# Patient Record
Sex: FEMALE | Race: WHITE | NOT HISPANIC OR LATINO | Employment: OTHER | URBAN - METROPOLITAN AREA
[De-identification: names, ages, dates, MRNs, and addresses within clinical notes are randomized per-mention and may not be internally consistent; named-entity substitution may affect disease eponyms.]

---

## 2017-01-09 ENCOUNTER — HOSPITAL ENCOUNTER (OUTPATIENT)
Dept: RADIOLOGY | Facility: MEDICAL CENTER | Age: 82
End: 2017-01-09
Attending: FAMILY MEDICINE
Payer: MEDICARE

## 2017-01-09 DIAGNOSIS — Z13.9 SCREENING: ICD-10-CM

## 2017-01-09 PROCEDURE — 77063 BREAST TOMOSYNTHESIS BI: CPT

## 2017-01-18 ENCOUNTER — APPOINTMENT (OUTPATIENT)
Dept: RADIOLOGY | Facility: MEDICAL CENTER | Age: 82
DRG: 189 | End: 2017-01-18
Attending: EMERGENCY MEDICINE
Payer: MEDICARE

## 2017-01-18 ENCOUNTER — HOSPITAL ENCOUNTER (INPATIENT)
Facility: MEDICAL CENTER | Age: 82
LOS: 12 days | DRG: 189 | End: 2017-01-30
Attending: EMERGENCY MEDICINE | Admitting: INTERNAL MEDICINE
Payer: MEDICARE

## 2017-01-18 ENCOUNTER — RESOLUTE PROFESSIONAL BILLING HOSPITAL PROF FEE (OUTPATIENT)
Dept: HOSPITALIST | Facility: MEDICAL CENTER | Age: 82
End: 2017-01-18
Payer: MEDICARE

## 2017-01-18 DIAGNOSIS — J84.9 INTERSTITIAL LUNG DISEASE (HCC): ICD-10-CM

## 2017-01-18 DIAGNOSIS — R09.02 HYPOXEMIA: ICD-10-CM

## 2017-01-18 DIAGNOSIS — R55 NEAR SYNCOPE: ICD-10-CM

## 2017-01-18 DIAGNOSIS — R91.1 LUNG NODULE: ICD-10-CM

## 2017-01-18 DIAGNOSIS — Z90.11 S/P RIGHT MASTECTOMY: ICD-10-CM

## 2017-01-18 DIAGNOSIS — I47.20 V TACH (HCC): ICD-10-CM

## 2017-01-18 DIAGNOSIS — J18.9 COMMUNITY ACQUIRED PNEUMONIA: ICD-10-CM

## 2017-01-18 DIAGNOSIS — J96.01 ACUTE RESPIRATORY FAILURE WITH HYPOXIA (HCC): ICD-10-CM

## 2017-01-18 PROBLEM — J96.90 RESPIRATORY FAILURE (HCC): Status: ACTIVE | Noted: 2017-01-18

## 2017-01-18 LAB
ALBUMIN SERPL BCP-MCNC: 3 G/DL (ref 3.2–4.9)
ALBUMIN/GLOB SERPL: 0.7 G/DL
ALP SERPL-CCNC: 95 U/L (ref 30–99)
ALT SERPL-CCNC: 19 U/L (ref 2–50)
ANION GAP SERPL CALC-SCNC: 9 MMOL/L (ref 0–11.9)
AST SERPL-CCNC: 27 U/L (ref 12–45)
BASOPHILS # BLD AUTO: 0.7 % (ref 0–1.8)
BASOPHILS # BLD: 0.05 K/UL (ref 0–0.12)
BILIRUB SERPL-MCNC: 0.2 MG/DL (ref 0.1–1.5)
BNP SERPL-MCNC: 75 PG/ML (ref 0–100)
BUN SERPL-MCNC: 26 MG/DL (ref 8–22)
CALCIUM SERPL-MCNC: 9.5 MG/DL (ref 8.4–10.2)
CHLORIDE SERPL-SCNC: 104 MMOL/L (ref 96–112)
CO2 SERPL-SCNC: 24 MMOL/L (ref 20–33)
CREAT SERPL-MCNC: 1.22 MG/DL (ref 0.5–1.4)
DEPRECATED D DIMER PPP IA-ACNC: 783 NG/ML(D-DU)
EKG IMPRESSION: NORMAL
EOSINOPHIL # BLD AUTO: 0.25 K/UL (ref 0–0.51)
EOSINOPHIL NFR BLD: 3.4 % (ref 0–6.9)
ERYTHROCYTE [DISTWIDTH] IN BLOOD BY AUTOMATED COUNT: 45.5 FL (ref 35.9–50)
GFR SERPL CREATININE-BSD FRML MDRD: 42 ML/MIN/1.73 M 2
GLOBULIN SER CALC-MCNC: 4.3 G/DL (ref 1.9–3.5)
GLUCOSE SERPL-MCNC: 142 MG/DL (ref 65–99)
HCT VFR BLD AUTO: 37.7 % (ref 37–47)
HGB BLD-MCNC: 12.6 G/DL (ref 12–16)
IMM GRANULOCYTES # BLD AUTO: 0.05 K/UL (ref 0–0.11)
IMM GRANULOCYTES NFR BLD AUTO: 0.7 % (ref 0–0.9)
LACTATE BLD-SCNC: 1.3 MMOL/L (ref 0.5–2)
LYMPHOCYTES # BLD AUTO: 0.78 K/UL (ref 1–4.8)
LYMPHOCYTES NFR BLD: 10.5 % (ref 22–41)
MCH RBC QN AUTO: 29.7 PG (ref 27–33)
MCHC RBC AUTO-ENTMCNC: 33.4 G/DL (ref 33.6–35)
MCV RBC AUTO: 88.9 FL (ref 81.4–97.8)
MONOCYTES # BLD AUTO: 0.9 K/UL (ref 0–0.85)
MONOCYTES NFR BLD AUTO: 12.1 % (ref 0–13.4)
NEUTROPHILS # BLD AUTO: 5.42 K/UL (ref 2–7.15)
NEUTROPHILS NFR BLD: 72.6 % (ref 44–72)
NRBC # BLD AUTO: 0 K/UL
NRBC BLD AUTO-RTO: 0 /100 WBC
PLATELET # BLD AUTO: 250 K/UL (ref 164–446)
PMV BLD AUTO: 8.3 FL (ref 9–12.9)
POTASSIUM SERPL-SCNC: 4.4 MMOL/L (ref 3.6–5.5)
PROT SERPL-MCNC: 7.3 G/DL (ref 6–8.2)
RBC # BLD AUTO: 4.24 M/UL (ref 4.2–5.4)
SODIUM SERPL-SCNC: 137 MMOL/L (ref 135–145)
SPECIMEN DRAWN FROM PATIENT: NORMAL
TROPONIN I SERPL-MCNC: <0.02 NG/ML (ref 0–0.04)
WBC # BLD AUTO: 7.5 K/UL (ref 4.8–10.8)

## 2017-01-18 PROCEDURE — 94760 N-INVAS EAR/PLS OXIMETRY 1: CPT

## 2017-01-18 PROCEDURE — 84484 ASSAY OF TROPONIN QUANT: CPT

## 2017-01-18 PROCEDURE — 700111 HCHG RX REV CODE 636 W/ 250 OVERRIDE (IP): Performed by: INTERNAL MEDICINE

## 2017-01-18 PROCEDURE — 83605 ASSAY OF LACTIC ACID: CPT

## 2017-01-18 PROCEDURE — 700102 HCHG RX REV CODE 250 W/ 637 OVERRIDE(OP): Performed by: EMERGENCY MEDICINE

## 2017-01-18 PROCEDURE — 99223 1ST HOSP IP/OBS HIGH 75: CPT | Mod: AI | Performed by: INTERNAL MEDICINE

## 2017-01-18 PROCEDURE — 700111 HCHG RX REV CODE 636 W/ 250 OVERRIDE (IP)

## 2017-01-18 PROCEDURE — A9270 NON-COVERED ITEM OR SERVICE: HCPCS | Performed by: EMERGENCY MEDICINE

## 2017-01-18 PROCEDURE — 700102 HCHG RX REV CODE 250 W/ 637 OVERRIDE(OP): Performed by: INTERNAL MEDICINE

## 2017-01-18 PROCEDURE — 99285 EMERGENCY DEPT VISIT HI MDM: CPT

## 2017-01-18 PROCEDURE — 93010 ELECTROCARDIOGRAM REPORT: CPT | Performed by: INTERNAL MEDICINE

## 2017-01-18 PROCEDURE — 700117 HCHG RX CONTRAST REV CODE 255: Performed by: EMERGENCY MEDICINE

## 2017-01-18 PROCEDURE — 96374 THER/PROPH/DIAG INJ IV PUSH: CPT

## 2017-01-18 PROCEDURE — 85379 FIBRIN DEGRADATION QUANT: CPT

## 2017-01-18 PROCEDURE — 93005 ELECTROCARDIOGRAM TRACING: CPT | Performed by: INTERNAL MEDICINE

## 2017-01-18 PROCEDURE — 36415 COLL VENOUS BLD VENIPUNCTURE: CPT

## 2017-01-18 PROCEDURE — 700105 HCHG RX REV CODE 258

## 2017-01-18 PROCEDURE — 94640 AIRWAY INHALATION TREATMENT: CPT

## 2017-01-18 PROCEDURE — 83880 ASSAY OF NATRIURETIC PEPTIDE: CPT

## 2017-01-18 PROCEDURE — 700105 HCHG RX REV CODE 258: Performed by: INTERNAL MEDICINE

## 2017-01-18 PROCEDURE — 700111 HCHG RX REV CODE 636 W/ 250 OVERRIDE (IP): Performed by: EMERGENCY MEDICINE

## 2017-01-18 PROCEDURE — 80053 COMPREHEN METABOLIC PANEL: CPT

## 2017-01-18 PROCEDURE — 71010 DX-CHEST-PORTABLE (1 VIEW): CPT

## 2017-01-18 PROCEDURE — 770020 HCHG ROOM/CARE - TELE (206)

## 2017-01-18 PROCEDURE — 72131 CT LUMBAR SPINE W/O DYE: CPT

## 2017-01-18 PROCEDURE — 71275 CT ANGIOGRAPHY CHEST: CPT

## 2017-01-18 PROCEDURE — A9270 NON-COVERED ITEM OR SERVICE: HCPCS | Performed by: INTERNAL MEDICINE

## 2017-01-18 PROCEDURE — 85025 COMPLETE CBC W/AUTO DIFF WBC: CPT

## 2017-01-18 PROCEDURE — 93005 ELECTROCARDIOGRAM TRACING: CPT

## 2017-01-18 RX ORDER — DOCUSATE SODIUM 100 MG/1
100 CAPSULE, LIQUID FILLED ORAL 2 TIMES DAILY
Status: DISCONTINUED | OUTPATIENT
Start: 2017-01-18 | End: 2017-01-18

## 2017-01-18 RX ORDER — DOCUSATE SODIUM 100 MG/1
100 CAPSULE, LIQUID FILLED ORAL EVERY MORNING
Status: DISCONTINUED | OUTPATIENT
Start: 2017-01-19 | End: 2017-01-30 | Stop reason: HOSPADM

## 2017-01-18 RX ORDER — ONDANSETRON 4 MG/1
4 TABLET, ORALLY DISINTEGRATING ORAL EVERY 4 HOURS PRN
Status: DISCONTINUED | OUTPATIENT
Start: 2017-01-18 | End: 2017-01-30 | Stop reason: HOSPADM

## 2017-01-18 RX ORDER — GABAPENTIN 300 MG/1
CAPSULE ORAL
Status: COMPLETED
Start: 2017-01-18 | End: 2017-01-18

## 2017-01-18 RX ORDER — AMOXICILLIN 250 MG
1 CAPSULE ORAL
Status: DISCONTINUED | OUTPATIENT
Start: 2017-01-18 | End: 2017-01-30 | Stop reason: HOSPADM

## 2017-01-18 RX ORDER — IPRATROPIUM BROMIDE AND ALBUTEROL SULFATE 2.5; .5 MG/3ML; MG/3ML
3 SOLUTION RESPIRATORY (INHALATION)
Status: DISCONTINUED | OUTPATIENT
Start: 2017-01-18 | End: 2017-01-20 | Stop reason: ALTCHOICE

## 2017-01-18 RX ORDER — AMOXICILLIN 250 MG
1 CAPSULE ORAL NIGHTLY
Status: DISCONTINUED | OUTPATIENT
Start: 2017-01-19 | End: 2017-01-30 | Stop reason: HOSPADM

## 2017-01-18 RX ORDER — ALPRAZOLAM 0.25 MG/1
0.25 TABLET ORAL NIGHTLY PRN
Status: DISCONTINUED | OUTPATIENT
Start: 2017-01-18 | End: 2017-01-30 | Stop reason: HOSPADM

## 2017-01-18 RX ORDER — FLUTICASONE PROPIONATE 50 MCG
1 SPRAY, SUSPENSION (ML) NASAL
Status: DISCONTINUED | OUTPATIENT
Start: 2017-01-18 | End: 2017-01-30 | Stop reason: HOSPADM

## 2017-01-18 RX ORDER — MORPHINE SULFATE 4 MG/ML
4 INJECTION, SOLUTION INTRAMUSCULAR; INTRAVENOUS ONCE
Status: COMPLETED | OUTPATIENT
Start: 2017-01-18 | End: 2017-01-18

## 2017-01-18 RX ORDER — HYDROCODONE BITARTRATE AND ACETAMINOPHEN 5; 325 MG/1; MG/1
1-2 TABLET ORAL EVERY 4 HOURS PRN
Status: DISCONTINUED | OUTPATIENT
Start: 2017-01-18 | End: 2017-01-21

## 2017-01-18 RX ORDER — GABAPENTIN 300 MG/1
300 CAPSULE ORAL 3 TIMES DAILY
Status: DISCONTINUED | OUTPATIENT
Start: 2017-01-18 | End: 2017-01-30 | Stop reason: HOSPADM

## 2017-01-18 RX ORDER — HEPARIN SODIUM 5000 [USP'U]/ML
5000 INJECTION, SOLUTION INTRAVENOUS; SUBCUTANEOUS EVERY 8 HOURS
Status: DISCONTINUED | OUTPATIENT
Start: 2017-01-19 | End: 2017-01-30 | Stop reason: HOSPADM

## 2017-01-18 RX ORDER — SODIUM CHLORIDE 9 MG/ML
INJECTION, SOLUTION INTRAVENOUS CONTINUOUS
Status: ACTIVE | OUTPATIENT
Start: 2017-01-18 | End: 2017-01-19

## 2017-01-18 RX ORDER — ONDANSETRON 2 MG/ML
4 INJECTION INTRAMUSCULAR; INTRAVENOUS EVERY 4 HOURS PRN
Status: DISCONTINUED | OUTPATIENT
Start: 2017-01-18 | End: 2017-01-30 | Stop reason: HOSPADM

## 2017-01-18 RX ORDER — PREDNISONE 20 MG/1
40 TABLET ORAL
Status: COMPLETED | OUTPATIENT
Start: 2017-01-18 | End: 2017-01-22

## 2017-01-18 RX ORDER — LACTULOSE 20 G/30ML
30 SOLUTION ORAL
Status: DISCONTINUED | OUTPATIENT
Start: 2017-01-18 | End: 2017-01-30 | Stop reason: HOSPADM

## 2017-01-18 RX ORDER — ENEMA 19; 7 G/133ML; G/133ML
1 ENEMA RECTAL
Status: DISCONTINUED | OUTPATIENT
Start: 2017-01-18 | End: 2017-01-18

## 2017-01-18 RX ORDER — BISACODYL 10 MG
10 SUPPOSITORY, RECTAL RECTAL
Status: DISCONTINUED | OUTPATIENT
Start: 2017-01-18 | End: 2017-01-30 | Stop reason: HOSPADM

## 2017-01-18 RX ADMIN — CEFTRIAXONE 1 G: 1 INJECTION, POWDER, FOR SOLUTION INTRAMUSCULAR; INTRAVENOUS at 23:15

## 2017-01-18 RX ADMIN — AZITHROMYCIN MONOHYDRATE 250 MG: 500 INJECTION, POWDER, LYOPHILIZED, FOR SOLUTION INTRAVENOUS at 23:51

## 2017-01-18 RX ADMIN — MORPHINE SULFATE 4 MG: 4 INJECTION INTRAVENOUS at 19:57

## 2017-01-18 RX ADMIN — ALPRAZOLAM 0.25 MG: 0.25 TABLET ORAL at 23:50

## 2017-01-18 RX ADMIN — GABAPENTIN 300 MG: 300 CAPSULE ORAL at 21:23

## 2017-01-18 RX ADMIN — IOHEXOL 100 ML: 350 INJECTION, SOLUTION INTRAVENOUS at 20:50

## 2017-01-18 RX ADMIN — SODIUM CHLORIDE: 9 INJECTION, SOLUTION INTRAVENOUS at 23:44

## 2017-01-18 RX ADMIN — PREDNISONE 40 MG: 20 TABLET ORAL at 23:44

## 2017-01-18 ASSESSMENT — COPD QUESTIONNAIRES
HAVE YOU SMOKED AT LEAST 100 CIGARETTES IN YOUR ENTIRE LIFE: YES
DO YOU EVER COUGH UP ANY MUCUS OR PHLEGM?: NO/ONLY WITH OCCASIONAL COLDS OR INFECTIONS
DURING THE PAST 4 WEEKS HOW MUCH DID YOU FEEL SHORT OF BREATH: SOME OF THE TIME
COPD SCREENING SCORE: 6

## 2017-01-18 ASSESSMENT — LIFESTYLE VARIABLES
CONSUMPTION TOTAL: POSITIVE
EVER_SMOKED: YES
TOTAL SCORE: 0
HAVE PEOPLE ANNOYED YOU BY CRITICIZING YOUR DRINKING: NO
TOTAL SCORE: 0
HOW MANY TIMES IN THE PAST YEAR HAVE YOU HAD 5 OR MORE DRINKS IN A DAY: 0
TOTAL SCORE: 0
DO YOU DRINK ALCOHOL: YES
ON A TYPICAL DAY WHEN YOU DRINK ALCOHOL HOW MANY DRINKS DO YOU HAVE: 2
HAVE YOU EVER FELT YOU SHOULD CUT DOWN ON YOUR DRINKING: NO
EVER HAD A DRINK FIRST THING IN THE MORNING TO STEADY YOUR NERVES TO GET RID OF A HANGOVER: NO
AVERAGE NUMBER OF DAYS PER WEEK YOU HAVE A DRINK CONTAINING ALCOHOL: 7
EVER FELT BAD OR GUILTY ABOUT YOUR DRINKING: NO

## 2017-01-18 ASSESSMENT — PAIN SCALES - GENERAL
PAINLEVEL_OUTOF10: 0
PAINLEVEL_OUTOF10: 10

## 2017-01-19 PROBLEM — D35.00 ADRENAL ADENOMA: Status: ACTIVE | Noted: 2017-01-19

## 2017-01-19 PROBLEM — R91.1 LUNG NODULE: Status: ACTIVE | Noted: 2017-01-19

## 2017-01-19 PROBLEM — I47.20 V TACH (HCC): Status: ACTIVE | Noted: 2017-01-19

## 2017-01-19 PROBLEM — I10 HYPERTENSION: Status: ACTIVE | Noted: 2017-01-19

## 2017-01-19 LAB
ANION GAP SERPL CALC-SCNC: 8 MMOL/L (ref 0–11.9)
BUN SERPL-MCNC: 18 MG/DL (ref 8–22)
CALCIUM SERPL-MCNC: 9 MG/DL (ref 8.4–10.2)
CHLORIDE SERPL-SCNC: 107 MMOL/L (ref 96–112)
CO2 SERPL-SCNC: 22 MMOL/L (ref 20–33)
CREAT SERPL-MCNC: 0.94 MG/DL (ref 0.5–1.4)
EKG IMPRESSION: NORMAL
ERYTHROCYTE [DISTWIDTH] IN BLOOD BY AUTOMATED COUNT: 46.5 FL (ref 35.9–50)
GFR SERPL CREATININE-BSD FRML MDRD: 56 ML/MIN/1.73 M 2
GLUCOSE SERPL-MCNC: 174 MG/DL (ref 65–99)
HCT VFR BLD AUTO: 35.2 % (ref 37–47)
HGB BLD-MCNC: 11.6 G/DL (ref 12–16)
MCH RBC QN AUTO: 29.7 PG (ref 27–33)
MCHC RBC AUTO-ENTMCNC: 33 G/DL (ref 33.6–35)
MCV RBC AUTO: 90.3 FL (ref 81.4–97.8)
PLATELET # BLD AUTO: 238 K/UL (ref 164–446)
PMV BLD AUTO: 10.1 FL (ref 9–12.9)
POTASSIUM SERPL-SCNC: 3.6 MMOL/L (ref 3.6–5.5)
RBC # BLD AUTO: 3.9 M/UL (ref 4.2–5.4)
SODIUM SERPL-SCNC: 137 MMOL/L (ref 135–145)
WBC # BLD AUTO: 7 K/UL (ref 4.8–10.8)

## 2017-01-19 PROCEDURE — A9270 NON-COVERED ITEM OR SERVICE: HCPCS | Performed by: INTERNAL MEDICINE

## 2017-01-19 PROCEDURE — 770020 HCHG ROOM/CARE - TELE (206)

## 2017-01-19 PROCEDURE — 700105 HCHG RX REV CODE 258: Performed by: INTERNAL MEDICINE

## 2017-01-19 PROCEDURE — 700102 HCHG RX REV CODE 250 W/ 637 OVERRIDE(OP): Performed by: INTERNAL MEDICINE

## 2017-01-19 PROCEDURE — 94640 AIRWAY INHALATION TREATMENT: CPT

## 2017-01-19 PROCEDURE — 93306 TTE W/DOPPLER COMPLETE: CPT

## 2017-01-19 PROCEDURE — 700112 HCHG RX REV CODE 229: Performed by: INTERNAL MEDICINE

## 2017-01-19 PROCEDURE — 80048 BASIC METABOLIC PNL TOTAL CA: CPT

## 2017-01-19 PROCEDURE — 700111 HCHG RX REV CODE 636 W/ 250 OVERRIDE (IP): Performed by: INTERNAL MEDICINE

## 2017-01-19 PROCEDURE — 99232 SBSQ HOSP IP/OBS MODERATE 35: CPT | Performed by: INTERNAL MEDICINE

## 2017-01-19 PROCEDURE — 93306 TTE W/DOPPLER COMPLETE: CPT | Mod: 26 | Performed by: INTERNAL MEDICINE

## 2017-01-19 PROCEDURE — 700102 HCHG RX REV CODE 250 W/ 637 OVERRIDE(OP): Performed by: EMERGENCY MEDICINE

## 2017-01-19 PROCEDURE — A9270 NON-COVERED ITEM OR SERVICE: HCPCS | Performed by: EMERGENCY MEDICINE

## 2017-01-19 PROCEDURE — 700101 HCHG RX REV CODE 250: Performed by: INTERNAL MEDICINE

## 2017-01-19 PROCEDURE — 85027 COMPLETE CBC AUTOMATED: CPT

## 2017-01-19 PROCEDURE — 94760 N-INVAS EAR/PLS OXIMETRY 1: CPT

## 2017-01-19 RX ADMIN — ALPRAZOLAM 0.25 MG: 0.25 TABLET ORAL at 21:13

## 2017-01-19 RX ADMIN — PREDNISONE 40 MG: 20 TABLET ORAL at 21:23

## 2017-01-19 RX ADMIN — DOCUSATE SODIUM 100 MG: 100 CAPSULE, LIQUID FILLED ORAL at 10:22

## 2017-01-19 RX ADMIN — HEPARIN SODIUM 5000 UNITS: 5000 INJECTION, SOLUTION INTRAVENOUS; SUBCUTANEOUS at 06:19

## 2017-01-19 RX ADMIN — GABAPENTIN 300 MG: 300 CAPSULE ORAL at 10:22

## 2017-01-19 RX ADMIN — IPRATROPIUM BROMIDE AND ALBUTEROL SULFATE 3 ML: .5; 3 SOLUTION RESPIRATORY (INHALATION) at 07:28

## 2017-01-19 RX ADMIN — GABAPENTIN 300 MG: 300 CAPSULE ORAL at 21:13

## 2017-01-19 RX ADMIN — IPRATROPIUM BROMIDE AND ALBUTEROL SULFATE 3 ML: .5; 3 SOLUTION RESPIRATORY (INHALATION) at 00:22

## 2017-01-19 RX ADMIN — HEPARIN SODIUM 5000 UNITS: 5000 INJECTION, SOLUTION INTRAVENOUS; SUBCUTANEOUS at 21:13

## 2017-01-19 RX ADMIN — CEFTRIAXONE SODIUM 1 G: 1 INJECTION, POWDER, FOR SOLUTION INTRAMUSCULAR; INTRAVENOUS at 21:13

## 2017-01-19 RX ADMIN — DEXTROSE MONOHYDRATE 500 MG: 50 INJECTION, SOLUTION INTRAVENOUS at 22:11

## 2017-01-19 RX ADMIN — IPRATROPIUM BROMIDE AND ALBUTEROL SULFATE 3 ML: .5; 3 SOLUTION RESPIRATORY (INHALATION) at 19:05

## 2017-01-19 RX ADMIN — GABAPENTIN 300 MG: 300 CAPSULE ORAL at 14:13

## 2017-01-19 RX ADMIN — METOPROLOL TARTRATE 25 MG: 25 TABLET ORAL at 21:13

## 2017-01-19 RX ADMIN — CHOLECALCIFEROL TAB 25 MCG (1000 UNIT) 1000 UNITS: 25 TAB at 10:30

## 2017-01-19 RX ADMIN — METOPROLOL TARTRATE 25 MG: 25 TABLET ORAL at 12:36

## 2017-01-19 RX ADMIN — SENNOSIDES AND DOCUSATE SODIUM 1 TABLET: 8.6; 5 TABLET ORAL at 21:12

## 2017-01-19 RX ADMIN — HEPARIN SODIUM 5000 UNITS: 5000 INJECTION, SOLUTION INTRAVENOUS; SUBCUTANEOUS at 14:13

## 2017-01-19 RX ADMIN — IPRATROPIUM BROMIDE AND ALBUTEROL SULFATE 3 ML: .5; 3 SOLUTION RESPIRATORY (INHALATION) at 14:58

## 2017-01-19 ASSESSMENT — ENCOUNTER SYMPTOMS
ABDOMINAL PAIN: 0
FEVER: 0
SHORTNESS OF BREATH: 1
SPUTUM PRODUCTION: 0
DIARRHEA: 0
WHEEZING: 0
NAUSEA: 0
CONSTIPATION: 0
COUGH: 1
HEMOPTYSIS: 0
CHILLS: 0
VOMITING: 0

## 2017-01-19 ASSESSMENT — PAIN SCALES - GENERAL: PAINLEVEL_OUTOF10: 0

## 2017-01-19 NOTE — FLOWSHEET NOTE
01/19/17 1455   RT Assessment of Delivered Medications   Evaluation of Medication Delivery Daily Yes-- Pt /Family has been Instructed in use of Respiratory Medications and Adverse Reactions   SVN Group   #SVN Performed Yes   Given By: Mouthpiece   Incentive Spirometry Group   Breathing Exercises Yes   Incentive Spirometer Volume 1250 mL   Chest Exam   Respiration 18   Pulse 71   Breath Sounds   RUL Breath Sounds Clear   RML Breath Sounds Clear   RLL Breath Sounds Fine Crackles;Diminished   KRISTINA Breath Sounds Clear   LLL Breath Sounds Fine Crackles;Diminished   Secretions   Cough Dry;Non Productive   Oximetry   #Pulse Oximetry (Single Determination) Yes   Oxygen   Home O2 Use Prior To Admission? No   Pulse Oximetry 92 %   O2 (LPM) 2   O2 Daily Delivery Respiratory  Nasal Cannula

## 2017-01-19 NOTE — PROGRESS NOTES
Hospital Medicine Progress Note, Adult, Complex               Author: Shefali Cooney Date & Time created: 1/19/2017  11:20 AM     Interval History:  CC - SOB    Pt reports subjective improvement but still SOB  Still requiring 3L O2 (not on any at home)  No f/c/n/v/d  BP better controlled this morning  7 beats VT overnight on tele    Review of Systems:  Review of Systems   Constitutional: Negative for fever and chills.   Respiratory: Positive for cough (dry) and shortness of breath. Negative for hemoptysis, sputum production and wheezing.    Cardiovascular: Negative for chest pain and leg swelling.   Gastrointestinal: Negative for nausea, vomiting, abdominal pain, diarrhea and constipation.   Genitourinary: Negative for dysuria, urgency and frequency.   Skin: Negative for rash.   All other systems reviewed and are negative.      Physical Exam:  Physical Exam   Constitutional: She is oriented to person, place, and time. She appears well-developed and well-nourished. No distress.   HENT:   Head: Normocephalic and atraumatic.   Mouth/Throat: Oropharynx is clear and moist.   Eyes: Conjunctivae and EOM are normal. Pupils are equal, round, and reactive to light. Right eye exhibits no discharge. Left eye exhibits no discharge. No scleral icterus.   Neck: Neck supple. No tracheal deviation present.   Cardiovascular: Normal rate, regular rhythm and normal heart sounds.    Pulmonary/Chest: Effort normal. No respiratory distress. She has no wheezes. She has rales (bibasilar).   Abdominal: Soft. Bowel sounds are normal. She exhibits no distension. There is no tenderness.   Musculoskeletal: She exhibits no edema.   Lymphadenopathy:     She has no cervical adenopathy.        Right: No supraclavicular adenopathy present.        Left: No supraclavicular adenopathy present.   Neurological: She is alert and oriented to person, place, and time. No cranial nerve deficit.   Skin: Skin is warm and dry. No rash noted. She is not  diaphoretic.   Psychiatric: She has a normal mood and affect. Her behavior is normal.   Nursing note and vitals reviewed.      Labs:  Recent Labs      17   ISTATSPEC  Venous     Recent Labs      17   TROPONINI  <0.02   BNPBTYPENAT  75     Recent Labs      17   SODIUM  137  137   POTASSIUM  4.4  3.6   CHLORIDE  104  107   CO2  24  22   BUN  26*  18   CREATININE  1.22  0.94   CALCIUM  9.5  9.0     Recent Labs      17   ALTSGPT  19   --    ASTSGOT  27   --    ALKPHOSPHAT  95   --    TBILIRUBIN  0.2   --    GLUCOSE  142*  174*     Recent Labs      17   RBC  4.24  3.90*   HEMOGLOBIN  12.6  11.6*   HEMATOCRIT  37.7  35.2*   PLATELETCT  250  238     Recent Labs      17   WBC  7.5  7.0   NEUTSPOLYS  72.60*   --    LYMPHOCYTES  10.50*   --    MONOCYTES  12.10   --    EOSINOPHILS  3.40   --    BASOPHILS  0.70   --    ASTSGOT  27   --    ALTSGPT  19   --    ALKPHOSPHAT  95   --    TBILIRUBIN  0.2   --            Hemodynamics:  Temp (24hrs), Av.9 °C (98.4 °F), Min:36.6 °C (97.8 °F), Max:37.4 °C (99.3 °F)  Temperature: 36.6 °C (97.8 °F)  Pulse  Av.7  Min: 75  Max: 92Heart Rate (Monitored): 79  Blood Pressure : 135/56 mmHg, NIBP: (!) 162/79 mmHg     Respiratory:    Respiration: 18, Pulse Oximetry: 93 %, O2 Daily Delivery Respiratory : Nasal Cannula     Given By:: Mouthpiece, Work Of Breathing / Effort: Mild  RUL Breath Sounds: Clear, RML Breath Sounds: Clear, RLL Breath Sounds: Fine Crackles, KRISTINA Breath Sounds: Clear, LLL Breath Sounds: Fine Crackles  Fluids:    Intake/Output Summary (Last 24 hours) at 17 1120  Last data filed at 17 0800   Gross per 24 hour   Intake    970 ml   Output      0 ml   Net    970 ml     Weight: 71.2 kg (156 lb 15.5 oz)  GI/Nutrition:  Orders Placed This Encounter   Procedures   • Diet Order     Standing Status: Standing      Number of  Occurrences: 1      Standing Expiration Date:      Order Specific Question:  Diet:     Answer:  Regular [1]     Medical Decision Making, by Problem:  Active Hospital Problems    Diagnosis   • *Respiratory failure (CMS-HCC) [J96.90] - subjective improvement, cont o2 to maintain sats >90%   • V tach (CMS-HCC) [I47.2] - get echo, start metoprolol, cont tele   • Lung nodule [R91.1] - outpt follow up   • Adrenal adenoma [D35.00]   • Hypertension [I10] - better controlled this AM, started metoprolol   • Interstitial lung disease (CMS-HCC) [J84.9] - rec outpatient pulm referral   • Community acquired pneumonia [J18.9] c3 & azithro as noted above, sputum cx pending   • COPD (CMS-HCC) [J44.9] no wheezing currently, bimal prn       Core Measures

## 2017-01-19 NOTE — PROGRESS NOTES
Report received from Yennifer at bedside, pt on the phone with her .  Morning assessment and medications given about 1020 after pt had napped.  For plan for the day, pt agreed that she would ambulate to the bathroom and walk the halls.  As decreased her oxygen to 2 lt/min and when checking back she has been >90%.  About 1315, pt assisted to the bathroom with a fww as she is unsteady without assistive devices; ra sat dropped to 83% and she felt dizzy.  Pt ambulated down the halls about 1400 with a fww and she is little unsteady on her right ankle and she is aware of this; she states she has the medical equipment at home as she needed it when.  Started on metoprolol at about lunchtime and had a discussion about her AAA increasing.  VSS.

## 2017-01-19 NOTE — ED NOTES
EKG performed - No stemi. Pt's vital signs checked in triage room. Pt sating at 77 on RA. Pt placed on 4L O2 via NC. Pt roomed to 7B

## 2017-01-19 NOTE — ED PROVIDER NOTES
ED Provider Note    CHIEF COMPLAINT  Chief Complaint   Patient presents with   • Chest Pain   • Shortness of Breath   • Back Pain       HPI  Yesi Garduno is a 88 y.o. female who presents for evaluation of hypoxemia. The patient has a home oximeter which she has been using intermittently and she is noted to have oxygenation in the 80s. Today her  checked her oxygen level and it was noted to be in the 70s. She is brought to the emergency department for evaluation. She is having shortness of breath. The patient denies any chest pain, denies cough, denies any pleuritic pain. The patient has no history of pneumonia. She has had mastectomy remotely in the 70s for breast cancer with no recurrence. She does have a diagnosis of COPD noted on the chart.  She has been recovering for ankle surgery over the past few months. The patient is also complaining of unrelenting back pain for the past week. She is having some intermittent numbness down the right leg. She denies any fever or chills. The patient has had no bowel or bladder control issues she denies any dysuria. The patient was a former smoker for 34 years not currently    REVIEW OF SYSTEMS  See HPI for further details. All other systems are reviewed and negative except as noted above    PAST MEDICAL HISTORY  Past Medical History   Diagnosis Date   • Cancer (CMS-HCC)    • Breast cancer (CMS-HCC)    • Near syncope 8/11/2015   • AAA (abdominal aortic aneurysm) (CMS-HCC) 8/11/2015   • COPD (chronic obstructive pulmonary disease) (CMS-HCC) 8/11/2015   • S/P right mastectomy 8/11/2015       FAMILY HISTORY  Family History   Problem Relation Age of Onset   • Cancer Sister    • Cancer Mother        SOCIAL HISTORY  Social History     Social History   • Marital Status:      Spouse Name: N/A   • Number of Children: N/A   • Years of Education: N/A     Social History Main Topics   • Smoking status: Former Smoker -- 34 years     Types: Cigarettes   • Smokeless tobacco:  "Never Used   • Alcohol Use: Yes   • Drug Use: No   • Sexual Activity: Not Asked     Other Topics Concern   • None     Social History Narrative       SURGICAL HISTORY  Past Surgical History   Procedure Laterality Date   • Other       mastectomy   • Mastectomy  1979     right side   • Breast biopsy  1990       CURRENT MEDICATIONS  Home Medications     Reviewed by Pablito Boyd R.N. (Registered Nurse) on 01/18/17 at 1913  Med List Status: Complete    Medication Last Dose Status    alprazolam (XANAX) 0.25 MG Tab 8/11/2016 Active    Calcium Carbonate-Vit D-Min (CALCIUM 1200 PO) 8/14/2016 Active    Coenzyme Q10 (CO Q 10 PO) 8/14/2016 Active    Cyanocobalamin (B-12 PO) 8/14/2016 Active    docusate sodium (COLACE) 100 MG Cap  Active    fluticasone (FLONASE) 50 MCG/ACT nasal spray 8/13/2016 Active    gelatin 650 MG capsule 8/14/2016 Active    hydrocodone-acetaminophen (NORCO) 5-325 MG Tab per tablet  Active    vitamin D (CHOLECALCIFEROL) 1000 UNIT Tab 8/14/2016 Active                 ALLERGIES  Allergies   Allergen Reactions   • Penicillins Vomiting   • Tetracycline Rash and Itching     Pt states \"I get a bad body rash and itch all over\".       PHYSICAL EXAM  VITAL SIGNS: /77 mmHg  Pulse 83  Temp(Src) 37 °C (98.6 °F)  Resp 22  Ht 1.753 m (5' 9.02\")  Wt 69.3 kg (152 lb 12.5 oz)  BMI 22.55 kg/m2  SpO2 93%  Constitutional :  Well developed, Well nourished, No acute distress, Non-toxic appearance.   HENT: Head is atraumatic normocephalic moist mucous membranes  Eyes: Normal-appearing nonicteric  Neck: Normal range of motion, No tenderness, Supple, No stridor.   Lymphatic: No cervical adenopathy.   Cardiovascular: Normal heart rate, Normal rhythm, No murmurs, No rubs, No gallops.   Thorax & Lungs: Equal breath sounds bilaterally no rales rhonchi  Skin: Warm, Dry, No erythema, No rash.   Neurologically she is awake alert without focal findings  Extremities no cyanosis or edema      CT-CTA CHEST PULMONARY ARTERY W/ " RECONS   Final Result      1.  No central or segmental pulmonary embolus   2.  BILATERAL interstitial lung disease, suspect atypical infection or other inflammatory process with a small component of consolidation in the RIGHT lower lobe.   3.  10 x 4 mm RIGHT middle lobe pulmonary nodule   4.  Enlarged LEFT hilar lymph node and prominent mediastinal and RIGHT hilar lymph nodes   5.  BILATERAL adrenal adenomas         RECOMMENDATION FOR PULMONARY NODULE EVALUATION:   High Risk Patient:  Initial follow up CT at 3-6 months, then at 9-12 and 24 months if no change.         Low Risk - Minimal or absent history of smoking and of other known risk factors.   High Risk - History of smoking or of other known risk factors.   Fleischner Society Guidelines for Pulmonary Nodules:  Radiology, 237:2005         CT-LSPINE W/O PLUS RECONS   Final Result      1.  No acute or suspicious abnormality identified.   2.  Osteopenia   3.  Lumbar spondylosis   4.  Slight interval enlargement of infrarenal abdominal aortic aneurysm which now measures up to 4.2 cm.      DX-CHEST-PORTABLE (1 VIEW)   Final Result      1.  Interstitial and alveolar edema, less likely atypical infection   2.  Atherosclerosis   3.  Emphysema          Results for orders placed or performed during the hospital encounter of 01/18/17   CBC WITH DIFFERENTIAL   Result Value Ref Range    WBC 7.5 4.8 - 10.8 K/uL    RBC 4.24 4.20 - 5.40 M/uL    Hemoglobin 12.6 12.0 - 16.0 g/dL    Hematocrit 37.7 37.0 - 47.0 %    MCV 88.9 81.4 - 97.8 fL    MCH 29.7 27.0 - 33.0 pg    MCHC 33.4 (L) 33.6 - 35.0 g/dL    RDW 45.5 35.9 - 50.0 fL    Platelet Count 250 164 - 446 K/uL    MPV 8.3 (L) 9.0 - 12.9 fL    Neutrophils-Polys 72.60 (H) 44.00 - 72.00 %    Lymphocytes 10.50 (L) 22.00 - 41.00 %    Monocytes 12.10 0.00 - 13.40 %    Eosinophils 3.40 0.00 - 6.90 %    Basophils 0.70 0.00 - 1.80 %    Immature Granulocytes 0.70 0.00 - 0.90 %    Nucleated RBC 0.00 /100 WBC    Neutrophils (Absolute) 5.42  2.00 - 7.15 K/uL    Lymphs (Absolute) 0.78 (L) 1.00 - 4.80 K/uL    Monos (Absolute) 0.90 (H) 0.00 - 0.85 K/uL    Eos (Absolute) 0.25 0.00 - 0.51 K/uL    Baso (Absolute) 0.05 0.00 - 0.12 K/uL    Immature Granulocytes (abs) 0.05 0.00 - 0.11 K/uL    NRBC (Absolute) 0.00 K/uL   COMP METABOLIC PANEL   Result Value Ref Range    Sodium 137 135 - 145 mmol/L    Potassium 4.4 3.6 - 5.5 mmol/L    Chloride 104 96 - 112 mmol/L    Co2 24 20 - 33 mmol/L    Anion Gap 9.0 0.0 - 11.9    Glucose 142 (H) 65 - 99 mg/dL    Bun 26 (H) 8 - 22 mg/dL    Creatinine 1.22 0.50 - 1.40 mg/dL    Calcium 9.5 8.4 - 10.2 mg/dL    AST(SGOT) 27 12 - 45 U/L    ALT(SGPT) 19 2 - 50 U/L    Alkaline Phosphatase 95 30 - 99 U/L    Total Bilirubin 0.2 0.1 - 1.5 mg/dL    Albumin 3.0 (L) 3.2 - 4.9 g/dL    Total Protein 7.3 6.0 - 8.2 g/dL    Globulin 4.3 (H) 1.9 - 3.5 g/dL    A-G Ratio 0.7 g/dL   TROPONIN   Result Value Ref Range    Troponin I <0.02 0.00 - 0.04 ng/mL   BTYPE NATRIURETIC PEPTIDE   Result Value Ref Range    B Natriuretic Peptide 75 0 - 100 pg/mL   LACTIC ACID   Result Value Ref Range    Lactic Acid 1.30 0.50 - 2.00 mmol/L    Specimen Venous    D-DIMER (only helpful in low pre-test probability wells critieria. Do not order if patient ruled out by PERC criteria. See Weblinks at top of Labs section)   Result Value Ref Range    D-Dimer Screen 783 (H) <250 ng/mL(D-DU)   ESTIMATED GFR   Result Value Ref Range    GFR If African American 50 (A) >60 mL/min/1.73 m 2    GFR If Non  42 (A) >60 mL/min/1.73 m 2   EKG (NOW)   Result Value Ref Range    Report       [facility] Emergency Dept.    Test Date:  2017  Pt Name:    ADAM JOSIAH                 Department: [facility]  MRN:        [id number]                      Room:       -ROOM 7  Gender:     F                            Technician: MILTON  :        [id number]                   Requested By:ER TRIAGE PROTOCOL  Order #:    [id number]                    Reading  MD:    Measurements  Intervals                                Axis  Rate:       93                           P:          73  NV:         167                          QRS:        79  QRSD:       101                          T:          38  QT:         358  QTc:        446    Interpretive Statements  Sinus rhythm  Probable left atrial enlargement  Artifact in lead(s) I,II,III,aVR,aVL,aVF  Compared to ECG 08/11/2015 10:33:22  Poor R-wave progression no longer present         12-lead tracing sinus rhythm rate 93 no acute ST elevation compared to previous EKG 8/11/2015 poor R-wave progression longer present she intervals normal  impression is abnormal EKG without evidence of hypoxemia        COURSE & MEDICAL DECISION MAKING  Pertinent Labs & Imaging studies reviewed. (See chart for details)  The patient is presenting with evidence of acute hypoxemia. Plain films showed no evidence of pneumonia. The patient had CT imaging done to rule out possible pulmonary embolus as a cause of her hypoxemia. CT imaging shows findings of probable pneumonia in the right lower lobe. There is no evidence of pulmonary embolus. The patient has negative troponins no evidence of acute ischemia. Her hypoxemia may be a combination of COPD and pneumonia. In addition imaging of her back was done because of chronic back pain this shows no evidence of any significant acute abnormalities such as metastatic disease or significant compression fracture. She was treated with IV morphine with relief of her symptoms of her back. The patient is not stable for discharge given her hypoxemia. I discussed case with the hospitalist will be admitting. She has no evidence of sepsis or Sirs. Antibiotics will be instituted by the hospitalist    FINAL IMPRESSION  1. Hypoxemia  2. Right lower lobe pneumonia  3.      Electronically signed by: Pelon Maxwell, 1/18/2017    SHEBA

## 2017-01-19 NOTE — PROGRESS NOTES
Pt transferred from ER. Verbalized she felt very weak and required a wheelchair to transfer from [facility] to bed with Z1 staff assistance. Pt is a&oX4, pleasant and cooperative.  Admit profile and med rec completed. POC discussed with pt, questions/concerns encouraged and addressed. Pt verbalized understanding; now in bed, CLIP, fall precautions in place, will CTM. RT is at the BS.

## 2017-01-19 NOTE — FLOWSHEET NOTE
01/19/17 0728   Interdisciplinary Plan of Care-Goals (Indications)   Obstructive Ventilatory Defect or Pulmonary Disease without Obvious Obstruction History / Diagnosis   RT Assessment of Delivered Medications   Evaluation of Medication Delivery Daily Yes-- Pt /Family has been Instructed in use of Respiratory Medications and Adverse Reactions   SVN Group   #SVN Performed Yes   Given By: Mouthpiece   Incentive Spirometry Group   Incentive Spirometry Instruction Yes   Incentive Spirometer Volume 800 mL   Chest Exam   Respiration 18   Pulse 78   Breath Sounds   RUL Breath Sounds Clear   RML Breath Sounds Clear   RLL Breath Sounds Fine Crackles   KRISTINA Breath Sounds Clear   LLL Breath Sounds Fine Crackles   Secretions   Cough Strong;Dry;Non Productive   How Sputum Obtained Spontaneous   Oximetry   #Pulse Oximetry (Single Determination) Yes   Oxygen   Home O2 Use Prior To Admission? No   Pulse Oximetry 94 %   O2 (LPM) 3   O2 Daily Delivery Respiratory  Nasal Cannula

## 2017-01-19 NOTE — ED NOTES
Noted that pt had a run of vtach-7 beats, hospitalist notified, pt to be changed to a tele admission.

## 2017-01-19 NOTE — FLOWSHEET NOTE
01/18/17 4447   Events/Summary/Plan   Events/Summary/Plan Protocol SVN   Interdisciplinary Plan of Care-Goals (Indications)   Obstructive Ventilatory Defect or Pulmonary Disease without Obvious Obstruction History / Diagnosis   Interdisciplinary Plan of Care-Outcomes    Bronchodilator Outcome Patient at Stable Baseline   Education   Education Yes - Pt. / Family has been Instructed in use of Respiratory Equipment;Yes - Pt. / Family has been Instructed in use of Respiratory Medications and Adverse Reactions   RT Assessment of Delivered Medications   Evaluation of Medication Delivery Daily Yes-- Pt /Family has been Instructed in use of Respiratory Medications and Adverse Reactions   SVN Group   #SVN Performed Yes   Given By: Mouthpiece   Date SVN Last Changed 01/19/17   Date SVN Next Change Due (Q 7 Days) 01/26/17   Chest Exam   Respiration 20   Pulse 78   Breath Sounds   Pre/Post Intervention Post Intervention Assessment   RUL Breath Sounds Clear   RML Breath Sounds Clear   RLL Breath Sounds Diminished   KRISTINA Breath Sounds Clear   LLL Breath Sounds Diminished   Oximetry   #Pulse Oximetry (Single Determination) Yes   Oxygen   Home O2 Use Prior To Admission? No   Pulse Oximetry 94 %   O2 (LPM) 2   O2 Daily Delivery Respiratory  Nasal Cannula   Room Air Challenge Fail

## 2017-01-19 NOTE — RESPIRATORY CARE
COPD Coordinator to see patient, but she was asleep. Will check back. Left COPD packet at the bedside.

## 2017-01-19 NOTE — PROGRESS NOTES
NO acute changes in pt status. Pt is awake, resting in bed with RR even and unlabored. Call light in reach and bed alarm set-- monitoring.     Telemetry Report: pt has been SR/ST.  HR: 80s- low 100s  Measurements: (16/10/36)  Ectopy: f PVC, couplet    Measurements and updates per Chance MATHEWS.

## 2017-01-19 NOTE — ED NOTES
Pt bib family with c/o of SOB and hypoxia, was found at home by family with oxygen saturation in the 70s. States she is currently have chest pain with right hip/leg numbness.   Pt saturating in the 70s during triage and placed on 4lpm nc and now saturating in the 90s.

## 2017-01-19 NOTE — CARE PLAN
Problem: Safety  Goal: Will remain free from falls  Outcome: PROGRESSING AS EXPECTED  Pt educated regarding the use of call light when needing assistance. Pt demonstrated and verbalized the proper use of a call light and to call for assistance. Fall precautions in place, treaded slippers on, personal belongings/phone in reach. Pt has a steady gate ambulating X1 assist to BSC and is encouraged to call if assistance is needed. Bed alarm is set.    Problem: Respiratory:  Goal: Respiratory status will improve  Outcome: PROGRESSING SLOWER THAN EXPECTED  Continues to need oxygen at 3 lpm via NC with O2sat 93-96%.

## 2017-01-19 NOTE — CARE PLAN
Problem: Bowel/Gastric:  Goal: Normal bowel function is maintained or improved  Intervention: Educate patient and significant other/support system about diet, fluid intake, medications and activity to promote bowel function  Pt has a good appetite and has been eating well.       Problem: Mobility  Goal: Risk for activity intolerance will decrease  Intervention: Encourage patient to increase activity level in collaboration with Interdisciplinary Team  PT has been ordered; after discussion, pt up to the bathroom about 1330 and desated to 83% on RA.  Ambulated down the marte about 1400 with a fww, little unsteady on with her right leg where she has a previous ankle fracture.

## 2017-01-20 LAB
EST. AVERAGE GLUCOSE BLD GHB EST-MCNC: 114 MG/DL
HBA1C MFR BLD: 5.6 % (ref 0–5.6)
LV EJECT FRACT  99904: 66
LV EJECT FRACT MOD 2C 99903: 66.48
LV EJECT FRACT MOD 4C 99902: 51
LV EJECT FRACT MOD BP 99901: 58.32

## 2017-01-20 PROCEDURE — 770020 HCHG ROOM/CARE - TELE (206)

## 2017-01-20 PROCEDURE — 700112 HCHG RX REV CODE 229: Performed by: INTERNAL MEDICINE

## 2017-01-20 PROCEDURE — 700101 HCHG RX REV CODE 250: Performed by: INTERNAL MEDICINE

## 2017-01-20 PROCEDURE — 700105 HCHG RX REV CODE 258: Performed by: INTERNAL MEDICINE

## 2017-01-20 PROCEDURE — 97162 PT EVAL MOD COMPLEX 30 MIN: CPT

## 2017-01-20 PROCEDURE — 700102 HCHG RX REV CODE 250 W/ 637 OVERRIDE(OP): Performed by: EMERGENCY MEDICINE

## 2017-01-20 PROCEDURE — 94760 N-INVAS EAR/PLS OXIMETRY 1: CPT

## 2017-01-20 PROCEDURE — 700111 HCHG RX REV CODE 636 W/ 250 OVERRIDE (IP): Performed by: INTERNAL MEDICINE

## 2017-01-20 PROCEDURE — G8979 MOBILITY GOAL STATUS: HCPCS | Mod: CJ

## 2017-01-20 PROCEDURE — 83036 HEMOGLOBIN GLYCOSYLATED A1C: CPT

## 2017-01-20 PROCEDURE — A9270 NON-COVERED ITEM OR SERVICE: HCPCS | Performed by: INTERNAL MEDICINE

## 2017-01-20 PROCEDURE — A9270 NON-COVERED ITEM OR SERVICE: HCPCS | Performed by: EMERGENCY MEDICINE

## 2017-01-20 PROCEDURE — 700102 HCHG RX REV CODE 250 W/ 637 OVERRIDE(OP): Performed by: INTERNAL MEDICINE

## 2017-01-20 PROCEDURE — G8978 MOBILITY CURRENT STATUS: HCPCS | Mod: CL

## 2017-01-20 PROCEDURE — 94640 AIRWAY INHALATION TREATMENT: CPT

## 2017-01-20 PROCEDURE — 99233 SBSQ HOSP IP/OBS HIGH 50: CPT | Performed by: INTERNAL MEDICINE

## 2017-01-20 RX ORDER — IPRATROPIUM BROMIDE AND ALBUTEROL SULFATE 2.5; .5 MG/3ML; MG/3ML
3 SOLUTION RESPIRATORY (INHALATION)
Status: DISCONTINUED | OUTPATIENT
Start: 2017-01-20 | End: 2017-01-26 | Stop reason: ALTCHOICE

## 2017-01-20 RX ORDER — IPRATROPIUM BROMIDE AND ALBUTEROL SULFATE 2.5; .5 MG/3ML; MG/3ML
3 SOLUTION RESPIRATORY (INHALATION)
Status: DISCONTINUED | OUTPATIENT
Start: 2017-01-20 | End: 2017-01-30 | Stop reason: HOSPADM

## 2017-01-20 RX ADMIN — HEPARIN SODIUM 5000 UNITS: 5000 INJECTION, SOLUTION INTRAVENOUS; SUBCUTANEOUS at 15:39

## 2017-01-20 RX ADMIN — CHOLECALCIFEROL TAB 25 MCG (1000 UNIT) 1000 UNITS: 25 TAB at 08:48

## 2017-01-20 RX ADMIN — IPRATROPIUM BROMIDE AND ALBUTEROL SULFATE 3 ML: .5; 3 SOLUTION RESPIRATORY (INHALATION) at 11:43

## 2017-01-20 RX ADMIN — HEPARIN SODIUM 5000 UNITS: 5000 INJECTION, SOLUTION INTRAVENOUS; SUBCUTANEOUS at 06:20

## 2017-01-20 RX ADMIN — CEFTRIAXONE SODIUM 1 G: 1 INJECTION, POWDER, FOR SOLUTION INTRAMUSCULAR; INTRAVENOUS at 22:09

## 2017-01-20 RX ADMIN — HEPARIN SODIUM 5000 UNITS: 5000 INJECTION, SOLUTION INTRAVENOUS; SUBCUTANEOUS at 22:15

## 2017-01-20 RX ADMIN — IPRATROPIUM BROMIDE AND ALBUTEROL SULFATE 3 ML: .5; 3 SOLUTION RESPIRATORY (INHALATION) at 18:29

## 2017-01-20 RX ADMIN — GABAPENTIN 300 MG: 300 CAPSULE ORAL at 08:49

## 2017-01-20 RX ADMIN — GABAPENTIN 300 MG: 300 CAPSULE ORAL at 15:39

## 2017-01-20 RX ADMIN — GABAPENTIN 300 MG: 300 CAPSULE ORAL at 22:15

## 2017-01-20 RX ADMIN — IPRATROPIUM BROMIDE AND ALBUTEROL SULFATE 3 ML: .5; 3 SOLUTION RESPIRATORY (INHALATION) at 07:44

## 2017-01-20 RX ADMIN — ALPRAZOLAM 0.25 MG: 0.25 TABLET ORAL at 22:15

## 2017-01-20 RX ADMIN — IPRATROPIUM BROMIDE AND ALBUTEROL SULFATE 3 ML: .5; 3 SOLUTION RESPIRATORY (INHALATION) at 15:55

## 2017-01-20 RX ADMIN — METOPROLOL TARTRATE 25 MG: 25 TABLET ORAL at 22:15

## 2017-01-20 RX ADMIN — DEXTROSE MONOHYDRATE 500 MG: 50 INJECTION, SOLUTION INTRAVENOUS at 23:06

## 2017-01-20 RX ADMIN — DOCUSATE SODIUM 100 MG: 100 CAPSULE, LIQUID FILLED ORAL at 08:47

## 2017-01-20 RX ADMIN — METOPROLOL TARTRATE 25 MG: 25 TABLET ORAL at 08:49

## 2017-01-20 RX ADMIN — SENNOSIDES AND DOCUSATE SODIUM 1 TABLET: 8.6; 5 TABLET ORAL at 22:15

## 2017-01-20 RX ADMIN — PREDNISONE 40 MG: 20 TABLET ORAL at 08:48

## 2017-01-20 ASSESSMENT — ENCOUNTER SYMPTOMS
SHORTNESS OF BREATH: 1
CONSTIPATION: 0
SPUTUM PRODUCTION: 0
FEVER: 0
CHILLS: 0
VOMITING: 0
WHEEZING: 0
COUGH: 1
HEMOPTYSIS: 0
NAUSEA: 0
DIARRHEA: 0
ABDOMINAL PAIN: 0

## 2017-01-20 ASSESSMENT — GAIT ASSESSMENTS
DISTANCE (FEET): 2
GAIT LEVEL OF ASSIST: MINIMAL ASSIST
DEVIATION: STEP TO
ASSISTIVE DEVICE: HAND HELD ASSIST

## 2017-01-20 ASSESSMENT — PAIN SCALES - GENERAL: PAINLEVEL_OUTOF10: 0

## 2017-01-20 NOTE — PROGRESS NOTES
Echo at bedside about 1600 and completed about 1630.  Attempted to turn her oxygen down to 1 lt/min but she desated quickly to 87-88%; put back to her 2 lt/min.    Tele Summary:    Strip at 0704 showed SR at 70.  Throughout the shift, pt had rare PVCs.    Measurements from am strip were as follows:  NE=0.20  QRS=0.08  QT=0.40

## 2017-01-20 NOTE — PROGRESS NOTES
Report received from eron De Oliveira sleeping during report.  Morning medications and assessment completed about 0850 after pt finished with breakfast.  When asking how she is feeling, she states she feels more fatigued today and that her back/hips are hurting which is why she went to the doctor on Tuesday.  Nightshift had to increase her oxygen to 3 lt/min as she kept desating at rest and with activity she would desat to 60-70s%.   When discussing if she ready to go home, she is not sure.  Swelling in right ankle has improved from yesterday.  Discussed during a shower as well as walking the halls to do room air saturations.

## 2017-01-20 NOTE — FLOWSHEET NOTE
01/19/17 1906   Events/Summary/Plan   Non-Invasive Resp Device Site Inspection Completed Intact   Interdisciplinary Plan of Care-Goals (Indications)   Obstructive Ventilatory Defect or Pulmonary Disease without Obvious Obstruction History / Diagnosis;Strong Subjective / Objective Improvement   Hyperinflation Protocol Indications Restrictive Lung Disorder / Consolidation   Interdisciplinary Plan of Care-Outcomes    Bronchodilator Outcome Patient at Stable Baseline   Hyperinflation Protocol Goals/Outcome Stable Vital Capacity x24 hrs and Patient Understands / uses I.S.   Education   Education Yes - Pt. / Family has been Instructed in use of Respiratory Equipment;Yes - Pt. / Family has been Instructed in use of Respiratory Medications and Adverse Reactions   RT Assessment of Delivered Medications   Evaluation of Medication Delivery Daily Yes-- Pt /Family has been Instructed in use of Respiratory Medications and Adverse Reactions   SVN Group   #SVN Performed Yes   Given By: Mouthpiece   Incentive Spirometry Group   Breathing Exercises Yes   Incentive Spirometer Volume 1350 mL   Respiratory WDL   Respiratory (WDL) X   Chest Exam   Work Of Breathing / Effort Mild   Respiration 16   Pulse 83   Breath Sounds   Pre/Post Intervention Pre Intervention Assessment   RUL Breath Sounds Clear   RML Breath Sounds Clear;Diminished   RLL Breath Sounds Diminished   KRISTINA Breath Sounds Clear   LLL Breath Sounds Diminished   Secretions   Cough Non Productive   Oximetry   #Pulse Oximetry (Single Determination) Yes   Oxygen   Home O2 Use Prior To Admission? No   Pulse Oximetry 90 %   O2 (LPM) 2   O2 Daily Delivery Respiratory  Nasal Cannula

## 2017-01-20 NOTE — FLOWSHEET NOTE
01/20/17 0200   Events/Summary/Plan   Events/Summary/Plan Pr refused SVN    Therapy Not Performed   Type of Therapy Not Performed SVN   Reason Therapy Not Performed Refused

## 2017-01-20 NOTE — FLOWSHEET NOTE
01/20/17 1146   Events/Summary/Plan   Events/Summary/Plan svn given   Interdisciplinary Plan of Care-Goals (Indications)   Obstructive Ventilatory Defect or Pulmonary Disease without Obvious Obstruction History / Diagnosis   Interdisciplinary Plan of Care-Outcomes    Bronchodilator Outcome Patient at Stable Baseline   SVN Group   #SVN Performed Yes   Given By: Mask   Incentive Spirometry Group   Incentive Spirometer Volume 1250 mL   Chest Exam   Work Of Breathing / Effort Mild   Respiration 18  (post 18)   Pulse 65  (post 78)   Heart Rate (Monitored) 65   Breath Sounds   Pre/Post Intervention Pre Intervention Assessment  (increased aeration after svn)   RUL Breath Sounds Clear;Diminished   RML Breath Sounds Clear;Diminished   RLL Breath Sounds Clear;Diminished   KRISTINA Breath Sounds Clear;Diminished   LLL Breath Sounds Clear;Diminished   Oximetry   #Pulse Oximetry (Single Determination) Yes   Oxygen   Pulse Oximetry 95 %   O2 (LPM) 4   O2 Daily Delivery Respiratory  Nasal Cannula

## 2017-01-20 NOTE — FLOWSHEET NOTE
01/20/17 0744   Events/Summary/Plan   Events/Summary/Plan svn given   Interdisciplinary Plan of Care-Goals (Indications)   Obstructive Ventilatory Defect or Pulmonary Disease without Obvious Obstruction History / Diagnosis   Interdisciplinary Plan of Care-Outcomes    Bronchodilator Outcome Patient at Stable Baseline   SVN Group   #SVN Performed Yes   Given By: Mask  (pt wanted to sleeep during svn)   Chest Exam   Respiration 16  (post 16)   Pulse 74  (post 83)   Heart Rate (Monitored) 74   Breath Sounds   Pre/Post Intervention Pre Intervention Assessment  (increased aeration after svn)   RUL Breath Sounds Clear;Diminished   RML Breath Sounds Clear;Diminished   RLL Breath Sounds Clear;Diminished   KRISTINA Breath Sounds Clear;Diminished   LLL Breath Sounds Clear;Diminished   Oximetry   #Pulse Oximetry (Single Determination) Yes   Oxygen   Home O2 Use Prior To Admission? No   Pulse Oximetry 98 %   O2 (LPM) 4  (decreased to 3 liters)   O2 Daily Delivery Respiratory  OxyMask

## 2017-01-20 NOTE — THERAPY
"Physical Therapy Evaluation completed.   Bed Mobility: Sit to Supine with SBA  Transfers: Sit to Stand: Minimal Assist  Gait: Level Of Assist: Minimal Assist with Hand Held Assist to take steps to HOB to reposition. Unsteady.  Hypoxic sitting up at EOB at 73% on 2 L upon PT arrival. Improved to 91% on 4 L supine at rest.   Plan of Care: Will benefit from Physical Therapy 5 times per week  Discharge Recommendations: Equipment: Will Continue to Assess for Equipment Needs. Post-acute therapy recommended before discharged home.    87 yo female admitted w/ SOB. PMH includes COPD but no home O2 use. Pt also has recent hx of R ankle fracture but reports being ambulatory without an AD prior to her admisssion. Pt continues to have difficulty maintaining O2 sats at rest and with activity, requiring 4 LO2 sitting EOB and supine during PT evaluation. Further mobility limited due to pt's respiratory status. RN present and aware. Pt will benefit from further acute and post acute therapy to promote improvement in strength, balance and mobility to promote safest return to home w/ spouse.     See \"Rehab Therapy-Acute\" Patient Summary Report for complete documentation.     "

## 2017-01-20 NOTE — CARE PLAN
Problem: Safety  Goal: Will remain free from falls  Outcome: PROGRESSING AS EXPECTED  Pt educated regarding the use of call light when needing assistance. Pt demonstrated and verbalized the proper use of a call light and to call for assistance. Fall precautions in place, treaded slippers on, personal belongings/phone in reach. Pt has a steady gate ambulating with FWW and x1 contact guard assist and is encouraged to call if assistance is needed. Bed alarm is set.    Problem: Respiratory:  Goal: Respiratory status will improve  Outcome: PROGRESSING SLOWER THAN EXPECTED  Back up to 3 lpm via NC after being on 2 lpm. Pt is not tolerating activity and becomes very SOB with O2 sat 84% on 2 lpm via NC. Commode is at BS to help decrease amount of exertion.

## 2017-01-20 NOTE — PROGRESS NOTES
Pt continues to desaturate to 77% when up to BSC-- placed on oxymask and is tolerating with oxygen at 92%. RT in to assess pt and no noted distress. Call light in reach and BA is set.     Telemetry Report: pt has been SR.  HR: 70s-90s  Measurements: (14/10/38)  Ectopy: f pvc, coup    Measurements and updates per Chance MATHEWS.

## 2017-01-20 NOTE — PROGRESS NOTES
Received report from day shift RN; care assumed. Pt is in bed, visiting with family-- denies any needs. CLIP, pt calling for assistance, fall precautions in place (bed alarm set), will CTM.

## 2017-01-20 NOTE — PROGRESS NOTES
Hospital Medicine Progress Note, Adult, Complex               Author: Shefali Cooney Date & Time created: 1/20/2017  9:22 AM     Interval History:  CC - SOB    Last night pt desatted, required inc supplemental O2  Today she feels better, stable on 3L, but desats when they turn to 2 now  No f/c/n/v/d  BP controlled now    Review of Systems:  Review of Systems   Constitutional: Negative for fever and chills.   Respiratory: Positive for cough (dry) and shortness of breath. Negative for hemoptysis, sputum production and wheezing.    Cardiovascular: Negative for chest pain and leg swelling.   Gastrointestinal: Negative for nausea, vomiting, abdominal pain, diarrhea and constipation.   Genitourinary: Negative for dysuria, urgency and frequency.   Skin: Negative for rash.   All other systems reviewed and are negative.      Physical Exam:  Physical Exam   Constitutional: She is oriented to person, place, and time. She appears well-developed and well-nourished. No distress.   HENT:   Head: Normocephalic and atraumatic.   Mouth/Throat: Oropharynx is clear and moist.   Eyes: Conjunctivae and EOM are normal. Pupils are equal, round, and reactive to light. Right eye exhibits no discharge. Left eye exhibits no discharge. No scleral icterus.   Neck: Neck supple. No JVD present. No tracheal deviation present.   Cardiovascular: Normal rate, regular rhythm and normal heart sounds.    Pulmonary/Chest: Effort normal. No respiratory distress. She has no wheezes. She has rales (bibasilar).   Abdominal: Soft. Bowel sounds are normal. She exhibits no distension. There is no tenderness.   Musculoskeletal: She exhibits no edema.   Neurological: She is alert and oriented to person, place, and time. No cranial nerve deficit.   Skin: Skin is warm and dry. No rash noted. She is not diaphoretic.   Psychiatric: She has a normal mood and affect. Her behavior is normal.   Nursing note and vitals reviewed.      Labs:  Recent Labs      01/18/17       ISTATSPEC  Venous     Recent Labs      17   TROPONINI  <0.02   BNPBTYPENAT  75     Recent Labs      17   SODIUM  137  137   POTASSIUM  4.4  3.6   CHLORIDE  104  107   CO2  24  22   BUN  26*  18   CREATININE  1.22  0.94   CALCIUM  9.5  9.0     Recent Labs      17   ALTSGPT  19   --    ASTSGOT  27   --    ALKPHOSPHAT  95   --    TBILIRUBIN  0.2   --    GLUCOSE  142*  174*     Recent Labs      17   RBC  4.24  3.90*   HEMOGLOBIN  12.6  11.6*   HEMATOCRIT  37.7  35.2*   PLATELETCT  250  238     Recent Labs      17   WBC  7.5  7.0   NEUTSPOLYS  72.60*   --    LYMPHOCYTES  10.50*   --    MONOCYTES  12.10   --    EOSINOPHILS  3.40   --    BASOPHILS  0.70   --    ASTSGOT  27   --    ALTSGPT  19   --    ALKPHOSPHAT  95   --    TBILIRUBIN  0.2   --            Hemodynamics:  Temp (24hrs), Av.7 °C (98.1 °F), Min:36.3 °C (97.4 °F), Max:37.3 °C (99.2 °F)  Temperature: 36.6 °C (97.8 °F)  Pulse  Av  Min: 71  Max: 98Heart Rate (Monitored): 74  Blood Pressure : 130/71 mmHg     Respiratory:    Respiration: 16 (post 16), Pulse Oximetry: 98 %, O2 Daily Delivery Respiratory : OxyMask     Given By:: Mask (pt wanted to sleeep during svn), Work Of Breathing / Effort: Mild  RUL Breath Sounds: Clear;Diminished, RML Breath Sounds: Clear;Diminished, RLL Breath Sounds: Diminished, KRISTINA Breath Sounds: Clear;Diminished, LLL Breath Sounds: Diminished  Fluids:    Intake/Output Summary (Last 24 hours) at 17 0922  Last data filed at 17 0800   Gross per 24 hour   Intake    660 ml   Output      0 ml   Net    660 ml        GI/Nutrition:  Orders Placed This Encounter   Procedures   • Diet Order     Standing Status: Standing      Number of Occurrences: 1      Standing Expiration Date:      Order Specific Question:  Diet:     Answer:  Regular [1]     Medical Decision Making, by Problem:  Active  Hospital Problems    Diagnosis   • *Respiratory failure (CMS-HCC) [J96.90] - subjective improvement, cont o2 to maintain sats >90%   • V tach (CMS-HCC) [I47.2] - echo done and ok, cont metoprolol, cont tele   • Lung nodule [R91.1] - outpt follow up   • Adrenal adenoma [D35.00]   • Hypertension [I10] - now controlled, cont metoprolol   • Interstitial lung disease (CMS-HCC) [J84.9] - rec outpatient pulm referral   • Community acquired pneumonia [J18.9] c3 & azithro as noted above, sputum cx pending   • COPD (CMS-HCC) [J44.9] no wheezing currently, bimal prn       Core Measures

## 2017-01-20 NOTE — CARE PLAN
Problem: Oxygenation:  Goal: Maintain adequate oxygenation dependent on patient condition  Outcome: PROGRESSING AS EXPECTED  Titrate and maintain saturation by pulse oximetry >90%. Current fiO2 2 lpm nasal cannula.          Problem: Bronchoconstriction:  Goal: Improve in air movement and diminished wheezing  Outcome: PROGRESSING AS EXPECTED  Improvement in airflow    Improved vital signs and measures of gas exchange    Improved patient appearance with subjective improvement of symptom alleviation after treatment.        Intervention: Implement inhaled treatments  Duoneb QID

## 2017-01-20 NOTE — PROGRESS NOTES
"Pt is a&oX4, concerned that she believed to have seen \"a spider on the floor\"-- will monitor. Pt very weak this evening and used the BSC and required X1 contact assist and FWW. Despite oxygen at 2 lpm via NC, pt did not tolerate getting up to BSC and was 83% once back in bed. Increased to 3.5 lpm via NC and after a couple of minutes, O2 sat is 97%. Snack provided with prednisone tonight. Pt denies other needs. Call light in reach and BA is set.   "

## 2017-01-21 ENCOUNTER — APPOINTMENT (OUTPATIENT)
Dept: RADIOLOGY | Facility: MEDICAL CENTER | Age: 82
DRG: 189 | End: 2017-01-21
Attending: INTERNAL MEDICINE
Payer: MEDICARE

## 2017-01-21 LAB
MRSA DNA SPEC QL NAA+PROBE: NORMAL
SIGNIFICANT IND 70042: NORMAL
SITE SITE: NORMAL
SOURCE SOURCE: NORMAL

## 2017-01-21 PROCEDURE — 700102 HCHG RX REV CODE 250 W/ 637 OVERRIDE(OP): Performed by: INTERNAL MEDICINE

## 2017-01-21 PROCEDURE — 770020 HCHG ROOM/CARE - TELE (206)

## 2017-01-21 PROCEDURE — 87641 MR-STAPH DNA AMP PROBE: CPT

## 2017-01-21 PROCEDURE — A9270 NON-COVERED ITEM OR SERVICE: HCPCS | Performed by: INTERNAL MEDICINE

## 2017-01-21 PROCEDURE — 700112 HCHG RX REV CODE 229: Performed by: INTERNAL MEDICINE

## 2017-01-21 PROCEDURE — 94640 AIRWAY INHALATION TREATMENT: CPT

## 2017-01-21 PROCEDURE — 700111 HCHG RX REV CODE 636 W/ 250 OVERRIDE (IP): Performed by: INTERNAL MEDICINE

## 2017-01-21 PROCEDURE — 700105 HCHG RX REV CODE 258: Performed by: INTERNAL MEDICINE

## 2017-01-21 PROCEDURE — 99233 SBSQ HOSP IP/OBS HIGH 50: CPT | Performed by: INTERNAL MEDICINE

## 2017-01-21 PROCEDURE — 700101 HCHG RX REV CODE 250: Performed by: INTERNAL MEDICINE

## 2017-01-21 PROCEDURE — A9270 NON-COVERED ITEM OR SERVICE: HCPCS | Performed by: EMERGENCY MEDICINE

## 2017-01-21 PROCEDURE — 71020 DX-CHEST-2 VIEWS: CPT

## 2017-01-21 PROCEDURE — 700102 HCHG RX REV CODE 250 W/ 637 OVERRIDE(OP)

## 2017-01-21 PROCEDURE — 700102 HCHG RX REV CODE 250 W/ 637 OVERRIDE(OP): Performed by: EMERGENCY MEDICINE

## 2017-01-21 PROCEDURE — A9270 NON-COVERED ITEM OR SERVICE: HCPCS

## 2017-01-21 PROCEDURE — 94760 N-INVAS EAR/PLS OXIMETRY 1: CPT

## 2017-01-21 RX ORDER — ACETAMINOPHEN 325 MG/1
TABLET ORAL
Status: COMPLETED
Start: 2017-01-21 | End: 2017-01-21

## 2017-01-21 RX ORDER — ACETAMINOPHEN 325 MG/1
650 TABLET ORAL EVERY 6 HOURS PRN
Status: DISCONTINUED | OUTPATIENT
Start: 2017-01-21 | End: 2017-01-30 | Stop reason: HOSPADM

## 2017-01-21 RX ORDER — HYDROCODONE BITARTRATE AND ACETAMINOPHEN 5; 325 MG/1; MG/1
1-2 TABLET ORAL EVERY 6 HOURS PRN
Status: DISCONTINUED | OUTPATIENT
Start: 2017-01-21 | End: 2017-01-30 | Stop reason: HOSPADM

## 2017-01-21 RX ADMIN — VANCOMYCIN HYDROCHLORIDE 1800 MG: 10 INJECTION, POWDER, LYOPHILIZED, FOR SOLUTION INTRAVENOUS at 17:33

## 2017-01-21 RX ADMIN — METOPROLOL TARTRATE 25 MG: 25 TABLET ORAL at 20:57

## 2017-01-21 RX ADMIN — ACETAMINOPHEN 650 MG: 325 TABLET ORAL at 02:34

## 2017-01-21 RX ADMIN — GABAPENTIN 300 MG: 300 CAPSULE ORAL at 08:49

## 2017-01-21 RX ADMIN — HEPARIN SODIUM 5000 UNITS: 5000 INJECTION, SOLUTION INTRAVENOUS; SUBCUTANEOUS at 20:58

## 2017-01-21 RX ADMIN — IPRATROPIUM BROMIDE AND ALBUTEROL SULFATE 3 ML: .5; 3 SOLUTION RESPIRATORY (INHALATION) at 18:46

## 2017-01-21 RX ADMIN — IPRATROPIUM BROMIDE AND ALBUTEROL SULFATE 3 ML: .5; 3 SOLUTION RESPIRATORY (INHALATION) at 15:55

## 2017-01-21 RX ADMIN — ALPRAZOLAM 0.25 MG: 0.25 TABLET ORAL at 21:06

## 2017-01-21 RX ADMIN — DOCUSATE SODIUM 100 MG: 100 CAPSULE, LIQUID FILLED ORAL at 08:48

## 2017-01-21 RX ADMIN — GABAPENTIN 300 MG: 300 CAPSULE ORAL at 14:58

## 2017-01-21 RX ADMIN — HYDROCODONE BITARTRATE AND ACETAMINOPHEN 1 TABLET: 5; 325 TABLET ORAL at 08:47

## 2017-01-21 RX ADMIN — HEPARIN SODIUM 5000 UNITS: 5000 INJECTION, SOLUTION INTRAVENOUS; SUBCUTANEOUS at 06:24

## 2017-01-21 RX ADMIN — METOPROLOL TARTRATE 25 MG: 25 TABLET ORAL at 08:49

## 2017-01-21 RX ADMIN — IPRATROPIUM BROMIDE AND ALBUTEROL SULFATE 3 ML: .5; 3 SOLUTION RESPIRATORY (INHALATION) at 07:52

## 2017-01-21 RX ADMIN — HEPARIN SODIUM 5000 UNITS: 5000 INJECTION, SOLUTION INTRAVENOUS; SUBCUTANEOUS at 15:00

## 2017-01-21 RX ADMIN — GABAPENTIN 300 MG: 300 CAPSULE ORAL at 20:57

## 2017-01-21 RX ADMIN — IPRATROPIUM BROMIDE AND ALBUTEROL SULFATE 3 ML: .5; 3 SOLUTION RESPIRATORY (INHALATION) at 11:38

## 2017-01-21 RX ADMIN — CHOLECALCIFEROL TAB 25 MCG (1000 UNIT) 1000 UNITS: 25 TAB at 08:49

## 2017-01-21 RX ADMIN — CEFEPIME HYDROCHLORIDE 1 G: 1 INJECTION, POWDER, FOR SOLUTION INTRAMUSCULAR; INTRAVENOUS at 20:59

## 2017-01-21 RX ADMIN — ACETAMINOPHEN 650 MG: 325 TABLET, FILM COATED ORAL at 02:34

## 2017-01-21 RX ADMIN — SENNOSIDES AND DOCUSATE SODIUM 1 TABLET: 8.6; 5 TABLET ORAL at 20:57

## 2017-01-21 RX ADMIN — PREDNISONE 40 MG: 20 TABLET ORAL at 08:48

## 2017-01-21 ASSESSMENT — ENCOUNTER SYMPTOMS
COUGH: 1
CONSTIPATION: 0
CHILLS: 0
SHORTNESS OF BREATH: 1
DIARRHEA: 0
SPUTUM PRODUCTION: 0
ABDOMINAL PAIN: 0
HEMOPTYSIS: 0
VOMITING: 0
NAUSEA: 0
FEVER: 0
WHEEZING: 0

## 2017-01-21 ASSESSMENT — PAIN SCALES - GENERAL
PAINLEVEL_OUTOF10: 6
PAINLEVEL_OUTOF10: 4

## 2017-01-21 NOTE — PROGRESS NOTES
Pt is a&o, but very fatigued tonight and is sleeping on and off frequently. Denies any pain or discomfort tonight. At rest pt's breathing is calm, without exertion and O2 sat 93-95% on 5 lpm via NC. New PIV started and abx infusing without difficulty. Pt denies other needs. Call light in reach and BA is set.

## 2017-01-21 NOTE — RESPIRATORY CARE
COPD EDUCATION by COPD CLINICAL EDUCATOR  1/20/2017  at  1:50 PM by Maru Farrar     Patient interviewed by COPD education team.  Patient unable to participate in full program.  Short intervention has been conducted.  Short intervention was completed with this patient covering: What is COPD/Intertitial Lung Disease (how the lungs work), daily medications rescue and maintenance, breathing techniques, infection prevention and oxygen safety were covered in detail.  A comprehensive packet including information about COPD/Interstitial Lung Disease, treatments, and oxygen safety was given.         Provided spacer with instruction for use, care, and cleaning.      Patient has a PCP appointment on 2/22/17.    A new patient appointment was scheduled with [facility] on 4/12/17.

## 2017-01-21 NOTE — PROGRESS NOTES
Received report from day shift RN; care assumed. Pt is sleeping in bed with RR even and unlabored. CLIP, pt calling for assistance, fall precautions in place (bed alarm is set), will CTM.

## 2017-01-21 NOTE — CARE PLAN
Problem: Oxygenation:  Goal: Maintain adequate oxygenation dependent on patient condition  Outcome: PROGRESSING AS EXPECTED  Titrate and maintain saturation by pulse oximetry >90%. Current fiO2 5 lpm nasal cannula. Encourage I.S>       Problem: Bronchoconstriction:  Goal: Improve in air movement and diminished wheezing  Outcome: PROGRESSING AS EXPECTED  Improvement in airflow    Improved vital signs and measures of gas exchange    Improved patient appearance with subjective improvement of symptom alleviation after treatment.        Intervention: Implement inhaled treatments  QID DUoneb

## 2017-01-21 NOTE — PROGRESS NOTES
As the day progressed, attempted to wean her down from 4 lt/min but she would desat to 70-80s.  With activity she would take over 5 minutes to compensate and her oxygen would have to be increased to 5 lt/min.  Pt still fatigued and wanting to sleep.  Her IS was up to 1500 and pt said she was breathing about the same.  No new cough.  Family at bedside when Dr. Cooney rounded and questions answered.    Tele Summary:    Strip at 0705 showed SR at 70.  Throughout the shift, pt had frequent PVCs.    Measurements from am strip were as follows:  MN=0.20  QRS=0.08  QT=0.40

## 2017-01-21 NOTE — FLOWSHEET NOTE
01/20/17 1600   Events/Summary/Plan   Events/Summary/Plan svn given   Interdisciplinary Plan of Care-Goals (Indications)   Obstructive Ventilatory Defect or Pulmonary Disease without Obvious Obstruction History / Diagnosis   Interdisciplinary Plan of Care-Outcomes    Bronchodilator Outcome Patient at Stable Baseline   SVN Group   #SVN Performed Yes   Given By: Mask   Incentive Spirometry Group   Incentive Spirometer Volume 1400 mL   Chest Exam   Respiration 20  (post 20 Simultaneous filing. User may not have seen previous data.)   Pulse 76  (post 79 Simultaneous filing. User may not have seen previous data.)   Heart Rate (Monitored) 76   Breath Sounds   Pre/Post Intervention Pre Intervention Assessment  (increased aeration after svn)   RUL Breath Sounds Clear;Diminished   RML Breath Sounds Clear;Diminished   RLL Breath Sounds Clear;Diminished   KRISTINA Breath Sounds Clear;Diminished   LLL Breath Sounds Clear;Diminished   Secretions   Cough Non Productive   Oximetry   #Pulse Oximetry (Single Determination) Yes   Oxygen   Pulse Oximetry 93 %  (Simultaneous filing. User may not have seen previous data.)   O2 (LPM) 4  (increased 02 5 liters. RN notified Simultaneous filing. User may not have seen previous data.)   O2 Daily Delivery Respiratory  Nasal Cannula

## 2017-01-21 NOTE — PROGRESS NOTES
NO acute changes in pt status. Pt is sleeping with RR even and unlabored. Call light in reach and bed alarm is set-- monitoring.     Telemetry Report: pt has been SR.  HR: 70s-80s  Measurements: (16/10/34)  Ectopy: o PVC    Measurements and updates per Funmilayo MATHEWS.

## 2017-01-21 NOTE — CARE PLAN
"Problem: Safety  Goal: Will remain free from falls  Outcome: PROGRESSING AS EXPECTED  Pt educated regarding the use of call light when needing assistance. Pt demonstrated and verbalized the proper use of a call light and to call for assistance. Fall precautions in place, treaded slippers on, personal belongings/phone in reach. Pt has a steady gate ambulating x1 assist and us of FWW and is encouraged to call if assistance is needed.     Problem: Bowel/Gastric:  Goal: Normal bowel function is maintained or improved  Outcome: PROGRESSING SLOWER THAN EXPECTED    01/21/17 0200   OTHER   Last BM 01/12/17     Pt stated, \"I haven't gone in 6 days\". RN attempted to escalate bowel protocol and provide pt with lactulose, but pt refused stating she is willing to take this am after breakfast-- did take senokot tonight.     Problem: Respiratory:  Goal: Respiratory status will improve  Intervention: Collaborate with respiratory therapist and Interdisciplinary Team on treatment measures to improve respiratory function  Pt progressing slower than expected and continues to require 5 lpm via NC. MD has been alerted.           "

## 2017-01-21 NOTE — PROGRESS NOTES
"Report received from Yennifer, pt not getting any better or worse throughout the night.  Pt up to the edge of bed about 0830 to eat breakfast.  Pt complaining of pain in her lower back into her hips that it hurts when she moves and pt asking for narcotics even though \"they make me loopy and the one that starts with an o makes me see bugs.\"  Spoke with Adrienne about her request and received orders for Norco.  Medicated about 0850 with her other medications.  Pt also complaining she hasn't pooped in a while but wanted to start with prune juice and coffee first before medications.  Lung sounds are the same, but pt having more frequency of dry coughing.    "

## 2017-01-21 NOTE — FLOWSHEET NOTE
01/21/17 0745   Events/Summary/Plan   Non-Invasive Resp Device Site Inspection Completed Intact   Interdisciplinary Plan of Care-Goals (Indications)   Obstructive Ventilatory Defect or Pulmonary Disease without Obvious Obstruction History / Diagnosis   Interdisciplinary Plan of Care-Outcomes    Bronchodilator Outcome Improved Patient Appearance with Decreased use of Accessory Muscles   Education   Education Yes - Pt. / Family has been Instructed in use of Respiratory Medications and Adverse Reactions   RT Assessment of Delivered Medications   Evaluation of Medication Delivery Daily Yes-- Pt /Family has been Instructed in use of Respiratory Medications and Adverse Reactions   SVN Group   #SVN Performed Yes   Given By: Mouthpiece   Date SVN Next Change Due (Q 7 Days) 01/26/17   Incentive Spirometry Group   Breathing Exercises Yes   Incentive Spirometer Volume 1250 mL   Incentive Spirometer Next Change Date (Q 30 Days) 02/19/17   Chest Exam   Work Of Breathing / Effort Mild   Respiration 16   Pulse 76   Breath Sounds   RUL Breath Sounds Clear   RML Breath Sounds Diminished   RLL Breath Sounds Diminished   KRISTINA Breath Sounds Clear   LLL Breath Sounds Diminished   Secretions   Cough Dry   Oxygen   Pulse Oximetry 92 %   O2 (LPM) 5   O2 Daily Delivery Respiratory  Nasal Cannula

## 2017-01-21 NOTE — FLOWSHEET NOTE
01/21/17 1138   Events/Summary/Plan   Non-Invasive Resp Device Site Inspection Completed Intact   Interdisciplinary Plan of Care-Goals (Indications)   Obstructive Ventilatory Defect or Pulmonary Disease without Obvious Obstruction History / Diagnosis   Interdisciplinary Plan of Care-Outcomes    Bronchodilator Outcome Improved Patient Appearance with Decreased use of Accessory Muscles   Education   Education Yes - Pt. / Family has been Instructed in use of Respiratory Medications and Adverse Reactions   RT Assessment of Delivered Medications   Evaluation of Medication Delivery Daily Yes-- Pt /Family has been Instructed in use of Respiratory Medications and Adverse Reactions   SVN Group   #SVN Performed Yes   Given By: Mouthpiece   Date SVN Next Change Due (Q 7 Days) 01/26/17   Incentive Spirometry Group   Breathing Exercises Yes   Incentive Spirometer Volume 1250 mL   Incentive Spirometer Next Change Date (Q 30 Days) 02/19/17   Chest Exam   Work Of Breathing / Effort Mild   Respiration 16   Pulse 76   Breath Sounds   RUL Breath Sounds Clear   RML Breath Sounds Diminished   RLL Breath Sounds Diminished   KRISTINA Breath Sounds Clear   LLL Breath Sounds Diminished   Secretions   Cough Dry;Non Productive   Oximetry   #Pulse Oximetry (Single Determination) Yes   Oxygen   Pulse Oximetry 92 %   O2 (LPM) 5   O2 Daily Delivery Respiratory  Nasal Cannula

## 2017-01-21 NOTE — PROGRESS NOTES
Dr. Marin updated regarding pt's c/o pain and no pain medications ordered. Tylenol 650 mg q 6 hrs PRN ordered. T/O RBV.

## 2017-01-22 ENCOUNTER — RESOLUTE PROFESSIONAL BILLING HOSPITAL PROF FEE (OUTPATIENT)
Dept: PULMONOLOGY | Facility: HOSPICE | Age: 82
End: 2017-01-22
Payer: MEDICARE

## 2017-01-22 LAB
ANION GAP SERPL CALC-SCNC: 6 MMOL/L (ref 0–11.9)
BASOPHILS # BLD AUTO: 0.3 % (ref 0–1.8)
BASOPHILS # BLD: 0.04 K/UL (ref 0–0.12)
BNP SERPL-MCNC: 96 PG/ML (ref 0–100)
BUN SERPL-MCNC: 29 MG/DL (ref 8–22)
CALCIUM SERPL-MCNC: 9.1 MG/DL (ref 8.4–10.2)
CHLORIDE SERPL-SCNC: 105 MMOL/L (ref 96–112)
CO2 SERPL-SCNC: 24 MMOL/L (ref 20–33)
CREAT SERPL-MCNC: 0.75 MG/DL (ref 0.5–1.4)
EOSINOPHIL # BLD AUTO: 0.13 K/UL (ref 0–0.51)
EOSINOPHIL NFR BLD: 1.1 % (ref 0–6.9)
ERYTHROCYTE [DISTWIDTH] IN BLOOD BY AUTOMATED COUNT: 48.8 FL (ref 35.9–50)
GFR SERPL CREATININE-BSD FRML MDRD: >60 ML/MIN/1.73 M 2
GLUCOSE SERPL-MCNC: 116 MG/DL (ref 65–99)
HCT VFR BLD AUTO: 36.5 % (ref 37–47)
HGB BLD-MCNC: 11.7 G/DL (ref 12–16)
IMM GRANULOCYTES # BLD AUTO: 0.1 K/UL (ref 0–0.11)
IMM GRANULOCYTES NFR BLD AUTO: 0.9 % (ref 0–0.9)
LYMPHOCYTES # BLD AUTO: 0.53 K/UL (ref 1–4.8)
LYMPHOCYTES NFR BLD: 4.6 % (ref 22–41)
MCH RBC QN AUTO: 29.9 PG (ref 27–33)
MCHC RBC AUTO-ENTMCNC: 32.1 G/DL (ref 33.6–35)
MCV RBC AUTO: 93.4 FL (ref 81.4–97.8)
MONOCYTES # BLD AUTO: 0.94 K/UL (ref 0–0.85)
MONOCYTES NFR BLD AUTO: 8.1 % (ref 0–13.4)
NEUTROPHILS # BLD AUTO: 9.88 K/UL (ref 2–7.15)
NEUTROPHILS NFR BLD: 85 % (ref 44–72)
NRBC # BLD AUTO: 0 K/UL
NRBC BLD AUTO-RTO: 0 /100 WBC
PLATELET # BLD AUTO: 286 K/UL (ref 164–446)
PMV BLD AUTO: 9.1 FL (ref 9–12.9)
POTASSIUM SERPL-SCNC: 3.9 MMOL/L (ref 3.6–5.5)
RBC # BLD AUTO: 3.91 M/UL (ref 4.2–5.4)
SODIUM SERPL-SCNC: 135 MMOL/L (ref 135–145)
WBC # BLD AUTO: 11.6 K/UL (ref 4.8–10.8)

## 2017-01-22 PROCEDURE — A9270 NON-COVERED ITEM OR SERVICE: HCPCS | Performed by: INTERNAL MEDICINE

## 2017-01-22 PROCEDURE — 700102 HCHG RX REV CODE 250 W/ 637 OVERRIDE(OP): Performed by: INTERNAL MEDICINE

## 2017-01-22 PROCEDURE — 700102 HCHG RX REV CODE 250 W/ 637 OVERRIDE(OP): Performed by: EMERGENCY MEDICINE

## 2017-01-22 PROCEDURE — 94640 AIRWAY INHALATION TREATMENT: CPT

## 2017-01-22 PROCEDURE — 700111 HCHG RX REV CODE 636 W/ 250 OVERRIDE (IP): Performed by: INTERNAL MEDICINE

## 2017-01-22 PROCEDURE — A9270 NON-COVERED ITEM OR SERVICE: HCPCS | Performed by: EMERGENCY MEDICINE

## 2017-01-22 PROCEDURE — 99233 SBSQ HOSP IP/OBS HIGH 50: CPT | Performed by: INTERNAL MEDICINE

## 2017-01-22 PROCEDURE — 83880 ASSAY OF NATRIURETIC PEPTIDE: CPT

## 2017-01-22 PROCEDURE — 80048 BASIC METABOLIC PNL TOTAL CA: CPT

## 2017-01-22 PROCEDURE — 770020 HCHG ROOM/CARE - TELE (206)

## 2017-01-22 PROCEDURE — 700101 HCHG RX REV CODE 250: Performed by: INTERNAL MEDICINE

## 2017-01-22 PROCEDURE — 94760 N-INVAS EAR/PLS OXIMETRY 1: CPT

## 2017-01-22 PROCEDURE — 700105 HCHG RX REV CODE 258: Performed by: INTERNAL MEDICINE

## 2017-01-22 PROCEDURE — 99222 1ST HOSP IP/OBS MODERATE 55: CPT | Mod: AI | Performed by: INTERNAL MEDICINE

## 2017-01-22 PROCEDURE — 85025 COMPLETE CBC W/AUTO DIFF WBC: CPT

## 2017-01-22 RX ORDER — METHYLPREDNISOLONE SODIUM SUCCINATE 125 MG/2ML
125 INJECTION, POWDER, LYOPHILIZED, FOR SOLUTION INTRAMUSCULAR; INTRAVENOUS EVERY 6 HOURS
Status: DISCONTINUED | OUTPATIENT
Start: 2017-01-22 | End: 2017-01-30 | Stop reason: HOSPADM

## 2017-01-22 RX ADMIN — IPRATROPIUM BROMIDE AND ALBUTEROL SULFATE 3 ML: .5; 3 SOLUTION RESPIRATORY (INHALATION) at 07:47

## 2017-01-22 RX ADMIN — HYDROCODONE BITARTRATE AND ACETAMINOPHEN 1 TABLET: 5; 325 TABLET ORAL at 01:15

## 2017-01-22 RX ADMIN — GABAPENTIN 300 MG: 300 CAPSULE ORAL at 09:37

## 2017-01-22 RX ADMIN — CEFEPIME HYDROCHLORIDE 1 G: 1 INJECTION, POWDER, FOR SOLUTION INTRAMUSCULAR; INTRAVENOUS at 20:42

## 2017-01-22 RX ADMIN — ALPRAZOLAM 0.25 MG: 0.25 TABLET ORAL at 21:59

## 2017-01-22 RX ADMIN — IPRATROPIUM BROMIDE AND ALBUTEROL SULFATE 3 ML: .5; 3 SOLUTION RESPIRATORY (INHALATION) at 19:19

## 2017-01-22 RX ADMIN — HYDROCODONE BITARTRATE AND ACETAMINOPHEN 1 TABLET: 5; 325 TABLET ORAL at 02:13

## 2017-01-22 RX ADMIN — CHOLECALCIFEROL TAB 25 MCG (1000 UNIT) 1000 UNITS: 25 TAB at 09:37

## 2017-01-22 RX ADMIN — GABAPENTIN 300 MG: 300 CAPSULE ORAL at 20:35

## 2017-01-22 RX ADMIN — METHYLPREDNISOLONE SODIUM SUCCINATE 125 MG: 125 INJECTION, POWDER, FOR SOLUTION INTRAMUSCULAR; INTRAVENOUS at 11:36

## 2017-01-22 RX ADMIN — IPRATROPIUM BROMIDE AND ALBUTEROL SULFATE 3 ML: .5; 3 SOLUTION RESPIRATORY (INHALATION) at 15:57

## 2017-01-22 RX ADMIN — IPRATROPIUM BROMIDE AND ALBUTEROL SULFATE 3 ML: .5; 3 SOLUTION RESPIRATORY (INHALATION) at 12:39

## 2017-01-22 RX ADMIN — PREDNISONE 40 MG: 20 TABLET ORAL at 09:37

## 2017-01-22 RX ADMIN — GABAPENTIN 300 MG: 300 CAPSULE ORAL at 15:32

## 2017-01-22 RX ADMIN — CEFEPIME HYDROCHLORIDE 1 G: 1 INJECTION, POWDER, FOR SOLUTION INTRAMUSCULAR; INTRAVENOUS at 09:42

## 2017-01-22 RX ADMIN — METOPROLOL TARTRATE 25 MG: 25 TABLET ORAL at 20:35

## 2017-01-22 RX ADMIN — HEPARIN SODIUM 5000 UNITS: 5000 INJECTION, SOLUTION INTRAVENOUS; SUBCUTANEOUS at 15:32

## 2017-01-22 RX ADMIN — METHYLPREDNISOLONE SODIUM SUCCINATE 125 MG: 125 INJECTION, POWDER, FOR SOLUTION INTRAMUSCULAR; INTRAVENOUS at 18:11

## 2017-01-22 RX ADMIN — HEPARIN SODIUM 5000 UNITS: 5000 INJECTION, SOLUTION INTRAVENOUS; SUBCUTANEOUS at 06:05

## 2017-01-22 ASSESSMENT — ENCOUNTER SYMPTOMS
WHEEZING: 0
NAUSEA: 0
CHILLS: 0
FEVER: 0
ABDOMINAL PAIN: 0
HEMOPTYSIS: 1
SHORTNESS OF BREATH: 1
COUGH: 1
VOMITING: 0
DIARRHEA: 0
SPUTUM PRODUCTION: 1
CONSTIPATION: 0

## 2017-01-22 ASSESSMENT — PAIN SCALES - GENERAL
PAINLEVEL_OUTOF10: 8
PAINLEVEL_OUTOF10: 10
PAINLEVEL_OUTOF10: 0

## 2017-01-22 NOTE — DISCHARGE PLANNING
Patient discussed in morning rounds.  Patient reportedly lives at home with her spouse and her dishcarge plan is to return home when medically able. No current SS needs noted.

## 2017-01-22 NOTE — CARE PLAN
Problem: Oxygenation:  Goal: Maintain adequate oxygenation dependent on patient condition  Outcome: PROGRESSING SLOWER THAN EXPECTED  Monitor oxygenation for SpO2 >90%  Intervention: Manage oxygen therapy by monitoring pulse oximetry and/or ABG values  Oxygen is currently required at 4 lpm nasal cannula for SpO2 >90%  Intervention: Levels of oxygenation will improve to baseline  Patient did not require oxygen at home prior to this admission.       Problem: Bronchoconstriction:  Goal: Improve in air movement and diminished wheezing  Outcome: PROGRESSING AS EXPECTED  Patient does claim a benefit from bronchodilator therapy. There was a increase in aeration and a decrease in wheezes heard  Intervention: Implement inhaled treatments  Patient is receiving Duoneb Qid  Intervention: Evaluate and manage medication effects  Patient will be evaluated before and after medication delivery to assess effectiveness of therapy. Currently there seems to be both subjective and objective improvement.

## 2017-01-22 NOTE — PROGRESS NOTES
Hospital Medicine Progress Note, Adult, Complex               Author: Shefali Cooney Date & Time created: 1/21/2017  6:55 PM     Interval History:  CC - SOB    Despite abx, steroids, and nebs, still requiring 5L O2, gets SOB with any movement, very weak today, trouble walking d/t weakness. Nebs not helping. CXR worse PNA. Appears euvolemic. No n/v/d. BP controlled.    Review of Systems:  Review of Systems   Constitutional: Negative for fever and chills.   Respiratory: Positive for cough (dry) and shortness of breath. Negative for hemoptysis, sputum production and wheezing.    Cardiovascular: Negative for chest pain and leg swelling.   Gastrointestinal: Negative for nausea, vomiting, abdominal pain, diarrhea and constipation.   Genitourinary: Negative for dysuria, urgency and frequency.   Skin: Negative for rash.   All other systems reviewed and are negative.      Physical Exam:  Physical Exam   Constitutional: She is oriented to person, place, and time. She appears well-developed and well-nourished. No distress.   HENT:   Head: Normocephalic and atraumatic.   Mouth/Throat: Oropharynx is clear and moist.   Eyes: Conjunctivae and EOM are normal. Pupils are equal, round, and reactive to light. Right eye exhibits no discharge. Left eye exhibits no discharge. No scleral icterus.   Neck: Neck supple. No JVD present. No tracheal deviation present.   Cardiovascular: Normal rate, regular rhythm and normal heart sounds.    Pulmonary/Chest: Effort normal. No respiratory distress. She has no wheezes. She has rales (bibasilar).   Abdominal: Soft. Bowel sounds are normal. She exhibits no distension. There is no tenderness.   Musculoskeletal: She exhibits no edema.   Neurological: She is alert and oriented to person, place, and time. No cranial nerve deficit.   Skin: Skin is warm and dry. No rash noted. She is not diaphoretic.   Psychiatric: She has a normal mood and affect. Her behavior is normal.   Nursing note and vitals  reviewed.      Labs:  Recent Labs      17   ISTATSPEC  Venous     Recent Labs      17   TROPONINI  <0.02   BNPBTYPENAT  75     Recent Labs      17   SODIUM  137  137   POTASSIUM  4.4  3.6   CHLORIDE  104  107   CO2  24  22   BUN  26*  18   CREATININE  1.22  0.94   CALCIUM  9.5  9.0     Recent Labs      17   ALTSGPT  19   --    ASTSGOT  27   --    ALKPHOSPHAT  95   --    TBILIRUBIN  0.2   --    GLUCOSE  142*  174*     Recent Labs      17   RBC  4.24  3.90*   HEMOGLOBIN  12.6  11.6*   HEMATOCRIT  37.7  35.2*   PLATELETCT  250  238     Recent Labs      17   WBC  7.5  7.0   NEUTSPOLYS  72.60*   --    LYMPHOCYTES  10.50*   --    MONOCYTES  12.10   --    EOSINOPHILS  3.40   --    BASOPHILS  0.70   --    ASTSGOT  27   --    ALTSGPT  19   --    ALKPHOSPHAT  95   --    TBILIRUBIN  0.2   --            Hemodynamics:  Temp (24hrs), Av.4 °C (97.6 °F), Min:36.2 °C (97.2 °F), Max:36.9 °C (98.4 °F)  Temperature: 36.3 °C (97.4 °F)  Pulse  Av.8  Min: 65  Max: 98   Blood Pressure : 103/47 mmHg     Respiratory:    Respiration: 16, Pulse Oximetry: 92 %, O2 Daily Delivery Respiratory : Nasal Cannula     Given By:: Mouthpiece, Work Of Breathing / Effort: Mild  RUL Breath Sounds: Clear, RML Breath Sounds: Diminished, RLL Breath Sounds: Diminished, KRISTINA Breath Sounds: Clear, LLL Breath Sounds: Diminished  Fluids:    Intake/Output Summary (Last 24 hours) at 17 8635  Last data filed at 17 1200   Gross per 24 hour   Intake    240 ml   Output      0 ml   Net    240 ml        GI/Nutrition:  Orders Placed This Encounter   Procedures   • Diet Order     Standing Status: Standing      Number of Occurrences: 1      Standing Expiration Date:      Order Specific Question:  Diet:     Answer:  Regular [1]     Medical Decision Making, by Problem:  Active Hospital Problems    Diagnosis   •  *Respiratory failure (CMS-HCC) [J96.90] - not improving, still on 5L, cont o2 to maintain sats >90%   • V tach (CMS-HCC) [I47.2] - echo done and ok, cont metoprolol, cont tele   • Lung nodule [R91.1] - outpt follow up   • Adrenal adenoma [D35.00]   • Hypertension [I10] - now controlled, cont metoprolol   • Interstitial lung disease (CMS-HCC) [J84.9] - will get inpatient pulm consult as she is no improving   • Community acquired pneumonia [J18.9] CXR shows worsening PNA. change c3 & azithro to vanc and zosyn (mrsa pcr pending), pulm consult   • COPD (CMS-HCC) [J44.9] no wheezing currently, duonebs prn       Core Measures

## 2017-01-22 NOTE — PROGRESS NOTES
"Pharmacy Kinetics Vancomycin Day # 1  2017        Estimated CrCl =  Estimated Creatinine Clearance: 43.2 mL/min (by C-G formula based on Cr of 0.94).     Recent Labs      17   1950  17   0222   WBC  7.5  7.0   NEUTSPOLYS  72.60*   --    BUN  26*  18   CREATININE  1.22  0.94        Blood pressure 114/62, pulse 82, temperature 36.2 °C (97.2 °F), resp. rate 16, height 1.753 m (5' 9.02\"), weight 71.2 kg (156 lb 15.5 oz), SpO2 94 %.    Temp (24hrs), Av.4 °C (97.6 °F), Min:36.2 °C (97.2 °F), Max:36.9 °C (98.4 °F)        A/P 1.  Vancomycin 1800 mg loading dose followed by vancomycin 1000 mg every 24 hours with a target trough of 16-20 mcg/ml.   2.  MRSA nasal swab ordered.   3.  Evaluate labs and schedule trough in AM.        Isaiah VargheseD BCPS              "

## 2017-01-22 NOTE — FLOWSHEET NOTE
01/22/17 0753   Events/Summary/Plan   Events/Summary/Plan svn given   Interdisciplinary Plan of Care-Goals (Indications)   Obstructive Ventilatory Defect or Pulmonary Disease without Obvious Obstruction History / Diagnosis   SVN Group   #SVN Performed Yes   Given By: Mouthpiece   Chest Exam   Work Of Breathing / Effort Mild   Respiration 20  (post 20)   Pulse 73  (post 84)   Heart Rate (Monitored) 73   Breath Sounds   Pre/Post Intervention Pre Intervention Assessment  (increased aeration after svn)   RUL Breath Sounds Clear;Diminished   RML Breath Sounds Clear;Diminished   RLL Breath Sounds Clear;Diminished   KRISTINA Breath Sounds Clear;Diminished   LLL Breath Sounds Clear;Diminished   Oximetry   #Pulse Oximetry (Single Determination) Yes   Oxygen   Pulse Oximetry 100 %   O2 (LPM) 15  (02 decreased to 10 liters. Switched over to oxymask)   O2 Daily Delivery Respiratory  Non Rebreather Mask  (switched over to oxymask after svn)

## 2017-01-22 NOTE — FLOWSHEET NOTE
01/22/17 1242   Events/Summary/Plan   Events/Summary/Plan svn given   Interdisciplinary Plan of Care-Goals (Indications)   Obstructive Ventilatory Defect or Pulmonary Disease without Obvious Obstruction History / Diagnosis   Interdisciplinary Plan of Care-Outcomes    Bronchodilator Outcome Improved Vital Signs and Measures of Gas Exchange;Patient at Stable Baseline   SVN Group   #SVN Performed Yes   Given By: Mouthpiece   Chest Exam   Work Of Breathing / Effort Mild   Respiration 20  (post 20)   Pulse 82  (post 92)   Heart Rate (Monitored) 82   Breath Sounds   Pre/Post Intervention Pre Intervention Assessment  (increased aeration after svn)   RUL Breath Sounds Clear;Diminished   RML Breath Sounds Clear;Diminished   RLL Breath Sounds Clear;Diminished   KRISTINA Breath Sounds Clear;Diminished   LLL Breath Sounds Clear;Diminished   Oximetry   #Pulse Oximetry (Single Determination) Yes   Oxygen   Pulse Oximetry 92 %   O2 (LPM) 6   O2 Daily Delivery Respiratory  Nasal Cannula

## 2017-01-22 NOTE — PROGRESS NOTES
1900 Report received. Pt resting in bed, 5 liters oxy mask, O2 sat 94%.    2030 Pt had large hard BM.     2100 Assessment completed, medications given. Pt reports back pain but declines medication at this time, requests Xanax.    0100 Pt requests pain medication.    0200 Pt still reporting severe pain, assisted to requested seated position at edge of bed. Second Norco tablet administered. Pt assisted back to bed.    0430 Pt observed resting in bed with eyes closed, respirations even and unlabored.    0600 O2 sat mid 80s on 5 liters oxy mask. Pt placed on non-rebreather at 15 liters. RT Lo with pt.

## 2017-01-22 NOTE — PROGRESS NOTES
Pt left for MRI about 1000.  Pt attempted to ambulate from the bed to the wheelchair which was about 10 ft but only made it 3 feet and had to sit down.  Legs are weak and unsteady even with the walker.  Pt sat up for lunch and dinner being a one person max assist; she was doing exercises in her legs while she was up.  Pt remains on the 5 lt/min and IS up to 1500; lung sounds are diminished.   MRSA swab collected and resulted negative.  Vancomycin started this evening at 1730.      Tele Summary:    Strip at 0717 showed SR 71.  Throughout the shift, pt had rare PVCs and rate was in the 70s.    Measurements from am strip were as follows:  NC=0.18  QRS=0.10  QT=0.38

## 2017-01-22 NOTE — CONSULTS
DATE OF SERVICE:  01/22/2017    REASON FOR CONSULTATION:  Hypoxemia, respiratory insufficiency, abnormal chest   x-ray.    REQUESTING PHYSICIAN:  Shefali Cooney MD    HISTORY OF PRESENT ILLNESS:  The patient is an 88-year-old woman who was   admitted to the hospital on the 18th of this month with increasing shortness   of breath.  The patient said a week ago that she was fine and was shopping in   _____.  Her daughter has had a recent bronchitis.  The patient is a remote   smoker having quit smoking about 40 years ago.  She says that Dr. Doyle her   once that she had COPD, but she has never been on oxygen or any inhalers.  The   patient has developed shortness of breath, cough.  She did not have much in   the way of fever or chills.  She has gotten her influenza vaccination this   year.  She was admitted, started on prednisone and antibiotics.  However, her   oxygen needs have continued to increase and she is now on a mask at 15 liters.    At rest she is not particularly dyspnea on the supplemental oxygen with her   sats are in the mid 90s.    REVIEW OF SYSTEMS:  Otherwise negative.  No history of seizures or neurologic   problems.  No chest pain or angina.  No history of cardiac issues.  No   abdominal issues.  Rest of the review of systems is negative.    PAST MEDICAL HISTORY:  Besides her chronic obstructive pulmonary disease which   seems to have been mild, she does have a prior history of breast cancer,   history of an abdominal aortic aneurysm and some chronic pain.    PAST SURGICAL HISTORY:  She had a prior right mastectomy.    SOCIAL HISTORY:  She does use alcohol on a regular basis, but has not smoked   in over 40 years.    MEDICATIONS:  At home reviewed.    PHYSICAL EXAMINATION:  GENERAL:  Currently, she is awake and alert on supplemental oxygen via mask   anywhere between 8 and 15 liters a minute.  Her sats are in the mid 90s.  She   is not in acute distress, on oxygen at rest.  Pulse is in the  90s, blood   pressure 103/45.  HEENT:  No nasal or oral lesions.  NECK:  No JVD.  LUNGS:  She has bibasilar crackles.  There are no wheezes are heard   anteriorly, lungs are clear.  HEART:  Regular.  I do not hear a gallop or a murmur.  ABDOMEN:  Soft, nontender.  Bowel sounds are present.  EXTREMITIES:  No cyanosis or edema.  NEUROLOGIC:  Intact.    LABORATORY DATA:  White blood cell count is 7.0 with hematocrit of 35%.  No   band forms are noted.  Electrolytes are normal.  Kidney and renal function are   normal.  Glucose is mildly elevated.  Her MRSA smear and is negative.    IMAGING:  Chest x-ray shows bilateral interstitial edema or pneumonitis with   no evidence of cardiomegaly.  A chest CTA showed no evidence of pulmonary   embolism, but she does have bilateral interstitial changes with significant   ground glass changes.  Her echocardiogram shows an EF of 66% with no diastolic   dysfunction, but she does have very mild pulmonary hypertension with an   estimated RVSP of 35 mmHg.  Her BNP is not elevated.    ASSESSMENT:  1.  Respiratory insufficiency, now on increasing oxygen supplementation.  2.  Abnormal chest x-ray and CT scan with diffuse bilateral infiltrates.  Her   scan suggests the possibility of underlying basilar fibrosis and possible IPF   or UIP now with a more diffuse changes consistent with inflammation or   infection.  She does not seem to have pulmonary edema.  3.  Possible mild chronic obstructive pulmonary disease.  4.  She does have a lung nodule on CT scan.  5.  History of hypertension.  6.  History of ventricular tachycardia in the past.    DISCUSSION:  At this point, it appears that she may have had some underlying   pulmonary fibrosis.  She is now a bit worse and most likely with an acute   viral or atypical infection.  She has been treated with antibiotics without   significant improvement, so far.  I would most likely increase her steroids   and give her Solu-Medrol intravenously rather  than prednisone.  Continue   antibiotics and I would keep her relatively dry, although certainly she is   euvolemic at the present time.  Try to avoid volume overload.  Lasix if   necessary can be given.  It is quite possible this is a viral process, so far   she seems to be handling the hypoxemia.  She is not in respiratory distress,   but needs to be watched closely in the hospital.  We will follow along with   you.       ____________________________________     RAFAL TOLBERT MD    SAB / NTS    DD:  01/22/2017 10:33:50  DT:  01/22/2017 11:03:29    D#:  [id number]  Job#:  [id number]

## 2017-01-23 ENCOUNTER — HOSPITAL ENCOUNTER (INPATIENT)
Facility: REHABILITATION | Age: 82
End: 2017-01-23
Admitting: PHYSICAL MEDICINE & REHABILITATION
Payer: MEDICARE

## 2017-01-23 PROCEDURE — 94760 N-INVAS EAR/PLS OXIMETRY 1: CPT

## 2017-01-23 PROCEDURE — 700105 HCHG RX REV CODE 258: Performed by: INTERNAL MEDICINE

## 2017-01-23 PROCEDURE — 770020 HCHG ROOM/CARE - TELE (206)

## 2017-01-23 PROCEDURE — 700111 HCHG RX REV CODE 636 W/ 250 OVERRIDE (IP): Performed by: INTERNAL MEDICINE

## 2017-01-23 PROCEDURE — A9270 NON-COVERED ITEM OR SERVICE: HCPCS | Performed by: INTERNAL MEDICINE

## 2017-01-23 PROCEDURE — 99232 SBSQ HOSP IP/OBS MODERATE 35: CPT | Performed by: INTERNAL MEDICINE

## 2017-01-23 PROCEDURE — 700102 HCHG RX REV CODE 250 W/ 637 OVERRIDE(OP): Performed by: INTERNAL MEDICINE

## 2017-01-23 PROCEDURE — A9270 NON-COVERED ITEM OR SERVICE: HCPCS | Performed by: EMERGENCY MEDICINE

## 2017-01-23 PROCEDURE — 700101 HCHG RX REV CODE 250: Performed by: INTERNAL MEDICINE

## 2017-01-23 PROCEDURE — 700102 HCHG RX REV CODE 250 W/ 637 OVERRIDE(OP): Performed by: EMERGENCY MEDICINE

## 2017-01-23 PROCEDURE — 94640 AIRWAY INHALATION TREATMENT: CPT

## 2017-01-23 RX ADMIN — METHYLPREDNISOLONE SODIUM SUCCINATE 125 MG: 125 INJECTION, POWDER, FOR SOLUTION INTRAMUSCULAR; INTRAVENOUS at 13:11

## 2017-01-23 RX ADMIN — GABAPENTIN 300 MG: 300 CAPSULE ORAL at 15:16

## 2017-01-23 RX ADMIN — IPRATROPIUM BROMIDE AND ALBUTEROL SULFATE 3 ML: .5; 3 SOLUTION RESPIRATORY (INHALATION) at 15:47

## 2017-01-23 RX ADMIN — GABAPENTIN 300 MG: 300 CAPSULE ORAL at 09:00

## 2017-01-23 RX ADMIN — METOPROLOL TARTRATE 25 MG: 25 TABLET ORAL at 20:01

## 2017-01-23 RX ADMIN — HEPARIN SODIUM 5000 UNITS: 5000 INJECTION, SOLUTION INTRAVENOUS; SUBCUTANEOUS at 15:16

## 2017-01-23 RX ADMIN — METHYLPREDNISOLONE SODIUM SUCCINATE 125 MG: 125 INJECTION, POWDER, FOR SOLUTION INTRAMUSCULAR; INTRAVENOUS at 18:36

## 2017-01-23 RX ADMIN — CEFEPIME HYDROCHLORIDE 1 G: 1 INJECTION, POWDER, FOR SOLUTION INTRAMUSCULAR; INTRAVENOUS at 08:59

## 2017-01-23 RX ADMIN — IPRATROPIUM BROMIDE AND ALBUTEROL SULFATE 3 ML: .5; 3 SOLUTION RESPIRATORY (INHALATION) at 06:54

## 2017-01-23 RX ADMIN — METHYLPREDNISOLONE SODIUM SUCCINATE 125 MG: 125 INJECTION, POWDER, FOR SOLUTION INTRAMUSCULAR; INTRAVENOUS at 00:03

## 2017-01-23 RX ADMIN — GABAPENTIN 300 MG: 300 CAPSULE ORAL at 20:01

## 2017-01-23 RX ADMIN — CEFEPIME HYDROCHLORIDE 1 G: 1 INJECTION, POWDER, FOR SOLUTION INTRAMUSCULAR; INTRAVENOUS at 20:06

## 2017-01-23 RX ADMIN — METHYLPREDNISOLONE SODIUM SUCCINATE 125 MG: 125 INJECTION, POWDER, FOR SOLUTION INTRAMUSCULAR; INTRAVENOUS at 05:47

## 2017-01-23 RX ADMIN — IPRATROPIUM BROMIDE AND ALBUTEROL SULFATE 3 ML: .5; 3 SOLUTION RESPIRATORY (INHALATION) at 11:25

## 2017-01-23 RX ADMIN — IPRATROPIUM BROMIDE AND ALBUTEROL SULFATE 3 ML: .5; 3 SOLUTION RESPIRATORY (INHALATION) at 19:15

## 2017-01-23 RX ADMIN — METHYLPREDNISOLONE SODIUM SUCCINATE 125 MG: 125 INJECTION, POWDER, FOR SOLUTION INTRAMUSCULAR; INTRAVENOUS at 23:30

## 2017-01-23 RX ADMIN — METOPROLOL TARTRATE 25 MG: 25 TABLET ORAL at 09:00

## 2017-01-23 RX ADMIN — CHOLECALCIFEROL TAB 25 MCG (1000 UNIT) 1000 UNITS: 25 TAB at 08:59

## 2017-01-23 ASSESSMENT — PAIN SCALES - GENERAL
PAINLEVEL_OUTOF10: 0
PAINLEVEL_OUTOF10: 0

## 2017-01-23 ASSESSMENT — ENCOUNTER SYMPTOMS
DIARRHEA: 0
SHORTNESS OF BREATH: 1
CHILLS: 0
NAUSEA: 0
CONSTIPATION: 0
HEMOPTYSIS: 1
SPUTUM PRODUCTION: 1
FEVER: 0
ABDOMINAL PAIN: 0
VOMITING: 0
WHEEZING: 0
COUGH: 1

## 2017-01-23 NOTE — DISCHARGE PLANNING
Aware of Rehab referral/consult from Dr. Hutchinson Transitional Care Coordinator to follow. Dr. Hale to consult at this time patient is showing to have Medicare as their coverage.

## 2017-01-23 NOTE — PROGRESS NOTES
Hospital Medicine Progress Note, Adult, Complex               Author: Shefali Cooney Date & Time created: 1/22/2017  5:03 PM     Interval History:  CC - SOB    Despite abx, steroids, and nebs, on 15L oxymask this morning, now on 8L. She is comfortable. Some scant blood in sputum. Pulmonology to see her today. No f/c/n/v/d. Glucose elevated, likely from prednisone, a1c wnl.    still requiring 5L O2, gets SOB with any movement, very weak today, trouble walking d/t weakness. Nebs not helping. CXR worse PNA. Appears euvolemic. No n/v/d. BP controlled.    Review of Systems:  Review of Systems   Constitutional: Negative for fever and chills.   Respiratory: Positive for cough (dry), hemoptysis, sputum production and shortness of breath. Negative for wheezing.    Cardiovascular: Negative for chest pain and leg swelling.   Gastrointestinal: Negative for nausea, vomiting, abdominal pain, diarrhea and constipation.   Genitourinary: Negative for dysuria, urgency and frequency.   Skin: Negative for rash.   All other systems reviewed and are negative.      Physical Exam:  Physical Exam   Constitutional: She is oriented to person, place, and time. She appears well-developed and well-nourished. No distress.   HENT:   Head: Normocephalic and atraumatic.   Mouth/Throat: Oropharynx is clear and moist.   Eyes: Conjunctivae and EOM are normal. Pupils are equal, round, and reactive to light. Right eye exhibits no discharge. Left eye exhibits no discharge. No scleral icterus.   Neck: Neck supple. No JVD present. No tracheal deviation present.   Cardiovascular: Normal rate, regular rhythm and normal heart sounds.    Pulmonary/Chest: Effort normal. No respiratory distress. She has no wheezes. She has rales (bibasilar).   Abdominal: Soft. Bowel sounds are normal. She exhibits no distension. There is no tenderness.   Musculoskeletal: She exhibits no edema.   Neurological: She is alert and oriented to person, place, and time. No cranial  nerve deficit.   Skin: Skin is warm and dry. No rash noted. She is not diaphoretic.   Psychiatric: She has a normal mood and affect. Her behavior is normal.   Nursing note and vitals reviewed.      Labs:        Invalid input(s): [id number]  Recent Labs      17   1116   BNPBTYPENAT  96     Recent Labs      17   1116   SODIUM  135   POTASSIUM  3.9   CHLORIDE  105   CO2  24   BUN  29*   CREATININE  0.75   CALCIUM  9.1     Recent Labs      17   1116   GLUCOSE  116*     Recent Labs      17   1116   RBC  3.91*   HEMOGLOBIN  11.7*   HEMATOCRIT  36.5*   PLATELETCT  286     Recent Labs      17   1116   WBC  11.6*   NEUTSPOLYS  85.00*   LYMPHOCYTES  4.60*   MONOCYTES  8.10   EOSINOPHILS  1.10   BASOPHILS  0.30           Hemodynamics:  Temp (24hrs), Av.6 °C (97.8 °F), Min:36.5 °C (97.7 °F), Max:36.7 °C (98 °F)  Temperature: 36.7 °C (98 °F)  Pulse  Av.3  Min: 51  Max: 98Heart Rate (Monitored): 77  Blood Pressure : (!) 90/74 mmHg (nurse aware.)     Respiratory:    Respiration: 18 (post 18), Pulse Oximetry: 95 %, O2 Daily Delivery Respiratory : Nasal Cannula     Given By:: Mouthpiece, Work Of Breathing / Effort: Mild  RUL Breath Sounds: Clear, RML Breath Sounds: Clear;Diminished, RLL Breath Sounds: Clear;Diminished, KRISTINA Breath Sounds: Clear, LLL Breath Sounds: Diminished  Fluids:    Intake/Output Summary (Last 24 hours) at 17 1703  Last data filed at 17 0800   Gross per 24 hour   Intake    780 ml   Output      0 ml   Net    780 ml        GI/Nutrition:  Orders Placed This Encounter   Procedures   • Diet Order     Standing Status: Standing      Number of Occurrences: 1      Standing Expiration Date:      Order Specific Question:  Diet:     Answer:  Regular [1]     Medical Decision Making, by Problem:  Active Hospital Problems    Diagnosis   • *Respiratory failure (CMS-[facility]) [J96.90] - not improving, now on 8L, cont o2 to maintain sats >90%   • V tach (CMS-[facility]) [I47.2] - echo  done and ok, cont metoprolol, cont tele   • Lung nodule [R91.1] - outpt follow up   • Adrenal adenoma [D35.00]   • Hypertension [I10] - now controlled, cont metoprolol   • Interstitial lung disease (CMS-[facility]) [J84.9] - will get inpatient pulm consult as she is no improving   • Community acquired pneumonia [J18.9] CXR shows worsening PNA. Dc vanc as mrsa pcr neg and cont zosyn, pulm consult   • COPD (CMS-[facility]) [J44.9] no wheezing currently, duonebs prn, prednisone       Core Measures

## 2017-01-23 NOTE — FLOWSHEET NOTE
01/22/17 1603   Events/Summary/Plan   Events/Summary/Plan Svn given, pt sitting up in cardiac chair   Interdisciplinary Plan of Care-Goals (Indications)   Obstructive Ventilatory Defect or Pulmonary Disease without Obvious Obstruction History / Diagnosis   Interdisciplinary Plan of Care-Outcomes    Bronchodilator Outcome Improved Vital Signs and Measures of Gas Exchange;Patient at Stable Baseline   SVN Group   #SVN Performed Yes   Given By: Mouthpiece   Chest Exam   Work Of Breathing / Effort Mild   Respiration 18  (post 18)   Pulse 77  (post 93)   Heart Rate (Monitored) 77   Breath Sounds   Pre/Post Intervention Pre Intervention Assessment  (increased aeration after svn)   RUL Breath Sounds Clear   RML Breath Sounds Clear;Diminished   RLL Breath Sounds Clear;Diminished   KRISTINA Breath Sounds Clear   LLL Breath Sounds Diminished   Oximetry   #Pulse Oximetry (Single Determination) Yes   Oxygen   Pulse Oximetry 95 %   O2 (LPM) 6   O2 Daily Delivery Respiratory  Nasal Cannula

## 2017-01-23 NOTE — PROGRESS NOTES
Pulmonary Critical Care Progress Note        Chief Complaint: Dyspnea, hypoxemia.    History of Present Illness: 88 y.o. female admitted on January 18 with dyspnea and hypoxemia. She continued to require high flow oxygen therapy, prompting pulmonary consultation on January 23.     ROS:  Respiratory: positive shortness of breath, Cardiac: negative chest pain, GI: negative abdominal pain.  All other systems negative.    Interval Events:  24 hour interval history reviewed.  Comfortable on oxygen nasal cannula, out of bed to chair. Eating.    PFSH:  No change.    Respiratory:  Pulse Oximetry: 93 % on an oxygen nasal cannula at 5-6 L/m.  Exam: diminished but symmetrical bilateral breath sounds with bilateral rails but no wheezing or egophony.  ImagingAvailable data reviewed. The chest film on January 21 demonstrates diffuse interstitial prominence without focal consolidation. The CT angiogram on January 18 demonstrated no vascular filling defects to suggest pulmonary emboli but did demonstrate bilateral interstitial lung disease and a 10 mm right middle lobe noncalcified nodule. There was a 15 mm left hilar lymph node and bilateral adrenal adenomas.    HemoDynamics:  Pulse: 73, Heart Rate (Monitored): 76  Blood Pressure : 127/48 mmHg     Exam: regular rhythm (Sinus)  Imaging: Available data reviewed. The echocardiogram on January 19 demonstrates normal left ventricular systolic function with an estimated ejection fraction of 66% but grade 2 diastolic dysfunction. Right ventricular systolic pressure is 35 mmHg.  Recent Labs      01/22/17   1116   BNPBTYPENAT  96     Neuro:  Exam: no focal deficits noted, alert.  Imaging: None - Reviewed    Fluids:  Intake/Output       01/21/17 0700 - 01/22/17 0659 01/22/17 0700 - 01/23/17 0659 01/23/17 0700 - 01/24/17 0659      2079-7049 9654-2040 Total 6914-4220 2706-7138 Total 6212-3900 5326-9684 Total       Intake    P.O.  240  300 540  880  720 1600  --  -- --    P.O. 240 300 540 880  720 1600 -- -- --    I.V.  --  -- --  --  100 100  --  -- --    IV Piggyback Volume (Rocephin) -- -- -- -- 100 100 -- -- --    Total Intake 240 300 540  -- -- --       Output    Urine  --  -- --  400  350 750  --  -- --    Number of Times Voided -- 1 x 1 x 1 x -- 1 x -- -- --    Indwelling Cathether -- -- -- -- 350 350 -- -- --    Void (ml) -- -- -- 400 -- 400 -- -- --    Total Output -- -- -- 400 350 750 -- -- --       Net I/O     240 300 540 480 470 950 -- -- --           Recent Labs      17   1116   SODIUM  135   POTASSIUM  3.9   CHLORIDE  105   CO2  24   BUN  29*   CREATININE  0.75   CALCIUM  9.1       GI/Nutrition:  Exam: abdomen is soft and non-tender  Imaging: None - Reviewed  taking PO  Liver Function  Recent Labs      17   1116   GLUCOSE  116*       Heme:  Recent Labs      17   1116   RBC  3.91*   HEMOGLOBIN  11.7*   HEMATOCRIT  36.5*   PLATELETCT  286       Infectious Disease:  Temp  Av.5 °C (97.7 °F)  Min: 36.3 °C (97.4 °F)  Max: 36.9 °C (98.5 °F)  Micro: antibiotics reviewed and no new positive cultures, on cefepime.  Recent Labs      17   1116   WBC  11.6*   NEUTSPOLYS  85.00*   LYMPHOCYTES  4.60*   MONOCYTES  8.10   EOSINOPHILS  1.10   BASOPHILS  0.30     Current Facility-Administered Medications   Medication Dose Frequency Provider Last Rate Last Dose   • methylPREDNISolone sod succ (SOLU-MEDROL) 125 MG injection 125 mg  125 mg Q6HRS Shefali Cooney M.D.   125 mg at 17 1311   • acetaminophen (TYLENOL) tablet 650 mg  650 mg Q6HRS PRN Jose Julien M.D.   650 mg at 17 0234   • hydrocodone-acetaminophen (NORCO) 5-325 MG per tablet 1-2 Tab  1-2 Tab Q6HRS PRN Shefali Cooney M.D.   1 Tab at 17 0213   • ceFEPIme (MAXIPIME) 1 g in  mL IVPB  1 g Q12HRS Shefali Cooney M.D.   1 g at 17 0859   • ipratropium-albuterol (DUONEB) nebulizer solution 3 mL  3 mL 4X/DAY (RT) Shefali Cooney M.D.   3 mL at 17 1125   •  ipratropium-albuterol (DUONEB) nebulizer solution 3 mL  3 mL Q4H PRN (RT) Newton Hutchinson M.D.       • metoprolol (LOPRESSOR) tablet 25 mg  25 mg TWICE DAILY Shefali Cooney M.D.   25 mg at 01/23/17 0900   • gabapentin (NEURONTIN) capsule 300 mg  300 mg TID Pelon Maxwell M.D.   300 mg at 01/23/17 1516   • alprazolam (XANAX) tablet 0.25 mg  0.25 mg HS PRN Newton Hutchinson M.D.   0.25 mg at 01/22/17 2159   • fluticasone (FLONASE) nasal spray 50 mcg  1 [place] QHS Newton Hutchinson M.D.   50 mcg at 01/18/17 2343   • vitamin D (cholecalciferol) tablet 1,000 Units  1,000 Units DAILY Newton Hutchinson M.D.   1,000 Units at 01/23/17 0859   • Respiratory Care per Protocol   Continuous RT Newton Hutchinson M.D.       • docusate sodium (COLACE) capsule 100 mg  100 mg QAM Newton Hutchinson M.D.   100 mg at 01/21/17 0848    And   • senna-docusate (PERICOLACE or SENOKOT S) 8.6-50 MG per tablet 1 Tab  1 Tab Nightly Newton Hutchinson M.D.   1 Tab at 01/21/17 2057    And   • senna-docusate (PERICOLACE or SENOKOT S) 8.6-50 MG per tablet 1 Tab  1 Tab Q24HRS PRN Newton Hutchinson M.D.        And   • lactulose 20 GM/30ML solution 30 mL  30 mL Q24HRS PRN Newton Hutchinson M.D.        And   • bisacodyl (DULCOLAX) suppository 10 mg  10 mg Q24HRS PRN Newton Hutchinson M.D.       • heparin injection 5,000 Units  5,000 Units Q8HRS Newton Hutchinson M.D.   5,000 Units at 01/23/17 1516   • ondansetron (ZOFRAN) syringe/vial injection 4 mg  4 mg Q4HRS PRN Newton Hutchinson M.D.       • ondansetron (ZOFRAN ODT) dispertab 4 mg  4 mg Q4HRS PRN Newton Hutchinson M.D.         Last reviewed on 1/18/2017 11:24 PM by Yennifer Hardy R.N.    Quality  Measures:  Core Measures & Quality Metrics    Problems:  1.  Respiratory insufficiency, requiring significant oxygen supplementation, slowly better.  2.  Abnormal chest x-ray and CT scan with diffuse bilateral infiltrates.  Her    scan suggests the possibility of underlying basilar fibrosis  and possible interstitial pulmonary fibrosis or usual interstitial pneumonia, now with a more diffuse changes consistent with inflammation or infection.  She does not seem to have pulmonary edema.  3.  Possible mild chronic obstructive pulmonary disease.  4.  10 mm right middle lobe noncalcified lung nodule on CT scan.  5. Systemic arterial hypertension, controlled.  6.  History of ventricular tachycardia.    Plan:  Continue titrated oxygen, bronchodilators, respiratory protocols and systemic corticosteroids.  Continue antibiotics, prophylaxis and mobilization.  The lung nodule require serial follow-up after discharge.  Pulmonary function studies and a high-resolution CT scan in the near future might help to clarify the nature of the underlying interstitial lung disease.

## 2017-01-23 NOTE — PROGRESS NOTES
0700 Report received from [facility] Concepción harrington, at bedside. Pt is resting in recliner.    0805 Monitored heart rhythm is SR w rare PVCs 90.14/ 0.10/ 0.38), rate 70-90's.    1015 Dr. Garrido visited pt & discuss POC at bedside.    1230 O2 sat maintains >92% on 6L/min O2 via NC, pt tolerating well.    1400 Taking a nap, no res. distress noticed, skin color pink.    1530 Pt's daughter is at bedside.    1900 Report given to NOC Concepción harrington, at bedside.

## 2017-01-23 NOTE — FLOWSHEET NOTE
01/22/17 1919   Events/Summary/Plan   Events/Summary/Plan SVN   Interdisciplinary Plan of Care-Goals (Indications)   Obstructive Ventilatory Defect or Pulmonary Disease without Obvious Obstruction History / Diagnosis   Interdisciplinary Plan of Care-Outcomes    Bronchodilator Outcome Patient at Stable Baseline   Education   Education Yes - Pt. / Family has been Instructed in use of Respiratory Equipment;Yes - Pt. / Family has been Instructed in use of Respiratory Medications and Adverse Reactions   RT Assessment of Delivered Medications   Evaluation of Medication Delivery Daily Yes-- Pt /Family has been Instructed in use of Respiratory Medications and Adverse Reactions   SVN Group   #SVN Performed Yes   Given By: Mouthpiece   Incentive Spirometry Group   Incentive Spirometry Instruction Yes   Breathing Exercises Yes   Incentive Spirometer Volume 1250 mL   Respiratory WDL   Respiratory (WDL) X   Chest Exam   Respiration 16   Pulse 78   Breath Sounds   Pre/Post Intervention Post Intervention Assessment   RUL Breath Sounds Clear   RML Breath Sounds Clear;Diminished   RLL Breath Sounds Clear;Diminished   KRISTINA Breath Sounds Clear   LLL Breath Sounds Diminished   Oximetry   #Pulse Oximetry (Single Determination) Yes   Oxygen   Pulse Oximetry 95 %   O2 (LPM) 5   O2 Daily Delivery Respiratory  Nasal Cannula

## 2017-01-23 NOTE — PROGRESS NOTES
Bedside report given to JESUS Clements. POC discussed w/ pt. Lines patent. Fall precautions in place.

## 2017-01-23 NOTE — CARE PLAN
Problem: Oxygenation:  Goal: Maintain adequate oxygenation dependent on patient condition  Outcome: PROGRESSING SLOWER THAN EXPECTED  Monitor oxygenation for SpO2 >90%  Intervention: Manage oxygen therapy by monitoring pulse oximetry and/or ABG values  Oxygen is currently required at 6 lpm nasal cannula for SpO2 >90%  Intervention: Levels of oxygenation will improve to baseline  Patient did not require oxygen at home prior to this admission.       Problem: Bronchoconstriction:  Goal: Improve in air movement and diminished wheezing  Outcome: PROGRESSING AS EXPECTED  Patient does claim a benefit from bronchodilator therapy. There was a increase in aeration and a decrease in wheezes heard  Intervention: Implement inhaled treatments  Patient is receiving Duoneb Q4   Intervention: Evaluate and manage medication effects  Patient will be evaluated before and after medication delivery to assess effectiveness of therapy. Currently there seems to be both subjective and objective improvement.

## 2017-01-23 NOTE — FLOWSHEET NOTE
01/23/17 0658   Interdisciplinary Plan of Care-Goals (Indications)   Obstructive Ventilatory Defect or Pulmonary Disease without Obvious Obstruction History / Diagnosis   Interdisciplinary Plan of Care-Outcomes    Bronchodilator Outcome Patient at Stable Baseline   Education   Education Yes - Pt. / Family has been Instructed in use of Respiratory Medications and Adverse Reactions;Yes - Pt. / Family has been Instructed in use of Respiratory Equipment   RT Assessment of Delivered Medications   Evaluation of Medication Delivery Daily Yes-- Pt /Family has been Instructed in use of Respiratory Medications and Adverse Reactions   SVN Group   #SVN Performed Yes   Given By: Mouthpiece   Date SVN Last Changed 01/19/17   Date SVN Next Change Due (Q 7 Days) 01/26/17   Incentive Spirometry Group   Breathing Exercises Yes   Incentive Spirometer Volume 1000 mL   Respiratory WDL   Respiratory (WDL) X   Chest Exam   Respiration 20   Heart Rate (Monitored) 74   Breath Sounds   Pre/Post Intervention Pre Intervention Assessment   RUL Breath Sounds Diminished;Fine Crackles   RML Breath Sounds Diminished;Fine Crackles   RLL Breath Sounds Diminished   KRISTINA Breath Sounds Diminished;Expiratory Wheezes   LLL Breath Sounds Diminished   Secretions   Cough Congested;Non Productive;Strong   How Sputum Obtained Spontaneous   Oximetry   #Pulse Oximetry (Single Determination) Yes   Oxygen   Home O2 Use Prior To Admission? No   Pulse Oximetry 98 %   O2 (LPM) 10  (titrated to 6 lpm,  RN aware.)   O2 Daily Delivery Respiratory  OxyMask  (changed to NC)   Increased aeration after TX.

## 2017-01-23 NOTE — PROGRESS NOTES
Pt desat to 76%. Titrated to 6L NC with little improvement. Now on 10L via oxymask and fluctuating between 90-93%. RT notified.

## 2017-01-23 NOTE — PROGRESS NOTES
Tele strip at 1919 shows sinus rhythm w/ HR of 79.     Measurements: 0.16 / 0.08 / 0.32    Tele Shift Summary:    Rhythm : SR  Rate : 70s    Ectopy : Per CCT Funmilayo, pt had occasional to rare PVCs and rare couplets.     Telemetry monitoring strips placed in pt chart.

## 2017-01-23 NOTE — PROGRESS NOTES
Assumed care of patient. Bedside report from JESUS Clements. POC discussed w/ pt. Lines patent. CLIP. Fall precautions in place.

## 2017-01-23 NOTE — PROGRESS NOTES
AOx4. Afebrile. Denied pain. Assessment per doc flowsheet. PIV intact & patent. IVabt infusing. Sitting up in cardiac chair reading. States understanding of POC. No additional concerns at this time. CLIP. Personal belongings within reach. Non-skid socks.

## 2017-01-23 NOTE — CARE PLAN
Problem: Infection  Goal: Will remain free from infection  Outcome: PROGRESSING AS EXPECTED  IV Cefepime for CAP. No s/s adverse reactions noted.        Problem: Knowledge Deficit  Goal: Knowledge of disease process/condition, treatment plan, diagnostic tests, and medications will improve  Outcome: PROGRESSING AS EXPECTED  POC discussed w/ pt. Understands disease process and treatment plan. Educated on new meds & procedures/tests. Questions answered.        Problem: Respiratory:  Goal: Respiratory status will improve  Outcome: PROGRESSING SLOWER THAN EXPECTED  5L o2 via NC. Maintaining o2sat >93%. No SOB. No dyspnea while at rest. RT following. IV steroids and IV abt per MD order.

## 2017-01-23 NOTE — DISCHARGE PLANNING
PMR referral     Medical Decision Making, by Problem:  Active Hospital Problems      Diagnosis    •  *Respiratory failure (CMS-HCC) [J96.90] - not improving, now on 8L, cont o2 to maintain sats >90%    •  V tach (CMS-HCC) [I47.2] - echo done and ok, cont metoprolol, cont tele    •  Lung nodule [R91.1] - outpt follow up    •  Adrenal adenoma [D35.00]    •  Hypertension [I10] - now controlled, cont metoprolol    •  Interstitial lung disease (CMS-HCC) [J84.9] - will get inpatient pulm consult as she is no improving    •  Community acquired pneumonia [J18.9] CXR shows worsening PNA. Dc vanc as mrsa pcr neg and cont zosyn, pulm consult    •  COPD (CMS-HCC) [J44.9] no wheezing currently, duonebs prn, prednisone        Core Measures                            Dulce Almanza [id number]. Physical Therapist Signed  Therapy 1/20/2017 10:22 AM      Expand All Collapse All    Physical Therapy Evaluation completed.   Bed Mobility: Sit to Supine with SBA  Transfers: Sit to Stand: Minimal Assist  Gait: Level Of Assist: Minimal Assist with Hand Held Assist to take steps to HOB to reposition. Unsteady.  Hypoxic sitting up at EOB at 73% on 2 L upon PT arrival. Improved to 91% on 4 L supine at rest.   Plan of Care: Will benefit from Physical Therapy 5 times per week  Discharge Recommendations: Equipment: Will Continue to Assess for Equipment Needs. Post-acute therapy recommended before discharged home.    89 yo female admitted w/ SOB. PMH includes COPD but no home O2 use. Pt also has recent hx of R ankle fracture but reports being ambulatory without an AD prior to her admisssion. Pt continues to have difficulty maintaining O2 sats at rest and with activity, requiring 4 LO2 sitting EOB and supine during PT evaluation. Further mobility limited due to pt's respiratory status. RN present and aware. Pt will benefit from further acute and post acute therapy to promote improvement in strength, balance and mobility to promote safest return to home  "w/ spouse.     See \"Rehab Therapy-Acute\" Patient Summary Report for complete documentation.                     Will need OT note for current level of function I will follow up with Physiatry consult.   "

## 2017-01-23 NOTE — FLOWSHEET NOTE
01/23/17 1120   Events/Summary/Plan   Non-Invasive Resp Device Site Inspection Completed Intact   Interdisciplinary Plan of Care-Goals (Indications)   Obstructive Ventilatory Defect or Pulmonary Disease without Obvious Obstruction History / Diagnosis   Interdisciplinary Plan of Care-Outcomes    Bronchodilator Outcome Patient at Stable Baseline   Education   Education Yes - Pt. / Family has been Instructed in use of Respiratory Medications and Adverse Reactions   RT Assessment of Delivered Medications   Evaluation of Medication Delivery Daily Yes-- Pt /Family has been Instructed in use of Respiratory Medications and Adverse Reactions   SVN Group   #SVN Performed Yes   Given By: Mouthpiece   Date SVN Next Change Due (Q 7 Days) 01/26/17   Incentive Spirometry Group   Breathing Exercises Yes   Incentive Spirometer Volume 1000 mL   Incentive Spirometer Next Change Date (Q 30 Days) 02/19/17   Chest Exam   Work Of Breathing / Effort Mild   Respiration 20   Pulse 76   Heart Rate (Monitored) 76   Breath Sounds   RUL Breath Sounds Diminished;Fine Crackles   RML Breath Sounds Diminished;Fine Crackles   RLL Breath Sounds Diminished;Fine Crackles   KRISTINA Breath Sounds Diminished;Fine Crackles   LLL Breath Sounds Diminished;Fine Crackles   Secretions   Cough Dry   How Sputum Obtained Cough on Request   Oximetry   #Pulse Oximetry (Single Determination) Yes   Oxygen   Pulse Oximetry 95 %   O2 (LPM) 6   O2 Daily Delivery Respiratory  Silicone Nasal Cannula

## 2017-01-24 PROBLEM — J96.01 ACUTE RESPIRATORY FAILURE WITH HYPOXIA (HCC): Status: ACTIVE | Noted: 2017-01-18

## 2017-01-24 PROCEDURE — 700105 HCHG RX REV CODE 258: Performed by: INTERNAL MEDICINE

## 2017-01-24 PROCEDURE — 700102 HCHG RX REV CODE 250 W/ 637 OVERRIDE(OP): Performed by: EMERGENCY MEDICINE

## 2017-01-24 PROCEDURE — A9270 NON-COVERED ITEM OR SERVICE: HCPCS | Performed by: INTERNAL MEDICINE

## 2017-01-24 PROCEDURE — 97165 OT EVAL LOW COMPLEX 30 MIN: CPT

## 2017-01-24 PROCEDURE — 94760 N-INVAS EAR/PLS OXIMETRY 1: CPT

## 2017-01-24 PROCEDURE — 700111 HCHG RX REV CODE 636 W/ 250 OVERRIDE (IP): Performed by: INTERNAL MEDICINE

## 2017-01-24 PROCEDURE — 97535 SELF CARE MNGMENT TRAINING: CPT

## 2017-01-24 PROCEDURE — A9270 NON-COVERED ITEM OR SERVICE: HCPCS | Performed by: EMERGENCY MEDICINE

## 2017-01-24 PROCEDURE — 700102 HCHG RX REV CODE 250 W/ 637 OVERRIDE(OP): Performed by: INTERNAL MEDICINE

## 2017-01-24 PROCEDURE — 97530 THERAPEUTIC ACTIVITIES: CPT

## 2017-01-24 PROCEDURE — 99233 SBSQ HOSP IP/OBS HIGH 50: CPT | Performed by: HOSPITALIST

## 2017-01-24 PROCEDURE — 94640 AIRWAY INHALATION TREATMENT: CPT

## 2017-01-24 PROCEDURE — G8988 SELF CARE GOAL STATUS: HCPCS | Mod: CI

## 2017-01-24 PROCEDURE — 770020 HCHG ROOM/CARE - TELE (206)

## 2017-01-24 PROCEDURE — 700101 HCHG RX REV CODE 250: Performed by: INTERNAL MEDICINE

## 2017-01-24 PROCEDURE — G8987 SELF CARE CURRENT STATUS: HCPCS | Mod: CK

## 2017-01-24 PROCEDURE — 97116 GAIT TRAINING THERAPY: CPT

## 2017-01-24 RX ADMIN — CEFEPIME HYDROCHLORIDE 1 G: 1 INJECTION, POWDER, FOR SOLUTION INTRAMUSCULAR; INTRAVENOUS at 08:35

## 2017-01-24 RX ADMIN — METHYLPREDNISOLONE SODIUM SUCCINATE 125 MG: 125 INJECTION, POWDER, FOR SOLUTION INTRAMUSCULAR; INTRAVENOUS at 17:59

## 2017-01-24 RX ADMIN — CEFEPIME HYDROCHLORIDE 1 G: 1 INJECTION, POWDER, FOR SOLUTION INTRAMUSCULAR; INTRAVENOUS at 21:25

## 2017-01-24 RX ADMIN — GABAPENTIN 300 MG: 300 CAPSULE ORAL at 08:37

## 2017-01-24 RX ADMIN — METOPROLOL TARTRATE 25 MG: 25 TABLET ORAL at 21:25

## 2017-01-24 RX ADMIN — IPRATROPIUM BROMIDE AND ALBUTEROL SULFATE 3 ML: .5; 3 SOLUTION RESPIRATORY (INHALATION) at 15:25

## 2017-01-24 RX ADMIN — IPRATROPIUM BROMIDE AND ALBUTEROL SULFATE 3 ML: .5; 3 SOLUTION RESPIRATORY (INHALATION) at 06:42

## 2017-01-24 RX ADMIN — IPRATROPIUM BROMIDE AND ALBUTEROL SULFATE 3 ML: .5; 3 SOLUTION RESPIRATORY (INHALATION) at 19:12

## 2017-01-24 RX ADMIN — HEPARIN SODIUM 5000 UNITS: 5000 INJECTION, SOLUTION INTRAVENOUS; SUBCUTANEOUS at 13:44

## 2017-01-24 RX ADMIN — METOPROLOL TARTRATE 25 MG: 25 TABLET ORAL at 08:36

## 2017-01-24 RX ADMIN — CHOLECALCIFEROL TAB 25 MCG (1000 UNIT) 1000 UNITS: 25 TAB at 08:36

## 2017-01-24 RX ADMIN — FLUTICASONE PROPIONATE 50 MCG: 50 SPRAY, METERED NASAL at 21:25

## 2017-01-24 RX ADMIN — GABAPENTIN 300 MG: 300 CAPSULE ORAL at 13:44

## 2017-01-24 RX ADMIN — IPRATROPIUM BROMIDE AND ALBUTEROL SULFATE 3 ML: .5; 3 SOLUTION RESPIRATORY (INHALATION) at 11:24

## 2017-01-24 RX ADMIN — GABAPENTIN 300 MG: 300 CAPSULE ORAL at 21:25

## 2017-01-24 RX ADMIN — METHYLPREDNISOLONE SODIUM SUCCINATE 125 MG: 125 INJECTION, POWDER, FOR SOLUTION INTRAMUSCULAR; INTRAVENOUS at 11:33

## 2017-01-24 RX ADMIN — METHYLPREDNISOLONE SODIUM SUCCINATE 125 MG: 125 INJECTION, POWDER, FOR SOLUTION INTRAMUSCULAR; INTRAVENOUS at 06:02

## 2017-01-24 ASSESSMENT — ENCOUNTER SYMPTOMS
CHILLS: 0
FOCAL WEAKNESS: 1
VOMITING: 0
NAUSEA: 0
ABDOMINAL PAIN: 0
COUGH: 1
MYALGIAS: 0
PALPITATIONS: 0
DIZZINESS: 0
FEVER: 0
SHORTNESS OF BREATH: 0

## 2017-01-24 ASSESSMENT — ACTIVITIES OF DAILY LIVING (ADL): TOILETING: INDEPENDENT

## 2017-01-24 ASSESSMENT — GAIT ASSESSMENTS
DEVIATION: STEP TO
ASSISTIVE DEVICE: FRONT WHEEL WALKER
DISTANCE (FEET): 15
GAIT LEVEL OF ASSIST: CONTACT GUARD ASSIST

## 2017-01-24 ASSESSMENT — PAIN SCALES - GENERAL: PAINLEVEL_OUTOF10: 0

## 2017-01-24 NOTE — PROGRESS NOTES
0700 [facility]pción, at bedside. Pt is resting in bed & eyes closed.    0730 Monitored heart rhythm is SR, no ectopy (0.14/ 0.10/ 0.38), rate 80's.    1530 Pulmonary specialist Dr. Putnam visited pt.    1630 Pt ambulated in the hallway w 1-2 person assistance, unsteady gait. O2 sat dropped to 70's on 5L/min during walking.    1900 Report given to NOC nurseConcepción, at bedside.

## 2017-01-24 NOTE — THERAPY
"Physical Therapy Evaluation completed.   Bed Mobility:     Transfers: Sit to Stand: Minimal Assist  Gait: Level Of Assist: Contact Guard Assist with Front-Wheel Walker       Plan of Care: Plan to complete next treatment by 1/25/17  Discharge Recommendations: Equipment: Will Continue to Assess for Equipment Needs. Post-acute therapy recommended before discharged home.    See \"Rehab Therapy-Acute\" Patient Summary Report for complete documentation.       Pt has improved activity tolerance during today's session and is able to amb outside of room.  Patient requires frequent rest breaks due to desaturating rather than fatigue.  Patient does fatigue and desat with sit to stand exercises.  Patient requires min to mod A for sit to stand, mod A for stand to sit to control descent and SBA to CGA for amb.  Will continue to follow patient while she remains in hospital, and will benefit from post acute therapy prior to d/c home  "

## 2017-01-24 NOTE — DISCHARGE PLANNING
Medical Social Work    Referral: Pt discussed at IDT rounds this AM.    Intervention: Per flowsheet, pt lives with spouse.  SW stated Rehab is requesting OT notes and SW requested updated PT eval as well.      Plan: SW available for any assistance with d.c planning.

## 2017-01-24 NOTE — PROGRESS NOTES
Bedside report given to JESUS Sanders. POC discussed w/ pt. Lines patent. Fall precautions in place.

## 2017-01-24 NOTE — PROGRESS NOTES
Hospital Medicine Progress Note, Adult, Complex               Author: Newton Hutchinson Date & Time created: 1/23/2017  9:09 PM     Interval History:  CC - SOB    Respiratory failure-no changes today. Patient feels slightly better today. Less cough. Steroids are making her hungry.     Review of Systems:  Review of Systems   Constitutional: Negative for fever and chills.   Respiratory: Positive for cough (dry), hemoptysis, sputum production and shortness of breath. Negative for wheezing.         No apparent change today     Cardiovascular: Negative for chest pain and leg swelling.   Gastrointestinal: Negative for nausea, vomiting, abdominal pain, diarrhea and constipation.   Genitourinary: Negative for dysuria, urgency and frequency.   Skin: Negative for rash.   All other systems reviewed and are negative.      Physical Exam:  Physical Exam   Constitutional: She is oriented to person, place, and time. She appears well-developed and well-nourished. No distress.   HENT:   Head: Normocephalic and atraumatic.   Mouth/Throat: Oropharynx is clear and moist.   Eyes: Conjunctivae and EOM are normal. Pupils are equal, round, and reactive to light. Right eye exhibits no discharge. Left eye exhibits no discharge. No scleral icterus.   Neck: Neck supple. No JVD present. No tracheal deviation present.   Cardiovascular: Normal rate, regular rhythm and normal heart sounds.    Pulmonary/Chest: Effort normal. No respiratory distress. She has no wheezes. She has rales (bibasilar).   Abdominal: Soft. Bowel sounds are normal. She exhibits no distension. There is no tenderness.   Musculoskeletal: She exhibits no edema.   Neurological: She is alert and oriented to person, place, and time. No cranial nerve deficit.   Skin: Skin is warm and dry. No rash noted. She is not diaphoretic.   Psychiatric: She has a normal mood and affect. Her behavior is normal.   Nursing note and vitals reviewed.      Labs:        Invalid input(s):  [id number]  Recent Labs      17   1116   BNPBTYPENAT  96     Recent Labs      17   1116   SODIUM  135   POTASSIUM  3.9   CHLORIDE  105   CO2  24   BUN  29*   CREATININE  0.75   CALCIUM  9.1     Recent Labs      17   1116   GLUCOSE  116*     Recent Labs      17   1116   RBC  3.91*   HEMOGLOBIN  11.7*   HEMATOCRIT  36.5*   PLATELETCT  286     Recent Labs      17   1116   WBC  11.6*   NEUTSPOLYS  85.00*   LYMPHOCYTES  4.60*   MONOCYTES  8.10   EOSINOPHILS  1.10   BASOPHILS  0.30           Hemodynamics:  Temp (24hrs), Av.6 °C (97.8 °F), Min:36.3 °C (97.4 °F), Max:36.9 °C (98.5 °F)  Temperature: 36.5 °C (97.7 °F)  Pulse  Av.6  Min: 51  Max: 98Heart Rate (Monitored): 76  Blood Pressure : 118/46 mmHg     Respiratory:    Respiration: 20, Pulse Oximetry: 95 %, O2 Daily Delivery Respiratory : Silicone Nasal Cannula     Given By:: Mouthpiece, Work Of Breathing / Effort: Mild  RUL Breath Sounds: Clear, RML Breath Sounds: Fine Crackles;Diminished, RLL Breath Sounds: Diminished, KRISTINA Breath Sounds: Clear, LLL Breath Sounds: Diminished;Fine Crackles  Fluids:    Intake/Output Summary (Last 24 hours) at 17 2109  Last data filed at 17 0600   Gross per 24 hour   Intake    340 ml   Output    350 ml   Net    -10 ml        GI/Nutrition:  Orders Placed This Encounter   Procedures   • Diet Order     Standing Status: Standing      Number of Occurrences: 1      Standing Expiration Date:      Order Specific Question:  Diet:     Answer:  Regular [1]     Medical Decision Making, by Problem:  Active Hospital Problems    Diagnosis   • *Respiratory failure (CMS-[facility]) [J96.90] - slowly improving on high dose steroids  -now down to 5 L NC, continue IV solumedrol, PMA is following  -consider stopping IV cefepime soon given all cultures NGTD.   • V tach (CMS-[facility]) [I47.2] - echo done and ok, cont metoprolol, cont tele   • Lung nodule [R91.1] - outpt follow up   • Adrenal adenoma [D35.00]-will need  F/U outpatient   • Hypertension [I10] - now controlled, cont metoprolol   • Interstitial lung disease (CMS-HCC) [J84.9] - will get inpatient pulm consult as she is no improving   • Community acquired pneumonia [J18.9]-as above   • COPD (CMS-HCC) [J44.9] no wheezing currently, duonebs prn, prednisone       Labs reviewed and Medications reviewed  Bergeron catheter: No Bergeron      DVT Prophylaxis: Heparin        Assessed for rehab: Patient was assess for and/or received rehabilitation services during this hospitalization

## 2017-01-24 NOTE — PROGRESS NOTES
Tele strip at 1853 shows sinus rhythm w/ HR of 81.     Measurements: 0.14 / 0.10 / 0.32    Tele Shift Summary:    Rhythm : SR  Rate : 60-80    Ectopy : Per CCT Funmilayo, pt had rare PVC.     Telemetry monitoring strips placed in pt chart.

## 2017-01-24 NOTE — CONSULTS
PM&R/Physiatry Note:    The patient admitted with shortness of breath, under treatment for pneumonia, pulmonary disease. Medical team working through medical stability. Physical therapy consulted, noted limited participation due to pulmonary issue.    Recommend occupational therapy evaluation as well as updated physical therapy evaluation to evaluate patient's candidacy for acute inpatient rehabilitation. Will continue to follow.

## 2017-01-24 NOTE — THERAPY
"Occupational Therapy Evaluation completed.   Functional Status: A&Ox3. O2@6L NC. Motivated for OOB activity. Tolerates UIC >3 hrs, PT,OT,RT sessions. STS with Gabriel, ADL transfers with CGA. Ambulates to/from BR with CGA,FWW,O2. Toileting with SBA. LE dressing with Gabriel. Stands at sink to groom with CGA.    Plan of Care: Will benefit from Occupational Therapy 3 times per week  Discharge Recommendations:  Equipment: Will Continue to Assess for Equipment Needs. Post-acute inpt therapy recommended before discharged home.  Lives with spouse. Indep with all I/ADL's PTA.   See \"Rehab Therapy-Acute\" Patient Summary Report for complete documentation.    "

## 2017-01-24 NOTE — FLOWSHEET NOTE
01/23/17 1915   Events/Summary/Plan   Non-Invasive Resp Device Site Inspection Completed Intact   Interdisciplinary Plan of Care-Goals (Indications)   Obstructive Ventilatory Defect or Pulmonary Disease without Obvious Obstruction History / Diagnosis   Interdisciplinary Plan of Care-Outcomes    Bronchodilator Outcome Improved Patient Appearance with Decreased use of Accessory Muscles   Education   Education Yes - Pt. / Family has been Instructed in use of Respiratory Medications and Adverse Reactions   RT Assessment of Delivered Medications   Evaluation of Medication Delivery Daily Yes-- Pt /Family has been Instructed in use of Respiratory Medications and Adverse Reactions   SVN Group   #SVN Performed Yes   Given By: Mouthpiece   Date SVN Next Change Due (Q 7 Days) 01/26/17   Incentive Spirometry Group   Breathing Exercises Yes   Incentive Spirometer Volume 1250 mL   Incentive Spirometer Next Change Date (Q 30 Days) 02/19/17   Chest Exam   Work Of Breathing / Effort Mild   Respiration 20   Pulse 88   Breath Sounds   RUL Breath Sounds Clear   RML Breath Sounds Fine Crackles;Diminished   RLL Breath Sounds Diminished   KRISTINA Breath Sounds Clear   LLL Breath Sounds Diminished;Fine Crackles   Secretions   Cough Dry   How Sputum Obtained Cough on Request   Oxygen   Pulse Oximetry 95 %   O2 (LPM) 6   O2 Daily Delivery Respiratory  Silicone Nasal Cannula

## 2017-01-24 NOTE — FLOWSHEET NOTE
01/24/17 0645   Interdisciplinary Plan of Care-Goals (Indications)   Obstructive Ventilatory Defect or Pulmonary Disease without Obvious Obstruction History / Diagnosis   Hyperinflation Protocol Indications Restrictive Lung Disorder / Consolidation   Interdisciplinary Plan of Care-Outcomes    Bronchodilator Outcome Patient at Stable Baseline   Hyperinflation Protocol Goals/Outcome Stable Vital Capacity x24 hrs and Patient Understands / uses I.S.   Education   Education Yes - Pt. / Family has been Instructed in use of Respiratory Medications and Adverse Reactions;Yes - Pt. / Family has been Instructed in use of Respiratory Equipment   RT Assessment of Delivered Medications   Evaluation of Medication Delivery Daily Yes-- Pt /Family has been Instructed in use of Respiratory Medications and Adverse Reactions   SVN Group   #SVN Performed Yes   Given By: Mouthseal   Incentive Spirometry Group   Breathing Exercises Yes   Incentive Spirometer Volume 1250 mL   Respiratory WDL   Respiratory (WDL) X   Chest Exam   Respiration 18   Heart Rate (Monitored) 70   Breath Sounds   Pre/Post Intervention Pre Intervention Assessment   RUL Breath Sounds Clear   RML Breath Sounds Fine Crackles   RLL Breath Sounds Diminished   KRISTINA Breath Sounds Fine Crackles   LLL Breath Sounds Fine Crackles;Diminished   Secretions   Cough Dry;Non Productive;Strong   How Sputum Obtained Spontaneous   Oximetry   #Pulse Oximetry (Single Determination) Yes   Oxygen   Home O2 Use Prior To Admission? No   Pulse Oximetry 97 %   O2 (LPM) 6  (titrated to 4 lpm,  day RN aware.)   O2 Daily Delivery Respiratory  Silicone Nasal Cannula

## 2017-01-24 NOTE — PROGRESS NOTES
Hospital Medicine ICU Interval Note    Date of Service: 2017    Chief Complaint  88 y.o.female admitted 2017 with pneumonia and acute respiratory failure with hypoxia    Interval Problem Update  Feeling ok, on 6L NC but desats to 70s with exertion, had to go up to 10L earlier.  Has been on treatment now for a week with no real improvement.  Pulm following.  No MRSA in sputum, vanco dc'd, still on cefepime.  Rehab consulting, pt/ot seeing.        Consultants/Specialty  Pulm/CC Dr. Putnam    Disposition  ?snf vs rehab; but not medically stable yet to go        Review of Systems   Constitutional: Negative for fever and chills.   Respiratory: Positive for cough. Negative for shortness of breath (she doesn't perceive any).    Cardiovascular: Negative for chest pain and palpitations.   Gastrointestinal: Negative for nausea, vomiting and abdominal pain.   Musculoskeletal: Negative for myalgias.   Neurological: Positive for focal weakness (legs). Negative for dizziness.      Physical Exam  Laboratory/Imaging   Hemodynamics  Temp (24hrs), Av.6 °C (97.9 °F), Min:36.4 °C (97.6 °F), Max:36.9 °C (98.5 °F)   Temperature: 36.4 °C (97.6 °F)  Pulse  Av.5  Min: 51  Max: 98 Heart Rate (Monitored): 70  Blood Pressure : 109/51 mmHg      Respiratory      Respiration: 18, Pulse Oximetry: 88 %, O2 Daily Delivery Respiratory : Silicone Nasal Cannula     Given By:: Mouthseal, Work Of Breathing / Effort: Mild  RUL Breath Sounds: Clear, RML Breath Sounds: Fine Crackles;Diminished, RLL Breath Sounds: Diminished, KRISTINA Breath Sounds: Fine Crackles;Diminished, LLL Breath Sounds: Fine Crackles;Diminished    Fluids       Nutrition  Orders Placed This Encounter   Procedures   • Diet Order     Standing Status: Standing      Number of Occurrences: 1      Standing Expiration Date:      Order Specific Question:  Diet:     Answer:  Regular [1]     Physical Exam   Constitutional: She is oriented to person, place, and time. She appears  well-developed and well-nourished.   Cardiovascular: Normal rate, regular rhythm and normal heart sounds.    No murmur heard.  Pulmonary/Chest: Effort normal and breath sounds normal. No respiratory distress. She has no wheezes. She has no rales (dry crackles throughout).   On 6L; pulls 2000 on IS   Abdominal: Soft. Bowel sounds are normal. She exhibits no distension. There is no tenderness.   Musculoskeletal: Normal range of motion. She exhibits no edema.   Neurological: She is alert and oriented to person, place, and time.   Skin: Skin is warm and dry. No erythema.   Nursing note and vitals reviewed.      Recent Labs      01/22/17   1116   WBC  11.6*   RBC  3.91*   HEMOGLOBIN  11.7*   HEMATOCRIT  36.5*   MCV  93.4   MCH  29.9   MCHC  32.1*   RDW  48.8   PLATELETCT  286   MPV  9.1     Recent Labs      01/22/17   1116   SODIUM  135   POTASSIUM  3.9   CHLORIDE  105   CO2  24   GLUCOSE  116*   BUN  29*   CREATININE  0.75   CALCIUM  9.1         Recent Labs      01/22/17   1116   BNPBTYPENAT  96              Assessment/Plan     * Community acquired pneumonia  Assessment & Plan  -continue cefepoime    Acute respiratory failure with hypoxia (CMS-HCC)  Assessment & Plan  -pulm/cc following  -on steroids, abx, RT protocol  -still requiring 6L O2, 10 with exertion  -?2/2 UIP, ILD with pna    Interstitial lung disease (CMS-HCC)  Assessment & Plan  -she says she's never had sx in the past but I suspect she's had this for some time  -continue meds    V tach (CMS-HCC)  Assessment & Plan  -hx; nothing here  -on metoprolol    Lung nodule  Assessment & Plan  -will need ouptt f/u    Adrenal adenoma  Assessment & Plan  -incidental finding; no sx    Hypertension  Assessment & Plan  -continue metoprolol    COPD (chronic obstructive pulmonary disease) (CMS-HCC)  Assessment & Plan  -hx, on steroids, rt protocol  -quit smoking 40 years ago      Labs reviewed, Medications reviewed and Radiology images reviewed  Bergeron catheter: No  Bergeron      DVT Prophylaxis: Heparin    Ulcer prophylaxis: Not indicated  Antibiotics: Treating active infection/contamination beyond 24 hours perioperative coverage

## 2017-01-24 NOTE — CARE PLAN
Problem: Oxygenation:  Goal: Maintain adequate oxygenation dependent on patient condition  Outcome: PROGRESSING SLOWER THAN EXPECTED  Patient still requires 6 lpm via nasal cannula or oxymask to maintain saturations >92%    Problem: Bronchoconstriction:  Goal: Improve in air movement and diminished wheezing  Outcome: PROGRESSING AS EXPECTED  Patient tolerating TX's wit6h a little increase in aeration after TX.  Intervention: Implement inhaled treatments  Followed QID per RT protocol.

## 2017-01-24 NOTE — FLOWSHEET NOTE
01/24/17 1525   Interdisciplinary Plan of Care-Goals (Indications)   Obstructive Ventilatory Defect or Pulmonary Disease without Obvious Obstruction History / Diagnosis   Hyperinflation Protocol Indications Restrictive Lung Disorder / Consolidation   Interdisciplinary Plan of Care-Outcomes    Bronchodilator Outcome Patient at Stable Baseline   Hyperinflation Protocol Goals/Outcome Improvement in Previously Absent or Diminished Breath Sounds   RT Assessment of Delivered Medications   Evaluation of Medication Delivery Daily Yes-- Pt /Family has been Instructed in use of Respiratory Medications and Adverse Reactions   SVN Group   #SVN Performed Yes   Given By: Mouthpiece   Respiratory WDL   Respiratory (WDL) X   Chest Exam   Work Of Breathing / Effort Mild   Respiration 20   Heart Rate (Monitored) 80   Breath Sounds   Pre/Post Intervention Pre Intervention Assessment   RUL Breath Sounds Diminished;Fine Crackles   RML Breath Sounds Diminished;Fine Crackles   RLL Breath Sounds Diminished   KRISTINA Breath Sounds Fine Crackles   LLL Breath Sounds Fine Crackles;Diminished   Oxygen   Pulse Oximetry 97 %   O2 (LPM) 6   O2 Daily Delivery Respiratory  Silicone Nasal Cannula

## 2017-01-24 NOTE — PROGRESS NOTES
AOx4. Afebrile. Denied pain. Assessment per doc flowsheet. PIV intact & patent. IVabt infusing. Due NOC meds given (see MAR). Sitting up in cardiac chair. States understanding of POC. No additional concerns at this time. CLIP. Personal belongings within reach. Non-skid socks. Bed locked & in low position. Bed alarm on.

## 2017-01-24 NOTE — CARE PLAN
Problem: Infection  Goal: Will remain free from infection  Outcome: PROGRESSING AS EXPECTED  IV Cefepime for CAP. No s/s adverse reactions noted.     Problem: Knowledge Deficit  Goal: Knowledge of disease process/condition, treatment plan, diagnostic tests, and medications will improve  Outcome: PROGRESSING AS EXPECTED  POC discussed w/ pt. Understands disease process and treatment plan. Educated on new meds & procedures/tests. Questions answered.       Problem: Respiratory:  Goal: Respiratory status will improve  Outcome: PROGRESSING SLOWER THAN EXPECTED  5-6L o2 via NC. Maintaining o2sat >93%. No SOB. No dyspnea while at rest. RT following. Continues on IV steroids and IV abt per MD order.

## 2017-01-24 NOTE — FLOWSHEET NOTE
01/24/17 1124   Interdisciplinary Plan of Care-Goals (Indications)   Obstructive Ventilatory Defect or Pulmonary Disease without Obvious Obstruction History / Diagnosis   Hyperinflation Protocol Indications Restrictive Lung Disorder / Consolidation   Interdisciplinary Plan of Care-Outcomes    Bronchodilator Outcome Patient at Stable Baseline   Hyperinflation Protocol Goals/Outcome Improvement in Repeat CXR   RT Assessment of Delivered Medications   Evaluation of Medication Delivery Daily Yes-- Pt /Family has been Instructed in use of Respiratory Medications and Adverse Reactions   SVN Group   #SVN Performed Yes   Given By: Mouthpiece   Incentive Spirometry Group   Breathing Exercises Yes   Incentive Spirometer Volume 1000 mL   Respiratory WDL   Respiratory (WDL) X   Chest Exam   Work Of Breathing / Effort Mild   Respiration 20   Heart Rate (Monitored) 70   Breath Sounds   Pre/Post Intervention Pre Intervention Assessment   RUL Breath Sounds Diminished;Fine Crackles   RML Breath Sounds Diminished;Fine Crackles   RLL Breath Sounds Diminished   KRISTINA Breath Sounds Fine Crackles   LLL Breath Sounds Fine Crackles   Oximetry   #Pulse Oximetry (Single Determination) Yes   Oxygen   Pulse Oximetry 97 %   O2 (LPM) 6   O2 Daily Delivery Respiratory  Silicone Nasal Cannula

## 2017-01-25 LAB
ANION GAP SERPL CALC-SCNC: 8 MMOL/L (ref 0–11.9)
BUN SERPL-MCNC: 39 MG/DL (ref 8–22)
CALCIUM SERPL-MCNC: 8.8 MG/DL (ref 8.4–10.2)
CHLORIDE SERPL-SCNC: 108 MMOL/L (ref 96–112)
CO2 SERPL-SCNC: 23 MMOL/L (ref 20–33)
CREAT SERPL-MCNC: 1.03 MG/DL (ref 0.5–1.4)
ERYTHROCYTE [DISTWIDTH] IN BLOOD BY AUTOMATED COUNT: 47.7 FL (ref 35.9–50)
GFR SERPL CREATININE-BSD FRML MDRD: 51 ML/MIN/1.73 M 2
GLUCOSE SERPL-MCNC: 150 MG/DL (ref 65–99)
HCT VFR BLD AUTO: 34.4 % (ref 37–47)
HGB BLD-MCNC: 10.9 G/DL (ref 12–16)
MCH RBC QN AUTO: 29.1 PG (ref 27–33)
MCHC RBC AUTO-ENTMCNC: 31.7 G/DL (ref 33.6–35)
MCV RBC AUTO: 91.7 FL (ref 81.4–97.8)
PLATELET # BLD AUTO: 224 K/UL (ref 164–446)
PMV BLD AUTO: 9.9 FL (ref 9–12.9)
POTASSIUM SERPL-SCNC: 4.1 MMOL/L (ref 3.6–5.5)
RBC # BLD AUTO: 3.75 M/UL (ref 4.2–5.4)
SODIUM SERPL-SCNC: 139 MMOL/L (ref 135–145)
WBC # BLD AUTO: 9.4 K/UL (ref 4.8–10.8)

## 2017-01-25 PROCEDURE — 97116 GAIT TRAINING THERAPY: CPT

## 2017-01-25 PROCEDURE — 94640 AIRWAY INHALATION TREATMENT: CPT

## 2017-01-25 PROCEDURE — 99232 SBSQ HOSP IP/OBS MODERATE 35: CPT | Performed by: HOSPITALIST

## 2017-01-25 PROCEDURE — 700102 HCHG RX REV CODE 250 W/ 637 OVERRIDE(OP): Performed by: INTERNAL MEDICINE

## 2017-01-25 PROCEDURE — A9270 NON-COVERED ITEM OR SERVICE: HCPCS | Performed by: INTERNAL MEDICINE

## 2017-01-25 PROCEDURE — A9270 NON-COVERED ITEM OR SERVICE: HCPCS | Performed by: EMERGENCY MEDICINE

## 2017-01-25 PROCEDURE — 700105 HCHG RX REV CODE 258: Performed by: INTERNAL MEDICINE

## 2017-01-25 PROCEDURE — 700111 HCHG RX REV CODE 636 W/ 250 OVERRIDE (IP): Performed by: INTERNAL MEDICINE

## 2017-01-25 PROCEDURE — 700102 HCHG RX REV CODE 250 W/ 637 OVERRIDE(OP): Performed by: EMERGENCY MEDICINE

## 2017-01-25 PROCEDURE — 80048 BASIC METABOLIC PNL TOTAL CA: CPT

## 2017-01-25 PROCEDURE — 85027 COMPLETE CBC AUTOMATED: CPT

## 2017-01-25 PROCEDURE — 94760 N-INVAS EAR/PLS OXIMETRY 1: CPT

## 2017-01-25 PROCEDURE — 770020 HCHG ROOM/CARE - TELE (206)

## 2017-01-25 PROCEDURE — 97530 THERAPEUTIC ACTIVITIES: CPT

## 2017-01-25 PROCEDURE — 700101 HCHG RX REV CODE 250: Performed by: INTERNAL MEDICINE

## 2017-01-25 RX ORDER — BUDESONIDE 0.5 MG/2ML
0.5 INHALANT ORAL
Status: DISCONTINUED | OUTPATIENT
Start: 2017-01-25 | End: 2017-01-30 | Stop reason: HOSPADM

## 2017-01-25 RX ADMIN — CHOLECALCIFEROL TAB 25 MCG (1000 UNIT) 1000 UNITS: 25 TAB at 09:14

## 2017-01-25 RX ADMIN — BUDESONIDE 0.5 MG: 0.5 INHALANT RESPIRATORY (INHALATION) at 19:05

## 2017-01-25 RX ADMIN — METHYLPREDNISOLONE SODIUM SUCCINATE 125 MG: 125 INJECTION, POWDER, FOR SOLUTION INTRAMUSCULAR; INTRAVENOUS at 06:07

## 2017-01-25 RX ADMIN — BUDESONIDE 0.5 MG: 0.5 INHALANT RESPIRATORY (INHALATION) at 11:42

## 2017-01-25 RX ADMIN — METHYLPREDNISOLONE SODIUM SUCCINATE 125 MG: 125 INJECTION, POWDER, FOR SOLUTION INTRAMUSCULAR; INTRAVENOUS at 00:43

## 2017-01-25 RX ADMIN — ALPRAZOLAM 0.25 MG: 0.25 TABLET ORAL at 00:43

## 2017-01-25 RX ADMIN — IPRATROPIUM BROMIDE AND ALBUTEROL SULFATE 3 ML: .5; 3 SOLUTION RESPIRATORY (INHALATION) at 11:42

## 2017-01-25 RX ADMIN — CEFEPIME HYDROCHLORIDE 1 G: 1 INJECTION, POWDER, FOR SOLUTION INTRAMUSCULAR; INTRAVENOUS at 09:14

## 2017-01-25 RX ADMIN — IPRATROPIUM BROMIDE AND ALBUTEROL SULFATE 3 ML: .5; 3 SOLUTION RESPIRATORY (INHALATION) at 15:24

## 2017-01-25 RX ADMIN — CEFEPIME HYDROCHLORIDE 1 G: 1 INJECTION, POWDER, FOR SOLUTION INTRAMUSCULAR; INTRAVENOUS at 22:27

## 2017-01-25 RX ADMIN — IPRATROPIUM BROMIDE AND ALBUTEROL SULFATE 3 ML: .5; 3 SOLUTION RESPIRATORY (INHALATION) at 07:24

## 2017-01-25 RX ADMIN — GABAPENTIN 300 MG: 300 CAPSULE ORAL at 09:13

## 2017-01-25 RX ADMIN — METHYLPREDNISOLONE SODIUM SUCCINATE 125 MG: 125 INJECTION, POWDER, FOR SOLUTION INTRAMUSCULAR; INTRAVENOUS at 14:56

## 2017-01-25 RX ADMIN — GABAPENTIN 300 MG: 300 CAPSULE ORAL at 22:28

## 2017-01-25 RX ADMIN — GABAPENTIN 300 MG: 300 CAPSULE ORAL at 14:56

## 2017-01-25 RX ADMIN — METHYLPREDNISOLONE SODIUM SUCCINATE 125 MG: 125 INJECTION, POWDER, FOR SOLUTION INTRAMUSCULAR; INTRAVENOUS at 23:55

## 2017-01-25 RX ADMIN — METOPROLOL TARTRATE 25 MG: 25 TABLET ORAL at 09:14

## 2017-01-25 RX ADMIN — METHYLPREDNISOLONE SODIUM SUCCINATE 125 MG: 125 INJECTION, POWDER, FOR SOLUTION INTRAMUSCULAR; INTRAVENOUS at 18:46

## 2017-01-25 RX ADMIN — IPRATROPIUM BROMIDE AND ALBUTEROL SULFATE 3 ML: .5; 3 SOLUTION RESPIRATORY (INHALATION) at 19:05

## 2017-01-25 RX ADMIN — ALPRAZOLAM 0.25 MG: 0.25 TABLET ORAL at 22:28

## 2017-01-25 RX ADMIN — FLUTICASONE PROPIONATE 50 MCG: 50 SPRAY, METERED NASAL at 22:28

## 2017-01-25 ASSESSMENT — GAIT ASSESSMENTS
GAIT LEVEL OF ASSIST: CONTACT GUARD ASSIST
DISTANCE (FEET): 10
ASSISTIVE DEVICE: FRONT WHEEL WALKER
DEVIATION: INCREASED BASE OF SUPPORT

## 2017-01-25 ASSESSMENT — ENCOUNTER SYMPTOMS
SHORTNESS OF BREATH: 0
NAUSEA: 0
DIZZINESS: 0
VOMITING: 0
PALPITATIONS: 0
MYALGIAS: 0
FEVER: 0
CHILLS: 0
ABDOMINAL PAIN: 0

## 2017-01-25 ASSESSMENT — PAIN SCALES - GENERAL
PAINLEVEL_OUTOF10: 0
PAINLEVEL_OUTOF10: 0

## 2017-01-25 NOTE — FLOWSHEET NOTE
01/25/17 1524   Interdisciplinary Plan of Care-Goals (Indications)   Obstructive Ventilatory Defect or Pulmonary Disease without Obvious Obstruction History / Diagnosis   Interdisciplinary Plan of Care-Outcomes    Bronchodilator Outcome Patient at Stable Baseline   RT Assessment of Delivered Medications   Evaluation of Medication Delivery Daily Yes-- Pt /Family has been Instructed in use of Respiratory Medications and Adverse Reactions   SVN Group   #SVN Performed Yes   Given By: Mouthpiece   Respiratory WDL   Respiratory (WDL) X   Chest Exam   Respiration 18   Heart Rate (Monitored) 76   Breath Sounds   Pre/Post Intervention Pre Intervention Assessment   RUL Breath Sounds Clear   RML Breath Sounds Fine Crackles   RLL Breath Sounds Fine Crackles;Diminished   KRISTINA Breath Sounds Clear   LLL Breath Sounds Fine Crackles   Oxygen   Pulse Oximetry 94 %   O2 (LPM) 3.5   O2 Daily Delivery Respiratory  Silicone Nasal Cannula

## 2017-01-25 NOTE — PROGRESS NOTES
"Pt assisted to BSC, had large BM, assisted back to bed. Reports that \"xanax worked\" and that she had been sleeping.  "

## 2017-01-25 NOTE — DISCHARGE PLANNING
PAM Martinez left message for Julio Cesar [phone] and Sailaja [phone] at [facility] informing them that new PT and OT notes were entered yesterday afternoon and requested a call back update on whether pt is good candidate for rehab.

## 2017-01-25 NOTE — THERAPY
"Physical Therapy Treatment completed.   Transfers: Sit to Stand: Contact Guard Assist  Gait: Level Of Assist: Contact Guard Assist with Front-Wheel Walker 10 ft.      Plan of Care: Will benefit from Physical Therapy 5 times per week  Discharge Recommendations: Equipment: Will Continue to Assess for Equipment Needs. Post-acute therapy recommended before discharged home.    Pt required 6 L O2 to maintain sats in standing but then did desat with ambulation to 82%. RN notified. Pt stable on 3.5 L at 91% at rest in chair at end of PT.      See \"Rehab Therapy-Acute\" Patient Summary Report for complete documentation.       "

## 2017-01-25 NOTE — DISCHARGE PLANNING
Physiatry Dr. Hale recommending pt appropriate for acute rehab. Most recent vitals documented (1/25/17 @ 1216) indicate pt on 6L O2,  which is barrier to IRF. TCC will continue to follow for medical readiness/clearance.

## 2017-01-25 NOTE — FLOWSHEET NOTE
01/25/17 1142   Interdisciplinary Plan of Care-Goals (Indications)   Obstructive Ventilatory Defect or Pulmonary Disease without Obvious Obstruction History / Diagnosis   Hyperinflation Protocol Indications Restrictive Lung Disorder / Consolidation   Interdisciplinary Plan of Care-Outcomes    Bronchodilator Outcome Patient at Stable Baseline   Hyperinflation Protocol Goals/Outcome Improvement in Repeat CXR   Education   Education Yes - Pt. / Family has been Instructed in use of Respiratory Medications and Adverse Reactions   RT Assessment of Delivered Medications   Evaluation of Medication Delivery Daily Yes-- Pt /Family has been Instructed in use of Respiratory Medications and Adverse Reactions   SVN Group   #SVN Performed Yes   Given By: Mouthpiece   Incentive Spirometry Group   Breathing Exercises Yes   Incentive Spirometer Volume 1500 mL   Respiratory WDL   Respiratory (WDL) X   Chest Exam   Work Of Breathing / Effort Mild   Respiration 18   Heart Rate (Monitored) 90   Breath Sounds   Pre/Post Intervention Post Intervention Assessment   RUL Breath Sounds Clear   RML Breath Sounds Clear   RLL Breath Sounds Fine Crackles   KRISTINA Breath Sounds Clear   LLL Breath Sounds Fine Crackles   Oximetry   #Pulse Oximetry (Single Determination) Yes   Continuous Oximetry Yes   Oxygen   Pulse Oximetry 96 %   O2 (LPM) 3.5   O2 Daily Delivery Respiratory  Silicone Nasal Cannula

## 2017-01-25 NOTE — PROGRESS NOTES
Pulmonary Critical Care Progress Note        Chief Complaint: Dyspnea, hypoxemia.    History of Present Illness: 88 y.o. female admitted on January 18 with dyspnea and hypoxemia. She continued to require high flow oxygen therapy, prompting pulmonary consultation on January 23.     ROS:  Respiratory: positive shortness of breath, but improving, Cardiac: negative chest pain, GI: negative abdominal pain.  All other systems negative.    Interval Events:  24 hour interval history reviewed.  Comfortable on oxygen nasal cannula, out of bed to chair. Eating. Her back pain, which was her original reason in complaint, has resolved. She is having some confusional awakenings at night.    PFSH:  No change.    Respiratory:  Pulse Oximetry: 97 % on an oxygen nasal cannula at 5-6 L/m.  Exam: diminished but symmetrical bilateral breath sounds with bilateral rails but no wheezing or egophony.  ImagingAvailable data reviewed. The chest film on January 21 demonstrates diffuse interstitial prominence without focal consolidation. The CT angiogram on January 18 demonstrated no vascular filling defects to suggest pulmonary emboli but did demonstrate bilateral interstitial lung disease and a 10 mm right middle lobe noncalcified nodule. There was a 15 mm left hilar lymph node and bilateral adrenal adenomas.    HemoDynamics:  Pulse: 77, Heart Rate (Monitored): 80  Blood Pressure : 101/41 mmHg     Exam: regular rhythm (Sinus)  Imaging: Available data reviewed. The echocardiogram on January 19 demonstrates normal left ventricular systolic function with an estimated ejection fraction of 66% but grade 2 diastolic dysfunction. Right ventricular systolic pressure is 35 mmHg.  Recent Labs      01/22/17   1116   BNPBTYPENAT  96     Neuro:  Exam: no focal deficits noted, alert.  Imaging: None - Reviewed    Fluids:  Intake/Output       01/22/17 0700 - 01/23/17 0659 01/23/17 0700 - 01/24/17 0659 01/24/17 0700 - 01/25/17 0659      [id number] 6874-7004  Total 1889-6173 0084-0230 Total 1900-0659 Total       Intake    P.O.  880  720 1600  --  120 120  --  -- --    P.O.  -- 120 120 -- -- --    I.V.  --  100 100  --  340 340  110  -- 110    IV Volume (NS) -- -- -- -- 240 240 -- -- --    CEFEPIME -- 100 100 -- 100 100 110 -- 110    Total Intake  -- 460 460 110 -- 110       Output    Urine  400  350 750  --  -- --  350  -- 350    Number of Times Voided 1 x -- 1 x -- -- -- 1 x -- 1 x    Indwelling Cathether -- 350 350 -- -- -- -- -- --    Void (ml) 400 -- 400 -- -- -- 350 -- 350    Stool  --  -- --  --  -- --  --  -- --    Number of Times Stooled -- -- -- -- -- -- 0 x -- 0 x    Total Output 400 350 750 -- -- -- 350 -- 350       Net I/O     480 470 950 -- 460 460 -240 -- -240           Recent Labs      17   1116   SODIUM  135   POTASSIUM  3.9   CHLORIDE  105   CO2  24   BUN  29*   CREATININE  0.75   CALCIUM  9.1       GI/Nutrition:  Exam: abdomen is soft and non-tender  Imaging: None - Reviewed  taking PO  Liver Function  Recent Labs      17   1116   GLUCOSE  116*       Heme:  Recent Labs      17   1116   RBC  3.91*   HEMOGLOBIN  11.7*   HEMATOCRIT  36.5*   PLATELETCT  286       Infectious Disease:  Temp  Av.6 °C (97.8 °F)  Min: 36.4 °C (97.6 °F)  Max: 36.6 °C (97.9 °F)  Micro: antibiotics reviewed and no new positive cultures, on cefepime.  Recent Labs      17   1116   WBC  11.6*   NEUTSPOLYS  85.00*   LYMPHOCYTES  4.60*   MONOCYTES  8.10   EOSINOPHILS  1.10   BASOPHILS  0.30     Current Facility-Administered Medications   Medication Dose Frequency Provider Last Rate Last Dose   • methylPREDNISolone sod succ (SOLU-MEDROL) 125 MG injection 125 mg  125 mg Q6HRS Shefali Cooney M.D.   125 mg at 17 1133   • acetaminophen (TYLENOL) tablet 650 mg  650 mg Q6HRS PRN Jose Julien M.D.   650 mg at 17 0234   • hydrocodone-acetaminophen (NORCO) 5-325 MG per tablet 1-2 Tab  1-2 Tab Q6HRS PRN Shefali CUMMINS  EFRA Cooney   1 Tab at 01/22/17 0213   • ceFEPIme (MAXIPIME) 1 g in  mL IVPB  1 g Q12HRS Shefali Cooney M.D.   1 g at 01/24/17 0835   • ipratropium-albuterol (DUONEB) nebulizer solution 3 mL  3 mL 4X/DAY (RT) Shefali Cooney M.D.   3 mL at 01/24/17 1525   • ipratropium-albuterol (DUONEB) nebulizer solution 3 mL  3 mL Q4H PRN (RT) Newton Hutchinson M.D.       • metoprolol (LOPRESSOR) tablet 25 mg  25 mg TWICE DAILY Shefali Cooney M.D.   25 mg at 01/24/17 0836   • gabapentin (NEURONTIN) capsule 300 mg  300 mg TID Pelon Maxwell M.D.   300 mg at 01/24/17 1344   • alprazolam (XANAX) tablet 0.25 mg  0.25 mg HS PRN Newton Hutchinson M.D.   0.25 mg at 01/22/17 2159   • fluticasone (FLONASE) nasal spray 50 mcg  1 [place] QHS Newton Hutchinson M.D.   50 mcg at 01/18/17 2343   • vitamin D (cholecalciferol) tablet 1,000 Units  1,000 Units DAILY Newton Hutchinson M.D.   1,000 Units at 01/24/17 0836   • Respiratory Care per Protocol   Continuous RT Newton Hutchinson M.D.       • docusate sodium (COLACE) capsule 100 mg  100 mg QAM Newton Hutchinson M.D.   100 mg at 01/21/17 0848    And   • senna-docusate (PERICOLACE or SENOKOT S) 8.6-50 MG per tablet 1 Tab  1 Tab Nightly Newton Hutchinson M.D.   1 Tab at 01/21/17 2057    And   • senna-docusate (PERICOLACE or SENOKOT S) 8.6-50 MG per tablet 1 Tab  1 Tab Q24HRS PRN KHARI Dumas.D.        And   • lactulose 20 GM/30ML solution 30 mL  30 mL Q24HRS PRJOHN Hutchinson M.D.        And   • bisacodyl (DULCOLAX) suppository 10 mg  10 mg Q24HRS PRJOHN Hutchinson M.D.       • heparin injection 5,000 Units  5,000 Units Q8HRS Newton Hutchinson M.D.   5,000 Units at 01/24/17 1344   • ondansetron (ZOFRAN) syringe/vial injection 4 mg  4 mg Q4HRS PRJOHN Hutchinson M.D.       • ondansetron (ZOFRAN ODT) dispertab 4 mg  4 mg Q4HRS PRJOHN Hutchinson M.D.         Last reviewed on 1/18/2017 11:24 PM by Yennifer Hardy R.N.    Quality  Measures:  Core  Measures & Quality Metrics    Problems:  1.  Respiratory insufficiency, requiring significant oxygen supplementation, slowly better.  2.  Abnormal chest x-ray and CT scan with diffuse bilateral infiltrates.  Her    scan suggests the possibility of underlying basilar fibrosis and possible interstitial pulmonary fibrosis or usual interstitial pneumonia, now with a more diffuse changes consistent with inflammation or infection.  She does not seem to have pulmonary edema.  3.  Possible mild chronic obstructive pulmonary disease.  4.  10 mm right middle lobe noncalcified lung nodule on CT scan.  5. Systemic arterial hypertension, controlled.  6.  History of ventricular tachycardia.    Plan:  Continue titrated oxygen, bronchodilators, respiratory protocols and systemic corticosteroids.  Continue antibiotics and prophylaxis.  Continue physical therapy and mobilization. The possibility of inpatient rehabilitation is being discussed.  The lung nodule require serial follow-up after discharge.  Pulmonary function studies and a high-resolution CT scan in the near future might help to clarify the nature of the underlying interstitial lung disease.

## 2017-01-25 NOTE — PROGRESS NOTES
0815Report received, plan of care reviewed and discussed, assessment complete, oriented to room, bed alarm on, nonskid socks applied, advised to call for assistance.   1015 Discussed plan of care, encouraged and answered all questions, will continue to monitor.  1215 Up to bedside commode, all needs met at this time.  1415 Up in chair, call light within reach.  1615 Ambulating with PT.  1815 Up in chair for dinner.  Cardiac summary.  Sinus rhythm with rare PVCs (0.18/0.08/0.40)  Report given to next shift.

## 2017-01-25 NOTE — PROGRESS NOTES
Report received. Pt resting in chair. Medications per MAR. Pt to call later when she wants to transfer to the bed and take Xanax.

## 2017-01-25 NOTE — FLOWSHEET NOTE
01/25/17 0724   Interdisciplinary Plan of Care-Goals (Indications)   Obstructive Ventilatory Defect or Pulmonary Disease without Obvious Obstruction History / Diagnosis   Hyperinflation Protocol Indications Restrictive Lung Disorder / Consolidation   Interdisciplinary Plan of Care-Outcomes    Bronchodilator Outcome Patient at Stable Baseline   Hyperinflation Protocol Goals/Outcome Improvement in Repeat CXR   Education   Education Yes - Pt. / Family has been Instructed in use of Respiratory Medications and Adverse Reactions;Yes - Pt. / Family has been Instructed in use of Respiratory Equipment   RT Assessment of Delivered Medications   Evaluation of Medication Delivery Daily Yes-- Pt /Family has been Instructed in use of Respiratory Medications and Adverse Reactions   SVN Group   #SVN Performed Yes   Given By: Mouthpiece   Date SVN Last Changed 01/19/17   Date SVN Next Change Due (Q 7 Days) 01/26/17   Incentive Spirometry Group   Breathing Exercises Yes   Incentive Spirometer Volume 1250 mL   Respiratory WDL   Respiratory (WDL) X   Chest Exam   Respiration 16   Heart Rate (Monitored) 88   Breath Sounds   Pre/Post Intervention Pre Intervention Assessment   RUL Breath Sounds Crackles   RML Breath Sounds Crackles   RLL Breath Sounds Crackles;Diminished   KRISTINA Breath Sounds Crackles   LLL Breath Sounds Crackles;Diminished   Oximetry   #Pulse Oximetry (Single Determination) Yes   Oxygen   Home O2 Use Prior To Admission? No   Pulse Oximetry 91 %   O2 (LPM) 6   O2 Daily Delivery Respiratory  Silicone Nasal Cannula

## 2017-01-25 NOTE — DISCHARGE PLANNING
Medical Social Work    Referral: Pt discussed in IDT rounds.     Intervention: Discussion on d.c placement.  Pt is not a good candidate for Rehab as pt is destating off O2.  PAM Martinez requested SNF order.    Plan: PAM Martinez will obtain SNF choice from pt.

## 2017-01-25 NOTE — CARE PLAN
Problem: Oxygenation:  Goal: Maintain adequate oxygenation dependent on patient condition  Outcome: PROGRESSING SLOWER THAN EXPECTED  Titrate and maintain saturation by pulse oximetry >90%. Current fiO2 6 lpm nasal cannula.          Problem: Bronchoconstriction:  Goal: Improve in air movement and diminished wheezing  Outcome: PROGRESSING SLOWER THAN EXPECTED  Improvement in airflow    Improved vital signs and measures of gas exchange    Improved patient appearance with subjective improvement of symptom alleviation after treatment.        Intervention: Implement inhaled treatments  DUoneb QID
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
## 2017-01-25 NOTE — ASSESSMENT & PLAN NOTE
-?2/2 UIP, ILD with pna; improving except for exertion  -pulm/cc following  -continuing on IV steroids, abx, RT protocol  -requiring less O2 with rest, but desats to low 80s on 10L O2 with ambulation; not improving  -will d/w pulm tomorrow  -there is an O2 company who could supply equipment going up to 10L, but she still desats on that much

## 2017-01-25 NOTE — DISCHARGE PLANNING
OT eval and updated PT note appreciated for Physiatry consult. Update to Dr. Hale. TCC to follow for Physiatry recommendation.

## 2017-01-25 NOTE — CONSULTS
Medical chart review completed. Patient is a 88 y.o. year-old female admitted with shortness of breath, treated for pneumonia, pulmonary disease, pulmonary consulted, with multiple co-morbidities including renal insufficiency, COPD, arrhythmia; with functional deficits in mobility/self-cares and de-conditioning. Pre-morbidly, lived with her spouse. The patient was evaluated by acute care by physical therapy and occupational therapy currently requiring assistance with transitions, mobility, and ADLs. The patient is a good candidate for an acute inpatient rehabilitation program with a coordinated program of care at an intensity and frequency not available at a lower level of care. This recommendation is substantiated by the patient's current medical condition with intervention and assessment of medical issues requiring an acute level of care for patient's safety and maximum outcome. A coordinated program of care will be provided by an interdisciplinary team including physical therapy, occupational therapy, and rehabilitation nursing. Rehab goals include improved mobility, self-care management, strength and conditioning/endurance, pain management, bowel and bladder management, mood and affect, and safety with independent home management including caregiver training. Estimated length of stay is approximately 10-14 days days. Rehab potential: Good. Disposition: to pre-morbid independent living setting with supportive care of patient's family and community resources. We will continue to follow with you in anticipation of discharge to acute inpatient rehabilitation when medically stable to do so at the discretion of her attending physician. Thank you for allowing us to participate in her care. Please call with any questions regarding this recommendation.

## 2017-01-25 NOTE — CARE PLAN
Problem: Oxygenation:  Goal: Maintain adequate oxygenation dependent on patient condition  Outcome: PROGRESSING AS EXPECTED  Oxygen turned down to 3.5 lpm from 6 lpm.    Intervention: Manage oxygen therapy by monitoring pulse oximetry and/or ABG values  Pt. On continuous oximetry.      Problem: Bronchoconstriction:  Goal: Improve in air movement and diminished wheezing  Outcome: PROGRESSING AS EXPECTED  Increased aeration after TX.  Intervention: Implement inhaled treatments  Following QID per RT protocol.  Intervention: Evaluate and manage medication effects  Tolerating TX's, with increased aeration after.

## 2017-01-25 NOTE — DISCHARGE PLANNING
PAM Martinez met with pt and pt's daughter in law, Lay, to discuss SNF.  Pt really wants to go to Rehab and O2 is now down to 4L.  PAM obtained SNF choices just in case Rehab does not accept.  ([facility], [facility], [facility]).  PAM called Sailaja at Rehab conveying pt's wishes and requesting to be kept informed of approval or denial so SW can send referrals to SNF's if denied.

## 2017-01-25 NOTE — DISCHARGE PLANNING
PAM Martinez received a call from Sailaja at [facility] stating Dr. Hale feels pt is a good candidate for Rehab.  She is working on an accepting physiatrist.  PAM notified [facility] and pt.  Pt will notify her family.

## 2017-01-26 PROCEDURE — 700111 HCHG RX REV CODE 636 W/ 250 OVERRIDE (IP): Performed by: INTERNAL MEDICINE

## 2017-01-26 PROCEDURE — 97530 THERAPEUTIC ACTIVITIES: CPT

## 2017-01-26 PROCEDURE — A9270 NON-COVERED ITEM OR SERVICE: HCPCS | Performed by: INTERNAL MEDICINE

## 2017-01-26 PROCEDURE — 97535 SELF CARE MNGMENT TRAINING: CPT

## 2017-01-26 PROCEDURE — 99233 SBSQ HOSP IP/OBS HIGH 50: CPT | Performed by: HOSPITALIST

## 2017-01-26 PROCEDURE — 700102 HCHG RX REV CODE 250 W/ 637 OVERRIDE(OP): Performed by: EMERGENCY MEDICINE

## 2017-01-26 PROCEDURE — 770020 HCHG ROOM/CARE - TELE (206)

## 2017-01-26 PROCEDURE — A9270 NON-COVERED ITEM OR SERVICE: HCPCS | Performed by: EMERGENCY MEDICINE

## 2017-01-26 PROCEDURE — 700102 HCHG RX REV CODE 250 W/ 637 OVERRIDE(OP): Performed by: INTERNAL MEDICINE

## 2017-01-26 PROCEDURE — 700101 HCHG RX REV CODE 250: Performed by: INTERNAL MEDICINE

## 2017-01-26 PROCEDURE — 94760 N-INVAS EAR/PLS OXIMETRY 1: CPT

## 2017-01-26 PROCEDURE — 94640 AIRWAY INHALATION TREATMENT: CPT

## 2017-01-26 PROCEDURE — 700105 HCHG RX REV CODE 258: Performed by: INTERNAL MEDICINE

## 2017-01-26 RX ADMIN — CEFEPIME HYDROCHLORIDE 1 G: 1 INJECTION, POWDER, FOR SOLUTION INTRAMUSCULAR; INTRAVENOUS at 08:44

## 2017-01-26 RX ADMIN — METOPROLOL TARTRATE 25 MG: 25 TABLET ORAL at 22:08

## 2017-01-26 RX ADMIN — ALPRAZOLAM 0.25 MG: 0.25 TABLET ORAL at 22:07

## 2017-01-26 RX ADMIN — METHYLPREDNISOLONE SODIUM SUCCINATE 125 MG: 125 INJECTION, POWDER, FOR SOLUTION INTRAMUSCULAR; INTRAVENOUS at 06:00

## 2017-01-26 RX ADMIN — METHYLPREDNISOLONE SODIUM SUCCINATE 125 MG: 125 INJECTION, POWDER, FOR SOLUTION INTRAMUSCULAR; INTRAVENOUS at 11:27

## 2017-01-26 RX ADMIN — METHYLPREDNISOLONE SODIUM SUCCINATE 125 MG: 125 INJECTION, POWDER, FOR SOLUTION INTRAMUSCULAR; INTRAVENOUS at 23:38

## 2017-01-26 RX ADMIN — CHOLECALCIFEROL TAB 25 MCG (1000 UNIT) 1000 UNITS: 25 TAB at 08:44

## 2017-01-26 RX ADMIN — IPRATROPIUM BROMIDE AND ALBUTEROL SULFATE 3 ML: .5; 3 SOLUTION RESPIRATORY (INHALATION) at 06:40

## 2017-01-26 RX ADMIN — METOPROLOL TARTRATE 25 MG: 25 TABLET ORAL at 08:44

## 2017-01-26 RX ADMIN — GABAPENTIN 300 MG: 300 CAPSULE ORAL at 15:09

## 2017-01-26 RX ADMIN — BUDESONIDE 0.5 MG: 0.5 INHALANT RESPIRATORY (INHALATION) at 06:40

## 2017-01-26 RX ADMIN — BUDESONIDE 0.5 MG: 0.5 INHALANT RESPIRATORY (INHALATION) at 19:34

## 2017-01-26 RX ADMIN — FLUTICASONE PROPIONATE 50 MCG: 50 SPRAY, METERED NASAL at 22:09

## 2017-01-26 RX ADMIN — CEFEPIME HYDROCHLORIDE 1 G: 1 INJECTION, POWDER, FOR SOLUTION INTRAMUSCULAR; INTRAVENOUS at 22:05

## 2017-01-26 RX ADMIN — GABAPENTIN 300 MG: 300 CAPSULE ORAL at 08:44

## 2017-01-26 RX ADMIN — METHYLPREDNISOLONE SODIUM SUCCINATE 125 MG: 125 INJECTION, POWDER, FOR SOLUTION INTRAMUSCULAR; INTRAVENOUS at 18:20

## 2017-01-26 RX ADMIN — IPRATROPIUM BROMIDE AND ALBUTEROL SULFATE 3 ML: .5; 3 SOLUTION RESPIRATORY (INHALATION) at 14:56

## 2017-01-26 RX ADMIN — IPRATROPIUM BROMIDE AND ALBUTEROL SULFATE 3 ML: .5; 3 SOLUTION RESPIRATORY (INHALATION) at 11:08

## 2017-01-26 ASSESSMENT — ENCOUNTER SYMPTOMS
DIZZINESS: 0
VOMITING: 0
CHILLS: 0
NAUSEA: 0
COUGH: 0
PALPITATIONS: 0
SHORTNESS OF BREATH: 0
ABDOMINAL PAIN: 0
FEVER: 0
MYALGIAS: 0

## 2017-01-26 ASSESSMENT — PAIN SCALES - GENERAL
PAINLEVEL_OUTOF10: 0
PAINLEVEL_OUTOF10: 0

## 2017-01-26 NOTE — CARE PLAN
Problem: Oxygenation:  Goal: Maintain adequate oxygenation dependent on patient condition  Outcome: PROGRESSING AS EXPECTED  Patient is currently on 3.5 Liters with saturations of 100%. Patient was turned down to 3, RN was aware  Intervention: Manage oxygen therapy by monitoring pulse oximetry and/or ABG values  Titrate to maintain o2 saturations greater than 90%  Intervention: Levels of oxygenation will improve to baseline  Patient doesn't use home o2      Problem: Bronchoconstriction:  Goal: Improve in air movement and diminished wheezing  Outcome: PROGRESSING AS EXPECTED  Patient has minimal increased aeration post treatment  Intervention: Evaluate and manage medication effects  Patient is mostly clear with fine crackles, will continue to monitor pre and post breath sounds

## 2017-01-26 NOTE — PROGRESS NOTES
Bedside report given to  Rebecca LEE.Pt  Resting in the bed.Safety precautions in place and all the lines remain patent.

## 2017-01-26 NOTE — CONSULTS
PMR consult/follow-up.    Chart reviewed including therapy notes.    Patient desaturates to 82% on 6 L when ambulating 10 feet.    She cannot tolerate 3 hours of therapy daily with this amount of respiratory insufficiency.    I recommend either home health with therapy versus SNF.    Of course if she is not yet ready for discharge, we can follow her and re-assess.    To be appropriate for acute inpatient rehabilitation, she would need to be able to maintain her saturations above 90% with ambulation on less than 6 L.    Thank you for this consult.    Emi Deras M.D.  Physical Medicine and Rehabilitation

## 2017-01-26 NOTE — PROGRESS NOTES
Assessment completed, alert and oriented  X2.On 3 1/2 lpm of oxygen, sat 94%. Denied pain at this time.Due medication given per MAR. States understanding of POC. No additional concern this time.

## 2017-01-26 NOTE — CARE PLAN
Problem: Safety  Goal: Will remain free from injury  Outcome: PROGRESSING AS EXPECTED  Hourly rounding.  Non-skid socks. Bed locked & in low position. Personal belongings within reach reminded to use the call light .        Problem: Infection  Goal: Will remain free from infection  Outcome: PROGRESSING AS EXPECTED  Standard precaution in place. Educated  And encouraged regarding proper hand washing techniques.

## 2017-01-26 NOTE — PROGRESS NOTES
Hospital Medicine ICU Interval Note    Date of Service: 2017    Chief Complaint  88 y.o.female admitted 2017 with pneumonia and acute respiratory failure with hypoxia    Interval Problem Update  Down to 3.5L NC and doing much better, however still desats to 80s with ambulation on 6L      Consultants/Specialty  Pulm/CC Dr. Putnam    Disposition  ?snf vs rehab; but not medically stable yet to go; getting closer        Review of Systems   Constitutional: Negative for fever and chills.   Respiratory: Negative for shortness of breath.    Cardiovascular: Negative for chest pain and palpitations.   Gastrointestinal: Negative for nausea, vomiting and abdominal pain.   Musculoskeletal: Negative for myalgias.   Neurological: Negative for dizziness.      Physical Exam  Laboratory/Imaging   Hemodynamics  Temp (24hrs), Av.5 °C (97.7 °F), Min:36.3 °C (97.4 °F), Max:36.7 °C (98 °F)   Temperature: 36.5 °C (97.7 °F)  Pulse  Av.9  Min: 51  Max: 98 Heart Rate (Monitored): 76  Blood Pressure : 118/49 mmHg      Respiratory      Respiration: 18, Pulse Oximetry: 100 %, O2 Daily Delivery Respiratory : Silicone Nasal Cannula     Given By:: Mouthpiece, Work Of Breathing / Effort: Mild  RUL Breath Sounds: Clear, RML Breath Sounds: Fine Crackles, RLL Breath Sounds: Fine Crackles;Diminished, KRISTINA Breath Sounds: Clear, LLL Breath Sounds: Fine Crackles    Fluids       Nutrition  Orders Placed This Encounter   Procedures   • Diet Order     Standing Status: Standing      Number of Occurrences: 1      Standing Expiration Date:      Order Specific Question:  Diet:     Answer:  Regular [1]     Physical Exam   Constitutional: She is oriented to person, place, and time.   Sitting up in chair   Cardiovascular: Normal rate, regular rhythm and normal heart sounds.    No murmur heard.  Pulmonary/Chest: Effort normal and breath sounds normal. No respiratory distress. She has no wheezes. She has no rales (dry crackles throughout but  actually much improved since yesterday).   Down to 3.5L O2   Abdominal: Soft. Bowel sounds are normal. She exhibits no distension. There is no tenderness.   Musculoskeletal: Normal range of motion. She exhibits no edema.   Neurological: She is alert and oriented to person, place, and time.   Skin: Skin is warm and dry. No erythema.   Nursing note and vitals reviewed.      Recent Labs      01/25/17   0450   WBC  9.4   RBC  3.75*   HEMOGLOBIN  10.9*   HEMATOCRIT  34.4*   MCV  91.7   MCH  29.1   MCHC  31.7*   RDW  47.7   PLATELETCT  224   MPV  9.9     Recent Labs      01/25/17   0450   SODIUM  139   POTASSIUM  4.1   CHLORIDE  108   CO2  23   GLUCOSE  150*   BUN  39*   CREATININE  1.03   CALCIUM  8.8                      Assessment/Plan     * Community acquired pneumonia  Assessment & Plan  -continue cefepoime    Acute respiratory failure with hypoxia (CMS-HCC)  Assessment & Plan  -pulm/cc following  -improving on IV steroids, abx, RT protocol  -requiring less O2  -?2/2 UIP, ILD with pna    Interstitial lung disease (CMS-HCC)  Assessment & Plan  -she says she's never had sx in the past but I suspect she's had this for some time  -continue meds    V tach (CMS-HCC)  Assessment & Plan  -hx; nothing here  -on metoprolol    Lung nodule  Assessment & Plan  -will need ouptt f/u    Adrenal adenoma  Assessment & Plan  -incidental finding; no sx    Hypertension  Assessment & Plan  -continue metoprolol    COPD (chronic obstructive pulmonary disease) (CMS-HCC)  Assessment & Plan  -hx, on steroids, rt protocol  -quit smoking 40 years ago      Labs reviewed, Medications reviewed and Radiology images reviewed  Bergeron catheter: No Bergeron      DVT Prophylaxis: Heparin    Ulcer prophylaxis: Not indicated  Antibiotics: Treating active infection/contamination beyond 24 hours perioperative coverage

## 2017-01-26 NOTE — PROGRESS NOTES
Tele Note:    Rhythm: Sinus rhythm  Rate: high 60s-high 90s  ND: 0.14  QRS: 0.10  QT: 0.36  Ectopy: Frequent PVCs, couplets    Primary pt care not given. Interpretation of strip only.

## 2017-01-26 NOTE — PREADMISSION SCREENING NOTE
"  Pre-Admission Screening Form    Patient Information:   Name: Yesi Garduno     MRN: [id number]       : 1928      Age: 88 y.o.   Gender: female      Race: White [7]       Marital Status:  [2]  Family Contact: Woody GardunoLindseye        Relationship: Spouse [17]  Daughter [2]  Home Phone: [phone]           Cell Phone:   [phone]  Advanced Directives: Yes  Code Status:  FULL  Current Attending Provider: Cat Martinez M.D.  Referring Physician: Dr. Hutchinson      Physiatrist Consult: Dr. Jan Hale       Referral Date: 17  Primary Payor Source:  MEDICARE  Secondary Payor Source:  [place] is seeking inpatient rehabilitation for ongoing medical management and therapeutic interventions to prepare for transition home, to supportive care of family and outpatient resources.     Medical Information:   Date of Admission to Acute Care [place]  Room Number: 3303/02  Rehabilitation Diagnosis: 16 Debility (Non Cardiac, Non Pulmonary)  @IMM@  Allergies   Allergen Reactions   • Hydrocodone Rash     \"all over\" and it makes me act loopy   • Penicillins Vomiting   • Tetracycline Rash and Itching     Pt states \"I get a bad body rash and itch all over\".     Past Medical History   Diagnosis Date   • Cancer (CMS-HCC)    • Breast cancer (CMS-HCC)    • Near syncope 2015   • AAA (abdominal aortic aneurysm) (CMS-HCC) 2015   • COPD (chronic obstructive pulmonary disease) (CMS-HCC) 2015   • S/P right mastectomy      per pt done in      Past Surgical History   Procedure Laterality Date   • Other       mastectomy   • Mastectomy       right side   • Breast biopsy       History Leading to Admission, Conditions that Caused the Need for Rehab (CMS):      Draft: Not Electronically Signed.   Newton Hutchinson M.D. Physician Unsigned Transcription  H&P 2017 10:15 PM      Expand All Collapse All    CHIEF COMPLAINT:  Increasing shortness of breath and " hypoxia at home.     HISTORY OF PRESENT ILLNESS:  The patient is an 88-year-old female with the    history of COPD, does not use oxygen at home.  She says over the last week,    she has had increasing shortness of breath especially with exertion.  It got    to the point today where she walked just to open a door at her home when a    neighbor came and saw her and she was out of breath.  This has never happened    to her before.  She denies any cough, except until have a cough in the ER.     She says that she has a pulse oximeter at home and checked and it was 66%.     She thought it was broken and therefore she called her daughter and daughter    came over and use her pulse ox, which showed to be 66%.  She has got her flu    shot this year.  No obvious sick contacts except for her daughter who was sick   a week and half earlier.  She also endorses some mild muscle aches, but no    fevers, no nausea or vomiting.  She denies any decrease in appetite.  She    denies any new skin changes.  No diarrhea, no dysuria, frequency or hematuria.     REVIEW OF SYSTEMS:  All other systems reviewed and are negative.     In the ED, no intervention was done.     PAST MEDICAL HISTORY:  1.  COPD.  2.  History of breast cancer.  3.  History of abdominal aortic aneurysm.  4.  Chronic pain.           ASSESSMENT:  1.  Community-acquired pneumonia.  2.  Acute respiratory failure with hypoxia.  3.  Newly diagnosed interstitial lung disease.  4.  Underlying chronic obstructive pulmonary disease.  5.  Incidentally discovered bilateral adrenal adenomas.  6.  A 10 x 4 mm right middle lobe pulmonary nodule incidentally discovered.  7.  Mild acute kidney injury.  8.  Dehydration.  9.  Elevated glucose.  10.  History of breast cancer.  11.  History of abdominal aortic aneurysm, stable.     PLAN:  Patient will be admitted to the medical unit.  She appears to have an    early community-acquired pneumonia in the setting of undiagnosed interstitial     lung disease.  Interstitial lung disease could certainly be related to the    smoking or to environmental exposure.  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  She might benefit from an outpatient    pulmonary consultation.  At this time, we will do Prednisone 40 mg p.o. daily    for 5 days as well as IV ceftriaxone and azithromycin for both atypical as    well as the community-acquired pneumonia pathogens.  Also, do DuoNeb inhalers,   RT q. 6 hours.  Do gentle fluids NS at 75 mL an hour.     CODE STATUS:  Full code.     DIET:  Regular.     DEEP VENOUS THROMBOSIS PROPHYLAXIS:  Heparin 5000 units every 8 hours.        ____________________________________     REJI CUNNINGHAM MD     [facility] / [facility]     DD:  01/18/2017 22:15:00  DT:  01/18/2017 23:17:38     D#:  [id number]  Job#:  [id number]                        Shalom Garrido M.D. Physician Unsigned Transcription  Consults 1/22/2017 10:33 AM      Expand All Collapse All    DATE OF SERVICE:  01/22/2017     REASON FOR CONSULTATION:  Hypoxemia, respiratory insufficiency, abnormal chest   x-ray.     REQUESTING PHYSICIAN:  Shefali Cooney MD     HISTORY OF PRESENT ILLNESS:  The patient is an 88-year-old woman who was    admitted to the hospital on the 18th of this month with increasing shortness    of breath.  The patient said a week ago that she was fine and was shopping in    _____.  Her daughter has had a recent bronchitis.  The patient is a remote    smoker having quit smoking about 40 years ago.  She says that Dr. Doyle her    once that she had COPD, but she has never been on oxygen or any inhalers.  The   patient has developed shortness of breath, cough.  She did not have much in    the way of fever or chills.  She has gotten her influenza vaccination this    year.  She was admitted, started on prednisone and antibiotics.  However, her    oxygen needs have continued to increase and she is now on a mask at 15 liters.    At rest she  is not particularly dyspnea on the supplemental oxygen with her    sats are in the mid 90s.           IMAGING:  Chest x-ray shows bilateral interstitial edema or pneumonitis with    no evidence of cardiomegaly.  A chest CTA showed no evidence of pulmonary    embolism, but she does have bilateral interstitial changes with significant    ground glass changes.  Her echocardiogram shows an EF of 66% with no diastolic   dysfunction, but she does have very mild pulmonary hypertension with an    estimated RVSP of 35 mmHg.  Her BNP is not elevated.     ASSESSMENT:  1.  Respiratory insufficiency, now on increasing oxygen supplementation.  2.  Abnormal chest x-ray and CT scan with diffuse bilateral infiltrates.  Her    scan suggests the possibility of underlying basilar fibrosis and possible IPF    or UIP now with a more diffuse changes consistent with inflammation or    infection.  She does not seem to have pulmonary edema.  3.  Possible mild chronic obstructive pulmonary disease.  4.  She does have a lung nodule on CT scan.  5.  History of hypertension.  6.  History of ventricular tachycardia in the past.     DISCUSSION:  At this point, it appears that she may have had some underlying    pulmonary fibrosis.  She is now a bit worse and most likely with an acute    viral or atypical infection.  She has been treated with antibiotics without    significant improvement, so far.  I would most likely increase her steroids    and give her Solu-Medrol intravenously rather than prednisone.  Continue    antibiotics and I would keep her relatively dry, although certainly she is    euvolemic at the present time.  Try to avoid volume overload.  Lasix if    necessary can be given.  It is quite possible this is a viral process, so far    she seems to be handling the hypoxemia.  She is not in respiratory distress,    but needs to be watched closely in the hospital.  We will follow along with    you.         ____________________________________     MD TAMMY WOLF / [facility]     DD:  01/22/2017 10:33:50  DT:  01/22/2017 11:03:29     D#:  [id number]  Job#:  [id number]                        Jan Hale M.D. Physician Signed  Consults 1/25/2017 12:18 PM      Expand All Collapse All    Medical chart review completed. Patient is a 88 y.o. year-old female admitted with shortness of breath, treated for pneumonia, pulmonary disease, pulmonary consulted, with multiple co-morbidities including renal insufficiency, COPD, arrhythmia; with functional deficits in mobility/self-cares and de-conditioning. Pre-morbidly, lived with her spouse. The patient was evaluated by acute care by physical therapy and occupational therapy currently requiring assistance with transitions, mobility, and ADLs. The patient is a good candidate for an acute inpatient rehabilitation program with a coordinated program of care at an intensity and frequency not available at a lower level of care. This recommendation is substantiated by the patient's current medical condition with intervention and assessment of medical issues requiring an acute level of care for patient's safety and maximum outcome. A coordinated program of care will be provided by an interdisciplinary team including physical therapy, occupational therapy, and rehabilitation nursing. Rehab goals include improved mobility, self-care management, strength and conditioning/endurance, pain management, bowel and bladder management, mood and affect, and safety with independent home management including caregiver training. Estimated length of stay is approximately 10-14 days days. Rehab potential: Good. Disposition: to pre-morbid independent living setting with supportive care of patient's family and community resources. We will continue to follow with you in anticipation of discharge to acute inpatient rehabilitation when medically stable to do so at the discretion of her attending physician.  "Thank you for allowing us to participate in her care. Please call with any questions regarding this recommendation.                     Co-morbidities: See above  Potential Risk - Complications: Deep Vein Thrombosis, Malnutrition, Pain, Perceptual Impairment, Pneumonia and Pressure Ulcer  Level of Risk: High    Ongoing Medical Management Needed (Medical/Nursing Needs):   Patient Active Problem List    Diagnosis Date Noted   • Acute respiratory failure with hypoxia (CMS-HCC) 01/18/2017     Priority: High   • Interstitial lung disease (CMS-HCC) 01/18/2017     Priority: High   • Community acquired pneumonia 01/18/2017     Priority: High   • V tach (CMS-HCC) 01/19/2017     Priority: Medium   • COPD (chronic obstructive pulmonary disease) (CMS-HCC) 08/11/2015     Priority: Low   • Lung nodule 01/19/2017   • Adrenal adenoma 01/19/2017   • Hypertension 01/19/2017   • Near syncope 08/11/2015   • AAA (abdominal aortic aneurysm) (CMS-HCC) 08/11/2015   • Breast cancer (CMS-HCC) 08/11/2015   • S/P right mastectomy 08/11/2015     Bernabe Hou R.N. Registered Nurse Signed  Progress Notes 1/25/2017 10:27 PM      Expand All Collapse All      Assessment completed, alert and oriented  X2.On 3 1/2 lpm of oxygen, sat 94%. Denied pain at this time.Due medication given per MAR. States understanding of POC. No additional concern this time.                      My Rodriguez R.N. Registered Nurse Signed  Progress Notes 1/26/2017  4:43 AM      Expand All Collapse All    Tele Note:    Rhythm: Sinus rhythm  Rate: high 60s-high 90s  CO: 0.14  QRS: 0.10  QT: 0.36  Ectopy: Frequent PVCs, couplets    Primary pt care not given. Interpretation of strip only.                      Current Vital Signs:   Temperature: 36.3 °C (97.4 °F) Pulse: 76 Respiration: 17 Blood Pressure : 124/68 mmHg  Weight: 71.2 kg (156 lb 15.5 oz) Height: 175.3 cm (5' 9.02\")  Pulse Oximetry: 92 % O2 (LPM): 3.5      Completed Laboratory Reports:  Recent Labs      " 01/25/17   0450   WBC  9.4   HEMOGLOBIN  10.9*   HEMATOCRIT  34.4*   PLATELETCT  224   SODIUM  139   POTASSIUM  4.1   BUN  39*   CREATININE  1.03   GLUCOSE  150*     Additional Labs: None    Prior Living Situation:   Housing / Facility: 1 Story House  Steps Into Home: 0  Steps In Home: 0  Lives with - Patient's Self Care Capacity: Spouse  Equipment Owned: Front-Wheel Walker, Single Point Cane, Wheelchair, Tub / Shower Seat, Grab Bar(s) In Tub / Shower, Grab Bar(s) By Toilet, Ramp    Prior Level of Function / Living Situation:   Physical Therapy: Prior Services: Home-Independent  Housing / Facility: 1 Story House  Steps Into Home: 0  Steps In Home: 0  Bathroom Set up: Walk In Shower  Equipment Owned: Front-Wheel Walker, Single Point Cane, Wheelchair, Tub / Shower Seat, Grab Bar(s) In Tub / Shower, Grab Bar(s) By Toilet, Ramp  Lives with - Patient's Self Care Capacity: Spouse  Bed Mobility: Independent  Transfer Status: Independent  Ambulation: Independent  Distance Ambulation (Feet):  (community - Monday)  Assistive Devices Used: None  Current Level of Function:   Level Of Assist: Contact Guard Assist  Assistive Device: Front Wheel Walker  Distance (Feet): 10  Deviation: Increased Base Of Support  # of Stairs Climbed: 0  Level of Assist with Stairs: Unable to Participate  Weight Bearing Status: FWB  Skilled Intervention: Verbal Cuing, Compensatory Strategies  Comments: PT cued PLB during ambulation. Pt desats in standing on 4 L, then 5 L. Able to ambulate a short distance w/ FWW on 6 L before desatting to 82%. RN and CNA notified.   Sit to Supine: Supervised  Scooting: Stand by Assist  Skilled Intervention: Verbal Cuing  Sit to Stand: Contact Guard Assist  Bed, Chair, Wheelchair Transfer: Contact Guard Assist  Toilet Transfers: Minimal Assist  Transfer Method: Stand Pivot  Skilled Intervention: Verbal Cuing  Comments: hand placement cued  Sitting in Chair: UIC pre and post PT  Sitting Edge of Bed: 26'  Standing: ~10  mins total  Occupational Therapy:   Self Feeding: Independent  Grooming / Hygiene: Independent  Bathing: Independent  Dressing: Independent  Toileting: Independent  Medication Management: Independent  Laundry: Independent  Kitchen Mobility: Independent  Finances: Independent  Home Management: Independent  Shopping: Independent  Prior Level Of Mobility: Independent Without Device in Home  Driving / Transportation: Driving Independent  Prior Services: Home-Independent  Housing / Facility: [address]  Occupation (Pre-Hospital Vocational): Retired Due To Age  Leisure Interests: Exercise, Hobbies, Card, Reading  Current Level of Function:   Eating: Independent  Bathing: Supervision (clau-area)  Upper Body Dressing: Contact Guard Assist  Lower Body Dressing: Minimal Assist  Toileting: Contact Guard Assist  Speech Language Pathology:      Rehabilitation Prognosis/Potential: Good  Estimated Length of Stay: 10-14 days    Nursing:      [facility]    Scope/Intensity of Services Recommended:  Physical Therapy: 1.5 hr / day  5 days / week. Therapeutic Interventions Required: Maximize Endurance, Mobility, Strength and Safety  Occupational Therapy: 1.5 hr / day 5 days / week. Therapeutic Interventions Required: Maximize Self Care, ADLs, IADLs and Energy Conservation  Rehabilitation Nursin/7. Therapeutic Interventions Required: Monitor Pain, Skin, Vital Signs, Intake and Output, Labs, Safety, Family Training and DVT Prophylaxis, ADL's, Medication administration.  Rehabilitation Physician: 3 - 5 days / week. Therapeutic Interventions Required: Medical Management  Respiratory Care: Evaluation. Therapeutic Interventions Required: Pulmonary Toileting, O2 Weaning, Aspiration Risk and Respiratroy care per protocol  Dietician: Consult. Therapeutic Interventions Required: Nutritional evaluation with recommendations to promote optimal health/healing.    Rehabilitation Goals and Plan (Expected frequency & duration of treatment in the  IRF):   Return to the Community, Modified Independent Level of Care and Outpatient Support  Anticipated Date of Rehabilitation Admission: 1/26/17  Patient/Family oriented IRF level of care/facility/plan: Yes  Patient/Family willing to participate in IRF care/facility/plan: Yes  Patient able to tolerate IRF level of care proposed: Yes  Patient has potential to benefit IRF level of care proposed: Yes  Comments: None    Special Needs or Precautions - Medical Necessity:  Safety Concerns/Precautions:  Fall Risk / High Risk for Falls  IV Site: Peripheral  Requires Oxygen  Diet:   DIET ORDERS (Through next 24h)    Start     Ordered    01/18/17 2207  Diet Order   ALL MEALS     Question:  Diet:  Answer:  Regular    01/18/17 2208          Anticipated Discharge Destination / Patient/Family Goal:  Destination: Home with Assistance Support System: Spouse and Family   Anticipated home health services: OT, PT and Nursing  Previously used HH service/ provider: Not Applicable  Anticipated DME Needs: To be determined  Outpatient Services: To be determined  Alternative resources to address additional identified needs:   N/A  Pre-Screen Completed: 1/26/2017 8:59 AM Sailaja Sanchez R.N.

## 2017-01-26 NOTE — DISCHARGE PLANNING
[facility] received a SNF choice form the. Per the choice form the referral has been sent to the patient's 1st choice [facility]

## 2017-01-26 NOTE — PROGRESS NOTES
0815Report received, plan of care reviewed and discussed, assessment complete, oriented to room, bed alarm on, nonskid socks applied, advised to call for assistance.   1015 Discussed plan of care, encouraged and answered all questions, will continue to monitor.  1215 Up to bathroom, all needs met at this time.  1415 Up in chair, call light within reach.  1615 Resting, will continue to monitor.  1815 Up in chair eating dinner.  Report given to next shift.  Cardiac Summary.  Sinus rhythm with occasional PVCs and PACs (0.14/0.08/0.36)

## 2017-01-26 NOTE — PROGRESS NOTES
Pulmonary Critical Care Progress Note        Chief Complaint: Dyspnea, hypoxemia.    History of Present Illness: 88 y.o. female admitted on January 18 with dyspnea and hypoxemia. She continued to require high flow oxygen therapy, prompting pulmonary consultation on January 23.     ROS:  Respiratory: positive shortness of breath, but improving, Cardiac: negative chest pain, GI: negative abdominal pain.  All other systems negative.    Interval Events:  24 hour interval history reviewed.  Comfortable on oxygen nasal cannula, flow rate down to 3.5 liters/minute. Out of bed to chair. Eating. Her back pain, which was her original reason in complaint, has resolved.     PFSH:  No change.    Respiratory:  Pulse Oximetry: 100 % on an oxygen nasal cannula at 3.5 L/m.  Exam: diminished but symmetrical bilateral breath sounds with bilateral rails but no wheezing or egophony.  ImagingAvailable data reviewed. The chest film on January 21 demonstrates diffuse interstitial prominence without focal consolidation. The CT angiogram on January 18 demonstrated no vascular filling defects to suggest pulmonary emboli but did demonstrate bilateral interstitial lung disease and a 10 mm right middle lobe noncalcified nodule. There was a 15 mm left hilar lymph node and bilateral adrenal adenomas.    HemoDynamics:  Pulse: 77, Heart Rate (Monitored): 76  Blood Pressure : 118/49 mmHg     Exam: regular rhythm (Sinus)  Imaging: Available data reviewed. The echocardiogram on January 19 demonstrates normal left ventricular systolic function with an estimated ejection fraction of 66% but grade 2 diastolic dysfunction. Right ventricular systolic pressure is 35 mmHg.      Neuro:  Exam: no focal deficits noted, alert.  Imaging: None - Reviewed    Fluids:  Intake/Output       01/23/17 0700 - 01/24/17 0659 01/24/17 0700 - 01/25/17 0659 01/25/17 0700 - 01/26/17 0659      [id number] Total 6283-1950 0066-7080 Total 9339-1472 9217-0465 Total        Intake    P.O.  --  120 120  --  -- --  --  -- --    P.O. -- 120 120 -- -- -- -- -- --    I.V.  --  340 340  110  -- 110  --  -- --    IV Volume (NS) -- 240 240 -- -- -- -- -- --    CEFEPIME -- 100 100 110 -- 110 -- -- --    Total Intake -- 460 460 110 -- 110 -- -- --       Output    Urine  --  -- --  350  -- 350  --  -- --    Number of Times Voided -- -- -- 1 x -- 1 x 1 x -- 1 x    Void (ml) -- -- -- 350 -- 350 -- -- --    Stool  --  -- --  --  -- --  --  -- --    Number of Times Stooled -- -- -- 0 x 1 x 1 x 1 x -- 1 x    Total Output -- -- -- 350 -- 350 -- -- --       Net I/O     -- 460 460 -240 -- -240 -- -- --           Recent Labs      17   0450   SODIUM  139   POTASSIUM  4.1   CHLORIDE  108   CO2  23   BUN  39*   CREATININE  1.03   CALCIUM  8.8       GI/Nutrition:  Exam: abdomen is soft and non-tender  Imaging: None - Reviewed  taking PO  Liver Function  Recent Labs      17   0450   GLUCOSE  150*       Heme:  Recent Labs      17   0450   RBC  3.75*   HEMOGLOBIN  10.9*   HEMATOCRIT  34.4*   PLATELETCT  224       Infectious Disease:  Temp  Av.5 °C (97.7 °F)  Min: 36.3 °C (97.4 °F)  Max: 36.7 °C (98 °F)  Micro: antibiotics reviewed and no new positive cultures, on cefepime.  Recent Labs      17   0450   WBC  9.4     Current Facility-Administered Medications   Medication Dose Frequency Provider Last Rate Last Dose   • budesonide (PULMICORT) neb susp 0.5 mg  0.5 mg BID (RT) Jan DAMIAN M.D.   0.5 mg at 17 1142   • methylPREDNISolone sod succ (SOLU-MEDROL) 125 MG injection 125 mg  125 mg Q6HRS Shefali Cooney M.D.   125 mg at 17 1456   • acetaminophen (TYLENOL) tablet 650 mg  650 mg Q6HRS PRN Jose Julien M.D.   650 mg at 17 0234   • hydrocodone-acetaminophen (NORCO) 5-325 MG per tablet 1-2 Tab  1-2 Tab Q6HRS PRN Shefali Cooney M.D.   1 Tab at 17 0213   • ceFEPIme (MAXIPIME) 1 g in  mL IVPB  1 g Q12HRS Shefali Cooney M.D.   1 g at  01/25/17 0914   • ipratropium-albuterol (DUONEB) nebulizer solution 3 mL  3 mL 4X/DAY (RT) Shefali Cooney M.D.   3 mL at 01/25/17 1524   • ipratropium-albuterol (DUONEB) nebulizer solution 3 mL  3 mL Q4H PRN (RT) Newton Hutchinson M.D.       • metoprolol (LOPRESSOR) tablet 25 mg  25 mg TWICE DAILY Shefali Cooney M.D.   25 mg at 01/25/17 0914   • gabapentin (NEURONTIN) capsule 300 mg  300 mg TID Pelon Maxwell M.D.   300 mg at 01/25/17 1456   • alprazolam (XANAX) tablet 0.25 mg  0.25 mg HS PRN Newton Hutchinson M.D.   0.25 mg at 01/25/17 0043   • fluticasone (FLONASE) nasal spray 50 mcg  1 [place] QHS Newton Hutchinson M.D.   50 mcg at 01/24/17 2125   • vitamin D (cholecalciferol) tablet 1,000 Units  1,000 Units DAILY Newton Hutchinson M.D.   1,000 Units at 01/25/17 0914   • Respiratory Care per Protocol   Continuous RT Newton Hutchinson M.D.       • docusate sodium (COLACE) capsule 100 mg  100 mg QAM Newton Hutchinson M.D.   100 mg at 01/21/17 0848    And   • senna-docusate (PERICOLACE or SENOKOT S) 8.6-50 MG per tablet 1 Tab  1 Tab Nightly Newton Hutchinson M.D.   1 Tab at 01/21/17 2057    And   • senna-docusate (PERICOLACE or SENOKOT S) 8.6-50 MG per tablet 1 Tab  1 Tab Q24HRS PRN Newton Hutchinson M.D.        And   • lactulose 20 GM/30ML solution 30 mL  30 mL Q24HRS PRN Newton Hutchinson M.D.        And   • bisacodyl (DULCOLAX) suppository 10 mg  10 mg Q24HRS PRJOHN Hutchinson M.D.       • heparin injection 5,000 Units  5,000 Units Q8HRS Newton Hutchinson M.D.   5,000 Units at 01/24/17 1344   • ondansetron (ZOFRAN) syringe/vial injection 4 mg  4 mg Q4HRS PRJOHN Hutchinson M.D.       • ondansetron (ZOFRAN ODT) dispertab 4 mg  4 mg Q4HRS PRJOHN Hutchinson M.D.         Last reviewed on 1/18/2017 11:24 PM by Yennifer Hardy R.N.    Quality  Measures:  Core Measures & Quality Metrics    Problems:  1.  Respiratory insufficiency, requiring significant oxygen supplementation, slowly better,  decreasing oxygen requirement.  2.  Abnormal chest x-ray and CT scan with diffuse bilateral infiltrates.  Her    scan suggests the possibility of underlying basilar fibrosis and possible interstitial pulmonary fibrosis or usual interstitial pneumonia, now with a more diffuse changes consistent with inflammation or infection.  She does not seem to have pulmonary edema.  3.  Possible mild chronic obstructive pulmonary disease.  4.  10 mm right middle lobe noncalcified lung nodule on CT scan.  5. Systemic arterial hypertension, controlled.  6.  History of ventricular tachycardia.    Plan:  Continue titrated oxygen, bronchodilators, respiratory protocols and systemic corticosteroids.  Continue antibiotics and prophylaxis.  Continue physical therapy and mobilization. Agree with acute inpatient rehabilitation.   The lung nodule will require serial follow-up after discharge.  Pulmonary function studies and a high-resolution CT scan after discharge and clinical resolution of her presumed acute pneumonia might help to clarify the nature of the underlying interstitial lung disease.

## 2017-01-26 NOTE — DISCHARGE PLANNING
PAM Martinez received voicemail from [facility] at [facility] who stated due to pt destating to 80's on 10L with physical activity the pt isn't a good candidate right now.  PAM faxed choice form for SNF (1st [facility]; [facility]; [facility]) to [facility].  PAM will let pt know of the denial.

## 2017-01-26 NOTE — FLOWSHEET NOTE
01/25/17 1905   Interdisciplinary Plan of Care-Goals (Indications)   Obstructive Ventilatory Defect or Pulmonary Disease without Obvious Obstruction History / Diagnosis   Interdisciplinary Plan of Care-Outcomes    Bronchodilator Outcome Patient at Stable Baseline   Education   Education Yes - Pt. / Family has been Instructed in use of Respiratory Equipment   RT Assessment of Delivered Medications   Evaluation of Medication Delivery Daily Yes-- Pt /Family has been Instructed in use of Respiratory Medications and Adverse Reactions   SVN Group   #SVN Performed Yes   Given By: Mouthpiece   Incentive Spirometry Group   Incentive Spirometry Instruction Yes   Breathing Exercises Yes   Respiratory WDL   Respiratory (WDL) X   Chest Exam   Respiration 18   Heart Rate (Monitored) 77   Breath Sounds   Pre/Post Intervention Post Intervention Assessment   RUL Breath Sounds Clear   RML Breath Sounds Clear   RLL Breath Sounds Fine Crackles   KRISTINA Breath Sounds Clear   LLL Breath Sounds Fine Crackles   Oximetry   Continuous Oximetry Yes   Oxygen   Home O2 Use Prior To Admission? No   Pulse Oximetry 100 %   O2 (LPM) 3.5   O2 Daily Delivery Respiratory  Silicone Nasal Cannula

## 2017-01-26 NOTE — DISCHARGE PLANNING
PAM Martinez left voicemail for Sailaja at Rehab ([id number]) to let her know pt is on 3.5L O2 since 1524 through current vitals at 643.

## 2017-01-27 LAB
ANION GAP SERPL CALC-SCNC: 8 MMOL/L (ref 0–11.9)
BUN SERPL-MCNC: 32 MG/DL (ref 8–22)
CALCIUM SERPL-MCNC: 8.4 MG/DL (ref 8.4–10.2)
CHLORIDE SERPL-SCNC: 110 MMOL/L (ref 96–112)
CO2 SERPL-SCNC: 21 MMOL/L (ref 20–33)
CREAT SERPL-MCNC: 0.89 MG/DL (ref 0.5–1.4)
ERYTHROCYTE [DISTWIDTH] IN BLOOD BY AUTOMATED COUNT: 47.2 FL (ref 35.9–50)
GFR SERPL CREATININE-BSD FRML MDRD: 60 ML/MIN/1.73 M 2
GLUCOSE SERPL-MCNC: 156 MG/DL (ref 65–99)
HCT VFR BLD AUTO: 34.1 % (ref 37–47)
HGB BLD-MCNC: 11.1 G/DL (ref 12–16)
MCH RBC QN AUTO: 29.7 PG (ref 27–33)
MCHC RBC AUTO-ENTMCNC: 32.6 G/DL (ref 33.6–35)
MCV RBC AUTO: 91.2 FL (ref 81.4–97.8)
PLATELET # BLD AUTO: 120 K/UL (ref 164–446)
PMV BLD AUTO: 10.8 FL (ref 9–12.9)
POTASSIUM SERPL-SCNC: 4.3 MMOL/L (ref 3.6–5.5)
RBC # BLD AUTO: 3.74 M/UL (ref 4.2–5.4)
SODIUM SERPL-SCNC: 139 MMOL/L (ref 135–145)
WBC # BLD AUTO: 8.6 K/UL (ref 4.8–10.8)

## 2017-01-27 PROCEDURE — 700105 HCHG RX REV CODE 258: Performed by: INTERNAL MEDICINE

## 2017-01-27 PROCEDURE — 700111 HCHG RX REV CODE 636 W/ 250 OVERRIDE (IP): Performed by: INTERNAL MEDICINE

## 2017-01-27 PROCEDURE — A9270 NON-COVERED ITEM OR SERVICE: HCPCS | Performed by: INTERNAL MEDICINE

## 2017-01-27 PROCEDURE — 94640 AIRWAY INHALATION TREATMENT: CPT

## 2017-01-27 PROCEDURE — 700102 HCHG RX REV CODE 250 W/ 637 OVERRIDE(OP): Performed by: INTERNAL MEDICINE

## 2017-01-27 PROCEDURE — 85027 COMPLETE CBC AUTOMATED: CPT

## 2017-01-27 PROCEDURE — 770020 HCHG ROOM/CARE - TELE (206)

## 2017-01-27 PROCEDURE — 80048 BASIC METABOLIC PNL TOTAL CA: CPT

## 2017-01-27 PROCEDURE — 99233 SBSQ HOSP IP/OBS HIGH 50: CPT | Performed by: HOSPITALIST

## 2017-01-27 PROCEDURE — 94760 N-INVAS EAR/PLS OXIMETRY 1: CPT

## 2017-01-27 RX ADMIN — BUDESONIDE 0.5 MG: 0.5 INHALANT RESPIRATORY (INHALATION) at 11:23

## 2017-01-27 RX ADMIN — BUDESONIDE 0.5 MG: 0.5 INHALANT RESPIRATORY (INHALATION) at 22:42

## 2017-01-27 RX ADMIN — FLUTICASONE PROPIONATE 50 MCG: 50 SPRAY, METERED NASAL at 20:36

## 2017-01-27 RX ADMIN — METOPROLOL TARTRATE 25 MG: 25 TABLET ORAL at 20:41

## 2017-01-27 RX ADMIN — METHYLPREDNISOLONE SODIUM SUCCINATE 125 MG: 125 INJECTION, POWDER, FOR SOLUTION INTRAMUSCULAR; INTRAVENOUS at 11:54

## 2017-01-27 RX ADMIN — METHYLPREDNISOLONE SODIUM SUCCINATE 125 MG: 125 INJECTION, POWDER, FOR SOLUTION INTRAMUSCULAR; INTRAVENOUS at 17:10

## 2017-01-27 RX ADMIN — METHYLPREDNISOLONE SODIUM SUCCINATE 125 MG: 125 INJECTION, POWDER, FOR SOLUTION INTRAMUSCULAR; INTRAVENOUS at 23:29

## 2017-01-27 RX ADMIN — CHOLECALCIFEROL TAB 25 MCG (1000 UNIT) 1000 UNITS: 25 TAB at 09:28

## 2017-01-27 RX ADMIN — METHYLPREDNISOLONE SODIUM SUCCINATE 125 MG: 125 INJECTION, POWDER, FOR SOLUTION INTRAMUSCULAR; INTRAVENOUS at 05:52

## 2017-01-27 RX ADMIN — ALPRAZOLAM 0.25 MG: 0.25 TABLET ORAL at 20:46

## 2017-01-27 RX ADMIN — CEFEPIME HYDROCHLORIDE 1 G: 1 INJECTION, POWDER, FOR SOLUTION INTRAMUSCULAR; INTRAVENOUS at 20:35

## 2017-01-27 RX ADMIN — CEFEPIME HYDROCHLORIDE 1 G: 1 INJECTION, POWDER, FOR SOLUTION INTRAMUSCULAR; INTRAVENOUS at 09:28

## 2017-01-27 RX ADMIN — METOPROLOL TARTRATE 25 MG: 25 TABLET ORAL at 09:28

## 2017-01-27 ASSESSMENT — ENCOUNTER SYMPTOMS
NAUSEA: 0
MYALGIAS: 0
COUGH: 0
SHORTNESS OF BREATH: 0
ABDOMINAL PAIN: 0
DIZZINESS: 0
VOMITING: 0
PALPITATIONS: 0

## 2017-01-27 ASSESSMENT — PAIN SCALES - GENERAL
PAINLEVEL_OUTOF10: 0
PAINLEVEL_OUTOF10: 0

## 2017-01-27 NOTE — PROGRESS NOTES
Tele Note    Rhythm Sinus Rhythm  Rate 60-80  NH 0.18  QRS 0.08  QT 0.40  Ectopy frequent PVC, occasional coup, rare PAC

## 2017-01-27 NOTE — CARE PLAN
Problem: Safety  Goal: Will remain free from injury  Outcome: PROGRESSING AS EXPECTED  Hourly rounding.  Non-skid socks. Bed locked & in low position. Personal belongings within reach. .        Problem: Pain Management  Goal: Pain level will decrease to patient’s comfort goal  Outcome: PROGRESSING AS EXPECTED  Taught pt 0-10 pain scale and  non pharmacological method of pain mgt, encouraged to verbalize when in pain. Administered PRN pain med as needed.

## 2017-01-27 NOTE — PROGRESS NOTES
Hospital Medicine ICU Interval Note    Date of Service: 2017    Chief Complaint  88 y.o.female admitted 2017 with pneumonia and acute respiratory failure with hypoxia    Interval Problem Update  Still doing fine on 3.5L but still desats with ambulation on 10L; feels the best she's felt however.    Consultants/Specialty  Pulm/CC Dr. Putnam    Disposition  ?snf vs rehab; needs to be able to maintain sats on at most 6L with ambulation    FC  D/W tx team on rounds        Review of Systems   Constitutional: Negative for fever and chills.        Feels quite good   Respiratory: Negative for cough and shortness of breath.    Cardiovascular: Negative for chest pain and palpitations.   Gastrointestinal: Negative for nausea, vomiting and abdominal pain.   Musculoskeletal: Negative for myalgias.   Neurological: Negative for dizziness.      Physical Exam  Laboratory/Imaging   Hemodynamics  Temp (24hrs), Av.6 °C (97.9 °F), Min:36.3 °C (97.4 °F), Max:36.9 °C (98.5 °F)   Temperature: 36.7 °C (98 °F)  Pulse  Av.1  Min: 51  Max: 98 Heart Rate (Monitored): 76  Blood Pressure : 130/47 mmHg      Respiratory      Respiration: 18, Pulse Oximetry: 94 %, O2 Daily Delivery Respiratory : Silicone Nasal Cannula     Given By:: Mouthpiece, Work Of Breathing / Effort: Mild  RUL Breath Sounds: Clear, RML Breath Sounds: Clear, RLL Breath Sounds: Fine Crackles, KRISTINA Breath Sounds: Clear, LLL Breath Sounds: Fine Crackles    Fluids       Nutrition  Orders Placed This Encounter   Procedures   • Diet Order     Standing Status: Standing      Number of Occurrences: 1      Standing Expiration Date:      Order Specific Question:  Diet:     Answer:  Regular [1]     Physical Exam   Constitutional: She is oriented to person, place, and time.   Sitting up in chair again, very good spirits   Cardiovascular: Normal rate, regular rhythm and normal heart sounds.    No murmur heard.  Pulmonary/Chest: Effort normal. No respiratory distress. She has  no wheezes. She has rales (fine dry crackles).    3.5L O2 but did still desaturate to 80s on 10L while ambulating   Abdominal: Soft. Bowel sounds are normal. She exhibits no distension. There is no tenderness.   Musculoskeletal: Normal range of motion. She exhibits no edema.   Neurological: She is alert and oriented to person, place, and time.   Skin: Skin is warm and dry. No erythema.   Nursing note and vitals reviewed.      Recent Labs      01/25/17   0450   WBC  9.4   RBC  3.75*   HEMOGLOBIN  10.9*   HEMATOCRIT  34.4*   MCV  91.7   MCH  29.1   MCHC  31.7*   RDW  47.7   PLATELETCT  224   MPV  9.9     Recent Labs      01/25/17   0450   SODIUM  139   POTASSIUM  4.1   CHLORIDE  108   CO2  23   GLUCOSE  150*   BUN  39*   CREATININE  1.03   CALCIUM  8.8                      Assessment/Plan     * Community acquired pneumonia  Assessment & Plan  -continue cefepime    Acute respiratory failure with hypoxia (CMS-HCC)  Assessment & Plan  -pulm/cc following  -improving on IV steroids, abx, RT protocol  -requiring less O2, except with ambulation, though she is very motivated to get up and around  -?2/2 UIP, ILD with pna    Interstitial lung disease (CMS-HCC)  Assessment & Plan  -suspect chronic  -continue steorids, abx    V tach (CMS-HCC)  Assessment & Plan  -hx; nothing here  -on metoprolol    Lung nodule  Assessment & Plan  -will need ouptt f/u    Adrenal adenoma  Assessment & Plan  -incidental finding; no sx    Hypertension  Assessment & Plan  -continue metoprolol    COPD (chronic obstructive pulmonary disease) (CMS-HCC)  Assessment & Plan  -hx, on steroids, rt protocol  -quit smoking 40 years ago      Labs reviewed, Medications reviewed and Radiology images reviewed  Bergeron catheter: No Bergeron      DVT Prophylaxis: Heparin    Ulcer prophylaxis: Not indicated  Antibiotics: Treating active infection/contamination beyond 24 hours perioperative coverage

## 2017-01-27 NOTE — CARE PLAN
Problem: Safety  Goal: Will remain free from falls  Intervention: Implement fall precautions    01/26/17 0815 01/27/17 0928   OTHER   Environmental Precautions --  Treaded Slipper Socks on Patient;Personal Belongings, Wastebasket, [place] etc. in Easy Reach;Transferred to Stronger Side;Report Given to Other Health Care Providers Regarding Fall Risk;Communication Sign for Patients & Families;Bed in Low Position;Mobility Assessed & Appropriate Sign Placed   IV Pole on Same Side of Bed as Bathroom --  Yes   Bedrails --  Bedrails Closest to Bathroom Down   Chair/Bed Strip Alarm --  Yes - Alarm On   Bed Alarm (Built in - for ICU ONLY) Yes - Alarm On --            Problem: Bowel/Gastric:  Goal: Normal bowel function is maintained or improved    01/25/17 1015 01/27/17 1000   OTHER   Last BM --  01/27/17   Number of Times Stooled 1 --

## 2017-01-27 NOTE — PROGRESS NOTES
Pulmonary Critical Care Progress Note        Chief Complaint: Dyspnea, hypoxemia.    History of Present Illness: 88 y.o. female admitted on January 18 with dyspnea and hypoxemia. She continued to require high flow oxygen therapy, prompting pulmonary consultation on January 23.     ROS:  Respiratory: positive shortness of breath, but improving, Cardiac: negative chest pain, GI: negative abdominal pain.  All other systems negative.    Interval Events:  24 hour interval history reviewed.  Comfortable on oxygen nasal cannula, flow rate down to 3.5 liters/minute. She does become hypoxemic with any exertion, requiring an increase in the oxygen flow rate 6 L/m. Out of bed to chair. Eating. Her back pain, which was her original reason in complaint, has resolved.     PFSH:  No change.    Respiratory:  Pulse Oximetry: (!) 82 % (ambulating ) on an oxygen nasal cannula at 3.5 L/m.  Exam: diminished but symmetrical bilateral breath sounds with some clearing of bilateral rales but no wheezing or egophony.  ImagingAvailable data reviewed. The chest film on January 21 demonstrates diffuse interstitial prominence without focal consolidation. The CT angiogram on January 18 demonstrated no vascular filling defects to suggest pulmonary emboli but did demonstrate bilateral interstitial lung disease and a 10 mm right middle lobe noncalcified nodule. There was a 15 mm left hilar lymph node and bilateral adrenal adenomas.    HemoDynamics:  Pulse: 73, Heart Rate (Monitored): 76  Blood Pressure : 150/62 mmHg     Exam: regular rhythm (Sinus)  Imaging: Available data reviewed. The echocardiogram on January 19 demonstrates normal left ventricular systolic function with an estimated ejection fraction of 66% but grade 2 diastolic dysfunction. Right ventricular systolic pressure is 35 mmHg.      Neuro:  Exam: no focal deficits noted, alert.  Imaging: None - Reviewed    Fluids:  Intake/Output       01/25/17 0700 - 01/26/17 0659 01/26/17 0700 -  17 - 17 0659      1900-0659 Total  Total  Total       Intake    Total Intake -- -- -- -- -- -- -- -- --       Output    Urine  --  -- --  --  -- --  --  -- --    Number of Times Voided 1 x -- 1 x -- -- -- -- -- --    Stool  --  -- --  --  -- --  --  -- --    Number of Times Stooled 1 x -- 1 x -- -- -- -- -- --    Total Output -- -- -- -- -- -- -- -- --       Net I/O     -- -- -- -- -- -- -- -- --           Recent Labs      17   SODIUM  139  139   POTASSIUM  4.1  4.3   CHLORIDE  108  110   CO2  23  21   BUN  39*  32*   CREATININE  1.03  0.89   CALCIUM  8.8  8.4       GI/Nutrition:  Exam: abdomen is soft and non-tender  Imaging: None - Reviewed  taking PO  Liver Function  Recent Labs      17   GLUCOSE  150*  156*       Heme:  Recent Labs      17   RBC  3.75*  3.74*   HEMOGLOBIN  10.9*  11.1*   HEMATOCRIT  34.4*  34.1*   PLATELETCT  224  120*       Infectious Disease:  Temp  Av.6 °C (97.9 °F)  Min: 36.4 °C (97.6 °F)  Max: 36.7 °C (98.1 °F)  Micro: antibiotics reviewed and no new positive cultures, on cefepime.  Recent Labs      17   WBC  9.4  8.6     Current Facility-Administered Medications   Medication Dose Frequency Provider Last Rate Last Dose   • budesonide (PULMICORT) neb susp 0.5 mg  0.5 mg BID (RT) Jan DAMIAN M.D.   0.5 mg at 17   • methylPREDNISolone sod succ (SOLU-MEDROL) 125 MG injection 125 mg  125 mg Q6HRS Shefali Cooney M.D.   125 mg at 17 0552   • acetaminophen (TYLENOL) tablet 650 mg  650 mg Q6HRS PRN Jose Julien M.D.   650 mg at 17 0234   • hydrocodone-acetaminophen (NORCO) 5-325 MG per tablet 1-2 Tab  1-2 Tab Q6HRS PRN Shefali Cooney M.D.   1 Tab at 17 0213   • ceFEPIme (MAXIPIME) 1 g in  mL IVPB  1 g Q12HRS Shefali Cooney M.D.   1 g at  01/27/17 0928   • ipratropium-albuterol (DUONEB) nebulizer solution 3 mL  3 mL Q4H PRN (RT) Newton Hutchinson M.D.       • metoprolol (LOPRESSOR) tablet 25 mg  25 mg TWICE DAILY Shefali Cooney M.D.   25 mg at 01/27/17 0928   • gabapentin (NEURONTIN) capsule 300 mg  300 mg TID Pelon Maxwell M.D.   300 mg at 01/26/17 1509   • alprazolam (XANAX) tablet 0.25 mg  0.25 mg HS PRN Newton Hutchinson M.D.   0.25 mg at 01/26/17 2207   • fluticasone (FLONASE) nasal spray 50 mcg  1 [place] QHS Newton Hutchinson M.D.   50 mcg at 01/26/17 2209   • vitamin D (cholecalciferol) tablet 1,000 Units  1,000 Units DAILY Newton Hutchinson M.D.   1,000 Units at 01/27/17 0928   • Respiratory Care per Protocol   Continuous RT Newton Hutchinson M.D.       • docusate sodium (COLACE) capsule 100 mg  100 mg QAM Newton Hutchinson M.D.   100 mg at 01/21/17 0848    And   • senna-docusate (PERICOLACE or SENOKOT S) 8.6-50 MG per tablet 1 Tab  1 Tab Nightly Newton Hutchinson M.D.   1 Tab at 01/21/17 2057    And   • senna-docusate (PERICOLACE or SENOKOT S) 8.6-50 MG per tablet 1 Tab  1 Tab Q24HRS PRN Newton Hutchinson M.D.        And   • lactulose 20 GM/30ML solution 30 mL  30 mL Q24HRS PRN Newton Hutchinson M.D.        And   • bisacodyl (DULCOLAX) suppository 10 mg  10 mg Q24HRS PRN Newton Hutchinson M.D.       • heparin injection 5,000 Units  5,000 Units Q8HRS Newton Hutchinson M.D.   5,000 Units at 01/24/17 1344   • ondansetron (ZOFRAN) syringe/vial injection 4 mg  4 mg Q4HRS PRN Newton Hutchinson M.D.       • ondansetron (ZOFRAN ODT) dispertab 4 mg  4 mg Q4HRS PRN Newton Hutchinson M.D.         Last reviewed on 1/18/2017 11:24 PM by Yennifer Hardy R.N.    Quality  Measures:  Core Measures & Quality Metrics    Problems:  1.  Respiratory insufficiency, requiring significant oxygen supplementation, slowly better, decreasing oxygen requirement.  2.  Abnormal chest x-ray and CT scan with diffuse bilateral infiltrates.  Her    scan suggests  the possibility of underlying basilar fibrosis and possible interstitial pulmonary fibrosis or usual interstitial pneumonia, now with a more diffuse changes consistent with inflammation or infection.  She does not seem to have pulmonary edema.  3.  Possible mild chronic obstructive pulmonary disease.  4.  10 mm right middle lobe noncalcified lung nodule on CT scan.  5. Systemic arterial hypertension, controlled.  6.  History of ventricular tachycardia.    Plan:  Continue titrated oxygen, bronchodilators, respiratory protocols and systemic corticosteroids.  Continue antibiotics and prophylaxis.  Continue physical therapy and mobilization. Agree with acute inpatient rehabilitation.   The lung nodule will require serial follow-up after discharge.  Pulmonary function studies and a high-resolution CT scan after discharge and clinical resolution of her presumed acute pneumonia might help to clarify the nature of the underlying interstitial lung disease.

## 2017-01-27 NOTE — PROGRESS NOTES
Assessment completed, medicated per MAR. Plan of care discussed. IV patent. Fall protocol in place.

## 2017-01-27 NOTE — PROGRESS NOTES
Hospital Medicine ICU Interval Note    Date of Service: 2017    Chief Complaint  88 y.o.female admitted 2017 with pneumonia and acute respiratory failure with hypoxia    Interval Problem Update  Still doing fine on 3.5L but still desats with ambulation on 10L; feels the best she's felt however.    Consultants/Specialty  Pulm/CC Dr. Putnam    Disposition  ?snf vs rehab; needs to be able to maintain sats on at most 6L with ambulation    FC  D/W tx team on rounds        Review of Systems   Constitutional:        Feels good   Respiratory: Negative for cough and shortness of breath.    Cardiovascular: Negative for chest pain and palpitations.   Gastrointestinal: Negative for nausea, vomiting and abdominal pain.   Musculoskeletal: Negative for myalgias.   Neurological: Negative for dizziness.      Physical Exam  Laboratory/Imaging   Hemodynamics  Temp (24hrs), Av.6 °C (97.9 °F), Min:36.4 °C (97.6 °F), Max:36.7 °C (98.1 °F)   Temperature: 36.6 °C (97.8 °F)  Pulse  Av.5  Min: 51  Max: 98 Heart Rate (Monitored): 76  Blood Pressure : 121/50 mmHg      Respiratory      Respiration: 17, Pulse Oximetry: 96 %, O2 Daily Delivery Respiratory : Silicone Nasal Cannula     Given By:: Mouthpiece, Work Of Breathing / Effort: Mild  RUL Breath Sounds: Clear, RML Breath Sounds: Clear, RLL Breath Sounds: Clear, KRISTINA Breath Sounds: Clear, LLL Breath Sounds: Clear    Fluids       Nutrition  Orders Placed This Encounter   Procedures   • Diet Order     Standing Status: Standing      Number of Occurrences: 1      Standing Expiration Date:      Order Specific Question:  Diet:     Answer:  Regular [1]     Physical Exam   Constitutional: She is oriented to person, place, and time.   Sitting up in chair again, very good spirits   Cardiovascular: Normal rate, regular rhythm and normal heart sounds.    No murmur heard.  Pulmonary/Chest: Effort normal. No respiratory distress. She has no wheezes. She has rales (fine dry crackles  remain).    3.5L O2 but still desaturates to 80s on 8L while ambulating   Abdominal: Soft. Bowel sounds are normal. She exhibits no distension. There is no tenderness.   Musculoskeletal: Normal range of motion. She exhibits no edema.   Neurological: She is alert and oriented to person, place, and time.   Skin: Skin is warm and dry. No erythema.   Nursing note and vitals reviewed.      Recent Labs      01/25/17 0450 01/27/17 0225   WBC  9.4  8.6   RBC  3.75*  3.74*   HEMOGLOBIN  10.9*  11.1*   HEMATOCRIT  34.4*  34.1*   MCV  91.7  91.2   MCH  29.1  29.7   MCHC  31.7*  32.6*   RDW  47.7  47.2   PLATELETCT  224  120*   MPV  9.9  10.8     Recent Labs      01/25/17 0450 01/27/17 0225   SODIUM  139  139   POTASSIUM  4.1  4.3   CHLORIDE  108  110   CO2  23  21   GLUCOSE  150*  156*   BUN  39*  32*   CREATININE  1.03  0.89   CALCIUM  8.8  8.4                      Assessment/Plan     * Community acquired pneumonia  Assessment & Plan  -continue cefepime    Acute respiratory failure with hypoxia (CMS-HCC)  Assessment & Plan  -pulm/cc following  -improving on IV steroids, abx, RT protocol  -requiring less O2, except with ambulation, will keep working on this; still desatting to 80s on 8L when exerting herself  -?2/2 UIP, ILD with pna; improving    Interstitial lung disease (CMS-HCC)  Assessment & Plan  -suspect chronic  -continue steorids, abx    V tach (CMS-HCC)  Assessment & Plan  -hx; nothing here  -on metoprolol    Lung nodule  Assessment & Plan  -will need ouptt f/u    Adrenal adenoma  Assessment & Plan  -incidental finding; no sx    Hypertension  Assessment & Plan  -continue metoprolol    COPD (chronic obstructive pulmonary disease) (CMS-HCC)  Assessment & Plan  -hx, on steroids, rt protocol  -quit smoking 40 years ago      Labs reviewed, Medications reviewed and Radiology images reviewed  Bergeron catheter: No Bergeron      DVT Prophylaxis: Heparin    Ulcer prophylaxis: Not indicated  Antibiotics: Treating active  infection/contamination beyond 24 hours perioperative coverage

## 2017-01-27 NOTE — PROGRESS NOTES
Received bedside report from Vijaya LEE. Assumed care of pt who is sitting up in bed, involved in report. RR even/unlabored. Fall protocol in place. CLIP. Will continue to monitor.

## 2017-01-27 NOTE — PROGRESS NOTES
Assessment completed, alert and oriented  to person ,place and time.On 3lpm of Oxygen sat 95% . Denied pain at this time.Due medication given per MAR. e.

## 2017-01-27 NOTE — PROGRESS NOTES
Bedside report given to  Mera LEE.Pt  Resting in the bed.Safety precautions in place and all the lines remain patent.

## 2017-01-27 NOTE — PROGRESS NOTES
Pulmonary Critical Care Progress Note        Chief Complaint: Dyspnea, hypoxemia.    History of Present Illness: 88 y.o. female admitted on January 18 with dyspnea and hypoxemia. She continued to require high flow oxygen therapy, prompting pulmonary consultation on January 23.     ROS:  Respiratory: positive shortness of breath, but improving, Cardiac: negative chest pain, GI: negative abdominal pain.  All other systems negative.    Interval Events:  24 hour interval history reviewed.  Comfortable on oxygen nasal cannula, flow rate down to 3 liters/minute. She does become hypoxemic with any exertion, requiring an increase in the oxygen flow rate to 6-8 L/m. Out of bed to chair. Eating. Her back pain, which was her original reason in complaint, has resolved.     PFSH:  No change.    Respiratory:  Pulse Oximetry: (!) 82 % (ambulating ) on an oxygen nasal cannula at 3 L/m.  Exam: diminished but symmetrical bilateral breath sounds with some clearing of bilateral rales but no wheezing or egophony.  ImagingAvailable data reviewed. The chest film on January 21 demonstrates diffuse interstitial prominence without focal consolidation. The CT angiogram on January 18 demonstrated no vascular filling defects to suggest pulmonary emboli but did demonstrate bilateral interstitial lung disease and a 10 mm right middle lobe noncalcified nodule. There was a 15 mm left hilar lymph node and bilateral adrenal adenomas.    HemoDynamics:  Pulse: 73, Heart Rate (Monitored): 76  Blood Pressure : 150/62 mmHg     Exam: regular rhythm (Sinus)  Imaging: Available data reviewed. The echocardiogram on January 19 demonstrates normal left ventricular systolic function with an estimated ejection fraction of 66% but grade 2 diastolic dysfunction. Right ventricular systolic pressure is 35 mmHg.      Neuro:  Exam: no focal deficits noted, alert and responsive.  Imaging: None - Reviewed    Fluids:  Intake/Output       01/25/17 0700 - 01/26/17 8773  17 07 - 1759 17 - 17 0659      1900-0659 Total  Total  Total       Intake    Total Intake -- -- -- -- -- -- -- -- --       Output    Urine  --  -- --  --  -- --  --  -- --    Number of Times Voided 1 x -- 1 x -- -- -- -- -- --    Stool  --  -- --  --  -- --  --  -- --    Number of Times Stooled 1 x -- 1 x -- -- -- -- -- --    Total Output -- -- -- -- -- -- -- -- --       Net I/O     -- -- -- -- -- -- -- -- --           Recent Labs      17   SODIUM  139  139   POTASSIUM  4.1  4.3   CHLORIDE  108  110   CO2  23  21   BUN  39*  32*   CREATININE  1.03  0.89   CALCIUM  8.8  8.4       GI/Nutrition:  Exam: abdomen is soft and non-tender  Imaging: None - Reviewed  taking PO  Liver Function  Recent Labs      17   GLUCOSE  150*  156*       Heme:  Recent Labs      17   RBC  3.75*  3.74*   HEMOGLOBIN  10.9*  11.1*   HEMATOCRIT  34.4*  34.1*   PLATELETCT  224  120*       Infectious Disease:  Temp  Av.6 °C (97.9 °F)  Min: 36.4 °C (97.6 °F)  Max: 36.7 °C (98.1 °F)  Micro: antibiotics reviewed and no new positive cultures, on cefepime.  Recent Labs      17   WBC  9.4  8.6     Current Facility-Administered Medications   Medication Dose Frequency Provider Last Rate Last Dose   • budesonide (PULMICORT) neb susp 0.5 mg  0.5 mg BID (RT) Jan DAMIAN M.D.   0.5 mg at 17   • methylPREDNISolone sod succ (SOLU-MEDROL) 125 MG injection 125 mg  125 mg Q6HRS Shefali Cooney M.D.   125 mg at 17 0552   • acetaminophen (TYLENOL) tablet 650 mg  650 mg Q6HRS PRN Jose Julien M.D.   650 mg at 17 0234   • hydrocodone-acetaminophen (NORCO) 5-325 MG per tablet 1-2 Tab  1-2 Tab Q6HRS PRN Shefali Cooney M.D.   1 Tab at 17 0213   • ceFEPIme (MAXIPIME) 1 g in  mL IVPB  1 g Q12HRS Shefali Cooney  M.D.   1 g at 01/27/17 0928   • ipratropium-albuterol (DUONEB) nebulizer solution 3 mL  3 mL Q4H PRN (RT) Newton Hutchinson M.D.       • metoprolol (LOPRESSOR) tablet 25 mg  25 mg TWICE DAILY Shefali Cooney M.D.   25 mg at 01/27/17 0928   • gabapentin (NEURONTIN) capsule 300 mg  300 mg TID Pelon Maxwell M.D.   300 mg at 01/26/17 1509   • alprazolam (XANAX) tablet 0.25 mg  0.25 mg HS PRN Newton Hutchinson M.D.   0.25 mg at 01/26/17 2207   • fluticasone (FLONASE) nasal spray 50 mcg  1 [place] QHS Newton Hutchinson M.D.   50 mcg at 01/26/17 2209   • vitamin D (cholecalciferol) tablet 1,000 Units  1,000 Units DAILY Newton Hutchinson M.D.   1,000 Units at 01/27/17 0928   • Respiratory Care per Protocol   Continuous RT Newton Hutchinson M.D.       • docusate sodium (COLACE) capsule 100 mg  100 mg QAM Newton Hutchinson M.D.   100 mg at 01/21/17 0848    And   • senna-docusate (PERICOLACE or SENOKOT S) 8.6-50 MG per tablet 1 Tab  1 Tab Nightly Newton Hutchinson M.D.   1 Tab at 01/21/17 2057    And   • senna-docusate (PERICOLACE or SENOKOT S) 8.6-50 MG per tablet 1 Tab  1 Tab Q24HRS PRN Newton Hutchinson M.D.        And   • lactulose 20 GM/30ML solution 30 mL  30 mL Q24HRS PRN Newton Hutchinson M.D.        And   • bisacodyl (DULCOLAX) suppository 10 mg  10 mg Q24HRS PRN Newton Hutchinson M.D.       • heparin injection 5,000 Units  5,000 Units Q8HRS Newton Hutchinson M.D.   5,000 Units at 01/24/17 1344   • ondansetron (ZOFRAN) syringe/vial injection 4 mg  4 mg Q4HRS PRN Newton Hutchinson M.D.       • ondansetron (ZOFRAN ODT) dispertab 4 mg  4 mg Q4HRS PRN Newton Hutchinson M.D.         Last reviewed on 1/18/2017 11:24 PM by Yennifer Hardy R.N.    Quality  Measures:  Core Measures & Quality Metrics    Problems:  1.  Respiratory insufficiency, requiring significant oxygen supplementation, slowly better, decreasing oxygen requirement.  2.  Abnormal chest x-ray and CT scan with diffuse bilateral infiltrates.  Her     scan suggests the possibility of underlying basilar fibrosis and possible interstitial pulmonary fibrosis or usual interstitial pneumonia, now with a more diffuse changes consistent with inflammation or infection.  She does not seem to have pulmonary edema.  3.  Possible mild chronic obstructive pulmonary disease.  4.  10 mm right middle lobe noncalcified lung nodule on CT scan.  5. Systemic arterial hypertension, controlled.  6.  History of ventricular tachycardia.    Plan:  Continue titrated oxygen, bronchodilators, respiratory protocols and systemic corticosteroids.  Continue antibiotics and prophylaxis.  Continue physical therapy and mobilization. Apparently does not qualify for acute inpatient rehabilitation. Options include a skilled nursing facility with rehabilitation versus home care with high flow oxygen system.  The lung nodule will require serial follow-up after discharge.  Pulmonary function studies and a high-resolution CT scan after discharge and clinical resolution of her presumed acute pneumonia might help to clarify the nature of the underlying interstitial lung disease.  Discussed with respiratory therapy and with Dr. Martinez as well as the patient.

## 2017-01-27 NOTE — PROGRESS NOTES
Tele strip at 1905 shows sinus rhythm w/ HR of 74.  Measurements: .14/.10/.36    Tele Shift Summary:  Rhythm: Sinus rhythm  Rate: 60's-70's  Ectopy: Per CCT Funmilayo, pt had frequent PVC, occasional couplets, rare PAC    Telemetry monitoring strips placed in pt chart. Primary RN responsible for further assessment and monitoring.

## 2017-01-28 ENCOUNTER — APPOINTMENT (OUTPATIENT)
Dept: RADIOLOGY | Facility: MEDICAL CENTER | Age: 82
DRG: 189 | End: 2017-01-28
Attending: HOSPITALIST
Payer: MEDICARE

## 2017-01-28 PROCEDURE — A9270 NON-COVERED ITEM OR SERVICE: HCPCS | Performed by: INTERNAL MEDICINE

## 2017-01-28 PROCEDURE — 94640 AIRWAY INHALATION TREATMENT: CPT

## 2017-01-28 PROCEDURE — 700102 HCHG RX REV CODE 250 W/ 637 OVERRIDE(OP): Performed by: INTERNAL MEDICINE

## 2017-01-28 PROCEDURE — 700101 HCHG RX REV CODE 250: Performed by: INTERNAL MEDICINE

## 2017-01-28 PROCEDURE — 770020 HCHG ROOM/CARE - TELE (206)

## 2017-01-28 PROCEDURE — 94760 N-INVAS EAR/PLS OXIMETRY 1: CPT

## 2017-01-28 PROCEDURE — 99232 SBSQ HOSP IP/OBS MODERATE 35: CPT | Performed by: HOSPITALIST

## 2017-01-28 PROCEDURE — 93970 EXTREMITY STUDY: CPT

## 2017-01-28 PROCEDURE — 700111 HCHG RX REV CODE 636 W/ 250 OVERRIDE (IP): Performed by: INTERNAL MEDICINE

## 2017-01-28 PROCEDURE — 97530 THERAPEUTIC ACTIVITIES: CPT

## 2017-01-28 RX ADMIN — METOPROLOL TARTRATE 25 MG: 25 TABLET ORAL at 09:24

## 2017-01-28 RX ADMIN — BUDESONIDE 0.5 MG: 0.5 INHALANT RESPIRATORY (INHALATION) at 07:03

## 2017-01-28 RX ADMIN — METHYLPREDNISOLONE SODIUM SUCCINATE 125 MG: 125 INJECTION, POWDER, FOR SOLUTION INTRAMUSCULAR; INTRAVENOUS at 17:22

## 2017-01-28 RX ADMIN — METHYLPREDNISOLONE SODIUM SUCCINATE 125 MG: 125 INJECTION, POWDER, FOR SOLUTION INTRAMUSCULAR; INTRAVENOUS at 05:53

## 2017-01-28 RX ADMIN — IPRATROPIUM BROMIDE AND ALBUTEROL SULFATE 3 ML: .5; 3 SOLUTION RESPIRATORY (INHALATION) at 14:07

## 2017-01-28 RX ADMIN — METHYLPREDNISOLONE SODIUM SUCCINATE 125 MG: 125 INJECTION, POWDER, FOR SOLUTION INTRAMUSCULAR; INTRAVENOUS at 12:54

## 2017-01-28 RX ADMIN — CHOLECALCIFEROL TAB 25 MCG (1000 UNIT) 1000 UNITS: 25 TAB at 09:24

## 2017-01-28 RX ADMIN — IPRATROPIUM BROMIDE AND ALBUTEROL SULFATE 3 ML: .5; 3 SOLUTION RESPIRATORY (INHALATION) at 07:04

## 2017-01-28 RX ADMIN — ALPRAZOLAM 0.25 MG: 0.25 TABLET ORAL at 23:31

## 2017-01-28 ASSESSMENT — ENCOUNTER SYMPTOMS
NERVOUS/ANXIOUS: 1
ABDOMINAL PAIN: 0
WHEEZING: 1
SHORTNESS OF BREATH: 0
COUGH: 0
VOMITING: 0
MYALGIAS: 0
NAUSEA: 0
PALPITATIONS: 0

## 2017-01-28 ASSESSMENT — GAIT ASSESSMENTS
GAIT LEVEL OF ASSIST: STAND BY ASSIST
DEVIATION: INCREASED BASE OF SUPPORT
ASSISTIVE DEVICE: FRONT WHEEL WALKER

## 2017-01-28 ASSESSMENT — PAIN SCALES - GENERAL
PAINLEVEL_OUTOF10: 0
PAINLEVEL_OUTOF10: 0

## 2017-01-28 NOTE — CARE PLAN
Problem: Safety  Goal: Will remain free from injury  Outcome: PROGRESSING AS EXPECTED  Hourly rounding.  Non-skid socks. Bed locked & in low position. Personal belongings within reach. .        Problem: Infection  Goal: Will remain free from infection  Outcome: PROGRESSING AS EXPECTED  Standard precaution in place. Educated  And encouraged regarding proper hand washing techniques.

## 2017-01-28 NOTE — PROGRESS NOTES
Assessment completed, medicated per MAR. Denies pain and nausea at this time. Plan of care discussed. Aware of upcoming walk. Denies needs at this time.

## 2017-01-28 NOTE — FLOWSHEET NOTE
01/27/17 6302   Interdisciplinary Plan of Care-Goals (Indications)   Obstructive Ventilatory Defect or Pulmonary Disease without Obvious Obstruction History / Diagnosis   Interdisciplinary Plan of Care-Outcomes    Bronchodilator Outcome Patient at Stable Baseline   Education   Education Yes - Pt. / Family has been Instructed in use of Respiratory Medications and Adverse Reactions   RT Assessment of Delivered Medications   Evaluation of Medication Delivery Daily Yes-- Pt /Family has been Instructed in use of Respiratory Medications and Adverse Reactions   SVN Group   #SVN Performed Yes   Given By: Mouthpiece   Date SVN Last Changed 01/27/17   Date SVN Next Change Due (Q 7 Days) 02/03/17   Incentive Spirometry Group   Incentive Spirometry Instruction Yes   Breathing Exercises Yes   Incentive Spirometer Volume 1500 mL   Incentive Spirometer Date Last Changed 01/19/17   Incentive Spirometer Next Change Date (Q 30 Days) 02/19/17   Chest Exam   Work Of Breathing / Effort Mild   Respiration 18   Pulse 68   Breath Sounds   RUL Breath Sounds Clear   RML Breath Sounds Clear   RLL Breath Sounds Clear   KRISTINA Breath Sounds Clear   LLL Breath Sounds Clear   Secretions   Cough Congested;Non Productive   How Sputum Obtained Spontaneous   Oximetry   #Pulse Oximetry (Single Determination) Yes   Oxygen   Home O2 Use Prior To Admission? No   Pulse Oximetry 95 %   O2 (LPM) 3   O2 Daily Delivery Respiratory  Silicone Nasal Cannula

## 2017-01-28 NOTE — PROGRESS NOTES
Assessment completed, alert and oriented  to person ,place and time. Edema RLE 1+. Denied pain at this time.Due medication given per MAR. States understanding of POC. No additional concern this time.

## 2017-01-28 NOTE — CARE PLAN
Problem: Safety  Goal: Will remain free from falls  Intervention: Implement fall precautions    01/26/17 0815 01/27/17 0928 01/28/17 0924   OTHER   Environmental Precautions --  --  Treaded Slipper Socks on Patient;Personal Belongings, Wastebasket, Call Bell etc. in Easy Reach;Transferred to Stronger Side;Report Given to Other Health Care Providers Regarding Fall Risk;Bed in Low Position;Communication Sign for Patients & Families;Mobility Assessed & Appropriate Sign Placed   IV Pole on Same Side of Bed as Bathroom --  Yes --    Bedrails --  --  Bedrails Closest to Bathroom Down   Chair/Bed Strip Alarm --  --  Yes - Alarm On   Bed Alarm (Built in - for ICU ONLY) Yes - Alarm On --  --            Problem: Venous Thromboembolism (VTW)/Deep Vein Thrombosis (DVT) Prevention:  Goal: Patient will participate in Venous Thrombosis (VTE)/Deep Vein Thrombosis (DVT)Prevention Measures  Outcome: PROGRESSING SLOWER THAN EXPECTED    01/28/17 0924   OTHER   Risk Assessment Score 4   VTE RISK High   Pharmacologic Prophylaxis Used Unfractionated Heparin  (refused)     Pt refusing heparin, educated pt on the need. Continued to refuse

## 2017-01-28 NOTE — PROGRESS NOTES
Hospital Medicine ICU Interval Note    Date of Service: 2017    Chief Complaint  88 y.o.female admitted 2017 with pneumonia and acute respiratory failure with hypoxia    Interval Problem Update  Said she developed wheezing for the first time during her breathing treatment today; o/w feeling ok; plan to walk more today; moving good air    Consultants/Specialty  Pulm/CC Dr. Putnam    Disposition  ?snf vs rehab; needs to be able to maintain sats on at most 6L with ambulation    FC  D/W tx team on rounds        Review of Systems   Constitutional:        Feels good   Respiratory: Positive for wheezing (earlier am). Negative for cough and shortness of breath.    Cardiovascular: Negative for chest pain and palpitations.   Gastrointestinal: Negative for nausea, vomiting and abdominal pain.   Musculoskeletal: Negative for myalgias.   Psychiatric/Behavioral: The patient is nervous/anxious (starting to get anxious to leave).       Physical Exam  Laboratory/Imaging   Hemodynamics  Temp (24hrs), Av.5 °C (97.7 °F), Min:36.3 °C (97.3 °F), Max:36.7 °C (98 °F)   Temperature: 36.4 °C (97.6 °F)  Pulse  Av.8  Min: 51  Max: 98    Blood Pressure : 134/53 mmHg      Respiratory      Respiration: 20, Pulse Oximetry: 92 %, O2 Daily Delivery Respiratory : Silicone Nasal Cannula     Given By:: Mouthpiece (duoneb and pulmicort), Work Of Breathing / Effort: Mild  RUL Breath Sounds: Clear, RML Breath Sounds: Clear, RLL Breath Sounds: Fine Crackles, KRISTINA Breath Sounds: Clear, LLL Breath Sounds: Fine Crackles    Fluids       Nutrition  Orders Placed This Encounter   Procedures   • Diet Order     Standing Status: Standing      Number of Occurrences: 1      Standing Expiration Date:      Order Specific Question:  Diet:     Answer:  Regular [1]     Physical Exam   Constitutional: She is oriented to person, place, and time.   Sitting up in chair again, very good spirits   Cardiovascular: Normal rate, regular rhythm and normal heart  sounds.    No murmur heard.  Pulmonary/Chest: Effort normal. No respiratory distress. She has no wheezes. She has rales (her crackles actually are not very prominent today; moving good air).   On 4L NC currently   Abdominal: Soft. Bowel sounds are normal. She exhibits no distension. There is no tenderness.   Musculoskeletal: Normal range of motion. She exhibits no edema.   Neurological: She is alert and oriented to person, place, and time.   Skin: Skin is warm and dry. No erythema.   Nursing note and vitals reviewed.      Recent Labs      01/27/17 0225   WBC  8.6   RBC  3.74*   HEMOGLOBIN  11.1*   HEMATOCRIT  34.1*   MCV  91.2   MCH  29.7   MCHC  32.6*   RDW  47.2   PLATELETCT  120*   MPV  10.8     Recent Labs      01/27/17 0225   SODIUM  139   POTASSIUM  4.3   CHLORIDE  110   CO2  21   GLUCOSE  156*   BUN  32*   CREATININE  0.89   CALCIUM  8.4                      Assessment/Plan     * Community acquired pneumonia  Assessment & Plan  -dc cefepime; s/p 10 days abx    Acute respiratory failure with hypoxia (CMS-HCC)  Assessment & Plan  -?2/2 UIP, ILD with pna; improving  -pulm/cc following  -improving on IV steroids, abx, RT protocol  -requiring less O2 but ambulation still an issue, trying to get to where she can require less than 6L with ambulation; she has gotten as low as 77% while on 8L NC ambulating; takes about 20 minutes to recover Os sats.      Interstitial lung disease (CMS-HCC)  Assessment & Plan  -suspect chronic  -continue steorids    V tach (CMS-HCC)  Assessment & Plan  -hx; nothing here  -on metoprolol    Lung nodule  Assessment & Plan  -will need ouptt f/u    Adrenal adenoma  Assessment & Plan  -incidental finding; no sx    Hypertension  Assessment & Plan  -continue metoprolol    COPD (chronic obstructive pulmonary disease) (CMS-HCC)  Assessment & Plan  -hx, on steroids, rt protocol  -quit smoking 40 years ago      Labs reviewed, Medications reviewed and Radiology images reviewed  Bergeron catheter:  No Bergeron      DVT Prophylaxis: Heparin    Ulcer prophylaxis: Not indicated  Antibiotics: Treating active infection/contamination beyond 24 hours perioperative coverage  Assessed for rehab: Patient was assess for and/or received rehabilitation services during this hospitalization

## 2017-01-28 NOTE — FLOWSHEET NOTE
01/28/17 1408   SVN Group   #SVN Performed Yes   Given By: Mouthpiece   Incentive Spirometry Group   Incentive Spirometer Volume 1300 mL   Chest Exam   Respiration 18   Pulse 74   Breath Sounds   RUL Breath Sounds Clear   RML Breath Sounds Clear   RLL Breath Sounds Fine Crackles   KRISTINA Breath Sounds Clear   LLL Breath Sounds Fine Crackles   Secretions   Cough Strong;Non Productive   Oxygen   Pulse Oximetry 91 %  (at rest)   O2 (LPM) 3   O2 Daily Delivery Respiratory  Silicone Nasal Cannula

## 2017-01-28 NOTE — FLOWSHEET NOTE
Oxygen requirements went up this AM.  Found pt 82% on 2.5 L this AM.  At 0900 94% on 5 L resting.  At 0915 84% with walking on 6 L nasal cannula.     01/28/17 0703   Interdisciplinary Plan of Care-Goals (Indications)   Obstructive Ventilatory Defect or Pulmonary Disease without Obvious Obstruction History / Diagnosis   RT Assessment of Delivered Medications   Evaluation of Medication Delivery Daily Yes-- Pt /Family has been Instructed in use of Respiratory Medications and Adverse Reactions   SVN Group   #SVN Performed Yes   Given By: Mouthpiece  (duoneb and pulmicort)   Incentive Spirometry Group   Breathing Exercises Yes   Incentive Spirometer Volume 1250 mL  (850-1250)   Chest Exam   Respiration 20   Pulse 72   Breath Sounds   Pre/Post Intervention (minimal crackles at bases+)   RUL Breath Sounds Clear   RML Breath Sounds Clear   RLL Breath Sounds Fine Crackles   KRISTINA Breath Sounds Clear   LLL Breath Sounds Fine Crackles   Secretions   Cough Moist;Non Productive  (slight wheeze, only with cough)   Oximetry   #Pulse Oximetry (Single Determination) Yes   Oxygen   Pulse Oximetry (!) 82 %  (this reading duplicated on 2 different oximeters)   O2 (LPM) 2.5   O2 Daily Delivery Respiratory  Silicone Nasal Cannula

## 2017-01-28 NOTE — THERAPY
"Physical Therapy Treatment completed.   Transfers: Sit to Stand: Stand by Assist  Gait: Level Of Assist: Stand by Assist with Front-Wheel Walker       Plan of Care: Will benefit from Physical Therapy 5 times per week  Discharge Recommendations: Equipment: Will Continue to Assess for Equipment Needs. Post-acute therapy recommended before discharged home.     Stability with ambulation has improved to SBA. PT educated pt on PLB during ambulation to optimize oxygenation. Pt able to maintain sats >91% on 6 L with 15 ft ambulation. Seated rest break was taken.  After 25 ft pt did desat to 84% requiring ~1.5 minutes on 6 L to recover. RN updated. PT educated pt and nursing on short distance ambulation with seated rest breaks monitoring O2 and allowing full recovery prior to increasing distances.     See \"Rehab Therapy-Acute\" Patient Summary Report for complete documentation.       "

## 2017-01-28 NOTE — DISCHARGE PLANNING
Medical Social Work    Referral: Pt discussed at IDT rounds this AM.    Intervention: [facility] has accepted pt but O2 stats are dipping too low during ambulation for transfer to [facility] yet.    Plan: SW available for any assistance with d.c planning.

## 2017-01-28 NOTE — PROGRESS NOTES
Received bedside report from Vijaya LEE. Assumed care of pt who is sitting up in bed, RR even/unlabored. Fall protocol in place. CLIP. Will continue to monitor.

## 2017-01-29 PROCEDURE — 94760 N-INVAS EAR/PLS OXIMETRY 1: CPT

## 2017-01-29 PROCEDURE — 700111 HCHG RX REV CODE 636 W/ 250 OVERRIDE (IP): Performed by: INTERNAL MEDICINE

## 2017-01-29 PROCEDURE — 99232 SBSQ HOSP IP/OBS MODERATE 35: CPT | Performed by: HOSPITALIST

## 2017-01-29 PROCEDURE — A9270 NON-COVERED ITEM OR SERVICE: HCPCS | Performed by: INTERNAL MEDICINE

## 2017-01-29 PROCEDURE — 700102 HCHG RX REV CODE 250 W/ 637 OVERRIDE(OP): Performed by: INTERNAL MEDICINE

## 2017-01-29 PROCEDURE — 770020 HCHG ROOM/CARE - TELE (206)

## 2017-01-29 PROCEDURE — 700101 HCHG RX REV CODE 250: Performed by: INTERNAL MEDICINE

## 2017-01-29 PROCEDURE — 94640 AIRWAY INHALATION TREATMENT: CPT

## 2017-01-29 RX ADMIN — BUDESONIDE 0.5 MG: 0.5 INHALANT RESPIRATORY (INHALATION) at 07:40

## 2017-01-29 RX ADMIN — METHYLPREDNISOLONE SODIUM SUCCINATE 125 MG: 125 INJECTION, POWDER, FOR SOLUTION INTRAMUSCULAR; INTRAVENOUS at 18:37

## 2017-01-29 RX ADMIN — METHYLPREDNISOLONE SODIUM SUCCINATE 125 MG: 125 INJECTION, POWDER, FOR SOLUTION INTRAMUSCULAR; INTRAVENOUS at 13:27

## 2017-01-29 RX ADMIN — CHOLECALCIFEROL TAB 25 MCG (1000 UNIT) 1000 UNITS: 25 TAB at 09:16

## 2017-01-29 RX ADMIN — METHYLPREDNISOLONE SODIUM SUCCINATE 125 MG: 125 INJECTION, POWDER, FOR SOLUTION INTRAMUSCULAR; INTRAVENOUS at 05:19

## 2017-01-29 RX ADMIN — ALPRAZOLAM 0.25 MG: 0.25 TABLET ORAL at 23:42

## 2017-01-29 RX ADMIN — FLUTICASONE PROPIONATE 50 MCG: 50 SPRAY, METERED NASAL at 20:32

## 2017-01-29 RX ADMIN — IPRATROPIUM BROMIDE AND ALBUTEROL SULFATE 3 ML: .5; 3 SOLUTION RESPIRATORY (INHALATION) at 07:39

## 2017-01-29 RX ADMIN — METOPROLOL TARTRATE 25 MG: 25 TABLET ORAL at 09:16

## 2017-01-29 RX ADMIN — METHYLPREDNISOLONE SODIUM SUCCINATE 125 MG: 125 INJECTION, POWDER, FOR SOLUTION INTRAMUSCULAR; INTRAVENOUS at 23:00

## 2017-01-29 ASSESSMENT — ENCOUNTER SYMPTOMS
COUGH: 0
VOMITING: 0
NERVOUS/ANXIOUS: 1
SHORTNESS OF BREATH: 0
NAUSEA: 0
ABDOMINAL PAIN: 0
WHEEZING: 1
PALPITATIONS: 0
MYALGIAS: 0

## 2017-01-29 ASSESSMENT — PAIN SCALES - GENERAL: PAINLEVEL_OUTOF10: 3

## 2017-01-29 NOTE — PROGRESS NOTES
Medicated per MAR. Pt resting in bed watching tv. Denies needs at this time. Right leg resting on pillow. Bed alarm on.

## 2017-01-29 NOTE — DISCHARGE PLANNING
Received call from PAM Singleton to see if who has a home concentrator at 10L.  Contacted [facility]  to call back.  Contacted [facility] spoke to Delmy they do have 10L concentrators.  Notified PAM Singleton via phone.

## 2017-01-29 NOTE — DISCHARGE PLANNING
Received choice form from PAM Singleton at 1310. Notified PAM Singleton via phone that we will need order and F2F before the referral can be sent.

## 2017-01-29 NOTE — DISCHARGE PLANNING
Medical SW    Referral: Pt discussed in AM IDT Rounds. MD request Sw call [employer] to locate company who carries portable and concentrators who offer lpm over 6. Currently when pt walking needs O2 6+ lpm.     Intervention: Gerald called Rajan LYONS. She contacted both [facility] and [facility] to determine if pt can received required high flow equipment. The order/face to face will need to be rewritten w/ accurate sats including walking w/o O2, sats at 4lpm walking etc. Gerald advised hospitalist Varsha LEE, as stated above.     Gerald met w/ pt at bedside. She completed O2 choice for [employer]. Gerald faxed to Rajan LYONS. Gerald received fax confirmation. Gerald called Rajan and left VM. Gerald advised JESUS Maddox order and face to face will need to be completed again w/ new sats entered by bedside RN.    Varsha indicates pt will not d/c today and order will not be filled today because sats will change tomorrow. Gerald advised Rajan to hold off on referral as pt is not d/cing today.         Plan: Gerald to assist w/ d/c planning as needed.

## 2017-01-29 NOTE — PROGRESS NOTES
Helped to bathroom. Back to bed without incident. Meds given per MAR for sleep. No other needs. Bed alarm on.

## 2017-01-29 NOTE — FLOWSHEET NOTE
01/29/17 0742   Events/Summary/Plan   Events/Summary/Plan svn given, pt went to bathroom before getting into cardiac chair, appears to be sob   Interdisciplinary Plan of Care-Goals (Indications)   Obstructive Ventilatory Defect or Pulmonary Disease without Obvious Obstruction History / Diagnosis   Interdisciplinary Plan of Care-Outcomes    Bronchodilator Outcome Patient at Stable Baseline   SVN Group   #SVN Performed Yes   Given By: Mask   Incentive Spirometry Group   Incentive Spirometer Volume 1500 mL   Chest Exam   Work Of Breathing / Effort Moderate   Respiration (!) 22  (post 20)   Pulse 74  (post 76)   Breath Sounds   Pre/Post Intervention Pre Intervention Assessment  (increased aeration after svn)   RUL Breath Sounds Clear   RML Breath Sounds Clear   RLL Breath Sounds Diminished   KRISTINA Breath Sounds Clear   LLL Breath Sounds Diminished   Secretions   Cough Dry   Oximetry   #Pulse Oximetry (Single Determination) Yes   Oxygen   Pulse Oximetry (!) 87 %   O2 (LPM) 3  (increased to 4 liters for breakfast)   O2 Daily Delivery Respiratory  Nasal Cannula

## 2017-01-29 NOTE — PROGRESS NOTES
Pulmonary Critical Care Progress Note        Chief Complaint: Dyspnea, hypoxemia.    History of Present Illness: 88 y.o. female admitted on January 18 with dyspnea and hypoxemia. She continued to require high flow oxygen therapy, prompting pulmonary consultation on January 23.     ROS:  Respiratory: positive shortness of breath, but improving, Cardiac: negative chest pain, GI: negative abdominal pain.  All other systems negative.    Interval Events:  24 hour interval history reviewed.  Comfortable on oxygen nasal cannula, flow rate down to 3 liters/minute. She does become hypoxemic with any exertion, requiring an increase in the oxygen flow PFSH:  No change.    Respiratory:  Pulse Oximetry: 90 % on an oxygen nasal cannula at 3 L/m.  Exam: diminished but symmetrical bilateral breath sounds with some clearing of bilateral rales but no wheezing or egophony.  ImagingAvailable data reviewed. The chest film on January 21 demonstrates diffuse interstitial prominence without focal consolidation. The CT angiogram on January 18 demonstrated no vascular filling defects to suggest pulmonary emboli but did demonstrate bilateral interstitial lung disease and a 10 mm right middle lobe noncalcified nodule. There was a 15 mm left hilar lymph node and bilateral adrenal adenomas.    HemoDynamics:  Pulse: 64, Heart Rate (Monitored): 74  Blood Pressure : 134/48 mmHg     Exam: regular rhythm (Sinus)  Imaging: Available data reviewed. The echocardiogram on January 19 demonstrates normal left ventricular systolic function with an estimated ejection fraction of 66% but grade 2 diastolic dysfunction. Right ventricular systolic pressure is 35 mmHg.      Neuro:  Exam: no focal deficits noted, alert and responsive.  Imaging: None - Reviewed    Fluids:  Intake/Output       01/27/17 0700 - 01/28/17 0659 01/28/17 0700 - 01/29/17 0659 01/29/17 0700 - 01/30/17 0659      [id number] 7516-6201 Total 2552-7700 1819-3510 Total 7665-4758 3253-3270  Total       Intake    P.O.  360  -- 360  360  -- 360  --  -- --    P.O. 360 -- 360 360 -- 360 -- -- --    Total Intake 360 -- 360 360 -- 360 -- -- --       Output    Urine  --  -- --  --  -- --  --  -- --    Number of Times Voided -- -- -- -- 1 x 1 x -- -- --    Total Output -- -- -- -- -- -- -- -- --       Net I/O     360 -- 360 360 -- 360 -- -- --           Recent Labs      17   SODIUM  139   POTASSIUM  4.3   CHLORIDE  110   CO2  21   BUN  32*   CREATININE  0.89   CALCIUM  8.4       GI/Nutrition:  Exam: abdomen is soft and non-tender  Imaging: None - Reviewed  taking PO  Liver Function  Recent Labs      17   GLUCOSE  156*       Heme:  Recent Labs      17   RBC  3.74*   HEMOGLOBIN  11.1*   HEMATOCRIT  34.1*   PLATELETCT  120*       Infectious Disease:  Temp  Av.4 °C (97.6 °F)  Min: 36.2 °C (97.2 °F)  Max: 36.7 °C (98.1 °F)  Micro: antibiotics reviewed and no new positive cultures, on cefepime.  Recent Labs      17   WBC  8.6     Current Facility-Administered Medications   Medication Dose Frequency Provider Last Rate Last Dose   • budesonide (PULMICORT) neb susp 0.5 mg  0.5 mg BID (RT) Jan DAMIAN M.D.   0.5 mg at 17 0740   • methylPREDNISolone sod succ (SOLU-MEDROL) 125 MG injection 125 mg  125 mg Q6HRS Shefali Cooney M.D.   125 mg at 17 0519   • acetaminophen (TYLENOL) tablet 650 mg  650 mg Q6HRS PRN Jose Julien M.D.   650 mg at 17 0234   • hydrocodone-acetaminophen (NORCO) 5-325 MG per tablet 1-2 Tab  1-2 Tab Q6HRS PRN Shefali Cooney M.D.   1 Tab at 17 0213   • ipratropium-albuterol (DUONEB) nebulizer solution 3 mL  3 mL Q4H PRN (RT) Newton Hutchinson M.D.   3 mL at 17 0739   • metoprolol (LOPRESSOR) tablet 25 mg  25 mg TWICE DAILY Shefali Cooney M.D.   25 mg at 17 0924   • gabapentin (NEURONTIN) capsule 300 mg  300 mg TID Pelon Maxwell M.D.   300 mg at 17 1509   • alprazolam (XANAX)  tablet 0.25 mg  0.25 mg HS PRN Newton Hutchinson M.D.   0.25 mg at 01/28/17 2331   • fluticasone (FLONASE) nasal spray 50 mcg  1 [place] QHS Newton Hutchinson M.D.   50 mcg at 01/27/17 2036   • vitamin D (cholecalciferol) tablet 1,000 Units  1,000 Units DAILY Newton Hutchinson M.D.   1,000 Units at 01/28/17 0924   • Respiratory Care per Protocol   Continuous RT Newton Hutchinson M.D.       • docusate sodium (COLACE) capsule 100 mg  100 mg QAM Newton Hutchinson M.D.   100 mg at 01/21/17 0848    And   • senna-docusate (PERICOLACE or SENOKOT S) 8.6-50 MG per tablet 1 Tab  1 Tab Nightly Newton Hutchinson M.D.   1 Tab at 01/21/17 2057    And   • senna-docusate (PERICOLACE or SENOKOT S) 8.6-50 MG per tablet 1 Tab  1 Tab Q24HRS PRJOHN Hutchinson M.D.        And   • lactulose 20 GM/30ML solution 30 mL  30 mL Q24HRS PRJOHN Hutchinson M.D.        And   • bisacodyl (DULCOLAX) suppository 10 mg  10 mg Q24HRS PRJOHN Hutchinson M.D.       • heparin injection 5,000 Units  5,000 Units Q8HRS Newton Hutchinson M.D.   5,000 Units at 01/24/17 1344   • ondansetron (ZOFRAN) syringe/vial injection 4 mg  4 mg Q4HRS PRJOHN Hutchinson M.D.       • ondansetron (ZOFRAN ODT) dispertab 4 mg  4 mg Q4HRS PRJOHN Hutchinson M.D.         Last reviewed on 1/18/2017 11:24 PM by Yennifer Hardy R.N.    Quality  Measures:  Core Measures & Quality Metrics    Problems:  1.  Respiratory insufficiency, requiring significant oxygen supplementation, slowly better, decreasing oxygen requirement.  2.  Abnormal chest x-ray and CT scan with diffuse bilateral infiltrates.  Her    scan suggests the possibility of underlying basilar fibrosis and possible interstitial pulmonary fibrosis or usual interstitial pneumonia, now with a more diffuse changes consistent with inflammation or infection.  She does not seem to have pulmonary edema.  3.  Possible mild chronic obstructive pulmonary disease.  4.  10 mm right middle lobe noncalcified lung  nodule on CT scan.  5. Systemic arterial hypertension, controlled.  6.  History of ventricular tachycardia.    Plan:  Continue titrated oxygen, bronchodilators, respiratory protocols and systemic corticosteroids.  Continue antibiotics and prophylaxis.  Continue physical therapy and mobilization. Apparently does not qualify for acute inpatient rehabilitation. Options include a skilled nursing facility with rehabilitation versus home care with high flow oxygen system.  The lung nodule will require serial follow-up after discharge.  Pulmonary function studies and a high-resolution CT scan after discharge and clinical resolution of her presumed acute pneumonia might help to clarify the nature of the underlying interstitial lung disease.  Discussed with respiratory therapy and  the patient.    Consider home soon on O2, ABs ,steroids, d/w pt 1/29, alert , has  help at home.

## 2017-01-29 NOTE — PROGRESS NOTES
Telemetry Summary    Rhythm SR/SB  HR 40-80s  Ectopy f PVC, o PAC, Couplets  CT 0.16  QRS 0.10  QT 0.36

## 2017-01-29 NOTE — PROGRESS NOTES
Tele Note    Rhythm Sinus Rhythm, Sinus licha  Rate 59-85  DC 0.14  QRS 0.10  QT 0.34  Ectopy frequent pvc, pac

## 2017-01-29 NOTE — PROGRESS NOTES
Hospital Medicine ICU Interval Note    Date of Service: 2017    Chief Complaint  88 y.o.female admitted 2017 with pneumonia and acute respiratory failure with hypoxia    Interval Problem Update  Still desatting to low 80s on 10L O2 with ambulation.  Otherwise doing ok, had to bump her up to 5L sitting this am    Consultants/Specialty  Pulm/CC    Disposition  Unsure, much stronger at this point and could go home, but still severely desaturates with ambulation; will d/w pulm tomorrow    FC  D/W tx team on rounds        Review of Systems   Constitutional:        Feels good   Respiratory: Positive for wheezing (earlier am). Negative for cough and shortness of breath.    Cardiovascular: Negative for chest pain and palpitations.   Gastrointestinal: Negative for nausea, vomiting and abdominal pain.   Musculoskeletal: Negative for myalgias.   Psychiatric/Behavioral: The patient is nervous/anxious (starting to get anxious to leave).       Physical Exam  Laboratory/Imaging   Hemodynamics  Temp (24hrs), Av.5 °C (97.7 °F), Min:36.2 °C (97.2 °F), Max:36.7 °C (98.1 °F)   Temperature: 36.6 °C (97.9 °F)  Pulse  Av.2  Min: 51  Max: 98 Heart Rate (Monitored): 74  Blood Pressure : (!) 164/62 mmHg (RN aware)      Respiratory      Respiration: 18, Pulse Oximetry: 91 %, O2 Daily Delivery Respiratory : Nasal Cannula     Given By:: Mask, Work Of Breathing / Effort: Moderate  RUL Breath Sounds: Clear, RML Breath Sounds: Clear, RLL Breath Sounds: Diminished, KRISTINA Breath Sounds: Clear, LLL Breath Sounds: Diminished    Fluids       Nutrition  Orders Placed This Encounter   Procedures   • Diet Order     Standing Status: Standing      Number of Occurrences: 1      Standing Expiration Date:      Order Specific Question:  Diet:     Answer:  Regular [1]     Physical Exam   Constitutional: She is oriented to person, place, and time.   Cardiovascular: Normal rate, regular rhythm and normal heart sounds.    No murmur  heard.  Pulmonary/Chest: Effort normal. No respiratory distress. She has no wheezes. She has rales (dry crackles).   On 5L NC currently; went down to low 80s on 10L with ambulation   Abdominal: Soft. Bowel sounds are normal. She exhibits no distension. There is no tenderness.   Musculoskeletal: Normal range of motion. She exhibits no edema.   Neurological: She is alert and oriented to person, place, and time.   Skin: Skin is warm and dry. No erythema.   Psychiatric:   Getting very anxious about her lung status and wanting to go home   Nursing note and vitals reviewed.      Recent Labs      01/27/17 0225   WBC  8.6   RBC  3.74*   HEMOGLOBIN  11.1*   HEMATOCRIT  34.1*   MCV  91.2   MCH  29.7   MCHC  32.6*   RDW  47.2   PLATELETCT  120*   MPV  10.8     Recent Labs      01/27/17 0225   SODIUM  139   POTASSIUM  4.3   CHLORIDE  110   CO2  21   GLUCOSE  156*   BUN  32*   CREATININE  0.89   CALCIUM  8.4                      Assessment/Plan     * Community acquired pneumonia  Assessment & Plan  -s/p cefepime    Acute respiratory failure with hypoxia (CMS-HCC)  Assessment & Plan  -?2/2 UIP, ILD with pna; improving except for exertion  -pulm/cc following  -continuing on IV steroids, abx, RT protocol  -requiring less O2 with rest, but desats to low 80s on 10L O2 with ambulation; not improving  -will d/w pulm tomorrow  -there is an O2 company who could supply equipment going up to 10L, but she still desats on that much       Interstitial lung disease (CMS-HCC)  Assessment & Plan  -suspect chronic  -continue steroids  -pulmicofrt, flonase    V tach (CMS-HCC)  Assessment & Plan  -hx; nothing here  -on metoprolol    Lung nodule  Assessment & Plan  -will need ouptt f/u    Adrenal adenoma  Assessment & Plan  -incidental finding; no sx    Hypertension  Assessment & Plan  -continue metoprolol    COPD (chronic obstructive pulmonary disease) (CMS-HCC)  Assessment & Plan  -hx, on steroids, rt protocol  -quit smoking 40 years ago       Labs reviewed, Medications reviewed and Radiology images reviewed  Bergeron catheter: No Bergeron      DVT Prophylaxis: Heparin    Ulcer prophylaxis: Not indicated  Antibiotics: Treating active infection/contamination beyond 24 hours perioperative coverage  Assessed for rehab: Patient was assess for and/or received rehabilitation services during this hospitalization

## 2017-01-29 NOTE — PROGRESS NOTES
Report received from JESUS Tesfaye. Pt denies any needs at this time. Lines patent. Safety precautions in place, call light within reach. Will continue to monitor.

## 2017-01-29 NOTE — PROGRESS NOTES
"AOx4. POC discussed. Pt refused all medications tonight stating she \"doesn't take them at home and just wants to sleep.\" Discussed implications for not taking beta-blocker. Pt continues to decline. Denies pain. No N/V/D. No numbness/tingling. No other needs at this time. Bed alarm on.  "

## 2017-01-29 NOTE — CARE PLAN
"Problem: Bowel/Gastric:  Goal: Will not experience complications related to bowel motility  Outcome: PROGRESSING SLOWER THAN EXPECTED    01/25/17 1015 01/28/17 2151   OTHER   Last BM --  01/27/17   Number of Times Stooled 1 --      Bowel protocol explained and refused.    Problem: Knowledge Deficit  Goal: Knowledge of the prescribed therapeutic regimen will improve  Outcome: PROGRESSING SLOWER THAN EXPECTED  Educated on medications. Pt is refusing most meds at this time stating she \"does not want to take anything she doesn't take at home.\" Reinforcement needed on education about taking medications while in the hospital.        "

## 2017-01-30 VITALS
TEMPERATURE: 99 F | DIASTOLIC BLOOD PRESSURE: 47 MMHG | HEIGHT: 69 IN | BODY MASS INDEX: 23.25 KG/M2 | SYSTOLIC BLOOD PRESSURE: 123 MMHG | OXYGEN SATURATION: 97 % | HEART RATE: 59 BPM | RESPIRATION RATE: 16 BRPM | WEIGHT: 156.97 LBS

## 2017-01-30 PROBLEM — J18.9 COMMUNITY ACQUIRED PNEUMONIA: Status: RESOLVED | Noted: 2017-01-18 | Resolved: 2017-01-30

## 2017-01-30 PROBLEM — I47.20 V TACH (HCC): Status: RESOLVED | Noted: 2017-01-19 | Resolved: 2017-01-30

## 2017-01-30 LAB
ANION GAP SERPL CALC-SCNC: 8 MMOL/L (ref 0–11.9)
BASE EXCESS BLDA CALC-SCNC: 1 MMOL/L (ref -4–3)
BODY TEMPERATURE: ABNORMAL CENTIGRADE
BUN SERPL-MCNC: 31 MG/DL (ref 8–22)
CALCIUM SERPL-MCNC: 8.1 MG/DL (ref 8.4–10.2)
CHLORIDE SERPL-SCNC: 103 MMOL/L (ref 96–112)
CO2 SERPL-SCNC: 25 MMOL/L (ref 20–33)
CREAT SERPL-MCNC: 0.73 MG/DL (ref 0.5–1.4)
ERYTHROCYTE [DISTWIDTH] IN BLOOD BY AUTOMATED COUNT: 45 FL (ref 35.9–50)
GFR SERPL CREATININE-BSD FRML MDRD: >60 ML/MIN/1.73 M 2
GLUCOSE SERPL-MCNC: 162 MG/DL (ref 65–99)
HCO3 BLDA-SCNC: 23 MMOL/L (ref 17–25)
HCT VFR BLD AUTO: 34.6 % (ref 37–47)
HGB BLD-MCNC: 11.1 G/DL (ref 12–16)
INHALED O2 FLOW RATE: 4 L/MIN (ref 2–10)
MCH RBC QN AUTO: 29.1 PG (ref 27–33)
MCHC RBC AUTO-ENTMCNC: 32.1 G/DL (ref 33.6–35)
MCV RBC AUTO: 90.8 FL (ref 81.4–97.8)
PCO2 BLDA: 29.5 MMHG (ref 26–37)
PH BLDA: 7.5 [PH] (ref 7.4–7.5)
PLATELET # BLD AUTO: 113 K/UL (ref 164–446)
PMV BLD AUTO: 10.1 FL (ref 9–12.9)
PO2 BLDA: 58.5 MMHG (ref 64–87)
POTASSIUM SERPL-SCNC: 4.3 MMOL/L (ref 3.6–5.5)
RBC # BLD AUTO: 3.81 M/UL (ref 4.2–5.4)
SAO2 % BLDA: 90.7 % (ref 93–99)
SODIUM SERPL-SCNC: 136 MMOL/L (ref 135–145)
WBC # BLD AUTO: 8.5 K/UL (ref 4.8–10.8)

## 2017-01-30 PROCEDURE — 700102 HCHG RX REV CODE 250 W/ 637 OVERRIDE(OP): Performed by: INTERNAL MEDICINE

## 2017-01-30 PROCEDURE — 99239 HOSP IP/OBS DSCHRG MGMT >30: CPT | Performed by: HOSPITALIST

## 2017-01-30 PROCEDURE — 97112 NEUROMUSCULAR REEDUCATION: CPT

## 2017-01-30 PROCEDURE — 80048 BASIC METABOLIC PNL TOTAL CA: CPT

## 2017-01-30 PROCEDURE — A9270 NON-COVERED ITEM OR SERVICE: HCPCS | Performed by: INTERNAL MEDICINE

## 2017-01-30 PROCEDURE — 97530 THERAPEUTIC ACTIVITIES: CPT

## 2017-01-30 PROCEDURE — G8989 SELF CARE D/C STATUS: HCPCS | Mod: CJ

## 2017-01-30 PROCEDURE — 94760 N-INVAS EAR/PLS OXIMETRY 1: CPT

## 2017-01-30 PROCEDURE — 97110 THERAPEUTIC EXERCISES: CPT

## 2017-01-30 PROCEDURE — 97535 SELF CARE MNGMENT TRAINING: CPT

## 2017-01-30 PROCEDURE — G8988 SELF CARE GOAL STATUS: HCPCS | Mod: CJ

## 2017-01-30 PROCEDURE — 700111 HCHG RX REV CODE 636 W/ 250 OVERRIDE (IP): Performed by: INTERNAL MEDICINE

## 2017-01-30 PROCEDURE — 82803 BLOOD GASES ANY COMBINATION: CPT

## 2017-01-30 PROCEDURE — 85027 COMPLETE CBC AUTOMATED: CPT

## 2017-01-30 PROCEDURE — 36600 WITHDRAWAL OF ARTERIAL BLOOD: CPT

## 2017-01-30 PROCEDURE — 94640 AIRWAY INHALATION TREATMENT: CPT

## 2017-01-30 RX ORDER — PSEUDOEPHEDRINE HCL 30 MG
100 TABLET ORAL EVERY MORNING
Qty: 60 CAP | Status: ON HOLD | DISCHARGE
Start: 2017-01-30 | End: 2021-04-01

## 2017-01-30 RX ORDER — HEPARIN SODIUM 5000 [USP'U]/ML
5000 INJECTION, SOLUTION INTRAVENOUS; SUBCUTANEOUS EVERY 8 HOURS
Refills: 0 | Status: SHIPPED | DISCHARGE
Start: 2017-01-30 | End: 2020-12-18

## 2017-01-30 RX ORDER — ONDANSETRON 4 MG/1
4 TABLET, ORALLY DISINTEGRATING ORAL EVERY 4 HOURS PRN
Qty: 10 TAB | Refills: 0 | Status: ON HOLD | DISCHARGE
Start: 2017-01-30 | End: 2021-04-01

## 2017-01-30 RX ORDER — ACETAMINOPHEN 325 MG/1
650 TABLET ORAL EVERY 6 HOURS PRN
Qty: 30 TAB | Refills: 0 | Status: ON HOLD | DISCHARGE
Start: 2017-01-30 | End: 2021-04-01

## 2017-01-30 RX ORDER — LACTULOSE 20 G/30ML
30 SOLUTION ORAL
Qty: 30 EACH | Status: ON HOLD | DISCHARGE
Start: 2017-01-30 | End: 2021-04-01

## 2017-01-30 RX ORDER — GABAPENTIN 300 MG/1
300 CAPSULE ORAL 3 TIMES DAILY
Qty: 90 CAP | Status: ON HOLD | DISCHARGE
Start: 2017-01-30 | End: 2021-04-01

## 2017-01-30 RX ORDER — AMOXICILLIN 250 MG
1 CAPSULE ORAL
Qty: 30 TAB | Refills: 0 | Status: ON HOLD | DISCHARGE
Start: 2017-01-30 | End: 2021-04-01

## 2017-01-30 RX ORDER — IPRATROPIUM BROMIDE AND ALBUTEROL SULFATE 2.5; .5 MG/3ML; MG/3ML
3 SOLUTION RESPIRATORY (INHALATION) EVERY 6 HOURS PRN
Status: ON HOLD | DISCHARGE
Start: 2017-01-30 | End: 2021-04-01

## 2017-01-30 RX ORDER — BISACODYL 10 MG
10 SUPPOSITORY, RECTAL RECTAL
Refills: 0 | Status: ON HOLD | DISCHARGE
Start: 2017-01-30 | End: 2021-04-01

## 2017-01-30 RX ORDER — BUDESONIDE 0.5 MG/2ML
500 INHALANT ORAL 2 TIMES DAILY
Status: SHIPPED | DISCHARGE
Start: 2017-01-30 | End: 2020-12-18

## 2017-01-30 RX ORDER — AMOXICILLIN 250 MG
1 CAPSULE ORAL DAILY
Qty: 30 TAB | Refills: 0 | Status: ON HOLD | DISCHARGE
Start: 2017-01-30 | End: 2021-04-01

## 2017-01-30 RX ORDER — PREDNISONE 20 MG/1
60 TABLET ORAL DAILY
Qty: 18 TAB | Refills: 0 | Status: SHIPPED | DISCHARGE
Start: 2017-01-30 | End: 2017-03-01

## 2017-01-30 RX ORDER — METHYLPREDNISOLONE SODIUM SUCCINATE 125 MG/2ML
125 INJECTION, POWDER, LYOPHILIZED, FOR SOLUTION INTRAMUSCULAR; INTRAVENOUS EVERY 6 HOURS
Refills: 0 | Status: SHIPPED | DISCHARGE
Start: 2017-01-30 | End: 2017-01-30

## 2017-01-30 RX ADMIN — METHYLPREDNISOLONE SODIUM SUCCINATE 125 MG: 125 INJECTION, POWDER, FOR SOLUTION INTRAMUSCULAR; INTRAVENOUS at 12:45

## 2017-01-30 RX ADMIN — CHOLECALCIFEROL TAB 25 MCG (1000 UNIT) 1000 UNITS: 25 TAB at 09:32

## 2017-01-30 RX ADMIN — METOPROLOL TARTRATE 25 MG: 25 TABLET ORAL at 09:31

## 2017-01-30 RX ADMIN — METHYLPREDNISOLONE SODIUM SUCCINATE 125 MG: 125 INJECTION, POWDER, FOR SOLUTION INTRAMUSCULAR; INTRAVENOUS at 05:51

## 2017-01-30 RX ADMIN — BUDESONIDE 0.5 MG: 0.5 INHALANT RESPIRATORY (INHALATION) at 06:45

## 2017-01-30 ASSESSMENT — PAIN SCALES - GENERAL: PAINLEVEL_OUTOF10: 0

## 2017-01-30 NOTE — FLOWSHEET NOTE
01/30/17 0645   Interdisciplinary Plan of Care-Goals (Indications)   Obstructive Ventilatory Defect or Pulmonary Disease without Obvious Obstruction History / Diagnosis   Interdisciplinary Plan of Care-Outcomes    Bronchodilator Outcome Improved Patient Appearance with Decreased use of Accessory Muscles   Education   Education Yes - Pt. / Family has been Instructed in use of Respiratory Medications and Adverse Reactions   RT Assessment of Delivered Medications   Evaluation of Medication Delivery Daily Yes-- Pt /Family has been Instructed in use of Respiratory Medications and Adverse Reactions   SVN Group   #SVN Performed Yes   Given By: Mask   Date SVN Next Change Due (Q 7 Days) 02/03/17   Incentive Spirometry Group   Breathing Exercises Yes   Incentive Spirometer Volume 1500 mL   Incentive Spirometer Next Change Date (Q 30 Days) 02/19/17   Chest Exam   Work Of Breathing / Effort Mild   Respiration 18   Pulse 68   Breath Sounds   RUL Breath Sounds Clear   RML Breath Sounds Fine Crackles   RLL Breath Sounds Diminished   KRISTINA Breath Sounds Clear   LLL Breath Sounds Fine Crackles;Diminished   Secretions   Cough Congested   Oximetry   #Pulse Oximetry (Single Determination) Yes   Oxygen   Pulse Oximetry 91 %   O2 (LPM) 5   O2 Daily Delivery Respiratory  Nasal Cannula

## 2017-01-30 NOTE — PROGRESS NOTES
Pulmonary Critical Care Progress Note        Chief Complaint: Dyspnea, hypoxemia.    History of Present Illness: 88 y.o. female admitted on January 18 with dyspnea and hypoxemia. She continued to require high flow oxygen therapy, prompting pulmonary consultation on January 23.     ROS:  Respiratory: positive shortness of breath, but improving, Cardiac: negative chest pain, GI: negative abdominal pain.  All other systems negative.    Interval Events:  24 hour interval history reviewed.     1/30 - no interval events     Respiratory:  Pulse Oximetry: 92 % on an oxygen nasal cannula at 3 L/m.  Exam: diminished but symmetrical bilateral breath sounds with some clearing of bilateral rales but no wheezing or egophony.  ImagingAvailable data reviewed. The chest film on January 21 demonstrates diffuse interstitial prominence without focal consolidation. The CT angiogram on January 18 demonstrated no vascular filling defects to suggest pulmonary emboli but did demonstrate bilateral interstitial lung disease and a 10 mm right middle lobe noncalcified nodule. There was a 15 mm left hilar lymph node and bilateral adrenal adenomas.    HemoDynamics:  Sinus Rhythm, Sinus Eddi    Frequent PVC, Occ PAC    Pulse: 63  Blood Pressure : 160/61 mmHg     Exam: regular rhythm (Sinus)  Imaging: Available data reviewed. The echocardiogram on January 19 demonstrates normal left ventricular systolic function with an estimated ejection fraction of 66% but grade 2 diastolic dysfunction. Right ventricular systolic pressure is 35 mmHg.      Neuro:  Exam: no focal deficits noted, alert and responsive.  Imaging: None - Reviewed    Fluids:  Intake/Output       01/28/17 0700 - 01/29/17 0659 01/29/17 0700 - 01/30/17 0659 01/30/17 0700 - 01/31/17 0659      0144-3715 3168-7281 Total 1659-1780 0748-0868 Total 9630-7562 3283-5598 Total       Intake    P.O.  360  -- 360  240  400 640  --  -- --    P.O. 360 -- 360 240 400 640 -- -- --    Total Intake  360 -- 360 240 400 640 -- -- --       Output    Urine  --  -- --  --  -- --  --  -- --    Number of Times Voided -- 1 x 1 x -- 2 x 2 x -- -- --    Stool  --  -- --  --  -- --  --  -- --    Number of Times Stooled -- -- -- -- 1 x 1 x -- -- --    Total Output -- -- -- -- -- -- -- -- --       Net I/O     360 -- 360 240 400 640 -- -- --           Recent Labs      17   032   SODIUM  136   POTASSIUM  4.3   CHLORIDE  103   CO2  25   BUN  31*   CREATININE  0.73   CALCIUM  8.1*       GI/Nutrition:  Exam: abdomen is soft and non-tender  Imaging: None - Reviewed  taking PO  Liver Function  Recent Labs      17   GLUCOSE  162*       Heme:  Recent Labs      17   RBC  3.81*   HEMOGLOBIN  11.1*   HEMATOCRIT  34.6*   PLATELETCT  113*       Infectious Disease:  Temp  Av.5 °C (97.7 °F)  Min: 36.2 °C (97.2 °F)  Max: 36.6 °C (97.9 °F)  Micro: antibiotics reviewed and no new positive cultures , off of abx   Recent Labs      17   WBC  8.5     Current Facility-Administered Medications   Medication Dose Frequency Provider Last Rate Last Dose   • budesonide (PULMICORT) neb susp 0.5 mg  0.5 mg BID (RT) Jan DAMIAN M.D.   0.5 mg at 17 0645   • methylPREDNISolone sod succ (SOLU-MEDROL) 125 MG injection 125 mg  125 mg Q6HRS Shefali Cooney M.D.   125 mg at 17 0551   • acetaminophen (TYLENOL) tablet 650 mg  650 mg Q6HRS PRN Jose Julien M.D.   650 mg at 17 0234   • hydrocodone-acetaminophen (NORCO) 5-325 MG per tablet 1-2 Tab  1-2 Tab Q6HRS PRN Shefali Cooney M.D.   1 Tab at 17 0213   • ipratropium-albuterol (DUONEB) nebulizer solution 3 mL  3 mL Q4H PRN (RT) Newton Hutchinson M.D.   3 mL at 17 0739   • metoprolol (LOPRESSOR) tablet 25 mg  25 mg TWICE DAILY Shefali Cooney M.D.   25 mg at 17 0916   • gabapentin (NEURONTIN) capsule 300 mg  300 mg TID Pelon Maxwell M.D.   300 mg at 17 1509   • alprazolam (XANAX) tablet 0.25 mg  0.25  mg HS PRN Newton Hutchinson M.D.   0.25 mg at 01/29/17 2342   • fluticasone (FLONASE) nasal spray 50 mcg  [address] [place] QHS Newton Hutchinson M.D.   50 mcg at 01/29/17 2032   • vitamin D (cholecalciferol) tablet 1,000 Units  1,000 Units DAILY Newton Hutchinson M.D.   1,000 Units at 01/29/17 0916   • Respiratory Care per Protocol   Continuous RT Newton Hutchinson M.D.       • docusate sodium (COLACE) capsule 100 mg  100 mg QAM Newton Hutchinson M.D.   100 mg at 01/21/17 0848    And   • senna-docusate (PERICOLACE or SENOKOT S) 8.6-50 MG per tablet 1 Tab  1 Tab Nightly Newton Hutchinson M.D.   1 Tab at 01/21/17 2057    And   • senna-docusate (PERICOLACE or SENOKOT S) 8.6-50 MG per tablet 1 Tab  1 Tab Q24HRS PRJOHN Hutchinson M.D.        And   • lactulose 20 GM/30ML solution 30 mL  30 mL Q24HRS PRJOHN Hutchinson M.D.        And   • bisacodyl (DULCOLAX) suppository 10 mg  10 mg Q24HRS PRJOHN Hutchinson M.D.       • heparin injection 5,000 Units  5,000 Units Q8HRS Newton Hutchinson M.D.   5,000 Units at 01/24/17 1344   • ondansetron (ZOFRAN) syringe/vial injection 4 mg  4 mg Q4HRS PRJOHN Hutchinson M.D.       • ondansetron (ZOFRAN ODT) dispertab 4 mg  4 mg Q4HRS PRJOHN Hutchinson M.D.         Last reviewed on 1/18/2017 11:24 PM by Yennifer aHrdy R.N.    Quality  Measures:  Core Measures & Quality Metrics    Problems:  1.  Respiratory insufficiency, requiring significant oxygen supplementation, slowly better, decreasing oxygen requirement.  2.  Abnormal chest x-ray and CT scan with diffuse bilateral infiltrates.  Her    scan suggests the possibility of underlying basilar fibrosis and possible interstitial pulmonary fibrosis or usual interstitial pneumonia, now with a more diffuse changes consistent with inflammation or infection.  She does not seem to have pulmonary edema.  3.  Possible mild chronic obstructive pulmonary disease.  4.  10 mm right middle lobe noncalcified lung nodule on CT  scan.  5. Systemic arterial hypertension, controlled.  6.  History of ventricular tachycardia.    Plan:  Continue titrated oxygen, bronchodilators, respiratory protocols and systemic corticosteroids.  Continue antibiotics and prophylaxis.  Continue physical therapy and mobilization. Apparently does not qualify for acute inpatient rehabilitation. Options include a skilled nursing facility with rehabilitation versus home care with high flow oxygen system.  The lung nodule will require serial follow-up after discharge.  Pulmonary function studies and a high-resolution CT scan after discharge and clinical resolution of her presumed acute pneumonia might help to clarify the nature of the underlying interstitial lung disease.  Discussed with respiratory therapy and  the patient.    Steroid taper.   +4.7 liters this admission, BP good, recommend diuresis to improve sats.    Currently desaturating with exercise even with high liter flow complicating dc plans.

## 2017-01-30 NOTE — PROGRESS NOTES
Resting with eyes closed. Respirations even and unlabored. Repositions self. No c/o. Call light in reach.     Tele Report:  Sinus Rhythm, Sinus Eddi   Frequent PVC, Occ PAC  HR 50-70  HI 0.14  QRS 0.10  QT 0.38

## 2017-01-30 NOTE — DISCHARGE PLANNING
Per the request of the SW on the floor. [facility] called and spoke to Aylin at [facility]. Per Aylin they are able to accept the patient at 8L of O2. SW on floor Cate michael been notified via voicemail

## 2017-01-30 NOTE — FLOWSHEET NOTE
01/29/17 2042   Events/Summary/Plan   Events/Summary/Plan SVN   Interdisciplinary Plan of Care-Goals (Indications)   Obstructive Ventilatory Defect or Pulmonary Disease without Obvious Obstruction History / Diagnosis   Interdisciplinary Plan of Care-Outcomes    Bronchodilator Outcome Diminished Wheezing and Volume of Air Movement Increased;Patient at Stable Baseline   Education   Education Yes - Pt. / Family has been Instructed in use of Respiratory Equipment;Yes - Pt. / Family has been Instructed in use of Respiratory Medications and Adverse Reactions   RT Assessment of Delivered Medications   Evaluation of Medication Delivery Daily Yes-- Pt /Family has been Instructed in use of Respiratory Medications and Adverse Reactions   SVN Group   #SVN Performed Yes   Given By: Mask   Respiratory WDL   Respiratory (WDL) X   Chest Exam   Respiration 18   Pulse 76   Breath Sounds   Pre/Post Intervention Post Intervention Assessment   RUL Breath Sounds Clear   RML Breath Sounds Clear   RLL Breath Sounds Diminished   KRISTINA Breath Sounds Clear   LLL Breath Sounds Diminished   Oximetry   #Pulse Oximetry (Single Determination) Yes   Oxygen   Pulse Oximetry 94 %   O2 (LPM) 4   O2 Daily Delivery Respiratory  Nasal Cannula

## 2017-01-30 NOTE — PROGRESS NOTES
Report received. Care assumed. Resting in bed. Alert and oriented. Respirations even and unlabored. NC in place. Ankle swollen, elevated on pillow, ice pack applied. No other c/o. Call light in reach.

## 2017-01-30 NOTE — DISCHARGE SUMMARY
CHIEF COMPLAINT ON ADMISSION  Chief Complaint   Patient presents with   • Chest Pain   • Shortness of Breath   • Back Pain       CODE STATUS  Full Code    HPI & HOSPITAL COURSE  For H and P on admission, please refer to full H and P dictated by Dr. Hutchinson on 1/18/17 for full details.  Briefly, this is an 88-year-old woman who was admitted to the hospital on the 18th of this month with increasing shortness of breath.  The patient said a week ago that she was fine and was shopping.  Her daughter had had a recent bronchitis.  The patient is a remote    smoker having quit smoking about 40 years ago.  She said that Dr. Doyle her once that she had COPD, but she had never been on oxygen or any inhalers.  She was admitted, started on prednisone and antibiotics.  However, her oxygen needs continued to increase and she was at one point on a mask at 15 liters.  We got a CT scan of her chest which showed interstitial lung disease.  We continued her steroids and finished out her antibiotics, and she has made a slow recovery to where she is in the mid 90s on 3L NC.  She still desaturates with walking, but it has greatly improved.  She was desatting to the low 80s on 10L O2, and now she goes to about 89 or 90% on 8L O2 with ambulation.  She has been accepted to [facility] for further therapy and will need to take many walks per day with oxygen, as this has been what has improved her sats each time.      Therefore, she is discharged in good and stable condition for further post-acute management.     SPECIFIC OUTPATIENT FOLLOW-UP  Will need dedicated PT with O2, doing well on 8L with ambulation, 3L without; has been improving daily with ambulation at least TID    DISCHARGE PROBLEM LIST    Community acquired pneumonia    Acute respiratory failure with hypoxia     Interstitial lung disease     Lung nodule     Adrenal adenoma    Hypertension  Resolved Problems:    V tach transient and likely 2/2 hypoxia    Chronic Medical  Problems:  1.  COPD.  2.  History of breast cancer.  3.  History of abdominal aortic aneurysm.  4.  Chronic pain.    FOLLOW UP  Future Appointments  Date Time Provider Department Center   2/21/2017 10:00 AM [id number] PULM None   2/21/2017 11:00 AM Willy Marcos M.D. PULM None     Savage Doyle M.D.  [address] [facility]  [address]  [facility] [address]  [phone]    Follow up on 2/22/2017      MEDICATIONS ON DISCHARGE  Medication Information                      acetaminophen (TYLENOL) 325 MG Tab  Take 2 Tabs by mouth every 6 hours as needed for Mild Pain or Moderate Pain.             alprazolam (XANAX) 0.25 MG Tab  Take 0.25 mg by mouth at bedtime as needed for Sleep.             bisacodyl (DULCOLAX) 10 MG Suppos  Insert 1 Suppository in rectum every 24 hours as needed (if lactulose ineffective).             budesonide (PULMICORT) 0.5 MG/2ML Suspension  2 mL by Nebulization route 2 Times a Day.             Calcium Carbonate-Vit D-Min (CALCIUM 1200 PO)  Take 1 Tab by mouth every day.             Coenzyme Q10 (CO Q 10 PO)  Take 1 Cap by mouth every day at 6 PM.             Cyanocobalamin (B-12 PO)  Take 1 Tab by mouth every day.             docusate sodium 100 MG Cap  Take 100 mg by mouth every morning.             fluticasone (FLONASE) 50 MCG/ACT nasal spray  Spray 1 Spray in nose every bedtime. Each Nostril             gabapentin (NEURONTIN) 300 MG Cap  Take 1 Cap by mouth 3 times a day.             gelatin 650 MG capsule  Take 1 Cap by mouth every day.             heparin 5000 UNIT/ML Solution  Inject 1 mL as instructed every 8 hours.             hydrocodone-acetaminophen (NORCO) 5-325 MG Tab per tablet  Take 1-2 Tabs by mouth every four hours as needed.             ipratropium-albuterol (DUONEB) 0.5-2.5 (3) MG/3ML nebulizer solution  3 mL by Nebulization route every 6 hours as needed for Shortness of Breath.             lactulose 20 GM/30ML Solution  Take 30 mL by mouth every 24 hours as needed (if  sennosides-docusate sodium (SENOKOT-S) ineffective).             metoprolol (LOPRESSOR) 25 MG Tab  Take 1 Tab by mouth 2 Times a Day.             ondansetron (ZOFRAN ODT) 4 MG TABLET DISPERSIBLE  Take 1 Tab by mouth every four hours as needed for Nausea/Vomiting (give PO if IV route is unavailable. May give per feeding tube.).             predniSONE (DELTASONE) 20 MG Tab  Take 3 Tabs by mouth every day for 30 days. Stay on this dose until seen by Pulmonary             senna-docusate (PERICOLACE OR SENOKOT S) 8.6-50 MG Tab  Take 1 Tab by mouth every day.             senna-docusate (PERICOLACE OR SENOKOT S) 8.6-50 MG Tab  Take 1 Tab by mouth every 24 hours as needed for Constipation.             vitamin D (CHOLECALCIFEROL) 1000 UNIT Tab  Take 1,000 Units by mouth every day.               DIET  Orders Placed This Encounter   Procedures   • Diet Order     Standing Status: Standing      Number of Occurrences: 1      Standing Expiration Date:      Order Specific Question:  Diet:     Answer:  Regular [1]       ACTIVITY  As tolerated and directed by skilled nursing.  Class 2 - comfortable at rest but have symptoms with ordinary physcial activity.  Needs about 8L O2 for ambulation; encouraging walking, has been improving the more she walks.    LINES, DRAINS, AND WOUNDS  This is an automated list. Peripheral IVs will be removed prior to discharge.  PIV Group Right Antecubital 22g Flexible Catheter (Active)   Line Secured Taped;Transparent 1/29/2017  7:00 PM   Site Condition / Description Assessed;Patent;Clean;Dry;Intact 1/29/2017  7:00 PM   Dressing Type / Description Transparent;Clean;Dry;Intact 1/29/2017  7:00 PM   Dressing Status Observed 1/29/2017  7:00 PM   Saline Locked Yes 1/29/2017  7:00 PM   Infiltration Grading Used by [facility] [place] 1/29/2017  7:00 PM   Phlebitis Scale (Used by [facility]) 0 1/29/2017  7:00 PM   Date Primary Tubing Changed 01/24/17 1/24/2017  9:24 AM   Date Secondary Tubing Changed 01/24/17  1/24/2017  9:24 AM   NEXT Primary Tubing Change  01/27/17 1/24/2017  9:24 AM   NEXT Secondary Tubing Change  01/25/17 1/24/2017  9:24 AM                     MENTAL STATUS ON TRANSFER  Level of Consciousness: Alert    CONSULTATIONS  [facility]    PROCEDURES  None    LABORATORY  Lab Results   Component Value Date/Time    SODIUM 136 01/30/2017 03:21 AM    POTASSIUM 4.3 01/30/2017 03:21 AM    CHLORIDE 103 01/30/2017 03:21 AM    CO2 25 01/30/2017 03:21 AM    GLUCOSE 162* 01/30/2017 03:21 AM    BUN 31* 01/30/2017 03:21 AM    CREATININE 0.73 01/30/2017 03:21 AM        Lab Results   Component Value Date/Time    WBC 8.5 01/30/2017 03:21 AM    HEMOGLOBIN 11.1* 01/30/2017 03:21 AM    HEMATOCRIT 34.6* 01/30/2017 03:21 AM    PLATELET COUNT 113* 01/30/2017 03:21 AM        Total time of the discharge process exceeds 45 minutes.

## 2017-01-30 NOTE — PROGRESS NOTES
Due medications given, refused all PO medications as per MAR. No c/o. Feet remain elevated on pillow. Edema now 2+ right, 1+ on left. Respiratory in for Tx. Call light in reach.

## 2017-01-30 NOTE — THERAPY
"Occupational Therapy Treatment completed with focus on ADLs, ADL transfers, patient education and upper extremity function.  Functional Status: A&Ox4. O2@3L NC with 97% sats when sitting in chair. O2@8L NC with 90-92% sats during walk in halls using FWW. Walks ~120'. Grooming at sink, toileting with SBA.      Plan of Care: Will benefit from Occupational Therapy 3 times per week.  Discharge Recommendations:  Equipment: O2.   Post-acute therapy recommended after/before discharged home - HH vs. SNF.   Pt states she prefers to return home with her spouse there to assist as needed. Daughter lives in town.   See \"Rehab Therapy-Acute\" Patient Summary Report for complete documentation.   "

## 2017-01-30 NOTE — PROGRESS NOTES
Due medication given without issue. IV patent, no redness or swelling noted. Pt anxious, Xanax provided per request. No c/o pain, SOB. Call light in reach.

## 2017-01-30 NOTE — PROGRESS NOTES
0700 Report received from [facility] nurseStephanie, at bedside. Pt is resting in recliner.    0730 Monitored heart rhythm is SR w occasional PVCs (0.14/ 0.10/ 0.38), rate 60-70's.    1155 O2 sat assessed- walking and resting, flowsheets.    1900 Report given to [facility] nurseKathy, at bedside.

## 2017-01-30 NOTE — PROGRESS NOTES
Due medication given without issue. IV flushes easily. No c/o. Calls for assist to bathroom, voided and had BM. Assist back to bed. Call light in reach.

## 2017-01-30 NOTE — PROGRESS NOTES
0700 Report received from [facility] nurse, Kathy, at bedside. Pt is resting in bed & eyes closed.    1030 Pt ambulated in the hallway, used FWW. O2 sat 80-91% on 10L/min during walking.    1115 Monitored heart rhythm is SR w rare PVCs (0.16/ 0.10/ 0.44), rate 60's.    1330 OT involved pt's care.    1500 Pt is informed about d/c to [facility] at 3 pm today.    1615 Report given to nurseChaparrita, at [facility].    1620 Pt transferred to [facility] by their transport, pt's spouse is with pt.

## 2017-01-30 NOTE — CARE PLAN
Problem: Oxygenation:  Goal: Maintain adequate oxygenation dependent on patient condition  Outcome: PROGRESSING SLOWER THAN EXPECTED  4LNC    Problem: Bronchoconstriction:  Goal: Improve in air movement and diminished wheezing  Outcome: PROGRESSING AS EXPECTED

## 2017-02-01 ENCOUNTER — TELEPHONE (OUTPATIENT)
Dept: PULMONOLOGY | Facility: HOSPICE | Age: 82
End: 2017-02-01

## 2017-02-01 DIAGNOSIS — R06.00 DYSPNEA, UNSPECIFIED TYPE: ICD-10-CM

## 2017-02-10 ENCOUNTER — HOSPITAL ENCOUNTER (OUTPATIENT)
Dept: RADIOLOGY | Facility: MEDICAL CENTER | Age: 82
End: 2017-02-10
Attending: FAMILY MEDICINE
Payer: MEDICARE

## 2017-02-10 DIAGNOSIS — B59 PNEUMONIA OF RIGHT LOWER LOBE DUE TO PNEUMOCYSTIS JIROVECII (HCC): ICD-10-CM

## 2017-02-10 PROCEDURE — 71020 DX-CHEST-2 VIEWS: CPT

## 2017-02-21 ENCOUNTER — OFFICE VISIT (OUTPATIENT)
Dept: PULMONOLOGY | Facility: HOSPICE | Age: 82
End: 2017-02-21
Payer: MEDICARE

## 2017-02-21 ENCOUNTER — NON-PROVIDER VISIT (OUTPATIENT)
Dept: PULMONOLOGY | Facility: HOSPICE | Age: 82
End: 2017-02-21
Payer: MEDICARE

## 2017-02-21 VITALS
TEMPERATURE: 98.8 F | DIASTOLIC BLOOD PRESSURE: 68 MMHG | WEIGHT: 150 LBS | BODY MASS INDEX: 22.22 KG/M2 | RESPIRATION RATE: 16 BRPM | HEART RATE: 83 BPM | HEIGHT: 69 IN | OXYGEN SATURATION: 95 % | SYSTOLIC BLOOD PRESSURE: 120 MMHG

## 2017-02-21 DIAGNOSIS — J18.9 COMMUNITY ACQUIRED PNEUMONIA: ICD-10-CM

## 2017-02-21 DIAGNOSIS — J44.9 COPD, MILD (HCC): ICD-10-CM

## 2017-02-21 DIAGNOSIS — R06.00 DYSPNEA, UNSPECIFIED TYPE: ICD-10-CM

## 2017-02-21 DIAGNOSIS — J84.9 ILD (INTERSTITIAL LUNG DISEASE) (HCC): ICD-10-CM

## 2017-02-21 PROCEDURE — 4040F PNEUMOC VAC/ADMIN/RCVD: CPT | Performed by: INTERNAL MEDICINE

## 2017-02-21 PROCEDURE — 1036F TOBACCO NON-USER: CPT | Performed by: INTERNAL MEDICINE

## 2017-02-21 PROCEDURE — 1111F DSCHRG MED/CURRENT MED MERGE: CPT | Performed by: INTERNAL MEDICINE

## 2017-02-21 PROCEDURE — 99214 OFFICE O/P EST MOD 30 MIN: CPT | Mod: 25 | Performed by: INTERNAL MEDICINE

## 2017-02-21 PROCEDURE — G8482 FLU IMMUNIZE ORDER/ADMIN: HCPCS | Performed by: INTERNAL MEDICINE

## 2017-02-21 PROCEDURE — G8419 CALC BMI OUT NRM PARAM NOF/U: HCPCS | Performed by: INTERNAL MEDICINE

## 2017-02-21 PROCEDURE — 1101F PT FALLS ASSESS-DOCD LE1/YR: CPT | Mod: 8P | Performed by: INTERNAL MEDICINE

## 2017-02-21 RX ORDER — GABAPENTIN 100 MG/1
CAPSULE ORAL
Refills: 1 | Status: ON HOLD | COMMUNITY
Start: 2017-02-13 | End: 2021-04-01

## 2017-02-21 RX ORDER — LEVOFLOXACIN 500 MG/1
TABLET, FILM COATED ORAL DAILY
Refills: 0 | Status: ON HOLD | COMMUNITY
Start: 2017-02-10 | End: 2021-04-01

## 2017-02-21 ASSESSMENT — PULMONARY FUNCTION TESTS
FVC_PERCENT_PREDICTED: 63
FEV1/FVC_PERCENT_PREDICTED: 74
FVC_PERCENT_PREDICTED: 64
FEV1_PERCENT_CHANGE: 1
FEV1: 1.55
FEV1/FVC_PERCENT_PREDICTED: 111
FEV1: 1.57
FEV1/FVC: 83
FEV1/FVC_PERCENT_PREDICTED: 114
FEV1_PERCENT_PREDICTED: 71
FVC: 1.85
FEV1_PERCENT_CHANGE: 0
FEV1/FVC: 84.86
FEV1/FVC_PERCENT_CHANGE: 0
FEV1_PERCENT_PREDICTED: 72
FEV1_PREDICTED: 2.16
FVC_PREDICTED: 2.9
FVC: 1.86

## 2017-02-21 NOTE — PATIENT INSTRUCTIONS
1. We have scheduled a repeat CT scan of the chest to be done in 6 weeks  2. We have scheduled a video swallow study  3. We have ordered blood work  4. We have prescribed Combivent inhaler  5. Continue to use oxygen at 2 or 3 L nasal cannula  6. Continue with physical therapy

## 2017-02-21 NOTE — PROGRESS NOTES
Yesi Garduno is a 88 y.o. female here for interstitial lung disease.  Patient was referred by Dr. Savage Doyle.    History of Present Illness:    The patient is an 88-year-old female who was actually in fairly good health up until she was admitted to the hospital in mid January for hypoxemia. Her chest x-ray showed bilateral infiltrates and evidence of some interstitial infiltrates. She required high doses of oxygen and at one point needed 15 L. She was discharged to rehabilitation facility on 8 L of oxygen. She spent one week at rehabilitation and then was discharged to home. Now she is on 2 L of oxygen at home. Prior to her illness as she is very functional and still doing some work with her auxillary. She was not requiring oxygen and was able to do all of her ADLs. Now she is still trying to get her strength back. She does some walking with a walker at home and with oxygen. She has no history of lung disease. When she was in the hospital she was treated with antibiotics and steroids. She is now completed both the antibiotics and steroids. She finished the steroids one week ago and is completing antibiotics today. She has no mucus production and denies any yellow phlegm. She denies trouble swallowing. She has no history of connective tissue disease. She denies any history of chemotherapy. She has had breast cancer and bladder cancer. She does not have any animals at home and does not have any birds at home. She denies that there is any significant mold at home. Pulmonary function tests were done today which show mild lung restriction. There was some air trapping on lung volumes and the diffusion capacity test is severely reduced. She had an echo in the hospital which showed a right ventricular systolic pressure 35 mmHg. The left ventricle was normal with left jugular ejection fraction of 66%. The patient quit smoking 40 years ago. She smoked minimally prior to that. 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    Constitutional:  Negative for fever, chills, sweats, and fatigue.  Eyes:  Negative for eye pain and visual changes.  HENT:  Negative for tinnitus and hoarse voice.  Cardiovascular:  Negative for chest pain, leg swelling, syncope and orthopnea.  Respiratory:  See HPI for pertinent negatives  Sleep:  Negative for somnolence, loud snoring, sleep disturbance due to breathing, insomnia.  Gastrointestinal:  Negative for dysphagia, nausea and abdominal pain.  Heme/lymph:  Denies easy bruising, blood clots.  Musculoskeletal:  Negative for arthralgias, sore muscles and back pain.  Skin:  Negative for rash and color change.  Neurological:  Negative for headaches, lightheadedness and weakness.  Psychiatric:  Denies depression.    Current Outpatient Prescriptions   Medication Sig Dispense Refill   • ipratropium-albuterol (COMBIVENT RESPIMAT)  MCG/ACT Aero Soln Inhale 1 Puff by mouth 4 times a day. 1 Inhaler 11   • gabapentin (NEURONTIN) 100 MG Cap TAKE 1 CAPSULE BY MOUTH AT 6 PM FOR 1 WEEK THEN 2 CAPS BY MOUTH AT 6 PM DAILY  1   • levofloxacin (LEVAQUIN) 500 MG tablet Take  by mouth every day. WITH FOOD  0   • gabapentin (NEURONTIN) 300 MG Cap Take 1 Cap by mouth 3 times a day. 90 Cap    • docusate sodium 100 MG Cap Take 100 mg by mouth every morning. 60 Cap    • senna-docusate (PERICOLACE OR SENOKOT S) 8.6-50 MG Tab Take 1 Tab by mouth every day. (Patient not taking: Reported on 2/21/2017) 30 Tab 0   • senna-docusate (PERICOLACE OR SENOKOT S) 8.6-50 MG Tab Take 1 Tab by mouth every 24 hours as needed for Constipation. (Patient not taking: Reported on 2/21/2017) 30 Tab 0   • lactulose 20 GM/30ML Solution Take 30 mL by mouth every 24 hours as needed (if sennosides-docusate sodium (SENOKOT-S) ineffective). 30 Each    • bisacodyl (DULCOLAX) 10 MG Suppos Insert 1 Suppository in rectum every 24 hours as needed (if lactulose ineffective).  0   • heparin 5000  UNIT/ML Solution Inject 1 mL as instructed every 8 hours.  0   • ondansetron (ZOFRAN ODT) 4 MG TABLET DISPERSIBLE Take 1 Tab by mouth every four hours as needed for Nausea/Vomiting (give PO if IV route is unavailable. May give per feeding tube.). (Patient not taking: Reported on 2/21/2017) 10 Tab 0   • metoprolol (LOPRESSOR) 25 MG Tab Take 1 Tab by mouth 2 Times a Day. (Patient not taking: Reported on 2/21/2017) 60 Tab    • ipratropium-albuterol (DUONEB) 0.5-2.5 (3) MG/3ML nebulizer solution 3 mL by Nebulization route every 6 hours as needed for Shortness of Breath.     • acetaminophen (TYLENOL) 325 MG Tab Take 2 Tabs by mouth every 6 hours as needed for Mild Pain or Moderate Pain. 30 Tab 0   • budesonide (PULMICORT) 0.5 MG/2ML Suspension 2 mL by Nebulization route 2 Times a Day.     • predniSONE (DELTASONE) 20 MG Tab Take 3 Tabs by mouth every day for 30 days. Stay on this dose until seen by Pulmonary (Patient not taking: Reported on 2/21/2017) 18 Tab 0   • hydrocodone-acetaminophen (NORCO) 5-325 MG Tab per tablet Take 1-2 Tabs by mouth every four hours as needed. 20 Tab 0   • alprazolam (XANAX) 0.25 MG Tab Take 0.25 mg by mouth at bedtime as needed for Sleep.     • Coenzyme Q10 (CO Q 10 PO) Take 1 Cap by mouth every day at 6 PM.     • Calcium Carbonate-Vit D-Min (CALCIUM 1200 PO) Take 1 Tab by mouth every day.     • gelatin 650 MG capsule Take 1 Cap by mouth every day.     • vitamin D (CHOLECALCIFEROL) 1000 UNIT Tab Take 1,000 Units by mouth every day.     • Cyanocobalamin (B-12 PO) Take 1 Tab by mouth every day.     • fluticasone (FLONASE) 50 MCG/ACT nasal spray Spray 1 Spray in nose every bedtime. Each Nostril       No current facility-administered medications for this visit.       Social History   Substance Use Topics   • Smoking status: Former Smoker -- 0.50 packs/day for 34 years     Types: Cigarettes     Quit date: 01/01/1985   • Smokeless tobacco: Never Used   • Alcohol Use: Yes      Comment: 2        Past  "Medical History   Diagnosis Date   • Cancer (CMS-HCC)    • Breast cancer (CMS-HCC)    • Near syncope 8/11/2015   • AAA (abdominal aortic aneurysm) (CMS-HCC) 8/11/2015   • COPD (chronic obstructive pulmonary disease) (CMS-HCC) 8/11/2015   • S/P right mastectomy      per pt done in 1979       Past Surgical History   Procedure Laterality Date   • Other       mastectomy   • Mastectomy  1979     right side   • Breast biopsy  1990       Allergies:  Hydrocodone; Penicillins; and Tetracycline    Family History   Problem Relation Age of Onset   • Cancer Sister    • Cancer Mother    • Lung Disease Paternal Uncle    • Arthritis Neg Hx    • Genetic Neg Hx    • Psychiatry Neg Hx    • Diabetes Neg Hx    • Heart Disease Neg Hx    • Hypertension Neg Hx    • Hyperlipidemia Neg Hx    • Stroke Neg Hx    • Alcohol/Drug Neg Hx        Physical Examination    Filed Vitals:    02/21/17 1040   Height: 1.753 m (5' 9.02\")   Weight: 68.04 kg (150 lb)   Weight % change since last entry.: 0 %   BP: 120/68   Pulse: 83   BMI (Calculated): 22.14   Resp: 16   Temp: 37.1 °C (98.8 °F)       Physical Exam:  Constitutional:  Well developed and well nourished.  Head:  Normocephalic and atraumatic.  Nose:  Nose normal.  Mouth/Throat:  Oropharynx is clear and moist, no lesions.    Eyes:  Conjunctivae and EOM are normal.  Pupils are equal, round, and reactive to light.  Neck:  Normal range of motion.  Supple.  No JVD. No tracheal deviation.  No thyromegally  Cardiovascular:  Normal rate, regular rhythm, normal heart sounds and intact distal pulses.  Pulmonary/Chest:  No accessory muscle use.  No wheezing, rales or rhonchi.  No dullness to percussion, tenderness or deformity.  Abdominal:  Soft.  No ascites.  No Hepatosplenomegally.  Non tender.  Musculoskeletal.  Normal range of motion.  No muscular atrophy.  Lymphadenopathy:  No cervical or supraclavicular adenopathy  Neurological:  Alert and oriented.  Cranial nerves intact.  No focal deficits  Skin:  No " rashes or ulcers.  Psyciatric:  Normal mood and affect.      PFTS: Spirometry shows moderate lung restriction and there was not a significant improvement after a bronchodilator. Lung volumes are consistent with mild lung restriction and there was mild air trapping. The diffusion capacity test was severely reduced.     Assessment and Plan:  1. Community acquired pneumonia  The patient was treated for community-acquired pneumonia. She has clinically improved significantly. She was on 15 L now she is down to 2 L of oxygen. She has completed her course of antibiotics. She is continuing to get physical therapy and she is trying to regain her strength. I have ordered a swallow study in order to make sure that she does not have silent aspiration.  - [place] EVAL,SWALLOW FUNCTION,CINE/VIDEO RECORD    2. ILD (interstitial lung disease) (CMS-[facility])  The CT scan of the chest demonstrates interstitial infiltrates. This may be related to atypical pathogens from community-acquired pneumonia. However must consider the possibility of interstitial lung disease with a differential diagnosis to include nonspecific interstitial pneumonitis, cryptogenic organizing pneumonia, eosinophilic pneumonia, hypersensitivity pneumonitis, interstitial lung disease related to connective tissue disease and vasculitis. The CT scan of the chest is not consistent with UIP. Chronic aspiration is a possibility. I have a low suspicion for sarcoid, neoplastic disease and occupational related interstitial lung disease. She has no history of medication that could induce interstitial lung disease. Clinically she has improved significantly and I would like to give her a little bit more time to improve. We will repeat the CT scan of the chest in 6 weeks. Also need to assess for mediastinal adenopathy as there was evidence of some enlarged lymph nodes which presumably are reactive on her original CT scan of the chest in the hospital. I have ordered blood work as listed  below. We will see her back in 6 weeks for close follow-up  - CT-CHEST, HIGH RESOLUTION LUNG; Future  - ALDOLASE; Future  - ANGIOTENSIN I CONVERTING ENZYME; Future  - ANTI-JO01 ABS; Future  - ANTI-NEUTROPHIL CYTOPLASMIC AB; Future  - ANTI-NUCLEAR ANTIBODY SERUM; Future  - BTYPE NATRIURETIC PEPTIDE; Future  - CCP-CYCLIC CITRULLINATED PEPTID; Future  - HYPERSENSITIVITY PNEUMONITIS ANTIBODIES; Future  - RHEUMATOID ARTHRITIS FACTOR; Future  - SSA 52 AND 60 (RO)(KARSON) AB, IGG; Future  - WESTERGREN SED RATE; Future  - SYSTEMIC SCLEROSIS PANEL; Future    3. COPD, mild (CMS-HCC)  The patient has a remote smoking history. She does have some air trapping on her pulmonary function tests. I suspect that she has mild COPD. We will start her on a Combivent inhaler  - ipratropium-albuterol (COMBIVENT RESPIMAT)  MCG/ACT Aero Soln; Inhale 1 Puff by mouth 4 times a day.  Dispense: 1 Inhaler; Refill: 11          Followup Return in about 6 weeks (around 4/4/2017) for follow up visit with Willy Marcos MD.

## 2017-02-21 NOTE — MR AVS SNAPSHOT
"        Yesi Shaan   2017 10:00 AM   Non-Provider Visit   MRN: [id number]    Department:  [facility]   Dept Phone:  [phone]    Description:  Female : 1928   Provider:  Willy Marcos M.D.           Reason for Visit     Shortness of Breath           Allergies as of 2017     Allergen Noted Reactions    Hydrocodone 2017   Rash    \"all over\" and it makes me act loopy    Penicillins 2015   Vomiting    Tetracycline 12/10/2011   Rash, Itching    Pt states \"I get a bad body rash and itch all over\".      You were diagnosed with     Dyspnea, unspecified type   [8932873]         Vital Signs     Smoking Status                   Former Smoker           Basic Information     Date Of Birth Sex Race Ethnicity Preferred Language    1928 Female White Non- English      Problem List              ICD-10-CM Priority Class Noted - Resolved    Near syncope R55   2015 - Present    AAA (abdominal aortic aneurysm) (CMS-HCC) I71.4   2015 - Present    Breast cancer (CMS-HCC) C50.919   2015 - Present    S/P right mastectomy Z90.11   2015 - Present    COPD (chronic obstructive pulmonary disease) (CMS-HCC) J44.9 Low  2015 - Present    Acute respiratory failure with hypoxia (CMS-HCC) J96.01 High  2017 - Present    Interstitial lung disease (CMS-HCC) J84.9 High  2017 - Present    Lung nodule R91.1   2017 - Present    Adrenal adenoma D35.00   2017 - Present    Hypertension I10   2017 - Present      Health Maintenance        Date Due Completion Dates    IMM DTaP/Tdap/Td Vaccine (1 - Tdap) 1947 ---    PAP SMEAR 1949 ---    COLONOSCOPY 1978 ---    IMM ZOSTER VACCINE 1988 ---    IMM PNEUMOCOCCAL 65+ (ADULT) LOW/MEDIUM RISK SERIES (1 of 2 - PCV13) 1993 ---    IMM INFLUENZA (1) 2016 ---    MAMMOGRAM 2018, 2016, 2015, 2013, 2012, 2011, 2011, 2010, 2010, 2009, 2009, " 1/23/2009, 1/21/2008, 1/21/2008    BONE DENSITY 7/7/2019 7/7/2014            Current Immunizations     Influenza TIV (IM) 11/1/2016    Pneumococcal polysaccharide vaccine (PPSV-23) 11/1/2016      Below and/or attached are the medications your provider expects you to take. Review all of your home medications and newly ordered medications with your provider and/or pharmacist. Follow medication instructions as directed by your provider and/or pharmacist. Please keep your medication list with you and share with your provider. Update the information when medications are discontinued, doses are changed, or new medications (including over-the-counter products) are added; and carry medication information at all times in the event of emergency situations     Allergies:  HYDROCODONE - Rash     PENICILLINS - Vomiting     TETRACYCLINE - Rash,Itching               Medications  Valid as of: February 21, 2017 - 11:03 AM    Generic Name Brand Name Tablet Size Instructions for use    Acetaminophen (Tab) TYLENOL 325 MG Take 2 Tabs by mouth every 6 hours as needed for Mild Pain or Moderate Pain.        ALPRAZolam (Tab) XANAX 0.25 MG Take 0.25 mg by mouth at bedtime as needed for Sleep.        Bisacodyl (Suppos) DULCOLAX 10 MG Insert 1 Suppository in rectum every 24 hours as needed (if lactulose ineffective).        Budesonide (Suspension) PULMICORT 0.5 MG/2ML 2 mL by Nebulization route 2 Times a Day.        Calcium Carbonate-Vit D-Min   Take 1 Tab by mouth every day.        Cholecalciferol (Tab) cholecalciferol 1000 UNIT Take 1,000 Units by mouth every day.        Coenzyme Q10   Take 1 Cap by mouth every day at 6 PM.        Cyanocobalamin   Take 1 Tab by mouth every day.        Docusate Sodium (Cap)  MG Take 100 mg by mouth every morning.        Fluticasone Propionate (Suspension) FLONASE 50 MCG/ACT Spray 1 Spray in nose every bedtime. Each Nostril        Gabapentin (Cap) NEURONTIN 300 MG Take 1 Cap by mouth 3 times a day.       Gabapentin (Cap) NEURONTIN 100 MG TAKE 1 CAPSULE BY MOUTH AT 6 PM FOR 1 WEEK THEN 2 CAPS BY MOUTH AT 6 PM DAILY        Gelatin (Cap) gelatin 650 MG Take 1 Cap by mouth every day.        Heparin Sodium (Porcine) (Solution) heparin 5000 UNIT/ML Inject 1 mL as instructed every 8 hours.        Hydrocodone-Acetaminophen (Tab) NORCO 5-325 MG Take 1-2 Tabs by mouth every four hours as needed.        Ipratropium-Albuterol (Solution) DUONEB 0.5-2.5 (3) MG/3ML 3 mL by Nebulization route every 6 hours as needed for Shortness of Breath.        Lactulose (Solution) lactulose 20 GM/30ML Take 30 mL by mouth every 24 hours as needed (if sennosides-docusate sodium (SENOKOT-S) ineffective).        LevoFLOXacin (Tab) LEVAQUIN 500 MG Take  by mouth every day. WITH FOOD        Metoprolol Tartrate (Tab) LOPRESSOR 25 MG Take 1 Tab by mouth 2 Times a Day.        Ondansetron (TABLET DISPERSIBLE) ZOFRAN ODT 4 MG Take 1 Tab by mouth every four hours as needed for Nausea/Vomiting (give PO if IV route is unavailable. May give per feeding tube.).        PredniSONE (Tab) DELTASONE 20 MG Take 3 Tabs by mouth every day for 30 days. Stay on this dose until seen by Pulmonary        Sennosides-Docusate Sodium (Tab) PERICOLACE or SENOKOT S 8.6-50 MG Take 1 Tab by mouth every day.        Sennosides-Docusate Sodium (Tab) PERICOLACE or SENOKOT S 8.6-50 MG Take 1 Tab by mouth every 24 hours as needed for Constipation.        .                 Medicines prescribed today were sent to:     [facility]/PHARMACY #[id number] - [place] - [address]    [address] [place] [address] [place] [address]    Phone: [phone] Fax: [phone]    Open 24 Hours?: No      Medication refill instructions:       If your prescription bottle indicates you have medication refills left, it is not necessary to call your provider’s office. Please contact your pharmacy and they will refill your medication.    If your prescription bottle indicates you do not have any  refills left, you may request refills at any time through one of the following ways: The online [employer] system (except Urgent Care), by calling your provider’s office, or by asking your pharmacy to contact your provider’s office with a refill request. Medication refills are processed only during regular business hours and may not be available until the next business day. Your provider may request additional information or to have a follow-up visit with you prior to refilling your medication.   *Please Note: Medication refills are assigned a new Rx number when refilled electronically. Your pharmacy may indicate that no refills were authorized even though a new prescription for the same medication is available at the pharmacy. Please request the medicine by name with the pharmacy before contacting your provider for a refill.           [employer]gg Access Code: Activation code not generated  Current [employer] Status: Active

## 2017-02-21 NOTE — PROCEDURES
Good patient effort & cooperation.  The results of this test meet the ATS/ERS standards for acceptability and repeatability.  Test was performed on the [employer] Body Plethysmograph- Elite DX system.  Predicted equations for Spirometry are NHanes, DLCO- [facility] volumes- ITS.  The DLCO was uncorrected for Hgb.  A bronchodilator of Ventolin HFA- 2puffs via spacer were administered. Post FVC had back extrapolation but were repeatable.    Spirometry shows moderate lung restriction and there was not a significant improvement after a bronchodilator. Lung volumes are consistent with mild lung restriction and there was mild air trapping. The diffusion capacity test was severely reduced.

## 2017-02-21 NOTE — MR AVS SNAPSHOT
"        Yesi Shaan   2017 11:00 AM   Office Visit   MRN: [id number]    Department:  [facility]   Dept Phone:  [phone]    Description:  Female : 1928   Provider:  Willy Marcos M.D.           Reason for Visit     Hospital Follow-up Pneumonia, ILD      Allergies as of 2017     Allergen Noted Reactions    Hydrocodone 2017   Rash    \"all over\" and it makes me act loopy    Penicillins 2015   Vomiting    Tetracycline 12/10/2011   Rash, Itching    Pt states \"I get a bad body rash and itch all over\".      You were diagnosed with     Community acquired pneumonia   [665704]       ILD (interstitial lung disease) (CMS-HCC)   [052872]       COPD, mild (CMS-HCC)   [193605]         Vital Signs     Blood Pressure Pulse Temperature Respirations Height Weight    120/68 mmHg 83 37.1 °C (98.8 °F) 16 1.753 m (5' 9.02\") 68.04 kg (150 lb)    Body Mass Index Oxygen Saturation Smoking Status             22.14 kg/m2 95% Former Smoker         Basic Information     Date Of Birth Sex Race Ethnicity Preferred Language    1928 Female White Non- English      Your appointments     2017 11:00 AM   Established Patient Pul with Willy Marcos M.D.   [facility] (--)    [facility] [address] [place] [id number] [phone]              Problem List              ICD-10-CM Priority Class Noted - Resolved    Near syncope R55   2015 - Present    AAA (abdominal aortic aneurysm) (CMS-HCC) I71.4   2015 - Present    Breast cancer (CMS-HCC) C50.919   2015 - Present    S/P right mastectomy Z90.11   2015 - Present    COPD (chronic obstructive pulmonary disease) (CMS-HCC) J44.9 Low  2015 - Present    Acute respiratory failure with hypoxia (CMS-HCC) J96.01 High  2017 - Present    Interstitial lung disease (CMS-HCC) J84.9 High  2017 - Present    Lung nodule R91.1   2017 - Present    Adrenal adenoma D35.00   2017 - Present   " Hypertension I10   1/19/2017 - Present      Health Maintenance        Date Due Completion Dates    IMM DTaP/Tdap/Td Vaccine (1 - Tdap) 8/5/1947 ---    PAP SMEAR 8/5/1949 ---    COLONOSCOPY 8/5/1978 ---    IMM ZOSTER VACCINE 8/5/1988 ---    IMM PNEUMOCOCCAL 65+ (ADULT) LOW/MEDIUM RISK SERIES (1 of 2 - PCV13) 8/5/1993 ---    IMM INFLUENZA (1) 9/1/2016 ---    MAMMOGRAM 1/9/2018 1/9/2017, 1/5/2016, 1/2/2015, 12/27/2013, 12/31/2012, 12/29/2011, 1/27/2011, 1/25/2010, 1/25/2010, 1/28/2009, 1/23/2009, 1/23/2009, 1/21/2008, 1/21/2008    BONE DENSITY 7/7/2019 7/7/2014            Current Immunizations     Influenza TIV (IM) 11/1/2016    Pneumococcal polysaccharide vaccine (PPSV-23) 11/1/2016      Below and/or attached are the medications your provider expects you to take. Review all of your home medications and newly ordered medications with your provider and/or pharmacist. Follow medication instructions as directed by your provider and/or pharmacist. Please keep your medication list with you and share with your provider. Update the information when medications are discontinued, doses are changed, or new medications (including over-the-counter products) are added; and carry medication information at all times in the event of emergency situations     Allergies:  HYDROCODONE - Rash     PENICILLINS - Vomiting     TETRACYCLINE - Rash,Itching               Medications  Valid as of: February 21, 2017 - 11:42 AM    Generic Name Brand Name Tablet Size Instructions for use    Acetaminophen (Tab) TYLENOL 325 MG Take 2 Tabs by mouth every 6 hours as needed for Mild Pain or Moderate Pain.        ALPRAZolam (Tab) XANAX 0.25 MG Take 0.25 mg by mouth at bedtime as needed for Sleep.        Bisacodyl (Suppos) DULCOLAX 10 MG Insert 1 Suppository in rectum every 24 hours as needed (if lactulose ineffective).        Budesonide (Suspension) PULMICORT 0.5 MG/2ML 2 mL by Nebulization route 2 Times a Day.        Calcium Carbonate-Vit D-Min   Take 1  Tab by mouth every day.        Cholecalciferol (Tab) cholecalciferol 1000 UNIT Take 1,000 Units by mouth every day.        Coenzyme Q10   Take 1 Cap by mouth every day at 6 PM.        Cyanocobalamin   Take 1 Tab by mouth every day.        Docusate Sodium (Cap)  MG Take 100 mg by mouth every morning.        Fluticasone Propionate (Suspension) FLONASE 50 MCG/ACT Spray 1 Spray in nose every bedtime. Each Nostril        Gabapentin (Cap) NEURONTIN 300 MG Take 1 Cap by mouth 3 times a day.        Gabapentin (Cap) NEURONTIN 100 MG TAKE 1 CAPSULE BY MOUTH AT 6 PM FOR 1 WEEK THEN 2 CAPS BY MOUTH AT 6 PM DAILY        Gelatin (Cap) gelatin 650 MG Take 1 Cap by mouth every day.        Heparin Sodium (Porcine) (Solution) heparin 5000 UNIT/ML Inject 1 mL as instructed every 8 hours.        Hydrocodone-Acetaminophen (Tab) NORCO 5-325 MG Take 1-2 Tabs by mouth every four hours as needed.        Ipratropium-Albuterol (Solution) DUONEB 0.5-2.5 (3) MG/3ML 3 mL by Nebulization route every 6 hours as needed for Shortness of Breath.        Ipratropium-Albuterol (Aero Soln) COMBIVENT RESPIMAT  MCG/ACT Inhale 1 Puff by mouth 4 times a day.        Lactulose (Solution) lactulose 20 GM/30ML Take 30 mL by mouth every 24 hours as needed (if sennosides-docusate sodium (SENOKOT-S) ineffective).        LevoFLOXacin (Tab) LEVAQUIN 500 MG Take  by mouth every day. WITH FOOD        Metoprolol Tartrate (Tab) LOPRESSOR 25 MG Take 1 Tab by mouth 2 Times a Day.        Ondansetron (TABLET DISPERSIBLE) ZOFRAN ODT 4 MG Take 1 Tab by mouth every four hours as needed for Nausea/Vomiting (give PO if IV route is unavailable. May give per feeding tube.).        PredniSONE (Tab) DELTASONE 20 MG Take 3 Tabs by mouth every day for 30 days. Stay on this dose until seen by Pulmonary        Sennosides-Docusate Sodium (Tab) PERICOLACE or SENOKOT S 8.6-50 MG Take 1 Tab by mouth every day.        Sennosides-Docusate Sodium (Tab) PERICOLACE or SENOKOT S  8.6-50 MG Take 1 Tab by mouth every 24 hours as needed for Constipation.        .                 Medicines prescribed today were sent to:     [facility]/PHARMACY #[id number] - [place], [place] - [address]    [address] [place] [address]    Phone: [phone] Fax: [phone]    Open 24 Hours?: No      Medication refill instructions:       If your prescription bottle indicates you have medication refills left, it is not necessary to call your provider’s office. Please contact your pharmacy and they will refill your medication.    If your prescription bottle indicates you do not have any refills left, you may request refills at any time through one of the following ways: The online [employer] system (except Urgent Care), by calling your provider’s office, or by asking your pharmacy to contact your provider’s office with a refill request. Medication refills are processed only during regular business hours and may not be available until the next business day. Your provider may request additional information or to have a follow-up visit with you prior to refilling your medication.   *Please Note: Medication refills are assigned a new Rx number when refilled electronically. Your pharmacy may indicate that no refills were authorized even though a new prescription for the same medication is available at the pharmacy. Please request the medicine by name with the pharmacy before contacting your provider for a refill.        Your To Do List     Future Labs/Procedures Complete By Expires    CT-CHEST, HIGH RESOLUTION LUNG  4/4/2017 (Approximate) 2/21/2018    ALDOLASE  As directed 2/21/2018    ANGIOTENSIN I CONVERTING ENZYME  As directed 2/21/2018    ANTI-JO01 ABS  As directed 2/21/2018    ANTI-NEUTROPHIL CYTOPLASMIC AB  As directed 2/21/2018    ANTI-NUCLEAR ANTIBODY SERUM  As directed 2/21/2018    BTYPE NATRIURETIC PEPTIDE  As directed 2/21/2018    CCP-CYCLIC CITRULLINATED PEPTID  As directed 2/21/2018     HYPERSENSITIVITY PNEUMONITIS ANTIBODIES  As directed 2/21/2018    RHEUMATOID ARTHRITIS FACTOR  As directed 2/21/2018    SSA 52 AND 60 (RO)(KARSON) AB, IGG  As directed 2/21/2018    SYSTEMIC SCLEROSIS PANEL  As directed 2/21/2018    WESTERGREN SED RATE  As directed 2/21/2018      Instructions    1. We have scheduled a repeat CT scan of the chest to be done in 6 weeks  2. We have scheduled a video swallow study  3. We have ordered blood work  4. We have prescribed Combivent inhaler  5. Continue to use oxygen at 2 or 3 L nasal cannula  6. Continue with physical therapy          MyChart Access Code: Activation code not generated  Current [employer] Status: Active

## 2017-03-03 ENCOUNTER — HOSPITAL ENCOUNTER (OUTPATIENT)
Dept: LAB | Facility: MEDICAL CENTER | Age: 82
End: 2017-03-03
Attending: FAMILY MEDICINE
Payer: MEDICARE

## 2017-03-03 ENCOUNTER — HOSPITAL ENCOUNTER (OUTPATIENT)
Dept: LAB | Facility: MEDICAL CENTER | Age: 82
End: 2017-03-03
Attending: INTERNAL MEDICINE
Payer: MEDICARE

## 2017-03-03 DIAGNOSIS — J84.9 ILD (INTERSTITIAL LUNG DISEASE) (HCC): ICD-10-CM

## 2017-03-03 LAB
BASOPHILS # BLD AUTO: 0.16 K/UL (ref 0–0.12)
BASOPHILS NFR BLD AUTO: 1.8 % (ref 0–1.8)
BNP SERPL-MCNC: 116 PG/ML (ref 0–100)
BNP SERPL-MCNC: 120 PG/ML (ref 0–100)
EOSINOPHIL # BLD: 0.08 K/UL (ref 0–0.51)
EOSINOPHIL NFR BLD AUTO: 0.9 % (ref 0–6.9)
ERYTHROCYTE [DISTWIDTH] IN BLOOD BY AUTOMATED COUNT: 51.8 FL (ref 35.9–50)
ERYTHROCYTE [SEDIMENTATION RATE] IN BLOOD BY WESTERGREN METHOD: 47 MM/HOUR (ref 0–30)
HCT VFR BLD AUTO: 31 % (ref 37–47)
HGB BLD-MCNC: 9.4 G/DL (ref 12–16)
LYMPHOCYTES # BLD: 1.02 K/UL (ref 1–4.8)
LYMPHOCYTES NFR BLD AUTO: 11.7 % (ref 22–41)
MANUAL DIFF BLD: NORMAL
MCH RBC QN AUTO: 29 PG (ref 27–33)
MCHC RBC AUTO-ENTMCNC: 30.3 G/DL (ref 33.6–35)
MCV RBC AUTO: 95.7 FL (ref 81.4–97.8)
MONOCYTES # BLD: 0.94 K/UL (ref 0–0.85)
MONOCYTES NFR BLD AUTO: 10.8 % (ref 0–13.4)
MORPHOLOGY BLD-IMP: NORMAL
NEUTROPHILS # BLD: 6.51 K/UL (ref 2–7.15)
NEUTROPHILS NFR BLD AUTO: 74.8 % (ref 44–72)
NRBC # BLD AUTO: 0 K/UL
NRBC BLD-RTO: 0 /100 WBC
OVALOCYTES BLD QL SMEAR: NORMAL
PLATELET # BLD AUTO: 339 K/UL (ref 164–446)
PLATELET BLD QL SMEAR: NORMAL
PMV BLD AUTO: 9.9 FL (ref 9–12.9)
POIKILOCYTOSIS BLD QL SMEAR: NORMAL
RBC # BLD AUTO: 3.24 M/UL (ref 4.2–5.4)
RBC BLD AUTO: PRESENT
RHEUMATOID FACT SERPL-ACNC: 19 IU/ML (ref 0–14)
SMUDGE CELLS BLD QL SMEAR: NORMAL
WBC # BLD AUTO: 8.7 K/UL (ref 4.8–10.8)

## 2017-03-03 PROCEDURE — 86038 ANTINUCLEAR ANTIBODIES: CPT

## 2017-03-03 PROCEDURE — 86200 CCP ANTIBODY: CPT

## 2017-03-03 PROCEDURE — 82164 ANGIOTENSIN I ENZYME TEST: CPT

## 2017-03-03 PROCEDURE — 85652 RBC SED RATE AUTOMATED: CPT

## 2017-03-03 PROCEDURE — 82085 ASSAY OF ALDOLASE: CPT

## 2017-03-03 PROCEDURE — 86003 ALLG SPEC IGE CRUDE XTRC EA: CPT | Mod: GA

## 2017-03-03 PROCEDURE — 86431 RHEUMATOID FACTOR QUANT: CPT

## 2017-03-03 PROCEDURE — 86606 ASPERGILLUS ANTIBODY: CPT | Mod: 91

## 2017-03-03 PROCEDURE — 86255 FLUORESCENT ANTIBODY SCREEN: CPT

## 2017-03-03 PROCEDURE — 86039 ANTINUCLEAR ANTIBODIES (ANA): CPT

## 2017-03-03 PROCEDURE — 83516 IMMUNOASSAY NONANTIBODY: CPT

## 2017-03-03 PROCEDURE — 86235 NUCLEAR ANTIGEN ANTIBODY: CPT

## 2017-03-03 PROCEDURE — 86005 ALLG SPEC IGE MULTIALLG SCR: CPT | Mod: 59

## 2017-03-03 PROCEDURE — 83880 ASSAY OF NATRIURETIC PEPTIDE: CPT | Mod: GA,91

## 2017-03-03 PROCEDURE — 36415 COLL VENOUS BLD VENIPUNCTURE: CPT

## 2017-03-03 PROCEDURE — 86331 IMMUNODIFFUSION OUCHTERLONY: CPT | Mod: 91

## 2017-03-05 LAB
ACE SERPL-CCNC: 36 U/L (ref 9–67)
ALDOLASE SERPL-CCNC: 5.7 U/L (ref 1.5–8.1)
CCP IGG SERPL-ACNC: 17 UNITS (ref 0–19)

## 2017-03-06 LAB
ANCA IGG TITR SER IF: NORMAL {TITER}
ENA JO1 AB TITR SER: 0 AU/ML (ref 0–40)
SSA52 R0ENA AB IGG Q0420: 1 AU/ML (ref 0–40)
SSA60 R0ENA AB IGG Q0419: 0 AU/ML (ref 0–40)

## 2017-03-07 LAB
ENA SCL70 IGG SER QL: 0 AU/ML (ref 0–40)
NUCLEAR IGG SER QL IA: ABNORMAL
NUCLEAR IGG TITR SER IF: ABNORMAL {TITER}
RNAP III AB SER IA-ACNC: 2 UNITS (ref 0–19)

## 2017-03-09 LAB
A FLAVUS AB SER QL ID: NORMAL
A FUMIGATUS1 AB SER QL ID: NORMAL
A FUMIGATUS2 AB SER QL: NORMAL
A FUMIGATUS3 AB SER QL ID: NORMAL
A FUMIGATUS6 AB SER QL ID: NORMAL
A PULLULANS AB SER QL ID: NORMAL
BEEF IGE QN: <0.1 KU/L
DEPRECATED MISC ALLERGEN IGE RAST QL: NORMAL
EPID ALLERG MIX73 IGE QN: NEGATIVE KU/L
LACEYELLA SACCHARI AB SER QL: NORMAL
P BETAE IGE QN: <0.1 KU/L
PIGEON SERUM AB QL ID: NORMAL
PORK IGE QN: <0.1 KU/L
S RECTIVIRGULA AB SER QL ID: NORMAL
S VIRIDIS AB SER QL: NORMAL
T CANDIDUS AB SER QL: NORMAL
T VULGARIS AB SER QL ID: NORMAL

## 2017-03-10 ENCOUNTER — TELEPHONE (OUTPATIENT)
Dept: PULMONOLOGY | Facility: HOSPICE | Age: 82
End: 2017-03-10

## 2017-03-10 NOTE — TELEPHONE ENCOUNTER
Pt had dmv paperwork filled out but it was sent back with information missing. Paperwork is up front in ma basket.

## 2017-03-11 NOTE — TELEPHONE ENCOUNTER
I called Yesi to let her know we filled out the paperwork and I refaxed it and mailed her a copy  [phone] (home)

## 2017-03-20 ENCOUNTER — HOSPITAL ENCOUNTER (OUTPATIENT)
Dept: LAB | Facility: MEDICAL CENTER | Age: 82
End: 2017-03-20
Attending: FAMILY MEDICINE
Payer: MEDICARE

## 2017-03-20 LAB
BASOPHILS # BLD AUTO: 0.07 K/UL (ref 0–0.12)
BASOPHILS NFR BLD AUTO: 0.9 % (ref 0–1.8)
BNP SERPL-MCNC: 88 PG/ML (ref 0–100)
EOSINOPHIL # BLD: 0.26 K/UL (ref 0–0.51)
EOSINOPHIL NFR BLD AUTO: 3.2 % (ref 0–6.9)
ERYTHROCYTE [DISTWIDTH] IN BLOOD BY AUTOMATED COUNT: 52.3 FL (ref 35.9–50)
HCT VFR BLD AUTO: 38.6 % (ref 37–47)
HGB BLD-MCNC: 11.7 G/DL (ref 12–16)
IMM GRANULOCYTES # BLD AUTO: 0.04 K/UL (ref 0–0.11)
IMM GRANULOCYTES NFR BLD AUTO: 0.5 % (ref 0–0.9)
IRON SERPL-MCNC: 70 UG/DL (ref 40–170)
LYMPHOCYTES # BLD: 1.23 K/UL (ref 1–4.8)
LYMPHOCYTES NFR BLD AUTO: 15.2 % (ref 22–41)
MCH RBC QN AUTO: 28.8 PG (ref 27–33)
MCHC RBC AUTO-ENTMCNC: 30.3 G/DL (ref 33.6–35)
MCV RBC AUTO: 95.1 FL (ref 81.4–97.8)
MONOCYTES # BLD: 0.58 K/UL (ref 0–0.85)
MONOCYTES NFR BLD AUTO: 7.2 % (ref 0–13.4)
NEUTROPHILS # BLD: 5.93 K/UL (ref 2–7.15)
NEUTROPHILS NFR BLD AUTO: 73 % (ref 44–72)
NRBC # BLD AUTO: 0 K/UL
NRBC BLD-RTO: 0 /100 WBC
PLATELET # BLD AUTO: 234 K/UL (ref 164–446)
PMV BLD AUTO: 9.6 FL (ref 9–12.9)
RBC # BLD AUTO: 4.06 M/UL (ref 4.2–5.4)
VIT B12 SERPL-MCNC: 294 PG/ML (ref 211–911)
WBC # BLD AUTO: 8.1 K/UL (ref 4.8–10.8)

## 2017-03-20 PROCEDURE — 83540 ASSAY OF IRON: CPT

## 2017-03-20 PROCEDURE — 36415 COLL VENOUS BLD VENIPUNCTURE: CPT

## 2017-03-20 PROCEDURE — 83880 ASSAY OF NATRIURETIC PEPTIDE: CPT

## 2017-03-20 PROCEDURE — 82607 VITAMIN B-12: CPT

## 2017-03-20 PROCEDURE — 85025 COMPLETE CBC W/AUTO DIFF WBC: CPT

## 2017-03-22 ENCOUNTER — HOSPITAL ENCOUNTER (OUTPATIENT)
Dept: RADIOLOGY | Facility: MEDICAL CENTER | Age: 82
End: 2017-03-22
Attending: INTERNAL MEDICINE
Payer: MEDICARE

## 2017-03-22 DIAGNOSIS — J18.9 COMMUNITY ACQUIRED PNEUMONIA: ICD-10-CM

## 2017-03-22 PROCEDURE — 92611 MOTION FLUOROSCOPY/SWALLOW: CPT

## 2017-03-22 PROCEDURE — G8997 SWALLOW GOAL STATUS: HCPCS | Mod: CH

## 2017-03-22 PROCEDURE — G8998 SWALLOW D/C STATUS: HCPCS | Mod: CH

## 2017-03-22 PROCEDURE — 74230 X-RAY XM SWLNG FUNCJ C+: CPT

## 2017-03-22 PROCEDURE — G8996 SWALLOW CURRENT STATUS: HCPCS | Mod: CH

## 2017-03-27 ENCOUNTER — HOSPITAL ENCOUNTER (OUTPATIENT)
Dept: RADIOLOGY | Facility: MEDICAL CENTER | Age: 82
End: 2017-03-27
Attending: INTERNAL MEDICINE
Payer: MEDICARE

## 2017-03-27 DIAGNOSIS — J84.9 ILD (INTERSTITIAL LUNG DISEASE) (HCC): ICD-10-CM

## 2017-03-27 PROCEDURE — 71250 CT THORAX DX C-: CPT

## 2017-04-13 ENCOUNTER — OFFICE VISIT (OUTPATIENT)
Dept: PULMONOLOGY | Facility: HOSPICE | Age: 82
End: 2017-04-13
Payer: MEDICARE

## 2017-04-13 VITALS
DIASTOLIC BLOOD PRESSURE: 60 MMHG | SYSTOLIC BLOOD PRESSURE: 118 MMHG | TEMPERATURE: 97.3 F | HEIGHT: 69 IN | BODY MASS INDEX: 22.22 KG/M2 | WEIGHT: 150 LBS | RESPIRATION RATE: 16 BRPM | OXYGEN SATURATION: 91 % | HEART RATE: 78 BPM

## 2017-04-13 DIAGNOSIS — J84.9 INTERSTITIAL LUNG DISEASE (HCC): ICD-10-CM

## 2017-04-13 PROCEDURE — 1036F TOBACCO NON-USER: CPT | Performed by: INTERNAL MEDICINE

## 2017-04-13 PROCEDURE — 4040F PNEUMOC VAC/ADMIN/RCVD: CPT | Performed by: INTERNAL MEDICINE

## 2017-04-13 PROCEDURE — G8420 CALC BMI NORM PARAMETERS: HCPCS | Performed by: INTERNAL MEDICINE

## 2017-04-13 PROCEDURE — 1101F PT FALLS ASSESS-DOCD LE1/YR: CPT | Mod: 8P | Performed by: INTERNAL MEDICINE

## 2017-04-13 PROCEDURE — 99214 OFFICE O/P EST MOD 30 MIN: CPT | Performed by: INTERNAL MEDICINE

## 2017-04-13 PROCEDURE — G8432 DEP SCR NOT DOC, RNG: HCPCS | Performed by: INTERNAL MEDICINE

## 2017-04-13 NOTE — PATIENT INSTRUCTIONS
1. We will send the CT scan films to a lung radiologists for a second opinion  2. I will call with the results of the radiologist's interpretation

## 2017-04-13 NOTE — PROGRESS NOTES
Yesi Garduno is a 88 y.o. female here for interstitial lung disease.    History of Present Illness:    The patient is an 88-year-old female who comes in for follow-up of interstitial lung disease. We repeated the CT scan of the chest which showed resolution of the previously identified posterior right lower lobe consolidation with a new 1.97 area of atelectasis or infiltrate in the lateral right middle lobe. There is persistent diffuse patchy interstitial infiltrates with anterior and intralobular septal thickening and a stable 10 mm noncalcified right middle lobe pulmonary nodule. The patient underwent a swallowing study and that was normal. We did AAA and extensive blood panel for interstitial lung disease and the patient is positive for ANTHONY at 1-160 and the sedimentation rates 47 and rheumatoid factor slightly elevated as well. The patient has no history of lupus or rheumatoid arthritis. Hypersensitivity pneumonitis panel was also negative.    Constitutional:  Negative for fever, chills, sweats, and fatigue.  Eyes:  Negative for eye pain and visual changes.  HENT:  Negative for tinnitus and hoarse voice.  Cardiovascular:  Negative for chest pain, leg swelling, syncope and orthopnea.  Respiratory:  See HPI for pertinent negatives  Sleep:  Negative for somnolence, loud snoring, sleep disturbance due to breathing, insomnia.  Gastrointestinal:  Negative for dysphagia, nausea and abdominal pain.  Heme/lymph:  Denies easy bruising, blood clots.  Musculoskeletal:  Negative for arthralgias, sore muscles and back pain.  Skin:  Negative for rash and color change.  Neurological:  Negative for headaches, lightheadedness and weakness.  Psychiatric:  Denies depression.    Current Outpatient Prescriptions   Medication Sig Dispense Refill   • gabapentin (NEURONTIN) 100 MG Cap TAKE 1 CAPSULE BY MOUTH AT 6 PM FOR 1 WEEK THEN 2 CAPS BY MOUTH AT 6 PM DAILY  1   • levofloxacin (LEVAQUIN) 500 MG tablet Take  by mouth every day. WITH  FOOD  0   • ipratropium-albuterol (COMBIVENT RESPIMAT)  MCG/ACT Aero Soln Inhale 1 Puff by mouth 4 times a day. 1 Inhaler 11   • gabapentin (NEURONTIN) 300 MG Cap Take 1 Cap by mouth 3 times a day. 90 Cap    • docusate sodium 100 MG Cap Take 100 mg by mouth every morning. 60 Cap    • senna-docusate (PERICOLACE OR SENOKOT S) 8.6-50 MG Tab Take 1 Tab by mouth every day. (Patient not taking: Reported on 2/21/2017) 30 Tab 0   • senna-docusate (PERICOLACE OR SENOKOT S) 8.6-50 MG Tab Take 1 Tab by mouth every 24 hours as needed for Constipation. (Patient not taking: Reported on 2/21/2017) 30 Tab 0   • lactulose 20 GM/30ML Solution Take 30 mL by mouth every 24 hours as needed (if sennosides-docusate sodium (SENOKOT-S) ineffective). 30 Each    • bisacodyl (DULCOLAX) 10 MG Suppos Insert 1 Suppository in rectum every 24 hours as needed (if lactulose ineffective).  0   • heparin 5000 UNIT/ML Solution Inject 1 mL as instructed every 8 hours.  0   • ondansetron (ZOFRAN ODT) 4 MG TABLET DISPERSIBLE Take 1 Tab by mouth every four hours as needed for Nausea/Vomiting (give PO if IV route is unavailable. May give per feeding tube.). (Patient not taking: Reported on 2/21/2017) 10 Tab 0   • metoprolol (LOPRESSOR) 25 MG Tab Take 1 Tab by mouth 2 Times a Day. (Patient not taking: Reported on 2/21/2017) 60 Tab    • ipratropium-albuterol (DUONEB) 0.5-2.5 (3) MG/3ML nebulizer solution 3 mL by Nebulization route every 6 hours as needed for Shortness of Breath.     • acetaminophen (TYLENOL) 325 MG Tab Take 2 Tabs by mouth every 6 hours as needed for Mild Pain or Moderate Pain. 30 Tab 0   • budesonide (PULMICORT) 0.5 MG/2ML Suspension 2 mL by Nebulization route 2 Times a Day.     • hydrocodone-acetaminophen (NORCO) 5-325 MG Tab per tablet Take 1-2 Tabs by mouth every four hours as needed. 20 Tab 0   • alprazolam (XANAX) 0.25 MG Tab Take 0.25 mg by mouth at bedtime as needed for Sleep.     • Coenzyme Q10 (CO Q 10 PO) Take 1 Cap by mouth  "every day at 6 PM.     • Calcium Carbonate-Vit D-Min (CALCIUM 1200 PO) Take 1 Tab by mouth every day.     • gelatin 650 MG capsule Take 1 Cap by mouth every day.     • vitamin D (CHOLECALCIFEROL) 1000 UNIT Tab Take 1,000 Units by mouth every day.     • Cyanocobalamin (B-12 PO) Take 1 Tab by mouth every day.     • fluticasone (FLONASE) 50 MCG/ACT nasal spray Spray 1 Spray in nose every bedtime. Each Nostril       No current facility-administered medications for this visit.       Social History   Substance Use Topics   • Smoking status: Former Smoker -- 0.50 packs/day for 34 years     Types: Cigarettes     Quit date: 01/01/1985   • Smokeless tobacco: Never Used   • Alcohol Use: Yes      Comment: 2        Past Medical History   Diagnosis Date   • Cancer (CMS-HCC)    • Breast cancer (CMS-HCC)    • Near syncope 8/11/2015   • AAA (abdominal aortic aneurysm) (CMS-HCC) 8/11/2015   • COPD (chronic obstructive pulmonary disease) (CMS-HCC) 8/11/2015   • S/P right mastectomy      per pt done in 1979       Past Surgical History   Procedure Laterality Date   • Other       mastectomy   • Mastectomy  1979     right side   • Breast biopsy  1990       Allergies:  Hydrocodone; Penicillins; and Tetracycline    Family History   Problem Relation Age of Onset   • Cancer Sister    • Cancer Mother    • Lung Disease Paternal Uncle    • Arthritis Neg Hx    • Genetic Neg Hx    • Psychiatry Neg Hx    • Diabetes Neg Hx    • Heart Disease Neg Hx    • Hypertension Neg Hx    • Hyperlipidemia Neg Hx    • Stroke Neg Hx    • Alcohol/Drug Neg Hx        Physical Examination    Filed Vitals:    04/13/17 1034   Height: 1.753 m (5' 9.02\")   Weight: 68.04 kg (150 lb)   Weight % change since last entry.: 0 %   BP: 118/60   Pulse: 78   BMI (Calculated): 22.14   Resp: 16   Temp: 36.3 °C (97.3 °F)   O2 sat % on O2: 91 %   O2 Flow Rate (L/min): 3       Physical Exam:  Constitutional:  Well developed and well nourished.  Head:  Normocephalic and " atraumatic.  Nose:  Nose normal.  Mouth/Throat:  Oropharynx is clear and moist, no lesions.    Neck:  Normal range of motion.  Supple.  No JVD.  Cardiovascular:  Normal rate, regular rhythm, normal heart sounds. No edema  Pulmonary/Chest: No wheezing, rales or rhonchi.  Respiratory effort non labored  Musculoskeletal.  No muscular atrophy.  Lymphadenopathy:  No cervical or supraclavicular adenopathy  Neurological:  Alert and oriented.  Cranial nerves intact.  No focal deficits  Skin:  No rashes or ulcers.  Psyciatric:  Normal mood and affect.    Assessment and Plan:  1. Interstitial lung disease (CMS-HCC)  The patient has interstitial lung disease of unclear etiology. The high-resolution CT scan of the chest is nonspecific and I believe that it is a low probability for UIP. Differential diagnosis may include an NS IP, cryptogenic organizing pneumonia, hypersensitivity pneumonitis, eosinophilic pneumonia, connective tissue disease related interstitial lung disease.    At this point it would be optimal to have tissue diagnosis. Unfortunately bronchoscopy is of limited value although there may be some information that can be garnered from a bronchoalveolar lavage. Best option would be open lung biopsy. However the patient is 88 years old. I am also reluctant to give her an empiric course of steroid therapy given her age and side effects. I would like to discuss the case with Dr. Alaniz at [facility] and with Dr. Elise bahena radiologist also at [facility].    Followup Return in about 4 weeks (around 5/11/2017) for follow up visit with Willy Marcos MD.

## 2017-04-13 NOTE — MR AVS SNAPSHOT
"        Yesi Shaan   2017 11:00 AM   Office Visit   MRN: [id number]    Department:  [facility]   Dept Phone:  [phone]    Description:  Female : 1928   Provider:  Willy Marcos M.D.           Reason for Visit     Results CT and Labs      Allergies as of 2017     Allergen Noted Reactions    Hydrocodone 2017   Rash    \"all over\" and it makes me act loopy    Penicillins 2015   Vomiting    Tetracycline 12/10/2011   Rash, Itching    Pt states \"I get a bad body rash and itch all over\".      You were diagnosed with     Interstitial lung disease (CMS-HCC)   [099470]         Vital Signs     Blood Pressure Pulse Temperature Respirations Height Weight    118/60 mmHg 78 36.3 °C (97.3 °F) 16 1.753 m (5' 9.02\") 68.04 kg (150 lb)    Body Mass Index Oxygen Saturation Smoking Status             22.14 kg/m2 91% Former Smoker         Basic Information     Date Of Birth Sex Race Ethnicity Preferred Language    1928 Female White Non- English      Problem List              ICD-10-CM Priority Class Noted - Resolved    Near syncope R55   2015 - Present    AAA (abdominal aortic aneurysm) (CMS-HCC) I71.4   2015 - Present    Breast cancer (CMS-HCC) C50.919   2015 - Present    S/P right mastectomy Z90.11   2015 - Present    COPD (chronic obstructive pulmonary disease) (CMS-HCC) J44.9 Low  2015 - Present    Acute respiratory failure with hypoxia (CMS-HCC) J96.01 High  2017 - Present    Interstitial lung disease (CMS-HCC) J84.9 High  2017 - Present    Lung nodule R91.1   2017 - Present    Adrenal adenoma D35.00   2017 - Present    Hypertension I10   2017 - Present      Health Maintenance        Date Due Completion Dates    IMM DTaP/Tdap/Td Vaccine (1 - Tdap) 1947 ---    PAP SMEAR 1949 ---    COLONOSCOPY 1978 ---    IMM ZOSTER VACCINE 1988 ---    IMM PNEUMOCOCCAL 65+ (ADULT) LOW/MEDIUM RISK SERIES (2 of 2 - PCV13) 2017 " 11/1/2016    MAMMOGRAM 1/9/2018 1/9/2017, 1/5/2016, 1/2/2015, 12/27/2013, 12/31/2012, 12/29/2011, 1/27/2011, 1/25/2010, 1/25/2010, 1/28/2009, 1/23/2009, 1/23/2009, 1/21/2008, 1/21/2008    BONE DENSITY 7/7/2019 7/7/2014            Current Immunizations     Influenza TIV (IM) 11/1/2016    Pneumococcal polysaccharide vaccine (PPSV-23) 11/1/2016      Below and/or attached are the medications your provider expects you to take. Review all of your home medications and newly ordered medications with your provider and/or pharmacist. Follow medication instructions as directed by your provider and/or pharmacist. Please keep your medication list with you and share with your provider. Update the information when medications are discontinued, doses are changed, or new medications (including over-the-counter products) are added; and carry medication information at all times in the event of emergency situations     Allergies:  HYDROCODONE - Rash     PENICILLINS - Vomiting     TETRACYCLINE - Rash,Itching               Medications  Valid as of: April 13, 2017 - 11:36 AM    Generic Name Brand Name Tablet Size Instructions for use    Acetaminophen (Tab) TYLENOL 325 MG Take 2 Tabs by mouth every 6 hours as needed for Mild Pain or Moderate Pain.        ALPRAZolam (Tab) XANAX 0.25 MG Take 0.25 mg by mouth at bedtime as needed for Sleep.        Bisacodyl (Suppos) DULCOLAX 10 MG Insert 1 Suppository in rectum every 24 hours as needed (if lactulose ineffective).        Budesonide (Suspension) PULMICORT 0.5 MG/2ML 2 mL by Nebulization route 2 Times a Day.        Calcium Carbonate-Vit D-Min   Take 1 Tab by mouth every day.        Cholecalciferol (Tab) cholecalciferol 1000 UNIT Take 1,000 Units by mouth every day.        Coenzyme Q10   Take 1 Cap by mouth every day at 6 PM.        Cyanocobalamin   Take 1 Tab by mouth every day.        Docusate Sodium (Cap)  MG Take 100 mg by mouth every morning.        Fluticasone Propionate (Suspension)  FLONASE 50 MCG/ACT Spray 1 Spray in nose every bedtime. Each Nostril        Gabapentin (Cap) NEURONTIN 300 MG Take 1 Cap by mouth 3 times a day.        Gabapentin (Cap) NEURONTIN 100 MG TAKE 1 CAPSULE BY MOUTH AT 6 PM FOR 1 WEEK THEN 2 CAPS BY MOUTH AT 6 PM DAILY        Gelatin (Cap) gelatin 650 MG Take 1 Cap by mouth every day.        Heparin Sodium (Porcine) (Solution) heparin 5000 UNIT/ML Inject 1 mL as instructed every 8 hours.        Hydrocodone-Acetaminophen (Tab) NORCO 5-325 MG Take 1-2 Tabs by mouth every four hours as needed.        Ipratropium-Albuterol (Solution) DUONEB 0.5-2.5 (3) MG/3ML 3 mL by Nebulization route every 6 hours as needed for Shortness of Breath.        Ipratropium-Albuterol (Aero Soln) COMBIVENT RESPIMAT  MCG/ACT Inhale 1 Puff by mouth 4 times a day.        Lactulose (Solution) lactulose 20 GM/30ML Take 30 mL by mouth every 24 hours as needed (if sennosides-docusate sodium (SENOKOT-S) ineffective).        LevoFLOXacin (Tab) LEVAQUIN 500 MG Take  by mouth every day. WITH FOOD        Metoprolol Tartrate (Tab) LOPRESSOR 25 MG Take 1 Tab by mouth 2 Times a Day.        Ondansetron (TABLET DISPERSIBLE) ZOFRAN ODT 4 MG Take 1 Tab by mouth every four hours as needed for Nausea/Vomiting (give PO if IV route is unavailable. May give per feeding tube.).        Sennosides-Docusate Sodium (Tab) PERICOLACE or SENOKOT S 8.6-50 MG Take 1 Tab by mouth every day.        Sennosides-Docusate Sodium (Tab) PERICOLACE or SENOKOT S 8.6-50 MG Take 1 Tab by mouth every 24 hours as needed for Constipation.        .                 Medicines prescribed today were sent to:     [id number] - [place] - [address] [address]    Phone: [phone] Fax: [phone]    Open 24 Hours?: No      Medication refill instructions:       If your prescription bottle indicates you have medication refills left, it is not necessary to call your provider’s office. Please contact your  pharmacy and they will refill your medication.    If your prescription bottle indicates you do not have any refills left, you may request refills at any time through one of the following ways: The online [employer] system (except Urgent Care), by calling your provider’s office, or by asking your pharmacy to contact your provider’s office with a refill request. Medication refills are processed only during regular business hours and may not be available until the next business day. Your provider may request additional information or to have a follow-up visit with you prior to refilling your medication.   *Please Note: Medication refills are assigned a new Rx number when refilled electronically. Your pharmacy may indicate that no refills were authorized even though a new prescription for the same medication is available at the pharmacy. Please request the medicine by name with the pharmacy before contacting your provider for a refill.        Instructions    1. We will send the CT scan films to a lung radiologists for a second opinion  2. I will call with the results of the radiologist's interpretation          [employer] Access Code: Activation code not generated  Current [employer] Status: Active

## 2017-04-18 ENCOUNTER — TELEPHONE (OUTPATIENT)
Dept: PULMONOLOGY | Facility: HOSPICE | Age: 82
End: 2017-04-18

## 2017-05-08 ENCOUNTER — TELEPHONE (OUTPATIENT)
Dept: PULMONOLOGY | Facility: HOSPICE | Age: 82
End: 2017-05-08

## 2017-05-08 NOTE — TELEPHONE ENCOUNTER
Fax was received 5/8/17 stating the got the forms and the case is currently in process and they would contact shortly with case status.

## 2017-05-11 ENCOUNTER — TELEPHONE (OUTPATIENT)
Dept: SLEEP MEDICINE | Facility: MEDICAL CENTER | Age: 82
End: 2017-05-11

## 2017-05-17 ENCOUNTER — OFFICE VISIT (OUTPATIENT)
Dept: PULMONOLOGY | Facility: HOSPICE | Age: 82
End: 2017-05-17
Payer: MEDICARE

## 2017-05-17 VITALS
OXYGEN SATURATION: 91 % | WEIGHT: 150 LBS | DIASTOLIC BLOOD PRESSURE: 60 MMHG | BODY MASS INDEX: 22.22 KG/M2 | HEART RATE: 77 BPM | SYSTOLIC BLOOD PRESSURE: 116 MMHG | HEIGHT: 69 IN | RESPIRATION RATE: 16 BRPM | TEMPERATURE: 97.5 F

## 2017-05-17 DIAGNOSIS — J84.10 PULMONARY FIBROSIS (HCC): ICD-10-CM

## 2017-05-17 PROCEDURE — 4040F PNEUMOC VAC/ADMIN/RCVD: CPT | Performed by: INTERNAL MEDICINE

## 2017-05-17 PROCEDURE — G8420 CALC BMI NORM PARAMETERS: HCPCS | Performed by: INTERNAL MEDICINE

## 2017-05-17 PROCEDURE — G8432 DEP SCR NOT DOC, RNG: HCPCS | Performed by: INTERNAL MEDICINE

## 2017-05-17 PROCEDURE — 99213 OFFICE O/P EST LOW 20 MIN: CPT | Performed by: INTERNAL MEDICINE

## 2017-05-17 PROCEDURE — 1036F TOBACCO NON-USER: CPT | Performed by: INTERNAL MEDICINE

## 2017-05-17 PROCEDURE — 1101F PT FALLS ASSESS-DOCD LE1/YR: CPT | Mod: 8P | Performed by: INTERNAL MEDICINE

## 2017-05-17 NOTE — PATIENT INSTRUCTIONS
1.  We have scheduled pulmonary rehabilitation  2. Recommend starting Pirfenidone medication when it becomes available  3. Recommend follow-up 4 weeks after starting the Pirfenidone

## 2017-05-17 NOTE — MR AVS SNAPSHOT
"        Yesi Shaan   2017 7:40 AM   Office Visit   MRN: [id number]    Department:  [facility]   Dept Phone:  [phone]    Description:  Female : 1928   Provider:  Willy Marcos M.D.           Reason for Visit     Follow-Up           Allergies as of 2017     Allergen Noted Reactions    Hydrocodone 2017   Rash    \"all over\" and it makes me act loopy    Penicillins 2015   Vomiting    Tetracycline 12/10/2011   Rash, Itching    Pt states \"I get a bad body rash and itch all over\".      You were diagnosed with     Pulmonary fibrosis (CMS-HCC)   [784955]         Vital Signs     Blood Pressure Pulse Temperature Respirations Height Weight    116/60 mmHg 77 36.4 °C (97.5 °F) 16 1.753 m (5' 9\") 68.04 kg (150 lb)    Body Mass Index Oxygen Saturation Smoking Status             22.14 kg/m2 91% Former Smoker         Basic Information     Date Of Birth Sex Race Ethnicity Preferred Language    1928 Female White Non- English      Your appointments     May 20, 2017  9:15 AM   ECHO with ECHO [facility], [facility] EXAM 9   ECHOCARDIOLOGY [facility] ([facility])    [address] [place] [id number]   [phone]           No prep              Problem List              ICD-10-CM Priority Class Noted - Resolved    Near syncope R55   2015 - Present    AAA (abdominal aortic aneurysm) (CMS-HCC) I71.4   2015 - Present    Breast cancer (CMS-HCC) C50.919   2015 - Present    S/P right mastectomy Z90.11   2015 - Present    COPD (chronic obstructive pulmonary disease) (CMS-HCC) J44.9 Low  2015 - Present    Acute respiratory failure with hypoxia (CMS-HCC) J96.01 High  2017 - Present    Interstitial lung disease (CMS-HCC) J84.9 High  2017 - Present    Lung nodule R91.1   2017 - Present    Adrenal adenoma D35.00   2017 - Present    Hypertension I10   2017 - Present      Health Maintenance        Date Due Completion Dates    IMM DTaP/Tdap/Td Vaccine (1 - Tdap) 1947 " ---    PAP SMEAR 8/5/1949 ---    COLONOSCOPY 8/5/1978 ---    IMM ZOSTER VACCINE 8/5/1988 ---    IMM PNEUMOCOCCAL 65+ (ADULT) LOW/MEDIUM RISK SERIES (2 of 2 - PCV13) 11/1/2017 11/1/2016    MAMMOGRAM 1/9/2018 1/9/2017, 1/5/2016, 1/2/2015, 12/27/2013, 12/31/2012, 12/29/2011, 1/27/2011, 1/25/2010, 1/25/2010, 1/28/2009, 1/23/2009, 1/23/2009, 1/21/2008, 1/21/2008    BONE DENSITY 7/7/2019 7/7/2014            Current Immunizations     Influenza TIV (IM) 11/1/2016    Pneumococcal polysaccharide vaccine (PPSV-23) 11/1/2016      Below and/or attached are the medications your provider expects you to take. Review all of your home medications and newly ordered medications with your provider and/or pharmacist. Follow medication instructions as directed by your provider and/or pharmacist. Please keep your medication list with you and share with your provider. Update the information when medications are discontinued, doses are changed, or new medications (including over-the-counter products) are added; and carry medication information at all times in the event of emergency situations     Allergies:  HYDROCODONE - Rash     PENICILLINS - Vomiting     TETRACYCLINE - Rash,Itching               Medications  Valid as of: May 17, 2017 -  8:14 AM    Generic Name Brand Name Tablet Size Instructions for use    Acetaminophen (Tab) TYLENOL 325 MG Take 2 Tabs by mouth every 6 hours as needed for Mild Pain or Moderate Pain.        ALPRAZolam (Tab) XANAX 0.25 MG Take 0.25 mg by mouth at bedtime as needed for Sleep.        Bisacodyl (Suppos) DULCOLAX 10 MG Insert 1 Suppository in rectum every 24 hours as needed (if lactulose ineffective).        Budesonide (Suspension) PULMICORT 0.5 MG/2ML 2 mL by Nebulization route 2 Times a Day.        Calcium Carbonate-Vit D-Min   Take 1 Tab by mouth every day.        Cholecalciferol (Tab) cholecalciferol 1000 UNIT Take 1,000 Units by mouth every day.        Coenzyme Q10   Take 1 Cap by mouth every day at 6 PM.          Cyanocobalamin   Take 1 Tab by mouth every day.        Docusate Sodium (Cap)  MG Take 100 mg by mouth every morning.        Fluticasone Propionate (Suspension) FLONASE 50 MCG/ACT Spray 1 Spray in nose every bedtime. Each Nostril        Gabapentin (Cap) NEURONTIN 300 MG Take 1 Cap by mouth 3 times a day.        Gabapentin (Cap) NEURONTIN 100 MG TAKE 1 CAPSULE BY MOUTH AT 6 PM FOR 1 WEEK THEN 2 CAPS BY MOUTH AT 6 PM DAILY        Gelatin (Cap) gelatin 650 MG Take 1 Cap by mouth every day.        Heparin Sodium (Porcine) (Solution) heparin 5000 UNIT/ML Inject 1 mL as instructed every 8 hours.        Hydrocodone-Acetaminophen (Tab) NORCO 5-325 MG Take 1-2 Tabs by mouth every four hours as needed.        Ipratropium-Albuterol (Solution) DUONEB 0.5-2.5 (3) MG/3ML 3 mL by Nebulization route every 6 hours as needed for Shortness of Breath.        Ipratropium-Albuterol (Aero Soln) COMBIVENT RESPIMAT  MCG/ACT Inhale 1 Puff by mouth 4 times a day.        Lactulose (Solution) lactulose 20 GM/30ML Take 30 mL by mouth every 24 hours as needed (if sennosides-docusate sodium (SENOKOT-S) ineffective).        LevoFLOXacin (Tab) LEVAQUIN 500 MG Take  by mouth every day. WITH FOOD        Metoprolol Tartrate (Tab) LOPRESSOR 25 MG Take 1 Tab by mouth 2 Times a Day.        Ondansetron (TABLET DISPERSIBLE) ZOFRAN ODT 4 MG Take 1 Tab by mouth every four hours as needed for Nausea/Vomiting (give PO if IV route is unavailable. May give per feeding tube.).        Sennosides-Docusate Sodium (Tab) PERICOLACE or SENOKOT S 8.6-50 MG Take 1 Tab by mouth every day.        Sennosides-Docusate Sodium (Tab) PERICOLACE or SENOKOT S 8.6-50 MG Take 1 Tab by mouth every 24 hours as needed for Constipation.        .                 Medicines prescribed today were sent to:     [id number] - [place] - [address] [place] [address]    Phone: [phone] Fax: [phone]    Open 24 Hours?: No       Medication refill instructions:       If your prescription bottle indicates you have medication refills left, it is not necessary to call your provider’s office. Please contact your pharmacy and they will refill your medication.    If your prescription bottle indicates you do not have any refills left, you may request refills at any time through one of the following ways: The online [employer] system (except Urgent Care), by calling your provider’s office, or by asking your pharmacy to contact your provider’s office with a refill request. Medication refills are processed only during regular business hours and may not be available until the next business day. Your provider may request additional information or to have a follow-up visit with you prior to refilling your medication.   *Please Note: Medication refills are assigned a new Rx number when refilled electronically. Your pharmacy may indicate that no refills were authorized even though a new prescription for the same medication is available at the pharmacy. Please request the medicine by name with the pharmacy before contacting your provider for a refill.        Referral     A referral request has been sent to our patient care coordination department. Please allow 3-5 business days for us to process this request and contact you either by phone or mail. If you do not hear from us by the 5th business day, please call us at [phone].           [employer] Access Code: Activation code not generated  Current [employer] Status: Active

## 2017-05-17 NOTE — PROGRESS NOTES
"Yesi Garduno is a 88 y.o. female here for follow-up of interstitial lung disease.    History of Present Illness:    The patient is an 80-year-old female who comes in for follow-up of pulmonary fibrosis. We received a feedback from Dr. Olivares who is a pulmonary radiologists at [facility] in regards to the results of her high resolution CT scan of the chest. 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 This is a possible UIP pattern with moderate to severe basilar predominant reticulations. There is suggestion of some early honeycombing, but not quite there yet to call it a definite UIP pattern. However, given her age and the imaging appearance, UIP is felt to be most likely. There is some groundglass, but not enough to make the imaging inconsistent with UIP. The distribution argues against chronic HP. CTD-ILD is a differential consideration, but felt to be less likely. She does have some underlying emphysema, but ILD is the predominant process. \"    Constitutional:  Negative for fever, chills, sweats, and fatigue.  Eyes:  Negative for eye pain and visual changes.  HENT:  Negative for tinnitus and hoarse voice.  Cardiovascular:  Negative for chest pain, leg swelling, syncope and orthopnea.  Respiratory:  See HPI for pertinent negatives  Sleep:  Negative for somnolence, loud snoring, sleep disturbance due to breathing, insomnia.  Gastrointestinal:  Negative for dysphagia, nausea and abdominal pain.  Heme/lymph:  Denies easy bruising, blood clots.  Musculoskeletal:  Negative for arthralgias, sore muscles and back pain.  Skin:  Negative for rash and color change.  Neurological:  Negative for headaches, lightheadedness and weakness.  Psychiatric:  Denies depression.    Current Outpatient Prescriptions   Medication Sig Dispense Refill   • gabapentin (NEURONTIN) 100 MG Cap TAKE 1 CAPSULE BY MOUTH AT 6 PM FOR 1 WEEK THEN 2 CAPS BY MOUTH AT 6 PM DAILY  1   • levofloxacin (LEVAQUIN) 500 MG tablet Take  by mouth " every day. WITH FOOD  0   • ipratropium-albuterol (COMBIVENT RESPIMAT)  MCG/ACT Aero Soln Inhale 1 Puff by mouth 4 times a day. 1 Inhaler 11   • gabapentin (NEURONTIN) 300 MG Cap Take 1 Cap by mouth 3 times a day. 90 Cap    • docusate sodium 100 MG Cap Take 100 mg by mouth every morning. 60 Cap    • senna-docusate (PERICOLACE OR SENOKOT S) 8.6-50 MG Tab Take 1 Tab by mouth every day. (Patient not taking: Reported on 2/21/2017) 30 Tab 0   • senna-docusate (PERICOLACE OR SENOKOT S) 8.6-50 MG Tab Take 1 Tab by mouth every 24 hours as needed for Constipation. (Patient not taking: Reported on 2/21/2017) 30 Tab 0   • lactulose 20 GM/30ML Solution Take 30 mL by mouth every 24 hours as needed (if sennosides-docusate sodium (SENOKOT-S) ineffective). 30 Each    • bisacodyl (DULCOLAX) 10 MG Suppos Insert 1 Suppository in rectum every 24 hours as needed (if lactulose ineffective).  0   • heparin 5000 UNIT/ML Solution Inject 1 mL as instructed every 8 hours.  0   • ondansetron (ZOFRAN ODT) 4 MG TABLET DISPERSIBLE Take 1 Tab by mouth every four hours as needed for Nausea/Vomiting (give PO if IV route is unavailable. May give per feeding tube.). (Patient not taking: Reported on 2/21/2017) 10 Tab 0   • metoprolol (LOPRESSOR) 25 MG Tab Take 1 Tab by mouth 2 Times a Day. (Patient not taking: Reported on 2/21/2017) 60 Tab    • ipratropium-albuterol (DUONEB) 0.5-2.5 (3) MG/3ML nebulizer solution 3 mL by Nebulization route every 6 hours as needed for Shortness of Breath.     • acetaminophen (TYLENOL) 325 MG Tab Take 2 Tabs by mouth every 6 hours as needed for Mild Pain or Moderate Pain. 30 Tab 0   • budesonide (PULMICORT) 0.5 MG/2ML Suspension 2 mL by Nebulization route 2 Times a Day.     • hydrocodone-acetaminophen (NORCO) 5-325 MG Tab per tablet Take 1-2 Tabs by mouth every four hours as needed. 20 Tab 0   • alprazolam (XANAX) 0.25 MG Tab Take 0.25 mg by mouth at bedtime as needed for Sleep.     • Coenzyme Q10 (CO Q 10 PO) Take  "1 Cap by mouth every day at 6 PM.     • Calcium Carbonate-Vit D-Min (CALCIUM 1200 PO) Take 1 Tab by mouth every day.     • gelatin 650 MG capsule Take 1 Cap by mouth every day.     • vitamin D (CHOLECALCIFEROL) 1000 UNIT Tab Take 1,000 Units by mouth every day.     • Cyanocobalamin (B-12 PO) Take 1 Tab by mouth every day.     • fluticasone (FLONASE) 50 MCG/ACT nasal spray Spray 1 Spray in nose every bedtime. Each Nostril       No current facility-administered medications for this visit.       Social History   Substance Use Topics   • Smoking status: Former Smoker -- 0.50 packs/day for 34 years     Types: Cigarettes     Quit date: 01/01/1985   • Smokeless tobacco: Never Used   • Alcohol Use: Yes      Comment: 2        Past Medical History   Diagnosis Date   • Cancer (CMS-HCC)    • Breast cancer (CMS-HCC)    • Near syncope 8/11/2015   • AAA (abdominal aortic aneurysm) (CMS-HCC) 8/11/2015   • COPD (chronic obstructive pulmonary disease) (CMS-HCC) 8/11/2015   • S/P right mastectomy      per pt done in 1979       Past Surgical History   Procedure Laterality Date   • Other       mastectomy   • Mastectomy  1979     right side   • Breast biopsy  1990       Allergies:  Hydrocodone; Penicillins; and Tetracycline    Family History   Problem Relation Age of Onset   • Cancer Sister    • Cancer Mother    • Lung Disease Paternal Uncle    • Arthritis Neg Hx    • Genetic Neg Hx    • Psychiatry Neg Hx    • Diabetes Neg Hx    • Heart Disease Neg Hx    • Hypertension Neg Hx    • Hyperlipidemia Neg Hx    • Stroke Neg Hx    • Alcohol/Drug Neg Hx        Physical Examination    Filed Vitals:    05/17/17 0752   Height: 1.753 m (5' 9\")   Weight: 68.04 kg (150 lb)   Weight % change since last entry.: 0 %   BP: 116/60   Pulse: 77   BMI (Calculated): 22.15   Resp: 16   Temp: 36.4 °C (97.5 °F)   O2 sat % on O2: 91 %   O2 Flow Rate (L/min): 3       Physical Exam:  Constitutional:  Well developed and well nourished.  Head:  Normocephalic and " atraumatic.  Nose:  Nose normal.  Mouth/Throat:  Oropharynx is clear and moist, no lesions.    Neck:  Normal range of motion.  Supple.  No JVD.  Cardiovascular:  Normal rate, regular rhythm, normal heart sounds. No edema  Pulmonary/Chest: No wheezing, rales or rhonchi.  Respiratory effort non labored  Musculoskeletal.  No muscular atrophy.  Lymphadenopathy:  No cervical or supraclavicular adenopathy  Neurological:  Alert and oriented.  Cranial nerves intact.  No focal deficits  Skin:  No rashes or ulcers.  Psyciatric:  Normal mood and affect.    Assessment and Plan:  1. Pulmonary fibrosis (CMS-[facility])  I have informed the patient that it is most likely that she has UIP. Treatment options include Esbriet or Ofev.  We have already requested Esbriet medication and the patient has an appointment with the clinical nurse to go over the medication. She will receive the medication from the specialty pharmacy. She will continue with her oxygen therapy. I'll also send her for pulmonary rehabilitation program. We will see her back in 4 weeks after she starts the medication to see how she is doing and also we will need to check liver function testing periodically.  - REFERRAL TO [facility] IMPROVEMENT PROGRAMS (HIP) Services Requested:: Pulmonary Rehab; Reason for Visit:: COPD/Emphysema      Followup Return in about 8 weeks (around 7/12/2017) for follow up visit with Willy Marcos MD.

## 2017-05-20 ENCOUNTER — HOSPITAL ENCOUNTER (OUTPATIENT)
Dept: CARDIOLOGY | Facility: MEDICAL CENTER | Age: 82
End: 2017-05-20
Attending: FAMILY MEDICINE
Payer: MEDICARE

## 2017-05-20 DIAGNOSIS — I50.9 CONGESTIVE HEART FAILURE, UNSPECIFIED CONGESTIVE HEART FAILURE CHRONICITY, UNSPECIFIED CONGESTIVE HEART FAILURE TYPE: ICD-10-CM

## 2017-05-20 PROCEDURE — 93306 TTE W/DOPPLER COMPLETE: CPT | Mod: 26 | Performed by: INTERNAL MEDICINE

## 2017-05-20 PROCEDURE — 93306 TTE W/DOPPLER COMPLETE: CPT

## 2017-05-21 LAB — LV EJECT FRACT  99904: 55

## 2017-05-25 ENCOUNTER — TELEPHONE (OUTPATIENT)
Dept: SLEEP MEDICINE | Facility: MEDICAL CENTER | Age: 82
End: 2017-05-25

## 2017-05-25 ENCOUNTER — PATIENT MESSAGE (OUTPATIENT)
Dept: PULMONOLOGY | Facility: HOSPICE | Age: 82
End: 2017-05-25

## 2017-05-25 NOTE — TELEPHONE ENCOUNTER
"Pt wondering if \"additional paperwork was sent to [employer] [address]\" yet.  Do you know anything about this?    "

## 2017-05-25 NOTE — TELEPHONE ENCOUNTER
Do you know when? Called patient and when she called [employer] they did not receive it as of yesterday.

## 2017-05-26 NOTE — TELEPHONE ENCOUNTER
I called Yesi and left a voice mail stating to call me so I can let her know I refaxed the paperwork

## 2017-06-06 ENCOUNTER — TELEPHONE (OUTPATIENT)
Dept: PULMONOLOGY | Facility: HOSPICE | Age: 82
End: 2017-06-06

## 2017-06-06 ENCOUNTER — PATIENT MESSAGE (OUTPATIENT)
Dept: PULMONOLOGY | Facility: HOSPICE | Age: 82
End: 2017-06-06

## 2017-06-06 DIAGNOSIS — J84.9 ILD (INTERSTITIAL LUNG DISEASE) (HCC): ICD-10-CM

## 2017-06-06 NOTE — PROGRESS NOTES
Subjective:      Yesi Garduno is a 88 y.o. female who presents with No chief complaint on file.            HPI    Yesi  has a past medical history of Cancer (CMS-HCC); Breast cancer (CMS-HCC); Near syncope (8/11/2015); AAA (abdominal aortic aneurysm) (CMS-HCC) (8/11/2015); COPD (chronic obstructive pulmonary disease) (CMS-HCC) (8/11/2015); and S/P right mastectomy.    Residential Hx-                                Pet Exposures Hx  Pet Exposures   • Denies Pet/Animal Exposures Yes        Occupational Hx-                                      Review of Systems       Objective:     There were no vitals filed for this visit.    Physical Exam           Assessment/Plan:     There are no diagnoses linked to this encounter.

## 2017-06-06 NOTE — TELEPHONE ENCOUNTER
From: Yesi Garduno  To: Willy Marcos M.D.  Sent: 6/6/2017 11:43 AM PDT  Subject: Non-Urgent Medical Question    [facility] needs a new prescription, cud u pls ask Dr. Marcos for it

## 2017-06-16 ENCOUNTER — TELEPHONE (OUTPATIENT)
Dept: PULMONOLOGY | Facility: HOSPICE | Age: 82
End: 2017-06-16

## 2017-06-16 NOTE — TELEPHONE ENCOUNTER
I spoke with Yesi on 6/15/17 to get her in for a sooner appt, at that time she informed me she was trying to get a hold of me to let me know the pharmacy was faxing over a form for  Medical Necessity needing the Dx code on it.      I faxed over the form to [phone] with the Dx code on it and then called the Pt to let her know it was done.

## 2017-06-20 ENCOUNTER — HOSPITAL ENCOUNTER (OUTPATIENT)
Dept: RADIOLOGY | Facility: MEDICAL CENTER | Age: 82
End: 2017-06-20
Attending: FAMILY MEDICINE
Payer: MEDICARE

## 2017-06-20 DIAGNOSIS — I71.40 ABDOMINAL AORTIC ANEURYSM WITHOUT RUPTURE (HCC): ICD-10-CM

## 2017-06-20 PROCEDURE — 76775 US EXAM ABDO BACK WALL LIM: CPT

## 2017-06-22 ENCOUNTER — TELEPHONE (OUTPATIENT)
Dept: PULMONOLOGY | Facility: HOSPICE | Age: 82
End: 2017-06-22

## 2017-06-22 NOTE — TELEPHONE ENCOUNTER
David from [facility] called and stated that he is waiting for a fax back for this patients maintenance dose of Esbriet.  His fax is [phone].  His phone is [phone] opt 2, opt 1.

## 2017-06-23 NOTE — TELEPHONE ENCOUNTER
Spoke to pharmacist at [facility], verbal order given for maintnance dose of Esbriet. 267mg 3 capsules p.o, t.i.d, with food, Quantity 270 with 11 refills.    Paper work put into Esbriet binder.

## 2017-07-10 DIAGNOSIS — J84.112 IPF (IDIOPATHIC PULMONARY FIBROSIS) (HCC): ICD-10-CM

## 2017-07-10 RX ORDER — PIRFENIDONE 267 MG/1
801 CAPSULE ORAL 3 TIMES DAILY
Qty: 90 CAP | Refills: 11 | Status: SHIPPED
Start: 2017-07-10 | End: 2018-06-28 | Stop reason: SDUPTHER

## 2017-07-14 ENCOUNTER — TELEPHONE (OUTPATIENT)
Dept: PULMONOLOGY | Facility: HOSPICE | Age: 82
End: 2017-07-14

## 2017-07-24 NOTE — TELEPHONE ENCOUNTER
PA is approved; patient does not qualify for the patient assistance program.    Esbriet shipped on 7/18/2017

## 2017-08-10 ENCOUNTER — OFFICE VISIT (OUTPATIENT)
Dept: PULMONOLOGY | Facility: HOSPICE | Age: 82
End: 2017-08-10
Payer: MEDICARE

## 2017-08-10 VITALS
SYSTOLIC BLOOD PRESSURE: 120 MMHG | BODY MASS INDEX: 22.66 KG/M2 | RESPIRATION RATE: 16 BRPM | WEIGHT: 153 LBS | DIASTOLIC BLOOD PRESSURE: 72 MMHG | OXYGEN SATURATION: 90 % | HEIGHT: 69 IN | HEART RATE: 69 BPM | TEMPERATURE: 97.7 F

## 2017-08-10 DIAGNOSIS — J84.112 IPF (IDIOPATHIC PULMONARY FIBROSIS) (HCC): ICD-10-CM

## 2017-08-10 DIAGNOSIS — M19.90 ARTHRITIS: ICD-10-CM

## 2017-08-10 PROCEDURE — 99214 OFFICE O/P EST MOD 30 MIN: CPT | Performed by: INTERNAL MEDICINE

## 2017-08-10 RX ORDER — TRIAMTERENE AND HYDROCHLOROTHIAZIDE 37.5; 25 MG/1; MG/1
1 TABLET ORAL DAILY
Qty: 30 TAB | Refills: 3 | Status: ON HOLD | OUTPATIENT
Start: 2017-08-10 | End: 2021-04-01

## 2017-08-10 NOTE — MR AVS SNAPSHOT
"        Yesi Garduno   8/10/2017 11:00 AM   Office Visit   MRN: [id number]    Department:  [facility]   Dept Phone:  [phone]    Description:  Female : 1928   Provider:  Willy Marcos M.D.           Reason for Visit     Follow-Up Esbriet check    COPD           Allergies as of 8/10/2017     Allergen Noted Reactions    Hydrocodone 2017   Rash    \"all over\" and it makes me act loopy    Penicillins 2015   Vomiting    Tetracycline 12/10/2011   Rash, Itching    Pt states \"I get a bad body rash and itch all over\".      You were diagnosed with     Arthritis   [732498]       IPF (idiopathic pulmonary fibrosis) (CMS-HCC)   [805560]         Vital Signs     Blood Pressure Pulse Temperature Respirations Height Weight    120/72 mmHg 69 36.5 °C (97.7 °F) 16 1.753 m (5' 9.02\") 69.4 kg (153 lb)    Body Mass Index Oxygen Saturation Smoking Status             22.58 kg/m2 90% Former Smoker         Basic Information     Date Of Birth Sex Race Ethnicity Preferred Language    1928 Female White Non- English      Problem List              ICD-10-CM Priority Class Noted - Resolved    Near syncope R55   2015 - Present    AAA (abdominal aortic aneurysm) (CMS-HCC) I71.4   2015 - Present    Breast cancer (CMS-HCC) C50.919   2015 - Present    S/P right mastectomy Z90.11   2015 - Present    COPD (chronic obstructive pulmonary disease) (CMS-HCC) J44.9 Low  2015 - Present    Acute respiratory failure with hypoxia (CMS-HCC) J96.01 High  2017 - Present    Interstitial lung disease (CMS-HCC) J84.9 High  2017 - Present    Lung nodule R91.1   2017 - Present    Adrenal adenoma D35.00   2017 - Present    Hypertension I10   2017 - Present      Health Maintenance        Date Due Completion Dates    IMM DTaP/Tdap/Td Vaccine (1 - Tdap) 1947 ---    PAP SMEAR 1949 ---    COLONOSCOPY 1978 ---    IMM ZOSTER VACCINE 1988 ---    IMM INFLUENZA (1) " 9/1/2017 11/1/2016    IMM PNEUMOCOCCAL 65+ (ADULT) LOW/MEDIUM RISK SERIES (2 of 2 - PCV13) 11/1/2017 11/1/2016    MAMMOGRAM 1/9/2018 1/9/2017, 1/5/2016, 1/2/2015, 12/27/2013, 12/31/2012, 12/29/2011, 1/27/2011, 1/25/2010, 1/25/2010, 1/28/2009, 1/23/2009, 1/23/2009, 1/21/2008, 1/21/2008    BONE DENSITY 7/7/2019 7/7/2014            Current Immunizations     Influenza TIV (IM) 11/17/2016, 10/19/2015    Pneumococcal polysaccharide vaccine (PPSV-23) 10/19/2015    SHINGLES VACCINE 8/2/2011      Below and/or attached are the medications your provider expects you to take. Review all of your home medications and newly ordered medications with your provider and/or pharmacist. Follow medication instructions as directed by your provider and/or pharmacist. Please keep your medication list with you and share with your provider. Update the information when medications are discontinued, doses are changed, or new medications (including over-the-counter products) are added; and carry medication information at all times in the event of emergency situations     Allergies:  HYDROCODONE - Rash     PENICILLINS - Vomiting     TETRACYCLINE - Rash,Itching               Medications  Valid as of: August 10, 2017 - 11:40 AM    Generic Name Brand Name Tablet Size Instructions for use    Acetaminophen (Tab) TYLENOL 325 MG Take 2 Tabs by mouth every 6 hours as needed for Mild Pain or Moderate Pain.        ALPRAZolam (Tab) XANAX 0.25 MG Take 0.25 mg by mouth at bedtime as needed for Sleep.        Bisacodyl (Suppos) DULCOLAX 10 MG Insert 1 Suppository in rectum every 24 hours as needed (if lactulose ineffective).        Budesonide (Suspension) PULMICORT 0.5 MG/2ML 2 mL by Nebulization route 2 Times a Day.        Calcium Carbonate-Vit D-Min   Take 1 Tab by mouth every day.        Cholecalciferol (Tab) cholecalciferol 1000 UNIT Take 1,000 Units by mouth every day.        Coenzyme Q10   Take 1 Cap by mouth every day at 6 PM.        Cyanocobalamin   Take  1 Tab by mouth every day.        Docusate Sodium (Cap)  MG Take 100 mg by mouth every morning.        Fluticasone Propionate (Suspension) FLONASE 50 MCG/ACT Spray 1 Spray in nose every bedtime. Each Nostril        Gabapentin (Cap) NEURONTIN 300 MG Take 1 Cap by mouth 3 times a day.        Gabapentin (Cap) NEURONTIN 100 MG TAKE 1 CAPSULE BY MOUTH AT 6 PM FOR 1 WEEK THEN 2 CAPS BY MOUTH AT 6 PM DAILY        Gelatin (Cap) gelatin 650 MG Take 1 Cap by mouth every day.        Heparin Sodium (Porcine) (Solution) heparin 5000 UNIT/ML Inject 1 mL as instructed every 8 hours.        Hydrocodone-Acetaminophen (Tab) NORCO 5-325 MG Take 1-2 Tabs by mouth every four hours as needed.        Ipratropium-Albuterol (Solution) DUONEB 0.5-2.5 (3) MG/3ML 3 mL by Nebulization route every 6 hours as needed for Shortness of Breath.        Ipratropium-Albuterol (Aero Soln) COMBIVENT RESPIMAT  MCG/ACT Inhale 1 Puff by mouth 4 times a day.        Lactulose (Solution) lactulose 20 GM/30ML Take 30 mL by mouth every 24 hours as needed (if sennosides-docusate sodium (SENOKOT-S) ineffective).        LevoFLOXacin (Tab) LEVAQUIN 500 MG Take  by mouth every day. WITH FOOD        Metoprolol Tartrate (Tab) LOPRESSOR 25 MG Take 1 Tab by mouth 2 Times a Day.        Ondansetron (TABLET DISPERSIBLE) ZOFRAN ODT 4 MG Take 1 Tab by mouth every four hours as needed for Nausea/Vomiting (give PO if IV route is unavailable. May give per feeding tube.).        Pirfenidone (Cap) Pirfenidone 267 MG Take 801 mg by mouth 3 times a day. Take with meals        Sennosides-Docusate Sodium (Tab) PERICOLACE or SENOKOT S 8.6-50 MG Take 1 Tab by mouth every day.        Sennosides-Docusate Sodium (Tab) PERICOLACE or SENOKOT S 8.6-50 MG Take 1 Tab by mouth every 24 hours as needed for Constipation.        .                 Medicines prescribed today were sent to:     [id number] - [place], [place] [address] [place] [address]     Phone: [phone] Fax: [phone]    Open 24 Hours?: No      Medication refill instructions:       If your prescription bottle indicates you have medication refills left, it is not necessary to call your provider’s office. Please contact your pharmacy and they will refill your medication.    If your prescription bottle indicates you do not have any refills left, you may request refills at any time through one of the following ways: The online [employer] system (except Urgent Care), by calling your provider’s office, or by asking your pharmacy to contact your provider’s office with a refill request. Medication refills are processed only during regular business hours and may not be available until the next business day. Your provider may request additional information or to have a follow-up visit with you prior to refilling your medication.   *Please Note: Medication refills are assigned a new Rx number when refilled electronically. Your pharmacy may indicate that no refills were authorized even though a new prescription for the same medication is available at the pharmacy. Please request the medicine by name with the pharmacy before contacting your provider for a refill.           [employer] Access Code: Activation code not generated  Current [employer] Status: Active

## 2017-08-10 NOTE — PATIENT INSTRUCTIONS
1. We have started Maxide diuretic to be taken once daily  2. Recommend blood work to be done now and every month  3. We have made a referral to rheumatology  4. We have ordered hand x-rays  5. Recommend continuing with Esbriet

## 2017-08-10 NOTE — PROGRESS NOTES
Yesi Garduno is a 89 y.o. female here for pulmonary fibrosis.    History of Present Illness:    The patient is an 89-year-old female with UIP. She is onEsbriet therapy. She is on 3 tablets 3 times a day. She started to have some joint pains in the wrists and some of the finger joints as well as the shoulder. She is wondering if she might have rheumatoid arthritis. Rheumatoid factor was slightly elevated however her CCP antibodies were negative. She is having some leg edema. She otherwise is been tolerating the medication without difficulty. She is on 2-1/2 L of oxygen and her saturations are 90%. She's been doing more exercises and things and has been a little bit more active and her daughter believes that she may be a little bit better than she was last time. She is not having any new complaints today.    Constitutional:  Negative for fever, chills, sweats, and fatigue.  Eyes:  Negative for eye pain and visual changes.  HENT:  Negative for tinnitus and hoarse voice.  Cardiovascular:  Negative for chest pain, leg swelling, syncope and orthopnea.  Respiratory:  See HPI for pertinent negatives  Sleep:  Negative for somnolence, loud snoring, sleep disturbance due to breathing, insomnia.  Gastrointestinal:  Negative for dysphagia, nausea and abdominal pain.  Heme/lymph:  Denies easy bruising, blood clots.  Musculoskeletal:  Negative for arthralgias, sore muscles and back pain.  Skin:  Negative for rash and color change.  Neurological:  Negative for headaches, lightheadedness and weakness.  Psychiatric:  Denies depression.    Current Outpatient Prescriptions   Medication Sig Dispense Refill   • triamterene-hctz (MAXZIDE-25/DYAZIDE) 37.5-25 MG Tab Take 1 Tab by mouth every day. 30 Tab 3   • Pirfenidone 267 MG Cap Take 801 mg by mouth 3 times a day. Take with meals 90 Cap 11   • levofloxacin (LEVAQUIN) 500 MG tablet Take  by mouth every day. WITH FOOD  0   • ipratropium-albuterol (COMBIVENT RESPIMAT)  MCG/ACT  Aero Soln Inhale 1 Puff by mouth 4 times a day. 1 Inhaler 11   • gabapentin (NEURONTIN) 300 MG Cap Take 1 Cap by mouth 3 times a day. 90 Cap    • ipratropium-albuterol (DUONEB) 0.5-2.5 (3) MG/3ML nebulizer solution 3 mL by Nebulization route every 6 hours as needed for Shortness of Breath.     • acetaminophen (TYLENOL) 325 MG Tab Take 2 Tabs by mouth every 6 hours as needed for Mild Pain or Moderate Pain. 30 Tab 0   • budesonide (PULMICORT) 0.5 MG/2ML Suspension 2 mL by Nebulization route 2 Times a Day.     • alprazolam (XANAX) 0.25 MG Tab Take 0.25 mg by mouth at bedtime as needed for Sleep.     • fluticasone (FLONASE) 50 MCG/ACT nasal spray Spray 1 Spray in nose every bedtime. Each Nostril     • gabapentin (NEURONTIN) 100 MG Cap TAKE 1 CAPSULE BY MOUTH AT 6 PM FOR 1 WEEK THEN 2 CAPS BY MOUTH AT 6 PM DAILY  1   • docusate sodium 100 MG Cap Take 100 mg by mouth every morning. 60 Cap    • senna-docusate (PERICOLACE OR SENOKOT S) 8.6-50 MG Tab Take 1 Tab by mouth every day. (Patient not taking: Reported on 2/21/2017) 30 Tab 0   • senna-docusate (PERICOLACE OR SENOKOT S) 8.6-50 MG Tab Take 1 Tab by mouth every 24 hours as needed for Constipation. (Patient not taking: Reported on 2/21/2017) 30 Tab 0   • lactulose 20 GM/30ML Solution Take 30 mL by mouth every 24 hours as needed (if sennosides-docusate sodium (SENOKOT-S) ineffective). 30 Each    • bisacodyl (DULCOLAX) 10 MG Suppos Insert 1 Suppository in rectum every 24 hours as needed (if lactulose ineffective).  0   • heparin 5000 UNIT/ML Solution Inject 1 mL as instructed every 8 hours.  0   • ondansetron (ZOFRAN ODT) 4 MG TABLET DISPERSIBLE Take 1 Tab by mouth every four hours as needed for Nausea/Vomiting (give PO if IV route is unavailable. May give per feeding tube.). (Patient not taking: Reported on 2/21/2017) 10 Tab 0   • metoprolol (LOPRESSOR) 25 MG Tab Take 1 Tab by mouth 2 Times a Day. (Patient not taking: Reported on 2/21/2017) 60 Tab    •  "hydrocodone-acetaminophen (NORCO) 5-325 MG Tab per tablet Take 1-2 Tabs by mouth every four hours as needed. 20 Tab 0   • Coenzyme Q10 (CO Q 10 PO) Take 1 Cap by mouth every day at 6 PM.     • Calcium Carbonate-Vit D-Min (CALCIUM 1200 PO) Take 1 Tab by mouth every day.     • gelatin 650 MG capsule Take 1 Cap by mouth every day.     • vitamin D (CHOLECALCIFEROL) 1000 UNIT Tab Take 1,000 Units by mouth every day.     • Cyanocobalamin (B-12 PO) Take 1 Tab by mouth every day.       No current facility-administered medications for this visit.       Social History   Substance Use Topics   • Smoking status: Former Smoker -- 0.50 packs/day for 34 years     Types: Cigarettes     Quit date: 01/01/1985   • Smokeless tobacco: Never Used   • Alcohol Use: Yes      Comment: 2        Past Medical History   Diagnosis Date   • Cancer (CMS-HCC)    • Breast cancer (CMS-HCC)    • Near syncope 8/11/2015   • AAA (abdominal aortic aneurysm) (CMS-HCC) 8/11/2015   • COPD (chronic obstructive pulmonary disease) (CMS-HCC) 8/11/2015   • S/P right mastectomy      per pt done in 1979       Past Surgical History   Procedure Laterality Date   • Other       mastectomy   • Mastectomy  1979     right side   • Breast biopsy  1990       Allergies:  Hydrocodone; Penicillins; and Tetracycline    Family History   Problem Relation Age of Onset   • Cancer Sister    • Cancer Mother    • Lung Disease Paternal Uncle    • Arthritis Neg Hx    • Genetic Neg Hx    • Psychiatry Neg Hx    • Diabetes Neg Hx    • Heart Disease Neg Hx    • Hypertension Neg Hx    • Hyperlipidemia Neg Hx    • Stroke Neg Hx    • Alcohol/Drug Neg Hx        Physical Examination    Filed Vitals:    08/10/17 1046   Height: 1.753 m (5' 9.02\")   Weight: 69.4 kg (153 lb)   Weight % change since last entry.: 0 %   BP: 120/72   Pulse: 69   BMI (Calculated): 22.58   Resp: 16   Temp: 36.5 °C (97.7 °F)   O2 sat % on O2: 90 %   O2 Flow Rate (L/min): 2.5       Physical Exam:  Constitutional:  Well " developed and well nourished.  Head:  Normocephalic and atraumatic.  Nose:  Nose normal.  Mouth/Throat:  Oropharynx is clear and moist, no lesions.    Neck:  Normal range of motion.  Supple.  No JVD.  Cardiovascular:  Normal rate, regular rhythm, normal heart sounds. No edema  Pulmonary/Chest: No wheezing, rales or rhonchi.  Respiratory effort non labored  Musculoskeletal.  No muscular atrophy.  Lymphadenopathy:  No cervical or supraclavicular adenopathy  Neurological:  Alert and oriented.  Cranial nerves intact.  No focal deficits  Skin:  No rashes or ulcers.  Psyciatric:  Normal mood and affect.    Assessment and Plan:  1. Arthritis  I have a low suspicion that we are dealing with rheumatoid arthritis. However if we are then the treatment for her interstitial lung disease will be significantly different than the use of Esbriet. Subsequently I have made a referral to rheumatology to see if they believe she has rheumatoid arthritis. I also ordered hand films.  - DX-JOINT SURVEY-HANDS SINGLE VIEW; Future  - REFERRAL TO OTHER    2. IPF (idiopathic pulmonary fibrosis) (CMS-HCC)  She will continue with the Esbriet for now and I would like to get liver function tests every month. She's having some swelling of her lower extremity is about started her on Maxide and have encouraged a low-salt diet. We will check a a basic metabolic panel every month.  - triamterene-hctz (MAXZIDE-25/DYAZIDE) 37.5-25 MG Tab; Take 1 Tab by mouth every day.  Dispense: 30 Tab; Refill: 3  - HEPATIC FUNCTION PANEL; Standing  - BASIC METABOLIC PANEL; Standing      Followup Return in about 3 months (around 11/10/2017) for follow up visit with Willy Marcos MD.

## 2017-08-17 ENCOUNTER — HOSPITAL ENCOUNTER (OUTPATIENT)
Dept: RADIOLOGY | Facility: MEDICAL CENTER | Age: 82
End: 2017-08-17
Attending: INTERNAL MEDICINE
Payer: MEDICARE

## 2017-08-17 DIAGNOSIS — M19.90 ARTHRITIS: ICD-10-CM

## 2017-08-17 PROCEDURE — 77077 JOINT SURVEY SINGLE VIEW: CPT

## 2017-08-21 ENCOUNTER — HOSPITAL ENCOUNTER (OUTPATIENT)
Dept: LAB | Facility: MEDICAL CENTER | Age: 82
End: 2017-08-21
Attending: INTERNAL MEDICINE
Payer: MEDICARE

## 2017-08-21 DIAGNOSIS — J84.112 IPF (IDIOPATHIC PULMONARY FIBROSIS) (HCC): ICD-10-CM

## 2017-08-21 LAB
ALBUMIN SERPL BCP-MCNC: 4 G/DL (ref 3.2–4.9)
ALP SERPL-CCNC: 83 U/L (ref 30–99)
ALT SERPL-CCNC: 10 U/L (ref 2–50)
ANION GAP SERPL CALC-SCNC: 8 MMOL/L (ref 0–11.9)
AST SERPL-CCNC: 16 U/L (ref 12–45)
BILIRUB CONJ SERPL-MCNC: <0.1 MG/DL (ref 0.1–0.5)
BILIRUB INDIRECT SERPL-MCNC: NORMAL MG/DL (ref 0–1)
BILIRUB SERPL-MCNC: 0.3 MG/DL (ref 0.1–1.5)
BUN SERPL-MCNC: 24 MG/DL (ref 8–22)
CALCIUM SERPL-MCNC: 9.7 MG/DL (ref 8.5–10.5)
CHLORIDE SERPL-SCNC: 110 MMOL/L (ref 96–112)
CO2 SERPL-SCNC: 24 MMOL/L (ref 20–33)
CREAT SERPL-MCNC: 0.78 MG/DL (ref 0.5–1.4)
GFR SERPL CREATININE-BSD FRML MDRD: >60 ML/MIN/1.73 M 2
GLUCOSE SERPL-MCNC: 86 MG/DL (ref 65–99)
POTASSIUM SERPL-SCNC: 4.2 MMOL/L (ref 3.6–5.5)
PROT SERPL-MCNC: 6.5 G/DL (ref 6–8.2)
SODIUM SERPL-SCNC: 142 MMOL/L (ref 135–145)

## 2017-08-21 PROCEDURE — 80048 BASIC METABOLIC PNL TOTAL CA: CPT

## 2017-08-21 PROCEDURE — 36415 COLL VENOUS BLD VENIPUNCTURE: CPT

## 2017-08-21 PROCEDURE — 80076 HEPATIC FUNCTION PANEL: CPT

## 2017-09-25 ENCOUNTER — HOSPITAL ENCOUNTER (OUTPATIENT)
Dept: LAB | Facility: MEDICAL CENTER | Age: 82
End: 2017-09-25
Attending: INTERNAL MEDICINE
Payer: MEDICARE

## 2017-09-25 DIAGNOSIS — J84.112 IPF (IDIOPATHIC PULMONARY FIBROSIS) (HCC): ICD-10-CM

## 2017-09-25 LAB
ALBUMIN SERPL BCP-MCNC: 3.8 G/DL (ref 3.2–4.9)
ALP SERPL-CCNC: 93 U/L (ref 30–99)
ALT SERPL-CCNC: 26 U/L (ref 2–50)
ANION GAP SERPL CALC-SCNC: 8 MMOL/L (ref 0–11.9)
AST SERPL-CCNC: 22 U/L (ref 12–45)
BILIRUB CONJ SERPL-MCNC: <0.1 MG/DL (ref 0.1–0.5)
BILIRUB INDIRECT SERPL-MCNC: NORMAL MG/DL (ref 0–1)
BILIRUB SERPL-MCNC: 0.3 MG/DL (ref 0.1–1.5)
BUN SERPL-MCNC: 19 MG/DL (ref 8–22)
CALCIUM SERPL-MCNC: 9.5 MG/DL (ref 8.5–10.5)
CHLORIDE SERPL-SCNC: 108 MMOL/L (ref 96–112)
CO2 SERPL-SCNC: 25 MMOL/L (ref 20–33)
CREAT SERPL-MCNC: 0.82 MG/DL (ref 0.5–1.4)
GFR SERPL CREATININE-BSD FRML MDRD: >60 ML/MIN/1.73 M 2
GLUCOSE SERPL-MCNC: 80 MG/DL (ref 65–99)
POTASSIUM SERPL-SCNC: 4.2 MMOL/L (ref 3.6–5.5)
PROT SERPL-MCNC: 6.5 G/DL (ref 6–8.2)
SODIUM SERPL-SCNC: 141 MMOL/L (ref 135–145)

## 2017-09-25 PROCEDURE — 36415 COLL VENOUS BLD VENIPUNCTURE: CPT

## 2017-09-25 PROCEDURE — 80048 BASIC METABOLIC PNL TOTAL CA: CPT

## 2017-09-25 PROCEDURE — 80076 HEPATIC FUNCTION PANEL: CPT

## 2017-10-30 ENCOUNTER — HOSPITAL ENCOUNTER (OUTPATIENT)
Dept: LAB | Facility: MEDICAL CENTER | Age: 82
End: 2017-10-30
Attending: INTERNAL MEDICINE
Payer: MEDICARE

## 2017-10-30 DIAGNOSIS — J84.112 IPF (IDIOPATHIC PULMONARY FIBROSIS) (HCC): ICD-10-CM

## 2017-10-30 LAB
ALBUMIN SERPL BCP-MCNC: 4.2 G/DL (ref 3.2–4.9)
ALP SERPL-CCNC: 95 U/L (ref 30–99)
ALT SERPL-CCNC: 14 U/L (ref 2–50)
ANION GAP SERPL CALC-SCNC: 9 MMOL/L (ref 0–11.9)
AST SERPL-CCNC: 18 U/L (ref 12–45)
BILIRUB CONJ SERPL-MCNC: <0.1 MG/DL (ref 0.1–0.5)
BILIRUB INDIRECT SERPL-MCNC: NORMAL MG/DL (ref 0–1)
BILIRUB SERPL-MCNC: 0.3 MG/DL (ref 0.1–1.5)
BUN SERPL-MCNC: 24 MG/DL (ref 8–22)
CALCIUM SERPL-MCNC: 9.7 MG/DL (ref 8.5–10.5)
CHLORIDE SERPL-SCNC: 109 MMOL/L (ref 96–112)
CO2 SERPL-SCNC: 23 MMOL/L (ref 20–33)
CREAT SERPL-MCNC: 0.81 MG/DL (ref 0.5–1.4)
GFR SERPL CREATININE-BSD FRML MDRD: >60 ML/MIN/1.73 M 2
GLUCOSE SERPL-MCNC: 100 MG/DL (ref 65–99)
POTASSIUM SERPL-SCNC: 4.3 MMOL/L (ref 3.6–5.5)
PROT SERPL-MCNC: 6.7 G/DL (ref 6–8.2)
SODIUM SERPL-SCNC: 141 MMOL/L (ref 135–145)

## 2017-10-30 PROCEDURE — 80076 HEPATIC FUNCTION PANEL: CPT

## 2017-10-30 PROCEDURE — 80048 BASIC METABOLIC PNL TOTAL CA: CPT

## 2017-10-30 PROCEDURE — 36415 COLL VENOUS BLD VENIPUNCTURE: CPT

## 2017-11-16 ENCOUNTER — OFFICE VISIT (OUTPATIENT)
Dept: PULMONOLOGY | Facility: HOSPICE | Age: 82
End: 2017-11-16
Payer: MEDICARE

## 2017-11-16 VITALS
DIASTOLIC BLOOD PRESSURE: 74 MMHG | OXYGEN SATURATION: 91 % | SYSTOLIC BLOOD PRESSURE: 120 MMHG | TEMPERATURE: 97.9 F | WEIGHT: 153 LBS | HEART RATE: 76 BPM | BODY MASS INDEX: 22.66 KG/M2 | RESPIRATION RATE: 16 BRPM | HEIGHT: 69 IN

## 2017-11-16 DIAGNOSIS — J84.112 IPF (IDIOPATHIC PULMONARY FIBROSIS) (HCC): ICD-10-CM

## 2017-11-16 PROCEDURE — 99214 OFFICE O/P EST MOD 30 MIN: CPT | Performed by: INTERNAL MEDICINE

## 2017-11-16 NOTE — PROGRESS NOTES
Yesi Garduno is a 89 y.o. female here for idiopathic pulmonary fibrosis.    History of Present Illness:    The patient is an 89-year-old female who has idiopathic pulmonary fibrosis. She had also some positive serologies but she recently saw rheumatology and it was felt like she did not have any significant connective tissue disease. She is now on Esbriet therapy at the maximal dosing. She's having her liver function tests checked every month and they have been fine. The patient is tolerating the medicine without difficulty. She is on 3 L of oxygen and her oxygen saturation is 91%. She has not had any new concerns today and denies any worsening shortness of breath or worsening cough or any worsening congestion. She has no new concerns.    Constitutional:  Negative for fever, chills, sweats, and fatigue.  Eyes:  Negative for eye pain and visual changes.  HENT:  Negative for tinnitus and hoarse voice.  Cardiovascular:  Negative for chest pain, leg swelling, syncope and orthopnea.  Respiratory:  See HPI for pertinent negatives  Sleep:  Negative for somnolence, loud snoring, sleep disturbance due to breathing, insomnia.  Gastrointestinal:  Negative for dysphagia, nausea and abdominal pain.  Heme/lymph:  Denies easy bruising, blood clots.  Musculoskeletal:  Negative for arthralgias, sore muscles and back pain.  Skin:  Negative for rash and color change.  Neurological:  Negative for headaches, lightheadedness and weakness.  Psychiatric:  Denies depression.    Current Outpatient Prescriptions   Medication Sig Dispense Refill   • triamterene-hctz (MAXZIDE-25/DYAZIDE) 37.5-25 MG Tab Take 1 Tab by mouth every day. 30 Tab 3   • Pirfenidone 267 MG Cap Take 801 mg by mouth 3 times a day. Take with meals 90 Cap 11   • gabapentin (NEURONTIN) 100 MG Cap TAKE 1 CAPSULE BY MOUTH AT 6 PM FOR 1 WEEK THEN 2 CAPS BY MOUTH AT 6 PM DAILY  1   • levofloxacin (LEVAQUIN) 500 MG tablet Take  by mouth every day. WITH FOOD  0   •  ipratropium-albuterol (COMBIVENT RESPIMAT)  MCG/ACT Aero Soln Inhale 1 Puff by mouth 4 times a day. 1 Inhaler 11   • gabapentin (NEURONTIN) 300 MG Cap Take 1 Cap by mouth 3 times a day. 90 Cap    • docusate sodium 100 MG Cap Take 100 mg by mouth every morning. 60 Cap    • senna-docusate (PERICOLACE OR SENOKOT S) 8.6-50 MG Tab Take 1 Tab by mouth every day. (Patient not taking: Reported on 2/21/2017) 30 Tab 0   • senna-docusate (PERICOLACE OR SENOKOT S) 8.6-50 MG Tab Take 1 Tab by mouth every 24 hours as needed for Constipation. (Patient not taking: Reported on 2/21/2017) 30 Tab 0   • lactulose 20 GM/30ML Solution Take 30 mL by mouth every 24 hours as needed (if sennosides-docusate sodium (SENOKOT-S) ineffective). 30 Each    • bisacodyl (DULCOLAX) 10 MG Suppos Insert 1 Suppository in rectum every 24 hours as needed (if lactulose ineffective).  0   • heparin 5000 UNIT/ML Solution Inject 1 mL as instructed every 8 hours.  0   • ondansetron (ZOFRAN ODT) 4 MG TABLET DISPERSIBLE Take 1 Tab by mouth every four hours as needed for Nausea/Vomiting (give PO if IV route is unavailable. May give per feeding tube.). (Patient not taking: Reported on 2/21/2017) 10 Tab 0   • metoprolol (LOPRESSOR) 25 MG Tab Take 1 Tab by mouth 2 Times a Day. (Patient not taking: Reported on 2/21/2017) 60 Tab    • ipratropium-albuterol (DUONEB) 0.5-2.5 (3) MG/3ML nebulizer solution 3 mL by Nebulization route every 6 hours as needed for Shortness of Breath.     • acetaminophen (TYLENOL) 325 MG Tab Take 2 Tabs by mouth every 6 hours as needed for Mild Pain or Moderate Pain. 30 Tab 0   • budesonide (PULMICORT) 0.5 MG/2ML Suspension 2 mL by Nebulization route 2 Times a Day.     • hydrocodone-acetaminophen (NORCO) 5-325 MG Tab per tablet Take 1-2 Tabs by mouth every four hours as needed. 20 Tab 0   • alprazolam (XANAX) 0.25 MG Tab Take 0.25 mg by mouth at bedtime as needed for Sleep.     • Coenzyme Q10 (CO Q 10 PO) Take 1 Cap by mouth every day at  "6 PM.     • Calcium Carbonate-Vit D-Min (CALCIUM 1200 PO) Take 1 Tab by mouth every day.     • gelatin 650 MG capsule Take 1 Cap by mouth every day.     • vitamin D (CHOLECALCIFEROL) 1000 UNIT Tab Take 1,000 Units by mouth every day.     • Cyanocobalamin (B-12 PO) Take 1 Tab by mouth every day.     • fluticasone (FLONASE) 50 MCG/ACT nasal spray Spray 1 Spray in nose every bedtime. Each Nostril       No current facility-administered medications for this visit.        Social History   Substance Use Topics   • Smoking status: Former Smoker     Packs/day: 0.50     Years: 34.00     Types: Cigarettes     Quit date: 1/1/1985   • Smokeless tobacco: Never Used   • Alcohol use Yes      Comment: 2        Past Medical History:   Diagnosis Date   • AAA (abdominal aortic aneurysm) (CMS-HCC) 8/11/2015   • Breast cancer (CMS-HCC)    • Cancer (CMS-HCC)    • COPD (chronic obstructive pulmonary disease) (CMS-HCC) 8/11/2015   • Near syncope 8/11/2015   • S/P right mastectomy     per pt done in 1979       Past Surgical History:   Procedure Laterality Date   • BREAST BIOPSY  1990   • MASTECTOMY  1979    right side   • OTHER      mastectomy       Allergies:  Hydrocodone; Penicillins; and Tetracycline    Family History   Problem Relation Age of Onset   • Cancer Sister    • Cancer Mother    • Lung Disease Paternal Uncle    • Arthritis Neg Hx    • Genetic Neg Hx    • Psychiatry Neg Hx    • Diabetes Neg Hx    • Heart Disease Neg Hx    • Hypertension Neg Hx    • Hyperlipidemia Neg Hx    • Stroke Neg Hx    • Alcohol/Drug Neg Hx        Physical Examination    Vitals:    11/16/17 1000   Height: 1.753 m (5' 9\")   Weight: 69.4 kg (153 lb)   Weight % change since last entry.: 0 %   BP: 120/74   Pulse: 76   BMI (Calculated): 22.59   Resp: 16   Temp: 36.6 °C (97.9 °F)   O2 sat % on O2: 91 %   O2 Flow Rate (L/min): 3       Physical Exam:  Constitutional:  Well developed and well nourished.  Head:  Normocephalic and atraumatic.  Nose:  Nose " normal.  Mouth/Throat:  Oropharynx is clear and moist, no lesions.    Neck:  Normal range of motion.  Supple.  No JVD.  Cardiovascular:  Normal rate, regular rhythm, normal heart sounds. No edema  Pulmonary/Chest: No wheezing, rales or rhonchi.  Respiratory effort non labored  Musculoskeletal.  No muscular atrophy.  Lymphadenopathy:  No cervical or supraclavicular adenopathy  Neurological:  Alert and oriented.  Cranial nerves intact.  No focal deficits  Skin:  No rashes or ulcers.  Psyciatric:  Normal mood and affect.    Assessment and Plan:  1. IPF (idiopathic pulmonary fibrosis) (CMS-HCC)  The patient has interstitial lung disease most consistent with a UIP. She is now on Esbriet therapy 3 tablets 3 times a day. Clinically she is stable. She is tolerating the medication. Her liver function tests are within normal limits. She is due for repeat CT scan of the chest and pulmonary function test in March. We'll plan to see her back at that time.  - CT-CHEST, HIGH RESOLUTION LUNG; Future  - AMB PULMONARY FUNCTION TEST/LAB; Future        Followup Return in about 3 months (around 2/16/2018) for follow up with the pulmonary physician.

## 2017-11-22 ENCOUNTER — TELEPHONE (OUTPATIENT)
Dept: PULMONOLOGY | Facility: HOSPICE | Age: 82
End: 2017-11-22

## 2017-11-22 DIAGNOSIS — J84.10 PULMONARY FIBROSIS (HCC): ICD-10-CM

## 2017-12-04 ENCOUNTER — HOSPITAL ENCOUNTER (OUTPATIENT)
Dept: LAB | Facility: MEDICAL CENTER | Age: 82
End: 2017-12-04
Attending: INTERNAL MEDICINE
Payer: MEDICARE

## 2017-12-04 LAB
ALBUMIN SERPL BCP-MCNC: 4.4 G/DL (ref 3.2–4.9)
ALP SERPL-CCNC: 91 U/L (ref 30–99)
ALT SERPL-CCNC: 12 U/L (ref 2–50)
ANION GAP SERPL CALC-SCNC: 9 MMOL/L (ref 0–11.9)
AST SERPL-CCNC: 20 U/L (ref 12–45)
BILIRUB CONJ SERPL-MCNC: <0.1 MG/DL (ref 0.1–0.5)
BILIRUB INDIRECT SERPL-MCNC: NORMAL MG/DL (ref 0–1)
BILIRUB SERPL-MCNC: 0.3 MG/DL (ref 0.1–1.5)
BUN SERPL-MCNC: 24 MG/DL (ref 8–22)
CALCIUM SERPL-MCNC: 9.6 MG/DL (ref 8.5–10.5)
CHLORIDE SERPL-SCNC: 106 MMOL/L (ref 96–112)
CO2 SERPL-SCNC: 24 MMOL/L (ref 20–33)
CREAT SERPL-MCNC: 0.95 MG/DL (ref 0.5–1.4)
GFR SERPL CREATININE-BSD FRML MDRD: 55 ML/MIN/1.73 M 2
GLUCOSE SERPL-MCNC: 76 MG/DL (ref 65–99)
POTASSIUM SERPL-SCNC: 4.1 MMOL/L (ref 3.6–5.5)
PROT SERPL-MCNC: 7 G/DL (ref 6–8.2)
SODIUM SERPL-SCNC: 139 MMOL/L (ref 135–145)

## 2017-12-04 PROCEDURE — 80048 BASIC METABOLIC PNL TOTAL CA: CPT

## 2017-12-04 PROCEDURE — 80076 HEPATIC FUNCTION PANEL: CPT

## 2017-12-04 PROCEDURE — 36415 COLL VENOUS BLD VENIPUNCTURE: CPT

## 2017-12-05 DIAGNOSIS — Z79.899 HIGH RISK MEDICATION USE: ICD-10-CM

## 2017-12-06 ENCOUNTER — TELEPHONE (OUTPATIENT)
Dept: PULMONOLOGY | Facility: HOSPICE | Age: 82
End: 2017-12-06

## 2017-12-06 NOTE — PROGRESS NOTES
Please inform pt they need to have their blood drawn in 1 month as kidney function is slightly abnormal. Should f/u with pcp.

## 2018-01-03 ENCOUNTER — TELEPHONE (OUTPATIENT)
Dept: PULMONOLOGY | Facility: HOSPICE | Age: 83
End: 2018-01-03

## 2018-01-03 NOTE — TELEPHONE ENCOUNTER
Called over to Dr. Garcia's office, left vm for them to cb [phone] and let us know what info they need on the pt.

## 2018-01-05 ENCOUNTER — HOSPITAL ENCOUNTER (OUTPATIENT)
Dept: LAB | Facility: MEDICAL CENTER | Age: 83
End: 2018-01-05
Attending: INTERNAL MEDICINE
Payer: MEDICARE

## 2018-01-05 LAB
ALBUMIN SERPL BCP-MCNC: 4.2 G/DL (ref 3.2–4.9)
ALP SERPL-CCNC: 84 U/L (ref 30–99)
ALT SERPL-CCNC: 13 U/L (ref 2–50)
ANION GAP SERPL CALC-SCNC: 7 MMOL/L (ref 0–11.9)
AST SERPL-CCNC: 16 U/L (ref 12–45)
BILIRUB CONJ SERPL-MCNC: <0.1 MG/DL (ref 0.1–0.5)
BILIRUB INDIRECT SERPL-MCNC: NORMAL MG/DL (ref 0–1)
BILIRUB SERPL-MCNC: 0.3 MG/DL (ref 0.1–1.5)
BUN SERPL-MCNC: 26 MG/DL (ref 8–22)
CALCIUM SERPL-MCNC: 9.2 MG/DL (ref 8.5–10.5)
CHLORIDE SERPL-SCNC: 106 MMOL/L (ref 96–112)
CO2 SERPL-SCNC: 27 MMOL/L (ref 20–33)
CREAT SERPL-MCNC: 0.97 MG/DL (ref 0.5–1.4)
GFR SERPL CREATININE-BSD FRML MDRD: 54 ML/MIN/1.73 M 2
GLUCOSE SERPL-MCNC: 89 MG/DL (ref 65–99)
POTASSIUM SERPL-SCNC: 4.2 MMOL/L (ref 3.6–5.5)
PROT SERPL-MCNC: 6.7 G/DL (ref 6–8.2)
SODIUM SERPL-SCNC: 140 MMOL/L (ref 135–145)

## 2018-01-05 PROCEDURE — 80076 HEPATIC FUNCTION PANEL: CPT

## 2018-01-05 PROCEDURE — 80048 BASIC METABOLIC PNL TOTAL CA: CPT

## 2018-01-05 PROCEDURE — 36415 COLL VENOUS BLD VENIPUNCTURE: CPT

## 2018-01-11 ENCOUNTER — APPOINTMENT (OUTPATIENT)
Dept: RADIOLOGY | Facility: MEDICAL CENTER | Age: 83
End: 2018-01-11
Attending: FAMILY MEDICINE
Payer: MEDICARE

## 2018-02-05 ENCOUNTER — HOSPITAL ENCOUNTER (OUTPATIENT)
Dept: LAB | Facility: MEDICAL CENTER | Age: 83
End: 2018-02-05
Attending: INTERNAL MEDICINE
Payer: MEDICARE

## 2018-02-05 DIAGNOSIS — J84.112 IPF (IDIOPATHIC PULMONARY FIBROSIS) (HCC): ICD-10-CM

## 2018-02-05 LAB
ALBUMIN SERPL BCP-MCNC: 4.3 G/DL (ref 3.2–4.9)
ALP SERPL-CCNC: 84 U/L (ref 30–99)
ALT SERPL-CCNC: 12 U/L (ref 2–50)
ANION GAP SERPL CALC-SCNC: 8 MMOL/L (ref 0–11.9)
AST SERPL-CCNC: 16 U/L (ref 12–45)
BILIRUB CONJ SERPL-MCNC: <0.1 MG/DL (ref 0.1–0.5)
BILIRUB INDIRECT SERPL-MCNC: NORMAL MG/DL (ref 0–1)
BILIRUB SERPL-MCNC: 0.3 MG/DL (ref 0.1–1.5)
BUN SERPL-MCNC: 23 MG/DL (ref 8–22)
CALCIUM SERPL-MCNC: 9.5 MG/DL (ref 8.5–10.5)
CHLORIDE SERPL-SCNC: 107 MMOL/L (ref 96–112)
CO2 SERPL-SCNC: 26 MMOL/L (ref 20–33)
CREAT SERPL-MCNC: 1.03 MG/DL (ref 0.5–1.4)
GLUCOSE SERPL-MCNC: 84 MG/DL (ref 65–99)
POTASSIUM SERPL-SCNC: 4.1 MMOL/L (ref 3.6–5.5)
PROT SERPL-MCNC: 6.4 G/DL (ref 6–8.2)
SODIUM SERPL-SCNC: 141 MMOL/L (ref 135–145)

## 2018-02-05 PROCEDURE — 80048 BASIC METABOLIC PNL TOTAL CA: CPT

## 2018-02-05 PROCEDURE — 80076 HEPATIC FUNCTION PANEL: CPT

## 2018-02-05 PROCEDURE — 36415 COLL VENOUS BLD VENIPUNCTURE: CPT

## 2018-02-08 ENCOUNTER — TELEPHONE (OUTPATIENT)
Dept: PULMONOLOGY | Facility: HOSPICE | Age: 83
End: 2018-02-08

## 2018-02-09 NOTE — TELEPHONE ENCOUNTER
The patient called and stated that her Esbriet needs a prior auth.  The number for that is [phone].  Thank you.

## 2018-02-09 NOTE — TELEPHONE ENCOUNTER
Called [facility] to start PA.     Case # - [id number]    Approval letter scanned into media.

## 2018-03-12 ENCOUNTER — NON-PROVIDER VISIT (OUTPATIENT)
Dept: PULMONOLOGY | Facility: HOSPICE | Age: 83
End: 2018-03-12
Payer: MEDICARE

## 2018-03-12 VITALS — HEIGHT: 68 IN | BODY MASS INDEX: 23.95 KG/M2 | WEIGHT: 158 LBS

## 2018-03-12 DIAGNOSIS — J84.112 IPF (IDIOPATHIC PULMONARY FIBROSIS) (HCC): ICD-10-CM

## 2018-03-12 PROCEDURE — 94726 PLETHYSMOGRAPHY LUNG VOLUMES: CPT | Performed by: INTERNAL MEDICINE

## 2018-03-12 PROCEDURE — 94729 DIFFUSING CAPACITY: CPT | Performed by: INTERNAL MEDICINE

## 2018-03-12 PROCEDURE — 94060 EVALUATION OF WHEEZING: CPT | Performed by: INTERNAL MEDICINE

## 2018-03-12 ASSESSMENT — PULMONARY FUNCTION TESTS
FVC: 2.51
FEV1_LLN: 1.65
FEV1/FVC: 67
FVC_PERCENT_PREDICTED: 93
FEV1_PERCENT_CHANGE: 0
FEV1/FVC_PERCENT_PREDICTED: 96
FEV1/FVC_PERCENT_PREDICTED: 90
FEV1_PERCENT_PREDICTED: 90
FEV1: 1.67
FEV1: 1.78
FVC_PREDICTED: 2.67
FEV1/FVC: 70.63
FVC: 2.52
FEV1_PERCENT_PREDICTED: 84
FEV1/FVC_PERCENT_PREDICTED: 98
FEV1/FVC_PERCENT_PREDICTED: 74
FEV1_PERCENT_CHANGE: 6
FVC_PERCENT_PREDICTED: 94
FEV1/FVC: 67
FEV1/FVC_PREDICTED: 72
FEV1/FVC_PERCENT_LLN: 60
FEV1/FVC_PERCENT_PREDICTED: 92
FEV1_PREDICTED: 1.98
FEV1/FVC_PERCENT_CHANGE: 6
FEV1/FVC: 71
FVC_LLN: 2.23

## 2018-03-12 NOTE — PROCEDURES
Technician Isabel Edwards, [facility] Comments:Good patient effort & cooperation.  The results of this test meet the ATS/ERS standards for acceptability & reproducibility.  Test was performed on the [employer] Body Plethysmograph-Elite DX system.  Predicted values were [facility]-3 for spirometry, [facility] for DLCO, ITS for Lung Volumes.  The DLCO was uncorrected for Hgb.  A bronchodilator of Ventolin HFA -2puffs via spacer administered.  DLCO performed during dilation period.      The FVC is 2.52 L or 94%, FEV1 is 1.78 L or 90%, FEV1/FVC 71%. TLC 89%. DLCO 57%. No bronchodilator response.    Interpretation:  Isolated moderate diffusion impairment which is consistent with interstitial lung disease. Normal overall total lung capacity.

## 2018-03-19 ENCOUNTER — HOSPITAL ENCOUNTER (OUTPATIENT)
Dept: RADIOLOGY | Facility: MEDICAL CENTER | Age: 83
End: 2018-03-19
Attending: INTERNAL MEDICINE
Payer: MEDICARE

## 2018-03-19 DIAGNOSIS — J84.112 IPF (IDIOPATHIC PULMONARY FIBROSIS) (HCC): ICD-10-CM

## 2018-03-19 PROCEDURE — 71250 CT THORAX DX C-: CPT

## 2018-03-23 ENCOUNTER — HOSPITAL ENCOUNTER (OUTPATIENT)
Dept: LAB | Facility: MEDICAL CENTER | Age: 83
End: 2018-03-23
Attending: INTERNAL MEDICINE
Payer: MEDICARE

## 2018-03-23 LAB
ALBUMIN SERPL BCP-MCNC: 4.1 G/DL (ref 3.2–4.9)
ALP SERPL-CCNC: 96 U/L (ref 30–99)
ALT SERPL-CCNC: 13 U/L (ref 2–50)
ANION GAP SERPL CALC-SCNC: 5 MMOL/L (ref 0–11.9)
AST SERPL-CCNC: 15 U/L (ref 12–45)
BILIRUB CONJ SERPL-MCNC: <0.1 MG/DL (ref 0.1–0.5)
BILIRUB INDIRECT SERPL-MCNC: NORMAL MG/DL (ref 0–1)
BILIRUB SERPL-MCNC: 0.4 MG/DL (ref 0.1–1.5)
BUN SERPL-MCNC: 25 MG/DL (ref 8–22)
CALCIUM SERPL-MCNC: 9.4 MG/DL (ref 8.5–10.5)
CHLORIDE SERPL-SCNC: 109 MMOL/L (ref 96–112)
CO2 SERPL-SCNC: 27 MMOL/L (ref 20–33)
CREAT SERPL-MCNC: 0.95 MG/DL (ref 0.5–1.4)
GLUCOSE SERPL-MCNC: 94 MG/DL (ref 65–99)
POTASSIUM SERPL-SCNC: 4.5 MMOL/L (ref 3.6–5.5)
PROT SERPL-MCNC: 6.5 G/DL (ref 6–8.2)
SODIUM SERPL-SCNC: 141 MMOL/L (ref 135–145)

## 2018-03-23 PROCEDURE — 80048 BASIC METABOLIC PNL TOTAL CA: CPT

## 2018-03-23 PROCEDURE — 80076 HEPATIC FUNCTION PANEL: CPT

## 2018-03-23 PROCEDURE — 36415 COLL VENOUS BLD VENIPUNCTURE: CPT

## 2018-03-29 ENCOUNTER — OFFICE VISIT (OUTPATIENT)
Dept: PULMONOLOGY | Facility: HOSPICE | Age: 83
End: 2018-03-29
Payer: MEDICARE

## 2018-03-29 VITALS
DIASTOLIC BLOOD PRESSURE: 78 MMHG | TEMPERATURE: 97.7 F | BODY MASS INDEX: 23.49 KG/M2 | OXYGEN SATURATION: 97 % | HEART RATE: 74 BPM | SYSTOLIC BLOOD PRESSURE: 120 MMHG | RESPIRATION RATE: 16 BRPM | WEIGHT: 155 LBS | HEIGHT: 68 IN

## 2018-03-29 DIAGNOSIS — J43.2 CENTRILOBULAR EMPHYSEMA (HCC): ICD-10-CM

## 2018-03-29 DIAGNOSIS — J84.9 INTERSTITIAL LUNG DISEASE (HCC): ICD-10-CM

## 2018-03-29 DIAGNOSIS — R91.1 LUNG NODULE: ICD-10-CM

## 2018-03-29 DIAGNOSIS — R09.02 HYPOXIA: ICD-10-CM

## 2018-03-29 PROCEDURE — 99214 OFFICE O/P EST MOD 30 MIN: CPT | Performed by: INTERNAL MEDICINE

## 2018-03-29 NOTE — PROGRESS NOTES
Yesi Garduno is a 89 y.o. female here for interstitial lung disease on perfinidone and chronic oxygen. Patient was referred by her primary care doctor.    History of Present Illness:    This lady is responding nicely to perfinidone, takes it 3 times a day without liver difficulties. Her chronic oxygen is utilized faithfully, I did order a portable oxygen concentrator. Her daughter is present, indicates that the patient is doing well with regard to activity as tolerated, supplemental oxygen use, faithful with medications.    This lady is quite independent at age 89, lives with her . Her daughter is present, Lindsey. I reviewed her prior imaging, lung function testing done last week shows an improvement in the oxygen transfer, from 33% previously up to 57%, very encouraging. She has no new hemoptysis or purulence. Vaccinations are current. Body mass index is appropriate. She is not smoking.    She will continue the current medicines, follow up in 6 months with lung function testing, sooner for problems      Constitutional ROS: No unexpected change in weight, No unexplained fevers  Eyes: No change in vision or blurring or double vision  Mouth/Throat ROS: No sore throat, No recent change in voice or hoarseness  Pulmonary ROS: See present history for pertinent positives  Cardiovascular ROS: No chest pain to suggest acute coronary syndrome  Gastrointestinal ROS: No abdominal pain to suggest peptic disease  Musculoskeletal/Extremities ROS: no acute artritis or unusual swelling  Hematologic/Lymphatic ROS: No easy bleeding or unusual lymph node swelling  Neurologic ROS: No new or unusual weakness  Psychiatric ROS: No hallucinations  Allergic/Immunologic: No  urticaria or allergic rash      Current Outpatient Prescriptions   Medication Sig Dispense Refill   • Pirfenidone 267 MG Cap Take 801 mg by mouth 3 times a day. Take with meals 90 Cap 11   • acetaminophen (TYLENOL) 325 MG Tab Take 2 Tabs by mouth every 6  hours as needed for Mild Pain or Moderate Pain. 30 Tab 0   • alprazolam (XANAX) 0.25 MG Tab Take 0.25 mg by mouth at bedtime as needed for Sleep.     • Coenzyme Q10 (CO Q 10 PO) Take 1 Cap by mouth every day at 6 PM.     • Calcium Carbonate-Vit D-Min (CALCIUM 1200 PO) Take 1 Tab by mouth every day.     • vitamin D (CHOLECALCIFEROL) 1000 UNIT Tab Take 1,000 Units by mouth every day.     • Cyanocobalamin (B-12 PO) Take 1 Tab by mouth every day.     • fluticasone (FLONASE) 50 MCG/ACT nasal spray Spray 1 Spray in nose every bedtime. Each Nostril     • triamterene-hctz (MAXZIDE-25/DYAZIDE) 37.5-25 MG Tab Take 1 Tab by mouth every day. 30 Tab 3   • gabapentin (NEURONTIN) 100 MG Cap TAKE 1 CAPSULE BY MOUTH AT 6 PM FOR 1 WEEK THEN 2 CAPS BY MOUTH AT 6 PM DAILY  1   • levofloxacin (LEVAQUIN) 500 MG tablet Take  by mouth every day. WITH FOOD  0   • ipratropium-albuterol (COMBIVENT RESPIMAT)  MCG/ACT Aero Soln Inhale 1 Puff by mouth 4 times a day. 1 Inhaler 11   • gabapentin (NEURONTIN) 300 MG Cap Take 1 Cap by mouth 3 times a day. 90 Cap    • docusate sodium 100 MG Cap Take 100 mg by mouth every morning. 60 Cap    • senna-docusate (PERICOLACE OR SENOKOT S) 8.6-50 MG Tab Take 1 Tab by mouth every day. (Patient not taking: Reported on 2/21/2017) 30 Tab 0   • senna-docusate (PERICOLACE OR SENOKOT S) 8.6-50 MG Tab Take 1 Tab by mouth every 24 hours as needed for Constipation. (Patient not taking: Reported on 2/21/2017) 30 Tab 0   • lactulose 20 GM/30ML Solution Take 30 mL by mouth every 24 hours as needed (if sennosides-docusate sodium (SENOKOT-S) ineffective). 30 Each    • bisacodyl (DULCOLAX) 10 MG Suppos Insert 1 Suppository in rectum every 24 hours as needed (if lactulose ineffective).  0   • heparin 5000 UNIT/ML Solution Inject 1 mL as instructed every 8 hours.  0   • ondansetron (ZOFRAN ODT) 4 MG TABLET DISPERSIBLE Take 1 Tab by mouth every four hours as needed for Nausea/Vomiting (give PO if IV route is unavailable.  "May give per feeding tube.). (Patient not taking: Reported on 2/21/2017) 10 Tab 0   • metoprolol (LOPRESSOR) 25 MG Tab Take 1 Tab by mouth 2 Times a Day. (Patient not taking: Reported on 2/21/2017) 60 Tab    • ipratropium-albuterol (DUONEB) 0.5-2.5 (3) MG/3ML nebulizer solution 3 mL by Nebulization route every 6 hours as needed for Shortness of Breath.     • budesonide (PULMICORT) 0.5 MG/2ML Suspension 2 mL by Nebulization route 2 Times a Day.     • hydrocodone-acetaminophen (NORCO) 5-325 MG Tab per tablet Take 1-2 Tabs by mouth every four hours as needed. 20 Tab 0   • gelatin 650 MG capsule Take 1 Cap by mouth every day.       No current facility-administered medications for this visit.        Social History   Substance Use Topics   • Smoking status: Former Smoker     Packs/day: 0.25     Years: 34.00     Types: Cigarettes     Quit date: 1/1/1985   • Smokeless tobacco: Never Used   • Alcohol use Yes      Comment: 2         Past Medical History:   Diagnosis Date   • AAA (abdominal aortic aneurysm) (CMS-HCC) 8/11/2015   • Breast cancer (CMS-HCC)    • Cancer (CMS-HCC)    • COPD (chronic obstructive pulmonary disease) (CMS-HCC) 8/11/2015   • Near syncope 8/11/2015   • S/P right mastectomy     per pt done in 1979       Past Surgical History:   Procedure Laterality Date   • BREAST BIOPSY  1990   • MASTECTOMY  1979    right side   • OTHER      mastectomy       Allergies: Tetracycline; Hydrocodone; and Penicillins    Family History   Problem Relation Age of Onset   • Cancer Sister    • Cancer Mother    • Lung Disease Paternal Uncle    • Arthritis Neg Hx    • Genetic Neg Hx    • Psychiatry Neg Hx    • Diabetes Neg Hx    • Heart Disease Neg Hx    • Hypertension Neg Hx    • Hyperlipidemia Neg Hx    • Stroke Neg Hx    • Alcohol/Drug Neg Hx        Physical Examination    Vitals:    03/29/18 1052   Height: 1.727 m (5' 8\")   Weight: 70.3 kg (155 lb)   Weight % change since last entry.: 0 %   BP: 120/78   Pulse: 74   BMI " (Calculated): 23.57   Resp: 16   Temp: 36.5 °C (97.7 °F)       General Appearance: alert, no distress  Skin: Skin color, texture, turgor normal. No rashes or lesions.  Eyes: negative  Oropharynx: Lips, mucosa, and tongue normal. Teeth and gums normal. Oropharynx moist and without lesion  Lungs: positive findings: Very faint rales in the bases  Heart: negative. RRR without murmur, gallop, or rubs.  No ectopy.  Abdomen: Abdomen soft, non-tender. . No masses,  No organomegaly  Extremities:  No deformities, edema, or skin discoloration  Joints: No acute arthritis  Peripheral Pulses:perfused  Neurologic: intact grossly  No clubbing or cyanosis    I (soft palate, uvula, fauces, tonsillar pillars visible)    Imaging: Described above    PFTS: Described above      Assessment and Plan  1. Interstitial lung disease (CMS-HCC)  - AMB PULMONARY FUNCTION TEST/LAB; Future  - DME OTHER    2. Lung nodule  - AMB PULMONARY FUNCTION TEST/LAB; Future    3. Centrilobular emphysema (CMS-HCC)  - AMB PULMONARY FUNCTION TEST/LAB; Future    4. Hypoxia  - AMB PULMONARY FUNCTION TEST/LAB; Future  - DME OTHER    This lady is responding nicely to perfinidone, takes it 3 times a day without liver difficulties. Her chronic oxygen is utilized faithfully, I did order a portable oxygen concentrator. Her daughter is present, indicates that the patient is doing well with regard to activity as tolerated, supplemental oxygen use, faithful with medications.    This lady is quite independent at age 89, lives with her . Her daughter is present, Lindsey. I reviewed her prior imaging, lung function testing done last week shows an improvement in the oxygen transfer, from 33% previously up to 57%, very encouraging. She has no new hemoptysis or purulence. Vaccinations are current. Body mass index is appropriate. She is not smoking.    She will continue the current medicines, follow up in 6 months with lung function testing, sooner for problems  Followup  Return in about 6 months (around 9/29/2018) for follow up visit with Dr. Cate Richardson, with PFT.

## 2018-03-29 NOTE — PATIENT INSTRUCTIONS
This lady is responding nicely to perfinidone, takes it 3 times a day without liver difficulties. Her chronic oxygen is utilized faithfully, I did order a portable oxygen concentrator. Her daughter is present, indicates that the patient is doing well with regard to activity as tolerated, supplemental oxygen use, faithful with medications.    This lady is quite independent at age 89, lives with her . Her daughter is present, Lindsey. I reviewed her prior imaging, lung function testing done last week shows an improvement in the oxygen transfer, from 33% previously up to 57%, very encouraging. She has no new hemoptysis or purulence. Vaccinations are current. Body mass index is appropriate. She is not smoking.    She will continue the current medicines, follow up in 6 months with lung function testing, sooner for problems

## 2018-04-27 ENCOUNTER — HOSPITAL ENCOUNTER (OUTPATIENT)
Dept: LAB | Facility: MEDICAL CENTER | Age: 83
End: 2018-04-27
Attending: INTERNAL MEDICINE
Payer: MEDICARE

## 2018-04-27 LAB
ALBUMIN SERPL BCP-MCNC: 4.1 G/DL (ref 3.2–4.9)
ALP SERPL-CCNC: 104 U/L (ref 30–99)
ALT SERPL-CCNC: 16 U/L (ref 2–50)
ANION GAP SERPL CALC-SCNC: 10 MMOL/L (ref 0–11.9)
AST SERPL-CCNC: 18 U/L (ref 12–45)
BILIRUB CONJ SERPL-MCNC: <0.1 MG/DL (ref 0.1–0.5)
BILIRUB INDIRECT SERPL-MCNC: ABNORMAL MG/DL (ref 0–1)
BILIRUB SERPL-MCNC: 0.4 MG/DL (ref 0.1–1.5)
BUN SERPL-MCNC: 25 MG/DL (ref 8–22)
CALCIUM SERPL-MCNC: 9.3 MG/DL (ref 8.5–10.5)
CHLORIDE SERPL-SCNC: 106 MMOL/L (ref 96–112)
CO2 SERPL-SCNC: 25 MMOL/L (ref 20–33)
CREAT SERPL-MCNC: 0.98 MG/DL (ref 0.5–1.4)
GLUCOSE SERPL-MCNC: 86 MG/DL (ref 65–99)
POTASSIUM SERPL-SCNC: 4.1 MMOL/L (ref 3.6–5.5)
PROT SERPL-MCNC: 6.8 G/DL (ref 6–8.2)
SODIUM SERPL-SCNC: 141 MMOL/L (ref 135–145)

## 2018-04-27 PROCEDURE — 36415 COLL VENOUS BLD VENIPUNCTURE: CPT

## 2018-04-27 PROCEDURE — 80076 HEPATIC FUNCTION PANEL: CPT

## 2018-04-27 PROCEDURE — 80048 BASIC METABOLIC PNL TOTAL CA: CPT

## 2018-06-01 ENCOUNTER — HOSPITAL ENCOUNTER (OUTPATIENT)
Dept: LAB | Facility: MEDICAL CENTER | Age: 83
End: 2018-06-01
Attending: INTERNAL MEDICINE
Payer: MEDICARE

## 2018-06-01 LAB
ALBUMIN SERPL BCP-MCNC: 4.2 G/DL (ref 3.2–4.9)
ALP SERPL-CCNC: 88 U/L (ref 30–99)
ALT SERPL-CCNC: 15 U/L (ref 2–50)
ANION GAP SERPL CALC-SCNC: 8 MMOL/L (ref 0–11.9)
AST SERPL-CCNC: 18 U/L (ref 12–45)
BILIRUB CONJ SERPL-MCNC: <0.1 MG/DL (ref 0.1–0.5)
BILIRUB INDIRECT SERPL-MCNC: NORMAL MG/DL (ref 0–1)
BILIRUB SERPL-MCNC: 0.3 MG/DL (ref 0.1–1.5)
BUN SERPL-MCNC: 27 MG/DL (ref 8–22)
CALCIUM SERPL-MCNC: 9.9 MG/DL (ref 8.5–10.5)
CHLORIDE SERPL-SCNC: 107 MMOL/L (ref 96–112)
CO2 SERPL-SCNC: 25 MMOL/L (ref 20–33)
CREAT SERPL-MCNC: 1.02 MG/DL (ref 0.5–1.4)
GLUCOSE SERPL-MCNC: 80 MG/DL (ref 65–99)
POTASSIUM SERPL-SCNC: 4.7 MMOL/L (ref 3.6–5.5)
PROT SERPL-MCNC: 6.7 G/DL (ref 6–8.2)
SODIUM SERPL-SCNC: 140 MMOL/L (ref 135–145)

## 2018-06-01 PROCEDURE — 80048 BASIC METABOLIC PNL TOTAL CA: CPT

## 2018-06-01 PROCEDURE — 36415 COLL VENOUS BLD VENIPUNCTURE: CPT

## 2018-06-01 PROCEDURE — 80076 HEPATIC FUNCTION PANEL: CPT

## 2018-06-28 DIAGNOSIS — J84.112 IPF (IDIOPATHIC PULMONARY FIBROSIS) (HCC): ICD-10-CM

## 2018-06-28 RX ORDER — PIRFENIDONE 267 MG/1
801 CAPSULE ORAL 3 TIMES DAILY
Qty: 90 CAP | Refills: 11 | Status: SHIPPED
Start: 2018-06-28 | End: 2018-06-29 | Stop reason: SDUPTHER

## 2018-06-28 NOTE — TELEPHONE ENCOUNTER
Have we ever prescribed this med? Yes.  If yes, what date? 07/10/2017    Last OV: 03/29/2018 - Dr. Richardson    Next OV: 10/04/2018 - A rotation     DX: IPF    Medications: Pirfenidone      Please fax too [phone]

## 2018-06-29 RX ORDER — PIRFENIDONE 267 MG/1
801 CAPSULE ORAL 3 TIMES DAILY
Qty: 90 CAP | Refills: 11 | Status: SHIPPED | OUTPATIENT
Start: 2018-06-29 | End: 2018-06-29 | Stop reason: SDUPTHER

## 2018-06-29 RX ORDER — PIRFENIDONE 267 MG/1
801 CAPSULE ORAL 3 TIMES DAILY
Qty: 90 CAP | Refills: 11 | Status: SHIPPED
Start: 2018-06-29 | End: 2018-07-29

## 2018-06-29 NOTE — TELEPHONE ENCOUNTER
Hetal from pt pharmacy [phone] called and wanted us to redo the Rx and add 30 day duration for the 90 cap.    I added the 30 days, please sign Rx and so we can Refax the RX

## 2018-06-29 NOTE — TELEPHONE ENCOUNTER
rx was sent to [facility]. I spoke to the pharmacist to cancel.    Please  Sign RX print on plain paper will need to be faxed

## 2018-07-02 ENCOUNTER — PATIENT MESSAGE (OUTPATIENT)
Dept: PULMONOLOGY | Facility: HOSPICE | Age: 83
End: 2018-07-02

## 2018-07-02 DIAGNOSIS — J84.112 IPF (IDIOPATHIC PULMONARY FIBROSIS) (HCC): ICD-10-CM

## 2018-07-03 ENCOUNTER — HOSPITAL ENCOUNTER (OUTPATIENT)
Dept: LAB | Facility: MEDICAL CENTER | Age: 83
End: 2018-07-03
Attending: INTERNAL MEDICINE
Payer: MEDICARE

## 2018-07-03 ENCOUNTER — HOSPITAL ENCOUNTER (OUTPATIENT)
Dept: LAB | Facility: MEDICAL CENTER | Age: 83
End: 2018-07-03
Attending: FAMILY MEDICINE
Payer: MEDICARE

## 2018-07-03 DIAGNOSIS — J84.112 IPF (IDIOPATHIC PULMONARY FIBROSIS) (HCC): ICD-10-CM

## 2018-07-03 LAB
ALBUMIN SERPL BCP-MCNC: 4.1 G/DL (ref 3.2–4.9)
ALP SERPL-CCNC: 85 U/L (ref 30–99)
ALT SERPL-CCNC: 13 U/L (ref 2–50)
ANION GAP SERPL CALC-SCNC: 9 MMOL/L (ref 0–11.9)
AST SERPL-CCNC: 16 U/L (ref 12–45)
BILIRUB CONJ SERPL-MCNC: <0.1 MG/DL (ref 0.1–0.5)
BILIRUB INDIRECT SERPL-MCNC: NORMAL MG/DL (ref 0–1)
BILIRUB SERPL-MCNC: 0.3 MG/DL (ref 0.1–1.5)
BUN SERPL-MCNC: 28 MG/DL (ref 8–22)
CALCIUM SERPL-MCNC: 9.6 MG/DL (ref 8.5–10.5)
CHLORIDE SERPL-SCNC: 109 MMOL/L (ref 96–112)
CO2 SERPL-SCNC: 24 MMOL/L (ref 20–33)
CREAT SERPL-MCNC: 1.06 MG/DL (ref 0.5–1.4)
GLUCOSE SERPL-MCNC: 77 MG/DL (ref 65–99)
POTASSIUM SERPL-SCNC: 4.5 MMOL/L (ref 3.6–5.5)
PROT SERPL-MCNC: 6.2 G/DL (ref 6–8.2)
SODIUM SERPL-SCNC: 142 MMOL/L (ref 135–145)
VIT B12 SERPL-MCNC: 435 PG/ML (ref 211–911)

## 2018-07-03 PROCEDURE — 80076 HEPATIC FUNCTION PANEL: CPT

## 2018-07-03 PROCEDURE — 80048 BASIC METABOLIC PNL TOTAL CA: CPT

## 2018-07-03 PROCEDURE — 36415 COLL VENOUS BLD VENIPUNCTURE: CPT

## 2018-07-03 PROCEDURE — 82607 VITAMIN B-12: CPT

## 2018-07-17 ENCOUNTER — TELEPHONE (OUTPATIENT)
Dept: PULMONOLOGY | Facility: HOSPICE | Age: 83
End: 2018-07-17

## 2018-07-17 DIAGNOSIS — J84.112 IPF (IDIOPATHIC PULMONARY FIBROSIS) (HCC): ICD-10-CM

## 2018-08-03 ENCOUNTER — HOSPITAL ENCOUNTER (OUTPATIENT)
Dept: LAB | Facility: MEDICAL CENTER | Age: 83
End: 2018-08-03
Attending: INTERNAL MEDICINE
Payer: MEDICARE

## 2018-08-03 LAB
ALBUMIN SERPL BCP-MCNC: 4.4 G/DL (ref 3.2–4.9)
ALP SERPL-CCNC: 86 U/L (ref 30–99)
ALT SERPL-CCNC: 13 U/L (ref 2–50)
ANION GAP SERPL CALC-SCNC: 8 MMOL/L (ref 0–11.9)
AST SERPL-CCNC: 18 U/L (ref 12–45)
BILIRUB CONJ SERPL-MCNC: <0.1 MG/DL (ref 0.1–0.5)
BILIRUB INDIRECT SERPL-MCNC: NORMAL MG/DL (ref 0–1)
BILIRUB SERPL-MCNC: 0.3 MG/DL (ref 0.1–1.5)
BUN SERPL-MCNC: 27 MG/DL (ref 8–22)
CALCIUM SERPL-MCNC: 9.5 MG/DL (ref 8.5–10.5)
CHLORIDE SERPL-SCNC: 108 MMOL/L (ref 96–112)
CO2 SERPL-SCNC: 27 MMOL/L (ref 20–33)
CREAT SERPL-MCNC: 0.92 MG/DL (ref 0.5–1.4)
GLUCOSE SERPL-MCNC: 85 MG/DL (ref 65–99)
POTASSIUM SERPL-SCNC: 4.7 MMOL/L (ref 3.6–5.5)
PROT SERPL-MCNC: 6.5 G/DL (ref 6–8.2)
SODIUM SERPL-SCNC: 143 MMOL/L (ref 135–145)

## 2018-08-03 PROCEDURE — 80076 HEPATIC FUNCTION PANEL: CPT

## 2018-08-03 PROCEDURE — 80048 BASIC METABOLIC PNL TOTAL CA: CPT

## 2018-08-03 PROCEDURE — 36415 COLL VENOUS BLD VENIPUNCTURE: CPT

## 2018-09-14 ENCOUNTER — HOSPITAL ENCOUNTER (OUTPATIENT)
Dept: LAB | Facility: MEDICAL CENTER | Age: 83
End: 2018-09-14
Attending: FAMILY MEDICINE
Payer: MEDICARE

## 2018-09-14 ENCOUNTER — HOSPITAL ENCOUNTER (OUTPATIENT)
Dept: LAB | Facility: MEDICAL CENTER | Age: 83
End: 2018-09-14
Attending: INTERNAL MEDICINE
Payer: MEDICARE

## 2018-09-14 DIAGNOSIS — J84.112 IPF (IDIOPATHIC PULMONARY FIBROSIS) (HCC): ICD-10-CM

## 2018-09-14 LAB
ALBUMIN SERPL BCP-MCNC: 4.1 G/DL (ref 3.2–4.9)
ALP SERPL-CCNC: 89 U/L (ref 30–99)
ALT SERPL-CCNC: 16 U/L (ref 2–50)
ANION GAP SERPL CALC-SCNC: 9 MMOL/L (ref 0–11.9)
AST SERPL-CCNC: 20 U/L (ref 12–45)
BILIRUB CONJ SERPL-MCNC: <0.1 MG/DL (ref 0.1–0.5)
BILIRUB INDIRECT SERPL-MCNC: NORMAL MG/DL (ref 0–1)
BILIRUB SERPL-MCNC: 0.4 MG/DL (ref 0.1–1.5)
BUN SERPL-MCNC: 25 MG/DL (ref 8–22)
CALCIUM SERPL-MCNC: 9.4 MG/DL (ref 8.5–10.5)
CHLORIDE SERPL-SCNC: 107 MMOL/L (ref 96–112)
CO2 SERPL-SCNC: 26 MMOL/L (ref 20–33)
CREAT SERPL-MCNC: 0.9 MG/DL (ref 0.5–1.4)
FASTING STATUS PATIENT QL REPORTED: NORMAL
GLUCOSE SERPL-MCNC: 82 MG/DL (ref 65–99)
POTASSIUM SERPL-SCNC: 4.7 MMOL/L (ref 3.6–5.5)
PROT SERPL-MCNC: 6.5 G/DL (ref 6–8.2)
SODIUM SERPL-SCNC: 142 MMOL/L (ref 135–145)

## 2018-09-14 PROCEDURE — 80076 HEPATIC FUNCTION PANEL: CPT

## 2018-09-14 PROCEDURE — 36415 COLL VENOUS BLD VENIPUNCTURE: CPT

## 2018-09-14 PROCEDURE — 82607 VITAMIN B-12: CPT

## 2018-09-14 PROCEDURE — 80048 BASIC METABOLIC PNL TOTAL CA: CPT

## 2018-09-15 LAB — VIT B12 SERPL-MCNC: 444 PG/ML (ref 211–911)

## 2018-10-04 ENCOUNTER — OFFICE VISIT (OUTPATIENT)
Dept: PULMONOLOGY | Facility: HOSPICE | Age: 83
End: 2018-10-04
Payer: MEDICARE

## 2018-10-04 ENCOUNTER — NON-PROVIDER VISIT (OUTPATIENT)
Dept: PULMONOLOGY | Facility: HOSPICE | Age: 83
End: 2018-10-04
Payer: MEDICARE

## 2018-10-04 VITALS
HEART RATE: 60 BPM | HEIGHT: 68 IN | BODY MASS INDEX: 23.19 KG/M2 | RESPIRATION RATE: 16 BRPM | WEIGHT: 153 LBS | OXYGEN SATURATION: 94 % | SYSTOLIC BLOOD PRESSURE: 116 MMHG | TEMPERATURE: 97.7 F | DIASTOLIC BLOOD PRESSURE: 76 MMHG

## 2018-10-04 VITALS — BODY MASS INDEX: 23.26 KG/M2 | WEIGHT: 153 LBS

## 2018-10-04 DIAGNOSIS — J43.2 CENTRILOBULAR EMPHYSEMA (HCC): ICD-10-CM

## 2018-10-04 DIAGNOSIS — R91.1 LUNG NODULE: ICD-10-CM

## 2018-10-04 DIAGNOSIS — J84.9 INTERSTITIAL LUNG DISEASE (HCC): ICD-10-CM

## 2018-10-04 DIAGNOSIS — R09.02 HYPOXIA: ICD-10-CM

## 2018-10-04 PROCEDURE — 94729 DIFFUSING CAPACITY: CPT | Performed by: INTERNAL MEDICINE

## 2018-10-04 PROCEDURE — 90670 PCV13 VACCINE IM: CPT | Performed by: INTERNAL MEDICINE

## 2018-10-04 PROCEDURE — G0009 ADMIN PNEUMOCOCCAL VACCINE: HCPCS | Performed by: INTERNAL MEDICINE

## 2018-10-04 PROCEDURE — 94726 PLETHYSMOGRAPHY LUNG VOLUMES: CPT | Performed by: INTERNAL MEDICINE

## 2018-10-04 PROCEDURE — 99214 OFFICE O/P EST MOD 30 MIN: CPT | Mod: 25 | Performed by: INTERNAL MEDICINE

## 2018-10-04 PROCEDURE — 94060 EVALUATION OF WHEEZING: CPT | Performed by: INTERNAL MEDICINE

## 2018-10-04 RX ORDER — PIRFENIDONE 801 MG/1
TABLET, FILM COATED ORAL
COMMUNITY
End: 2019-07-31 | Stop reason: SDUPTHER

## 2018-10-04 ASSESSMENT — PULMONARY FUNCTION TESTS
FEV1/FVC_PERCENT_PREDICTED: 93
FVC_PERCENT_PREDICTED: 95
FEV1/FVC_PERCENT_PREDICTED: 74
FEV1_PERCENT_PREDICTED: 87
FEV1/FVC_PERCENT_PREDICTED: 96
FEV1/FVC_PERCENT_PREDICTED: 99
FVC: 2.52
FVC_PERCENT_PREDICTED: 94
FEV1/FVC_PERCENT_PREDICTED: 95
FEV1/FVC_PERCENT_CHANGE: 4
FEV1_PREDICTED: 1.96
FVC_LLN: 2.20
FVC_PREDICTED: 2.64
FEV1_PERCENT_PREDICTED: 91
FEV1/FVC_PERCENT_LLN: 60
FEV1/FVC: 72
FEV1/FVC: 71.43
FEV1/FVC_PREDICTED: 72
FEV1/FVC: 69
FEV1_LLN: 1.64
FEV1: 1.72
FEV1: 1.8
FEV1_PERCENT_CHANGE: 4
FEV1_PERCENT_CHANGE: 0
FEV1/FVC: 69
FVC: 2.5

## 2018-10-04 ASSESSMENT — PAIN SCALES - GENERAL: PAINLEVEL: NO PAIN

## 2018-10-04 NOTE — PATIENT INSTRUCTIONS
This lady comes in with her daughter, the patient is 89 years of age but in very good shape.  She does use oxygen continuously.  Interstitial lung disease has remained stable, she continues to be active out and about within limits.    Lung function testing today shows stability, mild reduction of total lung capacity and a similar pattern of oxygen diffusion defect, 56% on today's testing.    She has done well on perfinidone, 3 times daily, without side effects.  Flu vaccine is going to be administered by her primary care doctor in November at her request, order placed but deferred.  Prevnar will be administered today.    In 6 months we will see her back with a high-resolution CAT scan, to review both the pulmonary fibrosis and IPF pattern, as well as tiny nodules seen on the last image, recheck at follow-up.  Lung function testing as well.  This is reviewed with her and her daughter who has good medical insight and is very supportive.

## 2018-10-04 NOTE — PROCEDURES
Technician Isabel Edwards, [facility] Comments:Good patient effort & cooperation.  The results of this test meet the ATS/ERS standards for acceptability & reproducibility.  Test was performed on the [employer] Body Plethysmograph-Elite DX system.  Predicted values were [facility] [place]-3 for spirometry, [facility] for DLCO, ITS for Lung Volumes.  The DLCO was uncorrected for Hgb.  A bronchodilator of Ventolin HFA -2puffs via spacer administered.  DLCO performed during dilation period.    Interpretation:    Lung function testing was completed on October 4, 2018.  Spirometry was normal, no change after bronchodilators.  Mild reduction of total lung capacity at 76% predicted.  Oxygen transfer was reduced to 56%, similar to March study when it was 57%.  This is consistent with known interstitial lung disease.  Good patient effort noted in flow volume loop confirms the mild restrictive process

## 2018-10-04 NOTE — PROGRESS NOTES
Yesi Garduno is a 89 y.o. female here for interstitial lung disease on Esbriet. Patient was referred by her primary care doctor.    History of Present Illness:      This lady comes in with her daughter, the patient is 89 years of age but in very good shape.  She does use oxygen continuously.  Interstitial lung disease has remained stable, she continues to be active out and about within limits.    Lung function testing today shows stability, mild reduction of total lung capacity and a similar pattern of oxygen diffusion defect, 56% on today's testing.    She has done well on perfinidone, 3 times daily, without side effects.  Flu vaccine is going to be administered by her primary care doctor in November at her request, order placed but deferred.  Prevnar will be administered today.    In 6 months we will see her back with a high-resolution CAT scan, to review both the pulmonary fibrosis and IPF pattern, as well as tiny nodules seen on the last image, recheck at follow-up.  Lung function testing as well.  This is reviewed with her and her daughter who has good medical insight and is very supportive.    Constitutional ROS: No unexpected change in weight, No unexplained fevers  Eyes: No change in vision or blurring or double vision  Mouth/Throat ROS: No sore throat, No recent change in voice or hoarseness  Pulmonary ROS: See present history for pertinent positives  Cardiovascular ROS: No chest pain to suggest acute coronary syndrome  Gastrointestinal ROS: No abdominal pain to suggest peptic disease  Musculoskeletal/Extremities ROS: no acute artritis or unusual swelling  Hematologic/Lymphatic ROS: No easy bleeding or unusual lymph node swelling  Neurologic ROS: No new or unusual weakness  Psychiatric ROS: No hallucinations  Allergic/Immunologic: No  urticaria or allergic rash      Current Outpatient Prescriptions   Medication Sig Dispense Refill   • Pirfenidone (ESBRIET) 801 MG Tab Take  by mouth.     •  acetaminophen (TYLENOL) 325 MG Tab Take 2 Tabs by mouth every 6 hours as needed for Mild Pain or Moderate Pain. 30 Tab 0   • alprazolam (XANAX) 0.25 MG Tab Take 0.25 mg by mouth at bedtime as needed for Sleep.     • Coenzyme Q10 (CO Q 10 PO) Take 1 Cap by mouth every day at 6 PM.     • Calcium Carbonate-Vit D-Min (CALCIUM 1200 PO) Take 1 Tab by mouth every day.     • Cyanocobalamin (B-12 PO) Take 1 Tab by mouth every day.     • fluticasone (FLONASE) 50 MCG/ACT nasal spray Spray 1 Spray in nose every bedtime. Each Nostril     • triamterene-hctz (MAXZIDE-25/DYAZIDE) 37.5-25 MG Tab Take 1 Tab by mouth every day. 30 Tab 3   • gabapentin (NEURONTIN) 100 MG Cap TAKE 1 CAPSULE BY MOUTH AT 6 PM FOR 1 WEEK THEN 2 CAPS BY MOUTH AT 6 PM DAILY  1   • levofloxacin (LEVAQUIN) 500 MG tablet Take  by mouth every day. WITH FOOD  0   • ipratropium-albuterol (COMBIVENT RESPIMAT)  MCG/ACT Aero Soln Inhale 1 Puff by mouth 4 times a day. 1 Inhaler 11   • gabapentin (NEURONTIN) 300 MG Cap Take 1 Cap by mouth 3 times a day. 90 Cap    • docusate sodium 100 MG Cap Take 100 mg by mouth every morning. 60 Cap    • senna-docusate (PERICOLACE OR SENOKOT S) 8.6-50 MG Tab Take 1 Tab by mouth every day. (Patient not taking: Reported on 2/21/2017) 30 Tab 0   • senna-docusate (PERICOLACE OR SENOKOT S) 8.6-50 MG Tab Take 1 Tab by mouth every 24 hours as needed for Constipation. (Patient not taking: Reported on 2/21/2017) 30 Tab 0   • lactulose 20 GM/30ML Solution Take 30 mL by mouth every 24 hours as needed (if sennosides-docusate sodium (SENOKOT-S) ineffective). 30 Each    • bisacodyl (DULCOLAX) 10 MG Suppos Insert 1 Suppository in rectum every 24 hours as needed (if lactulose ineffective).  0   • heparin 5000 UNIT/ML Solution Inject 1 mL as instructed every 8 hours.  0   • ondansetron (ZOFRAN ODT) 4 MG TABLET DISPERSIBLE Take 1 Tab by mouth every four hours as needed for Nausea/Vomiting (give PO if IV route is unavailable. May give per feeding  "tube.). (Patient not taking: Reported on 2/21/2017) 10 Tab 0   • metoprolol (LOPRESSOR) 25 MG Tab Take 1 Tab by mouth 2 Times a Day. (Patient not taking: Reported on 2/21/2017) 60 Tab    • ipratropium-albuterol (DUONEB) 0.5-2.5 (3) MG/3ML nebulizer solution 3 mL by Nebulization route every 6 hours as needed for Shortness of Breath.     • budesonide (PULMICORT) 0.5 MG/2ML Suspension 2 mL by Nebulization route 2 Times a Day.     • hydrocodone-acetaminophen (NORCO) 5-325 MG Tab per tablet Take 1-2 Tabs by mouth every four hours as needed. 20 Tab 0   • gelatin 650 MG capsule Take 1 Cap by mouth every day.     • vitamin D (CHOLECALCIFEROL) 1000 UNIT Tab Take 1,000 Units by mouth every day.       No current facility-administered medications for this visit.        Social History   Substance Use Topics   • Smoking status: Former Smoker     Packs/day: 0.25     Years: 34.00     Types: Cigarettes     Quit date: 1/1/1985   • Smokeless tobacco: Never Used   • Alcohol use Yes      Comment: 2         Past Medical History:   Diagnosis Date   • AAA (abdominal aortic aneurysm) ([facility]) 8/11/2015   • Breast cancer ([facility])    • Cancer (HCC)    • COPD (chronic obstructive pulmonary disease) ([facility]) 8/11/2015   • Near syncope 8/11/2015   • S/P right mastectomy     per pt done in 1979       Past Surgical History:   Procedure Laterality Date   • BREAST BIOPSY  1990   • MASTECTOMY  1979    right side   • OTHER      mastectomy       Allergies: Tetracycline; Hydrocodone; and Penicillins    Family History   Problem Relation Age of Onset   • Cancer Sister    • Cancer Mother    • Lung Disease Paternal Uncle    • Arthritis Neg Hx    • Genetic Neg Hx    • Psychiatry Neg Hx    • Diabetes Neg Hx    • Heart Disease Neg Hx    • Hypertension Neg Hx    • Hyperlipidemia Neg Hx    • Stroke Neg Hx    • Alcohol/Drug Neg Hx        Physical Examination    Vitals:    10/04/18 1125   Height: 1.727 m (5' 8\")   Weight: 69.4 kg (153 lb)   Weight % change since last " entry.: 0 %   BP: 116/76   Pulse: 60   BMI (Calculated): 23.26   Resp: 16   Temp: 36.5 °C (97.7 °F)   TempSrc: Oral       General Appearance: alert, no distress  Skin: Skin color, texture, turgor normal. No rashes or lesions.  Eyes: negative  Oropharynx: Lips, mucosa, and tongue normal. Teeth and gums normal. Oropharynx moist and without lesion  Lungs: positive findings: Minimal crackles without prolonged phases or increased effort  Heart: negative. RRR without murmur, gallop, or rubs.  No ectopy.  Abdomen: Abdomen soft, non-tender. . No masses,  No organomegaly  Extremities:  No deformities, edema, or skin discoloration  Joints: No acute arthritis  Peripheral Pulses:perfused  Neurologic: intact grossly  No clubbing    I (soft palate, uvula, fauces, tonsillar pillars visible)    Imaging: High-resolution CAT scan next visit    PFTS: Reviewed above      Assessment and Plan  1. Centrilobular emphysema (HCC)  - Influenza Vaccine, High Dose (65+ Only)  - Pneumococcal Conjugate Vaccine 13-Valent    2. Lung nodule  - CT-CHEST, HIGH RESOLUTION LUNG; Future    3. Interstitial lung disease (HCC)  - CT-CHEST, HIGH RESOLUTION LUNG; Future  - Spirometry; Future  - Diffusion Capacity (DLCO); Future  - Plethysmography; Future    This lady comes in with her daughter, the patient is 89 years of age but in very good shape.  She does use oxygen continuously.  Interstitial lung disease has remained stable, she continues to be active out and about within limits.    Lung function testing today shows stability, mild reduction of total lung capacity and a similar pattern of oxygen diffusion defect, 56% on today's testing.    She has done well on perfinidone, 3 times daily, without side effects.  Flu vaccine is going to be administered by her primary care doctor in November at her request, order placed but deferred.  Prevnar will be administered today.    In 6 months we will see her back with a high-resolution CAT scan, to review both the  pulmonary fibrosis and IPF pattern, as well as tiny nodules seen on the last image, recheck at follow-up.  Lung function testing as well.  This is reviewed with her and her daughter who has good medical insight and is very supportive.  Followup Return in about 6 months (around 4/4/2019) for follow up visit with Dr. Cate Richardson, after imaging test, with PFT.

## 2018-10-08 DIAGNOSIS — J84.9 ILD (INTERSTITIAL LUNG DISEASE) (HCC): ICD-10-CM

## 2018-10-19 ENCOUNTER — HOSPITAL ENCOUNTER (OUTPATIENT)
Dept: LAB | Facility: MEDICAL CENTER | Age: 83
End: 2018-10-19
Attending: INTERNAL MEDICINE
Payer: MEDICARE

## 2018-10-19 DIAGNOSIS — J84.112 IPF (IDIOPATHIC PULMONARY FIBROSIS) (HCC): ICD-10-CM

## 2018-10-19 LAB
ALBUMIN SERPL BCP-MCNC: 4.2 G/DL (ref 3.2–4.9)
ALP SERPL-CCNC: 85 U/L (ref 30–99)
ALT SERPL-CCNC: 14 U/L (ref 2–50)
ANION GAP SERPL CALC-SCNC: 7 MMOL/L (ref 0–11.9)
AST SERPL-CCNC: 15 U/L (ref 12–45)
BILIRUB CONJ SERPL-MCNC: <0.1 MG/DL (ref 0.1–0.5)
BILIRUB INDIRECT SERPL-MCNC: NORMAL MG/DL (ref 0–1)
BILIRUB SERPL-MCNC: 0.3 MG/DL (ref 0.1–1.5)
BUN SERPL-MCNC: 34 MG/DL (ref 8–22)
CALCIUM SERPL-MCNC: 10.1 MG/DL (ref 8.5–10.5)
CHLORIDE SERPL-SCNC: 108 MMOL/L (ref 96–112)
CO2 SERPL-SCNC: 27 MMOL/L (ref 20–33)
CREAT SERPL-MCNC: 1.09 MG/DL (ref 0.5–1.4)
GLUCOSE SERPL-MCNC: 88 MG/DL (ref 65–99)
POTASSIUM SERPL-SCNC: 4.7 MMOL/L (ref 3.6–5.5)
PROT SERPL-MCNC: 6.8 G/DL (ref 6–8.2)
SODIUM SERPL-SCNC: 142 MMOL/L (ref 135–145)

## 2018-10-19 PROCEDURE — 80076 HEPATIC FUNCTION PANEL: CPT

## 2018-10-19 PROCEDURE — 36415 COLL VENOUS BLD VENIPUNCTURE: CPT

## 2018-10-19 PROCEDURE — 80048 BASIC METABOLIC PNL TOTAL CA: CPT

## 2018-10-30 ENCOUNTER — PATIENT MESSAGE (OUTPATIENT)
Dept: PULMONOLOGY | Facility: HOSPICE | Age: 83
End: 2018-10-30

## 2018-11-16 ENCOUNTER — HOSPITAL ENCOUNTER (OUTPATIENT)
Dept: LAB | Facility: MEDICAL CENTER | Age: 83
End: 2018-11-16
Attending: INTERNAL MEDICINE
Payer: MEDICARE

## 2018-11-16 DIAGNOSIS — J84.112 IPF (IDIOPATHIC PULMONARY FIBROSIS) (HCC): ICD-10-CM

## 2018-11-16 LAB
ALBUMIN SERPL BCP-MCNC: 4 G/DL (ref 3.2–4.9)
ALP SERPL-CCNC: 85 U/L (ref 30–99)
ALT SERPL-CCNC: 10 U/L (ref 2–50)
ANION GAP SERPL CALC-SCNC: 7 MMOL/L (ref 0–11.9)
AST SERPL-CCNC: 13 U/L (ref 12–45)
BILIRUB CONJ SERPL-MCNC: <0.1 MG/DL (ref 0.1–0.5)
BILIRUB INDIRECT SERPL-MCNC: NORMAL MG/DL (ref 0–1)
BILIRUB SERPL-MCNC: 0.3 MG/DL (ref 0.1–1.5)
BUN SERPL-MCNC: 26 MG/DL (ref 8–22)
CALCIUM SERPL-MCNC: 9.7 MG/DL (ref 8.5–10.5)
CHLORIDE SERPL-SCNC: 108 MMOL/L (ref 96–112)
CO2 SERPL-SCNC: 27 MMOL/L (ref 20–33)
CREAT SERPL-MCNC: 0.98 MG/DL (ref 0.5–1.4)
FASTING STATUS PATIENT QL REPORTED: NORMAL
GLUCOSE SERPL-MCNC: 77 MG/DL (ref 65–99)
POTASSIUM SERPL-SCNC: 4.4 MMOL/L (ref 3.6–5.5)
PROT SERPL-MCNC: 6.5 G/DL (ref 6–8.2)
SODIUM SERPL-SCNC: 142 MMOL/L (ref 135–145)

## 2018-11-16 PROCEDURE — 80048 BASIC METABOLIC PNL TOTAL CA: CPT

## 2018-11-16 PROCEDURE — 80076 HEPATIC FUNCTION PANEL: CPT

## 2018-11-16 PROCEDURE — 36415 COLL VENOUS BLD VENIPUNCTURE: CPT

## 2018-12-14 ENCOUNTER — HOSPITAL ENCOUNTER (OUTPATIENT)
Dept: LAB | Facility: MEDICAL CENTER | Age: 83
End: 2018-12-14
Attending: NURSE PRACTITIONER
Payer: MEDICARE

## 2018-12-14 DIAGNOSIS — J84.112 IPF (IDIOPATHIC PULMONARY FIBROSIS) (HCC): ICD-10-CM

## 2018-12-14 LAB
ALBUMIN SERPL BCP-MCNC: 4.2 G/DL (ref 3.2–4.9)
ALP SERPL-CCNC: 87 U/L (ref 30–99)
ALT SERPL-CCNC: 14 U/L (ref 2–50)
ANION GAP SERPL CALC-SCNC: 9 MMOL/L (ref 0–11.9)
AST SERPL-CCNC: 19 U/L (ref 12–45)
BILIRUB CONJ SERPL-MCNC: <0.1 MG/DL (ref 0.1–0.5)
BILIRUB INDIRECT SERPL-MCNC: NORMAL MG/DL (ref 0–1)
BILIRUB SERPL-MCNC: 0.3 MG/DL (ref 0.1–1.5)
BUN SERPL-MCNC: 25 MG/DL (ref 8–22)
CALCIUM SERPL-MCNC: 9.5 MG/DL (ref 8.5–10.5)
CHLORIDE SERPL-SCNC: 108 MMOL/L (ref 96–112)
CO2 SERPL-SCNC: 25 MMOL/L (ref 20–33)
CREAT SERPL-MCNC: 0.97 MG/DL (ref 0.5–1.4)
FASTING STATUS PATIENT QL REPORTED: NORMAL
GLUCOSE SERPL-MCNC: 74 MG/DL (ref 65–99)
POTASSIUM SERPL-SCNC: 4.7 MMOL/L (ref 3.6–5.5)
PROT SERPL-MCNC: 6.6 G/DL (ref 6–8.2)
SODIUM SERPL-SCNC: 142 MMOL/L (ref 135–145)

## 2018-12-14 PROCEDURE — 80076 HEPATIC FUNCTION PANEL: CPT

## 2018-12-14 PROCEDURE — 36415 COLL VENOUS BLD VENIPUNCTURE: CPT

## 2018-12-14 PROCEDURE — 80048 BASIC METABOLIC PNL TOTAL CA: CPT

## 2019-01-23 ENCOUNTER — APPOINTMENT (OUTPATIENT)
Dept: RADIOLOGY | Facility: MEDICAL CENTER | Age: 84
End: 2019-01-23
Attending: FAMILY MEDICINE
Payer: MEDICARE

## 2019-01-29 ENCOUNTER — HOSPITAL ENCOUNTER (OUTPATIENT)
Dept: RADIOLOGY | Facility: MEDICAL CENTER | Age: 84
End: 2019-01-29
Attending: FAMILY MEDICINE
Payer: MEDICARE

## 2019-01-29 DIAGNOSIS — Z12.31 BREAST CANCER SCREENING BY MAMMOGRAM: ICD-10-CM

## 2019-01-29 PROCEDURE — 77063 BREAST TOMOSYNTHESIS BI: CPT

## 2019-02-20 ENCOUNTER — HOSPITAL ENCOUNTER (OUTPATIENT)
Dept: LAB | Facility: MEDICAL CENTER | Age: 84
End: 2019-02-20
Attending: NURSE PRACTITIONER
Payer: MEDICARE

## 2019-02-20 ENCOUNTER — HOSPITAL ENCOUNTER (OUTPATIENT)
Dept: LAB | Facility: MEDICAL CENTER | Age: 84
End: 2019-02-20
Attending: FAMILY MEDICINE
Payer: MEDICARE

## 2019-02-20 DIAGNOSIS — J84.112 IPF (IDIOPATHIC PULMONARY FIBROSIS) (HCC): ICD-10-CM

## 2019-02-20 LAB
ALBUMIN SERPL BCP-MCNC: 4.1 G/DL (ref 3.2–4.9)
ALP SERPL-CCNC: 83 U/L (ref 30–99)
ALT SERPL-CCNC: 12 U/L (ref 2–50)
ANION GAP SERPL CALC-SCNC: 3 MMOL/L (ref 0–11.9)
AST SERPL-CCNC: 16 U/L (ref 12–45)
BASOPHILS # BLD AUTO: 1 % (ref 0–1.8)
BASOPHILS # BLD: 0.06 K/UL (ref 0–0.12)
BILIRUB CONJ SERPL-MCNC: <0.1 MG/DL (ref 0.1–0.5)
BILIRUB INDIRECT SERPL-MCNC: NORMAL MG/DL (ref 0–1)
BILIRUB SERPL-MCNC: 0.3 MG/DL (ref 0.1–1.5)
BUN SERPL-MCNC: 28 MG/DL (ref 8–22)
CALCIUM SERPL-MCNC: 9.8 MG/DL (ref 8.5–10.5)
CHLORIDE SERPL-SCNC: 108 MMOL/L (ref 96–112)
CHOLEST SERPL-MCNC: 204 MG/DL (ref 100–199)
CO2 SERPL-SCNC: 27 MMOL/L (ref 20–33)
CREAT SERPL-MCNC: 0.99 MG/DL (ref 0.5–1.4)
EOSINOPHIL # BLD AUTO: 0.18 K/UL (ref 0–0.51)
EOSINOPHIL NFR BLD: 3.1 % (ref 0–6.9)
ERYTHROCYTE [DISTWIDTH] IN BLOOD BY AUTOMATED COUNT: 46.4 FL (ref 35.9–50)
FASTING STATUS PATIENT QL REPORTED: NORMAL
GLUCOSE SERPL-MCNC: 96 MG/DL (ref 65–99)
HCT VFR BLD AUTO: 38.1 % (ref 37–47)
HDLC SERPL-MCNC: 76 MG/DL
HGB BLD-MCNC: 11.9 G/DL (ref 12–16)
IMM GRANULOCYTES # BLD AUTO: 0.02 K/UL (ref 0–0.11)
IMM GRANULOCYTES NFR BLD AUTO: 0.3 % (ref 0–0.9)
LDLC SERPL CALC-MCNC: 114 MG/DL
LYMPHOCYTES # BLD AUTO: 1.31 K/UL (ref 1–4.8)
LYMPHOCYTES NFR BLD: 22.3 % (ref 22–41)
MCH RBC QN AUTO: 32.1 PG (ref 27–33)
MCHC RBC AUTO-ENTMCNC: 31.2 G/DL (ref 33.6–35)
MCV RBC AUTO: 102.7 FL (ref 81.4–97.8)
MONOCYTES # BLD AUTO: 0.57 K/UL (ref 0–0.85)
MONOCYTES NFR BLD AUTO: 9.7 % (ref 0–13.4)
NEUTROPHILS # BLD AUTO: 3.73 K/UL (ref 2–7.15)
NEUTROPHILS NFR BLD: 63.6 % (ref 44–72)
NRBC # BLD AUTO: 0 K/UL
NRBC BLD-RTO: 0 /100 WBC
PLATELET # BLD AUTO: 203 K/UL (ref 164–446)
PMV BLD AUTO: 10.1 FL (ref 9–12.9)
POTASSIUM SERPL-SCNC: 4.3 MMOL/L (ref 3.6–5.5)
PROT SERPL-MCNC: 6.3 G/DL (ref 6–8.2)
RBC # BLD AUTO: 3.71 M/UL (ref 4.2–5.4)
SODIUM SERPL-SCNC: 138 MMOL/L (ref 135–145)
TRIGL SERPL-MCNC: 69 MG/DL (ref 0–149)
TSH SERPL DL<=0.005 MIU/L-ACNC: 1.47 UIU/ML (ref 0.38–5.33)
VIT B12 SERPL-MCNC: 628 PG/ML (ref 211–911)
WBC # BLD AUTO: 5.9 K/UL (ref 4.8–10.8)

## 2019-02-20 PROCEDURE — 36415 COLL VENOUS BLD VENIPUNCTURE: CPT

## 2019-02-20 PROCEDURE — 85025 COMPLETE CBC W/AUTO DIFF WBC: CPT

## 2019-02-20 PROCEDURE — 84443 ASSAY THYROID STIM HORMONE: CPT

## 2019-02-20 PROCEDURE — 80061 LIPID PANEL: CPT

## 2019-02-20 PROCEDURE — 80048 BASIC METABOLIC PNL TOTAL CA: CPT

## 2019-02-20 PROCEDURE — 82607 VITAMIN B-12: CPT

## 2019-02-20 PROCEDURE — 80076 HEPATIC FUNCTION PANEL: CPT

## 2019-03-05 ENCOUNTER — TELEPHONE (OUTPATIENT)
Dept: PULMONOLOGY | Facility: HOSPICE | Age: 84
End: 2019-03-05

## 2019-03-21 ENCOUNTER — HOSPITAL ENCOUNTER (OUTPATIENT)
Dept: LAB | Facility: MEDICAL CENTER | Age: 84
End: 2019-03-21
Attending: NURSE PRACTITIONER
Payer: MEDICARE

## 2019-03-21 DIAGNOSIS — J84.112 IPF (IDIOPATHIC PULMONARY FIBROSIS) (HCC): ICD-10-CM

## 2019-03-21 LAB
ALBUMIN SERPL BCP-MCNC: 4 G/DL (ref 3.2–4.9)
ALP SERPL-CCNC: 79 U/L (ref 30–99)
ALT SERPL-CCNC: 11 U/L (ref 2–50)
ANION GAP SERPL CALC-SCNC: 7 MMOL/L (ref 0–11.9)
AST SERPL-CCNC: 15 U/L (ref 12–45)
BILIRUB CONJ SERPL-MCNC: <0.1 MG/DL (ref 0.1–0.5)
BILIRUB INDIRECT SERPL-MCNC: NORMAL MG/DL (ref 0–1)
BILIRUB SERPL-MCNC: 0.2 MG/DL (ref 0.1–1.5)
BUN SERPL-MCNC: 28 MG/DL (ref 8–22)
CALCIUM SERPL-MCNC: 9.3 MG/DL (ref 8.5–10.5)
CHLORIDE SERPL-SCNC: 110 MMOL/L (ref 96–112)
CO2 SERPL-SCNC: 26 MMOL/L (ref 20–33)
CREAT SERPL-MCNC: 0.95 MG/DL (ref 0.5–1.4)
GLUCOSE SERPL-MCNC: 102 MG/DL (ref 65–99)
POTASSIUM SERPL-SCNC: 4.1 MMOL/L (ref 3.6–5.5)
PROT SERPL-MCNC: 6 G/DL (ref 6–8.2)
SODIUM SERPL-SCNC: 143 MMOL/L (ref 135–145)

## 2019-03-21 PROCEDURE — 36415 COLL VENOUS BLD VENIPUNCTURE: CPT

## 2019-03-21 PROCEDURE — 80048 BASIC METABOLIC PNL TOTAL CA: CPT

## 2019-03-21 PROCEDURE — 80076 HEPATIC FUNCTION PANEL: CPT

## 2019-04-03 ENCOUNTER — HOSPITAL ENCOUNTER (OUTPATIENT)
Dept: RADIOLOGY | Facility: MEDICAL CENTER | Age: 84
End: 2019-04-03
Attending: INTERNAL MEDICINE
Payer: MEDICARE

## 2019-04-03 ENCOUNTER — HOSPITAL ENCOUNTER (OUTPATIENT)
Dept: RADIOLOGY | Facility: MEDICAL CENTER | Age: 84
End: 2019-04-03
Attending: FAMILY MEDICINE
Payer: MEDICARE

## 2019-04-03 DIAGNOSIS — J84.9 INTERSTITIAL LUNG DISEASE (HCC): ICD-10-CM

## 2019-04-03 DIAGNOSIS — I71.9 HYALINE NECROSIS OF AORTA (HCC): ICD-10-CM

## 2019-04-03 DIAGNOSIS — R91.1 LUNG NODULE: ICD-10-CM

## 2019-04-03 PROCEDURE — 76775 US EXAM ABDO BACK WALL LIM: CPT

## 2019-04-03 PROCEDURE — 71250 CT THORAX DX C-: CPT

## 2019-04-19 ENCOUNTER — OFFICE VISIT (OUTPATIENT)
Dept: PULMONOLOGY | Facility: HOSPICE | Age: 84
End: 2019-04-19
Payer: MEDICARE

## 2019-04-19 ENCOUNTER — NON-PROVIDER VISIT (OUTPATIENT)
Dept: PULMONOLOGY | Facility: HOSPICE | Age: 84
End: 2019-04-19
Payer: MEDICARE

## 2019-04-19 VITALS
TEMPERATURE: 98.4 F | BODY MASS INDEX: 23.64 KG/M2 | HEART RATE: 72 BPM | RESPIRATION RATE: 16 BRPM | DIASTOLIC BLOOD PRESSURE: 76 MMHG | SYSTOLIC BLOOD PRESSURE: 122 MMHG | OXYGEN SATURATION: 91 % | HEIGHT: 68 IN | WEIGHT: 156 LBS

## 2019-04-19 VITALS — BODY MASS INDEX: 23.72 KG/M2 | WEIGHT: 156 LBS

## 2019-04-19 DIAGNOSIS — R91.1 LUNG NODULE: ICD-10-CM

## 2019-04-19 DIAGNOSIS — J84.9 INTERSTITIAL LUNG DISEASE (HCC): ICD-10-CM

## 2019-04-19 DIAGNOSIS — R09.02 HYPOXIA: ICD-10-CM

## 2019-04-19 DIAGNOSIS — J43.2 CENTRILOBULAR EMPHYSEMA (HCC): ICD-10-CM

## 2019-04-19 PROCEDURE — 94726 PLETHYSMOGRAPHY LUNG VOLUMES: CPT | Performed by: INTERNAL MEDICINE

## 2019-04-19 PROCEDURE — 94060 EVALUATION OF WHEEZING: CPT | Performed by: INTERNAL MEDICINE

## 2019-04-19 PROCEDURE — 94729 DIFFUSING CAPACITY: CPT | Performed by: INTERNAL MEDICINE

## 2019-04-19 PROCEDURE — 99214 OFFICE O/P EST MOD 30 MIN: CPT | Mod: 25 | Performed by: INTERNAL MEDICINE

## 2019-04-19 ASSESSMENT — PULMONARY FUNCTION TESTS
FVC: 2.41
FEV1_PREDICTED: 1.94
FVC_PREDICTED: 2.62
FEV1/FVC: 66
FEV1/FVC_PERCENT_CHANGE: 600
FVC_PERCENT_PREDICTED: 92
FEV1_PERCENT_PREDICTED: 78
FEV1/FVC: 63
FVC_LLN: 2.19
FEV1_LLN: 1.62
FEV1/FVC_PERCENT_CHANGE: 4
FEV1/FVC_PERCENT_LLN: 60
FVC: 2.45
FEV1/FVC_PERCENT_PREDICTED: 91
FVC_PERCENT_PREDICTED: 93
FEV1/FVC: 63
FEV1/FVC_PERCENT_PREDICTED: 85
FEV1/FVC_PREDICTED: 72
FEV1/FVC: 66.53
FEV1: 1.63
FEV1: 1.53
FEV1_PERCENT_CHANGE: 6
FEV1/FVC_PERCENT_PREDICTED: 87
FEV1_PERCENT_CHANGE: 1
FEV1/FVC_PERCENT_PREDICTED: 89
FEV1/FVC_PERCENT_PREDICTED: 74
FEV1_PERCENT_PREDICTED: 83

## 2019-04-19 NOTE — LETTER
Cate Richardson M.D.  [facility]monary [facility]   [address] [place] [address] [place]  [place] [address]  Phone: [phone] - Fax: [phone]           Encounter Date: 4/19/2019  Provider: Cate Richardson M.D.  Location of Care: [facility] PULMONARY MEDICINE      Patient:   Yesi Garduno   MR Number: [id number]   YOB: 1928     PROGRESS NOTE:  Yesi Garduno is a 89 y.o. female here for interstitial lung disease on perfinidone. Patient was referred by primary care.    History of Present Illness:      This lady comes in with interstitial lung disease, current lung function testing continues to show stability, total lung capacity is 85% and oxygen transfer is actually better than it was last measurement.  This is reassuring.  She continues on perfinidone, and her daughter is present as well.  They asked about stem cell, we discussed the options, at present with her stability to improvement I do not think this is warranted.    No interval new problems, activity level remains good, oxygen is 24 7.  We will reassess in 6 months sooner if there are any new problems.    Of note the previously described lingular nodule has resolved.  The right middle lobe nodule at 7 mm from the interstitial changes are unchanged and no new process is seen    Constitutional ROS: No unexpected change in weight, No unexplained fevers  Eyes: No change in vision or blurring or double vision  Mouth/Throat ROS: No sore throat, No recent change in voice or hoarseness  Pulmonary ROS: See present history for pertinent positives  Cardiovascular ROS: No chest pain to suggest acute coronary syndrome  Gastrointestinal ROS: No abdominal pain to suggest peptic disease  Musculoskeletal/Extremities ROS: no acute artritis or unusual swelling  Hematologic/Lymphatic ROS: No easy bleeding or unusual lymph node swelling  Neurologic ROS: No new or unusual weakness  Psychiatric ROS: No  hallucinations  Allergic/Immunologic: No  urticaria or allergic rash      Current Outpatient Prescriptions   Medication Sig Dispense Refill   • Pirfenidone (ESBRIET) 801 MG Tab Take  by mouth.     • docusate sodium 100 MG Cap Take 100 mg by mouth every morning. 60 Cap    • fluticasone (FLONASE) 50 MCG/ACT nasal spray Spray 1 Spray in nose every bedtime. Each Nostril     • triamterene-hctz (MAXZIDE-25/DYAZIDE) 37.5-25 MG Tab Take 1 Tab by mouth every day. (Patient not taking: Reported on 4/19/2019) 30 Tab 3   • gabapentin (NEURONTIN) 100 MG Cap TAKE 1 CAPSULE BY MOUTH AT 6 PM FOR 1 WEEK THEN 2 CAPS BY MOUTH AT 6 PM DAILY  1   • levofloxacin (LEVAQUIN) 500 MG tablet Take  by mouth every day. WITH FOOD  0   • ipratropium-albuterol (COMBIVENT RESPIMAT)  MCG/ACT Aero Soln Inhale 1 Puff by mouth 4 times a day. (Patient not taking: Reported on 4/19/2019) 1 Inhaler 11   • gabapentin (NEURONTIN) 300 MG Cap Take 1 Cap by mouth 3 times a day. (Patient not taking: Reported on 4/19/2019) 90 Cap    • senna-docusate (PERICOLACE OR SENOKOT S) 8.6-50 MG Tab Take 1 Tab by mouth every day. (Patient not taking: Reported on 2/21/2017) 30 Tab 0   • senna-docusate (PERICOLACE OR SENOKOT S) 8.6-50 MG Tab Take 1 Tab by mouth every 24 hours as needed for Constipation. (Patient not taking: Reported on 2/21/2017) 30 Tab 0   • lactulose 20 GM/30ML Solution Take 30 mL by mouth every 24 hours as needed (if sennosides-docusate sodium (SENOKOT-S) ineffective). (Patient not taking: Reported on 4/19/2019) 30 Each    • bisacodyl (DULCOLAX) 10 MG Suppos Insert 1 Suppository in rectum every 24 hours as needed (if lactulose ineffective). (Patient not taking: Reported on 4/19/2019)  0   • heparin 5000 UNIT/ML Solution Inject 1 mL as instructed every 8 hours. (Patient not taking: Reported on 4/19/2019)  0   • ondansetron (ZOFRAN ODT) 4 MG TABLET DISPERSIBLE Take 1 Tab by mouth every four hours as needed for Nausea/Vomiting (give PO if IV route is  unavailable. May give per feeding tube.). (Patient not taking: Reported on 2/21/2017) 10 Tab 0   • metoprolol (LOPRESSOR) 25 MG Tab Take 1 Tab by mouth 2 Times a Day. (Patient not taking: Reported on 2/21/2017) 60 Tab    • ipratropium-albuterol (DUONEB) 0.5-2.5 (3) MG/3ML nebulizer solution 3 mL by Nebulization route every 6 hours as needed for Shortness of Breath. (Patient not taking: Reported on 4/19/2019)     • acetaminophen (TYLENOL) 325 MG Tab Take 2 Tabs by mouth every 6 hours as needed for Mild Pain or Moderate Pain. (Patient not taking: Reported on 4/19/2019) 30 Tab 0   • budesonide (PULMICORT) 0.5 MG/2ML Suspension 2 mL by Nebulization route 2 Times a Day. (Patient not taking: Reported on 4/19/2019)     • hydrocodone-acetaminophen (NORCO) 5-325 MG Tab per tablet Take 1-2 Tabs by mouth every four hours as needed. (Patient not taking: Reported on 4/19/2019) 20 Tab 0   • alprazolam (XANAX) 0.25 MG Tab Take 0.25 mg by mouth at bedtime as needed for Sleep.     • Coenzyme Q10 (CO Q 10 PO) Take 1 Cap by mouth every day at 6 PM.     • Calcium Carbonate-Vit D-Min (CALCIUM 1200 PO) Take 1 Tab by mouth every day.     • gelatin 650 MG capsule Take 1 Cap by mouth every day.     • vitamin D (CHOLECALCIFEROL) 1000 UNIT Tab Take 1,000 Units by mouth every day.     • Cyanocobalamin (B-12 PO) Take 1 Tab by mouth every day.       No current facility-administered medications for this visit.        Social History   Substance Use Topics   • Smoking status: Former Smoker     Packs/day: 0.25     Years: 34.00     Types: Cigarettes     Quit date: 1/1/1985   • Smokeless tobacco: Never Used   • Alcohol use 4.2 oz/week     7 Shots of liquor per week        Past Medical History:   Diagnosis Date   • AAA (abdominal aortic aneurysm) (HCC) 8/11/2015   • Breast cancer (HCC)    • Cancer (HCC)    • COPD (chronic obstructive pulmonary disease) (HCC) 8/11/2015   • Near syncope 8/11/2015   • S/P right mastectomy     per pt done in 1979  "      Past Surgical History:   Procedure Laterality Date   • BREAST BIOPSY  1990   • MASTECTOMY  1979    right side   • OTHER      mastectomy       Allergies: Tetracycline; Hydrocodone; and Penicillins    Family History   Problem Relation Age of Onset   • Cancer Sister    • Cancer Mother    • Lung Disease Paternal Uncle    • Arthritis Neg Hx    • Genetic Neg Hx    • Psychiatry Neg Hx    • Diabetes Neg Hx    • Heart Disease Neg Hx    • Hypertension Neg Hx    • Hyperlipidemia Neg Hx    • Stroke Neg Hx    • Alcohol/Drug Neg Hx        Physical Examination    Vitals:    04/19/19 1111 04/19/19 1114   Height: 1.727 m (5' 8\")    Weight: 70.8 kg (156 lb)    Weight % change since last entry.: 0 %    BP: 122/76    Pulse: 72    BMI (Calculated): 23.72    Resp: 16    Temp: 36.9 °C (98.4 °F)    TempSrc: Temporal    O2 sat % on O2:  (!) 91 %   O2 Flow Rate (L/min):  3       General Appearance: alert, no distress  Skin: Skin color, texture, turgor normal. No rashes or lesions.  Eyes: negative  Oropharynx: Lips, mucosa, and tongue normal. Teeth and gums normal. Oropharynx moist and without lesion  Lungs: positive findings: No rales or rhonchi  Heart: negative. RRR without murmur, gallop, or rubs.  No ectopy.  Abdomen: Abdomen soft, non-tender. . No masses,  No organomegaly  Extremities:  No deformities, edema, or skin discoloration  Joints: No acute arthritis  Peripheral Pulses:perfused  Neurologic: intact grossly  No    I (soft palate, uvula, fauces, tonsillar pillars visible)    Imaging: CAT scan to current described above    PFTS: Stable lung function test      Assessment and Plan  1. Interstitial lung disease (HCC)    2. Lung nodule  Resolved on current film see description    3. Centrilobular emphysema (HCC)    4. Hypoxia    This lady comes in with interstitial lung disease, current lung function testing continues to show stability, total lung capacity is 85% and oxygen transfer is actually better than it was last measurement.  " This is reassuring.  She continues on perfinidone, and her daughter is present as well.  They asked about stem cell, we discussed the options, at present with her stability to improvement I do not think this is warranted.    No interval new problems, activity level remains good, oxygen is 24 7.  We will reassess in 6 months sooner if there are any new problems.    Of note the previously described lingular nodule has resolved.  The right middle lobe nodule at 7 mm from the interstitial changes are unchanged and no new process is seen  Followup Return in about 6 months (around 10/19/2019) for follow up visit with Dr. Cate Richardson.        Electronically signed by Cate Richardson M.D.  on 04/19/19    Savage Doyle M.D.  [address] [address]  VIA Facsimile: [phone]

## 2019-04-19 NOTE — PROCEDURES
Technician: KIN Padilla    Technician Comment:  Good patient effort & cooperation.  The results of this test meet the ATS/ERS standards for acceptability & reproducibility.  Test was performed on the [employer] Body Plethysmograph-Elite DX system.  Predicted values were [facility]-3 for spirometry, [facility] for DLCO, ITS for Lung Volumes.  The DLCO was uncorrected for Hgb.  A bronchodilator of Ventolin HFA -2puffs via spacer administered.  DLCO performed during dilation period.    Interpretation:    Lung function testing completed on April 19, 2019 shows low normal total lung capacity at 85%.  Lung volumes are otherwise normal.  Spirometry shows very mild reduction of mid flows at 62% predicted.  No significant bronchodilator response.  FEV1 is low at 1.5 L, 78% predicted.  FEV1 FVC ratio is 63%.  Oxygen transfer is 60%, mildly reduced, slightly better than the previous test.  Flow volume loop confirms a combined obstructive and borderline restrictive pattern with good effort noted

## 2019-04-19 NOTE — PROGRESS NOTES
Yesi Garduno is a 89 y.o. female here for interstitial lung disease on perfinidone. Patient was referred by primary care.    History of Present Illness:      This lady comes in with interstitial lung disease, current lung function testing continues to show stability, total lung capacity is 85% and oxygen transfer is actually better than it was last measurement.  This is reassuring.  She continues on perfinidone, and her daughter is present as well.  They asked about stem cell, we discussed the options, at present with her stability to improvement I do not think this is warranted.    No interval new problems, activity level remains good, oxygen is 24 7.  We will reassess in 6 months sooner if there are any new problems.    Of note the previously described lingular nodule has resolved.  The right middle lobe nodule at 7 mm from the interstitial changes are unchanged and no new process is seen    Constitutional ROS: No unexpected change in weight, No unexplained fevers  Eyes: No change in vision or blurring or double vision  Mouth/Throat ROS: No sore throat, No recent change in voice or hoarseness  Pulmonary ROS: See present history for pertinent positives  Cardiovascular ROS: No chest pain to suggest acute coronary syndrome  Gastrointestinal ROS: No abdominal pain to suggest peptic disease  Musculoskeletal/Extremities ROS: no acute artritis or unusual swelling  Hematologic/Lymphatic ROS: No easy bleeding or unusual lymph node swelling  Neurologic ROS: No new or unusual weakness  Psychiatric ROS: No hallucinations  Allergic/Immunologic: No  urticaria or allergic rash      Current Outpatient Prescriptions   Medication Sig Dispense Refill   • Pirfenidone (ESBRIET) 801 MG Tab Take  by mouth.     • docusate sodium 100 MG Cap Take 100 mg by mouth every morning. 60 Cap    • fluticasone (FLONASE) 50 MCG/ACT nasal spray Spray 1 Spray in nose every bedtime. Each Nostril     • triamterene-hctz (MAXZIDE-25/DYAZIDE) 37.5-25  MG Tab Take 1 Tab by mouth every day. (Patient not taking: Reported on 4/19/2019) 30 Tab 3   • gabapentin (NEURONTIN) 100 MG Cap TAKE 1 CAPSULE BY MOUTH AT 6 PM FOR 1 WEEK THEN 2 CAPS BY MOUTH AT 6 PM DAILY  1   • levofloxacin (LEVAQUIN) 500 MG tablet Take  by mouth every day. WITH FOOD  0   • ipratropium-albuterol (COMBIVENT RESPIMAT)  MCG/ACT Aero Soln Inhale 1 Puff by mouth 4 times a day. (Patient not taking: Reported on 4/19/2019) 1 Inhaler 11   • gabapentin (NEURONTIN) 300 MG Cap Take 1 Cap by mouth 3 times a day. (Patient not taking: Reported on 4/19/2019) 90 Cap    • senna-docusate (PERICOLACE OR SENOKOT S) 8.6-50 MG Tab Take 1 Tab by mouth every day. (Patient not taking: Reported on 2/21/2017) 30 Tab 0   • senna-docusate (PERICOLACE OR SENOKOT S) 8.6-50 MG Tab Take 1 Tab by mouth every 24 hours as needed for Constipation. (Patient not taking: Reported on 2/21/2017) 30 Tab 0   • lactulose 20 GM/30ML Solution Take 30 mL by mouth every 24 hours as needed (if sennosides-docusate sodium (SENOKOT-S) ineffective). (Patient not taking: Reported on 4/19/2019) 30 Each    • bisacodyl (DULCOLAX) 10 MG Suppos Insert 1 Suppository in rectum every 24 hours as needed (if lactulose ineffective). (Patient not taking: Reported on 4/19/2019)  0   • heparin 5000 UNIT/ML Solution Inject 1 mL as instructed every 8 hours. (Patient not taking: Reported on 4/19/2019)  0   • ondansetron (ZOFRAN ODT) 4 MG TABLET DISPERSIBLE Take 1 Tab by mouth every four hours as needed for Nausea/Vomiting (give PO if IV route is unavailable. May give per feeding tube.). (Patient not taking: Reported on 2/21/2017) 10 Tab 0   • metoprolol (LOPRESSOR) 25 MG Tab Take 1 Tab by mouth 2 Times a Day. (Patient not taking: Reported on 2/21/2017) 60 Tab    • ipratropium-albuterol (DUONEB) 0.5-2.5 (3) MG/3ML nebulizer solution 3 mL by Nebulization route every 6 hours as needed for Shortness of Breath. (Patient not taking: Reported on 4/19/2019)     •  acetaminophen (TYLENOL) 325 MG Tab Take 2 Tabs by mouth every 6 hours as needed for Mild Pain or Moderate Pain. (Patient not taking: Reported on 4/19/2019) 30 Tab 0   • budesonide (PULMICORT) 0.5 MG/2ML Suspension 2 mL by Nebulization route 2 Times a Day. (Patient not taking: Reported on 4/19/2019)     • hydrocodone-acetaminophen (NORCO) 5-325 MG Tab per tablet Take 1-2 Tabs by mouth every four hours as needed. (Patient not taking: Reported on 4/19/2019) 20 Tab 0   • alprazolam (XANAX) 0.25 MG Tab Take 0.25 mg by mouth at bedtime as needed for Sleep.     • Coenzyme Q10 (CO Q 10 PO) Take 1 Cap by mouth every day at 6 PM.     • Calcium Carbonate-Vit D-Min (CALCIUM 1200 PO) Take 1 Tab by mouth every day.     • gelatin 650 MG capsule Take 1 Cap by mouth every day.     • vitamin D (CHOLECALCIFEROL) 1000 UNIT Tab Take 1,000 Units by mouth every day.     • Cyanocobalamin (B-12 PO) Take 1 Tab by mouth every day.       No current facility-administered medications for this visit.        Social History   Substance Use Topics   • Smoking status: Former Smoker     Packs/day: 0.25     Years: 34.00     Types: Cigarettes     Quit date: 1/1/1985   • Smokeless tobacco: Never Used   • Alcohol use 4.2 oz/week     7 Shots of liquor per week        Past Medical History:   Diagnosis Date   • AAA (abdominal aortic aneurysm) ([facility]) 8/11/2015   • Breast cancer ([facility])    • Cancer ([facility])    • COPD (chronic obstructive pulmonary disease) ([facility]) 8/11/2015   • Near syncope 8/11/2015   • S/P right mastectomy     per pt done in 1979       Past Surgical History:   Procedure Laterality Date   • BREAST BIOPSY  1990   • MASTECTOMY  1979    right side   • OTHER      mastectomy       Allergies: Tetracycline; Hydrocodone; and Penicillins    Family History   Problem Relation Age of Onset   • Cancer Sister    • Cancer Mother    • Lung Disease Paternal Uncle    • Arthritis Neg Hx    • Genetic Neg Hx    • Psychiatry Neg Hx    • Diabetes Neg Hx    • Heart Disease  "Neg Hx    • Hypertension Neg Hx    • Hyperlipidemia Neg Hx    • Stroke Neg Hx    • Alcohol/Drug Neg Hx        Physical Examination    Vitals:    04/19/19 1111 04/19/19 1114   Height: 1.727 m (5' 8\")    Weight: 70.8 kg (156 lb)    Weight % change since last entry.: 0 %    BP: 122/76    Pulse: 72    BMI (Calculated): 23.72    Resp: 16    Temp: 36.9 °C (98.4 °F)    TempSrc: Temporal    O2 sat % on O2:  (!) 91 %   O2 Flow Rate (L/min):  3       General Appearance: alert, no distress  Skin: Skin color, texture, turgor normal. No rashes or lesions.  Eyes: negative  Oropharynx: Lips, mucosa, and tongue normal. Teeth and gums normal. Oropharynx moist and without lesion  Lungs: positive findings: No rales or rhonchi  Heart: negative. RRR without murmur, gallop, or rubs.  No ectopy.  Abdomen: Abdomen soft, non-tender. . No masses,  No organomegaly  Extremities:  No deformities, edema, or skin discoloration  Joints: No acute arthritis  Peripheral Pulses:perfused  Neurologic: intact grossly  No    I (soft palate, uvula, fauces, tonsillar pillars visible)    Imaging: CAT scan to current described above    PFTS: Stable lung function test      Assessment and Plan  1. Interstitial lung disease (HCC)    2. Lung nodule  Resolved on current film see description    3. Centrilobular emphysema (HCC)    4. Hypoxia    This lady comes in with interstitial lung disease, current lung function testing continues to show stability, total lung capacity is 85% and oxygen transfer is actually better than it was last measurement.  This is reassuring.  She continues on perfinidone, and her daughter is present as well.  They asked about stem cell, we discussed the options, at present with her stability to improvement I do not think this is warranted.    No interval new problems, activity level remains good, oxygen is 24 7.  We will reassess in 6 months sooner if there are any new problems.    Of note the previously described lingular nodule has " resolved.  The right middle lobe nodule at 7 mm from the interstitial changes are unchanged and no new process is seen  Followup Return in about 6 months (around 10/19/2019) for follow up visit with Dr. Cate Richardson.

## 2019-04-19 NOTE — PATIENT INSTRUCTIONS
This lady comes in with interstitial lung disease, current lung function testing continues to show stability, total lung capacity is 85% and oxygen transfer is actually better than it was last measurement.  This is reassuring.  She continues on perfinidone, and her daughter is present as well.  They asked about stem cell, we discussed the options, at present with her stability to improvement I do not think this is warranted.    No interval new problems, activity level remains good, oxygen is 24 7.  We will reassess in 6 months sooner if there are any new problems.    Of note the previously described lingular nodule has resolved.  The right middle lobe nodule at 7 mm from the interstitial changes are unchanged and no new process is seen

## 2019-05-10 ENCOUNTER — HOSPITAL ENCOUNTER (OUTPATIENT)
Dept: LAB | Facility: MEDICAL CENTER | Age: 84
End: 2019-05-10
Attending: NURSE PRACTITIONER
Payer: MEDICARE

## 2019-05-10 DIAGNOSIS — J84.112 IPF (IDIOPATHIC PULMONARY FIBROSIS) (HCC): ICD-10-CM

## 2019-05-10 LAB
ALBUMIN SERPL BCP-MCNC: 4.2 G/DL (ref 3.2–4.9)
ALP SERPL-CCNC: 80 U/L (ref 30–99)
ALT SERPL-CCNC: 13 U/L (ref 2–50)
ANION GAP SERPL CALC-SCNC: 10 MMOL/L (ref 0–11.9)
AST SERPL-CCNC: 17 U/L (ref 12–45)
BILIRUB CONJ SERPL-MCNC: <0.1 MG/DL (ref 0.1–0.5)
BILIRUB INDIRECT SERPL-MCNC: NORMAL MG/DL (ref 0–1)
BILIRUB SERPL-MCNC: 0.3 MG/DL (ref 0.1–1.5)
BUN SERPL-MCNC: 26 MG/DL (ref 8–22)
CALCIUM SERPL-MCNC: 9.3 MG/DL (ref 8.5–10.5)
CHLORIDE SERPL-SCNC: 108 MMOL/L (ref 96–112)
CO2 SERPL-SCNC: 25 MMOL/L (ref 20–33)
CREAT SERPL-MCNC: 0.98 MG/DL (ref 0.5–1.4)
GLUCOSE SERPL-MCNC: 81 MG/DL (ref 65–99)
POTASSIUM SERPL-SCNC: 4.5 MMOL/L (ref 3.6–5.5)
PROT SERPL-MCNC: 6.5 G/DL (ref 6–8.2)
SODIUM SERPL-SCNC: 143 MMOL/L (ref 135–145)

## 2019-05-10 PROCEDURE — 80076 HEPATIC FUNCTION PANEL: CPT

## 2019-05-10 PROCEDURE — 36415 COLL VENOUS BLD VENIPUNCTURE: CPT

## 2019-05-10 PROCEDURE — 80048 BASIC METABOLIC PNL TOTAL CA: CPT

## 2019-06-14 ENCOUNTER — HOSPITAL ENCOUNTER (OUTPATIENT)
Dept: LAB | Facility: MEDICAL CENTER | Age: 84
End: 2019-06-14
Attending: NURSE PRACTITIONER
Payer: MEDICARE

## 2019-06-14 DIAGNOSIS — J84.112 IPF (IDIOPATHIC PULMONARY FIBROSIS) (HCC): ICD-10-CM

## 2019-06-14 LAB
ALBUMIN SERPL BCP-MCNC: 4.3 G/DL (ref 3.2–4.9)
ALP SERPL-CCNC: 83 U/L (ref 30–99)
ALT SERPL-CCNC: 15 U/L (ref 2–50)
ANION GAP SERPL CALC-SCNC: 7 MMOL/L (ref 0–11.9)
AST SERPL-CCNC: 17 U/L (ref 12–45)
BILIRUB CONJ SERPL-MCNC: <0.1 MG/DL (ref 0.1–0.5)
BILIRUB INDIRECT SERPL-MCNC: NORMAL MG/DL (ref 0–1)
BILIRUB SERPL-MCNC: 0.3 MG/DL (ref 0.1–1.5)
BUN SERPL-MCNC: 28 MG/DL (ref 8–22)
CALCIUM SERPL-MCNC: 9.6 MG/DL (ref 8.5–10.5)
CHLORIDE SERPL-SCNC: 107 MMOL/L (ref 96–112)
CO2 SERPL-SCNC: 25 MMOL/L (ref 20–33)
CREAT SERPL-MCNC: 1.07 MG/DL (ref 0.5–1.4)
GLUCOSE SERPL-MCNC: 75 MG/DL (ref 65–99)
POTASSIUM SERPL-SCNC: 4.8 MMOL/L (ref 3.6–5.5)
PROT SERPL-MCNC: 6.8 G/DL (ref 6–8.2)
SODIUM SERPL-SCNC: 139 MMOL/L (ref 135–145)

## 2019-06-14 PROCEDURE — 80048 BASIC METABOLIC PNL TOTAL CA: CPT

## 2019-06-14 PROCEDURE — 80076 HEPATIC FUNCTION PANEL: CPT

## 2019-06-14 PROCEDURE — 36415 COLL VENOUS BLD VENIPUNCTURE: CPT

## 2019-06-16 ENCOUNTER — PATIENT MESSAGE (OUTPATIENT)
Dept: PULMONOLOGY | Facility: HOSPICE | Age: 84
End: 2019-06-16

## 2019-06-17 NOTE — PROGRESS NOTES
Tele strip at 1910 shows Sinus rhythm with a HR of 69.      Measurements: .12/.08/.36    Tele Shift Summary:    Rhythm : Sinus bradycardia/rhythm  Rate : 50s to 60s (lowest was 49)    Ectopy : Per CCT [facility], pt had occasional PVCs and PACs    Telemetry monitoring strips placed in pt chart.   Improved

## 2019-06-17 NOTE — TELEPHONE ENCOUNTER
From: Yesi Garduno  To: Cate Richardson M.D.  Sent: 2019 10:13 AM PDT  Subject: Non-Urgent Medical Question    My monthly blood tests  in August, if you want me to continue with them I need a new order.  Thank you  Yesi Garduno

## 2019-07-31 DIAGNOSIS — J84.9 INTERSTITIAL LUNG DISEASE (HCC): ICD-10-CM

## 2019-07-31 RX ORDER — PIRFENIDONE 801 MG/1
1 TABLET, FILM COATED ORAL 3 TIMES DAILY
Qty: 90 TAB | Refills: 11 | Status: SHIPPED
Start: 2019-07-31 | End: 2019-07-31 | Stop reason: SDUPTHER

## 2019-07-31 RX ORDER — PIRFENIDONE 801 MG/1
1 TABLET, FILM COATED ORAL 3 TIMES DAILY
Qty: 90 TAB | Refills: 11 | Status: SHIPPED | OUTPATIENT
Start: 2019-07-31 | End: 2019-07-31 | Stop reason: SDUPTHER

## 2019-07-31 RX ORDER — PIRFENIDONE 801 MG/1
1 TABLET, FILM COATED ORAL 3 TIMES DAILY
Qty: 90 TAB | Refills: 11 | Status: SHIPPED | OUTPATIENT
Start: 2019-07-31 | End: 2019-08-30

## 2019-08-04 ENCOUNTER — PATIENT MESSAGE (OUTPATIENT)
Dept: PULMONOLOGY | Facility: HOSPICE | Age: 84
End: 2019-08-04

## 2019-08-04 DIAGNOSIS — J84.112 IPF (IDIOPATHIC PULMONARY FIBROSIS) (HCC): ICD-10-CM

## 2019-08-05 NOTE — PATIENT COMMUNICATION
Please see lab orders - this was ordered 6/2019 for standing orders.  Please have patient verify with the lab that it is also seen on their side for hepatic function.

## 2019-08-05 NOTE — PATIENT COMMUNICATION
Called and spoke with [facility].  Unfortunately that order has an expiration date on it.  They would need a new standing order.    I pended a standing lab order. Please review.   Thanks

## 2019-08-05 NOTE — TELEPHONE ENCOUNTER
From: Yesi Garduno  To: Cate Richardson M.D.  Sent: 8/4/2019 1:34 PM PDT  Subject: Prescription Question    Has my standing order for blood work been renewed

## 2019-08-17 ENCOUNTER — HOSPITAL ENCOUNTER (OUTPATIENT)
Dept: LAB | Facility: MEDICAL CENTER | Age: 84
End: 2019-08-17
Attending: NURSE PRACTITIONER
Payer: MEDICARE

## 2019-08-17 DIAGNOSIS — J84.112 IPF (IDIOPATHIC PULMONARY FIBROSIS) (HCC): ICD-10-CM

## 2019-08-17 LAB
ALBUMIN SERPL BCP-MCNC: 4.2 G/DL (ref 3.2–4.9)
ALP SERPL-CCNC: 85 U/L (ref 30–99)
ALT SERPL-CCNC: 10 U/L (ref 2–50)
AST SERPL-CCNC: 19 U/L (ref 12–45)
BILIRUB CONJ SERPL-MCNC: <0.1 MG/DL (ref 0.1–0.5)
BILIRUB INDIRECT SERPL-MCNC: NORMAL MG/DL (ref 0–1)
BILIRUB SERPL-MCNC: 0.3 MG/DL (ref 0.1–1.5)
PROT SERPL-MCNC: 7.1 G/DL (ref 6–8.2)

## 2019-08-17 PROCEDURE — 36415 COLL VENOUS BLD VENIPUNCTURE: CPT

## 2019-08-17 PROCEDURE — 80076 HEPATIC FUNCTION PANEL: CPT

## 2019-09-30 ENCOUNTER — HOSPITAL ENCOUNTER (OUTPATIENT)
Dept: LAB | Facility: MEDICAL CENTER | Age: 84
End: 2019-09-30
Attending: NURSE PRACTITIONER
Payer: MEDICARE

## 2019-09-30 DIAGNOSIS — J84.112 IPF (IDIOPATHIC PULMONARY FIBROSIS) (HCC): ICD-10-CM

## 2019-09-30 LAB
ALBUMIN SERPL BCP-MCNC: 4.1 G/DL (ref 3.2–4.9)
ALP SERPL-CCNC: 93 U/L (ref 30–99)
ALT SERPL-CCNC: 10 U/L (ref 2–50)
AST SERPL-CCNC: 22 U/L (ref 12–45)
BILIRUB CONJ SERPL-MCNC: 0.1 MG/DL (ref 0.1–0.5)
BILIRUB INDIRECT SERPL-MCNC: 0.2 MG/DL (ref 0–1)
BILIRUB SERPL-MCNC: 0.3 MG/DL (ref 0.1–1.5)
PROT SERPL-MCNC: 6.6 G/DL (ref 6–8.2)

## 2019-09-30 PROCEDURE — 80076 HEPATIC FUNCTION PANEL: CPT

## 2019-09-30 PROCEDURE — 36415 COLL VENOUS BLD VENIPUNCTURE: CPT

## 2019-10-03 ENCOUNTER — OFFICE VISIT (OUTPATIENT)
Dept: PULMONOLOGY | Facility: HOSPICE | Age: 84
End: 2019-10-03
Payer: MEDICARE

## 2019-10-03 VITALS
WEIGHT: 153 LBS | RESPIRATION RATE: 14 BRPM | SYSTOLIC BLOOD PRESSURE: 126 MMHG | DIASTOLIC BLOOD PRESSURE: 64 MMHG | OXYGEN SATURATION: 93 % | TEMPERATURE: 98 F | HEIGHT: 69 IN | BODY MASS INDEX: 22.66 KG/M2 | HEART RATE: 62 BPM

## 2019-10-03 DIAGNOSIS — R91.1 LUNG NODULE: ICD-10-CM

## 2019-10-03 DIAGNOSIS — R09.02 HYPOXIA: ICD-10-CM

## 2019-10-03 DIAGNOSIS — J43.2 CENTRILOBULAR EMPHYSEMA (HCC): ICD-10-CM

## 2019-10-03 DIAGNOSIS — J84.9 INTERSTITIAL PULMONARY DISEASE (HCC): ICD-10-CM

## 2019-10-03 DIAGNOSIS — J84.9 INTERSTITIAL LUNG DISEASE (HCC): ICD-10-CM

## 2019-10-03 PROCEDURE — 90662 IIV NO PRSV INCREASED AG IM: CPT | Performed by: INTERNAL MEDICINE

## 2019-10-03 PROCEDURE — G0008 ADMIN INFLUENZA VIRUS VAC: HCPCS | Performed by: INTERNAL MEDICINE

## 2019-10-03 PROCEDURE — 99214 OFFICE O/P EST MOD 30 MIN: CPT | Mod: 25 | Performed by: INTERNAL MEDICINE

## 2019-10-03 RX ORDER — PIRFENIDONE 801 MG/1
TABLET, FILM COATED ORAL 3 TIMES DAILY
COMMUNITY
End: 2020-12-18

## 2019-10-03 NOTE — PATIENT INSTRUCTIONS
Yesi comes in today with her daughter to follow-up on her interstitial lung disease, she is been on perfinidone for almost 3 years, we had some what of a response prior, total lung capacity is low normal, oxygen transfer has been reduced, has had some variability but last measured was 60%.    She does have some minor side effect with the perfinidone, in the morning has some dizziness lies down and then is able to mobilize.  Shortness of breath and dyspnea on exertion are multifactorial, may be subtly worse but she does continue on 3 L oxygen with good saturations and this is a 24/7 process.    Interval lung exam does not show significant changes, we will recheck her interstitial lung disease, look again at the stable nodule with a high-resolution CAT scan in about 3 months, and in addition look at lung function testing.  She is now 89 years of age, remarkably alert and is active is can be, and the question of how long she should be on the perfinidone and whether not we should do a trial off to see if there is an impact on her lung function will be an ongoing discussion.

## 2019-10-03 NOTE — PROGRESS NOTES
Yesi Garduno is a 89 y.o. female here for interstitial lung disease on anti-fibrotic therapy and chronic oxygen. Patient was referred by primary care.    History of Present Illness:      Yesi comes in today with her daughter to follow-up on her interstitial lung disease, she is been on perfinidone for almost 3 years, we had some what of a response prior, total lung capacity is low normal, oxygen transfer has been reduced, has had some variability but last measured was 60%.    She does have some minor side effect with the perfinidone, in the morning has some dizziness lies down and then is able to mobilize.  Shortness of breath and dyspnea on exertion are multifactorial, may be subtly worse but she does continue on 3 L oxygen with good saturations and this is a 24/7 process.    Interval lung exam does not show significant changes, we will recheck her interstitial lung disease, look again at the stable nodule with a high-resolution CAT scan in about 3 months, and in addition look at lung function testing.  She is now 89 years of age, remarkably alert and is active is can be, and the question of how long she should be on the perfinidone and whether not we should do a trial off to see if there is an impact on her lung function will be an ongoing discussion.    Constitutional ROS: No unexpected change in weight, No unexplained fevers  Eyes: No change in vision or blurring or double vision  Mouth/Throat ROS: No sore throat, No recent change in voice or hoarseness  Pulmonary ROS: See present history for pertinent positives  Cardiovascular ROS: No chest pain to suggest acute coronary syndrome  Gastrointestinal ROS: No abdominal pain to suggest peptic disease  Musculoskeletal/Extremities ROS: no acute artritis or unusual swelling  Hematologic/Lymphatic ROS: No easy bleeding or unusual lymph node swelling  Neurologic ROS: No new or unusual weakness  Psychiatric ROS: No hallucinations  Allergic/Immunologic: No  urticaria  or allergic rash      Current Outpatient Medications   Medication Sig Dispense Refill   • Pirfenidone 801 MG Tab Take  by mouth 3 times a day.     • alprazolam (XANAX) 0.25 MG Tab Take 0.25 mg by mouth at bedtime as needed for Sleep.     • Calcium Carbonate-Vit D-Min (CALCIUM 1200 PO) Take 1 Tab by mouth every day.     • gelatin 650 MG capsule Take 1 Cap by mouth every day.     • vitamin D (CHOLECALCIFEROL) 1000 UNIT Tab Take 1,000 Units by mouth every day.     • fluticasone (FLONASE) 50 MCG/ACT nasal spray Spray 1 Spray in nose every bedtime. Each Nostril     • triamterene-hctz (MAXZIDE-25/DYAZIDE) 37.5-25 MG Tab Take 1 Tab by mouth every day. (Patient not taking: Reported on 4/19/2019) 30 Tab 3   • gabapentin (NEURONTIN) 100 MG Cap TAKE 1 CAPSULE BY MOUTH AT 6 PM FOR 1 WEEK THEN 2 CAPS BY MOUTH AT 6 PM DAILY  1   • levofloxacin (LEVAQUIN) 500 MG tablet Take  by mouth every day. WITH FOOD  0   • ipratropium-albuterol (COMBIVENT RESPIMAT)  MCG/ACT Aero Soln Inhale 1 Puff by mouth 4 times a day. (Patient not taking: Reported on 4/19/2019) 1 Inhaler 11   • gabapentin (NEURONTIN) 300 MG Cap Take 1 Cap by mouth 3 times a day. (Patient not taking: Reported on 4/19/2019) 90 Cap    • docusate sodium 100 MG Cap Take 100 mg by mouth every morning. 60 Cap    • senna-docusate (PERICOLACE OR SENOKOT S) 8.6-50 MG Tab Take 1 Tab by mouth every day. (Patient not taking: Reported on 2/21/2017) 30 Tab 0   • senna-docusate (PERICOLACE OR SENOKOT S) 8.6-50 MG Tab Take 1 Tab by mouth every 24 hours as needed for Constipation. (Patient not taking: Reported on 2/21/2017) 30 Tab 0   • lactulose 20 GM/30ML Solution Take 30 mL by mouth every 24 hours as needed (if sennosides-docusate sodium (SENOKOT-S) ineffective). (Patient not taking: Reported on 4/19/2019) 30 Each    • bisacodyl (DULCOLAX) 10 MG Suppos Insert 1 Suppository in rectum every 24 hours as needed (if lactulose ineffective). (Patient not taking: Reported on 4/19/2019)  0    • heparin 5000 UNIT/ML Solution Inject 1 mL as instructed every 8 hours. (Patient not taking: Reported on 2019)  0   • ondansetron (ZOFRAN ODT) 4 MG TABLET DISPERSIBLE Take 1 Tab by mouth every four hours as needed for Nausea/Vomiting (give PO if IV route is unavailable. May give per feeding tube.). (Patient not taking: Reported on 2017) 10 Tab 0   • metoprolol (LOPRESSOR) 25 MG Tab Take 1 Tab by mouth 2 Times a Day. (Patient not taking: Reported on 2017) 60 Tab    • ipratropium-albuterol (DUONEB) 0.5-2.5 (3) MG/3ML nebulizer solution 3 mL by Nebulization route every 6 hours as needed for Shortness of Breath. (Patient not taking: Reported on 2019)     • acetaminophen (TYLENOL) 325 MG Tab Take 2 Tabs by mouth every 6 hours as needed for Mild Pain or Moderate Pain. (Patient not taking: Reported on 2019) 30 Tab 0   • budesonide (PULMICORT) 0.5 MG/2ML Suspension 2 mL by Nebulization route 2 Times a Day. (Patient not taking: Reported on 2019)     • hydrocodone-acetaminophen (NORCO) 5-325 MG Tab per tablet Take 1-2 Tabs by mouth every four hours as needed. (Patient not taking: Reported on 2019) 20 Tab 0   • Coenzyme Q10 (CO Q 10 PO) Take 1 Cap by mouth every day at 6 PM.     • Cyanocobalamin (B-12 PO) Take 1 Tab by mouth every day.       No current facility-administered medications for this visit.        Social History     Tobacco Use   • Smoking status: Former Smoker     Packs/day: 0.25     Years: 34.00     Pack years: 8.50     Types: Cigarettes     Last attempt to quit: 1985     Years since quittin.7   • Smokeless tobacco: Never Used   Substance Use Topics   • Alcohol use: Yes     Alcohol/week: 4.2 oz     Types: 7 Shots of liquor per week   • Drug use: No        Past Medical History:   Diagnosis Date   • AAA (abdominal aortic aneurysm) ([facility]) 2015   • Breast cancer (HCC)    • Cancer (HCC)    • COPD (chronic obstructive pulmonary disease) ([facility]) 2015   • Near syncope  "8/11/2015   • S/P right mastectomy     per pt done in 1979       Past Surgical History:   Procedure Laterality Date   • BREAST BIOPSY  1990   • MASTECTOMY  1979    right side   • OTHER      mastectomy       Allergies: Tetracycline; Hydrocodone; and Penicillins    Family History   Problem Relation Age of Onset   • Cancer Sister    • Cancer Mother    • Lung Disease Paternal Uncle    • Arthritis Neg Hx    • Genetic Disorder Neg Hx    • Psychiatric Illness Neg Hx    • Diabetes Neg Hx    • Heart Disease Neg Hx    • Hypertension Neg Hx    • Hyperlipidemia Neg Hx    • Stroke Neg Hx    • Alcohol/Drug Neg Hx        Physical Examination    Vitals:    10/03/19 0856   Height: 1.753 m (5' 9\")   Weight: 69.4 kg (153 lb)   Weight % change since last entry.: 0 %   BP: 126/64   Pulse: 62   BMI (Calculated): 22.59   Resp: 14   Temp: 36.7 °C (98 °F)   TempSrc: Oral       General Appearance: alert, no distress  Skin: Skin color, texture, turgor normal. No rashes or lesions.  Eyes: negative  Oropharynx: Lips, mucosa, and tongue normal. Teeth and gums normal. Oropharynx moist and without lesion  Lungs: positive findings: Very minimal crackles in the bases  Heart: negative. RRR without murmur, gallop, or rubs.  No ectopy.  Abdomen: Abdomen soft, non-tender. . No masses,  No organomegaly  Extremities:  No deformities, edema, or skin discoloration  Joints: No acute arthritis  Peripheral Pulses:perfused  Neurologic: intact grossly  No clubbing or cyanosis on oxygen    II (soft palate, uvula, fauces visible)    Imaging: Ordered for next visit    PFTS: Ordered for next visit      Assessment and Plan  1. Interstitial lung disease (HCC)  Seems to be controlled on perfinidone, not advancing, value of anti-fibrotic therapy discussed  - Influenza Vaccine, High Dose (65+ Only)  - PULMONARY FUNCTION TESTS -Test requested: Complete Pulmonary Function Test; Future    2. Lung nodule  Previously stable, one irregular nodule has resolved    3. " Centrilobular emphysema (HCC)    4. Hypoxia  Oxygen 24/7    5. Interstitial pulmonary disease (HCC)  See above  - CT-CHEST, HIGH RESOLUTION LUNG; Future  - PULMONARY FUNCTION TESTS -Test requested: Complete Pulmonary Function Test; Future      Followup Return in about 4 months (around 2/3/2020) for follow up visit with Dr. Cate Richardson, with PFT.

## 2019-12-19 ENCOUNTER — HOSPITAL ENCOUNTER (OUTPATIENT)
Dept: LAB | Facility: MEDICAL CENTER | Age: 84
End: 2019-12-19
Attending: NURSE PRACTITIONER
Payer: MEDICARE

## 2019-12-19 ENCOUNTER — HOSPITAL ENCOUNTER (OUTPATIENT)
Dept: LAB | Facility: MEDICAL CENTER | Age: 84
End: 2019-12-19
Attending: FAMILY MEDICINE
Payer: MEDICARE

## 2019-12-19 DIAGNOSIS — J84.112 IPF (IDIOPATHIC PULMONARY FIBROSIS) (HCC): ICD-10-CM

## 2019-12-19 LAB
ALBUMIN SERPL BCP-MCNC: 4.3 G/DL (ref 3.2–4.9)
ALBUMIN SERPL BCP-MCNC: 4.3 G/DL (ref 3.2–4.9)
ALBUMIN/GLOB SERPL: 1.8 G/DL
ALP SERPL-CCNC: 82 U/L (ref 30–99)
ALP SERPL-CCNC: 83 U/L (ref 30–99)
ALT SERPL-CCNC: 11 U/L (ref 2–50)
ALT SERPL-CCNC: 13 U/L (ref 2–50)
ANION GAP SERPL CALC-SCNC: 9 MMOL/L (ref 0–11.9)
AST SERPL-CCNC: 17 U/L (ref 12–45)
AST SERPL-CCNC: 17 U/L (ref 12–45)
BASOPHILS # BLD AUTO: 1.2 % (ref 0–1.8)
BASOPHILS # BLD: 0.07 K/UL (ref 0–0.12)
BILIRUB CONJ SERPL-MCNC: <0.1 MG/DL (ref 0.1–0.5)
BILIRUB INDIRECT SERPL-MCNC: NORMAL MG/DL (ref 0–1)
BILIRUB SERPL-MCNC: 0.4 MG/DL (ref 0.1–1.5)
BILIRUB SERPL-MCNC: 0.4 MG/DL (ref 0.1–1.5)
BUN SERPL-MCNC: 25 MG/DL (ref 8–22)
CALCIUM SERPL-MCNC: 9.4 MG/DL (ref 8.5–10.5)
CHLORIDE SERPL-SCNC: 105 MMOL/L (ref 96–112)
CHOLEST SERPL-MCNC: 213 MG/DL (ref 100–199)
CO2 SERPL-SCNC: 27 MMOL/L (ref 20–33)
CREAT SERPL-MCNC: 1.14 MG/DL (ref 0.5–1.4)
EOSINOPHIL # BLD AUTO: 0.11 K/UL (ref 0–0.51)
EOSINOPHIL NFR BLD: 1.9 % (ref 0–6.9)
ERYTHROCYTE [DISTWIDTH] IN BLOOD BY AUTOMATED COUNT: 46.8 FL (ref 35.9–50)
GLOBULIN SER CALC-MCNC: 2.4 G/DL (ref 1.9–3.5)
GLUCOSE SERPL-MCNC: 99 MG/DL (ref 65–99)
HCT VFR BLD AUTO: 39.1 % (ref 37–47)
HDLC SERPL-MCNC: 76 MG/DL
HGB BLD-MCNC: 12.1 G/DL (ref 12–16)
IMM GRANULOCYTES # BLD AUTO: 0.02 K/UL (ref 0–0.11)
IMM GRANULOCYTES NFR BLD AUTO: 0.3 % (ref 0–0.9)
LDLC SERPL CALC-MCNC: 120 MG/DL
LYMPHOCYTES # BLD AUTO: 1.37 K/UL (ref 1–4.8)
LYMPHOCYTES NFR BLD: 23.1 % (ref 22–41)
MCH RBC QN AUTO: 30.9 PG (ref 27–33)
MCHC RBC AUTO-ENTMCNC: 30.9 G/DL (ref 33.6–35)
MCV RBC AUTO: 99.7 FL (ref 81.4–97.8)
MONOCYTES # BLD AUTO: 0.59 K/UL (ref 0–0.85)
MONOCYTES NFR BLD AUTO: 9.9 % (ref 0–13.4)
NEUTROPHILS # BLD AUTO: 3.78 K/UL (ref 2–7.15)
NEUTROPHILS NFR BLD: 63.6 % (ref 44–72)
NRBC # BLD AUTO: 0 K/UL
NRBC BLD-RTO: 0 /100 WBC
PLATELET # BLD AUTO: 237 K/UL (ref 164–446)
PMV BLD AUTO: 9.4 FL (ref 9–12.9)
POTASSIUM SERPL-SCNC: 4.5 MMOL/L (ref 3.6–5.5)
PROT SERPL-MCNC: 6.7 G/DL (ref 6–8.2)
PROT SERPL-MCNC: 6.8 G/DL (ref 6–8.2)
RBC # BLD AUTO: 3.92 M/UL (ref 4.2–5.4)
SODIUM SERPL-SCNC: 141 MMOL/L (ref 135–145)
TRIGL SERPL-MCNC: 83 MG/DL (ref 0–149)
TSH SERPL DL<=0.005 MIU/L-ACNC: 2.01 UIU/ML (ref 0.38–5.33)
WBC # BLD AUTO: 5.9 K/UL (ref 4.8–10.8)

## 2019-12-19 PROCEDURE — 84443 ASSAY THYROID STIM HORMONE: CPT

## 2019-12-19 PROCEDURE — 80076 HEPATIC FUNCTION PANEL: CPT

## 2019-12-19 PROCEDURE — 36415 COLL VENOUS BLD VENIPUNCTURE: CPT

## 2019-12-19 PROCEDURE — 80061 LIPID PANEL: CPT

## 2019-12-19 PROCEDURE — 85025 COMPLETE CBC W/AUTO DIFF WBC: CPT

## 2019-12-19 PROCEDURE — 80053 COMPREHEN METABOLIC PANEL: CPT

## 2020-01-22 ENCOUNTER — HOSPITAL ENCOUNTER (OUTPATIENT)
Dept: LAB | Facility: MEDICAL CENTER | Age: 85
End: 2020-01-22
Attending: FAMILY MEDICINE
Payer: MEDICARE

## 2020-01-22 ENCOUNTER — HOSPITAL ENCOUNTER (OUTPATIENT)
Dept: LAB | Facility: MEDICAL CENTER | Age: 85
End: 2020-01-22
Attending: NURSE PRACTITIONER
Payer: MEDICARE

## 2020-01-22 DIAGNOSIS — J84.112 IPF (IDIOPATHIC PULMONARY FIBROSIS) (HCC): ICD-10-CM

## 2020-01-22 LAB
ALBUMIN SERPL BCP-MCNC: 4.2 G/DL (ref 3.2–4.9)
ALP SERPL-CCNC: 81 U/L (ref 30–99)
ALT SERPL-CCNC: 13 U/L (ref 2–50)
AST SERPL-CCNC: 18 U/L (ref 12–45)
BILIRUB CONJ SERPL-MCNC: <0.1 MG/DL (ref 0.1–0.5)
BILIRUB INDIRECT SERPL-MCNC: NORMAL MG/DL (ref 0–1)
BILIRUB SERPL-MCNC: 0.3 MG/DL (ref 0.1–1.5)
IRON SERPL-MCNC: 97 UG/DL (ref 40–170)
PROT SERPL-MCNC: 6.9 G/DL (ref 6–8.2)
VIT B12 SERPL-MCNC: 1085 PG/ML (ref 211–911)

## 2020-01-22 PROCEDURE — 36415 COLL VENOUS BLD VENIPUNCTURE: CPT

## 2020-01-22 PROCEDURE — 80076 HEPATIC FUNCTION PANEL: CPT

## 2020-01-22 PROCEDURE — 83540 ASSAY OF IRON: CPT

## 2020-01-22 PROCEDURE — 82607 VITAMIN B-12: CPT

## 2020-01-29 ENCOUNTER — PATIENT MESSAGE (OUTPATIENT)
Dept: PULMONOLOGY | Facility: HOSPICE | Age: 85
End: 2020-01-29

## 2020-01-29 NOTE — TELEPHONE ENCOUNTER
From: Yesi Garduno  To: Cate Richardson M.D.  Sent: 1/29/2020 1:20 PM PST  Subject: Non-Urgent Medical Question    I need a referral for oxygen. Medicare says they need information from Dr. ARYAN Richardson regarding my 24/7 use.Thank you  Yesi Garduno

## 2020-02-14 ENCOUNTER — HOSPITAL ENCOUNTER (OUTPATIENT)
Dept: RADIOLOGY | Facility: MEDICAL CENTER | Age: 85
End: 2020-02-14
Attending: INTERNAL MEDICINE
Payer: MEDICARE

## 2020-02-14 DIAGNOSIS — J84.9 INTERSTITIAL PULMONARY DISEASE (HCC): ICD-10-CM

## 2020-02-14 PROCEDURE — 71250 CT THORAX DX C-: CPT

## 2020-02-18 ENCOUNTER — NON-PROVIDER VISIT (OUTPATIENT)
Dept: PULMONOLOGY | Facility: HOSPICE | Age: 85
End: 2020-02-18
Attending: INTERNAL MEDICINE
Payer: MEDICARE

## 2020-02-18 ENCOUNTER — OFFICE VISIT (OUTPATIENT)
Dept: PULMONOLOGY | Facility: HOSPICE | Age: 85
End: 2020-02-18
Payer: MEDICARE

## 2020-02-18 VITALS
HEIGHT: 68 IN | BODY MASS INDEX: 22.73 KG/M2 | SYSTOLIC BLOOD PRESSURE: 124 MMHG | HEART RATE: 66 BPM | OXYGEN SATURATION: 99 % | TEMPERATURE: 97.9 F | WEIGHT: 150 LBS | RESPIRATION RATE: 14 BRPM | DIASTOLIC BLOOD PRESSURE: 78 MMHG

## 2020-02-18 VITALS — WEIGHT: 150 LBS | BODY MASS INDEX: 22.15 KG/M2

## 2020-02-18 DIAGNOSIS — R91.1 LUNG NODULE: ICD-10-CM

## 2020-02-18 DIAGNOSIS — R09.02 HYPOXIA: ICD-10-CM

## 2020-02-18 DIAGNOSIS — J84.9 INTERSTITIAL LUNG DISEASE (HCC): ICD-10-CM

## 2020-02-18 DIAGNOSIS — J43.2 CENTRILOBULAR EMPHYSEMA (HCC): ICD-10-CM

## 2020-02-18 DIAGNOSIS — J84.9 INTERSTITIAL PULMONARY DISEASE (HCC): ICD-10-CM

## 2020-02-18 PROCEDURE — 99214 OFFICE O/P EST MOD 30 MIN: CPT | Performed by: INTERNAL MEDICINE

## 2020-02-18 PROCEDURE — 94726 PLETHYSMOGRAPHY LUNG VOLUMES: CPT | Performed by: INTERNAL MEDICINE

## 2020-02-18 PROCEDURE — 94060 EVALUATION OF WHEEZING: CPT | Performed by: INTERNAL MEDICINE

## 2020-02-18 PROCEDURE — 94729 DIFFUSING CAPACITY: CPT | Performed by: INTERNAL MEDICINE

## 2020-02-18 RX ORDER — AZITHROMYCIN 250 MG/1
TABLET, FILM COATED ORAL
Status: ON HOLD | COMMUNITY
Start: 2020-01-30 | End: 2021-04-01

## 2020-02-18 ASSESSMENT — PULMONARY FUNCTION TESTS
FEV1/FVC_PERCENT_LLN: 63
FVC_PREDICTED: 2.6
FVC: 2.4
FEV1/FVC_PERCENT_PREDICTED: 74
FEV1/FVC: 68.42
FEV1_PERCENT_CHANGE: 2
FEV1/FVC: 65
FEV1/FVC_PREDICTED: 75
FEV1/FVC_PERCENT_PREDICTED: 85
FEV1/FVC_PERCENT_CHANGE: 6
FEV1/FVC: 69
FEV1: 1.69
FEV1_PERCENT_PREDICTED: 80
FVC: 2.47
FVC_LLN: 2.17
FEV1/FVC_PERCENT_CHANGE: 450
FEV1_PERCENT_PREDICTED: 88
FEV1_PERCENT_CHANGE: 9
FVC_PERCENT_PREDICTED: 95
FEV1_LLN: 1.60
FEV1: 1.55
FEV1/FVC_PERCENT_PREDICTED: 93
FEV1/FVC_PERCENT_PREDICTED: 90
FEV1/FVC: 65
FEV1/FVC_PERCENT_PREDICTED: 87
FVC_PERCENT_PREDICTED: 92
FEV1_PREDICTED: 1.92

## 2020-02-18 ASSESSMENT — PAIN SCALES - GENERAL: PAINLEVEL: NO PAIN

## 2020-02-18 NOTE — PROCEDURES
Technician: KIN Padilla    Technician Comment:  Good patient effort & cooperation.  The results of this test meet the ATS/ERS standards for acceptability & reproducibility.  Test was performed on the [employer] Body Plethysmograph-Elite DX system.  Predicted values were [facility] for spirometry, [facility] for DLCO, ITS for Lung Volumes.  The DLCO was uncorrected for Hgb.  A bronchodilator of Ventolin HFA -2puffs via spacer administered.  DLCO performed during dilation period.    Interpretation:    Lung function testing completed on February 18, 2020 shows a mild restrictive process with reduced oxygen transfer, DLCO is 47% predicted, partially corrects for alveolar ventilation to 71%.  total lung capacity is 77%.  FEV1/FVC ratio is 65%, with low FEV1, does suggest a combined obstructive and restrictive process contributing.  Flow volume loop confirms the obstructive process with coving of the expiratory limb, no significant bronchodilator response.  Good patient effort noted

## 2020-02-18 NOTE — PATIENT INSTRUCTIONS
Yesi comes in today with her daughter to follow-up on her interstitial lung disease.  Her CAT scan shows no change, appears to be predominantly fibrotic, and we once considered stopping the anti-fibrotic therapy as she may have predominantly burnt out interstitial lung disease.    I strongly suspect at this time that that is her pattern, she has not had progression, still requires 3 L oxygen, but in spite of a low oxygen transfer has actually fairly good response to oxygen.  Her multi ox today showed room air 97%, 80% with walking, 3 L restored 99% and then walking 3 L kept her at 92%.  She uses a tank, has considered the portable concentrator but cost is prohibitive.   We repeated the multi ox to requalify her, she has distinct benefit from it, uses it 24/7 and I reordered the 3 L continuously.    With regard to the anti-fibrotic therapy, Esbriet, we will discontinue that as she has daily nausea and side effects are worse than the treatment prospects, I also suspect that if we stop it we may not see any progression of her interstitial lung disease.  That remains to be determined and we will check her at a short interval, 4 months with lung function testing at that time and then decide if we can stretch that back out to 6 months as long as there is no loss of volume or oxygen transfer off the Esbriet.    Finally, she does have a small nodule which is unchanged on the current CAT scan, we will continue periodic surveillance.

## 2020-02-18 NOTE — PROCEDURES
Multi-Ox Readings  Multi Ox #1 Room air   O2 sat % at rest 97   O2 sat % on exertion 80   O2 sat average on exertion 84   Multi Ox #2 3 LPM   O2 sat % at rest 99   O2 sat % on exertion 92   O2 sat average on exertion 95     Oxygen Use 3   Oxygen Frequency 24/7   Duration of need     Is the patient mobile within the home?     CPAP Use?     BIPAP Use?     Servo Titration

## 2020-02-18 NOTE — PROGRESS NOTES
Yesi Garduno is a 89 y.o. female here for interstitial lung disease on Esbriet with results of lung function testing also a lung nodule. Patient was referred by her primary care doctor.    History of Present Illness:    comes in today with her daughter to follow-up on her interstitial lung disease.  Her CAT scan shows no change, appears to be predominantly fibrotic, and we once considered stopping the anti-fibrotic therapy as she may have predominantly burnt out interstitial lung disease.    I strongly suspect at this time that that is her pattern, she has not had progression, still requires 3 L oxygen, but in spite of a low oxygen transfer has actually fairly good response to oxygen.  Her multi ox today showed room air 97%, 80% with walking, 3 L restored 99% and then walking 3 L kept her at 92%.  She uses a tank, has considered the portable concentrator but cost is prohibitive.    We repeated the multi ox to requalify her, she has distinct benefit from it, uses it 24/7 and I reordered the 3 L continuously.    With regard to the anti-fibrotic therapy, Esbriet, we will discontinue that as she has daily nausea and side effects are worse than the treatment prospects, I also suspect that if we stop it we may not see any progression of her interstitial lung disease.  That remains to be determined and we will check her at a short interval, 4 months with lung function testing at that time and then decide if we can stretch that back out to 6 months as long as there is no loss of volume or oxygen transfer off the Esbriet.    Finally, she does have a small nodule which is unchanged on the current CAT scan, we will continue periodic surveillance.    Constitutional ROS: No unexpected change in weight, No unexplained fevers  Eyes: No change in vision or blurring or double vision  Mouth/Throat ROS: No sore throat, No recent change in voice or hoarseness  Pulmonary ROS: See present history for pertinent  positives  Cardiovascular ROS: No chest pain to suggest acute coronary syndrome  Gastrointestinal ROS: No abdominal pain to suggest peptic disease  Musculoskeletal/Extremities ROS: no acute artritis or unusual swelling  Hematologic/Lymphatic ROS: No easy bleeding or unusual lymph node swelling  Neurologic ROS: No new or unusual weakness  Psychiatric ROS: No hallucinations  Allergic/Immunologic: No  urticaria or allergic rash      Current Outpatient Medications   Medication Sig Dispense Refill   • Pirfenidone 801 MG Tab Take  by mouth 3 times a day.     • azithromycin (ZITHROMAX) 250 MG Tab      • triamterene-hctz (MAXZIDE-25/DYAZIDE) 37.5-25 MG Tab Take 1 Tab by mouth every day. (Patient not taking: Reported on 4/19/2019) 30 Tab 3   • gabapentin (NEURONTIN) 100 MG Cap TAKE 1 CAPSULE BY MOUTH AT 6 PM FOR 1 WEEK THEN 2 CAPS BY MOUTH AT 6 PM DAILY  1   • levofloxacin (LEVAQUIN) 500 MG tablet Take  by mouth every day. WITH FOOD  0   • ipratropium-albuterol (COMBIVENT RESPIMAT)  MCG/ACT Aero Soln Inhale 1 Puff by mouth 4 times a day. (Patient not taking: Reported on 4/19/2019) 1 Inhaler 11   • gabapentin (NEURONTIN) 300 MG Cap Take 1 Cap by mouth 3 times a day. (Patient not taking: Reported on 4/19/2019) 90 Cap    • docusate sodium 100 MG Cap Take 100 mg by mouth every morning. 60 Cap    • senna-docusate (PERICOLACE OR SENOKOT S) 8.6-50 MG Tab Take 1 Tab by mouth every day. (Patient not taking: Reported on 2/21/2017) 30 Tab 0   • senna-docusate (PERICOLACE OR SENOKOT S) 8.6-50 MG Tab Take 1 Tab by mouth every 24 hours as needed for Constipation. (Patient not taking: Reported on 2/21/2017) 30 Tab 0   • lactulose 20 GM/30ML Solution Take 30 mL by mouth every 24 hours as needed (if sennosides-docusate sodium (SENOKOT-S) ineffective). (Patient not taking: Reported on 4/19/2019) 30 Each    • bisacodyl (DULCOLAX) 10 MG Suppos Insert 1 Suppository in rectum every 24 hours as needed (if lactulose ineffective). (Patient not  taking: Reported on 4/19/2019)  0   • heparin 5000 UNIT/ML Solution Inject 1 mL as instructed every 8 hours. (Patient not taking: Reported on 4/19/2019)  0   • ondansetron (ZOFRAN ODT) 4 MG TABLET DISPERSIBLE Take 1 Tab by mouth every four hours as needed for Nausea/Vomiting (give PO if IV route is unavailable. May give per feeding tube.). (Patient not taking: Reported on 2/21/2017) 10 Tab 0   • metoprolol (LOPRESSOR) 25 MG Tab Take 1 Tab by mouth 2 Times a Day. (Patient not taking: Reported on 2/21/2017) 60 Tab    • ipratropium-albuterol (DUONEB) 0.5-2.5 (3) MG/3ML nebulizer solution 3 mL by Nebulization route every 6 hours as needed for Shortness of Breath. (Patient not taking: Reported on 4/19/2019)     • acetaminophen (TYLENOL) 325 MG Tab Take 2 Tabs by mouth every 6 hours as needed for Mild Pain or Moderate Pain. (Patient not taking: Reported on 4/19/2019) 30 Tab 0   • budesonide (PULMICORT) 0.5 MG/2ML Suspension 2 mL by Nebulization route 2 Times a Day. (Patient not taking: Reported on 4/19/2019)     • hydrocodone-acetaminophen (NORCO) 5-325 MG Tab per tablet Take 1-2 Tabs by mouth every four hours as needed. (Patient not taking: Reported on 4/19/2019) 20 Tab 0   • alprazolam (XANAX) 0.25 MG Tab Take 0.25 mg by mouth at bedtime as needed for Sleep.     • Coenzyme Q10 (CO Q 10 PO) Take 1 Cap by mouth every day at 6 PM.     • Calcium Carbonate-Vit D-Min (CALCIUM 1200 PO) Take 1 Tab by mouth every day.     • gelatin 650 MG capsule Take 1 Cap by mouth every day.     • vitamin D (CHOLECALCIFEROL) 1000 UNIT Tab Take 1,000 Units by mouth every day.     • Cyanocobalamin (B-12 PO) Take 1 Tab by mouth every day.     • fluticasone (FLONASE) 50 MCG/ACT nasal spray Spray 1 Spray in nose every bedtime. Each Nostril       No current facility-administered medications for this visit.        Social History     Tobacco Use   • Smoking status: Former Smoker     Packs/day: 0.25     Years: 34.00     Pack years: 8.50     Types:  "Cigarettes     Last attempt to quit: 1985     Years since quittin.1   • Smokeless tobacco: Never Used   Substance Use Topics   • Alcohol use: Yes     Alcohol/week: 4.2 oz     Types: 7 Shots of liquor per week   • Drug use: No        Past Medical History:   Diagnosis Date   • AAA (abdominal aortic aneurysm) (HCC) 2015   • Breast cancer (HCC)    • Cancer (HCC)    • COPD (chronic obstructive pulmonary disease) (HCC) 2015   • Near syncope 2015   • S/P right mastectomy     per pt done in        Past Surgical History:   Procedure Laterality Date   • BREAST BIOPSY     • MASTECTOMY      right side   • OTHER      mastectomy       Allergies: Tetracycline; Hydrocodone; and Penicillins    Family History   Problem Relation Age of Onset   • Cancer Sister    • Cancer Mother    • Lung Disease Paternal Uncle    • Arthritis Neg Hx    • Genetic Disorder Neg Hx    • Psychiatric Illness Neg Hx    • Diabetes Neg Hx    • Heart Disease Neg Hx    • Hypertension Neg Hx    • Hyperlipidemia Neg Hx    • Stroke Neg Hx    • Alcohol/Drug Neg Hx        Physical Examination    Vitals:    20 0858   Height: 1.727 m (5' 8\")   Weight: 68 kg (150 lb)   Weight % change since last entry.: 0 %   BP: 124/78   Pulse: 66   BMI (Calculated): 22.81   Resp: 14   Temp: 36.6 °C (97.9 °F)   TempSrc: Oral       General Appearance: alert, no distress  Skin: Skin color, texture, turgor normal. No rashes or lesions.  Eyes: negative  Oropharynx: Lips, mucosa, and tongue normal. Teeth and gums normal. Oropharynx moist and without lesion  Lungs: positive findings: Basilar crackles unchanged from prior exam, mid and upper lung zones clear  Heart: negative. RRR without murmur, gallop, or rubs.  No ectopy.  Abdomen: Abdomen soft, non-tender. . No masses,  No organomegaly  Extremities:  No deformities, edema, or skin discoloration  Joints: No acute arthritis  Peripheral Pulses:perfused  Neurologic: intact grossly  On oxygen no cyanosis " or clubbing    II (soft palate, uvula, fauces visible)    Imaging: Current chest CT scan shows stable basilar fibrotic pattern, and stable nodule    PFTS: Mild restriction with total lung capacity 77%.  Oxygen transfer at 47%.      Assessment and Plan  1. Interstitial lung disease (HCC)  Stop Esbriet, causing nausea, hopefully pattern is predominantly fibrotic, see discussion above  - Multiple Oximetry; Future  - DME O2 New Set Up  - PULMONARY FUNCTION TESTS -Test requested: Complete Pulmonary Function Test; Future    2. Centrilobular emphysema (HCC)  Remote smoker, not smoking currently and vaccinations are up-to-date  - PULMONARY FUNCTION TESTS -Test requested: Complete Pulmonary Function Test; Future    3. Lung nodule  Stable on serial imaging, recheck in 1 year    4. Hypoxia  Requalified and reordered oxygen as definitely needs it and very effective in keeping her active  - Multiple Oximetry; Future  - DME O2 New Set Up      Followup Return in about 4 months (around 6/18/2020) for follow up visit with Dr. Cate Richardson, with PFT.

## 2020-02-28 ENCOUNTER — TELEPHONE (OUTPATIENT)
Dept: PULMONOLOGY | Facility: HOSPICE | Age: 85
End: 2020-02-28

## 2020-02-28 DIAGNOSIS — J84.9 INTERSTITIAL LUNG DISEASE (HCC): ICD-10-CM

## 2020-06-14 NOTE — PATIENT COMMUNICATION
Pt is aware that due to new [employer] insurance - we need to get new testing on 2/18 to get her switched to another DME    none

## 2020-06-18 ENCOUNTER — OFFICE VISIT (OUTPATIENT)
Dept: PULMONOLOGY | Facility: HOSPICE | Age: 85
End: 2020-06-18
Payer: MEDICARE

## 2020-06-18 ENCOUNTER — NON-PROVIDER VISIT (OUTPATIENT)
Dept: PULMONOLOGY | Facility: HOSPICE | Age: 85
End: 2020-06-18
Attending: INTERNAL MEDICINE
Payer: MEDICARE

## 2020-06-18 VITALS
BODY MASS INDEX: 23.34 KG/M2 | HEIGHT: 68 IN | DIASTOLIC BLOOD PRESSURE: 76 MMHG | RESPIRATION RATE: 14 BRPM | HEART RATE: 60 BPM | OXYGEN SATURATION: 95 % | SYSTOLIC BLOOD PRESSURE: 116 MMHG | TEMPERATURE: 98.1 F | WEIGHT: 154 LBS

## 2020-06-18 VITALS — HEIGHT: 68 IN | BODY MASS INDEX: 23.34 KG/M2 | WEIGHT: 154 LBS

## 2020-06-18 DIAGNOSIS — J43.2 CENTRILOBULAR EMPHYSEMA (HCC): ICD-10-CM

## 2020-06-18 DIAGNOSIS — J84.9 INTERSTITIAL LUNG DISEASE (HCC): ICD-10-CM

## 2020-06-18 DIAGNOSIS — J96.01 ACUTE RESPIRATORY FAILURE WITH HYPOXIA (HCC): ICD-10-CM

## 2020-06-18 DIAGNOSIS — R91.1 LUNG NODULE: ICD-10-CM

## 2020-06-18 PROCEDURE — 94060 EVALUATION OF WHEEZING: CPT | Performed by: INTERNAL MEDICINE

## 2020-06-18 PROCEDURE — 94729 DIFFUSING CAPACITY: CPT | Performed by: INTERNAL MEDICINE

## 2020-06-18 PROCEDURE — 94726 PLETHYSMOGRAPHY LUNG VOLUMES: CPT | Performed by: INTERNAL MEDICINE

## 2020-06-18 PROCEDURE — 99214 OFFICE O/P EST MOD 30 MIN: CPT | Mod: 25 | Performed by: INTERNAL MEDICINE

## 2020-06-18 RX ORDER — IPRATROPIUM BROMIDE 42 UG/1
SPRAY, METERED NASAL
Status: ON HOLD | COMMUNITY
Start: 2020-05-30 | End: 2021-04-01

## 2020-06-18 RX ORDER — CIPROFLOXACIN HYDROCHLORIDE 3.5 MG/ML
SOLUTION/ DROPS TOPICAL
Status: ON HOLD | COMMUNITY
Start: 2020-05-29 | End: 2021-04-01

## 2020-06-18 ASSESSMENT — PULMONARY FUNCTION TESTS
FEV1/FVC: 65
FEV1/FVC_PERCENT_LLN: 60
FEV1_PREDICTED: 1.93
FEV1_PERCENT_CHANGE: 1
FEV1/FVC: 65
FEV1/FVC_PERCENT_PREDICTED: 95
FVC_PREDICTED: 2.6
FEV1/FVC_PERCENT_CHANGE: 600
FEV1_PERCENT_PREDICTED: 85
FEV1/FVC_PERCENT_PREDICTED: 91
FEV1/FVC_PERCENT_PREDICTED: 74
FEV1: 1.64
FEV1_PERCENT_PREDICTED: 80
FEV1: 1.55
FVC_PERCENT_PREDICTED: 92
FVC: 2.4
FEV1_LLN: 1.61
FEV1/FVC_PERCENT_CHANGE: 5
FEV1/FVC_PREDICTED: 71
FVC_PERCENT_PREDICTED: 91
FEV1_PERCENT_CHANGE: 6
FVC_LLN: 2.17
FEV1/FVC_PERCENT_PREDICTED: 88
FEV1/FVC: 68
FEV1/FVC_PERCENT_PREDICTED: 92
FEV1/FVC: 68.33
FVC: 2.37

## 2020-06-18 ASSESSMENT — FIBROSIS 4 INDEX
FIB4 SCORE: 1.92
FIB4 SCORE: 1.92

## 2020-06-18 ASSESSMENT — PAIN SCALES - GENERAL: PAINLEVEL: NO PAIN

## 2020-06-18 NOTE — PATIENT INSTRUCTIONS
Yesi comes in today with her daughter for follow-up of her chronic interstitial lung disease.  We stopped her anti-fibrotic therapy as it was giving her malaise fatigue a general sense of discomfort and fortunately we have not lost any capacity.  Her lung function testing today shows that her oxygen transfer is actually slightly better, total lung capacity is similar, and flows are similar.    She is 89 years of age, approaching 89, remains active and is aware of the need for supplemental oxygen with activity.  She will definitely benefit from the use of a portable oxygen concentrator which would give her mobility allow her to maintain activity and 3 L with exercise is recommended, I placed an order for the portable concentrator so that she can maintain an activity level with obvious benefit.    No interval change in her pulmonary status, she does have symmetric ankle edema, basilar crackles as previous but on room air today is comfortable, lung function testing looks slightly better and we can recheck in 6 months with lung function testing at that time

## 2020-06-18 NOTE — PROCEDURES
Tech: Concha Banuelos, RRT  Tech notes: Good patient effort & cooperation.  Pt off oxygen during maneuvers. No adverse reaction noted.  The results of this test meet the ATS/ERS standards for acceptability & reproducibility.  Test was performed on the [employer] Body Plethysmograph-Elite DX system.  Predicted values were GLI-2012 for spirometry, GLI- 2017 for DLCO, ITS for Lung Volumes.  The DLCO was uncorrected for Hgb.  A bronchodilator of Ventolin HFA -2puffs via spacer administered.  DLCO performed during dilation period.    Interpretation:    Lung function testing completed on June 18, 2020 shows mild reduction of mid flows at 68% predicted.  Borderline bronchodilator response was seen.  FEV1/FVC ratio is reduced at 65% consistent with obstructive defect.  In addition there is mild superimposed restriction with total lung capacity at 72%.  Oxygen transfer is 49% predicted, slightly better than measured previously.  Flow volume loop confirms the combined obstructive and restrictive pattern with good effort noted

## 2020-06-18 NOTE — PROGRESS NOTES
Yesi Garduno is a 89 y.o. female here for chronic interstitial lung disease and results of lung function testing. Patient was referred by her primary care doctor.    History of Present Illness:      Yesi comes in today with her daughter for follow-up of her chronic interstitial lung disease.  We stopped her anti-fibrotic therapy as it was giving her malaise fatigue a general sense of discomfort and fortunately we have not lost any capacity.  Her lung function testing today shows that her oxygen transfer is actually slightly better, total lung capacity is similar, and flows are similar.    She is 89 years of age, approaching 89, remains active and is aware of the need for supplemental oxygen with activity.  She will definitely benefit from the use of a portable oxygen concentrator which would give her mobility allow her to maintain activity and 3 L with exercise is recommended, I placed an order for the portable concentrator so that she can maintain an activity level with obvious benefit.    No interval change in her pulmonary status, she does have symmetric ankle edema, basilar crackles as previous but on room air today is comfortable, lung function testing looks slightly better and we can recheck in 6 months with lung function testing at that time    Constitutional ROS: No unexpected change in weight, No unexplained fevers  Eyes: No change in vision or blurring or double vision  Mouth/Throat ROS: No sore throat, No recent change in voice or hoarseness  Pulmonary ROS: See present history for pertinent positives  Cardiovascular ROS: No chest pain to suggest acute coronary syndrome  Gastrointestinal ROS: No abdominal pain to suggest peptic disease  Musculoskeletal/Extremities ROS: no acute artritis or unusual swelling; chronic lower extremity edema  Hematologic/Lymphatic ROS: No easy bleeding or unusual lymph node swelling  Neurologic ROS: No new or unusual weakness  Psychiatric ROS: No  hallucinations  Allergic/Immunologic: No  urticaria or allergic rash      Current Outpatient Medications   Medication Sig Dispense Refill   • acetaminophen (TYLENOL) 325 MG Tab Take 2 Tabs by mouth every 6 hours as needed for Mild Pain or Moderate Pain. 30 Tab 0   • alprazolam (XANAX) 0.25 MG Tab Take 0.25 mg by mouth at bedtime as needed for Sleep.     • Calcium Carbonate-Vit D-Min (CALCIUM 1200 PO) Take 1 Tab by mouth every day.     • gelatin 650 MG capsule Take 1 Cap by mouth every day.     • vitamin D (CHOLECALCIFEROL) 1000 UNIT Tab Take 1,000 Units by mouth every day.     • fluticasone (FLONASE) 50 MCG/ACT nasal spray Spray 1 Spray in nose every bedtime. Each Nostril     • ciprofloxacin (CILOXIN) 0.3 % Solution      • ipratropium (ATROVENT) 0.06 % Solution      • azithromycin (ZITHROMAX) 250 MG Tab      • Pirfenidone 801 MG Tab Take  by mouth 3 times a day.     • triamterene-hctz (MAXZIDE-25/DYAZIDE) 37.5-25 MG Tab Take 1 Tab by mouth every day. (Patient not taking: Reported on 4/19/2019) 30 Tab 3   • gabapentin (NEURONTIN) 100 MG Cap TAKE 1 CAPSULE BY MOUTH AT 6 PM FOR 1 WEEK THEN 2 CAPS BY MOUTH AT 6 PM DAILY  1   • levofloxacin (LEVAQUIN) 500 MG tablet Take  by mouth every day. WITH FOOD  0   • ipratropium-albuterol (COMBIVENT RESPIMAT)  MCG/ACT Aero Soln Inhale 1 Puff by mouth 4 times a day. (Patient not taking: Reported on 4/19/2019) 1 Inhaler 11   • gabapentin (NEURONTIN) 300 MG Cap Take 1 Cap by mouth 3 times a day. (Patient not taking: Reported on 4/19/2019) 90 Cap    • docusate sodium 100 MG Cap Take 100 mg by mouth every morning. 60 Cap    • senna-docusate (PERICOLACE OR SENOKOT S) 8.6-50 MG Tab Take 1 Tab by mouth every day. (Patient not taking: Reported on 2/21/2017) 30 Tab 0   • senna-docusate (PERICOLACE OR SENOKOT S) 8.6-50 MG Tab Take 1 Tab by mouth every 24 hours as needed for Constipation. (Patient not taking: Reported on 2/21/2017) 30 Tab 0   • lactulose 20 GM/30ML Solution Take 30 mL  by mouth every 24 hours as needed (if sennosides-docusate sodium (SENOKOT-S) ineffective). (Patient not taking: Reported on 2019) 30 Each    • bisacodyl (DULCOLAX) 10 MG Suppos Insert 1 Suppository in rectum every 24 hours as needed (if lactulose ineffective). (Patient not taking: Reported on 2019)  0   • heparin 5000 UNIT/ML Solution Inject 1 mL as instructed every 8 hours. (Patient not taking: Reported on 2019)  0   • ondansetron (ZOFRAN ODT) 4 MG TABLET DISPERSIBLE Take 1 Tab by mouth every four hours as needed for Nausea/Vomiting (give PO if IV route is unavailable. May give per feeding tube.). (Patient not taking: Reported on 2017) 10 Tab 0   • metoprolol (LOPRESSOR) 25 MG Tab Take 1 Tab by mouth 2 Times a Day. (Patient not taking: Reported on 2017) 60 Tab    • ipratropium-albuterol (DUONEB) 0.5-2.5 (3) MG/3ML nebulizer solution 3 mL by Nebulization route every 6 hours as needed for Shortness of Breath. (Patient not taking: Reported on 2019)     • budesonide (PULMICORT) 0.5 MG/2ML Suspension 2 mL by Nebulization route 2 Times a Day. (Patient not taking: Reported on 2019)     • hydrocodone-acetaminophen (NORCO) 5-325 MG Tab per tablet Take 1-2 Tabs by mouth every four hours as needed. (Patient not taking: Reported on 2019) 20 Tab 0   • Coenzyme Q10 (CO Q 10 PO) Take 1 Cap by mouth every day at 6 PM.     • Cyanocobalamin (B-12 PO) Take 1 Tab by mouth every day.       No current facility-administered medications for this visit.        Social History     Tobacco Use   • Smoking status: Former Smoker     Packs/day: 0.25     Years: 34.00     Pack years: 8.50     Types: Cigarettes     Last attempt to quit: 1985     Years since quittin.4   • Smokeless tobacco: Never Used   Substance Use Topics   • Alcohol use: Yes     Alcohol/week: 4.2 oz     Types: 7 Shots of liquor per week   • Drug use: No        Past Medical History:   Diagnosis Date   • AAA (abdominal aortic  "aneurysm) ([facility]) 8/11/2015   • Breast cancer ([facility])    • Cancer ([facility])    • COPD (chronic obstructive pulmonary disease) ([facility]) 8/11/2015   • Near syncope 8/11/2015   • S/P right mastectomy     per pt done in 1979       Past Surgical History:   Procedure Laterality Date   • BREAST BIOPSY  1990   • MASTECTOMY  1979    right side   • OTHER      mastectomy       Allergies: Tetracycline; Hydrocodone; and Penicillins    Family History   Problem Relation Age of Onset   • Cancer Sister    • Cancer Mother    • Lung Disease Paternal Uncle    • Arthritis Neg Hx    • Genetic Disorder Neg Hx    • Psychiatric Illness Neg Hx    • Diabetes Neg Hx    • Heart Disease Neg Hx    • Hypertension Neg Hx    • Hyperlipidemia Neg Hx    • Stroke Neg Hx    • Alcohol/Drug Neg Hx        Physical Examination    Vitals:    06/18/20 0931   Height: 1.727 m (5' 8\")   Weight: 69.9 kg (154 lb)   Weight % change since last entry.: 0 %   BP: 116/76   Pulse: 60   BMI (Calculated): 23.42   Resp: 14   Temp: 36.7 °C (98.1 °F)   TempSrc: Tympanic       General Appearance: alert, no distress  Skin: Skin color, texture, turgor normal. No rashes or lesions.  Eyes: negative  Oropharynx: Lips, mucosa, and tongue normal. Teeth and gums normal. Oropharynx moist and without lesion  Lungs: positive findings: Basilar crackles without wheezes or rhonchi  Heart: negative. RRR without murmur, gallop, or rubs.  No ectopy.  Abdomen: Abdomen soft, non-tender. . No masses,  No organomegaly  Extremities:  No deformities, edema, or skin discoloration  Joints: No acute arthritis  Peripheral Pulses:perfused  Neurologic: intact grossly  No neck vein distention and no clubbing    II (soft palate, uvula, fauces visible)    Imaging: None presently    PFTS: Oxygen transfer is slightly better than previous testing      Assessment and Plan  1. Interstitial lung disease (HCC)  No deterioration off anti-fibrotic therapy, better general status  - DME Other  - PULMONARY FUNCTION TESTS -Test " requested: Complete Pulmonary Function Test; Future    2. Lung nodule  Stable, surveillance discontinued    3. Centrilobular emphysema (HCC)  Noted, uses pursed lip breathing technique with exercise  - DME Other  - PULMONARY FUNCTION TESTS -Test requested: Complete Pulmonary Function Test; Future    4. Acute respiratory failure with hypoxia (HCC)  Acute and chronic, desaturation with exercise, portable concentrator ordered, see discussion above  - DME Other      Followup Return in about 6 months (around 12/18/2020) for follow up visit with Dr. Cate Richardson.

## 2020-06-24 ENCOUNTER — TELEPHONE (OUTPATIENT)
Dept: PULMONOLOGY | Facility: HOSPICE | Age: 85
End: 2020-06-24

## 2020-06-24 NOTE — TELEPHONE ENCOUNTER
"Received fax from [facility] they state that they have not been able to get ahold of patient for delivery of Esbriet.  I did review chart and it looks like on OV with Dr. Richardson and per his note \"We stopped her anti-fibrotic therapy as it was giving her malaise fatigue a general sense of discomfort and fortunately we have not lost any capacity.\"  Note on 2/18/2020 \" consider stopping the anti-fibrotic therapy as she may have predominantly burnt out ILD\"    I have called [facility] and let them know we have stopped therapy.   "

## 2020-12-17 ENCOUNTER — HOSPITAL ENCOUNTER (OUTPATIENT)
Dept: RADIOLOGY | Facility: MEDICAL CENTER | Age: 85
End: 2020-12-17
Attending: FAMILY MEDICINE
Payer: MEDICARE

## 2020-12-17 DIAGNOSIS — M15.0 PRIMARY GENERALIZED HYPERTROPHIC OSTEOARTHROSIS: ICD-10-CM

## 2020-12-17 PROCEDURE — 73521 X-RAY EXAM HIPS BI 2 VIEWS: CPT

## 2020-12-18 ENCOUNTER — NON-PROVIDER VISIT (OUTPATIENT)
Dept: SLEEP MEDICINE | Facility: MEDICAL CENTER | Age: 85
End: 2020-12-18
Attending: INTERNAL MEDICINE
Payer: MEDICARE

## 2020-12-18 ENCOUNTER — OFFICE VISIT (OUTPATIENT)
Dept: SLEEP MEDICINE | Facility: MEDICAL CENTER | Age: 85
End: 2020-12-18
Payer: MEDICARE

## 2020-12-18 VITALS — WEIGHT: 156 LBS | HEIGHT: 68 IN | BODY MASS INDEX: 23.64 KG/M2

## 2020-12-18 VITALS
SYSTOLIC BLOOD PRESSURE: 130 MMHG | RESPIRATION RATE: 16 BRPM | WEIGHT: 156 LBS | HEIGHT: 68 IN | HEART RATE: 80 BPM | DIASTOLIC BLOOD PRESSURE: 64 MMHG | OXYGEN SATURATION: 98 % | BODY MASS INDEX: 23.64 KG/M2

## 2020-12-18 DIAGNOSIS — J43.2 CENTRILOBULAR EMPHYSEMA (HCC): ICD-10-CM

## 2020-12-18 DIAGNOSIS — J96.01 ACUTE RESPIRATORY FAILURE WITH HYPOXIA (HCC): ICD-10-CM

## 2020-12-18 DIAGNOSIS — J84.9 INTERSTITIAL LUNG DISEASE (HCC): ICD-10-CM

## 2020-12-18 DIAGNOSIS — R09.02 HYPOXIA: ICD-10-CM

## 2020-12-18 DIAGNOSIS — Z90.11 S/P RIGHT MASTECTOMY: ICD-10-CM

## 2020-12-18 DIAGNOSIS — R91.1 LUNG NODULE: ICD-10-CM

## 2020-12-18 PROCEDURE — 94726 PLETHYSMOGRAPHY LUNG VOLUMES: CPT | Performed by: INTERNAL MEDICINE

## 2020-12-18 PROCEDURE — 94060 EVALUATION OF WHEEZING: CPT | Performed by: INTERNAL MEDICINE

## 2020-12-18 PROCEDURE — 99214 OFFICE O/P EST MOD 30 MIN: CPT | Mod: 25 | Performed by: INTERNAL MEDICINE

## 2020-12-18 PROCEDURE — 94729 DIFFUSING CAPACITY: CPT | Performed by: INTERNAL MEDICINE

## 2020-12-18 ASSESSMENT — PULMONARY FUNCTION TESTS
FVC: 2.4
FEV1/FVC_PERCENT_CHANGE: 267
FEV1/FVC_PERCENT_PREDICTED: 84
FEV1/FVC_PERCENT_PREDICTED: 91
FEV1/FVC_PERCENT_LLN: 63
FEV1_PERCENT_PREDICTED: 78
FVC_PERCENT_PREDICTED: 90
FEV1_PERCENT_CHANGE: 8
FEV1/FVC_PERCENT_PREDICTED: 89
FEV1/FVC_PERCENT_PREDICTED: 87
FEV1/FVC: 67
FVC: 2.33
FEV1/FVC_PERCENT_PREDICTED: 74
FVC_PREDICTED: 2.57
FEV1/FVC_PREDICTED: 75
FEV1_LLN: 1.58
FVC_PERCENT_PREDICTED: 93
FEV1/FVC: 64
FEV1: 1.49
FEV1/FVC: 64
FEV1/FVC: 67.5
FEV1_PERCENT_PREDICTED: 85
FEV1: 1.62
FEV1_PREDICTED: 1.89
FEV1/FVC_PERCENT_CHANGE: 5
FEV1_PERCENT_CHANGE: 3
FVC_LLN: 2.15

## 2020-12-18 ASSESSMENT — FIBROSIS 4 INDEX
FIB4 SCORE: 1.94
FIB4 SCORE: 1.94

## 2020-12-18 NOTE — PROGRESS NOTES
Yesi Garduno is a 89 y.o. female here for residual lung disease and lung function testing on chronic oxygen. Patient was referred by primary care.    History of Present Illness: As follows  Yesi comes in today with her daughter, has interstitial lung disease and previously was on pirfenidone.  She had side effects, malaise, and fortunately after discontinuing that medication she is actually rebounded, today her total lung capacity is up from 72% 6 months ago to 84%.  She has not lost any ground on oxygen transfer, and her spirometry looks good as well.  I suspect this is a stable chronic fibrotic process not advancing.  No interval new cough hemoptysis or purulence.    She is 89 years of age, very bright alert coherent insightful had her hair done yesterday and looks wonderful today.    Lung function testing today looks good as described above.  Flu vaccine is current.  She does require oxygen 24/7.  When she returns in 6 months we will look again at her oxygen needs, presently she is clearly benefiting from oxygen 24/7 and hopefully lung function testing will remain as good.  Constitutional ROS: No unexpected change in weight, No unexplained fevers  Eyes: No change in vision or blurring or double vision  Mouth/Throat ROS: No sore throat, No recent change in voice or hoarseness  Pulmonary ROS: See present history for pertinent positives  Cardiovascular ROS: No chest pain to suggest acute coronary syndrome  Gastrointestinal ROS: No abdominal pain to suggest peptic disease  Musculoskeletal/Extremities ROS: no acute artritis or unusual swelling  Hematologic/Lymphatic ROS: No easy bleeding or unusual lymph node swelling  Neurologic ROS: No new or unusual weakness  Psychiatric ROS: No hallucinations  Allergic/Immunologic: No  urticaria or allergic rash      Current Outpatient Medications   Medication Sig Dispense Refill   • acetaminophen (TYLENOL) 325 MG Tab Take 2 Tabs by mouth every 6 hours as needed for Mild  Pain or Moderate Pain. 30 Tab 0   • alprazolam (XANAX) 0.25 MG Tab Take 0.25 mg by mouth at bedtime as needed for Sleep.     • Calcium Carbonate-Vit D-Min (CALCIUM 1200 PO) Take 1 Tab by mouth every day.     • gelatin 650 MG capsule Take 1 Cap by mouth every day.     • vitamin D (CHOLECALCIFEROL) 1000 UNIT Tab Take 1,000 Units by mouth every day.     • fluticasone (FLONASE) 50 MCG/ACT nasal spray Spray 1 Spray in nose every bedtime. Each Nostril     • ciprofloxacin (CILOXIN) 0.3 % Solution      • ipratropium (ATROVENT) 0.06 % Solution      • azithromycin (ZITHROMAX) 250 MG Tab      • triamterene-hctz (MAXZIDE-25/DYAZIDE) 37.5-25 MG Tab Take 1 Tab by mouth every day. (Patient not taking: Reported on 4/19/2019) 30 Tab 3   • gabapentin (NEURONTIN) 100 MG Cap TAKE 1 CAPSULE BY MOUTH AT 6 PM FOR 1 WEEK THEN 2 CAPS BY MOUTH AT 6 PM DAILY  1   • levofloxacin (LEVAQUIN) 500 MG tablet Take  by mouth every day. WITH FOOD  0   • ipratropium-albuterol (COMBIVENT RESPIMAT)  MCG/ACT Aero Soln Inhale 1 Puff by mouth 4 times a day. (Patient not taking: Reported on 4/19/2019) 1 Inhaler 11   • gabapentin (NEURONTIN) 300 MG Cap Take 1 Cap by mouth 3 times a day. (Patient not taking: Reported on 4/19/2019) 90 Cap    • docusate sodium 100 MG Cap Take 100 mg by mouth every morning. 60 Cap    • senna-docusate (PERICOLACE OR SENOKOT S) 8.6-50 MG Tab Take 1 Tab by mouth every day. (Patient not taking: Reported on 2/21/2017) 30 Tab 0   • senna-docusate (PERICOLACE OR SENOKOT S) 8.6-50 MG Tab Take 1 Tab by mouth every 24 hours as needed for Constipation. (Patient not taking: Reported on 2/21/2017) 30 Tab 0   • lactulose 20 GM/30ML Solution Take 30 mL by mouth every 24 hours as needed (if sennosides-docusate sodium (SENOKOT-S) ineffective). (Patient not taking: Reported on 4/19/2019) 30 Each    • bisacodyl (DULCOLAX) 10 MG Suppos Insert 1 Suppository in rectum every 24 hours as needed (if lactulose ineffective). (Patient not taking:  Reported on 2019)  0   • ondansetron (ZOFRAN ODT) 4 MG TABLET DISPERSIBLE Take 1 Tab by mouth every four hours as needed for Nausea/Vomiting (give PO if IV route is unavailable. May give per feeding tube.). (Patient not taking: Reported on 2017) 10 Tab 0   • metoprolol (LOPRESSOR) 25 MG Tab Take 1 Tab by mouth 2 Times a Day. (Patient not taking: Reported on 2017) 60 Tab    • ipratropium-albuterol (DUONEB) 0.5-2.5 (3) MG/3ML nebulizer solution 3 mL by Nebulization route every 6 hours as needed for Shortness of Breath. (Patient not taking: Reported on 2019)     • Coenzyme Q10 (CO Q 10 PO) Take 1 Cap by mouth every day at 6 PM.     • Cyanocobalamin (B-12 PO) Take 1 Tab by mouth every day.       No current facility-administered medications for this visit.        Social History     Tobacco Use   • Smoking status: Former Smoker     Packs/day: 0.25     Years: 34.00     Pack years: 8.50     Types: Cigarettes     Quit date: 1985     Years since quittin.9   • Smokeless tobacco: Never Used   Substance Use Topics   • Alcohol use: Yes     Alcohol/week: 4.2 oz     Types: 7 Shots of liquor per week   • Drug use: No        Past Medical History:   Diagnosis Date   • AAA (abdominal aortic aneurysm) ([facility]) 2015   • Breast cancer ([facility])    • Cancer ([facility])    • COPD (chronic obstructive pulmonary disease) ([facility]) 2015   • Near syncope 2015   • S/P right mastectomy     per pt done in        Past Surgical History:   Procedure Laterality Date   • BREAST BIOPSY     • MASTECTOMY      right side   • OTHER      mastectomy       Allergies: Tetracycline, Hydrocodone, and Penicillins    Family History   Problem Relation Age of Onset   • Cancer Sister    • Cancer Mother    • Lung Disease Paternal Uncle    • Arthritis Neg Hx    • Genetic Disorder Neg Hx    • Psychiatric Illness Neg Hx    • Diabetes Neg Hx    • Heart Disease Neg Hx    • Hypertension Neg Hx    • Hyperlipidemia Neg Hx    • Stroke Neg  "Hx    • Alcohol/Drug Neg Hx        Physical Examination    Vitals:    12/18/20 1045 12/18/20 1049   Height: 1.727 m (5' 8\")    Weight: 70.8 kg (156 lb)    Weight % change since last entry.: 0 %    BP: 130/64    Pulse: 80    BMI (Calculated): 23.72    Resp: 16    O2 sat % on O2:  98 %   O2 Flow Rate (L/min):  3       General Appearance: alert, no distress  Skin: Skin color, texture, turgor normal. No rashes or lesions.  Eyes: negative  Oropharynx: Lips, mucosa, and tongue normal. Teeth and gums normal. Oropharynx moist and without lesion  Lungs: positive findings: Quiet and clear but diminished  Heart: negative. RRR without murmur, gallop, or rubs.  No ectopy.  Abdomen: Abdomen soft, non-tender. . No masses,  No organomegaly  Extremities:  No deformities, edema, or skin discoloration; recent hip x-ray showing degeneration  Joints: No acute arthritis  Peripheral Pulses:perfused  Neurologic: intact grossly  No clubbing      Imaging: None presently    PFTS: Reassess 6 months      Assessment and Plan  1. Centrilobular emphysema (HCC)  Previously noted  - Exercise Test for Bronchospasm / 6-Minute Walk; Future  - PULMONARY FUNCTION TESTS -Test requested: Complete Pulmonary Function Test; Future    2. Interstitial lung disease (HCC)  Appears to be stable not advancing  - Exercise Test for Bronchospasm / 6-Minute Walk; Future  - PULMONARY FUNCTION TESTS -Test requested: Complete Pulmonary Function Test; Future    3. Acute respiratory failure with hypoxia (HCC)  Longer acute, presently controlled on oxygen 24/7    4. S/P right mastectomy  Noted    5. Lung nodule  Stable previously    6. Hypoxia  Controlled      Followup Return in about 6 months (around 6/18/2021) for follow up visit with Dr. Cate Richardson, with 6 minute walk, with PFT.    "

## 2020-12-18 NOTE — PROCEDURES
Technician: Denise Amanda RRT   Good patient effort & cooperation.  The results of this test meet the ATS/ERS standards for acceptability & reproducibility.  Test was performed on the [employer] Body Plethysmograph-Elite DX system.  Predicted equations for Spirometry are GLI-2012, ITS for lung volumes, and GLI-2017 for DLCO.  The DLCO was uncorrected for Hgb.  A bronchodilator of Ventolin HFA -2puffs via spacer administered.  DLCO performed during dilation period.    Interpretation;     Lung function testing was completed on December 18, 2020.  Lung volumes demonstrate borderline restriction, total lung capacity is 84% predicted.  This is actually better than 6 months ago when it was 72% predicted.  Oxygen transfer remains in the same range, previously 10.2 raw data, now 9.5, very similar at about 50% predicted.  Spirometry reflects the restrictive process but also an element of obstruction, with mid flows reduced and a bronchodilator response at that level.  Flow volume loop confirms the combined process with good effort noted for this 89-year-old lady.

## 2021-01-12 DIAGNOSIS — Z23 NEED FOR VACCINATION: ICD-10-CM

## 2021-01-17 ENCOUNTER — IMMUNIZATION (OUTPATIENT)
Dept: FAMILY PLANNING/WOMEN'S HEALTH CLINIC | Facility: IMMUNIZATION CENTER | Age: 86
End: 2021-01-17
Attending: INTERNAL MEDICINE
Payer: MEDICARE

## 2021-01-17 DIAGNOSIS — Z23 ENCOUNTER FOR VACCINATION: Primary | ICD-10-CM

## 2021-01-17 DIAGNOSIS — Z23 NEED FOR VACCINATION: ICD-10-CM

## 2021-01-17 PROCEDURE — 91301 MODERNA SARS-COV-2 VACCINE: CPT

## 2021-01-17 PROCEDURE — 0011A MODERNA SARS-COV-2 VACCINE: CPT

## 2021-02-14 ENCOUNTER — IMMUNIZATION (OUTPATIENT)
Dept: FAMILY PLANNING/WOMEN'S HEALTH CLINIC | Facility: IMMUNIZATION CENTER | Age: 86
End: 2021-02-14
Attending: INTERNAL MEDICINE
Payer: MEDICARE

## 2021-02-14 DIAGNOSIS — Z23 ENCOUNTER FOR VACCINATION: Primary | ICD-10-CM

## 2021-02-14 PROCEDURE — 91301 MODERNA SARS-COV-2 VACCINE: CPT | Performed by: INTERNAL MEDICINE

## 2021-02-14 PROCEDURE — 0012A MODERNA SARS-COV-2 VACCINE: CPT | Performed by: INTERNAL MEDICINE

## 2021-03-31 ENCOUNTER — HOSPITAL ENCOUNTER (OUTPATIENT)
Facility: MEDICAL CENTER | Age: 86
End: 2021-04-07
Attending: EMERGENCY MEDICINE | Admitting: HOSPITALIST
Payer: MEDICARE

## 2021-03-31 ENCOUNTER — APPOINTMENT (OUTPATIENT)
Dept: RADIOLOGY | Facility: MEDICAL CENTER | Age: 86
End: 2021-03-31
Attending: EMERGENCY MEDICINE
Payer: MEDICARE

## 2021-03-31 DIAGNOSIS — R79.89 ELEVATED TROPONIN: ICD-10-CM

## 2021-03-31 DIAGNOSIS — D63.1 ANEMIA DUE TO STAGE 3B CHRONIC KIDNEY DISEASE: ICD-10-CM

## 2021-03-31 DIAGNOSIS — N18.31 STAGE 3A CHRONIC KIDNEY DISEASE: ICD-10-CM

## 2021-03-31 DIAGNOSIS — R53.1 WEAKNESS: ICD-10-CM

## 2021-03-31 DIAGNOSIS — C50.911 MALIGNANT NEOPLASM OF RIGHT FEMALE BREAST, UNSPECIFIED ESTROGEN RECEPTOR STATUS, UNSPECIFIED SITE OF BREAST (HCC): ICD-10-CM

## 2021-03-31 DIAGNOSIS — C50.912 MALIGNANT NEOPLASM OF LEFT FEMALE BREAST, UNSPECIFIED ESTROGEN RECEPTOR STATUS, UNSPECIFIED SITE OF BREAST (HCC): ICD-10-CM

## 2021-03-31 DIAGNOSIS — J96.01 ACUTE RESPIRATORY FAILURE WITH HYPOXIA (HCC): ICD-10-CM

## 2021-03-31 DIAGNOSIS — R06.02 SHORTNESS OF BREATH: ICD-10-CM

## 2021-03-31 DIAGNOSIS — N18.32 ANEMIA DUE TO STAGE 3B CHRONIC KIDNEY DISEASE: ICD-10-CM

## 2021-03-31 DIAGNOSIS — I71.40 ABDOMINAL AORTIC ANEURYSM (AAA) WITHOUT RUPTURE (HCC): ICD-10-CM

## 2021-03-31 DIAGNOSIS — D35.00 ADRENAL ADENOMA, UNSPECIFIED LATERALITY: ICD-10-CM

## 2021-03-31 DIAGNOSIS — R91.1 LUNG NODULE: ICD-10-CM

## 2021-03-31 DIAGNOSIS — R09.02 HYPOXIA: ICD-10-CM

## 2021-03-31 DIAGNOSIS — J84.9 INTERSTITIAL LUNG DISEASE (HCC): ICD-10-CM

## 2021-03-31 DIAGNOSIS — Z90.11 S/P RIGHT MASTECTOMY: ICD-10-CM

## 2021-03-31 DIAGNOSIS — R55 NEAR SYNCOPE: ICD-10-CM

## 2021-03-31 DIAGNOSIS — J96.21 ACUTE ON CHRONIC RESPIRATORY FAILURE WITH HYPOXIA (HCC): ICD-10-CM

## 2021-03-31 DIAGNOSIS — I16.0 HYPERTENSIVE URGENCY: ICD-10-CM

## 2021-03-31 DIAGNOSIS — J44.1 CHRONIC OBSTRUCTIVE PULMONARY DISEASE WITH ACUTE EXACERBATION (HCC): ICD-10-CM

## 2021-03-31 LAB
ALBUMIN SERPL BCP-MCNC: 4 G/DL (ref 3.2–4.9)
ALBUMIN/GLOB SERPL: 1.3 G/DL
ALP SERPL-CCNC: 90 U/L (ref 30–99)
ALT SERPL-CCNC: 17 U/L (ref 2–50)
ANION GAP SERPL CALC-SCNC: 12 MMOL/L (ref 7–16)
APPEARANCE UR: CLEAR
AST SERPL-CCNC: 20 U/L (ref 12–45)
BASOPHILS # BLD AUTO: 0.6 % (ref 0–1.8)
BASOPHILS # BLD: 0.04 K/UL (ref 0–0.12)
BILIRUB SERPL-MCNC: 0.3 MG/DL (ref 0.1–1.5)
BILIRUB UR QL STRIP.AUTO: NEGATIVE
BUN SERPL-MCNC: 38 MG/DL (ref 8–22)
CALCIUM SERPL-MCNC: 9.8 MG/DL (ref 8.4–10.2)
CHLORIDE SERPL-SCNC: 102 MMOL/L (ref 96–112)
CO2 SERPL-SCNC: 25 MMOL/L (ref 20–33)
COLOR UR: YELLOW
CREAT SERPL-MCNC: 1.27 MG/DL (ref 0.5–1.4)
EOSINOPHIL # BLD AUTO: 0.24 K/UL (ref 0–0.51)
EOSINOPHIL NFR BLD: 3.6 % (ref 0–6.9)
ERYTHROCYTE [DISTWIDTH] IN BLOOD BY AUTOMATED COUNT: 47.8 FL (ref 35.9–50)
FLUAV RNA SPEC QL NAA+PROBE: NEGATIVE
FLUBV RNA SPEC QL NAA+PROBE: NEGATIVE
GLOBULIN SER CALC-MCNC: 3 G/DL (ref 1.9–3.5)
GLUCOSE SERPL-MCNC: 121 MG/DL (ref 65–99)
GLUCOSE UR STRIP.AUTO-MCNC: NEGATIVE MG/DL
HCT VFR BLD AUTO: 36 % (ref 37–47)
HGB BLD-MCNC: 11.4 G/DL (ref 12–16)
IMM GRANULOCYTES # BLD AUTO: 0.11 K/UL (ref 0–0.11)
IMM GRANULOCYTES NFR BLD AUTO: 1.7 % (ref 0–0.9)
KETONES UR STRIP.AUTO-MCNC: NEGATIVE MG/DL
LACTATE BLD-SCNC: 1.4 MMOL/L (ref 0.5–2)
LEUKOCYTE ESTERASE UR QL STRIP.AUTO: NEGATIVE
LYMPHOCYTES # BLD AUTO: 0.47 K/UL (ref 1–4.8)
LYMPHOCYTES NFR BLD: 7.1 % (ref 22–41)
MCH RBC QN AUTO: 30.6 PG (ref 27–33)
MCHC RBC AUTO-ENTMCNC: 31.7 G/DL (ref 33.6–35)
MCV RBC AUTO: 96.5 FL (ref 81.4–97.8)
MICRO URNS: NORMAL
MONOCYTES # BLD AUTO: 0.64 K/UL (ref 0–0.85)
MONOCYTES NFR BLD AUTO: 9.6 % (ref 0–13.4)
NEUTROPHILS # BLD AUTO: 5.14 K/UL (ref 2–7.15)
NEUTROPHILS NFR BLD: 77.4 % (ref 44–72)
NITRITE UR QL STRIP.AUTO: NEGATIVE
NRBC # BLD AUTO: 0 K/UL
NRBC BLD-RTO: 0 /100 WBC
NT-PROBNP SERPL IA-MCNC: 328 PG/ML (ref 0–125)
PH UR STRIP.AUTO: 5.5 [PH] (ref 5–8)
PLATELET # BLD AUTO: 249 K/UL (ref 164–446)
PMV BLD AUTO: 8.8 FL (ref 9–12.9)
POTASSIUM SERPL-SCNC: 5.1 MMOL/L (ref 3.6–5.5)
PROT SERPL-MCNC: 7 G/DL (ref 6–8.2)
PROT UR QL STRIP: NEGATIVE MG/DL
RBC # BLD AUTO: 3.73 M/UL (ref 4.2–5.4)
RBC UR QL AUTO: NEGATIVE
RSV RNA SPEC QL NAA+PROBE: NEGATIVE
SARS-COV-2 RNA RESP QL NAA+PROBE: NOTDETECTED
SODIUM SERPL-SCNC: 139 MMOL/L (ref 135–145)
SP GR UR STRIP.AUTO: 1.02
SPECIMEN SOURCE: NORMAL
TROPONIN T SERPL-MCNC: 28 NG/L (ref 6–19)
WBC # BLD AUTO: 6.6 K/UL (ref 4.8–10.8)

## 2021-03-31 PROCEDURE — 71045 X-RAY EXAM CHEST 1 VIEW: CPT

## 2021-03-31 PROCEDURE — 84484 ASSAY OF TROPONIN QUANT: CPT

## 2021-03-31 PROCEDURE — G0378 HOSPITAL OBSERVATION PER HR: HCPCS

## 2021-03-31 PROCEDURE — 99285 EMERGENCY DEPT VISIT HI MDM: CPT

## 2021-03-31 PROCEDURE — 81003 URINALYSIS AUTO W/O SCOPE: CPT

## 2021-03-31 PROCEDURE — 83605 ASSAY OF LACTIC ACID: CPT

## 2021-03-31 PROCEDURE — 80053 COMPREHEN METABOLIC PANEL: CPT

## 2021-03-31 PROCEDURE — 85025 COMPLETE CBC W/AUTO DIFF WBC: CPT

## 2021-03-31 PROCEDURE — 83880 ASSAY OF NATRIURETIC PEPTIDE: CPT

## 2021-03-31 PROCEDURE — 87040 BLOOD CULTURE FOR BACTERIA: CPT

## 2021-03-31 PROCEDURE — C9803 HOPD COVID-19 SPEC COLLECT: HCPCS | Performed by: EMERGENCY MEDICINE

## 2021-03-31 PROCEDURE — 87086 URINE CULTURE/COLONY COUNT: CPT

## 2021-03-31 PROCEDURE — 0241U HCHG SARS-COV-2 COVID-19 NFCT DS RESP RNA 4 TRGT MIC: CPT

## 2021-03-31 PROCEDURE — 99220 PR INITIAL OBSERVATION CARE,LEVL III: CPT | Performed by: HOSPITALIST

## 2021-03-31 RX ORDER — VITAMIN B COMPLEX
1000 TABLET ORAL DAILY
Status: DISCONTINUED | OUTPATIENT
Start: 2021-04-01 | End: 2021-04-07 | Stop reason: HOSPADM

## 2021-03-31 RX ORDER — HYDRALAZINE HYDROCHLORIDE 20 MG/ML
10 INJECTION INTRAMUSCULAR; INTRAVENOUS EVERY 6 HOURS PRN
Status: DISCONTINUED | OUTPATIENT
Start: 2021-03-31 | End: 2021-04-07 | Stop reason: HOSPADM

## 2021-03-31 RX ORDER — HYDROMORPHONE HYDROCHLORIDE 1 MG/ML
0.25 INJECTION, SOLUTION INTRAMUSCULAR; INTRAVENOUS; SUBCUTANEOUS
Status: DISCONTINUED | OUTPATIENT
Start: 2021-03-31 | End: 2021-03-31

## 2021-03-31 RX ORDER — GUAIFENESIN/DEXTROMETHORPHAN 100-10MG/5
10 SYRUP ORAL EVERY 6 HOURS PRN
Status: DISCONTINUED | OUTPATIENT
Start: 2021-03-31 | End: 2021-04-07 | Stop reason: HOSPADM

## 2021-03-31 RX ORDER — OXYCODONE HYDROCHLORIDE 5 MG/1
2.5 TABLET ORAL
Status: DISCONTINUED | OUTPATIENT
Start: 2021-03-31 | End: 2021-03-31

## 2021-03-31 RX ORDER — ONDANSETRON 2 MG/ML
4 INJECTION INTRAMUSCULAR; INTRAVENOUS EVERY 4 HOURS PRN
Status: DISCONTINUED | OUTPATIENT
Start: 2021-03-31 | End: 2021-04-07 | Stop reason: HOSPADM

## 2021-03-31 RX ORDER — ONDANSETRON 4 MG/1
4 TABLET, ORALLY DISINTEGRATING ORAL EVERY 4 HOURS PRN
Status: DISCONTINUED | OUTPATIENT
Start: 2021-03-31 | End: 2021-04-07 | Stop reason: HOSPADM

## 2021-03-31 RX ORDER — LABETALOL HYDROCHLORIDE 5 MG/ML
10 INJECTION, SOLUTION INTRAVENOUS EVERY 4 HOURS PRN
Status: DISCONTINUED | OUTPATIENT
Start: 2021-03-31 | End: 2021-04-07 | Stop reason: HOSPADM

## 2021-03-31 RX ORDER — BISACODYL 10 MG
10 SUPPOSITORY, RECTAL RECTAL
Status: DISCONTINUED | OUTPATIENT
Start: 2021-03-31 | End: 2021-04-07 | Stop reason: HOSPADM

## 2021-03-31 RX ORDER — HEPARIN SODIUM 5000 [USP'U]/ML
5000 INJECTION, SOLUTION INTRAVENOUS; SUBCUTANEOUS EVERY 8 HOURS
Status: DISCONTINUED | OUTPATIENT
Start: 2021-04-01 | End: 2021-04-07 | Stop reason: HOSPADM

## 2021-03-31 RX ORDER — ACETAMINOPHEN 325 MG/1
650 TABLET ORAL EVERY 6 HOURS PRN
Status: DISCONTINUED | OUTPATIENT
Start: 2021-03-31 | End: 2021-04-07 | Stop reason: HOSPADM

## 2021-03-31 RX ORDER — ACETAMINOPHEN 325 MG/1
650 TABLET ORAL EVERY 6 HOURS PRN
Status: DISCONTINUED | OUTPATIENT
Start: 2021-03-31 | End: 2021-03-31

## 2021-03-31 RX ORDER — FLUTICASONE PROPIONATE 50 MCG
1 SPRAY, SUSPENSION (ML) NASAL
Status: DISCONTINUED | OUTPATIENT
Start: 2021-04-01 | End: 2021-04-07 | Stop reason: HOSPADM

## 2021-03-31 RX ORDER — OXYCODONE HYDROCHLORIDE 5 MG/1
5 TABLET ORAL
Status: DISCONTINUED | OUTPATIENT
Start: 2021-03-31 | End: 2021-03-31

## 2021-03-31 RX ORDER — ALPRAZOLAM 0.25 MG/1
0.25 TABLET ORAL NIGHTLY PRN
Status: DISCONTINUED | OUTPATIENT
Start: 2021-03-31 | End: 2021-04-07 | Stop reason: HOSPADM

## 2021-03-31 RX ORDER — POLYETHYLENE GLYCOL 3350 17 G/17G
1 POWDER, FOR SOLUTION ORAL
Status: DISCONTINUED | OUTPATIENT
Start: 2021-03-31 | End: 2021-04-07 | Stop reason: HOSPADM

## 2021-03-31 RX ORDER — AMOXICILLIN 250 MG
2 CAPSULE ORAL 2 TIMES DAILY
Status: DISCONTINUED | OUTPATIENT
Start: 2021-03-31 | End: 2021-04-07 | Stop reason: HOSPADM

## 2021-03-31 ASSESSMENT — ENCOUNTER SYMPTOMS
SORE THROAT: 0
CHILLS: 1
SPUTUM PRODUCTION: 0
HALLUCINATIONS: 0
SHORTNESS OF BREATH: 0
EYE REDNESS: 0
STRIDOR: 0
NERVOUS/ANXIOUS: 0
SPEECH CHANGE: 0
LOSS OF CONSCIOUSNESS: 0
FEVER: 0
BLOOD IN STOOL: 0
VOMITING: 0
EYE PAIN: 0
SEIZURES: 0
ABDOMINAL PAIN: 0
MYALGIAS: 0
EYE DISCHARGE: 0
HEMOPTYSIS: 0
PALPITATIONS: 0
BRUISES/BLEEDS EASILY: 0
DIARRHEA: 0
COUGH: 0
FLANK PAIN: 0
FOCAL WEAKNESS: 0

## 2021-03-31 ASSESSMENT — PAIN DESCRIPTION - PAIN TYPE: TYPE: ACUTE PAIN

## 2021-03-31 ASSESSMENT — PAIN SCALES - WONG BAKER: WONGBAKER_NUMERICALRESPONSE: DOESN'T HURT AT ALL

## 2021-03-31 ASSESSMENT — FIBROSIS 4 INDEX: FIB4 SCORE: 1.94

## 2021-04-01 ENCOUNTER — PATIENT OUTREACH (OUTPATIENT)
Dept: HEALTH INFORMATION MANAGEMENT | Facility: OTHER | Age: 86
End: 2021-04-01

## 2021-04-01 ENCOUNTER — APPOINTMENT (OUTPATIENT)
Dept: CARDIOLOGY | Facility: MEDICAL CENTER | Age: 86
End: 2021-04-01
Attending: HOSPITALIST
Payer: MEDICARE

## 2021-04-01 PROBLEM — R06.02 SHORTNESS OF BREATH: Status: RESOLVED | Noted: 2021-03-31 | Resolved: 2021-04-01

## 2021-04-01 PROBLEM — J96.21 ACUTE ON CHRONIC RESPIRATORY FAILURE WITH HYPOXIA (HCC): Status: RESOLVED | Noted: 2021-04-01 | Resolved: 2021-04-01

## 2021-04-01 PROBLEM — D63.1 ANEMIA DUE TO STAGE 3B CHRONIC KIDNEY DISEASE: Status: ACTIVE | Noted: 2021-04-01

## 2021-04-01 PROBLEM — J96.21 ACUTE ON CHRONIC RESPIRATORY FAILURE WITH HYPOXIA (HCC): Status: ACTIVE | Noted: 2021-04-01

## 2021-04-01 PROBLEM — N18.32 ANEMIA DUE TO STAGE 3B CHRONIC KIDNEY DISEASE: Status: ACTIVE | Noted: 2021-04-01

## 2021-04-01 PROBLEM — I16.0 HYPERTENSIVE URGENCY: Status: RESOLVED | Noted: 2021-03-31 | Resolved: 2021-04-01

## 2021-04-01 PROBLEM — R53.1 WEAKNESS: Status: ACTIVE | Noted: 2021-04-01

## 2021-04-01 LAB
ALBUMIN SERPL BCP-MCNC: 3.2 G/DL (ref 3.2–4.9)
ALBUMIN/GLOB SERPL: 1.3 G/DL
ALP SERPL-CCNC: 73 U/L (ref 30–99)
ALT SERPL-CCNC: 14 U/L (ref 2–50)
ANION GAP SERPL CALC-SCNC: 9 MMOL/L (ref 7–16)
AST SERPL-CCNC: 15 U/L (ref 12–45)
BASOPHILS # BLD AUTO: 0.6 % (ref 0–1.8)
BASOPHILS # BLD: 0.03 K/UL (ref 0–0.12)
BILIRUB SERPL-MCNC: 0.3 MG/DL (ref 0.1–1.5)
BUN SERPL-MCNC: 32 MG/DL (ref 8–22)
CALCIUM SERPL-MCNC: 9.3 MG/DL (ref 8.4–10.2)
CHLORIDE SERPL-SCNC: 105 MMOL/L (ref 96–112)
CO2 SERPL-SCNC: 26 MMOL/L (ref 20–33)
CREAT SERPL-MCNC: 1.13 MG/DL (ref 0.5–1.4)
EOSINOPHIL # BLD AUTO: 0.21 K/UL (ref 0–0.51)
EOSINOPHIL NFR BLD: 4.4 % (ref 0–6.9)
ERYTHROCYTE [DISTWIDTH] IN BLOOD BY AUTOMATED COUNT: 46.7 FL (ref 35.9–50)
GLOBULIN SER CALC-MCNC: 2.5 G/DL (ref 1.9–3.5)
GLUCOSE SERPL-MCNC: 142 MG/DL (ref 65–99)
HCT VFR BLD AUTO: 32.8 % (ref 37–47)
HGB BLD-MCNC: 10.1 G/DL (ref 12–16)
IMM GRANULOCYTES # BLD AUTO: 0.09 K/UL (ref 0–0.11)
IMM GRANULOCYTES NFR BLD AUTO: 1.9 % (ref 0–0.9)
LV EJECT FRACT MOD 2C 99903: 63.43
LV EJECT FRACT MOD 4C 99902: 63.04
LV EJECT FRACT MOD BP 99901: 64.38
LYMPHOCYTES # BLD AUTO: 0.49 K/UL (ref 1–4.8)
LYMPHOCYTES NFR BLD: 10.3 % (ref 22–41)
MCH RBC QN AUTO: 29.4 PG (ref 27–33)
MCHC RBC AUTO-ENTMCNC: 30.8 G/DL (ref 33.6–35)
MCV RBC AUTO: 95.6 FL (ref 81.4–97.8)
MONOCYTES # BLD AUTO: 0.6 K/UL (ref 0–0.85)
MONOCYTES NFR BLD AUTO: 12.6 % (ref 0–13.4)
NEUTROPHILS # BLD AUTO: 3.36 K/UL (ref 2–7.15)
NEUTROPHILS NFR BLD: 70.2 % (ref 44–72)
NRBC # BLD AUTO: 0 K/UL
NRBC BLD-RTO: 0 /100 WBC
PLATELET # BLD AUTO: 212 K/UL (ref 164–446)
PMV BLD AUTO: 8.6 FL (ref 9–12.9)
POTASSIUM SERPL-SCNC: 4.1 MMOL/L (ref 3.6–5.5)
PROT SERPL-MCNC: 5.7 G/DL (ref 6–8.2)
RBC # BLD AUTO: 3.43 M/UL (ref 4.2–5.4)
SODIUM SERPL-SCNC: 140 MMOL/L (ref 135–145)
TROPONIN T SERPL-MCNC: 25 NG/L (ref 6–19)
WBC # BLD AUTO: 4.8 K/UL (ref 4.8–10.8)

## 2021-04-01 PROCEDURE — G0378 HOSPITAL OBSERVATION PER HR: HCPCS

## 2021-04-01 PROCEDURE — 93306 TTE W/DOPPLER COMPLETE: CPT

## 2021-04-01 PROCEDURE — 36415 COLL VENOUS BLD VENIPUNCTURE: CPT

## 2021-04-01 PROCEDURE — 93306 TTE W/DOPPLER COMPLETE: CPT | Mod: 26 | Performed by: INTERNAL MEDICINE

## 2021-04-01 PROCEDURE — A9270 NON-COVERED ITEM OR SERVICE: HCPCS | Performed by: HOSPITALIST

## 2021-04-01 PROCEDURE — 80053 COMPREHEN METABOLIC PANEL: CPT

## 2021-04-01 PROCEDURE — 94760 N-INVAS EAR/PLS OXIMETRY 1: CPT

## 2021-04-01 PROCEDURE — 87040 BLOOD CULTURE FOR BACTERIA: CPT

## 2021-04-01 PROCEDURE — 96372 THER/PROPH/DIAG INJ SC/IM: CPT

## 2021-04-01 PROCEDURE — 85025 COMPLETE CBC W/AUTO DIFF WBC: CPT

## 2021-04-01 PROCEDURE — 700102 HCHG RX REV CODE 250 W/ 637 OVERRIDE(OP): Performed by: HOSPITALIST

## 2021-04-01 PROCEDURE — 99226 PR SUBSEQUENT OBSERVATION CARE,LEVEL III: CPT | Performed by: INTERNAL MEDICINE

## 2021-04-01 PROCEDURE — 84484 ASSAY OF TROPONIN QUANT: CPT

## 2021-04-01 PROCEDURE — 700111 HCHG RX REV CODE 636 W/ 250 OVERRIDE (IP): Performed by: HOSPITALIST

## 2021-04-01 RX ORDER — NAPROXEN SODIUM 220 MG
220 TABLET ORAL 2 TIMES DAILY PRN
Status: ON HOLD | COMMUNITY
End: 2021-04-01

## 2021-04-01 RX ORDER — ACETAMINOPHEN/DIPHENHYDRAMINE 500MG-25MG
1 TABLET ORAL
Status: ON HOLD | COMMUNITY
End: 2021-04-01

## 2021-04-01 RX ADMIN — ALPRAZOLAM 0.25 MG: 0.25 TABLET ORAL at 02:40

## 2021-04-01 RX ADMIN — HEPARIN SODIUM 5000 UNITS: 5000 INJECTION, SOLUTION INTRAVENOUS; SUBCUTANEOUS at 14:27

## 2021-04-01 RX ADMIN — ALPRAZOLAM 0.25 MG: 0.25 TABLET ORAL at 21:43

## 2021-04-01 RX ADMIN — Medication 1000 UNITS: at 05:40

## 2021-04-01 RX ADMIN — HEPARIN SODIUM 5000 UNITS: 5000 INJECTION, SOLUTION INTRAVENOUS; SUBCUTANEOUS at 05:40

## 2021-04-01 ASSESSMENT — PAIN DESCRIPTION - PAIN TYPE
TYPE: ACUTE PAIN

## 2021-04-01 ASSESSMENT — ENCOUNTER SYMPTOMS
WEIGHT LOSS: 0
WEAKNESS: 1
FEVER: 0
SPUTUM PRODUCTION: 0
CHILLS: 0
ORTHOPNEA: 1
DIZZINESS: 1
HEMOPTYSIS: 0
SHORTNESS OF BREATH: 1
WHEEZING: 1

## 2021-04-01 ASSESSMENT — LIFESTYLE VARIABLES
AVERAGE NUMBER OF DAYS PER WEEK YOU HAVE A DRINK CONTAINING ALCOHOL: 7
ALCOHOL_USE: YES
HOW MANY TIMES IN THE PAST YEAR HAVE YOU HAD 5 OR MORE DRINKS IN A DAY: 0
HAVE YOU EVER FELT YOU SHOULD CUT DOWN ON YOUR DRINKING: NO
EVER_SMOKED: NEVER
CONSUMPTION TOTAL: NEGATIVE
TOTAL SCORE: 0
HAVE PEOPLE ANNOYED YOU BY CRITICIZING YOUR DRINKING: NO
EVER FELT BAD OR GUILTY ABOUT YOUR DRINKING: NO
ON A TYPICAL DAY WHEN YOU DRINK ALCOHOL HOW MANY DRINKS DO YOU HAVE: 1
TOTAL SCORE: 0
EVER HAD A DRINK FIRST THING IN THE MORNING TO STEADY YOUR NERVES TO GET RID OF A HANGOVER: NO
TOTAL SCORE: 0

## 2021-04-01 ASSESSMENT — COGNITIVE AND FUNCTIONAL STATUS - GENERAL
TURNING FROM BACK TO SIDE WHILE IN FLAT BAD: A LITTLE
WALKING IN HOSPITAL ROOM: A LOT
DRESSING REGULAR UPPER BODY CLOTHING: A LITTLE
TOILETING: A LOT
MOBILITY SCORE: 12
CLIMB 3 TO 5 STEPS WITH RAILING: TOTAL
SUGGESTED CMS G CODE MODIFIER DAILY ACTIVITY: CK
PERSONAL GROOMING: A LITTLE
HELP NEEDED FOR BATHING: A LOT
DAILY ACTIVITIY SCORE: 16
MOVING TO AND FROM BED TO CHAIR: A LOT
DRESSING REGULAR LOWER BODY CLOTHING: A LOT
MOVING FROM LYING ON BACK TO SITTING ON SIDE OF FLAT BED: A LOT
SUGGESTED CMS G CODE MODIFIER MOBILITY: CL
STANDING UP FROM CHAIR USING ARMS: A LOT

## 2021-04-01 ASSESSMENT — COPD QUESTIONNAIRES
DURING THE PAST 4 WEEKS HOW MUCH DID YOU FEEL SHORT OF BREATH: SOME OF THE TIME
COPD SCREENING SCORE: 3
DO YOU EVER COUGH UP ANY MUCUS OR PHLEGM?: NO/ONLY WITH OCCASIONAL COLDS OR INFECTIONS
HAVE YOU SMOKED AT LEAST 100 CIGARETTES IN YOUR ENTIRE LIFE: NO/DON'T KNOW

## 2021-04-01 ASSESSMENT — FIBROSIS 4 INDEX: FIB4 SCORE: 1.79

## 2021-04-01 ASSESSMENT — PATIENT HEALTH QUESTIONNAIRE - PHQ9
SUM OF ALL RESPONSES TO PHQ9 QUESTIONS 1 AND 2: 0
2. FEELING DOWN, DEPRESSED, IRRITABLE, OR HOPELESS: NOT AT ALL
1. LITTLE INTEREST OR PLEASURE IN DOING THINGS: NOT AT ALL

## 2021-04-01 NOTE — ED PROVIDER NOTES
ED Provider Note    CHIEF COMPLAINT  Chief Complaint   Patient presents with    Shortness of Breath    Malaise       HPI  Yesi Garduno is a 89 y.o. female who presents to the emergency room and with fatigue and shortness of breath. Past medical history as documented below. The patient explains that yesterday she started to feel fatigued. This morning she felt relatively well but then as the day progressed she became weak yet again especially worse with ambulation and then also became more short of breath also worse with exertion. Denies any chest pain or palpitations. No recent change in medications to which she takes nearly none other than and antacid and some over-the-counter vitamin such as vitamin C. She does live with her . No recent contacts that are ill. Denies any cough or productive sputum. No medications taken prior to arrival. She does wear baseline 3 L of oxygen.    Nursing staff knows the patient was setting in 88-89% on her regular 3 L upon arrival. The    REVIEW OF SYSTEMS  See [facility] for further details. All other systems are negative.     PAST MEDICAL HISTORY   has a past medical history of AAA (abdominal aortic aneurysm) ([facility]) (2015), Breast cancer ([facility]), Cancer ([facility]), COPD (chronic obstructive pulmonary disease) ([facility]) (2015), Near syncope (2015), and S/P right mastectomy.    SOCIAL HISTORY  Social History     Tobacco Use    Smoking status: Former Smoker     Packs/day: 0.25     Years: 34.00     Pack years: 8.50     Types: Cigarettes     Quit date: 1985     Years since quittin.2    Smokeless tobacco: Never Used   Substance and Sexual Activity    Alcohol use: Yes     Alcohol/week: 4.2 oz     Types: 7 Shots of liquor per week    Drug use: No    Sexual activity: Not on file       SURGICAL HISTORY   has a past surgical history that includes other; mastectomy (); and breast biopsy ().    CURRENT MEDICATIONS  Home Medications    **Home medications have not yet  "been reviewed for this encounter**         ALLERGIES  Allergies   Allergen Reactions    Tetracycline Rash and Itching     Pt states \"I get a bad body rash and itch all over\".    Hydrocodone Rash     \"all over\" and it makes me act loopy    Penicillins Vomiting       PHYSICAL EXAM  VITAL SIGNS: /60   Pulse 81   Temp 36.4 °C (97.5 °F) (Temporal)   Resp 14   Ht 1.727 m (5' 8\")   Wt 70 kg (154 lb 5.2 oz)   SpO2 100%   BMI 23.46 kg/m²  @BERNA[[id number]::@   Pulse ox interpretation: I interpret this pulse ox as normal.  Constitutional: Alert in no apparent distress.  HENT: No signs of trauma, Bilateral external ears normal, Nose normal.   Eyes: Pupils are equal and reactive  Neck: Normal range of motion, No tenderness, Supple  Cardiovascular: Regular rate and rhythm, no murmurs.   Thorax & Lungs: Normal breath sounds, No respiratory distress, No wheezing, No chest tenderness. Nasal cannula in place on 4 L  Abdomen: Bowel sounds normal, Soft, No tenderness  Skin: Warm, Dry, No erythema, No rash.   Extremities: Intact distal pulses, minimal bilateral lower extremity edema, No tenderness  Musculoskeletal: Good range of motion in all major joints. No tenderness to palpation or major deformities noted.   Neurologic: Alert , Normal motor function, Normal sensory function, No focal deficits noted.   Psychiatric: Affect normal, Judgment normal, Mood normal.       DIAGNOSTIC STUDIES / PROCEDURES      LABS  Results for orders placed or performed during the hospital encounter of 03/31/21   CBC WITH DIFFERENTIAL   Result Value Ref Range    WBC 6.6 4.8 - 10.8 K/uL    RBC 3.73 (L) 4.20 - 5.40 M/uL    Hemoglobin 11.4 (L) 12.0 - 16.0 g/dL    Hematocrit 36.0 (L) 37.0 - 47.0 %    MCV 96.5 81.4 - 97.8 fL    MCH 30.6 27.0 - 33.0 pg    MCHC 31.7 (L) 33.6 - 35.0 g/dL    RDW 47.8 35.9 - 50.0 fL    Platelet Count 249 164 - 446 K/uL    MPV 8.8 (L) 9.0 - 12.9 fL    Neutrophils-Polys 77.40 (H) 44.00 - 72.00 %    Lymphocytes 7.10 (L) 22.00 - " 41.00 %    Monocytes 9.60 0.00 - 13.40 %    Eosinophils 3.60 0.00 - 6.90 %    Basophils 0.60 0.00 - 1.80 %    Immature Granulocytes 1.70 (H) 0.00 - 0.90 %    Nucleated RBC 0.00 /100 WBC    Neutrophils (Absolute) 5.14 2.00 - 7.15 K/uL    Lymphs (Absolute) 0.47 (L) 1.00 - 4.80 K/uL    Monos (Absolute) 0.64 0.00 - 0.85 K/uL    Eos (Absolute) 0.24 0.00 - 0.51 K/uL    Baso (Absolute) 0.04 0.00 - 0.12 K/uL    Immature Granulocytes (abs) 0.11 0.00 - 0.11 K/uL    NRBC (Absolute) 0.00 K/uL   COMP METABOLIC PANEL   Result Value Ref Range    Sodium 139 135 - 145 mmol/L    Potassium 5.1 3.6 - 5.5 mmol/L    Chloride 102 96 - 112 mmol/L    Co2 25 20 - 33 mmol/L    Anion Gap 12.0 7.0 - 16.0    Glucose 121 (H) 65 - 99 mg/dL    Bun 38 (H) 8 - 22 mg/dL    Creatinine 1.27 0.50 - 1.40 mg/dL    Calcium 9.8 8.4 - 10.2 mg/dL    AST(SGOT) 20 12 - 45 U/L    ALT(SGPT) 17 2 - 50 U/L    Alkaline Phosphatase 90 30 - 99 U/L    Total Bilirubin 0.3 0.1 - 1.5 mg/dL    Albumin 4.0 3.2 - 4.9 g/dL    Total Protein 7.0 6.0 - 8.2 g/dL    Globulin 3.0 1.9 - 3.5 g/dL    A-G Ratio 1.3 g/dL   URINALYSIS    Specimen: Urine   Result Value Ref Range    Color Yellow     Character Clear     Specific Gravity 1.020 <1.035    Ph 5.5 5.0 - 8.0    Glucose Negative Negative mg/dL    Ketones Negative Negative mg/dL    Protein Negative Negative mg/dL    Bilirubin Negative Negative    Nitrite Negative Negative    Leukocyte Esterase Negative Negative    Occult Blood Negative Negative    Micro Urine Req see below    proBrain Natriuretic Peptide, NT   Result Value Ref Range    NT-proBNP 328 (H) 0 - 125 pg/mL   COV-2, FLU A/B, AND RSV BY PCR (2-4 HOURS CEPHEID): Collect NP swab in VTM    Specimen: Nasopharyngeal; Respirate   Result Value Ref Range    Influenza virus A RNA Negative Negative    Influenza virus B, PCR Negative Negative    RSV, PCR Negative Negative    SARS-CoV-2 by PCR NotDetected     SARS-CoV-2 Source NP Swab    LACTIC ACID   Result Value Ref Range    Lactic  Acid 1.4 0.5 - 2.0 mmol/L   ESTIMATED GFR   Result Value Ref Range    GFR If  48 (A) >60 mL/min/1.73 m 2    GFR If Non  39 (A) >60 mL/min/1.73 m 2   TROPONIN   Result Value Ref Range    Troponin T 28 (H) 6 - 19 ng/L         RADIOLOGY  DX-CHEST-PORTABLE (1 VIEW)   Final Result      1.  Chronic bilateral interstitial opacities, likely findings of interstitial lung disease.   2.  No focal consolidation or pleural effusions.      EC-ECHOCARDIOGRAM COMPLETE W/O CONT    (Results Pending)           COURSE & MEDICAL DECISION MAKING  Pertinent Labs & Imaging studies reviewed. (See chart for details)  patient presented to the emergency department for dyspnea. History as above. No significant acute abnormalities. Patient is back to her baseline oxygenation without hypoxia as was true upon her arrival. Additional mild elevation of troponin. No EKG changes. No evidence of CHF exacerbation. COVID is pending. Chest x-ray does not show any acute consolidated processes. Given the patient's age and complaints as well as mildly elevated troponin level brought in for ongoing overnight care      FINAL IMPRESSION  Dyspnea  elevated troponin        Electronically signed by: Joshua Burnham M.D., 3/31/2021 6:55 PM

## 2021-04-01 NOTE — ED TRIAGE NOTES
Pt bib family via w/c; c/o sob, general malaise x2d. Pt on o2 2L via nc ATC at baseline. o2 sats 88-90% on o2 6L via nc. Pt denies pain. Sepsis scale: 3. Charge nurse notified, pt brought directly to rm#9 via w/c

## 2021-04-01 NOTE — PROGRESS NOTES
Assumed care of pt at 0700. Report received and bedside rounding completed with night RN. Pt is calm no SOB, no acute distress noted.  Call light and pt belongings within reach - hourly rounding in place. See flowsheets for further assessment.

## 2021-04-01 NOTE — RESPIRATORY CARE
"   COPD EDUCATION by COPD CLINICAL EDUCATOR  4/1/2021 at 11:14 AM by Amanda Samuel RRT     Patient reviewed by COPD education team. Per Patient she does not have a history or diagnosis of COPD, states She has Pulmonary Fibrosis/ ILD PFT 12/2020 Spirometry reflects the restrictive process but also an element of obstruction, patient is a former smoker.  Therefore does not qualify for the COPD program.    COPD Screen  COPD Risk Screening  Do you have a history of COPD?: No(ILD/Pulmonary Fibrosis)  Do you have a Pulmonologist?: Yes  COPD Population Screener  During the past 4 weeks, how much did you feel short of breath?: Some of the time  Do you ever cough up any mucus or phlegm?: No/only with occasional colds or infections  In the past 12 months, you do less than you used to because of your breathing problems: Disagree/unsure  Have you smoked at least 100 cigarettes in your entire life?: No/don't know  How old are you?: 60+  COPD Screening Score: 3  COPD Coordinator Recommended: Yes    COPD Assessment  COPD Clinical Specialists ONLY  COPD Education Initiated: Yes--Short Intervention  DQ Reason:: Pt states has Pulmonary Fibrosis/ILD was on Pirfenidone very expensive, not using any other respiratory medications/inhalers, quit smokng 1985, goes to Dr. Richardson and has follow up appt. Patient Admit for having generalized weakness, chills, easy fatigability and shortness of breath  Pulmonary Follow Up Appointment: 06/18/21  Appt Time: 1230  Pulmonologist Name: Dr. Richardson  Is this a COPD exacerbation patient?: No     MY COPD ACTION PLAN     It is recommended that patients and physicians /healthcare providers complete this action plan together. This plan should be discussed at each physician visit and updated as needed.    The green, yellow and red zones show groups of symptoms of COPD. This list of symptoms is not comprehensive, and you may experience other symptoms. In the \"Actions\" column, your healthcare provider has " "recommended actions for you to take based on your symptoms.    Patient Name: Yesi Garduno   YOB: 1928   Last Updated on:     Green Zone:  I am doing well today Actions   •  Usual activitiy and exercise level •  Take daily medications   •  Usual amounts of cough and phlegm/mucus •  Use oxygen as prescribed   •  Sleep well at night •  Continue regular exercise/diet plan   •  Appetite is good •  At all times avoid cigarette smoke, inhaled irritants     Daily Medications (these medications are taken every day):                Yellow Zone:  I am having a bad day or a COPD flare Actions   •  More breathless than usual •  Continue daily medications   •  I have less energy for my daily activities •  Use quick relief inhaler as ordered   •  Increased or thicker phlegm/mucus •  Use oxygen as prescribed   •  Using quick relief inhaler/nebulizer more often •  Get plenty of rest   •  Swelling of ankles more than usual •  Use pursed lip breathing   •  More coughing than usual •  At all times avoid cigarette smoke, inhaled irritants   •  I feel like I have a \"chest cold\"   •  Poor sleep and my symptoms woke me up   •  My appetite is not good   •  My medicine is not helping    •  Call provider immediately if symptoms don’t improve     Continue daily medications, add rescue medications:               Medications to be used during a flare up, (as Discussed with Provider):              Red Zone:  I need urgent medical care Actions   •  Severe shortness of breath even at rest •  Call 911 or seek medical care immediately   •  Not able to do any activity because of breathing    •  Fever or shaking chills    •  Feeling confused or very drowsy     •  Chest pains    •  Coughing up blood              "

## 2021-04-01 NOTE — ASSESSMENT & PLAN NOTE
Secondary to declining interstitial lung disease pattern  On home O2 at usually 3 L here she is requiring 4 L.  Severe debilitation and will require at this point assistive devices at home to be able to cope with her situation  Trying to set her up with a hospital bed and a wheelchair at home.  Working with family to accomplish this.

## 2021-04-01 NOTE — ED NOTES
Pt alert, lying in gurney. Allowed this RN to draw set of cultures. Pt belongings within reach, pleasant. Cooperative. Pt has purewick in place, continues on 3L NC

## 2021-04-01 NOTE — PROGRESS NOTES
Pharmacy Medication Reconciliation      Medication reconciliation updated and complete per pt at bedside  Allergies have been verified and updated   No oral ABX within the last 14 days  Patient home pharmacy:[facility]

## 2021-04-01 NOTE — H&P
Hospital Medicine History & Physical Note    Date of Service  3/31/2021    Primary Care Physician  Savage Doyle M.D.    Consultants  None     Code Status  Full Code    Chief Complaint  Chief Complaint   Patient presents with   • Shortness of Breath   • Malaise     History of Presenting Illness  89 y.o. female with a past medical history of chronic hypoxemic respiratory failure on 3 L of oxygen at baseline, chronic obstructive pulmonary disease, interstitial lung disease who follows with pulmonary Dr. Richardson who presented 3/31/2021 with shortness of breath and generalized weakness over the past 2 days.  Patient reports having generalized weakness, chills, easy fatigability and shortness of breath.  She denies having contact with sick people.  Patient reports having taken both doses of Covid vaccine the last being February 14.  She denies having cough sputum production however reports having nasal congestion.  She denies having lower extremity pain swelling or redness.  She denies having diarrhea abdominal pain.    Review of Systems  Review of Systems   Constitutional: Positive for chills and malaise/fatigue. Negative for fever.   HENT: Negative for congestion and sore throat.    Eyes: Negative for pain, discharge and redness.   Respiratory: Negative for cough, hemoptysis, sputum production, shortness of breath and stridor.    Cardiovascular: Negative for chest pain, palpitations and leg swelling.   Gastrointestinal: Negative for abdominal pain, blood in stool, diarrhea and vomiting.   Genitourinary: Negative for flank pain, hematuria and urgency.   Musculoskeletal: Negative for myalgias.   Skin: Negative.    Neurological: Negative for speech change, focal weakness, seizures and loss of consciousness.   Endo/Heme/Allergies: Does not bruise/bleed easily.   Psychiatric/Behavioral: Negative for hallucinations and suicidal ideas. The patient is not nervous/anxious.      Past Medical History   has a past medical  "history of AAA (abdominal aortic aneurysm) ([facility]) (8/11/2015), Breast cancer ([facility]), Cancer ([facility]), COPD (chronic obstructive pulmonary disease) ([facility]) (8/11/2015), Near syncope (8/11/2015), and S/P right mastectomy.    Surgical History   has a past surgical history that includes other; mastectomy (1979); and breast biopsy (1990).     Family History  family history includes Cancer in her mother and sister; Lung Disease in her paternal uncle.     Social History   reports that she quit smoking about 36 years ago. Her smoking use included cigarettes. She has a 8.50 pack-year smoking history. She has never used smokeless tobacco. She reports current alcohol use of about 4.2 oz of alcohol per week. She reports that she does not use drugs.    Allergies  Allergies   Allergen Reactions   • Tetracycline Rash and Itching     Pt states \"I get a bad body rash and itch all over\".   • Hydrocodone Rash     \"all over\" and it makes me act loopy   • Penicillins Vomiting       Medications  Prior to Admission Medications   Prescriptions Last Dose Informant Patient Reported? Taking?   Calcium Carbonate-Vit D-Min (CALCIUM 1200 PO)  Patient Yes No   Sig: Take 1 Tab by mouth every day.   Coenzyme Q10 (CO Q 10 PO)  Patient Yes No   Sig: Take 1 Cap by mouth every day at 6 PM.   Cyanocobalamin (B-12 PO)  Patient Yes No   Sig: Take 1 Tab by mouth every day.   acetaminophen (TYLENOL) 325 MG Tab   No No   Sig: Take 2 Tabs by mouth every 6 hours as needed for Mild Pain or Moderate Pain.   alprazolam (XANAX) 0.25 MG Tab  Patient Yes No   Sig: Take 0.25 mg by mouth at bedtime as needed for Sleep.   azithromycin (ZITHROMAX) 250 MG Tab   Yes No   bisacodyl (DULCOLAX) 10 MG Suppos   No No   Sig: Insert 1 Suppository in rectum every 24 hours as needed (if lactulose ineffective).   Patient not taking: Reported on 4/19/2019   ciprofloxacin (CILOXIN) 0.3 % Solution   Yes No   docusate sodium 100 MG Cap   No No   Sig: Take 100 mg by mouth every morning. "   fluticasone (FLONASE) 50 MCG/ACT nasal spray  Patient Yes No   Sig: Spray 1 Spray in nose every bedtime. Each Nostril   gabapentin (NEURONTIN) 100 MG Cap   Yes No   Sig: TAKE 1 CAPSULE BY MOUTH AT 6 PM FOR 1 WEEK THEN 2 CAPS BY MOUTH AT 6 PM DAILY   gabapentin (NEURONTIN) 300 MG Cap   No No   Sig: Take 1 Cap by mouth 3 times a day.   Patient not taking: Reported on 4/19/2019   gelatin 650 MG capsule  Patient Yes No   Sig: Take 1 Cap by mouth every day.   ipratropium (ATROVENT) 0.06 % Solution   Yes No   ipratropium-albuterol (COMBIVENT RESPIMAT)  MCG/ACT Aero Soln   No No   Sig: Inhale 1 Puff by mouth 4 times a day.   Patient not taking: Reported on 4/19/2019   ipratropium-albuterol (DUONEB) 0.5-2.5 (3) MG/3ML nebulizer solution   No No   Sig: 3 mL by Nebulization route every 6 hours as needed for Shortness of Breath.   Patient not taking: Reported on 4/19/2019   lactulose 20 GM/30ML Solution   No No   Sig: Take 30 mL by mouth every 24 hours as needed (if sennosides-docusate sodium (SENOKOT-S) ineffective).   Patient not taking: Reported on 4/19/2019   levofloxacin (LEVAQUIN) 500 MG tablet   Yes No   Sig: Take  by mouth every day. WITH FOOD   metoprolol (LOPRESSOR) 25 MG Tab   No No   Sig: Take 1 Tab by mouth 2 Times a Day.   Patient not taking: Reported on 2/21/2017   ondansetron (ZOFRAN ODT) 4 MG TABLET DISPERSIBLE   No No   Sig: Take 1 Tab by mouth every four hours as needed for Nausea/Vomiting (give PO if IV route is unavailable. May give per feeding tube.).   Patient not taking: Reported on 2/21/2017   senna-docusate (PERICOLACE OR SENOKOT S) 8.6-50 MG Tab   No No   Sig: Take 1 Tab by mouth every day.   Patient not taking: Reported on 2/21/2017   senna-docusate (PERICOLACE OR SENOKOT S) 8.6-50 MG Tab   No No   Sig: Take 1 Tab by mouth every 24 hours as needed for Constipation.   Patient not taking: Reported on 2/21/2017   triamterene-hctz (MAXZIDE-25/DYAZIDE) 37.5-25 MG Tab   No No   Sig: Take 1 Tab by  mouth every day.   Patient not taking: Reported on 4/19/2019   vitamin D (CHOLECALCIFEROL) 1000 UNIT Tab  Patient Yes No   Sig: Take 1,000 Units by mouth every day.      Facility-Administered Medications: None       Physical Exam  Temp:  [36.4 °C (97.5 °F)] 36.4 °C (97.5 °F)  Pulse:  [76-85] 81  Resp:  [13-19] 13  BP: (153-180)/() 160/82  SpO2:  [89 %-100 %] 100 %    Physical Exam  Constitutional:       General: She is not in acute distress.     Appearance: She is not toxic-appearing.   HENT:      Head: Normocephalic and atraumatic.      Right Ear: External ear normal.      Left Ear: External ear normal.      Nose: No congestion or rhinorrhea.      Mouth/Throat:      Mouth: Mucous membranes are dry.      Pharynx: No oropharyngeal exudate or posterior oropharyngeal erythema.   Eyes:      General: No scleral icterus.        Right eye: No discharge.         Left eye: No discharge.      Conjunctiva/sclera: Conjunctivae normal.      Pupils: Pupils are equal, round, and reactive to light.   Cardiovascular:      Rate and Rhythm: Normal rate.      Heart sounds: No friction rub. No gallop.    Pulmonary:      Breath sounds: No stridor. No wheezing or rhonchi.      Comments: Reduced air entry bilaterally basally.  Fine bilateral crepitations  Abdominal:      General: There is no distension.      Palpations: Abdomen is soft.      Tenderness: There is no abdominal tenderness. There is no guarding or rebound.   Musculoskeletal:         General: No swelling.      Cervical back: Neck supple. No rigidity. No muscular tenderness.      Right lower leg: No edema.      Left lower leg: No edema.   Skin:     General: Skin is dry.      Capillary Refill: Capillary refill takes 2 to 3 seconds.      Coloration: Skin is pale. Skin is not jaundiced.      Findings: No bruising or erythema.   Neurological:      Mental Status: She is alert and oriented to person, place, and time.      Cranial Nerves: No cranial nerve deficit.      Sensory:  No sensory deficit.      Motor: No weakness.   Psychiatric:         Mood and Affect: Mood normal.         Judgment: Judgment normal.       Laboratory:  Recent Labs     03/31/21 1847   WBC 6.6   RBC 3.73*   HEMOGLOBIN 11.4*   HEMATOCRIT 36.0*   MCV 96.5   MCH 30.6   MCHC 31.7*   RDW 47.8   PLATELETCT 249   MPV 8.8*     Recent Labs     03/31/21 1847   SODIUM 139   POTASSIUM 5.1   CHLORIDE 102   CO2 25   GLUCOSE 121*   BUN 38*   CREATININE 1.27   CALCIUM 9.8     Recent Labs     03/31/21 1847   ALTSGPT 17   ASTSGOT 20   ALKPHOSPHAT 90   TBILIRUBIN 0.3   GLUCOSE 121*         Recent Labs     03/31/21 1847   NTPROBNP 328*         Recent Labs     03/31/21 1847   TROPONINT 28*     Imaging:  DX-CHEST-PORTABLE (1 VIEW)   Final Result      1.  Chronic bilateral interstitial opacities, likely findings of interstitial lung disease.   2.  No focal consolidation or pleural effusions.      EC-ECHOCARDIOGRAM COMPLETE W/O CONT    (Results Pending)     Assessment/Plan:  I anticipate this patient is appropriate for observation status at this time.    Hypertensive urgency- (present on admission)  Assessment & Plan  We will start as needed hydralazine and labetalol.    Shortness of breath- (present on admission)  Assessment & Plan  Differentials include COVID-19, right sided heart failure, COPD / interstitial lung disease worsening.   Will check BNP and echocardiography    Patient reports having taken both doses of Covid vaccine the last being February 14.  Covid testing ordered, pending.   Oxygen as needed, Respiratory protocol, Bronchodilators, Incentive spirometry     Elevated troponin- (present on admission)  Assessment & Plan  In the indeterminate range in the setting of chronic kidney disease, will trend  The patient denies having chest pain.  EKG ordered, pending  Stat EKG, troponin for chest pain or worsening shortness of breath   We will check echocardiography    Stage 3a chronic kidney disease- (present on  admission)  Assessment & Plan  Avoid nephrotoxins as much as possible, renally dose medications, monitor inputs and outputs     COPD (chronic obstructive pulmonary disease) (CMS-HCC)- (present on admission)  Assessment & Plan  Does not appear to have   Follows with with Cate Richardson    Oxygen as needed, Respiratory protocol, Bronchodilators, Incentive spirometry

## 2021-04-01 NOTE — CARE PLAN
Problem: Communication  Goal: The ability to communicate needs accurately and effectively will improve  Outcome: PROGRESSING AS EXPECTED  Note: Updated granddaughter on patients condition. All questions answered at bedside.      Problem: Safety  Goal: Will remain free from injury  Outcome: PROGRESSING AS EXPECTED  Goal: Will remain free from falls  Outcome: PROGRESSING AS EXPECTED

## 2021-04-01 NOTE — ASSESSMENT & PLAN NOTE
Blood pressure is now controlled  Most recently 118/51  She usually does not take routine medications currently on as needed hydralazine and labetalol.

## 2021-04-01 NOTE — HOSPITAL COURSE
"Per notes, \"89 y.o. female with a past medical history of chronic hypoxemic respiratory failure on 3 L of oxygen at baseline, chronic obstructive pulmonary disease, interstitial lung disease who follows with pulmonary Dr. Richardson who presented 3/31/2021 with shortness of breath and generalized weakness over the past 2 days.  Patient reports having generalized weakness, chills, easy fatigability and shortness of breath.  She denies having contact with sick people.  Patient reports having taken both doses of Covid vaccine the last being February 14.  She denies having cough sputum production however reports having nasal congestion.  She denies having lower extremity pain swelling or redness.  She denies having diarrhea abdominal pain.\"     Patient was admitted and monitored overnight.  "

## 2021-04-01 NOTE — ED NOTES
Rn called lab to draw 2nd blood culture. Admitting MD at bedside.    Lactic repeats not needed due to first lactic begin under 2

## 2021-04-01 NOTE — FLOWSHEET NOTE
04/01/21 0304   Patient History   Pulmonary Diagnosis COPD   Home O2 Yes   Oxygen liter flow 3   Nocturnal CPAP No   Home Treatments/Frequency No   Sleep Apnea Screening   Have you had a sleep study? No   Have you been diagnosed with sleep apnea? No   S - Have you been told that you SNORE? 0   T - Are you often TIRED during the day? 0   O - Do you know if you stop breathing or has anyone witnessed you stop breathing while you were asleep? (OBSTRUCTION) 0   P - Do you have high blood PRESSURE or on medication to control high blood pressure? 0   B - Is your Body Mass Index greater than 35? (BMI) 0   A - Are you 50 years old or older? (AGE) 1   N - Are you a male with a NECK circumference greater than 17 inches, or a female with a neck circumference greater than 16 inches? 0   G - Are you male? (GENDER) 0   Stop Bang Total Score 1   COPD Risk Screening   Do you have a history of COPD? Yes   Do you have a Pulmonologist? No   COPD Population Screener   During the past 4 weeks, how much did you feel short of breath? 1   Do you ever cough up any mucus or phlegm? 0   In the past 12 months, you do less than you used to because of your breathing problems 0   Have you smoked at least 100 cigarettes in your entire life? 0   How old are you? 2   COPD Screening Score 3   COPD Coordinator Recommended Yes

## 2021-04-01 NOTE — ASSESSMENT & PLAN NOTE
Generalized weakness  PT OT evaluated patient recommended skilled  Patient and family want to go home with home health and they are also at this point setting her up with a hospital bed as well as assistive devices at home.

## 2021-04-01 NOTE — PROGRESS NOTES
"Hospital Medicine Daily Progress Note    Date of Service  4/1/2021    Chief Complaint  89 y.o. female admitted 3/31/2021 with shortness of breath    Hospital Course  Per notes, \"89 y.o. female with a past medical history of chronic hypoxemic respiratory failure on 3 L of oxygen at baseline, chronic obstructive pulmonary disease, interstitial lung disease who follows with pulmonary Dr. Richardson who presented 3/31/2021 with shortness of breath and generalized weakness over the past 2 days.  Patient reports having generalized weakness, chills, easy fatigability and shortness of breath.  She denies having contact with sick people.  Patient reports having taken both doses of Covid vaccine the last being February 14.  She denies having cough sputum production however reports having nasal congestion.  She denies having lower extremity pain swelling or redness.  She denies having diarrhea abdominal pain.\"     Patient was admitted and monitored overnight.      Interval Problem Update  Patient was seen and examined by me this morning at bedside, significant improved in overall symptoms, back to baseline O2 requirements.  However, she was still feeling shaky and unsteady.  I do think she is a fall risk, pending PT.    Consultants/Specialty  None    Code Status  Full Code    Disposition  Anticipated DC 24 hours    Review of Systems  Review of Systems   Constitutional: Positive for malaise/fatigue. Negative for chills, fever and weight loss.   Respiratory: Positive for shortness of breath and wheezing. Negative for hemoptysis and sputum production.    Cardiovascular: Positive for orthopnea.   Neurological: Positive for dizziness and weakness.   All other systems reviewed and are negative.       Physical Exam  Temp:  [36.4 °C (97.5 °F)-37.4 °C (99.4 °F)] 37.4 °C (99.4 °F)  Pulse:  [72-85] 72  Resp:  [13-19] 16  BP: (124-180)/() 142/59  SpO2:  [89 %-100 %] 95 %    Physical Exam  Vitals and nursing note reviewed. "   Constitutional:       Appearance: Normal appearance. She is not ill-appearing.   Cardiovascular:      Rate and Rhythm: Normal rate and regular rhythm.      Pulses: Normal pulses.      Heart sounds: Normal heart sounds.   Pulmonary:      Effort: Pulmonary effort is normal. No respiratory distress.      Breath sounds: Normal breath sounds. No stridor. No wheezing.   Abdominal:      General: Abdomen is flat. Bowel sounds are normal.      Palpations: Abdomen is soft.   Musculoskeletal:      Right lower leg: No edema.      Left lower leg: No edema.   Neurological:      General: No focal deficit present.      Mental Status: She is alert and oriented to person, place, and time.         Fluids    Intake/Output Summary (Last 24 hours) at 4/1/2021 1247  Last data filed at 4/1/2021 1200  Gross per 24 hour   Intake 360 ml   Output --   Net 360 ml       Laboratory  Recent Labs     03/31/21 1847 04/01/21  0432   WBC 6.6 4.8   RBC 3.73* 3.43*   HEMOGLOBIN 11.4* 10.1*   HEMATOCRIT 36.0* 32.8*   MCV 96.5 95.6   MCH 30.6 29.4   MCHC 31.7* 30.8*   RDW 47.8 46.7   PLATELETCT 249 212   MPV 8.8* 8.6*     Recent Labs     03/31/21 1847 04/01/21  0432   SODIUM 139 140   POTASSIUM 5.1 4.1   CHLORIDE 102 105   CO2 25 26   GLUCOSE 121* 142*   BUN 38* 32*   CREATININE 1.27 1.13   CALCIUM 9.8 9.3                   Imaging  EC-ECHOCARDIOGRAM COMPLETE W/O CONT   Final Result      DX-CHEST-PORTABLE (1 VIEW)   Final Result      1.  Chronic bilateral interstitial opacities, likely findings of interstitial lung disease.   2.  No focal consolidation or pleural effusions.           Assessment/Plan  Hypertensive urgency- (present on admission)  Assessment & Plan  We will start as needed hydralazine and labetalol.    Shortness of breath- (present on admission)  Assessment & Plan  Patient has a history of COPD / interstitial lung disease worsening.  On 3 L supplemental O2 at baseline.  Did have acute worsening of shortness of breath and weakness, as  well as wheezing initially.  Covid negative, no need for antibiotics at this time  Oxygen as needed, Respiratory protocol, Bronchodilators, Incentive spirometry     Elevated troponin- (present on admission)  Assessment & Plan  In the indeterminate range in the setting of chronic kidney disease, EKG unremarkable for acute findings, no chest pain.  Shortness of breath unlikely related to cardiac  Echocardiogram completed on 4/1: EF of 65%, mild concentric left ventricular hypertrophy, possible aortic abdominal aneurysm, RVSP 35 otherwise unremarkable    Stage 3a chronic kidney disease- (present on admission)  Assessment & Plan  Avoid nephrotoxins as much as possible, renally dose medications, monitor inputs and outputs   Currently at baseline    Weakness- (present on admission)  Assessment & Plan  In addition to shortness of breath patient complained of weakness, likely related to underlying respiratory failure and increased O2 needs.  -Concerned about follow-up recs  -Fall precautions  -PT eval    Acute on chronic respiratory failure with hypoxia (HCC)- (present on admission)  Assessment & Plan  Patient is on 3 L supplemental O2 at home, chronic respiratory failure secondary to COPD and interstitial lung disease.  -Was requiring 6 L O2 when she was admitted, will continue to wean  -RT eval and treat  -Incentive spirometry    Chronic obstructive pulmonary disease with acute exacerbation (HCC)- (present on admission)  Assessment & Plan  Documented history of COPD and interstitial lung disease, Follows with with Cate Richardson    Oxygen as needed, Respiratory protocol, Bronchodilators, Incentive spirometry         VTE prophylaxis: Lovenox

## 2021-04-01 NOTE — ASSESSMENT & PLAN NOTE
Troponin levels were trended and now downtrending  Echocardiogram shows left ventricular ejection fraction 65%

## 2021-04-01 NOTE — FLOWSHEET NOTE
04/01/21 0300   Vital Signs   Pulse 81   Respiration 17   Pulse Oximetry 96 %   $ Pulse Oximetry (Spot Check) Yes   O2 Alarms Set & Reviewed Yes   Respiratory Assessment   Respiratory Pattern Within Normal Limits   Level of Consciousness Alert   Chest Exam   Work Of Breathing / Effort Mild   Breath Sounds   RUL Breath Sounds Clear   RML Breath Sounds Clear   RLL Breath Sounds Clear   KRISTINA Breath Sounds Clear   LLL Breath Sounds Clear   Secretions   Cough Dry   Oxygen   O2 (LPM) 3   O2 Delivery Device Silicone Nasal Cannula   Smoking History   Have you ever smoked Never

## 2021-04-02 PROCEDURE — 700102 HCHG RX REV CODE 250 W/ 637 OVERRIDE(OP): Performed by: HOSPITALIST

## 2021-04-02 PROCEDURE — G0378 HOSPITAL OBSERVATION PER HR: HCPCS

## 2021-04-02 PROCEDURE — A9270 NON-COVERED ITEM OR SERVICE: HCPCS | Performed by: HOSPITALIST

## 2021-04-02 PROCEDURE — 99226 PR SUBSEQUENT OBSERVATION CARE,LEVEL III: CPT | Performed by: INTERNAL MEDICINE

## 2021-04-02 PROCEDURE — 700111 HCHG RX REV CODE 636 W/ 250 OVERRIDE (IP): Performed by: HOSPITALIST

## 2021-04-02 PROCEDURE — 97163 PT EVAL HIGH COMPLEX 45 MIN: CPT

## 2021-04-02 RX ADMIN — Medication 1000 UNITS: at 05:43

## 2021-04-02 RX ADMIN — ONDANSETRON 4 MG: 4 TABLET, ORALLY DISINTEGRATING ORAL at 09:55

## 2021-04-02 RX ADMIN — ALPRAZOLAM 0.25 MG: 0.25 TABLET ORAL at 22:16

## 2021-04-02 RX ADMIN — FLUTICASONE PROPIONATE 50 MCG: 50 SPRAY, METERED NASAL at 21:50

## 2021-04-02 ASSESSMENT — GAIT ASSESSMENTS
DISTANCE (FEET): 4
GAIT LEVEL OF ASSIST: MINIMAL ASSIST
DEVIATION: BRADYKINETIC
ASSISTIVE DEVICE: FRONT WHEEL WALKER

## 2021-04-02 ASSESSMENT — ENCOUNTER SYMPTOMS
ORTHOPNEA: 1
WHEEZING: 1
CHILLS: 0
WEAKNESS: 1
SHORTNESS OF BREATH: 1
DIZZINESS: 1
HEMOPTYSIS: 0
SPUTUM PRODUCTION: 0
FEVER: 0
WEIGHT LOSS: 0

## 2021-04-02 ASSESSMENT — COGNITIVE AND FUNCTIONAL STATUS - GENERAL
WALKING IN HOSPITAL ROOM: TOTAL
MOBILITY SCORE: 12
SUGGESTED CMS G CODE MODIFIER MOBILITY: CL
MOVING TO AND FROM BED TO CHAIR: A LITTLE
CLIMB 3 TO 5 STEPS WITH RAILING: TOTAL
STANDING UP FROM CHAIR USING ARMS: A LOT
MOVING FROM LYING ON BACK TO SITTING ON SIDE OF FLAT BED: A LOT
TURNING FROM BACK TO SIDE WHILE IN FLAT BAD: A LITTLE

## 2021-04-02 ASSESSMENT — PAIN DESCRIPTION - PAIN TYPE: TYPE: ACUTE PAIN

## 2021-04-02 NOTE — PROGRESS NOTES
"Hospital Medicine Daily Progress Note    Date of Service  4/2/2021    Chief Complaint  89 y.o. female admitted 3/31/2021 with shortness of breath    Hospital Course  Per notes, \"89 y.o. female with a past medical history of chronic hypoxemic respiratory failure on 3 L of oxygen at baseline, chronic obstructive pulmonary disease, interstitial lung disease who follows with pulmonary Dr. Richardson who presented 3/31/2021 with shortness of breath and generalized weakness over the past 2 days.  Patient reports having generalized weakness, chills, easy fatigability and shortness of breath.  She denies having contact with sick people.  Patient reports having taken both doses of Covid vaccine the last being February 14.  She denies having cough sputum production however reports having nasal congestion.  She denies having lower extremity pain swelling or redness.  She denies having diarrhea abdominal pain.\"     Patient was admitted and monitored overnight.      Interval Problem Update  Patient was seen and examined this morning bedside, respiratory symptoms have resolved.  However she still has significant weakness.    PT/OT recommended SNF for further therapy.  I discussed this with both patient and her daughter and granddaughter.  Referral sent on 4/2.    Consultants/Specialty  None    Code Status  Full Code    Disposition  Anticipated DC to SNF when accepted    Review of Systems  Review of Systems   Constitutional: Positive for malaise/fatigue. Negative for chills, fever and weight loss.   Respiratory: Positive for shortness of breath and wheezing. Negative for hemoptysis and sputum production.    Cardiovascular: Positive for orthopnea.   Neurological: Positive for dizziness and weakness.   All other systems reviewed and are negative.       Physical Exam  Temp:  [36.6 °C (97.8 °F)-37.4 °C (99.3 °F)] 37 °C (98.6 °F)  Pulse:  [64-82] 80  Resp:  [16-18] 18  BP: ()/(42-74) 123/48  SpO2:  [90 %-99 %] 95 %    Physical " Exam  Vitals and nursing note reviewed.   Constitutional:       Appearance: Normal appearance. She is not ill-appearing.   Cardiovascular:      Rate and Rhythm: Normal rate and regular rhythm.      Pulses: Normal pulses.      Heart sounds: Normal heart sounds.   Pulmonary:      Effort: Pulmonary effort is normal. No respiratory distress.      Breath sounds: Normal breath sounds. No stridor. No wheezing.   Abdominal:      General: Abdomen is flat. Bowel sounds are normal.      Palpations: Abdomen is soft.   Musculoskeletal:      Right lower leg: No edema.      Left lower leg: No edema.   Neurological:      General: No focal deficit present.      Mental Status: She is alert and oriented to person, place, and time.         Fluids    Intake/Output Summary (Last 24 hours) at 4/2/2021 1145  Last data filed at 4/1/2021 1614  Gross per 24 hour   Intake 120 ml   Output 1000 ml   Net -880 ml       Laboratory  Recent Labs     03/31/21 1847 04/01/21  0432   WBC 6.6 4.8   RBC 3.73* 3.43*   HEMOGLOBIN 11.4* 10.1*   HEMATOCRIT 36.0* 32.8*   MCV 96.5 95.6   MCH 30.6 29.4   MCHC 31.7* 30.8*   RDW 47.8 46.7   PLATELETCT 249 212   MPV 8.8* 8.6*     Recent Labs     03/31/21 1847 04/01/21  0432   SODIUM 139 140   POTASSIUM 5.1 4.1   CHLORIDE 102 105   CO2 25 26   GLUCOSE 121* 142*   BUN 38* 32*   CREATININE 1.27 1.13   CALCIUM 9.8 9.3                   Imaging  EC-ECHOCARDIOGRAM COMPLETE W/O CONT   Final Result      DX-CHEST-PORTABLE (1 VIEW)   Final Result      1.  Chronic bilateral interstitial opacities, likely findings of interstitial lung disease.   2.  No focal consolidation or pleural effusions.           Assessment/Plan  Hypertensive urgency- (present on admission)  Assessment & Plan  We will start as needed hydralazine and labetalol.    Shortness of breath- (present on admission)  Assessment & Plan  Patient has a history of COPD / interstitial lung disease worsening.  On 3 L supplemental O2 at baseline.  Did have acute  worsening of shortness of breath and weakness, as well as wheezing initially.  Covid negative, no need for antibiotics at this time  Oxygen as needed, Respiratory protocol, Bronchodilators, Incentive spirometry   Improved    Elevated troponin- (present on admission)  Assessment & Plan  In the indeterminate range in the setting of chronic kidney disease, EKG unremarkable for acute findings, no chest pain.  Shortness of breath unlikely related to cardiac  Echocardiogram completed on 4/1: EF of 65%, mild concentric left ventricular hypertrophy, possible aortic abdominal aneurysm, RVSP 35 otherwise unremarkable    Stage 3a chronic kidney disease- (present on admission)  Assessment & Plan  Avoid nephrotoxins as much as possible, renally dose medications, monitor inputs and outputs   Currently at baseline    Anemia due to stage 3b chronic kidney disease- (present on admission)  Assessment & Plan  Anemia likely secondary to chronic disease, no acute blood loss or need for transfusion    Weakness- (present on admission)  Assessment & Plan  In addition to shortness of breath patient complained of weakness, likely related to underlying respiratory failure and increased O2 needs.  -Fall precautions  -PT recommending SNF, referral placed on 4/2  -Dietary consulted, follow-up on recommendations  -Follow-up on vitamin D and TSH    Acute on chronic respiratory failure with hypoxia (HCC)- (present on admission)  Assessment & Plan  Patient is on 3 L supplemental O2 at home, chronic respiratory failure secondary to COPD and interstitial lung disease.  -Was requiring 6 L O2 when she was admitted, will continue to wean  -RT eval and treat  -Incentive spirometry    Chronic obstructive pulmonary disease with acute exacerbation (HCC)- (present on admission)  Assessment & Plan  Documented history of COPD and interstitial lung disease, Follows with with Cate Richardson    Oxygen as needed, Respiratory protocol, Bronchodilators, Incentive  spirometry         VTE prophylaxis: Lovenox

## 2021-04-02 NOTE — CARE PLAN
Problem: Safety  Goal: Will remain free from falls  Outcome: PROGRESSING AS EXPECTED  Note: Pt agreed to bed alarm and to call for help     Problem: Venous Thromboembolism (VTW)/Deep Vein Thrombosis (DVT) Prevention:  Goal: Patient will participate in Venous Thrombosis (VTE)/Deep Vein Thrombosis (DVT)Prevention Measures  Outcome: PROGRESSING AS EXPECTED  Note: Pt agreed to keep scd on since she refused heparin shot

## 2021-04-02 NOTE — DISCHARGE PLANNING
Anticipated Discharge Disposition:   Home pending PT evaluation     Action:   Chart review complete.    Per MD, patient may be able to discharge home today. Patient is pending PT at this time. RN CHARLI will continue to follow and assist as needed.     0845: RN CHARLI received a voalte message from MD stating that family is concerned about patient's living situation. MD to order HH for patient. RN CHARLI to collect choice when order in place.     1015: RN CHARLI met with patient and family at bedside. Patient and family would prefer for patient to go to SNF. Verbal consent from patient obtained due to incorrect form in hand. Family informed RN CM that PT recommending SNF. Message sent to MD with update and for orders. Choice collected for SNF- [facility]- [facility], [facility]- [facility], 3- [facility]    1124: SNF choice form faxed to [facility]     Barriers to Discharge:   SNF acceptance    Plan:   Hospital care management will continue to assist with discharge planning needs.     Care Transition Team Assessment    Information Source  Information Given By: Other (Comments)(chart)  Who is responsible for making decisions for patient? : Patient    Readmission Evaluation  Is this a readmission?: No    Elopement Risk  Legal Hold: No  Ambulatory or Self Mobile in Wheelchair: Yes  Disoriented: No  Psychiatric Symptoms: None  History of Wandering: No  Elopement this Admit: No  Vocalizing Wanting to Leave: No  Displays Behaviors, Body Language Wanting to Leave: No-Not at Risk for Elopement  Elopement Risk: Not at Risk for Elopement    Interdisciplinary Discharge Planning  Does Admitting Nurse Feel This Could be a Complex Discharge?: No  Primary Care Physician: DHIRAJ CAPELLAN M.D  Lives with - Patient's Self Care Capacity: Spouse  Patient or legal guardian wants to designate a caregiver: No  Support Systems: Family Member(s), Spouse / Significant Other  Housing / Facility: 1 Story House  Able to Return to Previous ADL's: Future Time  w/Therapy  Patient Prefers to be Discharged to:: home  Durable Medical Equipment: Home Oxygen  DME Provider / Phone: Preferred    Discharge Preparedness  What is your plan after discharge?: Home with help  What are your discharge supports?: Spouse    Finances  Financial Barriers to Discharge: No  Prescription Coverage: Yes    Vision / Hearing Impairment  Vision Impairment : Yes  Right Eye Vision: Wears Glasses  Left Eye Vision: Wears Glasses  Hearing Impairment : No    Advance Directive  Advance Directive?: None    Domestic Abuse  Have you ever been the victim of abuse or violence?: No  Physical Abuse or Sexual Abuse: No  Verbal Abuse or Emotional Abuse: No  Possible Abuse/Neglect Reported to:: Not Applicable    Psychological Assessment  History of Substance Abuse: None  History of Psychiatric Problems: No    Discharge Risks or Barriers  Discharge risks or barriers?: Complex medical needs  Patient risk factors: Vulnerable adult, Complex medical needs    Anticipated Discharge Information  Discharge Disposition: Still a Patient (30)

## 2021-04-02 NOTE — DISCHARGE PLANNING
Received Choice form at 6487  Agency/Facility Name: [facility] 3-[facility]  Referral sent per Choice form @ 1649

## 2021-04-02 NOTE — DIETARY
"Nutrition services: Day 0 of admit.  Yesi Garduno is a 89 y.o. female with admitting DX of Shortness of breath.  Consult received for poor oral intake.    Assessment:  Height: 172.7 cm (5' 7.99\")  Weight: 70 kg (154 lb 5.2 oz)  Body mass index is 23.47 kg/m²., BMI classification: Normal  Diet/Intake: Regular. Recorded PO intake 4/1 = 50-75% x 2 meals.    Evaluation:   1. Consult received for poor oral intake. Pt denies poor appetite and states that she is currently eating less here because her right arm hurts and she has trouble getting to her food. She can't cut up her food. Offered to have nutrition services cut up her food on her meal trays and she felt this would be helpful. Her lunch today had just arrived and she felt the pulled pork would be easy to eat.  2. Pt declined offer of additional snacks or supplements at this time. Pt states she sometimes does drink Boost at home, but does not feel she needs it now.  3. Admit nutrition screen negative for unplanned weight loss. Weight has been stable over past year per chart review.  4. No pressure injuries noted per chart review.  5. Labs and medications reviewed.    Malnutrition Risk: Criteria not met.    Recommendations/Plan:  1. Cut up foods per pt request.   2. Encourage continued intake of >50% of meals.  3. Document intake of all meals as % taken in ADL's to provide interdisciplinary communication across all shifts.   4. Monitor weight.  5. Nutrition rep will continue to see patient for ongoing meal and snack preferences.   6. Order supplement per RD if PO intake worsens.  7. RD to monitor weekly per department policy.            "

## 2021-04-02 NOTE — THERAPY
Physical Therapy   Initial Evaluation     Patient Name: Yesi Garduno  Age:  89 y.o., Sex:  female  Medical Record #: [id number]  Today's Date: 4/2/2021     Precautions: (P) Fall Risk    Assessment  Patient is 89 y.o. female with a diagnosis of SOB ,weakness Pt lives at home with  who has dementia,she ambulates around the house and users a 4 ww at times.Pt C/O blurred vision when sitting or standing and feels very weak.Acute PT needed to improve bed mob,transfers,ambulation and stairs     Plan    Recommend Physical Therapy 5 times per week until therapy goals are met for the following treatments:  Bed Mobility, Gait Training, Neuro Re-Education / Balance, Stair Training, Therapeutic Activities and Therapeutic Exercises      04/02/21 0900   Prior Living Situation   Prior Services None   Housing / Facility 1 Story House   Steps Into Home 2   Steps In Home 0   Equipment Owned 4-Wheel Walker   Lives with - Patient's Self Care Capacity Spouse   Prior Level of Functional Mobility   Bed Mobility Independent   Transfer Status Independent   Ambulation Independent   Distance Ambulation (Feet)   (household)   Assistive Devices Used Front-Wheel Walker   Stairs Independent   Balance Assessment   Sitting Balance (Static) Fair   Sitting Balance (Dynamic) Fair -   Standing Balance (Static) Poor   Standing Balance (Dynamic) Poor   Weight Shift Sitting Fair   Weight Shift Standing Poor   Gait Analysis   Gait Level Of Assist Minimal Assist   Assistive Device Front Wheel Walker   Distance (Feet) 4  (side stepping )   # of Times Distance was Traveled 1   Deviation Bradykinetic   # of Stairs Climbed 0   Weight Bearing Status full   Bed Mobility    Supine to Sit Moderate Assist   Sit to Supine Modified Independent   Scooting Modified Independent   Functional Mobility   Sit to Stand Moderate Assist   Bed, Chair, Wheelchair Transfer Unable to Participate   Transfer Method Stand Pivot   Activity Tolerance   Sitting Edge of Bed 10    Standing 2 x 2 mins   Patient / Family Goals    Patient / Family Goal #1 SNF   Short Term Goals    Short Term Goal # 1 Pt to be I with bed mob in 6 V    Short Term Goal # 2 Pt to be S with transfers in 6 V so can go home   Short Term Goal # 3 Pt to be S with ambulation x 100 feet in 6 V so can go home   Short Term Goal # 4 Pt to be S x 2 stairs in 6 V so can get into house   Problem List    Problems Impaired Bed Mobility;Impaired Transfers;Impaired Ambulation;Functional ROM Deficit;Functional Strength Deficit;Impaired Balance;Decreased Activity Tolerance   Anticipated Discharge Equipment and Recommendations   DC Equipment Recommendations None   Discharge Recommendations Recommend post-acute placement for additional physical therapy services prior to discharge home       DC Equipment Recommendations: (P) None  Discharge Recommendations: (P) Recommend post-acute placement for additional physical therapy services prior to discharge home

## 2021-04-02 NOTE — FACE TO FACE
Face to Face Supporting Documentation - Home Health    The encounter with this patient was in whole or in part the primary reason for home health admission.    Date of encounter:   Patient:                    MRN:                       YOB: 2021  Yesi Garduno  [id number]  8/5/1928     Home health to see patient for:  Skilled Nursing care for assessment, interventions & education, Home health aide, Physical Therapy evaluation and treatment and Occupational therapy evaluation and treatment    Skilled need for:  Recent Deterioration of Health Status COPD    Skilled nursing interventions to include:  Comment: PT/OT    Homebound status evidenced by:  Need the aid of supportive devices such as crutches, canes, wheelchairs or walkers or Needs the assistance of another person in order to leave the home. Leaving home requires a considerable and taxing effort. There is a normal inability to leave the home.    Community Physician to provide follow up care: Savage Doyle M.D.     Optional Interventions? No      I certify the face to face encounter for this home health care referral meets the CMS requirements and the encounter/clinical assessment with the patient was, in whole, or in part, for the medical condition(s) listed above, which is the primary reason for home health care. Based on my clinical findings: the service(s) are medically necessary, support the need for home health care, and the homebound criteria are met.  I certify that this patient has had a face to face encounter by myself.  Jaya Howe M.D. - NPI: [id number]

## 2021-04-03 LAB
BACTERIA UR CULT: NORMAL
SIGNIFICANT IND 70042: NORMAL
SITE SITE: NORMAL
SOURCE SOURCE: NORMAL
TSH SERPL DL<=0.005 MIU/L-ACNC: 2.11 UIU/ML (ref 0.38–5.33)

## 2021-04-03 PROCEDURE — 96372 THER/PROPH/DIAG INJ SC/IM: CPT

## 2021-04-03 PROCEDURE — G0378 HOSPITAL OBSERVATION PER HR: HCPCS

## 2021-04-03 PROCEDURE — 84443 ASSAY THYROID STIM HORMONE: CPT

## 2021-04-03 PROCEDURE — 700111 HCHG RX REV CODE 636 W/ 250 OVERRIDE (IP): Performed by: HOSPITALIST

## 2021-04-03 PROCEDURE — A9270 NON-COVERED ITEM OR SERVICE: HCPCS | Performed by: HOSPITALIST

## 2021-04-03 PROCEDURE — 99226 PR SUBSEQUENT OBSERVATION CARE,LEVEL III: CPT | Performed by: INTERNAL MEDICINE

## 2021-04-03 PROCEDURE — 82306 VITAMIN D 25 HYDROXY: CPT

## 2021-04-03 PROCEDURE — 700102 HCHG RX REV CODE 250 W/ 637 OVERRIDE(OP): Performed by: HOSPITALIST

## 2021-04-03 PROCEDURE — 36415 COLL VENOUS BLD VENIPUNCTURE: CPT

## 2021-04-03 RX ADMIN — ALPRAZOLAM 0.25 MG: 0.25 TABLET ORAL at 21:50

## 2021-04-03 RX ADMIN — Medication 1000 UNITS: at 06:27

## 2021-04-03 RX ADMIN — HEPARIN SODIUM 5000 UNITS: 5000 INJECTION, SOLUTION INTRAVENOUS; SUBCUTANEOUS at 21:50

## 2021-04-03 ASSESSMENT — ENCOUNTER SYMPTOMS
DIZZINESS: 1
SPUTUM PRODUCTION: 0
WEAKNESS: 1
ORTHOPNEA: 1
WEIGHT LOSS: 0
SHORTNESS OF BREATH: 1
CHILLS: 0
FEVER: 0
HEMOPTYSIS: 0
WHEEZING: 1

## 2021-04-03 ASSESSMENT — PAIN DESCRIPTION - PAIN TYPE: TYPE: ACUTE PAIN

## 2021-04-03 NOTE — PROGRESS NOTES
Bedside report received from day RN.Pt is AAO x 4.  Pt reports no pain. POC discussed to ambulate more often.All needs met at this time.Bed in low position.Call light within reach.Rounding in place.

## 2021-04-03 NOTE — DISCHARGE PLANNING
Anticipated Discharge Disposition: SNF    Action:   -Pt declined by [facility] due to not being contracted with insurance  -Accepted at [facility]. Spoke with Maryuri who states they will submit for insurance auth    Barriers to Discharge: Insurance auth for SNF    Plan: F/U with [facility] [place]

## 2021-04-03 NOTE — PROGRESS NOTES
"Hospital Medicine Daily Progress Note    Date of Service  4/3/2021    Chief Complaint  89 y.o. female admitted 3/31/2021 with shortness of breath    Hospital Course  Per notes, \"89 y.o. female with a past medical history of chronic hypoxemic respiratory failure on 3 L of oxygen at baseline, chronic obstructive pulmonary disease, interstitial lung disease who follows with pulmonary Dr. Richardson who presented 3/31/2021 with shortness of breath and generalized weakness over the past 2 days.  Patient reports having generalized weakness, chills, easy fatigability and shortness of breath.  She denies having contact with sick people.  Patient reports having taken both doses of Covid vaccine the last being February 14.  She denies having cough sputum production however reports having nasal congestion.  She denies having lower extremity pain swelling or redness.  She denies having diarrhea abdominal pain.\"     Patient was admitted and monitored overnight.      Interval Problem Update  Patient was seen and examined this morning bedside, no improvement in symptoms but still with weakness.  Has been able to work with PT/OT.    Working on SNF placement, has been accepted but pending insurance authorization, per case management will likely happen Monday 4/5    Consultants/Specialty  None    Code Status  Full Code    Disposition  Anticipated DC to SNF when accepted    Review of Systems  Review of Systems   Constitutional: Positive for malaise/fatigue. Negative for chills, fever and weight loss.   Respiratory: Positive for shortness of breath and wheezing. Negative for hemoptysis and sputum production.    Cardiovascular: Positive for orthopnea.   Neurological: Positive for dizziness and weakness.   All other systems reviewed and are negative.       Physical Exam  Temp:  [36.5 °C (97.7 °F)-37.1 °C (98.8 °F)] 36.5 °C (97.7 °F)  Pulse:  [66-80] 74  Resp:  [14-18] 14  BP: (122-132)/(44-59) 124/44  SpO2:  [91 %-96 %] 94 %    Physical " Exam  Vitals and nursing note reviewed.   Constitutional:       Appearance: Normal appearance. She is not ill-appearing.   Cardiovascular:      Rate and Rhythm: Normal rate and regular rhythm.      Pulses: Normal pulses.      Heart sounds: Normal heart sounds.   Pulmonary:      Effort: Pulmonary effort is normal. No respiratory distress.      Breath sounds: Normal breath sounds. No stridor. No wheezing.   Abdominal:      General: Abdomen is flat. Bowel sounds are normal.      Palpations: Abdomen is soft.   Musculoskeletal:      Right lower leg: No edema.      Left lower leg: No edema.   Skin:     General: Skin is warm and dry.   Neurological:      General: No focal deficit present.      Mental Status: She is alert and oriented to person, place, and time.         Fluids    Intake/Output Summary (Last 24 hours) at 4/3/2021 1134  Last data filed at 4/3/2021 0800  Gross per 24 hour   Intake 600 ml   Output 500 ml   Net 100 ml       Laboratory  Recent Labs     03/31/21 1847 04/01/21  0432   WBC 6.6 4.8   RBC 3.73* 3.43*   HEMOGLOBIN 11.4* 10.1*   HEMATOCRIT 36.0* 32.8*   MCV 96.5 95.6   MCH 30.6 29.4   MCHC 31.7* 30.8*   RDW 47.8 46.7   PLATELETCT 249 212   MPV 8.8* 8.6*     Recent Labs     03/31/21 1847 04/01/21  0432   SODIUM 139 140   POTASSIUM 5.1 4.1   CHLORIDE 102 105   CO2 25 26   GLUCOSE 121* 142*   BUN 38* 32*   CREATININE 1.27 1.13   CALCIUM 9.8 9.3                   Imaging  EC-ECHOCARDIOGRAM COMPLETE W/O CONT   Final Result      DX-CHEST-PORTABLE (1 VIEW)   Final Result      1.  Chronic bilateral interstitial opacities, likely findings of interstitial lung disease.   2.  No focal consolidation or pleural effusions.           Assessment/Plan  Hypertensive urgency- (present on admission)  Assessment & Plan  Has been stable, continue with as needed medications for blood pressure    Shortness of breath- (present on admission)  Assessment & Plan  Patient has a history of COPD / interstitial lung disease  worsening.  On 3 L supplemental O2 at baseline.  Did have acute worsening of shortness of breath and weakness, as well as wheezing initially.  Covid negative, no need for antibiotics at this time  Oxygen as needed, Respiratory protocol, Bronchodilators, Incentive spirometry   Resolved    Elevated troponin- (present on admission)  Assessment & Plan  In the indeterminate range in the setting of chronic kidney disease, EKG unremarkable for acute findings, no chest pain.  Shortness of breath unlikely related to cardiac  Echocardiogram completed on 4/1: EF of 65%, mild concentric left ventricular hypertrophy, possible aortic abdominal aneurysm, RVSP 35 otherwise unremarkable    Stage 3a chronic kidney disease- (present on admission)  Assessment & Plan  Avoid nephrotoxins as much as possible, renally dose medications, monitor inputs and outputs   Currently at baseline    Anemia due to stage 3b chronic kidney disease- (present on admission)  Assessment & Plan  Anemia likely secondary to chronic disease, no acute blood loss or need for transfusion    Weakness- (present on admission)  Assessment & Plan  In addition to shortness of breath patient complained of weakness, likely related to underlying respiratory failure and increased O2 needs.  -Fall precautions  -PT recommending SNF, referral placed on 4/2  -Dietary consulted, follow-up on recommendations  -Follow-up on vitamin D, TSH within normal limits    Acute on chronic respiratory failure with hypoxia (HCC)- (present on admission)  Assessment & Plan  Patient is on 3 L supplemental O2 at home, chronic respiratory failure secondary to COPD and interstitial lung disease.  -Was requiring 6 L O2 when she was admitted, will continue to wean  -RT eval and treat  -Incentive spirometry    Chronic obstructive pulmonary disease with acute exacerbation (HCC)- (present on admission)  Assessment & Plan  Documented history of COPD and interstitial lung disease, Follows with with Cate  S Richardson    Oxygen as needed, Respiratory protocol, Bronchodilators, Incentive spirometry         VTE prophylaxis: Lovenox

## 2021-04-03 NOTE — CARE PLAN
Problem: Communication  Goal: The ability to communicate needs accurately and effectively will improve  Outcome: PROGRESSING AS EXPECTED     Problem: Safety  Goal: Will remain free from injury  Outcome: PROGRESSING AS EXPECTED  Pt calls appropriately     Problem: Venous Thromboembolism (VTW)/Deep Vein Thrombosis (DVT) Prevention:  Goal: Patient will participate in Venous Thrombosis (VTE)/Deep Vein Thrombosis (DVT)Prevention Measures  Outcome: PROGRESSING AS EXPECTED  Pt has the SCD's on  and educated pt the importance of it because of refusal of heparin.

## 2021-04-03 NOTE — CARE PLAN
Problem: Knowledge Deficit  Goal: Knowledge of disease process/condition, treatment plan, diagnostic tests, and medications will improve    Discussed plan of care for today w/ pt this am, she is A+O x 4, but fatigued, she verbalizes understanding of her plan of care, denies questions. Emotional support given. Reinforcing education as necessary. Reviewed hospitalist's note today. Educated pt this am that staff will get her up to chair today, pt verbalizes understanding.     Problem: Mobility  Goal: Risk for activity intolerance will decrease  Outcome: PROGRESSING SLOWER THAN EXPECTED    Entered pt's room today, told her we would try to get her up to chair, pt refuses, educated pt about how she should attempt to get OOB and tried to coax her, pt continues to refuse.

## 2021-04-03 NOTE — PROGRESS NOTES
Received care of pt from NOC RN this am. Pt is A+O x 4. Denies need for pain medication. Sitting up in bed, eating breakfast.

## 2021-04-04 PROCEDURE — 700102 HCHG RX REV CODE 250 W/ 637 OVERRIDE(OP): Performed by: HOSPITALIST

## 2021-04-04 PROCEDURE — 96372 THER/PROPH/DIAG INJ SC/IM: CPT

## 2021-04-04 PROCEDURE — 99226 PR SUBSEQUENT OBSERVATION CARE,LEVEL III: CPT | Performed by: INTERNAL MEDICINE

## 2021-04-04 PROCEDURE — A9270 NON-COVERED ITEM OR SERVICE: HCPCS | Performed by: HOSPITALIST

## 2021-04-04 PROCEDURE — 700111 HCHG RX REV CODE 636 W/ 250 OVERRIDE (IP): Performed by: HOSPITALIST

## 2021-04-04 PROCEDURE — G0378 HOSPITAL OBSERVATION PER HR: HCPCS

## 2021-04-04 RX ADMIN — SENNOSIDES AND DOCUSATE SODIUM 2 TABLET: 8.6; 5 TABLET ORAL at 17:22

## 2021-04-04 RX ADMIN — Medication 1000 UNITS: at 05:04

## 2021-04-04 RX ADMIN — ALPRAZOLAM 0.25 MG: 0.25 TABLET ORAL at 21:04

## 2021-04-04 RX ADMIN — HEPARIN SODIUM 5000 UNITS: 5000 INJECTION, SOLUTION INTRAVENOUS; SUBCUTANEOUS at 13:40

## 2021-04-04 RX ADMIN — SENNOSIDES AND DOCUSATE SODIUM 2 TABLET: 8.6; 5 TABLET ORAL at 05:04

## 2021-04-04 RX ADMIN — HEPARIN SODIUM 5000 UNITS: 5000 INJECTION, SOLUTION INTRAVENOUS; SUBCUTANEOUS at 21:03

## 2021-04-04 RX ADMIN — FLUTICASONE PROPIONATE 50 MCG: 50 SPRAY, METERED NASAL at 21:03

## 2021-04-04 ASSESSMENT — ENCOUNTER SYMPTOMS
WEAKNESS: 1
ORTHOPNEA: 1
DIZZINESS: 1
HEMOPTYSIS: 0
FEVER: 0
SHORTNESS OF BREATH: 1
WHEEZING: 1
WEIGHT LOSS: 0
CHILLS: 0
SPUTUM PRODUCTION: 0

## 2021-04-04 ASSESSMENT — PAIN DESCRIPTION - PAIN TYPE
TYPE: ACUTE PAIN
TYPE: ACUTE PAIN

## 2021-04-04 NOTE — PROGRESS NOTES
1930: Received report from Day shift RN. Pt is laying in bed, A+OX4 , showing no signs of distress. Pt c/o 0/10 pain and denies the need for pain medication. VSS on 3L via NC. CMS intact. Pt refuses SCD's at this time. Fall precautions in place. Bed alarm on. Pt educated to call before getting out of bed.  Call light and belongings at bedside and within reach. All questions answered at this time. Q2 turns implemented. Rounding in place.

## 2021-04-04 NOTE — PROGRESS NOTES
"Hospital Medicine Daily Progress Note    Date of Service  4/4/2021    Chief Complaint  89 y.o. female admitted 3/31/2021 with shortness of breath    Hospital Course  Per notes, \"89 y.o. female with a past medical history of chronic hypoxemic respiratory failure on 3 L of oxygen at baseline, chronic obstructive pulmonary disease, interstitial lung disease who follows with pulmonary Dr. Richardson who presented 3/31/2021 with shortness of breath and generalized weakness over the past 2 days.  Patient reports having generalized weakness, chills, easy fatigability and shortness of breath.  She denies having contact with sick people.  Patient reports having taken both doses of Covid vaccine the last being February 14.  She denies having cough sputum production however reports having nasal congestion.  She denies having lower extremity pain swelling or redness.  She denies having diarrhea abdominal pain.\"     Patient was admitted and monitored overnight.      Interval Problem Update  Patient seen and examined this morning at bedside, no complaints.  Still having weakness, but states is slightly improved.    Patient did mention immediate change in discharge plan and shaving the be going home with additional help her by family, attempted to contact granddaughter, no answer.  We will try again later.  For now plan remains to follow-up with placement. Working on SNF placement, has been accepted but pending insurance authorization, per case management will likely happen Monday 4/5    Consultants/Specialty  None    Code Status  Full Code    Disposition  Anticipated DC to SNF when accepted    Review of Systems  Review of Systems   Constitutional: Positive for malaise/fatigue. Negative for chills, fever and weight loss.   Respiratory: Positive for shortness of breath and wheezing. Negative for hemoptysis and sputum production.    Cardiovascular: Positive for orthopnea.   Neurological: Positive for dizziness and weakness.   All other " systems reviewed and are negative.       Physical Exam  Temp:  [36.5 °C (97.7 °F)-36.6 °C (97.9 °F)] 36.6 °C (97.9 °F)  Pulse:  [72-84] 77  Resp:  [16-18] 16  BP: (121-140)/(50-62) 140/62  SpO2:  [90 %-95 %] 95 %    Physical Exam  Vitals and nursing note reviewed.   Constitutional:       Appearance: Normal appearance. She is not ill-appearing.   Cardiovascular:      Rate and Rhythm: Normal rate and regular rhythm.      Pulses: Normal pulses.      Heart sounds: Normal heart sounds.   Pulmonary:      Effort: Pulmonary effort is normal. No respiratory distress.      Breath sounds: Normal breath sounds. No stridor. No wheezing.   Abdominal:      General: Abdomen is flat. Bowel sounds are normal.      Palpations: Abdomen is soft.   Musculoskeletal:      Right lower leg: No edema.      Left lower leg: No edema.   Skin:     General: Skin is warm and dry.   Neurological:      General: No focal deficit present.      Mental Status: She is alert and oriented to person, place, and time.         Fluids    Intake/Output Summary (Last 24 hours) at 4/4/2021 1101  Last data filed at 4/4/2021 0800  Gross per 24 hour   Intake 780 ml   Output 850 ml   Net -70 ml       Laboratory                        Imaging  EC-ECHOCARDIOGRAM COMPLETE W/O CONT   Final Result      DX-CHEST-PORTABLE (1 VIEW)   Final Result      1.  Chronic bilateral interstitial opacities, likely findings of interstitial lung disease.   2.  No focal consolidation or pleural effusions.           Assessment/Plan  Hypertensive urgency- (present on admission)  Assessment & Plan  Has been stable, continue with as needed medications for blood pressure    Shortness of breath- (present on admission)  Assessment & Plan  Patient has a history of COPD / interstitial lung disease worsening.  On 3 L supplemental O2 at baseline.  Did have acute worsening of shortness of breath and weakness, as well as wheezing initially.  Covid negative, no need for antibiotics at this time  Oxygen  as needed, Respiratory protocol, Bronchodilators, Incentive spirometry   Resolved    Elevated troponin- (present on admission)  Assessment & Plan  In the indeterminate range in the setting of chronic kidney disease, EKG unremarkable for acute findings, no chest pain.  Shortness of breath unlikely related to cardiac  Echocardiogram completed on 4/1: EF of 65%, mild concentric left ventricular hypertrophy, possible aortic abdominal aneurysm, RVSP 35 otherwise unremarkable    Stage 3a chronic kidney disease- (present on admission)  Assessment & Plan  Avoid nephrotoxins as much as possible, renally dose medications, monitor inputs and outputs   Currently at baseline    Anemia due to stage 3b chronic kidney disease- (present on admission)  Assessment & Plan  Anemia likely secondary to chronic disease, no acute blood loss or need for transfusion    Weakness- (present on admission)  Assessment & Plan  In addition to shortness of breath patient complained of weakness, likely related to underlying respiratory failure and increased O2 needs.  -Fall precautions  -PT recommending SNF, referral placed on 4/2  -Dietary consulted, follow-up on recommendations  -Follow-up on vitamin D, TSH within normal limits    Acute on chronic respiratory failure with hypoxia (HCC)- (present on admission)  Assessment & Plan  Patient is on 3 L supplemental O2 at home, chronic respiratory failure secondary to COPD and interstitial lung disease.  -Currently at baseline of 3 L  -RT eval and treat  -Incentive spirometry    Chronic obstructive pulmonary disease with acute exacerbation (HCC)- (present on admission)  Assessment & Plan  Documented history of COPD and interstitial lung disease, Follows with with Cate Richardson    Oxygen as needed, Respiratory protocol, Bronchodilators, Incentive spirometry         VTE prophylaxis: Lovenox

## 2021-04-04 NOTE — CARE PLAN
Problem: Communication  Goal: The ability to communicate needs accurately and effectively will improve  Outcome: PROGRESSING AS EXPECTED     Problem: Safety  Goal: Will remain free from injury  Outcome: PROGRESSING AS EXPECTED  Goal: Will remain free from falls  Outcome: PROGRESSING AS EXPECTED  Note: Fall band on pt  Call light in reach  Bed alarm on   White board updated  Pt education provided

## 2021-04-04 NOTE — CARE PLAN
Problem: Safety  Goal: Will remain free from injury  Outcome: PROGRESSING AS EXPECTED  Bed in lowest position, call light within reach.     Problem: Venous Thromboembolism (VTW)/Deep Vein Thrombosis (DVT) Prevention:  Goal: Patient will participate in Venous Thrombosis (VTE)/Deep Vein Thrombosis (DVT)Prevention Measures  Outcome: PROGRESSING AS EXPECTED     Problem: Acute care of the COPD Patient  Goal: Optimal outcomes for the COPD Patient  Outcome: PROGRESSING AS EXPECTED

## 2021-04-04 NOTE — DISCHARGE PLANNING
Anticipated Discharge Disposition:   SNF ([facility]) vs home with HH     Action:   Chart review complete.     Discussed patient's plan of care and plans for discharge during rounds.   Per MD, patient and patient's family are possibly now wanting her to go home with HH and assistance from them. MD attempted to reach out to family. No answer. RN CM will continue to follow and assist. Patient is pending insurance authorization for [facility].     Barriers to Discharge:   Insurance auth for SNF  Pending family and patient plan for discharge     Plan:   Follow up with MD and family for plan   Hospital care management will continue to assist with discharge planning needs.

## 2021-04-04 NOTE — PROGRESS NOTES
Received report from night shift RN. Assumed pt care. AAOX4, denies SOB, chest pain, N/V. No pain at this moment. Slight bilateral lower extremities swelling. Pt is refuses SCD's at this time. CMS intact. Bed in lowest position, bed alarm on, and call light within reach.

## 2021-04-05 LAB
BACTERIA BLD CULT: NORMAL
SIGNIFICANT IND 70042: NORMAL
SITE SITE: NORMAL
SOURCE SOURCE: NORMAL

## 2021-04-05 PROCEDURE — 700111 HCHG RX REV CODE 636 W/ 250 OVERRIDE (IP): Performed by: HOSPITALIST

## 2021-04-05 PROCEDURE — 700102 HCHG RX REV CODE 250 W/ 637 OVERRIDE(OP): Performed by: HOSPITALIST

## 2021-04-05 PROCEDURE — 96372 THER/PROPH/DIAG INJ SC/IM: CPT

## 2021-04-05 PROCEDURE — G0378 HOSPITAL OBSERVATION PER HR: HCPCS

## 2021-04-05 PROCEDURE — A9270 NON-COVERED ITEM OR SERVICE: HCPCS | Performed by: HOSPITALIST

## 2021-04-05 PROCEDURE — 99226 PR SUBSEQUENT OBSERVATION CARE,LEVEL III: CPT | Performed by: INTERNAL MEDICINE

## 2021-04-05 RX ADMIN — ALPRAZOLAM 0.25 MG: 0.25 TABLET ORAL at 21:18

## 2021-04-05 RX ADMIN — FLUTICASONE PROPIONATE 50 MCG: 50 SPRAY, METERED NASAL at 21:18

## 2021-04-05 RX ADMIN — HEPARIN SODIUM 5000 UNITS: 5000 INJECTION, SOLUTION INTRAVENOUS; SUBCUTANEOUS at 13:32

## 2021-04-05 RX ADMIN — Medication 1000 UNITS: at 05:32

## 2021-04-05 ASSESSMENT — ENCOUNTER SYMPTOMS
FEVER: 0
WEIGHT LOSS: 0
DIZZINESS: 1
WEAKNESS: 1
SPUTUM PRODUCTION: 0
HEMOPTYSIS: 0
CHILLS: 0
WHEEZING: 1
SHORTNESS OF BREATH: 1
ORTHOPNEA: 1

## 2021-04-05 ASSESSMENT — PAIN DESCRIPTION - PAIN TYPE
TYPE: ACUTE PAIN
TYPE: ACUTE PAIN

## 2021-04-05 NOTE — CARE PLAN
Problem: Bowel/Gastric:  Goal: Normal bowel function is maintained or improved  Outcome: PROGRESSING SLOWER THAN EXPECTED  Note: Pt educated on importance of stool softeners and hydration   Pt refusing all stool softeners and PRN constipation medication at this time  Goal: Will not experience complications related to bowel motility  Outcome: PROGRESSING SLOWER THAN EXPECTED

## 2021-04-05 NOTE — PROGRESS NOTES
Received report from NOC RN. Assumed care of pt. A&O x4; denies pain, nausea, numbness/tingling. Pt is currently on 4 L via NC with O2 sat 96%, no signs of distress. Pt eating breakfast. Discussed POC, pt refusing to wear SCDs and understands importance. No other needs at this time. Bed low and locked, call light in reach.

## 2021-04-05 NOTE — FACE TO FACE
Face to Face Supporting Documentation - Home Health    The encounter with this patient was in whole or in part the primary reason for home health admission.    Date of encounter:   Patient:                    MRN:                       YOB: 2021  Yesi Garduno  [id number]  8/5/1928     Home health to see patient for:  Skilled Nursing care for assessment, interventions & education, Home health aide, Physical Therapy evaluation and treatment and Occupational therapy evaluation and treatment    Skilled need for:  Recent Deterioration of Health Status COPD    Skilled nursing interventions to include:  Comment: PT/OT    Homebound status evidenced by:  Need the aid of supportive devices such as crutches, canes, wheelchairs or walkers or Needs the assistance of another person in order to leave the home. Leaving home requires a considerable and taxing effort. There is a normal inability to leave the home.    Community Physician to provide follow up care: Savage Doyle M.D.     Optional Interventions? No      I certify the face to face encounter for this home health care referral meets the CMS requirements and the encounter/clinical assessment with the patient was, in whole, or in part, for the medical condition(s) listed above, which is the primary reason for home health care. Based on my clinical findings: the service(s) are medically necessary, support the need for home health care, and the homebound criteria are met.  I certify that this patient has had a face to face encounter by myself.  Jaya Howe M.D. - NPI: [id number]

## 2021-04-05 NOTE — PROGRESS NOTES
"Hospital Medicine Daily Progress Note    Date of Service  4/5/2021    Chief Complaint  89 y.o. female admitted 3/31/2021 with shortness of breath    Hospital Course  Per notes, \"89 y.o. female with a past medical history of chronic hypoxemic respiratory failure on 3 L of oxygen at baseline, chronic obstructive pulmonary disease, interstitial lung disease who follows with pulmonary Dr. Richardson who presented 3/31/2021 with shortness of breath and generalized weakness over the past 2 days.  Patient reports having generalized weakness, chills, easy fatigability and shortness of breath.  She denies having contact with sick people.  Patient reports having taken both doses of Covid vaccine the last being February 14.  She denies having cough sputum production however reports having nasal congestion.  She denies having lower extremity pain swelling or redness.  She denies having diarrhea abdominal pain.\"     Patient was admitted and monitored overnight.      Interval Problem Update  Patient seen and examined this morning at bedside, still having weakness, back to baseline, has been able to work with PT/OT.    Discussed plan with patient and granddaughter, family is now wishing for patient to go home with home health.  They are willing to arrange for additional caretaking if needed.  Have placed referral for home health and PT/OT 4/5.    Consultants/Specialty  None    Code Status  Full Code    Disposition  Anticipated DC to home with home health    Review of Systems  Review of Systems   Constitutional: Positive for malaise/fatigue. Negative for chills, fever and weight loss.   Respiratory: Positive for shortness of breath and wheezing. Negative for hemoptysis and sputum production.    Cardiovascular: Positive for orthopnea.   Neurological: Positive for dizziness and weakness.   All other systems reviewed and are negative.       Physical Exam  Temp:  [36.6 °C (97.8 °F)-36.8 °C (98.2 °F)] 36.7 °C (98 °F)  Pulse:  [69-79] " 69  Resp:  [15-18] 18  BP: (127-134)/(44-59) 127/59  SpO2:  [92 %-94 %] 94 %    Physical Exam  Vitals and nursing note reviewed.   Constitutional:       Appearance: Normal appearance. She is not ill-appearing.   Cardiovascular:      Rate and Rhythm: Normal rate and regular rhythm.      Pulses: Normal pulses.      Heart sounds: Normal heart sounds.   Pulmonary:      Effort: Pulmonary effort is normal. No respiratory distress.      Breath sounds: Normal breath sounds. No stridor. No wheezing.   Abdominal:      General: Abdomen is flat. Bowel sounds are normal.      Palpations: Abdomen is soft.   Musculoskeletal:      Right lower leg: No edema.      Left lower leg: No edema.   Skin:     General: Skin is warm and dry.   Neurological:      General: No focal deficit present.      Mental Status: She is alert and oriented to person, place, and time.         Fluids    Intake/Output Summary (Last 24 hours) at 4/5/2021 1112  Last data filed at 4/5/2021 0538  Gross per 24 hour   Intake 360 ml   Output 275 ml   Net 85 ml       Laboratory                        Imaging  EC-ECHOCARDIOGRAM COMPLETE W/O CONT   Final Result      DX-CHEST-PORTABLE (1 VIEW)   Final Result      1.  Chronic bilateral interstitial opacities, likely findings of interstitial lung disease.   2.  No focal consolidation or pleural effusions.           Assessment/Plan  Hypertensive urgency- (present on admission)  Assessment & Plan  Continue current medications    Shortness of breath- (present on admission)  Assessment & Plan  Patient has a history of COPD / interstitial lung disease worsening.  On 3 L supplemental O2 at baseline.  Did have acute worsening of shortness of breath and weakness, as well as wheezing initially.  Covid negative, no need for antibiotics at this time  Oxygen as needed, Respiratory protocol, Bronchodilators, Incentive spirometry   Resolved    Elevated troponin- (present on admission)  Assessment & Plan  In the indeterminate range in the  setting of chronic kidney disease, EKG unremarkable for acute findings, no chest pain.  Shortness of breath unlikely related to cardiac  Echocardiogram completed on 4/1: EF of 65%, mild concentric left ventricular hypertrophy, possible aortic abdominal aneurysm, RVSP 35 otherwise unremarkable  Asymptomatic    Stage 3a chronic kidney disease- (present on admission)  Assessment & Plan  Avoid nephrotoxins as much as possible, renally dose medications, monitor inputs and outputs   Currently at baseline    Anemia due to stage 3b chronic kidney disease- (present on admission)  Assessment & Plan  Anemia likely secondary to chronic disease, no acute blood loss or need for transfusion    Weakness- (present on admission)  Assessment & Plan  In addition to shortness of breath patient complained of weakness, likely related to underlying respiratory failure and increased O2 needs.  -Fall precautions  -PT recommending SNF, referral placed on 4/2.  Family now recommending home with home health, referral placed on 4/5  -Dietary consulted, follow-up on recommendations    Acute on chronic respiratory failure with hypoxia (HCC)- (present on admission)  Assessment & Plan  Patient is on 3 L supplemental O2 at home, chronic respiratory failure secondary to COPD and interstitial lung disease.  -Currently at baseline of 3 L  -RT eval and treat  -Incentive spirometry    Chronic obstructive pulmonary disease with acute exacerbation (HCC)- (present on admission)  Assessment & Plan  Documented history of COPD and interstitial lung disease, Follows with with Cate Richardson    Oxygen as needed, Respiratory protocol, Bronchodilators, Incentive spirometry         VTE prophylaxis: Lovenox

## 2021-04-05 NOTE — DISCHARGE PLANNING
Received Choice form at 2538  Agency/Facility Name: [facility]   Referral sent per Choice form @ 6799

## 2021-04-05 NOTE — PROGRESS NOTES
1930: Received report from Day shift RN. Pt is laying in bed, A+OX4 , showing no signs of distress. Pt c/o 0/10 pain and denies the need for pain medication. VSS on 3L via NC. CMS intact. Pt refuses SCD's. Education provided. Purewick in place and attached to suction. Pt educated on the importance of having a bowel movement before she discharges. Pt is currently refusing stool softeners and Miralax. Pt agrees to discuss if she stills needs PRN medication for constipation in the morning. Fall band on, bed alarm on, Q2 turns in place.  Call light and belongings at bedside and within reach. All questions answered at this time.

## 2021-04-05 NOTE — DISCHARGE PLANNING
Anticipated Discharge Disposition:   Home with HH     Action:   Chart review complete.     Per MD, patient and family would like the patient to discharge home with HH. RN CHARLI will obtain choice from patient.     1005: RN CM met with patient at bedside to discuss HH. Patient agreeable and gave consent for the following choices: [facility]- [facility], 2- [facility].     1015: Choice form faxed to [facility]    1415: informed by [facility] that patient has been accepted to [facility]. Bedside RN and MD made aware.     Barriers to Discharge:   Medical Clearance    Plan:   Hospital care management will continue to assist with discharge planning needs.

## 2021-04-06 LAB
25(OH)D3 SERPL-MCNC: 30 NG/ML (ref 30–80)
BACTERIA BLD CULT: NORMAL
SIGNIFICANT IND 70042: NORMAL
SITE SITE: NORMAL
SOURCE SOURCE: NORMAL

## 2021-04-06 PROCEDURE — 99226 PR SUBSEQUENT OBSERVATION CARE,LEVEL III: CPT | Performed by: HOSPITALIST

## 2021-04-06 PROCEDURE — G0378 HOSPITAL OBSERVATION PER HR: HCPCS

## 2021-04-06 PROCEDURE — 700102 HCHG RX REV CODE 250 W/ 637 OVERRIDE(OP): Performed by: HOSPITALIST

## 2021-04-06 PROCEDURE — 97110 THERAPEUTIC EXERCISES: CPT

## 2021-04-06 PROCEDURE — 96372 THER/PROPH/DIAG INJ SC/IM: CPT

## 2021-04-06 PROCEDURE — 97530 THERAPEUTIC ACTIVITIES: CPT

## 2021-04-06 PROCEDURE — 700111 HCHG RX REV CODE 636 W/ 250 OVERRIDE (IP): Performed by: HOSPITALIST

## 2021-04-06 PROCEDURE — A9270 NON-COVERED ITEM OR SERVICE: HCPCS | Performed by: HOSPITALIST

## 2021-04-06 RX ADMIN — HEPARIN SODIUM 5000 UNITS: 5000 INJECTION, SOLUTION INTRAVENOUS; SUBCUTANEOUS at 13:12

## 2021-04-06 RX ADMIN — HEPARIN SODIUM 5000 UNITS: 5000 INJECTION, SOLUTION INTRAVENOUS; SUBCUTANEOUS at 20:16

## 2021-04-06 RX ADMIN — Medication 1000 UNITS: at 05:00

## 2021-04-06 RX ADMIN — ACETAMINOPHEN 650 MG: 325 TABLET ORAL at 08:43

## 2021-04-06 ASSESSMENT — ENCOUNTER SYMPTOMS
SHORTNESS OF BREATH: 1
INSOMNIA: 0
CARDIOVASCULAR NEGATIVE: 1
ORTHOPNEA: 0
MUSCULOSKELETAL NEGATIVE: 1
FOCAL WEAKNESS: 0
SENSORY CHANGE: 0
TINGLING: 0
DIZZINESS: 0
STRIDOR: 0
MYALGIAS: 0
CONSTITUTIONAL NEGATIVE: 1
BRUISES/BLEEDS EASILY: 0
POLYDIPSIA: 0
FALLS: 0
SEIZURES: 0
DOUBLE VISION: 0
EYE REDNESS: 0
BLOOD IN STOOL: 0
SINUS PAIN: 0
SPUTUM PRODUCTION: 0
PSYCHIATRIC NEGATIVE: 1
HEARTBURN: 0
ABDOMINAL PAIN: 0
DIAPHORESIS: 0
CHILLS: 0
PHOTOPHOBIA: 0
WHEEZING: 0
FEVER: 0
FLANK PAIN: 0
NEUROLOGICAL NEGATIVE: 1
DEPRESSION: 0
COUGH: 1
PND: 0
EYES NEGATIVE: 1
EYE DISCHARGE: 0
BACK PAIN: 0
GASTROINTESTINAL NEGATIVE: 1

## 2021-04-06 ASSESSMENT — COGNITIVE AND FUNCTIONAL STATUS - GENERAL
TURNING FROM BACK TO SIDE WHILE IN FLAT BAD: A LOT
STANDING UP FROM CHAIR USING ARMS: TOTAL
CLIMB 3 TO 5 STEPS WITH RAILING: TOTAL
SUGGESTED CMS G CODE MODIFIER MOBILITY: CM
MOVING FROM LYING ON BACK TO SITTING ON SIDE OF FLAT BED: A LOT
MOVING TO AND FROM BED TO CHAIR: A LOT
WALKING IN HOSPITAL ROOM: TOTAL
MOBILITY SCORE: 9

## 2021-04-06 ASSESSMENT — PAIN DESCRIPTION - PAIN TYPE
TYPE: ACUTE PAIN
TYPE: ACUTE PAIN

## 2021-04-06 ASSESSMENT — LIFESTYLE VARIABLES: SUBSTANCE_ABUSE: 0

## 2021-04-06 ASSESSMENT — GAIT ASSESSMENTS: GAIT LEVEL OF ASSIST: UNABLE TO PARTICIPATE

## 2021-04-06 ASSESSMENT — VISUAL ACUITY: OU: 1

## 2021-04-06 NOTE — PROGRESS NOTES
Received report from NOC RN, assumed care of pt. A&O x4, reports 5/10 pain in hips. Repositioned patient. Pt denies nausea, numbness, and tingling. Pt refuses SCDs; education provided. Pt is sitting up in bed for breakfast and has no other needs at this time. Bed low and locked, call light in reach.

## 2021-04-06 NOTE — CARE PLAN
Problem: Safety  Goal: Will remain free from falls  Outcome: PROGRESSING AS EXPECTED  Reviewed fall precautions and call light/bed alarm education.    Problem: Bowel/Gastric:  Goal: Normal bowel function is maintained or improved  Outcome: PROGRESSING AS EXPECTED  Pt had normal BM yesterday 4/5.

## 2021-04-06 NOTE — DISCHARGE PLANNING
Care Transition Team Discharge Planning    Anticipated Discharge Disposition: DC home with hospice pending. Patient will need [facility] to transport home via gurney.    Action: Lsw met with patient and dtr Nina Blackwood to discuss hospice services. Also present was Araceli Menendez  with [facility].      Hospice services were explained to the patient and dtr.  The family had decided to accept hospice services.   Lsw faxed CHOICE frorm to [facility].  Lsw placed the CHOICE form in the CM basket.    Lsw received a message to call dtmyriam Wood at [phone]; however, Lsw could not get the phone call through.      Barriers to Discharge: Awaiting for hospice acceptance.    Plan: Lsw will assist the medical team with discharge planning.

## 2021-04-06 NOTE — THERAPY
"Physical Therapy   Daily Treatment     Patient Name: Yesi Garduno  Age:  89 y.o., Sex:  female  Medical Record #: [id number]  Today's Date: 4/6/2021     Precautions: Fall Risk    Assessment    Pts function has declined since initial eval, now requiring maxA for sit <> stand and transfers, pt reports feeling fatigued throughout PT session, sats down to 85% on 3.5 L nc while pt attempting to transfer otherwise sats > 90% throughout PT session. Concern pts family will be unable to care for pt currently, highly recommend SNF. Updated SW, RN, and MD.     Plan    Continue current treatment plan.    DC Equipment Recommendations: Wheelchair, Bed Side Commode, Front-Wheel Walker(hospital bed)  Discharge Recommendations: Recommend post-acute placement for additional physical therapy services prior to discharge home       04/06/21 1220   Cognition    Comments Pt supine, agreeable for PT. Upon entry pt stated \" I can't stand\"   Gait Analysis   Gait Level Of Assist Unable to Participate   Bed Mobility    Supine to Sit Moderate Assist   Functional Mobility   Sit to Stand Maximal Assist   Bed, Chair, Wheelchair Transfer Total Assist   Toilet Transfers Total Assist   Transfer Method Steady Pivot Lift   Comments total assist with clau-care   Activity Tolerance   Standing InSara Steady 1x 90sec, 1x 60 sec   Short Term Goals    Short Term Goal # 1 Pt to be I with bed mob in 6 V    Goal Outcome # 1 Progressing slower than expected   Short Term Goal # 2 Pt to be S with transfers in 6 V so can go home   Goal Outcome # 2 Progressing slower than expected   Short Term Goal # 3 Pt to be S with ambulation x 100 feet in 6 V so can go home   Goal Outcome # 3 Progressing slower than expected   Short Term Goal # 4 Pt to be S x 2 stairs in 6 V so can get into house   Goal Outcome # 4 Progressing slower than expected     "

## 2021-04-06 NOTE — CARE PLAN
Problem: Communication  Goal: The ability to communicate needs accurately and effectively will improve  Outcome: PROGRESSING AS EXPECTED  Note: Call light in reach   Whiteboard updated  Rounding in place     Problem: Safety  Goal: Will remain free from injury  Outcome: PROGRESSING AS EXPECTED

## 2021-04-06 NOTE — PROGRESS NOTES
1930: Received report from Day shift RN. Pt is laying in bed, A+OX4 , showing no signs of distress. Pt c/o 0/10 pain. VSS on 3L, 91-93%. CMS intact. Bed alarm on. Q2 turns in place.  Call light and belongings at bedside and within reach. All questions answered at this time. Rounding in place.

## 2021-04-06 NOTE — PROGRESS NOTES
Received phone call from Delia with Dr. Toro's office ( patient's PCP). Pt gave verbal permission to talk to them. They just wanted updates on pt's status.

## 2021-04-06 NOTE — PROGRESS NOTES
"Hospital Medicine Daily Progress Note    Date of Service  4/6/2021    Chief Complaint  89 y.o. female admitted 3/31/2021 with shortness of breath    Hospital Course  Per notes, \"89 y.o. female with a past medical history of chronic hypoxemic respiratory failure on 3 L of oxygen at baseline, chronic obstructive pulmonary disease, interstitial lung disease who follows with pulmonary Dr. Richardson who presented 3/31/2021 with shortness of breath and generalized weakness over the past 2 days.  Patient reports having generalized weakness, chills, easy fatigability and shortness of breath.  She denies having contact with sick people.  Patient reports having taken both doses of Covid vaccine the last being February 14.  She denies having cough sputum production however reports having nasal congestion.  She denies having lower extremity pain swelling or redness.  She denies having diarrhea abdominal pain.\"     Patient was admitted and monitored overnight.    Patient overnight has decreased in the amount of oxygen she is using by nasal cannula.  Due to her age, severe debility, oxygen need, fatigue the family at this point is try to set her up with home health as they do not wish for her to go into a skilled setting.  Therapies did evaluate her and she would best benefit from skilled setting.  We will try to set her up with a bed as well as wheelchair at home.  Once these are set up she should be able to discharge home health which has also been set up for her.    Interval Problem Update  Hospital bed at home  Home wheelchair  Home O2  Home health to be set up    Consultants/Specialty  Outpatient follow-up with pulmonary medicine    Code Status  Full Code    Disposition  Home once everything is set up for her    Review of Systems  Review of Systems   Constitutional: Negative.  Negative for chills, diaphoresis and fever.   HENT: Negative.  Negative for nosebleeds and sinus pain.    Eyes: Negative.  Negative for double vision, " photophobia, discharge and redness.   Respiratory: Positive for cough and shortness of breath. Negative for sputum production, wheezing and stridor.    Cardiovascular: Negative.  Negative for chest pain, orthopnea, leg swelling and PND.   Gastrointestinal: Negative.  Negative for abdominal pain, blood in stool and heartburn.   Genitourinary: Negative.  Negative for dysuria, flank pain and frequency.   Musculoskeletal: Negative.  Negative for back pain, falls and myalgias.   Skin: Negative.  Negative for itching.   Neurological: Negative.  Negative for dizziness, tingling, sensory change, focal weakness and seizures.   Endo/Heme/Allergies: Negative.  Negative for polydipsia. Does not bruise/bleed easily.   Psychiatric/Behavioral: Negative.  Negative for depression, substance abuse and suicidal ideas. The patient does not have insomnia.    All other systems reviewed and are negative.       Physical Exam  Temp:  [36.6 °C (97.9 °F)-36.8 °C (98.2 °F)] 36.6 °C (97.9 °F)  Pulse:  [71-78] 71  Resp:  [16-17] 16  BP: (118-137)/(51-66) 118/51  SpO2:  [93 %-97 %] 97 %    Physical Exam  Vitals and nursing note reviewed. Exam conducted with a chaperone present.   Constitutional:       General: She is awake.      Appearance: Normal appearance. She is well-developed, well-groomed and normal weight.   HENT:      Head: Normocephalic and atraumatic.      Jaw: There is normal jaw occlusion. No trismus.      Salivary Glands: Right salivary gland is not tender. Left salivary gland is not tender.      Right Ear: External ear normal.      Left Ear: External ear normal.      Nose: Nose normal.      Mouth/Throat:      Mouth: Mucous membranes are dry.      Pharynx: Oropharynx is clear.   Eyes:      General: Lids are normal. Vision grossly intact.      Extraocular Movements: Extraocular movements intact.      Conjunctiva/sclera: Conjunctivae normal.      Right eye: Right conjunctiva is not injected. No exudate.     Left eye: Left conjunctiva  is not injected. No exudate.     Pupils: Pupils are equal, round, and reactive to light.   Neck:      Thyroid: No thyroid mass.      Vascular: No hepatojugular reflux or JVD.      Trachea: No abnormal tracheal secretions or tracheal deviation.   Cardiovascular:      Rate and Rhythm: Normal rate and regular rhythm. Occasional extrasystoles are present.     Pulses: Normal pulses.      Heart sounds: Normal heart sounds. No murmur. No friction rub.   Pulmonary:      Effort: Accessory muscle usage present.      Breath sounds: Decreased air movement present. Examination of the right-upper field reveals decreased breath sounds. Examination of the left-upper field reveals decreased breath sounds. Examination of the right-middle field reveals decreased breath sounds. Examination of the left-middle field reveals decreased breath sounds. Examination of the right-lower field reveals decreased breath sounds. Examination of the left-lower field reveals decreased breath sounds. Decreased breath sounds present. No wheezing or rhonchi.   Abdominal:      General: Abdomen is flat.      Palpations: Abdomen is soft.      Tenderness: There is no abdominal tenderness. There is no right CVA tenderness or left CVA tenderness.      Hernia: No hernia is present.   Musculoskeletal:         General: Normal range of motion.      Cervical back: Full passive range of motion without pain, normal range of motion and neck supple. No rigidity. No muscular tenderness.      Right lower leg: No edema.      Left lower leg: No edema.   Lymphadenopathy:      Head:      Right side of head: No submental adenopathy.      Left side of head: No submental adenopathy.      Cervical:      Right cervical: No superficial cervical adenopathy.     Left cervical: No superficial cervical adenopathy.      Upper Body:      Right upper body: No supraclavicular adenopathy.      Left upper body: No supraclavicular adenopathy.   Skin:     General: Skin is warm and dry.       Capillary Refill: Capillary refill takes more than 3 seconds.      Coloration: Skin is not cyanotic or pale.      Findings: No abrasion or bruising.   Neurological:      General: No focal deficit present.      Mental Status: She is alert and oriented to person, place, and time. Mental status is at baseline.      GCS: GCS eye subscore is 4. GCS verbal subscore is 5. GCS motor subscore is 6.      Cranial Nerves: No cranial nerve deficit.      Sensory: No sensory deficit.      Motor: Motor function is intact.      Deep Tendon Reflexes:      Reflex Scores:       Tricep reflexes are 2+ on the right side and 2+ on the left side.       Bicep reflexes are 2+ on the right side and 2+ on the left side.       Brachioradialis reflexes are 2+ on the right side and 2+ on the left side.       Patellar reflexes are 2+ on the right side and 2+ on the left side.       Achilles reflexes are 2+ on the right side and 2+ on the left side.  Psychiatric:         Attention and Perception: Attention and perception normal.         Mood and Affect: Mood normal.         Speech: Speech normal.         Behavior: Behavior normal. Behavior is cooperative.         Thought Content: Thought content normal.         Cognition and Memory: Cognition and memory normal.         Judgment: Judgment normal.         Fluids    Intake/Output Summary (Last 24 hours) at 4/6/2021 1147  Last data filed at 4/6/2021 0900  Gross per 24 hour   Intake 220 ml   Output --   Net 220 ml       Laboratory                        Imaging  EC-ECHOCARDIOGRAM COMPLETE W/O CONT   Final Result      DX-CHEST-PORTABLE (1 VIEW)   Final Result      1.  Chronic bilateral interstitial opacities, likely findings of interstitial lung disease.   2.  No focal consolidation or pleural effusions.           Assessment/Plan  Hypertensive urgency- (present on admission)  Assessment & Plan  Blood pressure is now controlled  Most recently 118/51  She usually does not take routine medications currently  on as needed hydralazine and labetalol.    Shortness of breath- (present on admission)  Assessment & Plan  Secondary to declining interstitial lung disease pattern  On home O2 at usually 3 L here she is requiring 4 L.  Severe debilitation and will require at this point assistive devices at home to be able to cope with her situation  Trying to set her up with a hospital bed and a wheelchair at home.  Working with family to accomplish this.      Elevated troponin- (present on admission)  Assessment & Plan  Troponin levels were trended and now downtrending  Echocardiogram shows left ventricular ejection fraction 65%    Stage 3a chronic kidney disease- (present on admission)  Assessment & Plan  Monitor renal functions  Avoid nephrotoxic medications    Anemia due to stage 3b chronic kidney disease- (present on admission)  Assessment & Plan  Monitor H&H if drops below 7 or 21 transfuse  Currently not at a transfusable level.    Weakness- (present on admission)  Assessment & Plan  Generalized weakness  PT OT evaluated patient recommended skilled  Patient and family want to go home with home health and they are also at this point setting her up with a hospital bed as well as assistive devices at home.    Acute on chronic respiratory failure with hypoxia (HCC)- (present on admission)  Assessment & Plan  Continue oxygen support  RT protocol  Nebulizer treatments    Chronic obstructive pulmonary disease with acute exacerbation (HCC)- (present on admission)  Assessment & Plan  O2 support  Nebulizer support  RT protocol         VTE prophylaxis: Heparin

## 2021-04-06 NOTE — FACE TO FACE
Face to Face Note  -  Durable Medical Equipment    Issac William M.D. - NPI: [id number]  I certify that this patient is under my care and that they had a durable medical equipment(DME)face to face encounter by myself that meets the physician DME face-to-face encounter requirements with this patient on:    Date of encounter:   Patient:                    MRN:                       YOB: 2021 June Frank Garduno  [id number]  8/5/1928     The encounter with the patient was in whole, or in part, for the following medical condition, which is the primary reason for durable medical equipment:  COPD    I certify that, based on my findings, the following durable medical equipment is medically necessary:  Wheel Chair and Beds.    HOME O2 Saturation Measurements:(Values must be present for Home Oxygen orders)         ,     ,         My Clinical findings support the need for the above equipment due to:  Abnormal Gait, Hypoxia    Supporting Symptoms: Patient given her age and condition is debilitated and will need continued support at home    If patient feels more short of breath, they can go up to 6 liters per minute and contact healthcare provider.

## 2021-04-07 VITALS
HEIGHT: 68 IN | OXYGEN SATURATION: 94 % | HEART RATE: 70 BPM | SYSTOLIC BLOOD PRESSURE: 115 MMHG | RESPIRATION RATE: 17 BRPM | BODY MASS INDEX: 23.39 KG/M2 | WEIGHT: 154.32 LBS | TEMPERATURE: 98.5 F | DIASTOLIC BLOOD PRESSURE: 50 MMHG

## 2021-04-07 PROCEDURE — G0378 HOSPITAL OBSERVATION PER HR: HCPCS

## 2021-04-07 PROCEDURE — 700102 HCHG RX REV CODE 250 W/ 637 OVERRIDE(OP): Performed by: HOSPITALIST

## 2021-04-07 PROCEDURE — 99217 PR OBSERVATION CARE DISCHARGE: CPT | Mod: GW | Performed by: HOSPITALIST

## 2021-04-07 PROCEDURE — A9270 NON-COVERED ITEM OR SERVICE: HCPCS | Performed by: HOSPITALIST

## 2021-04-07 RX ADMIN — ACETAMINOPHEN 650 MG: 325 TABLET ORAL at 08:27

## 2021-04-07 RX ADMIN — ALPRAZOLAM 0.25 MG: 0.25 TABLET ORAL at 00:14

## 2021-04-07 ASSESSMENT — PAIN DESCRIPTION - PAIN TYPE: TYPE: CHRONIC PAIN

## 2021-04-07 NOTE — DISCHARGE PLANNING
Anticipated Discharge Disposition:   Home with [facility]    Action:   Chart review complete.     Informed by MD that jordi from [facility] would like the family to go home today. RN CM to call patient's family with more information.     1015: RN CM called patient's granddaughter and daughter. Address verified as the followin [place] [address]. At this time, the family has oxygen, a walker and will have a bed delivered shortly. Family is okay with [facility] transport at 1200 today. PCS form and transport communication form filled out and faxed to [facility].     1021: RN CM called [facility] and gave the above information to them.     1100: [facility] confirmed that patient will be transferring home via [facility] at 1200    Barriers to Discharge:   None    Plan:    Await [facility] transport   Hospital care management will continue to assist with discharge planning needs.

## 2021-04-07 NOTE — DISCHARGE PLANNING
Received Transport Form @ 1010  Spoke to Zbigniew @ [facility]    Transport is scheduled for 4/7 @1200 going to Home.  [address] [id number]  Notified Dilma LEE CM

## 2021-04-07 NOTE — DISCHARGE SUMMARY
"Discharge Summary    CHIEF COMPLAINT ON ADMISSION  Chief Complaint   Patient presents with   • Shortness of Breath   • Malaise       Reason for Admission  weakness, short of breath, shaking*     Admission Date  3/31/2021    CODE STATUS  Full Code    HPI & HOSPITAL COURSE    Per notes, \"89 y.o. female with a past medical history of chronic hypoxemic respiratory failure on 3 L of oxygen at baseline, chronic obstructive pulmonary disease, interstitial lung disease who follows with pulmonary Dr. Richardson who presented 3/31/2021 with shortness of breath and generalized weakness over the past 2 days.  Patient reports having generalized weakness, chills, easy fatigability and shortness of breath.  She denies having contact with sick people.  Patient reports having taken both doses of Covid vaccine the last being February 14.  She denies having cough sputum production however reports having nasal congestion.   After discussion with the patient and family they have decided to request a hospice evaluation.  Hospice evaluated the patient and at this point has accepted the patient.  The patient will be going home with [facility] hospice will arrange outpatient resources for the patient.  Patient will be discharged with oxygen.  Initially patient's hypertension now has become controlled.  Initial troponin elevation was most likely secondary to the elevated hypertension.  Echocardiogram was done which shows a left ventricle ejection fraction of 65% and good muscular function.  She does have stage III chronic kidney disease which has been stable while she has been here.  She does have some anemia but did not require transfusion.  She was eval by physical therapy and Occupational Therapy recommend post discharge therapies.  She may receive therapy to stay stronger from hospice as well.  Overall patient's condition is stable enough to discharge with hospice and continued outpatient management.    Therefore, she is discharged in " good and stable condition to hospice.    The patient met 2-midnight criteria for an inpatient stay at the time of discharge.    Discharge Date  4/7/2021    FOLLOW UP ITEMS POST DISCHARGE  Follow-up with hospice    DISCHARGE DIAGNOSES  Active Problems:    Shortness of breath POA: Yes    Hypertensive urgency POA: Yes    Stage 3a chronic kidney disease POA: Yes    Elevated troponin POA: Yes    Acute on chronic respiratory failure with hypoxia (HCC) POA: Yes    Weakness POA: Yes    Anemia due to stage 3b chronic kidney disease POA: Yes    Chronic obstructive pulmonary disease with acute exacerbation (HCC) POA: Yes  Resolved Problems:    * No resolved hospital problems. *      FOLLOW UP  Future Appointments   Date Time Provider Department Center   6/18/2021 12:30 PM SPIROMETRY/6MW [facility] None   6/18/2021  1:00 PM PFT-RM1 [facility] None   6/18/2021  3:20 PM Cate Richardson M.D. [facility] None     Savage Doyle M.D.  [address] [address]  [phone]    Go on 4/6/2021  Please arrive at 9 am for your appointment. Thank you     [facility] [address] [address]  [phone]        [facility] [address] [address]  [phone]          MEDICATIONS ON DISCHARGE     Medication List      CONTINUE taking these medications      Instructions   ALPRAZolam 0.25 MG Tabs  Commonly known as: XANAX   Take 0.25 mg by mouth at bedtime as needed for Sleep.  Dose: 0.25 mg     B-12 PO   Take 1 Tab by mouth every day.  Dose: 1 tablet     CALCIUM 1200 PO   Take 1 Tab by mouth every day.  Dose: 1 tablet     Flonase 50 MCG/ACT nasal spray  Generic drug: fluticasone   Administer 1 Spray into affected nostril(S) 2 times a day as needed (allergy). Each Nostril  Dose: 1 Spray     vitamin D 1000 Unit (25 mcg) Tabs  Commonly known as: cholecalciferol   Take 1,000 Units by mouth every day.  Dose: 1,000 Units        STOP taking these medications   "  acetaminophen 325 MG Tabs  Commonly known as: Tylenol     Aleve 220 MG tablet  Generic drug: naproxen     bisacodyl 10 MG Supp  Commonly known as: DULCOLAX     docusate sodium 100 MG Caps     gabapentin 300 MG Caps  Commonly known as: NEURONTIN     ipratropium-albuterol 0.5-2.5 (3) MG/3ML nebulizer solution  Commonly known as: DUONEB     ipratropium-albuterol  MCG/ACT Aers  Commonly known as: Combivent Respimat     lactulose 20 GM/30ML Soln     metoprolol tartrate 25 MG Tabs  Commonly known as: LOPRESSOR     ondansetron 4 MG Tbdp  Commonly known as: ZOFRAN ODT     REFRESH OP     senna-docusate 8.6-50 MG Tabs  Commonly known as: PERICOLACE or SENOKOT S     triamterene-hctz 37.5-25 MG Tabs  Commonly known as: MAXZIDE-25/DYAZIDE     Tylenol PM Extra Strength  MG Tabs  Generic drug: diphenhydrAMINE-APAP (sleep)            Allergies  Allergies   Allergen Reactions   • Hydrocodone Rash     \"all over\" and it makes me act loopy   • Penicillins Vomiting   • Tetracycline Rash and Itching     Pt states \"I get a bad body rash and itch all over\".       DIET  Orders Placed This Encounter   Procedures   • Diet Order Diet: Regular     Standing Status:   Standing     Number of Occurrences:   1     Order Specific Question:   Diet:     Answer:   Regular [1]       ACTIVITY  As tolerated.  Weight bearing as tolerated    CONSULTATIONS  Hospice    PROCEDURES  None    LABORATORY  Lab Results   Component Value Date    SODIUM 140 04/01/2021    POTASSIUM 4.1 04/01/2021    CHLORIDE 105 04/01/2021    CO2 26 04/01/2021    GLUCOSE 142 (H) 04/01/2021    BUN 32 (H) 04/01/2021    CREATININE 1.13 04/01/2021        Lab Results   Component Value Date    WBC 4.8 04/01/2021    HEMOGLOBIN 10.1 (L) 04/01/2021    HEMATOCRIT 32.8 (L) 04/01/2021    PLATELETCT 212 04/01/2021        Total time of the discharge process exceeds 38 minutes.  "

## 2021-04-07 NOTE — DISCHARGE INSTRUCTIONS
Hospice  Hospice is a service that is designed to provide people who are terminally ill and their families with medical, spiritual, and psychological support. Its aim is to improve your quality of life by keeping you as comfortable as possible in the final stages of life.  Who will be my providers when I begin hospice care?  Hospice teams often include:  · A nurse.  · A doctor. The hospice doctor will be available for your care, but you can include your regular doctor or nurse practitioner.  · A .  · A counselor.  · A  (such as a ).  · A dietitian.  · Therapists.  · Trained volunteers who can help with care.  What services does hospice provide?  Hospice services can vary depending on the center or organization. Generally, they include:  · Ways to keep you comfortable, such as:  ? Providing care in your home or in a home-like setting.  ? Working with your family and friends to help meet your needs.  ? Allowing you to enjoy the support of loved ones by receiving much of your basic care from family and friends.  · Pain relief and symptom management. The staff will supply all necessary medicines and equipment so that you can stay comfortable and alert enough to enjoy the company of your friends and family.  · Visits or care from a nurse and doctor. This may include 24-hour on-call services.  · Companionship when you are alone.  · Allowing you and your family to rest. Hospice staff may do light housekeeping, prepare meals, and run errands.  · Counseling. They will make sure your emotional, spiritual, and social needs are being met, as well as those needs of your family members.  · Spiritual care. This will be individualized to meet your needs and your family's needs. It may involve:  ? Helping you and your family understand the dying process.  ? Helping you say goodbye to your family and friends.  ? Performing a specific Scientologist ceremony or ritual.  · Massage.  · Nutrition  therapy.  · Physical and occupational therapy.  · Short-term inpatient care, if something cannot be managed in the home.  · Art or music therapy.  · Bereavement support for grieving family members.  When should hospice care begin?  Most people who use hospice are believed to have less than 6 months to live.  · Your family and health care providers can help you decide when hospice services should begin.  · If you live longer than 6 months but your condition does not improve, your doctor may be able to approve you for continued hospice care.  · If your condition improves, you may discontinue the program.  What should I consider before selecting a program?  Most hospice programs are run by nonprofit, independent organizations. Some are affiliated with hospitals, nursing homes, or home health care agencies. Hospice programs can take place in your home or at a hospice center, hospital, or skilled nursing facility. When choosing a hospice program, ask the following questions:  · What services are available to me?  · What services will be offered to my loved ones?  · How involved will my loved ones be?  · How involved will my health care provider be?  · Who makes up the hospice care team? How are they trained or screened?  · How will my pain and symptoms be managed?  · If my circumstances change, can the services be provided in a different setting, such as my home or in the hospital?  · Is the program reviewed and licensed by the state or certified in some other way?  · What does it cost? Is it covered by insurance?  · If I choose a hospice center or nursing home, where is the hospice center located? Is it convenient for family and friends?  · If I choose a hospice center or nursing home, can my family and friends visit any time?  · Will you provide emotional and spiritual support?  · Who can my family call with questions?  Where can I learn more about hospice?  You can learn about existing hospice programs in your area  from your health care providers. You can also read more about hospice online. The websites of the following organizations have helpful information:  · National Hospice and Palliative Care Organization (NHPCO): www.nhpco.org  · National Association for Home Care & Hospice (NAHC): www.nahc.org  · Hospice Foundation of [place] (HFA): www.hospicefoundation.org  · American Cancer Society (ACS): www.cancer.org  · Hospice Net: www.hospicenet.org  · Visiting Nurse Associations of [place] (VNAA): www.vnaa.org  You may also find more information by contacting the following agencies:  · A local agency on aging.  · Your local [facility] chapter.  · Your state's department of health or .  Summary  · Hospice is a service that is designed to provide people who are terminally ill and their families with medical, spiritual, and psychological support.  · Hospice aims to improve your quality of life by keeping you as comfortable as possible in the final stages of life.  · Hospice teams often include a doctor, nurse, , counselor, ,dietitian, therapists, and volunteers.  · Hospice care generally includes medicine for symptom management, visits from doctors and nurses, physical and occupational therapy, nutrition counseling, spiritual and emotional counseling, caregiver support, and bereavement support for grieving family members.  · Hospice programs can take place in your home or at a hospice center, hospital, or skilled nursing facility.  This information is not intended to replace advice given to you by your health care provider. Make sure you discuss any questions you have with your health care provider.  Document Released: 04/05/2005 Document Revised: 11/30/2018 Document Reviewed: 01/09/2018  Elsevier Patient Education © 2020 [employer] Inc.  Chronic Respiratory Failure    Respiratory failure is a condition in which the lungs do not work well and the breathing (respiratory) system fails.  When respiratory failure occurs, it becomes difficult for the lungs to get enough oxygen or to eliminate carbon dioxide or to do both duties. If the lungs do not work properly, the heart, brain, and other body systems do not get enough oxygen. Respiratory failure is life-threatening if it is not treated.  Respiratory failure can be acute or chronic. Acute respiratory failure is sudden and severe and requires emergency medical treatment. Chronic respiratory failure happens over time, usually due to a medical condition that gets worse.  What are the causes?  This condition may be caused by any problem that affects the heart or lungs. Causes include:  · Chronic bronchitis and emphysema (COPD).  · Pulmonary fibrosis.  · Water in the lungs due to heart failure, lung injury, or infection (pulmonary edema).  · Asthma.  · Nerve or muscle diseases that make chest movements difficult, such as Rosita Gehrig disease or Guillain-Wiergate syndrome.  · A collapsed lung (pneumothorax).  · Pulmonary hypertension.  · Chronic sleep apnea.  · Pneumonia.  · Obesity.  · A blood clot in a lung (pulmonary embolism).  · Trauma to the chest that makes breathing difficult.  What increases the risk?  You are more likely to develop this condition if:  · You are a smoker, or have a history of smoking.  · You have a weak immune system.  · You have a family history of breathing problems or lung disease.  · You have a long term lung disease such as COPD.  What are the signs or symptoms?  Symptoms of this condition include:  · Shortness of breath with or without activity.  · Difficulty breathing.  · Wheezing.  · A fast or irregular heartbeat (arrhythmia).  · Chest pain or tightness.  · A bluish color to the fingernail or toenail beds (cyanosis).  · Confusion.  · Drowsiness.  · Extreme fatigue, especially with minimal activity.  How is this diagnosed?  This condition may be diagnosed based on:  · Your medical history.  · A physical exam.  · Other tests,  such as:  ? A chest X-ray.  ? A CT scan of your lungs.  ? Blood tests, such as an arterial blood gas test. This test is done to check if you have enough oxygen in your blood.  ? An electrocardiogram. This test records the electrical activity of your heart.  ? An echocardiogram. This test uses sound waves to produce an image of your heart.  · A check of your blood pressure, heart rate, breathing rate, and blood oxygen level.  How is this treated?  Treatment for this condition depends on the cause. Treatment can include the following:  · Getting oxygen through a nasal cannula. This is a tube that goes in your nose.  · Getting oxygen through a face mask.  · Receiving noninvasive positive pressure ventilation. This is a method of breathing support in which a machine blows air into your lungs through a mask. The machine allows you to breathe on your own. It helps the body take in oxygen and eliminate carbon dioxide.  · Using a ventilator. This is a breathing machine that delivers oxygen to the lungs through a breathing tube that is put into the trachea. This machine is used when you can no longer breathe well enough on your own.  · Medicines to help with breathing, such as:  ? Medicines that open up and relax air passages, such as bronchodilators. These may be given through a device that turns liquid medicines into a mist you can breathe in (nebulizer). These medicines help with breathing.  ? Diuretics. These medicines get rid of extra fluid out of your lungs, which can help you breathe better.  ? Steroid medicines. These decrease inflammation in the lungs.  ? Antibiotic medicines. These may be given to treat a bacterial infection, such as pneumonia.  · Pulmonary rehabilitation. This is an exercise program that strengthens the muscles in your chest and helps you learn breathing techniques in order to manage your condition.  Follow these instructions at home:  Medicines  · Take over-the-counter and prescription medicines  only as told by your health care provider.  · If you were prescribed an antibiotic medicine, take it as told by your health care provider. Do not stop taking the antibiotic even if you start to feel better.  General instructions  · Use oxygen therapy and pulmonary rehabilitation if directed to by your health care provider. If you require home oxygen therapy, ask your health care provider whether you should purchase a pulse oximeter to measure your oxygen level at home.  · Work with your health care provider to create a plan to help you deal with your condition. Follow this plan.  · Do not use any products that contain nicotine or tobacco, such as cigarettes and e-cigarettes. If you need help quitting, ask your health care provider.  · Avoid exposure to irritants that make your breathing problems worse. These include smoke, chemicals, and fumes.  · Stay active, but balance activity with periods of rest. Exercise and physical activity will help you maintain your ability to do things you want to do.  · Stay up to date on all vaccines, especially yearly influenza and pneumonia vaccines.  · Avoid people who are sick as well as crowded places during the flu season.  · Keep all follow-up visits as told by your health care provider. This is important.  Contact a health care provider if:  · Your shortness of breath gets worse and you cannot do the things you used to do.  · You have increased mucus (sputum), wheezing, coughing, or loss of energy.  · You are on oxygen therapy and you are starting to need more.  · You need to use your medicines more often.  · You have a fever.  Get help right away if:  · Your shortness of breath becomes worse.  · You are unable to say more than a few words without having to catch your breath.  · You develop chest pain or tightness.  Summary  · Respiratory failure is a condition in which the lungs do not work well and the breathing system fails.  · This condition can be very serious and is often  life-threatening.  · This condition is diagnosed with tests and can be treated with medicines or oxygen.  · Contact a health care provider if your shortness of breath gets worse or if you need to use your oxygen or medicines more often than before.  This information is not intended to replace advice given to you by your health care provider. Make sure you discuss any questions you have with your health care provider.  Document Released: 12/18/2006 Document Revised: 11/30/2018 Document Reviewed: 12/29/2017  [employer] Patient Education © 2020 [employer]  Discharge Instructions    Discharged to home by medical transportation with escort. Discharged via ambulance, hospital escort: Yes.  Special equipment needed: Oxygen    Be sure to schedule a follow-up appointment with your primary care doctor or any specialists as instructed.     Discharge Plan:   Confirmed Follow up Appointment: Appointment Scheduled  Confirmed Symptoms Management: Discussed(hospice to follow)  Medication Reconciliation Updated: Yes    I understand that a diet low in cholesterol, fat, and sodium is recommended for good health. Unless I have been given specific instructions below for another diet, I accept this instruction as my diet prescription.   Other diet: renal    Special Instructions: Chronic Obstructive Pulmonary Disease (COPD) is a long-term, progressive lung disease that makes it harder to breathe. It includes chronic bronchitis, emphysema, and refractory (non-reversible) asthma. With COPD, the airways in your lungs become inflamed and thicken, and the tissue where oxygen is exchanged is destroyed. The flow of air in and out of your lungs decreases. When that happens, less oxygen gets into your body tissues, and it becomes harder to get rid of the waste gas carbon dioxide. As the disease gets worse, shortness of breath makes it harder to remain active. There is no cure for COPD, but it is preventable and treatable.    COPD Patient  Discharge Instructions    • Diet  o Follow a low fat, low cholesterol, low salt diet unless instructed otherwise by your Doctor. Read the labels on the back of food products and track your intake of fat, cholesterol and salt.  • No smoking  o Discontinuing smoking will have the biggest impact on preventing progression of disease.  o To participate in [employer]’s Quit Tobacco Program, call 651-0985 or visit [email or web address]  • Oxygen  o If your doctor has order that you wear oxygen at home, it is important to wear it as ordered.  o Do not smoke, vape, or use e-cigarettes with oxygen on.  o Store in an appropriate location: upright in its hitchcock or laid flat down, away from open flames and stoves.   o Do not use oil-based creams and moisturizers (ie: petroleum products, oil-based lip moisturizers) or aerosol sprays (ie: hair sprays or deodorants) when using your oxygen equipment.  o Be careful with tubing placement to prevent yourself and others from tripping.  • Medications  o Refer to your personalized Action Plan to manage your symptoms.  • Warning signs of an exacerbation  o Breathing fast and shallow, worsening shortness of breath (like you just finished exercising)  o Chest tightness  o Increases in sputum production  o Changes in sputum color  o Lower oxygen levels than baseline  • When to call your doctor  o If the warning signs of an exacerbation do not improve  o Refer to your personalized Action Plan   • Pulmonary Rehab  o Your doctor has ordered you a referral to Pulmonary Rehab. Call [phone] to schedule an appointment  • Attend your follow-up appointment with your PCP and/or Pulmonologist  • Remote Monitoring: At the direction of the remote monitoring on-call provider, you may increase your oxygen by 2 liters above your baseline.     See the educational handout provided by your COPD Navigator for more information. This also explains more about COPD, symptoms of an exacerbation, and some of the  tests that you have undergone.    · Is patient discharged on Warfarin / Coumadin?   No     Depression / Suicide Risk    As you are discharged from this [facility] facility, it is important to learn how to keep safe from harming yourself.    Recognize the warning signs:  · Abrupt changes in personality, positive or negative- including increase in energy   · Giving away possessions  · Change in eating patterns- significant weight changes-  positive or negative  · Change in sleeping patterns- unable to sleep or sleeping all the time   · Unwillingness or inability to communicate  · Depression  · Unusual sadness, discouragement and loneliness  · Talk of wanting to die  · Neglect of personal appearance   · Rebelliousness- reckless behavior  · Withdrawal from people/activities they love  · Confusion- inability to concentrate     If you or a loved one observes any of these behaviors or has concerns about self-harm, here's what you can do:  · Talk about it- your feelings and reasons for harming yourself  · Remove any means that you might use to hurt yourself (examples: pills, rope, extension cords, firearm)  · Get professional help from the community (Mental Health, Substance Abuse, psychological counseling)  · Do not be alone:Call your Safe Contact- someone whom you trust who will be there for you.  · Call your local CRISIS HOTLINE 872-7664 or 739-892-2800  · Call your local Children's Mobile Crisis Response Team [place] (564) 533-9546 or www.[employer]  · Call the toll free National Suicide Prevention Hotlines   · National Suicide Prevention Lifeline 926-051-HBMX (6963)  · National Hope Line Network 800-SUICIDE (378-9540)

## 2021-06-08 ENCOUNTER — HOSPITAL ENCOUNTER (OUTPATIENT)
Dept: RADIOLOGY | Facility: MEDICAL CENTER | Age: 86
End: 2021-06-08
Attending: FAMILY MEDICINE
Payer: MEDICARE

## 2021-06-08 DIAGNOSIS — Z12.31 ENCOUNTER FOR MAMMOGRAM TO ESTABLISH BASELINE MAMMOGRAM: ICD-10-CM

## 2021-06-08 PROCEDURE — 77063 BREAST TOMOSYNTHESIS BI: CPT

## 2021-06-18 ENCOUNTER — NON-PROVIDER VISIT (OUTPATIENT)
Dept: SLEEP MEDICINE | Facility: MEDICAL CENTER | Age: 86
End: 2021-06-18
Attending: INTERNAL MEDICINE
Payer: MEDICARE

## 2021-06-18 VITALS — BODY MASS INDEX: 23.49 KG/M2 | HEIGHT: 68 IN | WEIGHT: 155 LBS

## 2021-06-18 DIAGNOSIS — J84.9 INTERSTITIAL LUNG DISEASE (HCC): ICD-10-CM

## 2021-06-18 DIAGNOSIS — J43.2 CENTRILOBULAR EMPHYSEMA (HCC): ICD-10-CM

## 2021-06-18 PROCEDURE — 94726 PLETHYSMOGRAPHY LUNG VOLUMES: CPT | Performed by: INTERNAL MEDICINE

## 2021-06-18 PROCEDURE — 94010 BREATHING CAPACITY TEST: CPT | Performed by: INTERNAL MEDICINE

## 2021-06-18 PROCEDURE — 94729 DIFFUSING CAPACITY: CPT | Performed by: INTERNAL MEDICINE

## 2021-06-18 PROCEDURE — 94618 PULMONARY STRESS TESTING: CPT | Performed by: INTERNAL MEDICINE

## 2021-06-18 ASSESSMENT — PULMONARY FUNCTION TESTS
FVC_PREDICTED: 2.57
FEV1: 1.5
FEV1/FVC: 68
FEV1/FVC: 68
FEV1/FVC_PERCENT_LLN: 63
FEV1/FVC_PREDICTED: 75
FEV1/FVC_PERCENT_PREDICTED: 89
FVC_LLN: 2.15
FEV1_LLN: 1.58
FEV1_PERCENT_PREDICTED: 79
FEV1_PREDICTED: 1.89
FVC_PERCENT_PREDICTED: 86
FEV1/FVC_PERCENT_PREDICTED: 74
FEV1/FVC_PERCENT_PREDICTED: 92
FVC: 2.22

## 2021-06-18 ASSESSMENT — 6 MINUTE WALK TEST (6MWT)
HEART RATE AT 2 MIN: 62
PERCEIVED BREATHLESSNESS AT 5 MIN: 0
BLOOD PRESSURE: RIGHT ARM
PERCEIVED FATIGUE AT 2 MIN: 0
PERCEIVED FATIGUE AT 6 MIN: 0
SAO2 AT 3 MIN: 90
SAO2 AT 6 MIN: 87
HEART RATE AT 6 MIN: 78
HEART RATE AT 1 MIN: 78
AMBULATES WITH O2: WITHOUT O2
PERCEIVED BREATHLESSNESS AT 1 MIN: 0
PERCEIVED FATIGUE AT 5 MIN: 0
HEART RATE AT 6 MIN: 78
HEART RATE AT 4 MIN: 62
PERCEIVED FATIGUE AT 3 MIN: 0
PERCEIVED FATIGUE AT 6 MIN: 0
PERCEIVED FATIGUE AT 2 MIN: 0
PERCEIVED BREATHLESSNESS AT 3 MIN: 0
PERCEIVED BREATHLESSNESS AT 1 MIN: 0
PERCEIVED FATIGUE AT 4 MIN: 0
HEART RATE AT 2 MIN: 62
SAO2 AT 5 MIN: 89
SAO2 AT 4 MIN: 89
HEART RATE: 77
SITTING BLOOD PRESSURE: 128/70
SAO2 AT 1 MIN: 81
PERCEIVED FATIGUE AT 3 MIN: 0
PERCEIVED FATIGUE AT 1 MIN: 0
PERCEIVED FATIGUE AT 4 MIN: 0
PERCEIVED FATIGUE AT 2 MIN: 0
HEART RATE AT 1 MIN: 75
HEART RATE AT 2 MIN: 77
SAO2 AT 1 MIN: 93
SAO2 AT 2 MIN: 91
TOTAL REST TIME: 0
SAO2 AT 1 MIN: 81
PERCEIVED BREATHLESSNESS AT 1 MIN: 0
PERCEIVED FATIGUE AT 2 MIN: 0
PERCEIVED BREATHLESSNESS AT 1 MIN: 0
SAO2 AT 6 MIN: 87
HEART RATE AT 1 MIN: 78
PERCEIVED BREATHLESSNESS AT 3 MIN: 0
PERCEIVED BREATHLESSNESS AT 2 MIN: 0
SAO2 AT 2 MIN: 94
PERCEIVED FATIGUE AT 1 MIN: 0
HEART RATE AT 5 MIN: 52
NUMBER OF RESTS: 0
PERCENT OF NORMAL WALKED: 22
SAO2 AT 3 MIN: 90
O2 SAT PERCENT ROOM AIR: 91
HEART RATE AT 4 MIN: 62
HEART RATE AT 5 MIN: 52
SAO2 AT 5 MIN: 89
TOTAL REST TIME: 0
SAO2 AT 4 MIN: 89
SAO2 AT 2 MIN: 94
PERCEIVED BREATHLESSNESS AT 4 MIN: 0
HEART RATE AT 2 MIN: 77
PERCEIVED FATIGUE AT 5 MIN: 0
PERCEIVED BREATHLESSNESS AT 2 MIN: 0
PERCEIVED BREATHLESSNESS AT 6 MIN: 0
PERCENT OF NORMAL WALKED: 22
PERCEIVED BREATHLESSNESS AT 6 MIN: 0
PERCEIVED BREATHLESSNESS AT 4 MIN: 0
BLOOD PRESSURE AT 1 MIN: 128/70
HEART RATE AT 1 MIN: 75
HEART RATE AT 3 MIN: 68
O2 SAT PERCENT ROOM AIR: 91
HEART RATE: 77
SAO2 AT 2 MIN: 91
BLOOD PRESSURE: RIGHT ARM
PERCEIVED BREATHLESSNESS AT 2 MIN: 0
PERCEIVED FATIGUE AT 1 MIN: 0
HEART RATE AT 3 MIN: 68
PERCEIVED BREATHLESSNESS AT 2 MIN: 0
NUMBER OF RESTS: 0
SAO2 AT 1 MIN: 93
BLOOD PRESSURE AT 1 MIN: 128/70
SITTING BLOOD PRESSURE: 128/70
PERCEIVED BREATHLESSNESS AT 5 MIN: 0
PERCEIVED FATIGUE AT 1 MIN: 0
AMBULATES WITH O2: WITHOUT O2

## 2021-06-18 ASSESSMENT — FIBROSIS 4 INDEX: FIB4 SCORE: 1.74

## 2021-06-18 NOTE — PROCEDURES
Test started on Room Air in minute 1 added 02 3 lpm pulse in minute 6 upped 02 to 4 lpm pulse patient finished test on 4 lpm 02 pulse.    There is significant desaturation with ambulation.  Patient requires up to 4 L/min pulsed and likely continuous given ending saturation was 87% on 4 L/min pulsed.  Distance walked was 240 feet are 22% predicted.  I would recommend 4 L continuous supplemental oxygen with activity.  Exercise tolerance is significantly reduced.

## 2021-06-18 NOTE — PROCEDURES
Tech: Concha Banuelos, RRT, CPFT  Tech notes: Good patient effort & cooperation.  The results of this test meet the ATS/ERS standards for acceptability & reproducibility.  Test was performed on the [employer] Body Plethysmograph-Elite DX system.  Predicted values were GLI-2012 for spirometry, GLI- 2017 for DLCO, ITS for Lung Volumes.  The DLCO was uncorrected for Hgb.    Interpretation:  Baseline spirometry shows airflow obstruction with FEV1/FVC ratio 68.  FEV1 is 1.5 L or 79% predicted.  Bronchodilator testing was not performed.  Total lung capacity is mildly reduced at 4.4 L or 77% predicted.  DLCO is 10.31 mL/min/mmHg.  No predicted values to compare to.  Pulmonary function testing shows mixed restrictive obstructive pattern based on airflow obstruction and mild reduction in TLC.  Correlate clinically and with imaging.

## 2021-06-18 NOTE — PROCEDURES
Test started on Room Air in minute 1 added 02 3 lpm pulse in minute 6 upped 02 4 lpm pulse patient finished test on 4 lpm pulse.

## 2021-06-29 ENCOUNTER — TELEPHONE (OUTPATIENT)
Dept: SLEEP MEDICINE | Facility: MEDICAL CENTER | Age: 86
End: 2021-06-29

## 2021-06-29 NOTE — TELEPHONE ENCOUNTER
I spoke with patient regarding her 6 MW results. Patient knows to keep oxygen levels at 4 LPM with exertion.      Patient in agreement.

## 2021-07-22 ENCOUNTER — SLEEP CENTER VISIT (OUTPATIENT)
Dept: SLEEP MEDICINE | Facility: MEDICAL CENTER | Age: 86
End: 2021-07-22
Payer: MEDICARE

## 2021-07-22 VITALS
HEART RATE: 78 BPM | HEIGHT: 68 IN | TEMPERATURE: 97.7 F | RESPIRATION RATE: 16 BRPM | OXYGEN SATURATION: 98 % | SYSTOLIC BLOOD PRESSURE: 130 MMHG | DIASTOLIC BLOOD PRESSURE: 78 MMHG | BODY MASS INDEX: 23.57 KG/M2

## 2021-07-22 DIAGNOSIS — J84.9 INTERSTITIAL LUNG DISEASE (HCC): ICD-10-CM

## 2021-07-22 DIAGNOSIS — J43.2 CENTRILOBULAR EMPHYSEMA (HCC): ICD-10-CM

## 2021-07-22 DIAGNOSIS — J96.11 CHRONIC RESPIRATORY FAILURE WITH HYPOXIA (HCC): ICD-10-CM

## 2021-07-22 DIAGNOSIS — R91.1 LUNG NODULE: ICD-10-CM

## 2021-07-22 PROCEDURE — 99214 OFFICE O/P EST MOD 30 MIN: CPT | Performed by: INTERNAL MEDICINE

## 2021-07-22 ASSESSMENT — ENCOUNTER SYMPTOMS
PHOTOPHOBIA: 0
DIZZINESS: 0
SPUTUM PRODUCTION: 0
FOCAL WEAKNESS: 0
VOMITING: 0
PND: 0
EYE PAIN: 0
FALLS: 0
BLURRED VISION: 0
SINUS PAIN: 0
HEADACHES: 0
HEMOPTYSIS: 0
EYE REDNESS: 0
NECK PAIN: 0
FEVER: 0
PALPITATIONS: 0
COUGH: 0
SPEECH CHANGE: 0
SORE THROAT: 0
DIAPHORESIS: 0
CLAUDICATION: 0
ABDOMINAL PAIN: 0
TREMORS: 0
CONSTIPATION: 0
SHORTNESS OF BREATH: 0
MYALGIAS: 0
DIARRHEA: 0
BACK PAIN: 0
CHILLS: 0
WEAKNESS: 0
HEARTBURN: 0
ORTHOPNEA: 0
NAUSEA: 0
WEIGHT LOSS: 0
DEPRESSION: 0
WHEEZING: 0
STRIDOR: 0
DOUBLE VISION: 0
EYE DISCHARGE: 0

## 2021-07-22 NOTE — PROGRESS NOTES
Chief Complaint   Patient presents with   • Follow-Up     last seen 12/18/2020 Dr. Richardson    • Results     PFT, 6MW 6/18/21, ER 3/31/21-4/7/21 CXR 3/31/21, echo 3/31/21          HPI: This patient is a 89 y.o. female whom is followed in our clinic for COPD-ILD overlap last seen by Dr. Richardson on 12/18/2020.  Patient has not had any bronchodilator therapy and has been treated primarily with supplemental oxygen for chronic hypoxic respiratory failure secondary to combined COPD-ILD overlap.  She is a former tobacco smoker but quit in 1985.  She has not participated in pulmonary rehab.  She lives at home with her daughter but is fairly independent and typically fairly active on 3 L/min of supplemental oxygen.  She does have history of right lower lobe pulmonary nodule 6 mm in size based on CT chest from February 2020.  Since her last clinic visit with us she was seen in the hospital in March briefly for acute worsening of shortness of breath.  She did have mildly elevated troponin and BNP but echocardiogram showed normal LV and RV function.  Patient tells me she had temporary loss of function of her right arm but there is no mention of this during her stay and no head imaging.  Chest x-ray showed no pneumonia.  She was ultimately discharged on hospice which she received services from for about a month but when she improved back to baseline oxygen levels, she was discharged from hospice therapy.  Today she is on baseline oxygen at 3 L/min.  Pulmonary function testing from June 18 is overall stable since December.  FVC was 2.2 L down from 2.4 L, total lung capacity 4.4 down from 4.8 and DLCO improved from 9.52-10.31.  When reviewing PFTs from June of last year she actually had a lower TLC so there has been no significant change in 1 year.  She only walked 240 feet on 6-minute walk test which was 22% predicted at 4 L/min oxygen.  She has no acute complaints today.  She would like to obtain portable concentrator if possible and  we did discuss the possibility of pulmonary rehab.    Past Medical History:   Diagnosis Date   • AAA (abdominal aortic aneurysm) (HCC) 2015   • Breast cancer (HCC)    • Cancer (HCC)    • Near syncope 2015   • S/P right mastectomy     per pt done in        Social History     Socioeconomic History   • Marital status:      Spouse name: Not on file   • Number of children: Not on file   • Years of education: Not on file   • Highest education level: Not on file   Occupational History   • Not on file   Tobacco Use   • Smoking status: Former Smoker     Packs/day: 0.25     Years: 34.00     Pack years: 8.50     Types: Cigarettes     Quit date: 1985     Years since quittin.5   • Smokeless tobacco: Never Used   Vaping Use   • Vaping Use: Never used   Substance and Sexual Activity   • Alcohol use: Yes     Alcohol/week: 1.8 oz     Types: 3 Shots of liquor per week   • Drug use: No   • Sexual activity: Not on file   Other Topics Concern   • Not on file   Social History Narrative   • Not on file     Social Determinants of Health     Financial Resource Strain:    • Difficulty of Paying Living Expenses:    Food Insecurity:    • Worried About Running Out of Food in the Last Year:    • Ran Out of Food in the Last Year:    Transportation Needs:    • Lack of Transportation (Medical):    • Lack of Transportation (Non-Medical):    Physical Activity:    • Days of Exercise per Week:    • Minutes of Exercise per Session:    Stress:    • Feeling of Stress :    Social Connections:    • Frequency of Communication with Friends and Family:    • Frequency of Social Gatherings with Friends and Family:    • Attends [employer] Services:    • Active Member of Clubs or Organizations:    • Attends Club or Organization Meetings:    • Marital Status:    Intimate Partner Violence:    • Fear of Current or Ex-Partner:    • Emotionally Abused:    • Physically Abused:    • Sexually Abused:        Family History   Problem Relation  Age of Onset   • Cancer Sister    • Cancer Mother    • Lung Disease Paternal Uncle    • Arthritis Neg Hx    • Genetic Disorder Neg Hx    • Psychiatric Illness Neg Hx    • Diabetes Neg Hx    • Heart Disease Neg Hx    • Hypertension Neg Hx    • Hyperlipidemia Neg Hx    • Stroke Neg Hx    • Alcohol/Drug Neg Hx        Current Outpatient Medications on File Prior to Visit   Medication Sig Dispense Refill   • Acetaminophen (TYLENOL PO) Take  by mouth.     • alprazolam (XANAX) 0.25 MG Tab Take 0.25 mg by mouth at bedtime as needed for Sleep.     • Calcium Carbonate-Vit D-Min (CALCIUM 1200 PO) Take 1 Tab by mouth every day.     • vitamin D (CHOLECALCIFEROL) 1000 UNIT Tab Take 1,000 Units by mouth every day.     • Cyanocobalamin (B-12 PO) Take 1 Tab by mouth every day.     • fluticasone (FLONASE) 50 MCG/ACT nasal spray Administer 1 Spray into affected nostril(S) 2 times a day as needed (allergy). Each Nostril        No current facility-administered medications on file prior to visit.       Hydrocodone, Penicillins, and Tetracycline      ROS:   Review of Systems   Constitutional: Negative for chills, diaphoresis, fever, malaise/fatigue and weight loss.   HENT: Negative for congestion, ear discharge, ear pain, hearing loss, nosebleeds, sinus pain, sore throat and tinnitus.    Eyes: Negative for blurred vision, double vision, photophobia, pain, discharge and redness.   Respiratory: Negative for cough, hemoptysis, sputum production, shortness of breath, wheezing and stridor.    Cardiovascular: Negative for chest pain, palpitations, orthopnea, claudication, leg swelling and PND.   Gastrointestinal: Negative for abdominal pain, constipation, diarrhea, heartburn, nausea and vomiting.   Genitourinary: Negative for dysuria and urgency.   Musculoskeletal: Negative for back pain, falls, joint pain, myalgias and neck pain.   Skin: Negative for itching and rash.   Neurological: Negative for dizziness, tremors, speech change, focal  "weakness, weakness and headaches.   Endo/Heme/Allergies: Negative for environmental allergies.   Psychiatric/Behavioral: Negative for depression.       /78 (BP Location: Right arm, Patient Position: Sitting, BP Cuff Size: Adult)   Pulse 78   Temp 36.5 °C (97.7 °F) (Temporal)   Resp 16   Ht 1.727 m (5' 8\")   SpO2 98%   Physical Exam  Vitals reviewed.   Constitutional:       General: She is not in acute distress.     Appearance: Normal appearance. She is well-developed and normal weight.   HENT:      Head: Normocephalic and atraumatic.      Right Ear: External ear normal.      Left Ear: External ear normal.      Nose: Nose normal. No congestion.      Mouth/Throat:      Mouth: Mucous membranes are moist.      Pharynx: Oropharynx is clear. No oropharyngeal exudate.   Eyes:      General: No scleral icterus.     Extraocular Movements: Extraocular movements intact.      Conjunctiva/sclera: Conjunctivae normal.      Pupils: Pupils are equal, round, and reactive to light.   Neck:      Vascular: No JVD.      Trachea: No tracheal deviation.   Cardiovascular:      Rate and Rhythm: Normal rate and regular rhythm.      Heart sounds: Normal heart sounds. No murmur heard.   No friction rub. No gallop.    Pulmonary:      Effort: Pulmonary effort is normal. No accessory muscle usage or respiratory distress.      Breath sounds: No wheezing or rales.      Comments: Dry, early inspiratory crackles bilateral lung bases with decreased breath sounds at apices  Abdominal:      General: There is no distension.      Palpations: Abdomen is soft.      Tenderness: There is no abdominal tenderness.   Musculoskeletal:         General: No tenderness or deformity. Normal range of motion.      Cervical back: Normal range of motion and neck supple.      Right lower leg: Edema present.      Left lower leg: No edema.      Comments: 2+ pitting edema of right lower extremity   Lymphadenopathy:      Cervical: No cervical adenopathy.   Skin:     " General: Skin is warm and dry.      Findings: No rash.      Nails: There is no clubbing.   Neurological:      Mental Status: She is alert and oriented to person, place, and time.      Cranial Nerves: No cranial nerve deficit.      Gait: Gait normal.   Psychiatric:         Mood and Affect: Mood normal.         Behavior: Behavior normal.         PFTs as reviewed by me personally: As per HPI    Imaging as reviewed by me personally: As per HPI    Assessment:  1. Interstitial lung disease (HCC)  REFERRAL TO PULMONARY REHAB    PULMONARY FUNCTION TESTS -Test requested: Complete Pulmonary Function Test    Exercise Test for Bronchospasm / 6-Minute Walk   2. Centrilobular emphysema (HCC)     3. Lung nodule     4. Chronic respiratory failure with hypoxia (HCC)  PULMONARY FUNCTION TESTS -Test requested: Complete Pulmonary Function Test    Exercise Test for Bronchospasm / 6-Minute Walk       Plan:  1.  Chronic, stable based on PFTs over the past year.  She has apparently been on Esbriet in the past but had side effects related to this.  At this point I think she may benefit from pulmonary rehab and she agreed to consider this.  I have placed referral.  Otherwise we will continue supplemental oxygen between 3 and 4 L/min and follow-up in 6 months with updated PFTs and 6-minute walk test.  2.  Patient has never had clinical COPD exacerbation and is not currently on any bronchodilator therapy.  Continue supplemental oxygen.  Referral to pulmonary rehab.  3.  6 mm pulmonary nodule stable from 2018.  We did not pursue any surveillance imaging given even if the nodule had enlarged, patient is 89 and would not want any invasive diagnostic procedures or treatment.  4.  Oxygen needs increased slightly based on 6-minute walk test to 4 L/min with activity.  Continue supplemental oxygen referral to pulmonary rehab.  Return in about 6 months (around 1/22/2022) for ILD/COPD.

## 2021-07-23 NOTE — NON-PROVIDER
Pt and daughter explained pt was in hospice and was provided with OCD travel tanks. The tanks have [facility] informations. When they had tried to contact [employer] to get tanks filled/replaced. [facility] had no record of the patient. Pt confirmed to having nocturnal concentrator through [facility]. Pt was requesting if possible to get a G5 POC. Explained to the patient [facility] has POC but not G5 inogens. Will need to process through [facility] and insurance for POC coverage. Or patient can purchase out of pocket and will need to contact inogen herself and request they fax order to us.     I contacted [facility], I spoke to Ruben. States day time oxygen was picked up 4/2021 because patient got a POC. Informed patient did not get a POC. CMN dated 2/18/2020. Ruben requested updated order and notes to provide the patient with day time oxygen again. Order with all supporting documentation faxed to [facility].

## 2021-07-23 NOTE — NON-PROVIDER
I spoke to Ruben confirmed received faxed order and supporting documentation. [facility] is requesting an updated order with pulsed Liter flow for OCD tanks.     Order faxed to [facility]. I spoke to the patient directly informed order placed and faxed to [facility]. Confirmed orders were received.

## 2021-08-25 PROBLEM — S72.009A HIP FRACTURE (HCC): Status: ACTIVE | Noted: 2021-08-25

## 2022-01-01 ENCOUNTER — HOSPITAL ENCOUNTER (OUTPATIENT)
Dept: PULMONOLOGY | Facility: MEDICAL CENTER | Age: 87
End: 2022-11-07
Attending: INTERNAL MEDICINE
Payer: MEDICARE

## 2022-01-01 ENCOUNTER — PATIENT MESSAGE (OUTPATIENT)
Dept: HEALTH INFORMATION MANAGEMENT | Facility: OTHER | Age: 87
End: 2022-01-01

## 2022-01-01 ENCOUNTER — OFFICE VISIT (OUTPATIENT)
Dept: SLEEP MEDICINE | Facility: MEDICAL CENTER | Age: 87
End: 2022-01-01
Payer: MEDICARE

## 2022-01-01 VITALS
RESPIRATION RATE: 16 BRPM | OXYGEN SATURATION: 98 % | SYSTOLIC BLOOD PRESSURE: 134 MMHG | HEIGHT: 69 IN | HEART RATE: 78 BPM | DIASTOLIC BLOOD PRESSURE: 62 MMHG | WEIGHT: 154 LBS | BODY MASS INDEX: 22.81 KG/M2

## 2022-01-01 DIAGNOSIS — J84.9 INTERSTITIAL LUNG DISEASE (HCC): ICD-10-CM

## 2022-01-01 DIAGNOSIS — J96.11 CHRONIC RESPIRATORY FAILURE WITH HYPOXIA (HCC): ICD-10-CM

## 2022-01-01 PROCEDURE — 94726 PLETHYSMOGRAPHY LUNG VOLUMES: CPT

## 2022-01-01 PROCEDURE — 94726 PLETHYSMOGRAPHY LUNG VOLUMES: CPT | Mod: 26,GZ | Performed by: INTERNAL MEDICINE

## 2022-01-01 PROCEDURE — 94060 EVALUATION OF WHEEZING: CPT

## 2022-01-01 PROCEDURE — 99214 OFFICE O/P EST MOD 30 MIN: CPT | Performed by: INTERNAL MEDICINE

## 2022-01-01 PROCEDURE — 94729 DIFFUSING CAPACITY: CPT | Mod: 26,GZ | Performed by: INTERNAL MEDICINE

## 2022-01-01 PROCEDURE — 94729 DIFFUSING CAPACITY: CPT

## 2022-01-01 PROCEDURE — 94060 EVALUATION OF WHEEZING: CPT | Mod: 26,GZ | Performed by: INTERNAL MEDICINE

## 2022-01-01 RX ADMIN — Medication 2.5 MG: at 10:45

## 2022-01-01 ASSESSMENT — PULMONARY FUNCTION TESTS
FEV1/FVC_PERCENT_PREDICTED: 93
FEV1_PERCENT_PREDICTED: 79
FEV1_LLN: 1.59
FEV1/FVC_PERCENT_PREDICTED: 89
FEV1/FVC_PERCENT_LLN: 63
FEV1: 1.52
FEV1/FVC: 65
FVC_PERCENT_PREDICTED: 89
FEV1/FVC: 68
FVC_LLN: 2.18
FEV1/FVC_PERCENT_PREDICTED: 73
FEV1_LLN: 1.59
FEV1_PERCENT_CHANGE: -3
FEV1: 1.53
FVC_PREDICTED: 2.61
FEV1/FVC_PERCENT_LLN: 63
FEV1_PERCENT_CHANGE: 0
FEV1/FVC: 68
FEV1/FVC_PREDICTED: 75
FVC_PERCENT_PREDICTED: 86
FEV1/FVC_PERCENT_CHANGE: 4
FEV1/FVC_PERCENT_PREDICTED: 86
FVC: 2.25
FVC_LLN: 2.18
FEV1_PERCENT_PREDICTED: 80
FVC: 2.33
FEV1_PREDICTED: 1.91
FEV1/FVC_PERCENT_CHANGE: 0
FEV1/FVC_PERCENT_PREDICTED: 90
FEV1/FVC: 65

## 2022-01-01 ASSESSMENT — ENCOUNTER SYMPTOMS
HEMOPTYSIS: 0
SORE THROAT: 0
EYE PAIN: 0
PALPITATIONS: 0
CONSTIPATION: 0
HEARTBURN: 0
SINUS PAIN: 0
MYALGIAS: 0
WEAKNESS: 0
NECK PAIN: 0
DEPRESSION: 0
CLAUDICATION: 0
ORTHOPNEA: 0
VOMITING: 0
COUGH: 0
WHEEZING: 0
FALLS: 0
SPEECH CHANGE: 0
WEIGHT LOSS: 0
FEVER: 0
STRIDOR: 0
SPUTUM PRODUCTION: 0
PND: 0
ABDOMINAL PAIN: 0
DIZZINESS: 0
SHORTNESS OF BREATH: 0
DOUBLE VISION: 0
EYE REDNESS: 0
BACK PAIN: 0
DIARRHEA: 0
BLURRED VISION: 0
HEADACHES: 0
FOCAL WEAKNESS: 0
TREMORS: 0
EYE DISCHARGE: 0
PHOTOPHOBIA: 0
CHILLS: 0
DIAPHORESIS: 0
NAUSEA: 0

## 2022-01-01 ASSESSMENT — FIBROSIS 4 INDEX: FIB4 SCORE: 1.78

## 2022-02-24 ENCOUNTER — HOSPITAL ENCOUNTER (OUTPATIENT)
Dept: LAB | Facility: MEDICAL CENTER | Age: 87
End: 2022-02-24
Attending: FAMILY MEDICINE
Payer: MEDICARE

## 2022-02-24 PROCEDURE — 36415 COLL VENOUS BLD VENIPUNCTURE: CPT

## 2022-02-24 PROCEDURE — 86769 SARS-COV-2 COVID-19 ANTIBODY: CPT | Mod: 91

## 2022-02-24 PROCEDURE — 86769 SARS-COV-2 COVID-19 ANTIBODY: CPT

## 2022-02-26 LAB
SARS-COV-2 IGG SERPL IA-ACNC: >100 IV
SARS-COV-2 IGG SERPL QL IA: POSITIVE

## 2022-02-28 LAB — SARS-COV-2 IGG SERPLBLD QL IA.RAPID: POSITIVE

## 2022-05-11 NOTE — TELEPHONE ENCOUNTER
From: Yesi Garduno  To: Cate Richardson M.D.  Sent: 10/30/2018 2:03 PM PDT  Subject: Prescription Question    Had my regular flu shot 10/24/18 at Dr. Edinson Doyle's office.   Quality 110: Preventive Care And Screening: Influenza Immunization: Influenza Immunization Administered during Influenza season Quality 226: Preventive Care And Screening: Tobacco Use: Screening And Cessation Intervention: Patient screened for tobacco use and is an ex/non-smoker Detail Level: Detailed Quality 130: Documentation Of Current Medications In The Medical Record: Current Medications Documented Quality 431: Preventive Care And Screening: Unhealthy Alcohol Use - Screening: Patient not identified as an unhealthy alcohol user when screened for unhealthy alcohol use using a systematic screening method

## 2022-07-20 ENCOUNTER — PATIENT MESSAGE (OUTPATIENT)
Dept: SLEEP MEDICINE | Facility: MEDICAL CENTER | Age: 87
End: 2022-07-20
Payer: MEDICARE

## 2022-07-20 DIAGNOSIS — J84.9 INTERSTITIAL LUNG DISEASE (HCC): ICD-10-CM

## 2022-08-10 NOTE — PATIENT COMMUNICATION
Patient left a  22 10:18 am to schedule testings. PFT on file . I will need a new order. Please sign

## 2022-08-12 ENCOUNTER — HOSPITAL ENCOUNTER (OUTPATIENT)
Dept: RADIOLOGY | Facility: MEDICAL CENTER | Age: 87
End: 2022-08-12
Attending: FAMILY MEDICINE
Payer: MEDICARE

## 2022-08-12 DIAGNOSIS — M79.672 LEFT FOOT PAIN: ICD-10-CM

## 2022-08-12 DIAGNOSIS — M79.671 RIGHT FOOT PAIN: ICD-10-CM

## 2022-08-12 PROCEDURE — 73630 X-RAY EXAM OF FOOT: CPT | Mod: LT

## 2022-08-23 ENCOUNTER — APPOINTMENT (OUTPATIENT)
Dept: RADIOLOGY | Facility: MEDICAL CENTER | Age: 87
End: 2022-08-23
Attending: FAMILY MEDICINE
Payer: MEDICARE

## 2022-08-23 DIAGNOSIS — Z12.31 VISIT FOR SCREENING MAMMOGRAM: ICD-10-CM

## 2022-08-23 PROCEDURE — 77067 SCR MAMMO BI INCL CAD: CPT | Mod: 52

## 2022-11-09 NOTE — PROCEDURES
DATE OF SERVICE:  11/07/2022     PULMONARY FUNCTION TEST INTERPRETATION     Mild isolated reduction in FEV1 of undetermined significance.    Negative bronchodilator response.    Scooping of the expiratory loop consistent with obstruction.    Lung volumes demonstrate air trapping.    Summary: Findings consistent with mild obstructive airways disease with air trapping. An obstructive defect on spirometry can be consistent with many obstructive lung disease including, but not limited to asthma, emphysema, chronic bronchitis, acute bronchitis, post viral airway hyper-reactivity, pulmonary sarcoidosis, and bronchiolitis. Correlate clinically and with imaging.         ______________________________  DO ANIA GREEN/EWA/NAYANA    DD:  11/08/2022 15:59  DT:  11/08/2022 16:42    Job#:  [id number]

## 2022-11-15 NOTE — Clinical Note
"I saw Ms Garduno today. We have been monitoring PFTs annually for ILD component of her lung disease. She and I have discussed whether or not she wants to continue these versus evaluation based on symptoms alone and she has chosen to continue monitoring. As you both are aware, there are not normal values for DLCO in her age group but we compare values to HER prior testing to evaluate for decline. She and her daughter were taken aback on 11/7/22 when she had testing with [facility]. They told me they were told \"who ordered this testing\" and that testing her would give no information. I would appreciate that you directly contact me if you feel testing is unsafe or unnecessary before telling the patient that you deem it not necessary. This is not a good example of team work and puts doubt in the patient's mind regarding their care. I am happy to discuss any patient any time if there are concerns.  Alba"

## 2022-11-16 NOTE — PROGRESS NOTES
Chief Complaint   Patient presents with    Follow-Up     Last seen 21     Results     PFT 22          HPI: This patient is a 89 y.o. female whom is followed in our clinic for copd-ILD c/b chronic hypoxic respiratory failure last seen by me on 21.  Patient has not had any bronchodilator therapy and has been treated primarily with supplemental oxygen for chronic hypoxic respiratory failure secondary to combined COPD-ILD overlap.  She is a former tobacco smoker but quit in .  She has not participated in pulmonary rehab and referral was declined last visit due to insurance.  She lives at home with her daughter but is fairly independent and typically fairly active on 3 L/min of supplemental oxygen.  She does have history of right lower lobe pulmonary nodule 6 mm in size based on CT chest from 2020. She denies any changes in respiratory sxs since our last visit in 2021. PFTs from 22 are stable based on FVC and TLC with mild reduction in DLCO. We discussed continued observation with PFTs or symptoms only and she would like to continue monitoring PFTs. HRCT chest from  shows subpleural fibrotic changes mainly in the upper lobes, emphsyema with hyperinflation       Social History     Socioeconomic History    Marital status:      Spouse name: Not on file    Number of children: Not on file    Years of education: Not on file    Highest education level: Not on file   Occupational History    Not on file   Tobacco Use    Smoking status: Former     Packs/day: 0.25     Years: 34.00     Pack years: 8.50     Types: Cigarettes     Quit date: 1985     Years since quittin.8     Passive exposure: Past    Smokeless tobacco: Never   Vaping Use    Vaping Use: Never used   Substance and Sexual Activity    Alcohol use: Yes     Alcohol/week: 4.2 oz     Types: 7 Shots of liquor per week     Comment: sip of wine    Drug use: No    Sexual activity: Not on file   Other Topics Concern    Not  on file   Social History Narrative    Not on file     Social Determinants of Health     Financial Resource Strain: Not on file   Food Insecurity: Not on file   Transportation Needs: Not on file   Physical Activity: Not on file   Stress: Not on file   Social Connections: Not on file   Intimate Partner Violence: Not on file   Housing Stability: Not on file       Family History   Problem Relation Age of Onset    Cancer Sister     Cancer Mother     Lung Disease Paternal Uncle     Arthritis Neg Hx     Genetic Disorder Neg Hx     Psychiatric Illness Neg Hx     Diabetes Neg Hx     Heart Disease Neg Hx     Hypertension Neg Hx     Hyperlipidemia Neg Hx     Stroke Neg Hx     Alcohol/Drug Neg Hx        Current Outpatient Medications on File Prior to Visit   Medication Sig Dispense Refill    Acetaminophen (TYLENOL PO) Take  by mouth.      alprazolam (XANAX) 0.25 MG Tab Take 1 Tablet by mouth at bedtime as needed for Sleep.      Calcium Carbonate-Vit D-Min (CALCIUM 1200 PO) Take 1 Tab by mouth every day.      vitamin D (CHOLECALCIFEROL) 1000 UNIT Tab Take 1 Tablet by mouth every day.      Cyanocobalamin (B-12 PO) Take 1 Tab by mouth every day.      fluticasone (FLONASE) 50 MCG/ACT nasal spray Administer 1 Spray into affected nostril(S) 2 times a day as needed (allergy). Each Nostril       No current facility-administered medications on file prior to visit.       Hydrocodone, Penicillins, Tetracycline, and Oxycodone      ROS:   Review of Systems   Constitutional:  Negative for chills, diaphoresis, fever, malaise/fatigue and weight loss.   HENT:  Negative for congestion, ear discharge, ear pain, hearing loss, nosebleeds, sinus pain, sore throat and tinnitus.    Eyes:  Negative for blurred vision, double vision, photophobia, pain, discharge and redness.   Respiratory:  Negative for cough, hemoptysis, sputum production, shortness of breath, wheezing and stridor.    Cardiovascular:  Negative for chest pain, palpitations, orthopnea,  "claudication, leg swelling and PND.   Gastrointestinal:  Negative for abdominal pain, constipation, diarrhea, heartburn, nausea and vomiting.   Genitourinary:  Negative for dysuria and urgency.   Musculoskeletal:  Negative for back pain, falls, joint pain, myalgias and neck pain.   Skin:  Negative for itching and rash.   Neurological:  Negative for dizziness, tremors, speech change, focal weakness, weakness and headaches.   Endo/Heme/Allergies:  Negative for environmental allergies.   Psychiatric/Behavioral:  Negative for depression.      /62   Pulse 78   Resp 16   Ht 1.753 m (5' 9\")   Wt 69.9 kg (154 lb)   SpO2 98%   Physical Exam  Constitutional:       General: She is not in acute distress.     Appearance: Normal appearance. She is well-developed and normal weight.   HENT:      Head: Normocephalic and atraumatic.      Right Ear: External ear normal.      Left Ear: External ear normal.      Nose: Nose normal. No congestion.      Mouth/Throat:      Mouth: Mucous membranes are moist.      Pharynx: Oropharynx is clear. No oropharyngeal exudate.   Eyes:      General: No scleral icterus.     Extraocular Movements: Extraocular movements intact.      Conjunctiva/sclera: Conjunctivae normal.      Pupils: Pupils are equal, round, and reactive to light.   Neck:      Vascular: No JVD.      Trachea: No tracheal deviation.   Cardiovascular:      Rate and Rhythm: Normal rate and regular rhythm.      Heart sounds: Normal heart sounds. No murmur heard.    No friction rub. No gallop.   Pulmonary:      Effort: Pulmonary effort is normal. No accessory muscle usage or respiratory distress.      Breath sounds: No wheezing or rales.      Comments: Basilar inspiratory crackles b/l  Abdominal:      General: There is no distension.      Palpations: Abdomen is soft.      Tenderness: There is no abdominal tenderness.   Musculoskeletal:         General: No tenderness or deformity. Normal range of motion.      Cervical back: Normal " range of motion and neck supple.      Right lower leg: No edema.      Left lower leg: No edema.   Lymphadenopathy:      Cervical: No cervical adenopathy.   Skin:     General: Skin is warm and dry.      Findings: No rash.      Nails: There is no clubbing.   Neurological:      Mental Status: She is alert and oriented to person, place, and time.      Cranial Nerves: No cranial nerve deficit.      Gait: Gait normal.   Psychiatric:         Behavior: Behavior normal.       PFTs as reviewed by me personally: as per hPI    Imagaing as reviewed by me personally:  as per HPI    Assessment:  1. Interstitial lung disease (HCC)  PULMONARY FUNCTION TESTS -Test requested: Complete Pulmonary Function Test      2. Chronic respiratory failure with hypoxia (HCC)  PULMONARY FUNCTION TESTS -Test requested: Complete Pulmonary Function Test          Plan:  COPD-ILD overlap however PFTs are mainly restriction and stable for the most part which is somewhat atypical for IPF and CT is not definite UIP pattern. Discussed observation based on sxs only vs. Continue monitoring with annual PFTs. Pt would like to continue PFTs once per year and consider pulmonary rehab pending insurance coverage  Chronic and 2/2 #1. O2 needs are stable and pt is compliant with and benefiting from O2 at 3-4 LPM. Consider pulmonary rehab referral again pending insurance  Return in about 6 months (around 5/15/2023).

## 2023-01-01 ENCOUNTER — APPOINTMENT (OUTPATIENT)
Dept: RADIOLOGY | Facility: MEDICAL CENTER | Age: 88
DRG: 521 | End: 2023-01-01
Attending: INTERNAL MEDICINE
Payer: MEDICARE

## 2023-01-01 ENCOUNTER — APPOINTMENT (OUTPATIENT)
Dept: RADIOLOGY | Facility: MEDICAL CENTER | Age: 88
DRG: 682 | End: 2023-01-01
Attending: EMERGENCY MEDICINE
Payer: MEDICARE

## 2023-01-01 ENCOUNTER — APPOINTMENT (OUTPATIENT)
Dept: CARDIOLOGY | Facility: MEDICAL CENTER | Age: 88
DRG: 521 | End: 2023-01-01
Attending: HOSPITALIST
Payer: MEDICARE

## 2023-01-01 ENCOUNTER — APPOINTMENT (OUTPATIENT)
Dept: RADIOLOGY | Facility: MEDICAL CENTER | Age: 88
DRG: 521 | End: 2023-01-01
Attending: EMERGENCY MEDICINE
Payer: MEDICARE

## 2023-01-01 ENCOUNTER — PATIENT OUTREACH (OUTPATIENT)
Dept: SCHEDULING | Facility: IMAGING CENTER | Age: 88
End: 2023-01-01
Payer: MEDICARE

## 2023-01-01 ENCOUNTER — ANESTHESIA EVENT (OUTPATIENT)
Dept: SURGERY | Facility: MEDICAL CENTER | Age: 88
DRG: 521 | End: 2023-01-01
Payer: MEDICARE

## 2023-01-01 ENCOUNTER — HOSPITAL ENCOUNTER (OUTPATIENT)
Dept: LAB | Facility: MEDICAL CENTER | Age: 88
End: 2023-04-14
Attending: PHYSICIAN ASSISTANT
Payer: MEDICARE

## 2023-01-01 ENCOUNTER — APPOINTMENT (OUTPATIENT)
Dept: SLEEP MEDICINE | Facility: MEDICAL CENTER | Age: 88
End: 2023-01-01
Attending: INTERNAL MEDICINE
Payer: MEDICARE

## 2023-01-01 ENCOUNTER — HOSPITAL ENCOUNTER (INPATIENT)
Facility: MEDICAL CENTER | Age: 88
LOS: 7 days | DRG: 682 | End: 2023-08-16
Attending: EMERGENCY MEDICINE | Admitting: HOSPITALIST
Payer: MEDICARE

## 2023-01-01 ENCOUNTER — OFFICE VISIT (OUTPATIENT)
Dept: URGENT CARE | Facility: CLINIC | Age: 88
End: 2023-01-01
Payer: MEDICARE

## 2023-01-01 ENCOUNTER — APPOINTMENT (OUTPATIENT)
Dept: RADIOLOGY | Facility: MEDICAL CENTER | Age: 88
DRG: 682 | End: 2023-01-01
Attending: HOSPITALIST
Payer: MEDICARE

## 2023-01-01 ENCOUNTER — HOSPITAL ENCOUNTER (INPATIENT)
Facility: MEDICAL CENTER | Age: 88
LOS: 17 days | DRG: 521 | End: 2023-06-02
Attending: EMERGENCY MEDICINE | Admitting: HOSPITALIST
Payer: MEDICARE

## 2023-01-01 ENCOUNTER — ANESTHESIA (OUTPATIENT)
Dept: SURGERY | Facility: MEDICAL CENTER | Age: 88
DRG: 521 | End: 2023-01-01
Payer: MEDICARE

## 2023-01-01 ENCOUNTER — APPOINTMENT (OUTPATIENT)
Dept: RADIOLOGY | Facility: MEDICAL CENTER | Age: 88
DRG: 521 | End: 2023-01-01
Attending: STUDENT IN AN ORGANIZED HEALTH CARE EDUCATION/TRAINING PROGRAM
Payer: MEDICARE

## 2023-01-01 VITALS
HEIGHT: 69 IN | WEIGHT: 142.2 LBS | BODY MASS INDEX: 21.06 KG/M2 | TEMPERATURE: 98.2 F | RESPIRATION RATE: 18 BRPM | OXYGEN SATURATION: 96 % | SYSTOLIC BLOOD PRESSURE: 131 MMHG | HEART RATE: 69 BPM | DIASTOLIC BLOOD PRESSURE: 72 MMHG

## 2023-01-01 VITALS
HEART RATE: 72 BPM | RESPIRATION RATE: 17 BRPM | OXYGEN SATURATION: 93 % | HEIGHT: 69 IN | TEMPERATURE: 98 F | DIASTOLIC BLOOD PRESSURE: 64 MMHG | BODY MASS INDEX: 19.36 KG/M2 | SYSTOLIC BLOOD PRESSURE: 142 MMHG | WEIGHT: 130.73 LBS

## 2023-01-01 VITALS
RESPIRATION RATE: 16 BRPM | OXYGEN SATURATION: 96 % | SYSTOLIC BLOOD PRESSURE: 138 MMHG | HEART RATE: 77 BPM | TEMPERATURE: 96.8 F | DIASTOLIC BLOOD PRESSURE: 72 MMHG | BODY MASS INDEX: 22.59 KG/M2 | WEIGHT: 153 LBS

## 2023-01-01 DIAGNOSIS — I71.43 INFRARENAL ABDOMINAL AORTIC ANEURYSM (AAA) WITHOUT RUPTURE (HCC): ICD-10-CM

## 2023-01-01 DIAGNOSIS — S72.002A DISPLACED FRACTURE OF LEFT FEMORAL NECK (HCC): ICD-10-CM

## 2023-01-01 DIAGNOSIS — S72.002A CLOSED FRACTURE OF NECK OF LEFT FEMUR, INITIAL ENCOUNTER (HCC): ICD-10-CM

## 2023-01-01 DIAGNOSIS — I71.40 ABDOMINAL AORTIC ANEURYSM (AAA) WITHOUT RUPTURE, UNSPECIFIED PART (HCC): ICD-10-CM

## 2023-01-01 DIAGNOSIS — F51.01 PRIMARY INSOMNIA: ICD-10-CM

## 2023-01-01 DIAGNOSIS — R53.1 WEAKNESS: ICD-10-CM

## 2023-01-01 DIAGNOSIS — N17.9 ACUTE RENAL FAILURE SUPERIMPOSED ON STAGE 4 CHRONIC KIDNEY DISEASE, UNSPECIFIED ACUTE RENAL FAILURE TYPE (HCC): ICD-10-CM

## 2023-01-01 DIAGNOSIS — E87.5 HYPERKALEMIA: ICD-10-CM

## 2023-01-01 DIAGNOSIS — R05.1 ACUTE COUGH: ICD-10-CM

## 2023-01-01 DIAGNOSIS — N17.9 AKI (ACUTE KIDNEY INJURY) (HCC): ICD-10-CM

## 2023-01-01 DIAGNOSIS — G47.10 HYPERSOMNOLENT: ICD-10-CM

## 2023-01-01 DIAGNOSIS — I95.1 ORTHOSTATIC HYPOTENSION: ICD-10-CM

## 2023-01-01 DIAGNOSIS — Z20.822 EXPOSURE TO COVID-19 VIRUS: ICD-10-CM

## 2023-01-01 DIAGNOSIS — N18.4 ACUTE RENAL FAILURE SUPERIMPOSED ON STAGE 4 CHRONIC KIDNEY DISEASE, UNSPECIFIED ACUTE RENAL FAILURE TYPE (HCC): ICD-10-CM

## 2023-01-01 DIAGNOSIS — R33.9 URINARY RETENTION: ICD-10-CM

## 2023-01-01 DIAGNOSIS — S72.002D CLOSED FRACTURE OF LEFT HIP WITH ROUTINE HEALING, SUBSEQUENT ENCOUNTER: ICD-10-CM

## 2023-01-01 DIAGNOSIS — I10 PRIMARY HYPERTENSION: ICD-10-CM

## 2023-01-01 DIAGNOSIS — S72.009A CLOSED FRACTURE OF HIP, UNSPECIFIED LATERALITY, INITIAL ENCOUNTER (HCC): ICD-10-CM

## 2023-01-01 DIAGNOSIS — R62.7 FAILURE TO THRIVE IN ADULT: ICD-10-CM

## 2023-01-01 DIAGNOSIS — R62.51 FAILURE TO THRIVE (CHILD): ICD-10-CM

## 2023-01-01 LAB
ALBUMIN SERPL BCP-MCNC: 2.9 G/DL (ref 3.2–4.9)
ALBUMIN SERPL BCP-MCNC: 2.9 G/DL (ref 3.2–4.9)
ALBUMIN SERPL BCP-MCNC: 3 G/DL (ref 3.2–4.9)
ALBUMIN SERPL BCP-MCNC: 3.1 G/DL (ref 3.2–4.9)
ALBUMIN SERPL BCP-MCNC: 3.2 G/DL (ref 3.2–4.9)
ALBUMIN SERPL BCP-MCNC: 3.5 G/DL (ref 3.2–4.9)
ALBUMIN SERPL BCP-MCNC: 4 G/DL (ref 3.2–4.9)
ALBUMIN SERPL BCP-MCNC: 4.1 G/DL (ref 3.2–4.9)
ALBUMIN SERPL BCP-MCNC: 4.2 G/DL (ref 3.2–4.9)
ALBUMIN SERPL BCP-MCNC: 4.6 G/DL (ref 3.2–4.9)
ALBUMIN/GLOB SERPL: 1.5 G/DL
ALBUMIN/GLOB SERPL: 1.8 G/DL
ALBUMIN/GLOB SERPL: 1.8 G/DL
ALP SERPL-CCNC: 130 U/L (ref 30–99)
ALP SERPL-CCNC: 74 U/L (ref 30–99)
ALP SERPL-CCNC: 87 U/L (ref 30–99)
ALT SERPL-CCNC: 13 U/L (ref 2–50)
ALT SERPL-CCNC: 23 U/L (ref 2–50)
ALT SERPL-CCNC: 9 U/L (ref 2–50)
ANION GAP SERPL CALC-SCNC: 10 MMOL/L (ref 7–16)
ANION GAP SERPL CALC-SCNC: 11 MMOL/L (ref 7–16)
ANION GAP SERPL CALC-SCNC: 12 MMOL/L (ref 7–16)
ANION GAP SERPL CALC-SCNC: 12 MMOL/L (ref 7–16)
ANION GAP SERPL CALC-SCNC: 9 MMOL/L (ref 7–16)
ANION GAP SERPL CALC-SCNC: 9 MMOL/L (ref 7–16)
APPEARANCE UR: CLEAR
AST SERPL-CCNC: 16 U/L (ref 12–45)
AST SERPL-CCNC: 18 U/L (ref 12–45)
AST SERPL-CCNC: 23 U/L (ref 12–45)
BACTERIA #/AREA URNS HPF: NEGATIVE /HPF
BACTERIA BLD CULT: NORMAL
BACTERIA BLD CULT: NORMAL
BACTERIA UR CULT: NORMAL
BASOPHILS # BLD AUTO: 0.2 % (ref 0–1.8)
BASOPHILS # BLD AUTO: 0.4 % (ref 0–1.8)
BASOPHILS # BLD AUTO: 0.5 % (ref 0–1.8)
BASOPHILS # BLD AUTO: 0.5 % (ref 0–1.8)
BASOPHILS # BLD AUTO: 0.6 % (ref 0–1.8)
BASOPHILS # BLD AUTO: 0.9 % (ref 0–1.8)
BASOPHILS # BLD: 0.02 K/UL (ref 0–0.12)
BASOPHILS # BLD: 0.03 K/UL (ref 0–0.12)
BASOPHILS # BLD: 0.04 K/UL (ref 0–0.12)
BASOPHILS # BLD: 0.04 K/UL (ref 0–0.12)
BASOPHILS # BLD: 0.06 K/UL (ref 0–0.12)
BILIRUB SERPL-MCNC: 0.3 MG/DL (ref 0.1–1.5)
BILIRUB UR QL STRIP.AUTO: NEGATIVE
BUN SERPL-MCNC: 23 MG/DL (ref 8–22)
BUN SERPL-MCNC: 26 MG/DL (ref 8–22)
BUN SERPL-MCNC: 27 MG/DL (ref 8–22)
BUN SERPL-MCNC: 27 MG/DL (ref 8–22)
BUN SERPL-MCNC: 28 MG/DL (ref 8–22)
BUN SERPL-MCNC: 29 MG/DL (ref 8–22)
BUN SERPL-MCNC: 30 MG/DL (ref 8–22)
BUN SERPL-MCNC: 32 MG/DL (ref 8–22)
BUN SERPL-MCNC: 32 MG/DL (ref 8–22)
BUN SERPL-MCNC: 34 MG/DL (ref 8–22)
BUN SERPL-MCNC: 35 MG/DL (ref 8–22)
BUN SERPL-MCNC: 36 MG/DL (ref 8–22)
BUN SERPL-MCNC: 36 MG/DL (ref 8–22)
BUN SERPL-MCNC: 40 MG/DL (ref 8–22)
BUN SERPL-MCNC: 42 MG/DL (ref 8–22)
BUN SERPL-MCNC: 42 MG/DL (ref 8–22)
BUN SERPL-MCNC: 46 MG/DL (ref 8–22)
BUN SERPL-MCNC: 47 MG/DL (ref 8–22)
BUN SERPL-MCNC: 47 MG/DL (ref 8–22)
BUN SERPL-MCNC: 53 MG/DL (ref 8–22)
CALCIUM ALBUM COR SERPL-MCNC: 10 MG/DL (ref 8.5–10.5)
CALCIUM ALBUM COR SERPL-MCNC: 10 MG/DL (ref 8.5–10.5)
CALCIUM ALBUM COR SERPL-MCNC: 10.2 MG/DL (ref 8.5–10.5)
CALCIUM ALBUM COR SERPL-MCNC: 9.4 MG/DL (ref 8.5–10.5)
CALCIUM ALBUM COR SERPL-MCNC: 9.4 MG/DL (ref 8.5–10.5)
CALCIUM ALBUM COR SERPL-MCNC: 9.5 MG/DL (ref 8.5–10.5)
CALCIUM ALBUM COR SERPL-MCNC: 9.7 MG/DL (ref 8.5–10.5)
CALCIUM ALBUM COR SERPL-MCNC: 9.8 MG/DL (ref 8.5–10.5)
CALCIUM ALBUM COR SERPL-MCNC: 9.9 MG/DL (ref 8.5–10.5)
CALCIUM ALBUM COR SERPL-MCNC: 9.9 MG/DL (ref 8.5–10.5)
CALCIUM SERPL-MCNC: 10.5 MG/DL (ref 8.4–10.2)
CALCIUM SERPL-MCNC: 8.8 MG/DL (ref 8.4–10.2)
CALCIUM SERPL-MCNC: 8.9 MG/DL (ref 8.4–10.2)
CALCIUM SERPL-MCNC: 9 MG/DL (ref 8.4–10.2)
CALCIUM SERPL-MCNC: 9.1 MG/DL (ref 8.4–10.2)
CALCIUM SERPL-MCNC: 9.1 MG/DL (ref 8.4–10.2)
CALCIUM SERPL-MCNC: 9.2 MG/DL (ref 8.4–10.2)
CALCIUM SERPL-MCNC: 9.3 MG/DL (ref 8.4–10.2)
CALCIUM SERPL-MCNC: 9.4 MG/DL (ref 8.4–10.2)
CALCIUM SERPL-MCNC: 9.5 MG/DL (ref 8.4–10.2)
CALCIUM SERPL-MCNC: 9.7 MG/DL (ref 8.4–10.2)
CHLORIDE SERPL-SCNC: 101 MMOL/L (ref 96–112)
CHLORIDE SERPL-SCNC: 102 MMOL/L (ref 96–112)
CHLORIDE SERPL-SCNC: 103 MMOL/L (ref 96–112)
CHLORIDE SERPL-SCNC: 104 MMOL/L (ref 96–112)
CHLORIDE SERPL-SCNC: 105 MMOL/L (ref 96–112)
CHLORIDE SERPL-SCNC: 106 MMOL/L (ref 96–112)
CHLORIDE SERPL-SCNC: 107 MMOL/L (ref 96–112)
CHLORIDE SERPL-SCNC: 107 MMOL/L (ref 96–112)
CHLORIDE SERPL-SCNC: 108 MMOL/L (ref 96–112)
CHLORIDE SERPL-SCNC: 110 MMOL/L (ref 96–112)
CHLORIDE SERPL-SCNC: 111 MMOL/L (ref 96–112)
CHLORIDE SERPL-SCNC: 111 MMOL/L (ref 96–112)
CHLORIDE SERPL-SCNC: 114 MMOL/L (ref 96–112)
CK SERPL-CCNC: 74 U/L (ref 0–154)
CO2 SERPL-SCNC: 18 MMOL/L (ref 20–33)
CO2 SERPL-SCNC: 18 MMOL/L (ref 20–33)
CO2 SERPL-SCNC: 19 MMOL/L (ref 20–33)
CO2 SERPL-SCNC: 19 MMOL/L (ref 20–33)
CO2 SERPL-SCNC: 21 MMOL/L (ref 20–33)
CO2 SERPL-SCNC: 22 MMOL/L (ref 20–33)
CO2 SERPL-SCNC: 22 MMOL/L (ref 20–33)
CO2 SERPL-SCNC: 23 MMOL/L (ref 20–33)
CO2 SERPL-SCNC: 23 MMOL/L (ref 20–33)
CO2 SERPL-SCNC: 24 MMOL/L (ref 20–33)
CO2 SERPL-SCNC: 24 MMOL/L (ref 20–33)
CO2 SERPL-SCNC: 25 MMOL/L (ref 20–33)
CO2 SERPL-SCNC: 26 MMOL/L (ref 20–33)
CO2 SERPL-SCNC: 27 MMOL/L (ref 20–33)
CO2 SERPL-SCNC: 29 MMOL/L (ref 20–33)
COLOR UR: YELLOW
CORTIS SERPL-MCNC: 20.3 UG/DL (ref 0–23)
CREAT SERPL-MCNC: 0.96 MG/DL (ref 0.5–1.4)
CREAT SERPL-MCNC: 1.04 MG/DL (ref 0.5–1.4)
CREAT SERPL-MCNC: 1.06 MG/DL (ref 0.5–1.4)
CREAT SERPL-MCNC: 1.07 MG/DL (ref 0.5–1.4)
CREAT SERPL-MCNC: 1.11 MG/DL (ref 0.5–1.4)
CREAT SERPL-MCNC: 1.14 MG/DL (ref 0.5–1.4)
CREAT SERPL-MCNC: 1.16 MG/DL (ref 0.5–1.4)
CREAT SERPL-MCNC: 1.21 MG/DL (ref 0.5–1.4)
CREAT SERPL-MCNC: 1.25 MG/DL (ref 0.5–1.4)
CREAT SERPL-MCNC: 1.26 MG/DL (ref 0.5–1.4)
CREAT SERPL-MCNC: 1.27 MG/DL (ref 0.5–1.4)
CREAT SERPL-MCNC: 1.3 MG/DL (ref 0.5–1.4)
CREAT SERPL-MCNC: 1.35 MG/DL (ref 0.5–1.4)
CREAT SERPL-MCNC: 1.4 MG/DL (ref 0.5–1.4)
CREAT SERPL-MCNC: 1.45 MG/DL (ref 0.5–1.4)
CREAT SERPL-MCNC: 1.55 MG/DL (ref 0.5–1.4)
CREAT SERPL-MCNC: 1.59 MG/DL (ref 0.5–1.4)
CREAT SERPL-MCNC: 1.63 MG/DL (ref 0.5–1.4)
CREAT SERPL-MCNC: 1.63 MG/DL (ref 0.5–1.4)
CREAT SERPL-MCNC: 1.65 MG/DL (ref 0.5–1.4)
CREAT UR-MCNC: 44.43 MG/DL
CRP SERPL HS-MCNC: 0.48 MG/DL (ref 0–0.75)
EKG IMPRESSION: NORMAL
EKG IMPRESSION: NORMAL
EOSINOPHIL # BLD AUTO: 0 K/UL (ref 0–0.51)
EOSINOPHIL # BLD AUTO: 0.02 K/UL (ref 0–0.51)
EOSINOPHIL # BLD AUTO: 0.11 K/UL (ref 0–0.51)
EOSINOPHIL # BLD AUTO: 0.12 K/UL (ref 0–0.51)
EOSINOPHIL # BLD AUTO: 0.17 K/UL (ref 0–0.51)
EOSINOPHIL # BLD AUTO: 0.19 K/UL (ref 0–0.51)
EOSINOPHIL # BLD AUTO: 0.31 K/UL (ref 0–0.51)
EOSINOPHIL # BLD AUTO: 0.34 K/UL (ref 0–0.51)
EOSINOPHIL NFR BLD: 0 % (ref 0–6.9)
EOSINOPHIL NFR BLD: 0.2 % (ref 0–6.9)
EOSINOPHIL NFR BLD: 1.8 % (ref 0–6.9)
EOSINOPHIL NFR BLD: 1.9 % (ref 0–6.9)
EOSINOPHIL NFR BLD: 2 % (ref 0–6.9)
EOSINOPHIL NFR BLD: 2.5 % (ref 0–6.9)
EOSINOPHIL NFR BLD: 4 % (ref 0–6.9)
EOSINOPHIL NFR BLD: 5 % (ref 0–6.9)
EPI CELLS #/AREA URNS HPF: NEGATIVE /HPF
ERYTHROCYTE [DISTWIDTH] IN BLOOD BY AUTOMATED COUNT: 45.9 FL (ref 35.9–50)
ERYTHROCYTE [DISTWIDTH] IN BLOOD BY AUTOMATED COUNT: 46.5 FL (ref 35.9–50)
ERYTHROCYTE [DISTWIDTH] IN BLOOD BY AUTOMATED COUNT: 46.5 FL (ref 35.9–50)
ERYTHROCYTE [DISTWIDTH] IN BLOOD BY AUTOMATED COUNT: 46.6 FL (ref 35.9–50)
ERYTHROCYTE [DISTWIDTH] IN BLOOD BY AUTOMATED COUNT: 46.9 FL (ref 35.9–50)
ERYTHROCYTE [DISTWIDTH] IN BLOOD BY AUTOMATED COUNT: 47 FL (ref 35.9–50)
ERYTHROCYTE [DISTWIDTH] IN BLOOD BY AUTOMATED COUNT: 47 FL (ref 35.9–50)
ERYTHROCYTE [DISTWIDTH] IN BLOOD BY AUTOMATED COUNT: 47.7 FL (ref 35.9–50)
ERYTHROCYTE [DISTWIDTH] IN BLOOD BY AUTOMATED COUNT: 47.8 FL (ref 35.9–50)
ERYTHROCYTE [DISTWIDTH] IN BLOOD BY AUTOMATED COUNT: 48.6 FL (ref 35.9–50)
ERYTHROCYTE [DISTWIDTH] IN BLOOD BY AUTOMATED COUNT: 49.5 FL (ref 35.9–50)
ERYTHROCYTE [DISTWIDTH] IN BLOOD BY AUTOMATED COUNT: 50.4 FL (ref 35.9–50)
ERYTHROCYTE [DISTWIDTH] IN BLOOD BY AUTOMATED COUNT: 50.6 FL (ref 35.9–50)
ERYTHROCYTE [DISTWIDTH] IN BLOOD BY AUTOMATED COUNT: 50.6 FL (ref 35.9–50)
ERYTHROCYTE [DISTWIDTH] IN BLOOD BY AUTOMATED COUNT: 51.3 FL (ref 35.9–50)
ERYTHROCYTE [DISTWIDTH] IN BLOOD BY AUTOMATED COUNT: 51.8 FL (ref 35.9–50)
ERYTHROCYTE [DISTWIDTH] IN BLOOD BY AUTOMATED COUNT: 51.9 FL (ref 35.9–50)
ERYTHROCYTE [DISTWIDTH] IN BLOOD BY AUTOMATED COUNT: 54.1 FL (ref 35.9–50)
ERYTHROCYTE [SEDIMENTATION RATE] IN BLOOD BY WESTERGREN METHOD: 28 MM/HOUR (ref 0–25)
FERRITIN SERPL-MCNC: 274 NG/ML (ref 10–291)
FLUAV RNA SPEC QL NAA+PROBE: NEGATIVE
FLUAV RNA SPEC QL NAA+PROBE: NEGATIVE
FLUBV RNA SPEC QL NAA+PROBE: NEGATIVE
FLUBV RNA SPEC QL NAA+PROBE: NEGATIVE
GFR SERPLBLD CREATININE-BSD FMLA CKD-EPI: 28 ML/MIN/1.73 M 2
GFR SERPLBLD CREATININE-BSD FMLA CKD-EPI: 29 ML/MIN/1.73 M 2
GFR SERPLBLD CREATININE-BSD FMLA CKD-EPI: 29 ML/MIN/1.73 M 2
GFR SERPLBLD CREATININE-BSD FMLA CKD-EPI: 30 ML/MIN/1.73 M 2
GFR SERPLBLD CREATININE-BSD FMLA CKD-EPI: 31 ML/MIN/1.73 M 2
GFR SERPLBLD CREATININE-BSD FMLA CKD-EPI: 33 ML/MIN/1.73 M 2
GFR SERPLBLD CREATININE-BSD FMLA CKD-EPI: 35 ML/MIN/1.73 M 2
GFR SERPLBLD CREATININE-BSD FMLA CKD-EPI: 36 ML/MIN/1.73 M 2
GFR SERPLBLD CREATININE-BSD FMLA CKD-EPI: 38 ML/MIN/1.73 M 2
GFR SERPLBLD CREATININE-BSD FMLA CKD-EPI: 39 ML/MIN/1.73 M 2
GFR SERPLBLD CREATININE-BSD FMLA CKD-EPI: 39 ML/MIN/1.73 M 2
GFR SERPLBLD CREATININE-BSD FMLA CKD-EPI: 40 ML/MIN/1.73 M 2
GFR SERPLBLD CREATININE-BSD FMLA CKD-EPI: 41 ML/MIN/1.73 M 2
GFR SERPLBLD CREATININE-BSD FMLA CKD-EPI: 43 ML/MIN/1.73 M 2
GFR SERPLBLD CREATININE-BSD FMLA CKD-EPI: 44 ML/MIN/1.73 M 2
GFR SERPLBLD CREATININE-BSD FMLA CKD-EPI: 46 ML/MIN/1.73 M 2
GFR SERPLBLD CREATININE-BSD FMLA CKD-EPI: 48 ML/MIN/1.73 M 2
GFR SERPLBLD CREATININE-BSD FMLA CKD-EPI: 48 ML/MIN/1.73 M 2
GFR SERPLBLD CREATININE-BSD FMLA CKD-EPI: 50 ML/MIN/1.73 M 2
GFR SERPLBLD CREATININE-BSD FMLA CKD-EPI: 54 ML/MIN/1.73 M 2
GLOBULIN SER CALC-MCNC: 2.3 G/DL (ref 1.9–3.5)
GLOBULIN SER CALC-MCNC: 2.4 G/DL (ref 1.9–3.5)
GLOBULIN SER CALC-MCNC: 3 G/DL (ref 1.9–3.5)
GLUCOSE BLD STRIP.AUTO-MCNC: 105 MG/DL (ref 65–99)
GLUCOSE BLD STRIP.AUTO-MCNC: 91 MG/DL (ref 65–99)
GLUCOSE SERPL-MCNC: 103 MG/DL (ref 65–99)
GLUCOSE SERPL-MCNC: 104 MG/DL (ref 65–99)
GLUCOSE SERPL-MCNC: 106 MG/DL (ref 65–99)
GLUCOSE SERPL-MCNC: 106 MG/DL (ref 65–99)
GLUCOSE SERPL-MCNC: 109 MG/DL (ref 65–99)
GLUCOSE SERPL-MCNC: 112 MG/DL (ref 65–99)
GLUCOSE SERPL-MCNC: 118 MG/DL (ref 65–99)
GLUCOSE SERPL-MCNC: 119 MG/DL (ref 65–99)
GLUCOSE SERPL-MCNC: 121 MG/DL (ref 65–99)
GLUCOSE SERPL-MCNC: 141 MG/DL (ref 65–99)
GLUCOSE SERPL-MCNC: 155 MG/DL (ref 65–99)
GLUCOSE SERPL-MCNC: 81 MG/DL (ref 65–99)
GLUCOSE SERPL-MCNC: 82 MG/DL (ref 65–99)
GLUCOSE SERPL-MCNC: 82 MG/DL (ref 65–99)
GLUCOSE SERPL-MCNC: 87 MG/DL (ref 65–99)
GLUCOSE SERPL-MCNC: 87 MG/DL (ref 65–99)
GLUCOSE SERPL-MCNC: 88 MG/DL (ref 65–99)
GLUCOSE SERPL-MCNC: 88 MG/DL (ref 65–99)
GLUCOSE SERPL-MCNC: 89 MG/DL (ref 65–99)
GLUCOSE SERPL-MCNC: 98 MG/DL (ref 65–99)
GLUCOSE UR STRIP.AUTO-MCNC: NEGATIVE MG/DL
HCT VFR BLD AUTO: 27.5 % (ref 37–47)
HCT VFR BLD AUTO: 27.8 % (ref 37–47)
HCT VFR BLD AUTO: 28.6 % (ref 37–47)
HCT VFR BLD AUTO: 28.9 % (ref 37–47)
HCT VFR BLD AUTO: 29.4 % (ref 37–47)
HCT VFR BLD AUTO: 29.5 % (ref 37–47)
HCT VFR BLD AUTO: 29.8 % (ref 37–47)
HCT VFR BLD AUTO: 30 % (ref 37–47)
HCT VFR BLD AUTO: 30.2 % (ref 37–47)
HCT VFR BLD AUTO: 30.4 % (ref 37–47)
HCT VFR BLD AUTO: 30.9 % (ref 37–47)
HCT VFR BLD AUTO: 31.5 % (ref 37–47)
HCT VFR BLD AUTO: 31.5 % (ref 37–47)
HCT VFR BLD AUTO: 31.9 % (ref 37–47)
HCT VFR BLD AUTO: 34.4 % (ref 37–47)
HCT VFR BLD AUTO: 35.1 % (ref 37–47)
HCT VFR BLD AUTO: 36 % (ref 37–47)
HCT VFR BLD AUTO: 38.5 % (ref 37–47)
HGB BLD-MCNC: 10 G/DL (ref 12–16)
HGB BLD-MCNC: 10 G/DL (ref 12–16)
HGB BLD-MCNC: 10.7 G/DL (ref 12–16)
HGB BLD-MCNC: 10.8 G/DL (ref 12–16)
HGB BLD-MCNC: 10.8 G/DL (ref 12–16)
HGB BLD-MCNC: 11.4 G/DL (ref 12–16)
HGB BLD-MCNC: 8.6 G/DL (ref 12–16)
HGB BLD-MCNC: 8.6 G/DL (ref 12–16)
HGB BLD-MCNC: 8.7 G/DL (ref 12–16)
HGB BLD-MCNC: 8.8 G/DL (ref 12–16)
HGB BLD-MCNC: 8.9 G/DL (ref 12–16)
HGB BLD-MCNC: 8.9 G/DL (ref 12–16)
HGB BLD-MCNC: 9 G/DL (ref 12–16)
HGB BLD-MCNC: 9.1 G/DL (ref 12–16)
HGB BLD-MCNC: 9.1 G/DL (ref 12–16)
HGB BLD-MCNC: 9.2 G/DL (ref 12–16)
HGB BLD-MCNC: 9.2 G/DL (ref 12–16)
HGB BLD-MCNC: 9.5 G/DL (ref 12–16)
IMM GRANULOCYTES # BLD AUTO: 0.02 K/UL (ref 0–0.11)
IMM GRANULOCYTES # BLD AUTO: 0.03 K/UL (ref 0–0.11)
IMM GRANULOCYTES # BLD AUTO: 0.04 K/UL (ref 0–0.11)
IMM GRANULOCYTES # BLD AUTO: 0.05 K/UL (ref 0–0.11)
IMM GRANULOCYTES # BLD AUTO: 0.1 K/UL (ref 0–0.11)
IMM GRANULOCYTES NFR BLD AUTO: 0.3 % (ref 0–0.9)
IMM GRANULOCYTES NFR BLD AUTO: 0.4 % (ref 0–0.9)
IMM GRANULOCYTES NFR BLD AUTO: 0.5 % (ref 0–0.9)
IMM GRANULOCYTES NFR BLD AUTO: 0.6 % (ref 0–0.9)
IMM GRANULOCYTES NFR BLD AUTO: 0.6 % (ref 0–0.9)
IMM GRANULOCYTES NFR BLD AUTO: 1.2 % (ref 0–0.9)
IRON SATN MFR SERPL: 10 % (ref 15–55)
IRON SERPL-MCNC: 21 UG/DL (ref 40–170)
KETONES UR STRIP.AUTO-MCNC: NEGATIVE MG/DL
LACTATE SERPL-SCNC: 0.6 MMOL/L (ref 0.5–2)
LACTATE SERPL-SCNC: 0.9 MMOL/L (ref 0.5–2)
LEUKOCYTE ESTERASE UR QL STRIP.AUTO: NEGATIVE
LV EJECT FRACT MOD 4C 99902: 57.36
LYMPHOCYTES # BLD AUTO: 0.63 K/UL (ref 1–4.8)
LYMPHOCYTES # BLD AUTO: 0.9 K/UL (ref 1–4.8)
LYMPHOCYTES # BLD AUTO: 1.08 K/UL (ref 1–4.8)
LYMPHOCYTES # BLD AUTO: 1.19 K/UL (ref 1–4.8)
LYMPHOCYTES # BLD AUTO: 1.24 K/UL (ref 1–4.8)
LYMPHOCYTES # BLD AUTO: 1.26 K/UL (ref 1–4.8)
LYMPHOCYTES # BLD AUTO: 1.39 K/UL (ref 1–4.8)
LYMPHOCYTES # BLD AUTO: 1.46 K/UL (ref 1–4.8)
LYMPHOCYTES NFR BLD: 12.7 % (ref 22–41)
LYMPHOCYTES NFR BLD: 15.3 % (ref 22–41)
LYMPHOCYTES NFR BLD: 15.4 % (ref 22–41)
LYMPHOCYTES NFR BLD: 16.7 % (ref 22–41)
LYMPHOCYTES NFR BLD: 18.2 % (ref 22–41)
LYMPHOCYTES NFR BLD: 19 % (ref 22–41)
LYMPHOCYTES NFR BLD: 19 % (ref 22–41)
LYMPHOCYTES NFR BLD: 6.5 % (ref 22–41)
MAGNESIUM SERPL-MCNC: 1.6 MG/DL (ref 1.5–2.5)
MAGNESIUM SERPL-MCNC: 1.7 MG/DL (ref 1.5–2.5)
MAGNESIUM SERPL-MCNC: 1.8 MG/DL (ref 1.5–2.5)
MAGNESIUM SERPL-MCNC: 1.9 MG/DL (ref 1.5–2.5)
MAGNESIUM SERPL-MCNC: 1.9 MG/DL (ref 1.5–2.5)
MAGNESIUM SERPL-MCNC: 2 MG/DL (ref 1.5–2.5)
MAGNESIUM SERPL-MCNC: 2.1 MG/DL (ref 1.5–2.5)
MCH RBC QN AUTO: 28.1 PG (ref 27–33)
MCH RBC QN AUTO: 28.3 PG (ref 27–33)
MCH RBC QN AUTO: 28.3 PG (ref 27–33)
MCH RBC QN AUTO: 28.6 PG (ref 27–33)
MCH RBC QN AUTO: 28.7 PG (ref 27–33)
MCH RBC QN AUTO: 29.1 PG (ref 27–33)
MCH RBC QN AUTO: 30.1 PG (ref 27–33)
MCH RBC QN AUTO: 30.1 PG (ref 27–33)
MCH RBC QN AUTO: 30.3 PG (ref 27–33)
MCH RBC QN AUTO: 30.5 PG (ref 27–33)
MCH RBC QN AUTO: 30.6 PG (ref 27–33)
MCH RBC QN AUTO: 30.7 PG (ref 27–33)
MCH RBC QN AUTO: 30.8 PG (ref 27–33)
MCH RBC QN AUTO: 30.9 PG (ref 27–33)
MCH RBC QN AUTO: 30.9 PG (ref 27–33)
MCH RBC QN AUTO: 31 PG (ref 27–33)
MCHC RBC AUTO-ENTMCNC: 29.6 G/DL (ref 32.2–35.5)
MCHC RBC AUTO-ENTMCNC: 29.7 G/DL (ref 32.2–35.5)
MCHC RBC AUTO-ENTMCNC: 29.7 G/DL (ref 32.2–35.5)
MCHC RBC AUTO-ENTMCNC: 29.8 G/DL (ref 32.2–35.5)
MCHC RBC AUTO-ENTMCNC: 29.8 G/DL (ref 32.2–35.5)
MCHC RBC AUTO-ENTMCNC: 29.9 G/DL (ref 32.2–35.5)
MCHC RBC AUTO-ENTMCNC: 30.1 G/DL (ref 32.2–35.5)
MCHC RBC AUTO-ENTMCNC: 30.1 G/DL (ref 32.2–35.5)
MCHC RBC AUTO-ENTMCNC: 30.2 G/DL (ref 33.6–35)
MCHC RBC AUTO-ENTMCNC: 30.2 G/DL (ref 33.6–35)
MCHC RBC AUTO-ENTMCNC: 30.4 G/DL (ref 32.2–35.5)
MCHC RBC AUTO-ENTMCNC: 30.8 G/DL (ref 33.6–35)
MCHC RBC AUTO-ENTMCNC: 31.3 G/DL (ref 32.2–35.5)
MCHC RBC AUTO-ENTMCNC: 31.3 G/DL (ref 32.2–35.5)
MCHC RBC AUTO-ENTMCNC: 31.3 G/DL (ref 33.6–35)
MCHC RBC AUTO-ENTMCNC: 31.4 G/DL (ref 33.6–35)
MCHC RBC AUTO-ENTMCNC: 31.7 G/DL (ref 33.6–35)
MCHC RBC AUTO-ENTMCNC: 31.7 G/DL (ref 33.6–35)
MCV RBC AUTO: 100.3 FL (ref 81.4–97.8)
MCV RBC AUTO: 100.7 FL (ref 81.4–97.8)
MCV RBC AUTO: 101.4 FL (ref 81.4–97.8)
MCV RBC AUTO: 101.4 FL (ref 81.4–97.8)
MCV RBC AUTO: 93.8 FL (ref 81.4–97.8)
MCV RBC AUTO: 95.1 FL (ref 81.4–97.8)
MCV RBC AUTO: 95.1 FL (ref 81.4–97.8)
MCV RBC AUTO: 95.6 FL (ref 81.4–97.8)
MCV RBC AUTO: 96.5 FL (ref 81.4–97.8)
MCV RBC AUTO: 96.9 FL (ref 81.4–97.8)
MCV RBC AUTO: 97.2 FL (ref 81.4–97.8)
MCV RBC AUTO: 97.5 FL (ref 81.4–97.8)
MCV RBC AUTO: 97.5 FL (ref 81.4–97.8)
MCV RBC AUTO: 97.9 FL (ref 81.4–97.8)
MCV RBC AUTO: 98 FL (ref 81.4–97.8)
MCV RBC AUTO: 98.6 FL (ref 81.4–97.8)
MCV RBC AUTO: 99.2 FL (ref 81.4–97.8)
MCV RBC AUTO: 99.7 FL (ref 81.4–97.8)
MICRO URNS: ABNORMAL
MICRO URNS: NORMAL
MICRO URNS: NORMAL
MONOCYTES # BLD AUTO: 0.62 K/UL (ref 0–0.85)
MONOCYTES # BLD AUTO: 0.67 K/UL (ref 0–0.85)
MONOCYTES # BLD AUTO: 1 K/UL (ref 0–0.85)
MONOCYTES # BLD AUTO: 1.02 K/UL (ref 0–0.85)
MONOCYTES # BLD AUTO: 1.04 K/UL (ref 0–0.85)
MONOCYTES # BLD AUTO: 1.07 K/UL (ref 0–0.85)
MONOCYTES # BLD AUTO: 1.13 K/UL (ref 0–0.85)
MONOCYTES # BLD AUTO: 1.17 K/UL (ref 0–0.85)
MONOCYTES NFR BLD AUTO: 11.4 % (ref 0–13.4)
MONOCYTES NFR BLD AUTO: 11.7 % (ref 0–13.4)
MONOCYTES NFR BLD AUTO: 12 % (ref 0–13.4)
MONOCYTES NFR BLD AUTO: 12.6 % (ref 0–13.4)
MONOCYTES NFR BLD AUTO: 13.3 % (ref 0–13.4)
MONOCYTES NFR BLD AUTO: 15.2 % (ref 0–13.4)
MONOCYTES NFR BLD AUTO: 15.3 % (ref 0–13.4)
MONOCYTES NFR BLD AUTO: 9.3 % (ref 0–13.4)
NEUTROPHILS # BLD AUTO: 4.1 K/UL (ref 1.82–7.42)
NEUTROPHILS # BLD AUTO: 4.14 K/UL (ref 2–7.15)
NEUTROPHILS # BLD AUTO: 4.55 K/UL (ref 1.82–7.42)
NEUTROPHILS # BLD AUTO: 4.81 K/UL (ref 2–7.15)
NEUTROPHILS # BLD AUTO: 5.08 K/UL (ref 2–7.15)
NEUTROPHILS # BLD AUTO: 5.63 K/UL (ref 2–7.15)
NEUTROPHILS # BLD AUTO: 6.28 K/UL (ref 2–7.15)
NEUTROPHILS # BLD AUTO: 7.83 K/UL (ref 2–7.15)
NEUTROPHILS NFR BLD: 60.3 % (ref 44–72)
NEUTROPHILS NFR BLD: 62.8 % (ref 44–72)
NEUTROPHILS NFR BLD: 65.8 % (ref 44–72)
NEUTROPHILS NFR BLD: 67.7 % (ref 44–72)
NEUTROPHILS NFR BLD: 68.5 % (ref 44–72)
NEUTROPHILS NFR BLD: 70.6 % (ref 44–72)
NEUTROPHILS NFR BLD: 73.6 % (ref 44–72)
NEUTROPHILS NFR BLD: 81.2 % (ref 44–72)
NITRITE UR QL STRIP.AUTO: NEGATIVE
NRBC # BLD AUTO: 0 K/UL
NRBC BLD-RTO: 0 /100 WBC
NRBC BLD-RTO: 0 /100 WBC (ref 0–0.2)
NRBC BLD-RTO: 0 /100 WBC (ref 0–0.2)
NT-PROBNP SERPL IA-MCNC: 1058 PG/ML (ref 0–125)
NT-PROBNP SERPL IA-MCNC: 259 PG/ML (ref 0–125)
PH UR STRIP.AUTO: 5.5 [PH] (ref 5–8)
PH UR STRIP.AUTO: 6 [PH] (ref 5–8)
PH UR STRIP.AUTO: 6.5 [PH] (ref 5–8)
PHOSPHATE SERPL-MCNC: 2.5 MG/DL (ref 2.5–4.5)
PHOSPHATE SERPL-MCNC: 2.7 MG/DL (ref 2.5–4.5)
PHOSPHATE SERPL-MCNC: 2.8 MG/DL (ref 2.5–4.5)
PHOSPHATE SERPL-MCNC: 3.5 MG/DL (ref 2.5–4.5)
PHOSPHATE SERPL-MCNC: 3.7 MG/DL (ref 2.5–4.5)
PHOSPHATE SERPL-MCNC: 3.9 MG/DL (ref 2.5–4.5)
PHOSPHATE SERPL-MCNC: 4.1 MG/DL (ref 2.5–4.5)
PHOSPHATE SERPL-MCNC: 4.1 MG/DL (ref 2.5–4.5)
PLATELET # BLD AUTO: 152 K/UL (ref 164–446)
PLATELET # BLD AUTO: 155 K/UL (ref 164–446)
PLATELET # BLD AUTO: 162 K/UL (ref 164–446)
PLATELET # BLD AUTO: 163 K/UL (ref 164–446)
PLATELET # BLD AUTO: 163 K/UL (ref 164–446)
PLATELET # BLD AUTO: 194 K/UL (ref 164–446)
PLATELET # BLD AUTO: 213 K/UL (ref 164–446)
PLATELET # BLD AUTO: 214 K/UL (ref 164–446)
PLATELET # BLD AUTO: 219 K/UL (ref 164–446)
PLATELET # BLD AUTO: 220 K/UL (ref 164–446)
PLATELET # BLD AUTO: 230 K/UL (ref 164–446)
PLATELET # BLD AUTO: 237 K/UL (ref 164–446)
PLATELET # BLD AUTO: 239 K/UL (ref 164–446)
PLATELET # BLD AUTO: 251 K/UL (ref 164–446)
PLATELET # BLD AUTO: 271 K/UL (ref 164–446)
PLATELET # BLD AUTO: 294 K/UL (ref 164–446)
PLATELET # BLD AUTO: 297 K/UL (ref 164–446)
PLATELET # BLD AUTO: 315 K/UL (ref 164–446)
PMV BLD AUTO: 8.7 FL (ref 9–12.9)
PMV BLD AUTO: 8.8 FL (ref 9–12.9)
PMV BLD AUTO: 9 FL (ref 9–12.9)
PMV BLD AUTO: 9 FL (ref 9–12.9)
PMV BLD AUTO: 9.1 FL (ref 9–12.9)
PMV BLD AUTO: 9.2 FL (ref 9–12.9)
PMV BLD AUTO: 9.3 FL (ref 9–12.9)
PMV BLD AUTO: 9.3 FL (ref 9–12.9)
PMV BLD AUTO: 9.4 FL (ref 9–12.9)
PMV BLD AUTO: 9.6 FL (ref 9–12.9)
PMV BLD AUTO: 9.7 FL (ref 9–12.9)
PMV BLD AUTO: 9.8 FL (ref 9–12.9)
POTASSIUM SERPL-SCNC: 3.8 MMOL/L (ref 3.6–5.5)
POTASSIUM SERPL-SCNC: 3.9 MMOL/L (ref 3.6–5.5)
POTASSIUM SERPL-SCNC: 4 MMOL/L (ref 3.6–5.5)
POTASSIUM SERPL-SCNC: 4 MMOL/L (ref 3.6–5.5)
POTASSIUM SERPL-SCNC: 4.1 MMOL/L (ref 3.6–5.5)
POTASSIUM SERPL-SCNC: 4.2 MMOL/L (ref 3.6–5.5)
POTASSIUM SERPL-SCNC: 4.3 MMOL/L (ref 3.6–5.5)
POTASSIUM SERPL-SCNC: 4.4 MMOL/L (ref 3.6–5.5)
POTASSIUM SERPL-SCNC: 4.5 MMOL/L (ref 3.6–5.5)
POTASSIUM SERPL-SCNC: 4.6 MMOL/L (ref 3.6–5.5)
POTASSIUM SERPL-SCNC: 4.6 MMOL/L (ref 3.6–5.5)
POTASSIUM SERPL-SCNC: 4.7 MMOL/L (ref 3.6–5.5)
POTASSIUM SERPL-SCNC: 4.8 MMOL/L (ref 3.6–5.5)
POTASSIUM SERPL-SCNC: 4.9 MMOL/L (ref 3.6–5.5)
POTASSIUM SERPL-SCNC: 5 MMOL/L (ref 3.6–5.5)
POTASSIUM SERPL-SCNC: 5 MMOL/L (ref 3.6–5.5)
POTASSIUM SERPL-SCNC: 5.6 MMOL/L (ref 3.6–5.5)
POTASSIUM SERPL-SCNC: 6.7 MMOL/L (ref 3.6–5.5)
PROT SERPL-MCNC: 6.4 G/DL (ref 6–8.2)
PROT SERPL-MCNC: 6.6 G/DL (ref 6–8.2)
PROT SERPL-MCNC: 7.6 G/DL (ref 6–8.2)
PROT UR QL STRIP: NEGATIVE MG/DL
RBC # BLD AUTO: 2.79 M/UL (ref 4.2–5.4)
RBC # BLD AUTO: 2.84 M/UL (ref 4.2–5.4)
RBC # BLD AUTO: 2.85 M/UL (ref 4.2–5.4)
RBC # BLD AUTO: 2.87 M/UL (ref 4.2–5.4)
RBC # BLD AUTO: 2.96 M/UL (ref 4.2–5.4)
RBC # BLD AUTO: 2.96 M/UL (ref 4.2–5.4)
RBC # BLD AUTO: 2.97 M/UL (ref 4.2–5.4)
RBC # BLD AUTO: 3 M/UL (ref 4.2–5.4)
RBC # BLD AUTO: 3.18 M/UL (ref 4.2–5.4)
RBC # BLD AUTO: 3.22 M/UL (ref 4.2–5.4)
RBC # BLD AUTO: 3.23 M/UL (ref 4.2–5.4)
RBC # BLD AUTO: 3.24 M/UL (ref 4.2–5.4)
RBC # BLD AUTO: 3.25 M/UL (ref 4.2–5.4)
RBC # BLD AUTO: 3.26 M/UL (ref 4.2–5.4)
RBC # BLD AUTO: 3.53 M/UL (ref 4.2–5.4)
RBC # BLD AUTO: 3.54 M/UL (ref 4.2–5.4)
RBC # BLD AUTO: 3.73 M/UL (ref 4.2–5.4)
RBC # BLD AUTO: 4.05 M/UL (ref 4.2–5.4)
RBC # URNS HPF: ABNORMAL /HPF
RBC UR QL AUTO: ABNORMAL
RBC UR QL AUTO: NEGATIVE
RBC UR QL AUTO: NEGATIVE
RSV RNA SPEC QL NAA+PROBE: NEGATIVE
RSV RNA SPEC QL NAA+PROBE: NEGATIVE
SARS-COV-2 RNA RESP QL NAA+PROBE: NEGATIVE
SARS-COV-2 RNA RESP QL NAA+PROBE: NOTDETECTED
SIGNIFICANT IND 70042: NORMAL
SITE SITE: NORMAL
SODIUM SERPL-SCNC: 136 MMOL/L (ref 135–145)
SODIUM SERPL-SCNC: 136 MMOL/L (ref 135–145)
SODIUM SERPL-SCNC: 138 MMOL/L (ref 135–145)
SODIUM SERPL-SCNC: 139 MMOL/L (ref 135–145)
SODIUM SERPL-SCNC: 140 MMOL/L (ref 135–145)
SODIUM SERPL-SCNC: 140 MMOL/L (ref 135–145)
SODIUM SERPL-SCNC: 141 MMOL/L (ref 135–145)
SODIUM SERPL-SCNC: 142 MMOL/L (ref 135–145)
SODIUM SERPL-SCNC: 143 MMOL/L (ref 135–145)
SODIUM UR-SCNC: 160 MMOL/L
SOURCE SOURCE: NORMAL
SP GR UR STRIP.AUTO: 1.01
SP GR UR STRIP.AUTO: 1.01
SP GR UR STRIP.AUTO: 1.02
SPECIMEN SOURCE: NORMAL
TIBC SERPL-MCNC: 213 UG/DL (ref 250–450)
TROPONIN T SERPL-MCNC: 30 NG/L (ref 6–19)
TROPONIN T SERPL-MCNC: 35 NG/L (ref 6–19)
TROPONIN T SERPL-MCNC: 37 NG/L (ref 6–19)
TROPONIN T SERPL-MCNC: 38 NG/L (ref 6–19)
TROPONIN T SERPL-MCNC: 42 NG/L (ref 6–19)
TSH SERPL DL<=0.005 MIU/L-ACNC: 4.35 UIU/ML (ref 0.38–5.33)
UIBC SERPL-MCNC: 192 UG/DL (ref 110–370)
URATE SERPL-MCNC: 5.1 MG/DL (ref 1.9–8.2)
WBC # BLD AUTO: 5.1 K/UL (ref 4.8–10.8)
WBC # BLD AUTO: 5.8 K/UL (ref 4.8–10.8)
WBC # BLD AUTO: 5.9 K/UL (ref 4.8–10.8)
WBC # BLD AUTO: 6 K/UL (ref 4.8–10.8)
WBC # BLD AUTO: 6.2 K/UL (ref 4.8–10.8)
WBC # BLD AUTO: 6.4 K/UL (ref 4.8–10.8)
WBC # BLD AUTO: 6.6 K/UL (ref 4.8–10.8)
WBC # BLD AUTO: 6.7 K/UL (ref 4.8–10.8)
WBC # BLD AUTO: 6.8 K/UL (ref 4.8–10.8)
WBC # BLD AUTO: 6.8 K/UL (ref 4.8–10.8)
WBC # BLD AUTO: 7.2 K/UL (ref 4.8–10.8)
WBC # BLD AUTO: 7.7 K/UL (ref 4.8–10.8)
WBC # BLD AUTO: 7.7 K/UL (ref 4.8–10.8)
WBC # BLD AUTO: 8.3 K/UL (ref 4.8–10.8)
WBC # BLD AUTO: 8.5 K/UL (ref 4.8–10.8)
WBC # BLD AUTO: 9.7 K/UL (ref 4.8–10.8)
WBC #/AREA URNS HPF: ABNORMAL /HPF

## 2023-01-01 PROCEDURE — 85027 COMPLETE CBC AUTOMATED: CPT

## 2023-01-01 PROCEDURE — A9270 NON-COVERED ITEM OR SERVICE: HCPCS | Performed by: HOSPITALIST

## 2023-01-01 PROCEDURE — 770020 HCHG ROOM/CARE - TELE (206)

## 2023-01-01 PROCEDURE — 97530 THERAPEUTIC ACTIVITIES: CPT

## 2023-01-01 PROCEDURE — 97162 PT EVAL MOD COMPLEX 30 MIN: CPT

## 2023-01-01 PROCEDURE — 99223 1ST HOSP IP/OBS HIGH 75: CPT | Mod: 25,AI | Performed by: HOSPITALIST

## 2023-01-01 PROCEDURE — 71045 X-RAY EXAM CHEST 1 VIEW: CPT

## 2023-01-01 PROCEDURE — 94760 N-INVAS EAR/PLS OXIMETRY 1: CPT

## 2023-01-01 PROCEDURE — 97166 OT EVAL MOD COMPLEX 45 MIN: CPT

## 2023-01-01 PROCEDURE — 700102 HCHG RX REV CODE 250 W/ 637 OVERRIDE(OP): Performed by: INTERNAL MEDICINE

## 2023-01-01 PROCEDURE — 80048 BASIC METABOLIC PNL TOTAL CA: CPT

## 2023-01-01 PROCEDURE — 80053 COMPREHEN METABOLIC PANEL: CPT

## 2023-01-01 PROCEDURE — 160031 HCHG SURGERY MINUTES - 1ST 30 MINS LEVEL 5: Performed by: STUDENT IN AN ORGANIZED HEALTH CARE EDUCATION/TRAINING PROGRAM

## 2023-01-01 PROCEDURE — 82728 ASSAY OF FERRITIN: CPT

## 2023-01-01 PROCEDURE — 770001 HCHG ROOM/CARE - MED/SURG/GYN PRIV*

## 2023-01-01 PROCEDURE — 700111 HCHG RX REV CODE 636 W/ 250 OVERRIDE (IP): Mod: JZ | Performed by: INTERNAL MEDICINE

## 2023-01-01 PROCEDURE — 80069 RENAL FUNCTION PANEL: CPT

## 2023-01-01 PROCEDURE — 97597 DBRDMT OPN WND 1ST 20 CM/<: CPT

## 2023-01-01 PROCEDURE — A9270 NON-COVERED ITEM OR SERVICE: HCPCS | Performed by: INTERNAL MEDICINE

## 2023-01-01 PROCEDURE — 700102 HCHG RX REV CODE 250 W/ 637 OVERRIDE(OP): Performed by: HOSPITALIST

## 2023-01-01 PROCEDURE — 99024 POSTOP FOLLOW-UP VISIT: CPT | Performed by: STUDENT IN AN ORGANIZED HEALTH CARE EDUCATION/TRAINING PROGRAM

## 2023-01-01 PROCEDURE — 93306 TTE W/DOPPLER COMPLETE: CPT | Mod: 26 | Performed by: INTERNAL MEDICINE

## 2023-01-01 PROCEDURE — 84100 ASSAY OF PHOSPHORUS: CPT

## 2023-01-01 PROCEDURE — 97110 THERAPEUTIC EXERCISES: CPT

## 2023-01-01 PROCEDURE — 700111 HCHG RX REV CODE 636 W/ 250 OVERRIDE (IP): Performed by: HOSPITALIST

## 2023-01-01 PROCEDURE — 83550 IRON BINDING TEST: CPT

## 2023-01-01 PROCEDURE — 73130 X-RAY EXAM OF HAND: CPT | Mod: RT

## 2023-01-01 PROCEDURE — 700111 HCHG RX REV CODE 636 W/ 250 OVERRIDE (IP): Performed by: ANESTHESIOLOGY

## 2023-01-01 PROCEDURE — 700111 HCHG RX REV CODE 636 W/ 250 OVERRIDE (IP): Performed by: INTERNAL MEDICINE

## 2023-01-01 PROCEDURE — 70450 CT HEAD/BRAIN W/O DYE: CPT

## 2023-01-01 PROCEDURE — 82533 TOTAL CORTISOL: CPT

## 2023-01-01 PROCEDURE — 99239 HOSP IP/OBS DSCHRG MGMT >30: CPT | Performed by: FAMILY MEDICINE

## 2023-01-01 PROCEDURE — 27236 TREAT THIGH FRACTURE: CPT | Mod: 79,LT | Performed by: STUDENT IN AN ORGANIZED HEALTH CARE EDUCATION/TRAINING PROGRAM

## 2023-01-01 PROCEDURE — 96375 TX/PRO/DX INJ NEW DRUG ADDON: CPT

## 2023-01-01 PROCEDURE — 36415 COLL VENOUS BLD VENIPUNCTURE: CPT

## 2023-01-01 PROCEDURE — 73130 X-RAY EXAM OF HAND: CPT | Mod: LT

## 2023-01-01 PROCEDURE — 83735 ASSAY OF MAGNESIUM: CPT

## 2023-01-01 PROCEDURE — 82962 GLUCOSE BLOOD TEST: CPT

## 2023-01-01 PROCEDURE — 84300 ASSAY OF URINE SODIUM: CPT

## 2023-01-01 PROCEDURE — 97535 SELF CARE MNGMENT TRAINING: CPT

## 2023-01-01 PROCEDURE — 83605 ASSAY OF LACTIC ACID: CPT

## 2023-01-01 PROCEDURE — 51798 US URINE CAPACITY MEASURE: CPT

## 2023-01-01 PROCEDURE — 71260 CT THORAX DX C+: CPT

## 2023-01-01 PROCEDURE — 700101 HCHG RX REV CODE 250: Performed by: HOSPITALIST

## 2023-01-01 PROCEDURE — 700101 HCHG RX REV CODE 250: Performed by: INTERNAL MEDICINE

## 2023-01-01 PROCEDURE — 81003 URINALYSIS AUTO W/O SCOPE: CPT

## 2023-01-01 PROCEDURE — 84484 ASSAY OF TROPONIN QUANT: CPT

## 2023-01-01 PROCEDURE — C9803 HOPD COVID-19 SPEC COLLECT: HCPCS | Performed by: EMERGENCY MEDICINE

## 2023-01-01 PROCEDURE — 84550 ASSAY OF BLOOD/URIC ACID: CPT

## 2023-01-01 PROCEDURE — 93005 ELECTROCARDIOGRAM TRACING: CPT | Performed by: EMERGENCY MEDICINE

## 2023-01-01 PROCEDURE — 700105 HCHG RX REV CODE 258: Performed by: INTERNAL MEDICINE

## 2023-01-01 PROCEDURE — 99233 SBSQ HOSP IP/OBS HIGH 50: CPT | Performed by: INTERNAL MEDICINE

## 2023-01-01 PROCEDURE — 700117 HCHG RX CONTRAST REV CODE 255: Performed by: EMERGENCY MEDICINE

## 2023-01-01 PROCEDURE — 99497 ADVNCD CARE PLAN 30 MIN: CPT | Performed by: HOSPITALIST

## 2023-01-01 PROCEDURE — 97112 NEUROMUSCULAR REEDUCATION: CPT

## 2023-01-01 PROCEDURE — 99285 EMERGENCY DEPT VISIT HI MDM: CPT

## 2023-01-01 PROCEDURE — 73060 X-RAY EXAM OF HUMERUS: CPT | Mod: LT

## 2023-01-01 PROCEDURE — C1776 JOINT DEVICE (IMPLANTABLE): HCPCS | Performed by: STUDENT IN AN ORGANIZED HEALTH CARE EDUCATION/TRAINING PROGRAM

## 2023-01-01 PROCEDURE — 99232 SBSQ HOSP IP/OBS MODERATE 35: CPT | Performed by: INTERNAL MEDICINE

## 2023-01-01 PROCEDURE — 84443 ASSAY THYROID STIM HORMONE: CPT

## 2023-01-01 PROCEDURE — 83880 ASSAY OF NATRIURETIC PEPTIDE: CPT

## 2023-01-01 PROCEDURE — 99222 1ST HOSP IP/OBS MODERATE 55: CPT | Performed by: INTERNAL MEDICINE

## 2023-01-01 PROCEDURE — 99232 SBSQ HOSP IP/OBS MODERATE 35: CPT | Performed by: FAMILY MEDICINE

## 2023-01-01 PROCEDURE — 85025 COMPLETE CBC W/AUTO DIFF WBC: CPT

## 2023-01-01 PROCEDURE — 27236 TREAT THIGH FRACTURE: CPT | Mod: ASROC,79,LT

## 2023-01-01 PROCEDURE — 82570 ASSAY OF URINE CREATININE: CPT

## 2023-01-01 PROCEDURE — 01210 ANES OPEN PX HIP JOINT NOS: CPT | Performed by: ANESTHESIOLOGY

## 2023-01-01 PROCEDURE — 97116 GAIT TRAINING THERAPY: CPT

## 2023-01-01 PROCEDURE — 0241U HCHG SARS-COV-2 COVID-19 NFCT DS RESP RNA 4 TRGT MIC: CPT

## 2023-01-01 PROCEDURE — 0SRS0J9 REPLACEMENT OF LEFT HIP JOINT, FEMORAL SURFACE WITH SYNTHETIC SUBSTITUTE, CEMENTED, OPEN APPROACH: ICD-10-PCS | Performed by: STUDENT IN AN ORGANIZED HEALTH CARE EDUCATION/TRAINING PROGRAM

## 2023-01-01 PROCEDURE — C1713 ANCHOR/SCREW BN/BN,TIS/BN: HCPCS | Performed by: STUDENT IN AN ORGANIZED HEALTH CARE EDUCATION/TRAINING PROGRAM

## 2023-01-01 PROCEDURE — 700105 HCHG RX REV CODE 258: Performed by: STUDENT IN AN ORGANIZED HEALTH CARE EDUCATION/TRAINING PROGRAM

## 2023-01-01 PROCEDURE — 700105 HCHG RX REV CODE 258: Performed by: HOSPITALIST

## 2023-01-01 PROCEDURE — 73502 X-RAY EXAM HIP UNI 2-3 VIEWS: CPT | Mod: RT

## 2023-01-01 PROCEDURE — 99239 HOSP IP/OBS DSCHRG MGMT >30: CPT | Performed by: INTERNAL MEDICINE

## 2023-01-01 PROCEDURE — 86140 C-REACTIVE PROTEIN: CPT

## 2023-01-01 PROCEDURE — 81001 URINALYSIS AUTO W/SCOPE: CPT

## 2023-01-01 PROCEDURE — 99498 ADVNCD CARE PLAN ADDL 30 MIN: CPT | Mod: 25 | Performed by: HOSPITALIST

## 2023-01-01 PROCEDURE — 96374 THER/PROPH/DIAG INJ IV PUSH: CPT

## 2023-01-01 PROCEDURE — 700101 HCHG RX REV CODE 250: Performed by: ANESTHESIOLOGY

## 2023-01-01 PROCEDURE — 160002 HCHG RECOVERY MINUTES (STAT): Performed by: STUDENT IN AN ORGANIZED HEALTH CARE EDUCATION/TRAINING PROGRAM

## 2023-01-01 PROCEDURE — 502000 HCHG MISC OR IMPLANTS RC 0278: Performed by: STUDENT IN AN ORGANIZED HEALTH CARE EDUCATION/TRAINING PROGRAM

## 2023-01-01 PROCEDURE — 99497 ADVNCD CARE PLAN 30 MIN: CPT | Mod: 25 | Performed by: HOSPITALIST

## 2023-01-01 PROCEDURE — 76775 US EXAM ABDO BACK WALL LIM: CPT

## 2023-01-01 PROCEDURE — 87651 STREP A DNA AMP PROBE: CPT | Performed by: PHYSICIAN ASSISTANT

## 2023-01-01 PROCEDURE — 700101 HCHG RX REV CODE 250: Performed by: EMERGENCY MEDICINE

## 2023-01-01 PROCEDURE — 99222 1ST HOSP IP/OBS MODERATE 55: CPT | Mod: 24,57 | Performed by: STUDENT IN AN ORGANIZED HEALTH CARE EDUCATION/TRAINING PROGRAM

## 2023-01-01 PROCEDURE — 82550 ASSAY OF CK (CPK): CPT

## 2023-01-01 PROCEDURE — 160036 HCHG PACU - EA ADDL 30 MINS PHASE I: Performed by: STUDENT IN AN ORGANIZED HEALTH CARE EDUCATION/TRAINING PROGRAM

## 2023-01-01 PROCEDURE — 700111 HCHG RX REV CODE 636 W/ 250 OVERRIDE (IP): Performed by: EMERGENCY MEDICINE

## 2023-01-01 PROCEDURE — 97602 WOUND(S) CARE NON-SELECTIVE: CPT

## 2023-01-01 PROCEDURE — 87040 BLOOD CULTURE FOR BACTERIA: CPT

## 2023-01-01 PROCEDURE — 93306 TTE W/DOPPLER COMPLETE: CPT

## 2023-01-01 PROCEDURE — 160035 HCHG PACU - 1ST 60 MINS PHASE I: Performed by: STUDENT IN AN ORGANIZED HEALTH CARE EDUCATION/TRAINING PROGRAM

## 2023-01-01 PROCEDURE — 99291 CRITICAL CARE FIRST HOUR: CPT

## 2023-01-01 PROCEDURE — 97163 PT EVAL HIGH COMPLEX 45 MIN: CPT

## 2023-01-01 PROCEDURE — 99100 ANES PT EXTEME AGE<1 YR&>70: CPT | Performed by: ANESTHESIOLOGY

## 2023-01-01 PROCEDURE — 73552 X-RAY EXAM OF FEMUR 2/>: CPT | Mod: LT

## 2023-01-01 PROCEDURE — 160048 HCHG OR STATISTICAL LEVEL 1-5: Performed by: STUDENT IN AN ORGANIZED HEALTH CARE EDUCATION/TRAINING PROGRAM

## 2023-01-01 PROCEDURE — 0241U POCT CEPHEID COV-2, FLU A/B, RSV - PCR: CPT | Performed by: PHYSICIAN ASSISTANT

## 2023-01-01 PROCEDURE — 160009 HCHG ANES TIME/MIN: Performed by: STUDENT IN AN ORGANIZED HEALTH CARE EDUCATION/TRAINING PROGRAM

## 2023-01-01 PROCEDURE — 72170 X-RAY EXAM OF PELVIS: CPT

## 2023-01-01 PROCEDURE — 73030 X-RAY EXAM OF SHOULDER: CPT | Mod: RT

## 2023-01-01 PROCEDURE — 72125 CT NECK SPINE W/O DYE: CPT

## 2023-01-01 PROCEDURE — 700105 HCHG RX REV CODE 258: Performed by: EMERGENCY MEDICINE

## 2023-01-01 PROCEDURE — 83540 ASSAY OF IRON: CPT

## 2023-01-01 PROCEDURE — 160042 HCHG SURGERY MINUTES - EA ADDL 1 MIN LEVEL 5: Performed by: STUDENT IN AN ORGANIZED HEALTH CARE EDUCATION/TRAINING PROGRAM

## 2023-01-01 PROCEDURE — 85652 RBC SED RATE AUTOMATED: CPT

## 2023-01-01 PROCEDURE — 99214 OFFICE O/P EST MOD 30 MIN: CPT | Mod: CS | Performed by: PHYSICIAN ASSISTANT

## 2023-01-01 PROCEDURE — 87086 URINE CULTURE/COLONY COUNT: CPT

## 2023-01-01 DEVICE — IMPLANTABLE DEVICE: Type: IMPLANTABLE DEVICE | Site: HIP | Status: FUNCTIONAL

## 2023-01-01 DEVICE — BONE CEMENT SIMPLEX ANTIBIO - (10/PK): Type: IMPLANTABLE DEVICE | Site: HIP | Status: FUNCTIONAL

## 2023-01-01 RX ORDER — IBUPROFEN 200 MG
600 TABLET ORAL EVERY 8 HOURS PRN
Status: ON HOLD | COMMUNITY
End: 2023-01-01

## 2023-01-01 RX ORDER — HALOPERIDOL 5 MG/ML
1 INJECTION INTRAMUSCULAR
Status: DISCONTINUED | OUTPATIENT
Start: 2023-01-01 | End: 2023-01-01 | Stop reason: HOSPADM

## 2023-01-01 RX ORDER — LABETALOL HYDROCHLORIDE 5 MG/ML
10 INJECTION, SOLUTION INTRAVENOUS ONCE
Status: COMPLETED | OUTPATIENT
Start: 2023-01-01 | End: 2023-01-01

## 2023-01-01 RX ORDER — AMOXICILLIN 250 MG
2 CAPSULE ORAL 2 TIMES DAILY
Status: DISCONTINUED | OUTPATIENT
Start: 2023-01-01 | End: 2023-01-01 | Stop reason: HOSPADM

## 2023-01-01 RX ORDER — ONDANSETRON 2 MG/ML
4 INJECTION INTRAMUSCULAR; INTRAVENOUS EVERY 4 HOURS PRN
Status: DISCONTINUED | OUTPATIENT
Start: 2023-01-01 | End: 2023-01-01 | Stop reason: HOSPADM

## 2023-01-01 RX ORDER — AMLODIPINE BESYLATE 5 MG/1
5 TABLET ORAL DAILY
Qty: 30 TABLET | Refills: 2 | Status: SHIPPED
Start: 2023-01-01 | End: 2023-09-14

## 2023-01-01 RX ORDER — SODIUM CHLORIDE 9 MG/ML
INJECTION, SOLUTION INTRAVENOUS CONTINUOUS
Status: ACTIVE | OUTPATIENT
Start: 2023-01-01 | End: 2023-01-01

## 2023-01-01 RX ORDER — LIDOCAINE 50 MG/G
1 PATCH TOPICAL EVERY 24 HOURS
Qty: 10 PATCH | Status: SHIPPED
Start: 2023-01-01 | End: 2023-01-01

## 2023-01-01 RX ORDER — ALPRAZOLAM 0.25 MG/1
.125-.25 TABLET ORAL NIGHTLY PRN
Qty: 1 TABLET | Refills: 0 | Status: SHIPPED | OUTPATIENT
Start: 2023-01-01 | End: 2023-01-01

## 2023-01-01 RX ORDER — FLUTICASONE PROPIONATE 50 MCG
1 SPRAY, SUSPENSION (ML) NASAL 2 TIMES DAILY
Status: DISCONTINUED | OUTPATIENT
Start: 2023-01-01 | End: 2023-01-01

## 2023-01-01 RX ORDER — POLYETHYLENE GLYCOL 3350 17 G/17G
1 POWDER, FOR SOLUTION ORAL
Status: DISCONTINUED | OUTPATIENT
Start: 2023-01-01 | End: 2023-01-01 | Stop reason: HOSPADM

## 2023-01-01 RX ORDER — ACETAMINOPHEN 325 MG/1
650 TABLET ORAL EVERY 6 HOURS PRN
Status: DISCONTINUED | OUTPATIENT
Start: 2023-01-01 | End: 2023-01-01

## 2023-01-01 RX ORDER — TRANEXAMIC ACID 100 MG/ML
INJECTION, SOLUTION INTRAVENOUS PRN
Status: DISCONTINUED | OUTPATIENT
Start: 2023-01-01 | End: 2023-01-01 | Stop reason: SURG

## 2023-01-01 RX ORDER — FUROSEMIDE 10 MG/ML
20 INJECTION INTRAMUSCULAR; INTRAVENOUS ONCE
Status: COMPLETED | OUTPATIENT
Start: 2023-01-01 | End: 2023-01-01

## 2023-01-01 RX ORDER — SODIUM CHLORIDE 9 MG/ML
INJECTION, SOLUTION INTRAVENOUS CONTINUOUS
Status: DISCONTINUED | OUTPATIENT
Start: 2023-01-01 | End: 2023-01-01

## 2023-01-01 RX ORDER — LABETALOL HYDROCHLORIDE 5 MG/ML
10 INJECTION, SOLUTION INTRAVENOUS EVERY 4 HOURS PRN
Status: DISCONTINUED | OUTPATIENT
Start: 2023-01-01 | End: 2023-01-01 | Stop reason: HOSPADM

## 2023-01-01 RX ORDER — DEXAMETHASONE SODIUM PHOSPHATE 4 MG/ML
INJECTION, SOLUTION INTRA-ARTICULAR; INTRALESIONAL; INTRAMUSCULAR; INTRAVENOUS; SOFT TISSUE PRN
Status: DISCONTINUED | OUTPATIENT
Start: 2023-01-01 | End: 2023-01-01 | Stop reason: SURG

## 2023-01-01 RX ORDER — LIDOCAINE HYDROCHLORIDE 20 MG/ML
INJECTION, SOLUTION EPIDURAL; INFILTRATION; INTRACAUDAL; PERINEURAL PRN
Status: DISCONTINUED | OUTPATIENT
Start: 2023-01-01 | End: 2023-01-01 | Stop reason: SURG

## 2023-01-01 RX ORDER — HYDROMORPHONE HYDROCHLORIDE 1 MG/ML
0.25 INJECTION, SOLUTION INTRAMUSCULAR; INTRAVENOUS; SUBCUTANEOUS
Status: DISCONTINUED | OUTPATIENT
Start: 2023-01-01 | End: 2023-01-01

## 2023-01-01 RX ORDER — CEFAZOLIN SODIUM 1 G/3ML
INJECTION, POWDER, FOR SOLUTION INTRAMUSCULAR; INTRAVENOUS PRN
Status: DISCONTINUED | OUTPATIENT
Start: 2023-01-01 | End: 2023-01-01 | Stop reason: SURG

## 2023-01-01 RX ORDER — DIPHENHYDRAMINE HYDROCHLORIDE 50 MG/ML
12.5 INJECTION INTRAMUSCULAR; INTRAVENOUS
Status: DISCONTINUED | OUTPATIENT
Start: 2023-01-01 | End: 2023-01-01 | Stop reason: HOSPADM

## 2023-01-01 RX ORDER — HYDRALAZINE HYDROCHLORIDE 20 MG/ML
10 INJECTION INTRAMUSCULAR; INTRAVENOUS ONCE
Status: ACTIVE | OUTPATIENT
Start: 2023-01-01 | End: 2023-01-01

## 2023-01-01 RX ORDER — ALPRAZOLAM 0.25 MG/1
.125-.25 TABLET ORAL NIGHTLY PRN
Status: DISCONTINUED | OUTPATIENT
Start: 2023-01-01 | End: 2023-01-01 | Stop reason: HOSPADM

## 2023-01-01 RX ORDER — ACETAMINOPHEN 500 MG
1000 TABLET ORAL 3 TIMES DAILY
Status: DISCONTINUED | OUTPATIENT
Start: 2023-01-01 | End: 2023-01-01 | Stop reason: HOSPADM

## 2023-01-01 RX ORDER — BISACODYL 10 MG
10 SUPPOSITORY, RECTAL RECTAL
Status: DISCONTINUED | OUTPATIENT
Start: 2023-01-01 | End: 2023-01-01 | Stop reason: HOSPADM

## 2023-01-01 RX ORDER — SENNOSIDES 8.6 MG
650 CAPSULE ORAL
Status: ON HOLD | COMMUNITY
End: 2023-01-01

## 2023-01-01 RX ORDER — GABAPENTIN 100 MG/1
100 CAPSULE ORAL 2 TIMES DAILY
COMMUNITY
End: 2023-01-01

## 2023-01-01 RX ORDER — ALPRAZOLAM 0.25 MG/1
.125-.25 TABLET ORAL NIGHTLY PRN
Status: ON HOLD | COMMUNITY
End: 2023-01-01 | Stop reason: SDUPTHER

## 2023-01-01 RX ORDER — ACETAMINOPHEN 500 MG
1000 TABLET ORAL 3 TIMES DAILY
Qty: 30 TABLET | Refills: 0 | Status: SHIPPED
Start: 2023-01-01 | End: 2023-01-01

## 2023-01-01 RX ORDER — OXYCODONE HCL 5 MG/5 ML
10 SOLUTION, ORAL ORAL
Status: DISCONTINUED | OUTPATIENT
Start: 2023-01-01 | End: 2023-01-01 | Stop reason: HOSPADM

## 2023-01-01 RX ORDER — HEPARIN SODIUM 5000 [USP'U]/ML
5000 INJECTION, SOLUTION INTRAVENOUS; SUBCUTANEOUS EVERY 8 HOURS
Status: DISCONTINUED | OUTPATIENT
Start: 2023-01-01 | End: 2023-01-01 | Stop reason: HOSPADM

## 2023-01-01 RX ORDER — OXYCODONE HCL 5 MG/5 ML
5 SOLUTION, ORAL ORAL
Status: DISCONTINUED | OUTPATIENT
Start: 2023-01-01 | End: 2023-01-01 | Stop reason: HOSPADM

## 2023-01-01 RX ORDER — ACETAMINOPHEN 325 MG/1
650 TABLET ORAL EVERY 6 HOURS PRN
Status: DISCONTINUED | OUTPATIENT
Start: 2023-01-01 | End: 2023-01-01 | Stop reason: HOSPADM

## 2023-01-01 RX ORDER — MAGNESIUM SULFATE HEPTAHYDRATE 40 MG/ML
2 INJECTION, SOLUTION INTRAVENOUS ONCE
Status: COMPLETED | OUTPATIENT
Start: 2023-01-01 | End: 2023-01-01

## 2023-01-01 RX ORDER — SULFAMETHOXAZOLE AND TRIMETHOPRIM 800; 160 MG/1; MG/1
1 TABLET ORAL 2 TIMES DAILY
Status: ON HOLD | COMMUNITY
End: 2023-01-01

## 2023-01-01 RX ORDER — HYDRALAZINE HYDROCHLORIDE 20 MG/ML
10 INJECTION INTRAMUSCULAR; INTRAVENOUS ONCE
Status: COMPLETED | OUTPATIENT
Start: 2023-01-01 | End: 2023-01-01

## 2023-01-01 RX ORDER — FLUTICASONE PROPIONATE 50 MCG
1 SPRAY, SUSPENSION (ML) NASAL 2 TIMES DAILY PRN
Status: DISCONTINUED | OUTPATIENT
Start: 2023-01-01 | End: 2023-01-01

## 2023-01-01 RX ORDER — SODIUM BICARBONATE 650 MG/1
650 TABLET ORAL
Status: DISCONTINUED | OUTPATIENT
Start: 2023-01-01 | End: 2023-01-01 | Stop reason: HOSPADM

## 2023-01-01 RX ORDER — LABETALOL HYDROCHLORIDE 5 MG/ML
5 INJECTION, SOLUTION INTRAVENOUS
Status: COMPLETED | OUTPATIENT
Start: 2023-01-01 | End: 2023-01-01

## 2023-01-01 RX ORDER — EPHEDRINE SULFATE 50 MG/ML
5 INJECTION, SOLUTION INTRAVENOUS
Status: DISCONTINUED | OUTPATIENT
Start: 2023-01-01 | End: 2023-01-01 | Stop reason: HOSPADM

## 2023-01-01 RX ORDER — ONDANSETRON 4 MG/1
4 TABLET, ORALLY DISINTEGRATING ORAL EVERY 4 HOURS PRN
Status: DISCONTINUED | OUTPATIENT
Start: 2023-01-01 | End: 2023-01-01 | Stop reason: HOSPADM

## 2023-01-01 RX ORDER — ALPRAZOLAM 0.25 MG/1
0.25 TABLET ORAL NIGHTLY PRN
Status: DISCONTINUED | OUTPATIENT
Start: 2023-01-01 | End: 2023-01-01

## 2023-01-01 RX ORDER — LIDOCAINE 50 MG/G
1 PATCH TOPICAL EVERY 24 HOURS
Status: DISCONTINUED | OUTPATIENT
Start: 2023-01-01 | End: 2023-01-01 | Stop reason: HOSPADM

## 2023-01-01 RX ORDER — MORPHINE SULFATE 4 MG/ML
2 INJECTION INTRAVENOUS
Status: DISCONTINUED | OUTPATIENT
Start: 2023-01-01 | End: 2023-01-01

## 2023-01-01 RX ORDER — SODIUM CHLORIDE 9 MG/ML
500 INJECTION, SOLUTION INTRAVENOUS ONCE
Status: COMPLETED | OUTPATIENT
Start: 2023-01-01 | End: 2023-01-01

## 2023-01-01 RX ORDER — MIDODRINE HYDROCHLORIDE 5 MG/1
5 TABLET ORAL
Qty: 60 TABLET | Status: SHIPPED
Start: 2023-01-01 | End: 2023-01-01

## 2023-01-01 RX ORDER — ROCURONIUM BROMIDE 10 MG/ML
INJECTION, SOLUTION INTRAVENOUS PRN
Status: DISCONTINUED | OUTPATIENT
Start: 2023-01-01 | End: 2023-01-01 | Stop reason: SURG

## 2023-01-01 RX ORDER — AMOXICILLIN 250 MG
2 CAPSULE ORAL 2 TIMES DAILY
Qty: 30 TABLET | Refills: 0 | Status: SHIPPED
Start: 2023-01-01 | End: 2023-01-01

## 2023-01-01 RX ORDER — HYDROMORPHONE HYDROCHLORIDE 1 MG/ML
0.5 INJECTION, SOLUTION INTRAMUSCULAR; INTRAVENOUS; SUBCUTANEOUS
Status: DISCONTINUED | OUTPATIENT
Start: 2023-01-01 | End: 2023-01-01

## 2023-01-01 RX ORDER — CHOLECALCIFEROL (VITAMIN D3) 125 MCG
5 CAPSULE ORAL NIGHTLY
Status: DISCONTINUED | OUTPATIENT
Start: 2023-01-01 | End: 2023-01-01 | Stop reason: HOSPADM

## 2023-01-01 RX ORDER — ACETAMINOPHEN 325 MG/1
650 TABLET ORAL EVERY 6 HOURS
Status: DISPENSED | OUTPATIENT
Start: 2023-01-01 | End: 2023-01-01

## 2023-01-01 RX ORDER — SODIUM CHLORIDE, SODIUM LACTATE, POTASSIUM CHLORIDE, CALCIUM CHLORIDE 600; 310; 30; 20 MG/100ML; MG/100ML; MG/100ML; MG/100ML
INJECTION, SOLUTION INTRAVENOUS CONTINUOUS
Status: ACTIVE | OUTPATIENT
Start: 2023-01-01 | End: 2023-01-01

## 2023-01-01 RX ORDER — HEPARIN SODIUM 5000 [USP'U]/ML
5000 INJECTION, SOLUTION INTRAVENOUS; SUBCUTANEOUS EVERY 8 HOURS
Refills: 0 | Status: SHIPPED
Start: 2023-01-01 | End: 2023-01-01

## 2023-01-01 RX ORDER — SODIUM HYPOCHLORITE 1.25 MG/ML
SOLUTION TOPICAL 2 TIMES DAILY
Status: DISCONTINUED | OUTPATIENT
Start: 2023-01-01 | End: 2023-01-01 | Stop reason: HOSPADM

## 2023-01-01 RX ORDER — SODIUM CHLORIDE, SODIUM LACTATE, POTASSIUM CHLORIDE, AND CALCIUM CHLORIDE .6; .31; .03; .02 G/100ML; G/100ML; G/100ML; G/100ML
30 INJECTION, SOLUTION INTRAVENOUS ONCE
Status: DISCONTINUED | OUTPATIENT
Start: 2023-01-01 | End: 2023-01-01

## 2023-01-01 RX ORDER — ONDANSETRON 2 MG/ML
4 INJECTION INTRAMUSCULAR; INTRAVENOUS
Status: DISCONTINUED | OUTPATIENT
Start: 2023-01-01 | End: 2023-01-01 | Stop reason: HOSPADM

## 2023-01-01 RX ORDER — FUROSEMIDE 10 MG/ML
40 INJECTION INTRAMUSCULAR; INTRAVENOUS ONCE
Status: DISCONTINUED | OUTPATIENT
Start: 2023-01-01 | End: 2023-01-01

## 2023-01-01 RX ORDER — AMLODIPINE BESYLATE 5 MG/1
5 TABLET ORAL
Status: DISCONTINUED | OUTPATIENT
Start: 2023-01-01 | End: 2023-01-01 | Stop reason: HOSPADM

## 2023-01-01 RX ORDER — MIDODRINE HYDROCHLORIDE 5 MG/1
5 TABLET ORAL
Status: DISCONTINUED | OUTPATIENT
Start: 2023-01-01 | End: 2023-01-01 | Stop reason: HOSPADM

## 2023-01-01 RX ADMIN — AMLODIPINE BESYLATE 5 MG: 5 TABLET ORAL at 05:00

## 2023-01-01 RX ADMIN — MINERAL OIL, PETROLATUM 1 APPLICATION: 425; 573 OINTMENT OPHTHALMIC at 21:36

## 2023-01-01 RX ADMIN — FENTANYL CITRATE 50 MCG: 50 INJECTION, SOLUTION INTRAMUSCULAR; INTRAVENOUS at 13:21

## 2023-01-01 RX ADMIN — MINERAL OIL, PETROLATUM 1 APPLICATION: 425; 573 OINTMENT OPHTHALMIC at 15:42

## 2023-01-01 RX ADMIN — MINERAL OIL, PETROLATUM 1 APPLICATION: 425; 573 OINTMENT OPHTHALMIC at 04:30

## 2023-01-01 RX ADMIN — MIDODRINE HYDROCHLORIDE 5 MG: 5 TABLET ORAL at 12:26

## 2023-01-01 RX ADMIN — MINERAL OIL, PETROLATUM 1 APPLICATION: 425; 573 OINTMENT OPHTHALMIC at 14:14

## 2023-01-01 RX ADMIN — HYDROMORPHONE HYDROCHLORIDE 0.25 MG: 1 INJECTION, SOLUTION INTRAMUSCULAR; INTRAVENOUS; SUBCUTANEOUS at 08:56

## 2023-01-01 RX ADMIN — HEPARIN SODIUM 5000 UNITS: 5000 INJECTION, SOLUTION INTRAVENOUS; SUBCUTANEOUS at 05:01

## 2023-01-01 RX ADMIN — HEPARIN SODIUM 5000 UNITS: 5000 INJECTION, SOLUTION INTRAVENOUS; SUBCUTANEOUS at 21:08

## 2023-01-01 RX ADMIN — SENNOSIDES AND DOCUSATE SODIUM 2 TABLET: 50; 8.6 TABLET ORAL at 05:41

## 2023-01-01 RX ADMIN — ALPRAZOLAM 0.25 MG: 0.25 TABLET ORAL at 00:11

## 2023-01-01 RX ADMIN — SENNOSIDES AND DOCUSATE SODIUM 2 TABLET: 50; 8.6 TABLET ORAL at 17:29

## 2023-01-01 RX ADMIN — CEFAZOLIN 2 G: 1 INJECTION, POWDER, FOR SOLUTION INTRAMUSCULAR; INTRAVENOUS at 11:55

## 2023-01-01 RX ADMIN — LIDOCAINE 1 PATCH: 50 PATCH TOPICAL at 14:25

## 2023-01-01 RX ADMIN — MIDODRINE HYDROCHLORIDE 5 MG: 5 TABLET ORAL at 17:37

## 2023-01-01 RX ADMIN — DAKIN'S SOLUTION 0.125% (QUARTER STRENGTH) 473 ML: 0.12 SOLUTION at 05:00

## 2023-01-01 RX ADMIN — SODIUM BICARBONATE 650 MG TABLET 650 MG: at 08:23

## 2023-01-01 RX ADMIN — ROCURONIUM BROMIDE 50 MG: 50 INJECTION, SOLUTION INTRAVENOUS at 11:55

## 2023-01-01 RX ADMIN — MINERAL OIL, PETROLATUM 1 APPLICATION: 425; 573 OINTMENT OPHTHALMIC at 21:26

## 2023-01-01 RX ADMIN — HEPARIN SODIUM 5000 UNITS: 5000 INJECTION, SOLUTION INTRAVENOUS; SUBCUTANEOUS at 20:53

## 2023-01-01 RX ADMIN — ACETAMINOPHEN 650 MG: 325 TABLET ORAL at 11:55

## 2023-01-01 RX ADMIN — EPHEDRINE SULFATE 10 MG: 50 INJECTION, SOLUTION INTRAVENOUS at 13:11

## 2023-01-01 RX ADMIN — LIDOCAINE 1 PATCH: 50 PATCH TOPICAL at 13:32

## 2023-01-01 RX ADMIN — HEPARIN SODIUM 5000 UNITS: 5000 INJECTION, SOLUTION INTRAVENOUS; SUBCUTANEOUS at 13:47

## 2023-01-01 RX ADMIN — SODIUM BICARBONATE 650 MG TABLET 650 MG: at 08:42

## 2023-01-01 RX ADMIN — SODIUM BICARBONATE 650 MG TABLET 650 MG: at 17:39

## 2023-01-01 RX ADMIN — ACETAMINOPHEN 1000 MG: 500 TABLET ORAL at 20:10

## 2023-01-01 RX ADMIN — HEPARIN SODIUM 5000 UNITS: 5000 INJECTION, SOLUTION INTRAVENOUS; SUBCUTANEOUS at 15:33

## 2023-01-01 RX ADMIN — ACETAMINOPHEN 650 MG: 325 TABLET ORAL at 01:42

## 2023-01-01 RX ADMIN — ACETAMINOPHEN 1000 MG: 500 TABLET ORAL at 09:23

## 2023-01-01 RX ADMIN — FENTANYL CITRATE 50 MCG: 50 INJECTION, SOLUTION INTRAMUSCULAR; INTRAVENOUS at 12:47

## 2023-01-01 RX ADMIN — HEPARIN SODIUM 5000 UNITS: 5000 INJECTION, SOLUTION INTRAVENOUS; SUBCUTANEOUS at 13:48

## 2023-01-01 RX ADMIN — SENNOSIDES AND DOCUSATE SODIUM 2 TABLET: 50; 8.6 TABLET ORAL at 17:54

## 2023-01-01 RX ADMIN — SENNOSIDES AND DOCUSATE SODIUM 2 TABLET: 50; 8.6 TABLET ORAL at 18:24

## 2023-01-01 RX ADMIN — DAKIN'S SOLUTION 0.125% (QUARTER STRENGTH) 473 ML: 0.12 SOLUTION at 17:30

## 2023-01-01 RX ADMIN — LIDOCAINE 1 PATCH: 50 PATCH TOPICAL at 13:46

## 2023-01-01 RX ADMIN — SODIUM BICARBONATE 650 MG TABLET 650 MG: at 11:57

## 2023-01-01 RX ADMIN — MINERAL OIL, PETROLATUM 1 APPLICATION: 425; 573 OINTMENT OPHTHALMIC at 04:32

## 2023-01-01 RX ADMIN — HEPARIN SODIUM 5000 UNITS: 5000 INJECTION, SOLUTION INTRAVENOUS; SUBCUTANEOUS at 22:42

## 2023-01-01 RX ADMIN — LIDOCAINE 1 PATCH: 50 PATCH TOPICAL at 16:03

## 2023-01-01 RX ADMIN — DAKIN'S SOLUTION 0.125% (QUARTER STRENGTH) 10 ML: 0.12 SOLUTION at 05:12

## 2023-01-01 RX ADMIN — HEPARIN SODIUM 5000 UNITS: 5000 INJECTION, SOLUTION INTRAVENOUS; SUBCUTANEOUS at 13:13

## 2023-01-01 RX ADMIN — HEPARIN SODIUM 5000 UNITS: 5000 INJECTION, SOLUTION INTRAVENOUS; SUBCUTANEOUS at 05:41

## 2023-01-01 RX ADMIN — SENNOSIDES AND DOCUSATE SODIUM 2 TABLET: 50; 8.6 TABLET ORAL at 05:00

## 2023-01-01 RX ADMIN — ONDANSETRON 4 MG: 2 INJECTION INTRAMUSCULAR; INTRAVENOUS at 12:29

## 2023-01-01 RX ADMIN — MAGNESIUM SULFATE HEPTAHYDRATE 2 G: 2 INJECTION, SOLUTION INTRAVENOUS at 09:29

## 2023-01-01 RX ADMIN — FENTANYL CITRATE 100 MCG: 50 INJECTION, SOLUTION INTRAMUSCULAR; INTRAVENOUS at 12:15

## 2023-01-01 RX ADMIN — SODIUM BICARBONATE 650 MG TABLET 650 MG: at 08:20

## 2023-01-01 RX ADMIN — LIDOCAINE 1 PATCH: 50 PATCH TOPICAL at 14:35

## 2023-01-01 RX ADMIN — SODIUM BICARBONATE 650 MG TABLET 650 MG: at 08:08

## 2023-01-01 RX ADMIN — ACETAMINOPHEN 650 MG: 325 TABLET ORAL at 17:54

## 2023-01-01 RX ADMIN — DEXAMETHASONE SODIUM PHOSPHATE 4 MG: 4 INJECTION INTRA-ARTICULAR; INTRALESIONAL; INTRAMUSCULAR; INTRAVENOUS; SOFT TISSUE at 12:29

## 2023-01-01 RX ADMIN — HYDROMORPHONE HYDROCHLORIDE 0.5 MG: 1 INJECTION, SOLUTION INTRAMUSCULAR; INTRAVENOUS; SUBCUTANEOUS at 09:17

## 2023-01-01 RX ADMIN — HEPARIN SODIUM 5000 UNITS: 5000 INJECTION, SOLUTION INTRAVENOUS; SUBCUTANEOUS at 21:17

## 2023-01-01 RX ADMIN — MINERAL OIL, PETROLATUM 1 APPLICATION: 425; 573 OINTMENT OPHTHALMIC at 12:02

## 2023-01-01 RX ADMIN — DAKIN'S SOLUTION 0.125% (QUARTER STRENGTH) 473 ML: 0.12 SOLUTION at 04:10

## 2023-01-01 RX ADMIN — AMLODIPINE BESYLATE 5 MG: 5 TABLET ORAL at 05:41

## 2023-01-01 RX ADMIN — HEPARIN SODIUM 5000 UNITS: 5000 INJECTION, SOLUTION INTRAVENOUS; SUBCUTANEOUS at 05:11

## 2023-01-01 RX ADMIN — MINERAL OIL, PETROLATUM 1 APPLICATION: 425; 573 OINTMENT OPHTHALMIC at 13:13

## 2023-01-01 RX ADMIN — DAKIN'S SOLUTION 0.125% (QUARTER STRENGTH) 50 ML: 0.12 SOLUTION at 19:58

## 2023-01-01 RX ADMIN — HYDROMORPHONE HYDROCHLORIDE 0.5 MG: 1 INJECTION, SOLUTION INTRAMUSCULAR; INTRAVENOUS; SUBCUTANEOUS at 04:25

## 2023-01-01 RX ADMIN — ACETAMINOPHEN 650 MG: 325 TABLET ORAL at 09:11

## 2023-01-01 RX ADMIN — HEPARIN SODIUM 5000 UNITS: 5000 INJECTION, SOLUTION INTRAVENOUS; SUBCUTANEOUS at 05:06

## 2023-01-01 RX ADMIN — SENNOSIDES AND DOCUSATE SODIUM 2 TABLET: 50; 8.6 TABLET ORAL at 18:20

## 2023-01-01 RX ADMIN — SODIUM CHLORIDE: 9 INJECTION, SOLUTION INTRAVENOUS at 19:03

## 2023-01-01 RX ADMIN — ACETAMINOPHEN 1000 MG: 500 TABLET ORAL at 15:43

## 2023-01-01 RX ADMIN — SENNOSIDES AND DOCUSATE SODIUM 2 TABLET: 50; 8.6 TABLET ORAL at 04:36

## 2023-01-01 RX ADMIN — DAKIN'S SOLUTION 0.125% (QUARTER STRENGTH) 50 ML: 0.12 SOLUTION at 04:29

## 2023-01-01 RX ADMIN — LIDOCAINE 1 PATCH: 50 PATCH TOPICAL at 14:00

## 2023-01-01 RX ADMIN — ACETAMINOPHEN 650 MG: 325 TABLET ORAL at 13:50

## 2023-01-01 RX ADMIN — AMLODIPINE BESYLATE 5 MG: 5 TABLET ORAL at 04:09

## 2023-01-01 RX ADMIN — ACETAMINOPHEN 650 MG: 325 TABLET ORAL at 18:03

## 2023-01-01 RX ADMIN — SODIUM CHLORIDE: 9 INJECTION, SOLUTION INTRAVENOUS at 01:46

## 2023-01-01 RX ADMIN — MINERAL OIL, PETROLATUM 1 APPLICATION: 425; 573 OINTMENT OPHTHALMIC at 13:11

## 2023-01-01 RX ADMIN — DAKIN'S SOLUTION 0.125% (QUARTER STRENGTH) 473 ML: 0.12 SOLUTION at 18:40

## 2023-01-01 RX ADMIN — ACETAMINOPHEN 1000 MG: 500 TABLET ORAL at 16:03

## 2023-01-01 RX ADMIN — HEPARIN SODIUM 5000 UNITS: 5000 INJECTION, SOLUTION INTRAVENOUS; SUBCUTANEOUS at 04:37

## 2023-01-01 RX ADMIN — ACETAMINOPHEN 650 MG: 325 TABLET ORAL at 21:28

## 2023-01-01 RX ADMIN — SODIUM BICARBONATE 650 MG TABLET 650 MG: at 17:20

## 2023-01-01 RX ADMIN — MIDODRINE HYDROCHLORIDE 5 MG: 5 TABLET ORAL at 11:55

## 2023-01-01 RX ADMIN — SENNOSIDES AND DOCUSATE SODIUM 2 TABLET: 50; 8.6 TABLET ORAL at 04:42

## 2023-01-01 RX ADMIN — LABETALOL HYDROCHLORIDE 5 MG: 5 INJECTION INTRAVENOUS at 13:57

## 2023-01-01 RX ADMIN — SENNOSIDES AND DOCUSATE SODIUM 2 TABLET: 50; 8.6 TABLET ORAL at 05:20

## 2023-01-01 RX ADMIN — SENNOSIDES AND DOCUSATE SODIUM 2 TABLET: 50; 8.6 TABLET ORAL at 05:48

## 2023-01-01 RX ADMIN — SODIUM BICARBONATE 650 MG TABLET 650 MG: at 08:12

## 2023-01-01 RX ADMIN — HEPARIN SODIUM 5000 UNITS: 5000 INJECTION, SOLUTION INTRAVENOUS; SUBCUTANEOUS at 14:20

## 2023-01-01 RX ADMIN — ACETAMINOPHEN 650 MG: 325 TABLET ORAL at 19:12

## 2023-01-01 RX ADMIN — ACETAMINOPHEN 650 MG: 325 TABLET ORAL at 17:37

## 2023-01-01 RX ADMIN — ACETAMINOPHEN 1000 MG: 500 TABLET ORAL at 23:13

## 2023-01-01 RX ADMIN — ACETAMINOPHEN 650 MG: 325 TABLET ORAL at 11:42

## 2023-01-01 RX ADMIN — DAKIN'S SOLUTION 0.125% (QUARTER STRENGTH) 10 ML: 0.12 SOLUTION at 17:20

## 2023-01-01 RX ADMIN — MIDODRINE HYDROCHLORIDE 5 MG: 5 TABLET ORAL at 12:01

## 2023-01-01 RX ADMIN — ACETAMINOPHEN 650 MG: 325 TABLET ORAL at 14:30

## 2023-01-01 RX ADMIN — HEPARIN SODIUM 5000 UNITS: 5000 INJECTION, SOLUTION INTRAVENOUS; SUBCUTANEOUS at 16:02

## 2023-01-01 RX ADMIN — ALPRAZOLAM 0.25 MG: 0.25 TABLET ORAL at 21:22

## 2023-01-01 RX ADMIN — SUGAMMADEX 200 MG: 100 INJECTION, SOLUTION INTRAVENOUS at 13:19

## 2023-01-01 RX ADMIN — AMLODIPINE BESYLATE 5 MG: 5 TABLET ORAL at 04:37

## 2023-01-01 RX ADMIN — SODIUM BICARBONATE 650 MG TABLET 650 MG: at 08:22

## 2023-01-01 RX ADMIN — ACETAMINOPHEN 650 MG: 325 TABLET ORAL at 14:45

## 2023-01-01 RX ADMIN — POLYETHYLENE GLYCOL 3350 1 PACKET: 17 POWDER, FOR SOLUTION ORAL at 17:25

## 2023-01-01 RX ADMIN — SODIUM BICARBONATE 650 MG TABLET 650 MG: at 12:49

## 2023-01-01 RX ADMIN — ACETAMINOPHEN 650 MG: 325 TABLET ORAL at 11:10

## 2023-01-01 RX ADMIN — IOHEXOL 100 ML: 350 INJECTION, SOLUTION INTRAVENOUS at 21:53

## 2023-01-01 RX ADMIN — SODIUM CHLORIDE, POTASSIUM CHLORIDE, SODIUM LACTATE AND CALCIUM CHLORIDE: 600; 310; 30; 20 INJECTION, SOLUTION INTRAVENOUS at 11:48

## 2023-01-01 RX ADMIN — ACETAMINOPHEN 650 MG: 325 TABLET ORAL at 12:38

## 2023-01-01 RX ADMIN — BISACODYL 10 MG: 10 SUPPOSITORY RECTAL at 08:47

## 2023-01-01 RX ADMIN — HEPARIN SODIUM 5000 UNITS: 5000 INJECTION, SOLUTION INTRAVENOUS; SUBCUTANEOUS at 13:11

## 2023-01-01 RX ADMIN — MINERAL OIL, PETROLATUM 1 APPLICATION: 425; 573 OINTMENT OPHTHALMIC at 21:08

## 2023-01-01 RX ADMIN — HYDRALAZINE HYDROCHLORIDE 10 MG: 20 INJECTION INTRAMUSCULAR; INTRAVENOUS at 03:04

## 2023-01-01 RX ADMIN — SODIUM BICARBONATE 650 MG TABLET 650 MG: at 13:43

## 2023-01-01 RX ADMIN — MINERAL OIL, PETROLATUM 1 APPLICATION: 425; 573 OINTMENT OPHTHALMIC at 21:09

## 2023-01-01 RX ADMIN — HEPARIN SODIUM 5000 UNITS: 5000 INJECTION, SOLUTION INTRAVENOUS; SUBCUTANEOUS at 21:50

## 2023-01-01 RX ADMIN — SODIUM CHLORIDE: 9 INJECTION, SOLUTION INTRAVENOUS at 17:40

## 2023-01-01 RX ADMIN — DAKIN'S SOLUTION 0.125% (QUARTER STRENGTH) 473 ML: 0.12 SOLUTION at 05:25

## 2023-01-01 RX ADMIN — ACETAMINOPHEN 650 MG: 325 TABLET ORAL at 05:07

## 2023-01-01 RX ADMIN — SENNOSIDES AND DOCUSATE SODIUM 2 TABLET: 50; 8.6 TABLET ORAL at 16:45

## 2023-01-01 RX ADMIN — SODIUM BICARBONATE 650 MG TABLET 650 MG: at 17:28

## 2023-01-01 RX ADMIN — ACETAMINOPHEN 650 MG: 325 TABLET ORAL at 19:28

## 2023-01-01 RX ADMIN — MINERAL OIL, PETROLATUM 1 APPLICATION: 425; 573 OINTMENT OPHTHALMIC at 14:09

## 2023-01-01 RX ADMIN — HEPARIN SODIUM 5000 UNITS: 5000 INJECTION, SOLUTION INTRAVENOUS; SUBCUTANEOUS at 14:14

## 2023-01-01 RX ADMIN — HEPARIN SODIUM 5000 UNITS: 5000 INJECTION, SOLUTION INTRAVENOUS; SUBCUTANEOUS at 13:07

## 2023-01-01 RX ADMIN — HEPARIN SODIUM 5000 UNITS: 5000 INJECTION, SOLUTION INTRAVENOUS; SUBCUTANEOUS at 04:47

## 2023-01-01 RX ADMIN — MINERAL OIL, PETROLATUM 1 APPLICATION: 425; 573 OINTMENT OPHTHALMIC at 04:33

## 2023-01-01 RX ADMIN — ACETAMINOPHEN 650 MG: 325 TABLET ORAL at 09:07

## 2023-01-01 RX ADMIN — HEPARIN SODIUM 5000 UNITS: 5000 INJECTION, SOLUTION INTRAVENOUS; SUBCUTANEOUS at 21:25

## 2023-01-01 RX ADMIN — MINERAL OIL, PETROLATUM 1 APPLICATION: 425; 573 OINTMENT OPHTHALMIC at 14:24

## 2023-01-01 RX ADMIN — MINERAL OIL, PETROLATUM 1 APPLICATION: 425; 573 OINTMENT OPHTHALMIC at 21:11

## 2023-01-01 RX ADMIN — MINERAL OIL, PETROLATUM 1 APPLICATION: 425; 573 OINTMENT OPHTHALMIC at 04:36

## 2023-01-01 RX ADMIN — ACETAMINOPHEN 1000 MG: 500 TABLET ORAL at 08:32

## 2023-01-01 RX ADMIN — DAKIN'S SOLUTION 0.125% (QUARTER STRENGTH) 50 ML: 0.12 SOLUTION at 05:01

## 2023-01-01 RX ADMIN — SENNOSIDES AND DOCUSATE SODIUM 2 TABLET: 50; 8.6 TABLET ORAL at 05:44

## 2023-01-01 RX ADMIN — MAGNESIUM SULFATE HEPTAHYDRATE 2 G: 2 INJECTION, SOLUTION INTRAVENOUS at 10:25

## 2023-01-01 RX ADMIN — HEPARIN SODIUM 5000 UNITS: 5000 INJECTION, SOLUTION INTRAVENOUS; SUBCUTANEOUS at 14:24

## 2023-01-01 RX ADMIN — HEPARIN SODIUM 5000 UNITS: 5000 INJECTION, SOLUTION INTRAVENOUS; SUBCUTANEOUS at 14:26

## 2023-01-01 RX ADMIN — SODIUM BICARBONATE 650 MG TABLET 650 MG: at 12:25

## 2023-01-01 RX ADMIN — SODIUM BICARBONATE 650 MG TABLET 650 MG: at 13:03

## 2023-01-01 RX ADMIN — SODIUM BICARBONATE 650 MG TABLET 650 MG: at 16:45

## 2023-01-01 RX ADMIN — MAGNESIUM HYDROXIDE 30 ML: 400 SUSPENSION ORAL at 04:36

## 2023-01-01 RX ADMIN — DAKIN'S SOLUTION 0.125% (QUARTER STRENGTH) 473 ML: 0.12 SOLUTION at 05:41

## 2023-01-01 RX ADMIN — HEPARIN SODIUM 5000 UNITS: 5000 INJECTION, SOLUTION INTRAVENOUS; SUBCUTANEOUS at 07:32

## 2023-01-01 RX ADMIN — RIVAROXABAN 10 MG: 10 TABLET, FILM COATED ORAL at 18:23

## 2023-01-01 RX ADMIN — Medication 5 MG: at 20:33

## 2023-01-01 RX ADMIN — EPHEDRINE SULFATE 10 MG: 50 INJECTION, SOLUTION INTRAVENOUS at 12:23

## 2023-01-01 RX ADMIN — PROPOFOL 100 MG: 10 INJECTION, EMULSION INTRAVENOUS at 11:55

## 2023-01-01 RX ADMIN — DAKIN'S SOLUTION 0.125% (QUARTER STRENGTH) 473 ML: 0.12 SOLUTION at 17:03

## 2023-01-01 RX ADMIN — LIDOCAINE 1 PATCH: 50 PATCH TOPICAL at 15:42

## 2023-01-01 RX ADMIN — SODIUM BICARBONATE 650 MG TABLET 650 MG: at 18:01

## 2023-01-01 RX ADMIN — RIVAROXABAN 10 MG: 10 TABLET, FILM COATED ORAL at 18:02

## 2023-01-01 RX ADMIN — MINERAL OIL, PETROLATUM 1 APPLICATION: 425; 573 OINTMENT OPHTHALMIC at 05:06

## 2023-01-01 RX ADMIN — HEPARIN SODIUM 5000 UNITS: 5000 INJECTION, SOLUTION INTRAVENOUS; SUBCUTANEOUS at 15:23

## 2023-01-01 RX ADMIN — ALPRAZOLAM 0.25 MG: 0.25 TABLET ORAL at 21:28

## 2023-01-01 RX ADMIN — SENNOSIDES AND DOCUSATE SODIUM 2 TABLET: 50; 8.6 TABLET ORAL at 18:04

## 2023-01-01 RX ADMIN — DAKIN'S SOLUTION 0.125% (QUARTER STRENGTH) 473 ML: 0.12 SOLUTION at 17:10

## 2023-01-01 RX ADMIN — MIDODRINE HYDROCHLORIDE 5 MG: 5 TABLET ORAL at 07:50

## 2023-01-01 RX ADMIN — ALPRAZOLAM 0.25 MG: 0.25 TABLET ORAL at 22:27

## 2023-01-01 RX ADMIN — HEPARIN SODIUM 5000 UNITS: 5000 INJECTION, SOLUTION INTRAVENOUS; SUBCUTANEOUS at 14:00

## 2023-01-01 RX ADMIN — TRANEXAMIC ACID 1000 MG: 100 INJECTION, SOLUTION INTRAVENOUS at 11:55

## 2023-01-01 RX ADMIN — RIVAROXABAN 10 MG: 10 TABLET, FILM COATED ORAL at 17:38

## 2023-01-01 RX ADMIN — HEPARIN SODIUM 5000 UNITS: 5000 INJECTION, SOLUTION INTRAVENOUS; SUBCUTANEOUS at 04:30

## 2023-01-01 RX ADMIN — AMLODIPINE BESYLATE 5 MG: 5 TABLET ORAL at 05:10

## 2023-01-01 RX ADMIN — HEPARIN SODIUM 5000 UNITS: 5000 INJECTION, SOLUTION INTRAVENOUS; SUBCUTANEOUS at 13:49

## 2023-01-01 RX ADMIN — SODIUM BICARBONATE 650 MG TABLET 650 MG: at 11:52

## 2023-01-01 RX ADMIN — SENNOSIDES AND DOCUSATE SODIUM 2 TABLET: 50; 8.6 TABLET ORAL at 18:02

## 2023-01-01 RX ADMIN — SODIUM BICARBONATE 650 MG TABLET 650 MG: at 11:18

## 2023-01-01 RX ADMIN — AMLODIPINE BESYLATE 5 MG: 5 TABLET ORAL at 13:07

## 2023-01-01 RX ADMIN — MIDODRINE HYDROCHLORIDE 5 MG: 5 TABLET ORAL at 08:41

## 2023-01-01 RX ADMIN — ACETAMINOPHEN 650 MG: 325 TABLET ORAL at 00:22

## 2023-01-01 RX ADMIN — ACETAMINOPHEN 650 MG: 325 TABLET ORAL at 23:31

## 2023-01-01 RX ADMIN — HEPARIN SODIUM 5000 UNITS: 5000 INJECTION, SOLUTION INTRAVENOUS; SUBCUTANEOUS at 21:36

## 2023-01-01 RX ADMIN — ACETAMINOPHEN 650 MG: 325 TABLET ORAL at 12:18

## 2023-01-01 RX ADMIN — MIDODRINE HYDROCHLORIDE 5 MG: 5 TABLET ORAL at 17:04

## 2023-01-01 RX ADMIN — ACETAMINOPHEN 650 MG: 325 TABLET ORAL at 05:47

## 2023-01-01 RX ADMIN — FENTANYL CITRATE 50 MCG: 50 INJECTION, SOLUTION INTRAMUSCULAR; INTRAVENOUS at 11:55

## 2023-01-01 RX ADMIN — SODIUM CHLORIDE 500 ML: 9 INJECTION, SOLUTION INTRAVENOUS at 12:53

## 2023-01-01 RX ADMIN — MINERAL OIL, PETROLATUM 1 APPLICATION: 425; 573 OINTMENT OPHTHALMIC at 04:44

## 2023-01-01 RX ADMIN — SENNOSIDES AND DOCUSATE SODIUM 2 TABLET: 50; 8.6 TABLET ORAL at 17:01

## 2023-01-01 RX ADMIN — LIDOCAINE 1 PATCH: 50 PATCH TOPICAL at 13:49

## 2023-01-01 RX ADMIN — ACETAMINOPHEN 650 MG: 325 TABLET ORAL at 20:10

## 2023-01-01 RX ADMIN — HEPARIN SODIUM 5000 UNITS: 5000 INJECTION, SOLUTION INTRAVENOUS; SUBCUTANEOUS at 20:33

## 2023-01-01 RX ADMIN — FENTANYL CITRATE 25 MCG: 50 INJECTION, SOLUTION INTRAMUSCULAR; INTRAVENOUS at 22:35

## 2023-01-01 RX ADMIN — MIDODRINE HYDROCHLORIDE 5 MG: 5 TABLET ORAL at 07:51

## 2023-01-01 RX ADMIN — ACETAMINOPHEN 650 MG: 325 TABLET ORAL at 10:11

## 2023-01-01 RX ADMIN — SODIUM CHLORIDE: 9 INJECTION, SOLUTION INTRAVENOUS at 15:12

## 2023-01-01 RX ADMIN — ACETAMINOPHEN 650 MG: 325 TABLET ORAL at 11:13

## 2023-01-01 RX ADMIN — LIDOCAINE 1 PATCH: 50 PATCH TOPICAL at 14:21

## 2023-01-01 RX ADMIN — SENNOSIDES AND DOCUSATE SODIUM 2 TABLET: 50; 8.6 TABLET ORAL at 17:05

## 2023-01-01 RX ADMIN — SODIUM CHLORIDE 500 ML: 9 INJECTION, SOLUTION INTRAVENOUS at 11:39

## 2023-01-01 RX ADMIN — ACETAMINOPHEN 650 MG: 325 TABLET ORAL at 04:41

## 2023-01-01 RX ADMIN — ALPRAZOLAM 0.25 MG: 0.25 TABLET ORAL at 20:58

## 2023-01-01 RX ADMIN — MORPHINE SULFATE 2 MG: 4 INJECTION INTRAVENOUS at 01:04

## 2023-01-01 RX ADMIN — MIDODRINE HYDROCHLORIDE 5 MG: 5 TABLET ORAL at 18:25

## 2023-01-01 RX ADMIN — ACETAMINOPHEN 650 MG: 325 TABLET ORAL at 21:22

## 2023-01-01 RX ADMIN — LABETALOL HYDROCHLORIDE 10 MG: 5 INJECTION INTRAVENOUS at 23:35

## 2023-01-01 RX ADMIN — SENNOSIDES AND DOCUSATE SODIUM 2 TABLET: 50; 8.6 TABLET ORAL at 17:25

## 2023-01-01 RX ADMIN — MIDODRINE HYDROCHLORIDE 5 MG: 5 TABLET ORAL at 11:23

## 2023-01-01 RX ADMIN — HEPARIN SODIUM 5000 UNITS: 5000 INJECTION, SOLUTION INTRAVENOUS; SUBCUTANEOUS at 21:21

## 2023-01-01 RX ADMIN — FUROSEMIDE 20 MG: 10 INJECTION, SOLUTION INTRAMUSCULAR; INTRAVENOUS at 11:54

## 2023-01-01 RX ADMIN — SODIUM CHLORIDE: 9 INJECTION, SOLUTION INTRAVENOUS at 04:28

## 2023-01-01 RX ADMIN — ACETAMINOPHEN 650 MG: 325 TABLET ORAL at 00:32

## 2023-01-01 RX ADMIN — MINERAL OIL, PETROLATUM 1 APPLICATION: 425; 573 OINTMENT OPHTHALMIC at 20:11

## 2023-01-01 RX ADMIN — MIDODRINE HYDROCHLORIDE 5 MG: 5 TABLET ORAL at 17:29

## 2023-01-01 RX ADMIN — HEPARIN SODIUM 5000 UNITS: 5000 INJECTION, SOLUTION INTRAVENOUS; SUBCUTANEOUS at 21:03

## 2023-01-01 RX ADMIN — HEPARIN SODIUM 5000 UNITS: 5000 INJECTION, SOLUTION INTRAVENOUS; SUBCUTANEOUS at 04:32

## 2023-01-01 RX ADMIN — HEPARIN SODIUM 5000 UNITS: 5000 INJECTION, SOLUTION INTRAVENOUS; SUBCUTANEOUS at 05:08

## 2023-01-01 RX ADMIN — DAKIN'S SOLUTION 0.125% (QUARTER STRENGTH) 473 ML: 0.12 SOLUTION at 18:00

## 2023-01-01 RX ADMIN — HEPARIN SODIUM 5000 UNITS: 5000 INJECTION, SOLUTION INTRAVENOUS; SUBCUTANEOUS at 21:11

## 2023-01-01 RX ADMIN — POLYETHYLENE GLYCOL 3350 1 PACKET: 17 POWDER, FOR SOLUTION ORAL at 04:55

## 2023-01-01 RX ADMIN — MIDODRINE HYDROCHLORIDE 5 MG: 5 TABLET ORAL at 07:32

## 2023-01-01 RX ADMIN — MIDODRINE HYDROCHLORIDE 5 MG: 5 TABLET ORAL at 17:25

## 2023-01-01 RX ADMIN — LIDOCAINE 1 PATCH: 50 PATCH TOPICAL at 14:14

## 2023-01-01 RX ADMIN — HEPARIN SODIUM 5000 UNITS: 5000 INJECTION, SOLUTION INTRAVENOUS; SUBCUTANEOUS at 14:30

## 2023-01-01 RX ADMIN — LABETALOL HYDROCHLORIDE 5 MG: 5 INJECTION INTRAVENOUS at 13:47

## 2023-01-01 RX ADMIN — LIDOCAINE 1 PATCH: 50 PATCH TOPICAL at 14:09

## 2023-01-01 RX ADMIN — MIDODRINE HYDROCHLORIDE 5 MG: 5 TABLET ORAL at 08:12

## 2023-01-01 RX ADMIN — LABETALOL HYDROCHLORIDE 5 MG: 5 INJECTION INTRAVENOUS at 14:10

## 2023-01-01 RX ADMIN — SENNOSIDES AND DOCUSATE SODIUM 2 TABLET: 50; 8.6 TABLET ORAL at 17:38

## 2023-01-01 RX ADMIN — SODIUM BICARBONATE 650 MG TABLET 650 MG: at 17:01

## 2023-01-01 RX ADMIN — MIDODRINE HYDROCHLORIDE 5 MG: 5 TABLET ORAL at 13:13

## 2023-01-01 RX ADMIN — LIDOCAINE HYDROCHLORIDE 100 MG: 20 INJECTION, SOLUTION EPIDURAL; INFILTRATION; INTRACAUDAL at 11:55

## 2023-01-01 RX ADMIN — HEPARIN SODIUM 5000 UNITS: 5000 INJECTION, SOLUTION INTRAVENOUS; SUBCUTANEOUS at 21:09

## 2023-01-01 RX ADMIN — SODIUM BICARBONATE 650 MG TABLET 650 MG: at 18:20

## 2023-01-01 RX ADMIN — SENNOSIDES AND DOCUSATE SODIUM 2 TABLET: 50; 8.6 TABLET ORAL at 18:42

## 2023-01-01 RX ADMIN — MINERAL OIL, PETROLATUM 1 APPLICATION: 425; 573 OINTMENT OPHTHALMIC at 13:49

## 2023-01-01 RX ADMIN — FUROSEMIDE 20 MG: 10 INJECTION, SOLUTION INTRAVENOUS at 08:17

## 2023-01-01 RX ADMIN — SENNOSIDES AND DOCUSATE SODIUM 2 TABLET: 50; 8.6 TABLET ORAL at 05:06

## 2023-01-01 RX ADMIN — SENNOSIDES AND DOCUSATE SODIUM 2 TABLET: 50; 8.6 TABLET ORAL at 04:55

## 2023-01-01 RX ADMIN — FUROSEMIDE 20 MG: 10 INJECTION, SOLUTION INTRAMUSCULAR; INTRAVENOUS at 10:25

## 2023-01-01 ASSESSMENT — ENCOUNTER SYMPTOMS
DEPRESSION: 0
HEADACHES: 0
DOUBLE VISION: 0
BLOOD IN STOOL: 0
DIZZINESS: 0
MYALGIAS: 0
WEAKNESS: 0
COUGH: 0
COUGH: 0
CLAUDICATION: 0
NERVOUS/ANXIOUS: 1
ABDOMINAL PAIN: 0
DOUBLE VISION: 0
HEADACHES: 0
NAUSEA: 0
SHORTNESS OF BREATH: 0
MYALGIAS: 0
WEAKNESS: 0
BLURRED VISION: 0
VOMITING: 0
WHEEZING: 0
HEARTBURN: 0
HEADACHES: 0
DEPRESSION: 0
SHORTNESS OF BREATH: 0
HALLUCINATIONS: 1
INSOMNIA: 0
HEARTBURN: 0
CHILLS: 0
BACK PAIN: 0
CONSTIPATION: 0
WEAKNESS: 0
WEAKNESS: 1
FEVER: 0
FEVER: 0
DIARRHEA: 0
PALPITATIONS: 0
DEPRESSION: 0
FEVER: 0
ABDOMINAL PAIN: 0
BLURRED VISION: 0
BLURRED VISION: 0
MYALGIAS: 0
SHORTNESS OF BREATH: 0
FALLS: 0
DIZZINESS: 0
ABDOMINAL PAIN: 0
FALLS: 0
VOMITING: 0
DEPRESSION: 0
NAUSEA: 0
DIZZINESS: 0
FALLS: 0
HEARTBURN: 0
HEADACHES: 0
FEVER: 0
HEADACHES: 0
CLAUDICATION: 0
ABDOMINAL PAIN: 0
SPEECH CHANGE: 0
COUGH: 0
CHILLS: 0
COUGH: 0
MEMORY LOSS: 1
HEADACHES: 0
DIARRHEA: 0
DOUBLE VISION: 0
FOCAL WEAKNESS: 0
DIARRHEA: 0
DOUBLE VISION: 0
CONSTIPATION: 0
BLURRED VISION: 0
VOMITING: 0
DIZZINESS: 0
DIARRHEA: 0
FEVER: 0
BLURRED VISION: 0
WEAKNESS: 0
MEMORY LOSS: 1
HEARTBURN: 0
BLURRED VISION: 0
MYALGIAS: 0
CHILLS: 0
FOCAL WEAKNESS: 0
ABDOMINAL PAIN: 0
FEVER: 0
BACK PAIN: 0
FOCAL WEAKNESS: 0
SHORTNESS OF BREATH: 1
BLURRED VISION: 0
DIZZINESS: 0
HALLUCINATIONS: 0
PALPITATIONS: 0
SORE THROAT: 0
BLURRED VISION: 0
DEPRESSION: 0
ABDOMINAL PAIN: 0
NERVOUS/ANXIOUS: 0
SPEECH CHANGE: 0
SPEECH CHANGE: 0
COUGH: 0
INSOMNIA: 0
COUGH: 0
DOUBLE VISION: 0
COUGH: 0
HEARTBURN: 0
SORE THROAT: 0
HEMOPTYSIS: 0
SHORTNESS OF BREATH: 0
SHORTNESS OF BREATH: 0
FEVER: 0
HEARTBURN: 0
BLURRED VISION: 0
DEPRESSION: 0
MEMORY LOSS: 1
DIARRHEA: 0
VOMITING: 0
COUGH: 0
VOMITING: 0
DIARRHEA: 0
BLURRED VISION: 0
HALLUCINATIONS: 1
HEARTBURN: 0
BACK PAIN: 0
HEARTBURN: 0
WEAKNESS: 0
FEVER: 0
NERVOUS/ANXIOUS: 1
CHILLS: 0
HEADACHES: 0
PALPITATIONS: 0
HEADACHES: 0
ABDOMINAL PAIN: 0
BACK PAIN: 0
BLOOD IN STOOL: 0
FOCAL WEAKNESS: 0
BLURRED VISION: 0
ABDOMINAL PAIN: 0
HEARTBURN: 0
SHORTNESS OF BREATH: 0
SHORTNESS OF BREATH: 0
MEMORY LOSS: 1
WEAKNESS: 0
BLOOD IN STOOL: 0
FEVER: 0
HEARTBURN: 0
PALPITATIONS: 0
FALLS: 0
BLOOD IN STOOL: 0
HALLUCINATIONS: 1
WEAKNESS: 0
CHILLS: 0
PALPITATIONS: 0
DEPRESSION: 0
SORE THROAT: 0
FEVER: 0
VOMITING: 0
PALPITATIONS: 0
SHORTNESS OF BREATH: 0
FOCAL WEAKNESS: 0
ABDOMINAL PAIN: 0
PALPITATIONS: 0
MYALGIAS: 0
WEAKNESS: 0
SORE THROAT: 0
MYALGIAS: 0
COUGH: 0
INSOMNIA: 0
DIZZINESS: 0
SPEECH CHANGE: 0
MYALGIAS: 0
CHILLS: 0
PALPITATIONS: 0
FEVER: 0
WEAKNESS: 1
DEPRESSION: 0
MYALGIAS: 0
NAUSEA: 0
FEVER: 0
CHILLS: 0
NERVOUS/ANXIOUS: 0
FOCAL WEAKNESS: 0
CHILLS: 0
SHORTNESS OF BREATH: 0
HEADACHES: 0
DEPRESSION: 0
CONSTIPATION: 0
VOMITING: 0
FEVER: 0
NERVOUS/ANXIOUS: 0
VOMITING: 0
FALLS: 0
BLURRED VISION: 0
COUGH: 0
DIARRHEA: 0
CHILLS: 0
VOMITING: 0
WHEEZING: 0
DOUBLE VISION: 0
ABDOMINAL PAIN: 0
BLURRED VISION: 0
MEMORY LOSS: 1
COUGH: 0
DOUBLE VISION: 0
BACK PAIN: 0
HEARTBURN: 0
SORE THROAT: 0
DIARRHEA: 0
BLOOD IN STOOL: 0
MEMORY LOSS: 1
FEVER: 0
VOMITING: 0
DIARRHEA: 0
SORE THROAT: 0
SENSORY CHANGE: 0
CLAUDICATION: 0
HEADACHES: 0
HEARTBURN: 0
DEPRESSION: 0
WEAKNESS: 1
BLOOD IN STOOL: 0
PHOTOPHOBIA: 0
FEVER: 0
COUGH: 0
MEMORY LOSS: 1
BACK PAIN: 0
BLURRED VISION: 0
DOUBLE VISION: 1
CLAUDICATION: 0
DIZZINESS: 0
CHILLS: 0
FOCAL WEAKNESS: 0
DIZZINESS: 0
FOCAL WEAKNESS: 0
DOUBLE VISION: 0
WEAKNESS: 1
ABDOMINAL PAIN: 0
NERVOUS/ANXIOUS: 0
FEVER: 0
NAUSEA: 0
INSOMNIA: 0
HEADACHES: 0
FEVER: 0
DOUBLE VISION: 0
NAUSEA: 0
ABDOMINAL PAIN: 0
ABDOMINAL PAIN: 0
MEMORY LOSS: 1
NERVOUS/ANXIOUS: 0
HEADACHES: 0
FEVER: 0
HEADACHES: 0
FEVER: 0
HALLUCINATIONS: 1
DEPRESSION: 0
DIZZINESS: 0
BLURRED VISION: 1
SORE THROAT: 0
WEAKNESS: 0
NERVOUS/ANXIOUS: 0
CHILLS: 0
NERVOUS/ANXIOUS: 0
NAUSEA: 0
SPEECH CHANGE: 0
HEARTBURN: 0
CHILLS: 0
ABDOMINAL PAIN: 0
VOMITING: 0
FEVER: 0
CONSTIPATION: 0
SORE THROAT: 0
SHORTNESS OF BREATH: 0
NAUSEA: 0
CHILLS: 0
ABDOMINAL PAIN: 0
CLAUDICATION: 0
DEPRESSION: 0
MEMORY LOSS: 1
SENSORY CHANGE: 0
FEVER: 0
DIZZINESS: 0
WEAKNESS: 0
HALLUCINATIONS: 1
NERVOUS/ANXIOUS: 0
DIARRHEA: 0
CHILLS: 0
SORE THROAT: 0
BLURRED VISION: 0
BLOOD IN STOOL: 0
SHORTNESS OF BREATH: 0
PALPITATIONS: 0
ABDOMINAL PAIN: 0
NAUSEA: 0
DEPRESSION: 0
COUGH: 0
WEAKNESS: 0
PHOTOPHOBIA: 0
NERVOUS/ANXIOUS: 0
DOUBLE VISION: 0
DIARRHEA: 0
BACK PAIN: 0
HEARTBURN: 0
SORE THROAT: 0
BACK PAIN: 0
ABDOMINAL PAIN: 0
INSOMNIA: 0
SORE THROAT: 0
DIARRHEA: 0
CHILLS: 0
DOUBLE VISION: 0
HEARTBURN: 0
NERVOUS/ANXIOUS: 0
NAUSEA: 0
PALPITATIONS: 0
MEMORY LOSS: 1
VOMITING: 0
HEADACHES: 0
SENSORY CHANGE: 0
VOMITING: 0
CHILLS: 0
DIARRHEA: 0
VOMITING: 0
SHORTNESS OF BREATH: 0
COUGH: 0
NAUSEA: 0
DIZZINESS: 0
DIARRHEA: 0
HEADACHES: 0
DIZZINESS: 0
HEADACHES: 0
BLURRED VISION: 0
WEAKNESS: 0
BLURRED VISION: 0
HALLUCINATIONS: 1
FALLS: 0
DIZZINESS: 0
NERVOUS/ANXIOUS: 0
HEADACHES: 0
DIARRHEA: 0
BLURRED VISION: 1
CHILLS: 0
BRUISES/BLEEDS EASILY: 0
DIZZINESS: 0
SHORTNESS OF BREATH: 0
HALLUCINATIONS: 1
DIZZINESS: 0
COUGH: 0
PHOTOPHOBIA: 0
NAUSEA: 0
FALLS: 0
DIARRHEA: 0
MEMORY LOSS: 1
SHORTNESS OF BREATH: 0
BLOOD IN STOOL: 0
DEPRESSION: 0
HEADACHES: 0
HEARTBURN: 0
SORE THROAT: 0
FALLS: 0
COUGH: 0
VOMITING: 0
HEADACHES: 0
VOMITING: 0
HALLUCINATIONS: 1
WEAKNESS: 0
PALPITATIONS: 0
BLURRED VISION: 0
DEPRESSION: 0
NERVOUS/ANXIOUS: 0
BACK PAIN: 0
MYALGIAS: 0
COUGH: 0
SHORTNESS OF BREATH: 0
SENSORY CHANGE: 0
WEAKNESS: 1
SPEECH CHANGE: 0
MEMORY LOSS: 1
FEVER: 0
MYALGIAS: 0
FALLS: 0
ABDOMINAL PAIN: 0
DEPRESSION: 0
DOUBLE VISION: 0
ABDOMINAL PAIN: 0
COUGH: 0
FOCAL WEAKNESS: 0
MYALGIAS: 0
PHOTOPHOBIA: 0
VOMITING: 0
HALLUCINATIONS: 1
HEADACHES: 0
SHORTNESS OF BREATH: 0
DIAPHORESIS: 0
FOCAL WEAKNESS: 0
MYALGIAS: 0
WEAKNESS: 0
NERVOUS/ANXIOUS: 0
BLURRED VISION: 0
SHORTNESS OF BREATH: 0
WEAKNESS: 0
DIZZINESS: 0
HALLUCINATIONS: 1
DIZZINESS: 0
CHILLS: 0
BLURRED VISION: 0
SHORTNESS OF BREATH: 0
FLANK PAIN: 0
NECK PAIN: 0
PALPITATIONS: 0
SORE THROAT: 0
BACK PAIN: 0
NERVOUS/ANXIOUS: 0
DEPRESSION: 0
DIARRHEA: 0
DIZZINESS: 0
BACK PAIN: 0
SHORTNESS OF BREATH: 0
CONSTIPATION: 0
COUGH: 0
PALPITATIONS: 0
COUGH: 0
HEARTBURN: 0
NERVOUS/ANXIOUS: 0
PALPITATIONS: 0
BLOOD IN STOOL: 0
MYALGIAS: 0
VOMITING: 0
CHILLS: 0
MYALGIAS: 0
MEMORY LOSS: 1
SORE THROAT: 0
DIZZINESS: 0
PALPITATIONS: 0
SHORTNESS OF BREATH: 0
HEADACHES: 0
DIZZINESS: 0
PHOTOPHOBIA: 0
DIARRHEA: 0
FOCAL WEAKNESS: 0
NERVOUS/ANXIOUS: 0
NERVOUS/ANXIOUS: 0
FALLS: 0
BLOOD IN STOOL: 0
BACK PAIN: 0
BLURRED VISION: 0
HEARTBURN: 0
MYALGIAS: 0
ABDOMINAL PAIN: 0
DEPRESSION: 0
SHORTNESS OF BREATH: 0
MYALGIAS: 0
DOUBLE VISION: 0
HALLUCINATIONS: 0
MYALGIAS: 0
CHILLS: 0
DIZZINESS: 0
WEAKNESS: 0
ABDOMINAL PAIN: 0
NECK PAIN: 0
FOCAL WEAKNESS: 0
VOMITING: 0
VOMITING: 0
NAUSEA: 0
DOUBLE VISION: 0
FALLS: 0
SENSORY CHANGE: 0
FALLS: 0
SENSORY CHANGE: 0
HALLUCINATIONS: 0
FOCAL WEAKNESS: 0
PALPITATIONS: 0
HEARTBURN: 0
COUGH: 0
NERVOUS/ANXIOUS: 0
MYALGIAS: 0
NAUSEA: 0
SPUTUM PRODUCTION: 0
DIARRHEA: 0
CHILLS: 0
FALLS: 1
FOCAL WEAKNESS: 0
CHILLS: 0
COUGH: 0
BLOOD IN STOOL: 0
INSOMNIA: 0
BLOOD IN STOOL: 0
WEAKNESS: 0
SHORTNESS OF BREATH: 0
ABDOMINAL PAIN: 0
NAUSEA: 0
PALPITATIONS: 0
HEARTBURN: 0
NERVOUS/ANXIOUS: 0
BACK PAIN: 0
DOUBLE VISION: 0
VOMITING: 0
BLOOD IN STOOL: 0
FEVER: 0
HEADACHES: 0
PALPITATIONS: 0
DIZZINESS: 0
COUGH: 1
NERVOUS/ANXIOUS: 0
CHILLS: 0
MYALGIAS: 0
BACK PAIN: 0
DOUBLE VISION: 0
MYALGIAS: 0
VOMITING: 0

## 2023-01-01 ASSESSMENT — PATIENT HEALTH QUESTIONNAIRE - PHQ9
1. LITTLE INTEREST OR PLEASURE IN DOING THINGS: NEARLY EVERY DAY
1. LITTLE INTEREST OR PLEASURE IN DOING THINGS: NOT AT ALL
SUM OF ALL RESPONSES TO PHQ9 QUESTIONS 1 AND 2: 0
2. FEELING DOWN, DEPRESSED, IRRITABLE, OR HOPELESS: NOT AT ALL
1. LITTLE INTEREST OR PLEASURE IN DOING THINGS: NOT AT ALL
SUM OF ALL RESPONSES TO PHQ9 QUESTIONS 1 AND 2: 0
SUM OF ALL RESPONSES TO PHQ9 QUESTIONS 1 AND 2: 6
2. FEELING DOWN, DEPRESSED, IRRITABLE, OR HOPELESS: NOT AT ALL
2. FEELING DOWN, DEPRESSED, IRRITABLE, OR HOPELESS: NOT AT ALL
SUM OF ALL RESPONSES TO PHQ9 QUESTIONS 1 AND 2: 6
2. FEELING DOWN, DEPRESSED, IRRITABLE, OR HOPELESS: NEARLY EVERY DAY
SUM OF ALL RESPONSES TO PHQ9 QUESTIONS 1 AND 2: 0
2. FEELING DOWN, DEPRESSED, IRRITABLE, OR HOPELESS: NOT AT ALL
SUM OF ALL RESPONSES TO PHQ9 QUESTIONS 1 AND 2: 0
SUM OF ALL RESPONSES TO PHQ9 QUESTIONS 1 AND 2: 0
SUM OF ALL RESPONSES TO PHQ QUESTIONS 1-9: 11
3. TROUBLE FALLING OR STAYING ASLEEP OR SLEEPING TOO MUCH: NOT AT ALL
5. POOR APPETITE OR OVEREATING: NOT AT ALL
1. LITTLE INTEREST OR PLEASURE IN DOING THINGS: NOT AT ALL
2. FEELING DOWN, DEPRESSED, IRRITABLE, OR HOPELESS: NOT AT ALL
SUM OF ALL RESPONSES TO PHQ9 QUESTIONS 1 AND 2: 0
3. TROUBLE FALLING OR STAYING ASLEEP OR SLEEPING TOO MUCH: NOT AT ALL
2. FEELING DOWN, DEPRESSED, IRRITABLE, OR HOPELESS: NOT AT ALL
SUM OF ALL RESPONSES TO PHQ9 QUESTIONS 1 AND 2: 0
2. FEELING DOWN, DEPRESSED, IRRITABLE, OR HOPELESS: NOT AT ALL
7. TROUBLE CONCENTRATING ON THINGS, SUCH AS READING THE NEWSPAPER OR WATCHING TELEVISION: NOT AT ALL
SUM OF ALL RESPONSES TO PHQ QUESTIONS 1-9: 0
5. POOR APPETITE OR OVEREATING: NOT AT ALL
3. TROUBLE FALLING OR STAYING ASLEEP OR SLEEPING TOO MUCH: NOT AT ALL
1. LITTLE INTEREST OR PLEASURE IN DOING THINGS: NOT AT ALL
2. FEELING DOWN, DEPRESSED, IRRITABLE, OR HOPELESS: NOT AT ALL
SUM OF ALL RESPONSES TO PHQ9 QUESTIONS 1 AND 2: 0
2. FEELING DOWN, DEPRESSED, IRRITABLE, OR HOPELESS: NOT AT ALL
9. THOUGHTS THAT YOU WOULD BE BETTER OFF DEAD, OR OF HURTING YOURSELF: MORE THAN HALF THE DAYS
1. LITTLE INTEREST OR PLEASURE IN DOING THINGS: NOT AT ALL
1. LITTLE INTEREST OR PLEASURE IN DOING THINGS: NOT AT ALL
2. FEELING DOWN, DEPRESSED, IRRITABLE, OR HOPELESS: NOT AT ALL
4. FEELING TIRED OR HAVING LITTLE ENERGY: NOT AT ALL
1. LITTLE INTEREST OR PLEASURE IN DOING THINGS: NOT AT ALL
2. FEELING DOWN, DEPRESSED, IRRITABLE, OR HOPELESS: NOT AT ALL
8. MOVING OR SPEAKING SO SLOWLY THAT OTHER PEOPLE COULD HAVE NOTICED. OR THE OPPOSITE, BEING SO FIGETY OR RESTLESS THAT YOU HAVE BEEN MOVING AROUND A LOT MORE THAN USUAL: NOT AT ALL
SUM OF ALL RESPONSES TO PHQ9 QUESTIONS 1 AND 2: 0
4. FEELING TIRED OR HAVING LITTLE ENERGY: MORE THAN HALF THE DAYS
9. THOUGHTS THAT YOU WOULD BE BETTER OFF DEAD, OR OF HURTING YOURSELF: NOT AT ALL
2. FEELING DOWN, DEPRESSED, IRRITABLE, OR HOPELESS: NOT AT ALL
SUM OF ALL RESPONSES TO PHQ9 QUESTIONS 1 AND 2: 0
2. FEELING DOWN, DEPRESSED, IRRITABLE, OR HOPELESS: NOT AT ALL
2. FEELING DOWN, DEPRESSED, IRRITABLE, OR HOPELESS: NOT AT ALL
1. LITTLE INTEREST OR PLEASURE IN DOING THINGS: NOT AT ALL
1. LITTLE INTEREST OR PLEASURE IN DOING THINGS: NOT AT ALL
2. FEELING DOWN, DEPRESSED, IRRITABLE, OR HOPELESS: NOT AT ALL
SUM OF ALL RESPONSES TO PHQ9 QUESTIONS 1 AND 2: 0
1. LITTLE INTEREST OR PLEASURE IN DOING THINGS: NOT AT ALL
1. LITTLE INTEREST OR PLEASURE IN DOING THINGS: NOT AT ALL
SUM OF ALL RESPONSES TO PHQ9 QUESTIONS 1 AND 2: 0
6. FEELING BAD ABOUT YOURSELF - OR THAT YOU ARE A FAILURE OR HAVE LET YOURSELF OR YOUR FAMILY DOWN: NOT AL ALL
7. TROUBLE CONCENTRATING ON THINGS, SUCH AS READING THE NEWSPAPER OR WATCHING TELEVISION: NOT AT ALL
SUM OF ALL RESPONSES TO PHQ9 QUESTIONS 1 AND 2: 0
2. FEELING DOWN, DEPRESSED, IRRITABLE, OR HOPELESS: NOT AT ALL
SUM OF ALL RESPONSES TO PHQ9 QUESTIONS 1 AND 2: 0
8. MOVING OR SPEAKING SO SLOWLY THAT OTHER PEOPLE COULD HAVE NOTICED. OR THE OPPOSITE, BEING SO FIGETY OR RESTLESS THAT YOU HAVE BEEN MOVING AROUND A LOT MORE THAN USUAL: NOT AT ALL
SUM OF ALL RESPONSES TO PHQ9 QUESTIONS 1 AND 2: 0
6. FEELING BAD ABOUT YOURSELF - OR THAT YOU ARE A FAILURE OR HAVE LET YOURSELF OR YOUR FAMILY DOWN: SEVERAL DAYS
1. LITTLE INTEREST OR PLEASURE IN DOING THINGS: NOT AT ALL
3. TROUBLE FALLING OR STAYING ASLEEP OR SLEEPING TOO MUCH: NOT AT ALL
1. LITTLE INTEREST OR PLEASURE IN DOING THINGS: NOT AT ALL
2. FEELING DOWN, DEPRESSED, IRRITABLE, OR HOPELESS: NEARLY EVERY DAY
1. LITTLE INTEREST OR PLEASURE IN DOING THINGS: NOT AT ALL
SUM OF ALL RESPONSES TO PHQ9 QUESTIONS 1 AND 2: 0
1. LITTLE INTEREST OR PLEASURE IN DOING THINGS: NOT AT ALL
2. FEELING DOWN, DEPRESSED, IRRITABLE, OR HOPELESS: NOT AT ALL
SUM OF ALL RESPONSES TO PHQ9 QUESTIONS 1 AND 2: 0
2. FEELING DOWN, DEPRESSED, IRRITABLE, OR HOPELESS: NOT AT ALL
1. LITTLE INTEREST OR PLEASURE IN DOING THINGS: NEARLY EVERY DAY
SUM OF ALL RESPONSES TO PHQ9 QUESTIONS 1 AND 2: 0
SUM OF ALL RESPONSES TO PHQ9 QUESTIONS 1 AND 2: 0

## 2023-01-01 ASSESSMENT — COGNITIVE AND FUNCTIONAL STATUS - GENERAL
SUGGESTED CMS G CODE MODIFIER MOBILITY: CN
HELP NEEDED FOR BATHING: TOTAL
TOILETING: TOTAL
MOVING FROM LYING ON BACK TO SITTING ON SIDE OF FLAT BED: UNABLE
SUGGESTED CMS G CODE MODIFIER MOBILITY: CL
TOILETING: TOTAL
HELP NEEDED FOR BATHING: A LOT
CLIMB 3 TO 5 STEPS WITH RAILING: TOTAL
TOILETING: TOTAL
MOVING TO AND FROM BED TO CHAIR: UNABLE
MOVING FROM LYING ON BACK TO SITTING ON SIDE OF FLAT BED: A LOT
MOBILITY SCORE: 6
DAILY ACTIVITIY SCORE: 9
WALKING IN HOSPITAL ROOM: TOTAL
DRESSING REGULAR LOWER BODY CLOTHING: TOTAL
MOVING TO AND FROM BED TO CHAIR: UNABLE
DAILY ACTIVITIY SCORE: 9
HELP NEEDED FOR BATHING: TOTAL
CLIMB 3 TO 5 STEPS WITH RAILING: TOTAL
TOILETING: A LOT
DAILY ACTIVITIY SCORE: 11
EATING MEALS: A LITTLE
MOVING TO AND FROM BED TO CHAIR: A LOT
SUGGESTED CMS G CODE MODIFIER MOBILITY: CM
PERSONAL GROOMING: A LITTLE
TURNING FROM BACK TO SIDE WHILE IN FLAT BAD: A LOT
WALKING IN HOSPITAL ROOM: TOTAL
STANDING UP FROM CHAIR USING ARMS: A LOT
SUGGESTED CMS G CODE MODIFIER DAILY ACTIVITY: CK
HELP NEEDED FOR BATHING: A LITTLE
MOVING TO AND FROM BED TO CHAIR: A LOT
MOBILITY SCORE: 6
DAILY ACTIVITIY SCORE: 15
SUGGESTED CMS G CODE MODIFIER MOBILITY: CM
WALKING IN HOSPITAL ROOM: TOTAL
DAILY ACTIVITIY SCORE: 9
DRESSING REGULAR LOWER BODY CLOTHING: A LITTLE
MOVING FROM LYING ON BACK TO SITTING ON SIDE OF FLAT BED: A LOT
DAILY ACTIVITIY SCORE: 13
CLIMB 3 TO 5 STEPS WITH RAILING: TOTAL
HELP NEEDED FOR BATHING: A LOT
STANDING UP FROM CHAIR USING ARMS: TOTAL
DRESSING REGULAR UPPER BODY CLOTHING: A LOT
SUGGESTED CMS G CODE MODIFIER DAILY ACTIVITY: CL
TOILETING: TOTAL
DRESSING REGULAR LOWER BODY CLOTHING: TOTAL
CLIMB 3 TO 5 STEPS WITH RAILING: TOTAL
TURNING FROM BACK TO SIDE WHILE IN FLAT BAD: A LOT
STANDING UP FROM CHAIR USING ARMS: TOTAL
DRESSING REGULAR LOWER BODY CLOTHING: TOTAL
WALKING IN HOSPITAL ROOM: TOTAL
MOVING TO AND FROM BED TO CHAIR: UNABLE
MOVING FROM LYING ON BACK TO SITTING ON SIDE OF FLAT BED: UNABLE
DAILY ACTIVITIY SCORE: 14
DRESSING REGULAR UPPER BODY CLOTHING: A LITTLE
SUGGESTED CMS G CODE MODIFIER DAILY ACTIVITY: CL
WALKING IN HOSPITAL ROOM: TOTAL
DRESSING REGULAR LOWER BODY CLOTHING: TOTAL
MOBILITY SCORE: 10
DRESSING REGULAR LOWER BODY CLOTHING: TOTAL
TOILETING: A LOT
SUGGESTED CMS G CODE MODIFIER MOBILITY: CM
MOVING FROM LYING ON BACK TO SITTING ON SIDE OF FLAT BED: UNABLE
TURNING FROM BACK TO SIDE WHILE IN FLAT BAD: A LOT
MOBILITY SCORE: 9
SUGGESTED CMS G CODE MODIFIER DAILY ACTIVITY: CL
SUGGESTED CMS G CODE MODIFIER DAILY ACTIVITY: CK
DRESSING REGULAR UPPER BODY CLOTHING: A LOT
PERSONAL GROOMING: A LITTLE
MOBILITY SCORE: 6
STANDING UP FROM CHAIR USING ARMS: TOTAL
STANDING UP FROM CHAIR USING ARMS: TOTAL
SUGGESTED CMS G CODE MODIFIER DAILY ACTIVITY: CK
CLIMB 3 TO 5 STEPS WITH RAILING: TOTAL
WALKING IN HOSPITAL ROOM: TOTAL
EATING MEALS: A LITTLE
EATING MEALS: A LOT
TURNING FROM BACK TO SIDE WHILE IN FLAT BAD: UNABLE
TURNING FROM BACK TO SIDE WHILE IN FLAT BAD: A LOT
MOVING TO AND FROM BED TO CHAIR: A LOT
DRESSING REGULAR LOWER BODY CLOTHING: TOTAL
WALKING IN HOSPITAL ROOM: TOTAL
EATING MEALS: A LOT
CLIMB 3 TO 5 STEPS WITH RAILING: TOTAL
SUGGESTED CMS G CODE MODIFIER MOBILITY: CM
SUGGESTED CMS G CODE MODIFIER DAILY ACTIVITY: CK
MOVING TO AND FROM BED TO CHAIR: UNABLE
MOBILITY SCORE: 7
WALKING IN HOSPITAL ROOM: TOTAL
SUGGESTED CMS G CODE MODIFIER MOBILITY: CM
PERSONAL GROOMING: A LITTLE
DRESSING REGULAR LOWER BODY CLOTHING: A LOT
TURNING FROM BACK TO SIDE WHILE IN FLAT BAD: A LOT
DAILY ACTIVITIY SCORE: 15
STANDING UP FROM CHAIR USING ARMS: TOTAL
WALKING IN HOSPITAL ROOM: TOTAL
PERSONAL GROOMING: A LOT
EATING MEALS: A LOT
HELP NEEDED FOR BATHING: A LOT
PERSONAL GROOMING: A LOT
TOILETING: TOTAL
PERSONAL GROOMING: A LOT
HELP NEEDED FOR BATHING: TOTAL
CLIMB 3 TO 5 STEPS WITH RAILING: TOTAL
MOVING FROM LYING ON BACK TO SITTING ON SIDE OF FLAT BED: UNABLE
DRESSING REGULAR UPPER BODY CLOTHING: A LOT
DRESSING REGULAR UPPER BODY CLOTHING: A LOT
MOVING FROM LYING ON BACK TO SITTING ON SIDE OF FLAT BED: UNABLE
HELP NEEDED FOR BATHING: A LOT
STANDING UP FROM CHAIR USING ARMS: A LOT
DRESSING REGULAR UPPER BODY CLOTHING: A LOT
MOVING TO AND FROM BED TO CHAIR: A LOT
HELP NEEDED FOR BATHING: TOTAL
DAILY ACTIVITIY SCORE: 19
MOBILITY SCORE: 7
MOVING FROM LYING ON BACK TO SITTING ON SIDE OF FLAT BED: A LOT
DRESSING REGULAR UPPER BODY CLOTHING: A LOT
MOVING FROM LYING ON BACK TO SITTING ON SIDE OF FLAT BED: UNABLE
PERSONAL GROOMING: A LOT
SUGGESTED CMS G CODE MODIFIER DAILY ACTIVITY: CK
STANDING UP FROM CHAIR USING ARMS: A LOT
HELP NEEDED FOR BATHING: A LOT
SUGGESTED CMS G CODE MODIFIER DAILY ACTIVITY: CL
WALKING IN HOSPITAL ROOM: A LOT
TURNING FROM BACK TO SIDE WHILE IN FLAT BAD: A LOT
SUGGESTED CMS G CODE MODIFIER MOBILITY: CM
DRESSING REGULAR UPPER BODY CLOTHING: A LOT
CLIMB 3 TO 5 STEPS WITH RAILING: TOTAL
DRESSING REGULAR UPPER BODY CLOTHING: A LITTLE
TOILETING: TOTAL
CLIMB 3 TO 5 STEPS WITH RAILING: TOTAL
DRESSING REGULAR LOWER BODY CLOTHING: A LOT
MOVING FROM LYING ON BACK TO SITTING ON SIDE OF FLAT BED: A LOT
DAILY ACTIVITIY SCORE: 10
TOILETING: TOTAL
WALKING IN HOSPITAL ROOM: TOTAL
MOVING FROM LYING ON BACK TO SITTING ON SIDE OF FLAT BED: A LOT
EATING MEALS: A LITTLE
DAILY ACTIVITIY SCORE: 14
MOVING TO AND FROM BED TO CHAIR: A LOT
STANDING UP FROM CHAIR USING ARMS: TOTAL
SUGGESTED CMS G CODE MODIFIER MOBILITY: CN
SUGGESTED CMS G CODE MODIFIER DAILY ACTIVITY: CL
CLIMB 3 TO 5 STEPS WITH RAILING: TOTAL
STANDING UP FROM CHAIR USING ARMS: TOTAL
TOILETING: A LOT
MOVING TO AND FROM BED TO CHAIR: UNABLE
MOBILITY SCORE: 11
DRESSING REGULAR UPPER BODY CLOTHING: A LITTLE
SUGGESTED CMS G CODE MODIFIER MOBILITY: CL
SUGGESTED CMS G CODE MODIFIER MOBILITY: CN
MOVING FROM LYING ON BACK TO SITTING ON SIDE OF FLAT BED: A LOT
HELP NEEDED FOR BATHING: TOTAL
TURNING FROM BACK TO SIDE WHILE IN FLAT BAD: UNABLE
TURNING FROM BACK TO SIDE WHILE IN FLAT BAD: A LOT
TOILETING: TOTAL
CLIMB 3 TO 5 STEPS WITH RAILING: TOTAL
MOBILITY SCORE: 10
MOBILITY SCORE: 7
SUGGESTED CMS G CODE MODIFIER DAILY ACTIVITY: CL
PERSONAL GROOMING: A LITTLE
STANDING UP FROM CHAIR USING ARMS: TOTAL
DRESSING REGULAR LOWER BODY CLOTHING: TOTAL
WALKING IN HOSPITAL ROOM: TOTAL
CLIMB 3 TO 5 STEPS WITH RAILING: TOTAL
DRESSING REGULAR LOWER BODY CLOTHING: TOTAL
TURNING FROM BACK TO SIDE WHILE IN FLAT BAD: A LOT
TURNING FROM BACK TO SIDE WHILE IN FLAT BAD: UNABLE
MOBILITY SCORE: 9
TURNING FROM BACK TO SIDE WHILE IN FLAT BAD: A LOT
STANDING UP FROM CHAIR USING ARMS: TOTAL
MOVING TO AND FROM BED TO CHAIR: A LOT
MOBILITY SCORE: 9
MOVING TO AND FROM BED TO CHAIR: UNABLE
PERSONAL GROOMING: A LOT
DRESSING REGULAR UPPER BODY CLOTHING: A LITTLE
SUGGESTED CMS G CODE MODIFIER MOBILITY: CL

## 2023-01-01 ASSESSMENT — PAIN DESCRIPTION - PAIN TYPE
TYPE: SURGICAL PAIN
TYPE: ACUTE PAIN
TYPE: SURGICAL PAIN
TYPE: SURGICAL PAIN
TYPE: ACUTE PAIN
TYPE: SURGICAL PAIN
TYPE: ACUTE PAIN;SURGICAL PAIN
TYPE: ACUTE PAIN
TYPE: ACUTE PAIN;CHRONIC PAIN
TYPE: ACUTE PAIN;CHRONIC PAIN
TYPE: SURGICAL PAIN
TYPE: ACUTE PAIN;CHRONIC PAIN
TYPE: SURGICAL PAIN
TYPE: SURGICAL PAIN
TYPE: ACUTE PAIN
TYPE: ACUTE PAIN
TYPE: ACUTE PAIN;SURGICAL PAIN
TYPE: SURGICAL PAIN
TYPE: SURGICAL PAIN
TYPE: ACUTE PAIN
TYPE: ACUTE PAIN
TYPE: SURGICAL PAIN
TYPE: SURGICAL PAIN
TYPE: ACUTE PAIN;SURGICAL PAIN
TYPE: ACUTE PAIN
TYPE: ACUTE PAIN
TYPE: SURGICAL PAIN
TYPE: ACUTE PAIN;SURGICAL PAIN
TYPE: ACUTE PAIN
TYPE: ACUTE PAIN
TYPE: ACUTE PAIN;CHRONIC PAIN
TYPE: ACUTE PAIN;CHRONIC PAIN
TYPE: SURGICAL PAIN
TYPE: ACUTE PAIN
TYPE: SURGICAL PAIN
TYPE: ACUTE PAIN
TYPE: ACUTE PAIN
TYPE: SURGICAL PAIN
TYPE: ACUTE PAIN
TYPE: ACUTE PAIN
TYPE: SURGICAL PAIN
TYPE: SURGICAL PAIN
TYPE: ACUTE PAIN
TYPE: SURGICAL PAIN
TYPE: ACUTE PAIN
TYPE: SURGICAL PAIN
TYPE: ACUTE PAIN
TYPE: SURGICAL PAIN
TYPE: SURGICAL PAIN
TYPE: ACUTE PAIN
TYPE: SURGICAL PAIN
TYPE: ACUTE PAIN
TYPE: ACUTE PAIN
TYPE: SURGICAL PAIN
TYPE: ACUTE PAIN
TYPE: SURGICAL PAIN
TYPE: ACUTE PAIN
TYPE: SURGICAL PAIN
TYPE: ACUTE PAIN;SURGICAL PAIN
TYPE: SURGICAL PAIN
TYPE: ACUTE PAIN;CHRONIC PAIN

## 2023-01-01 ASSESSMENT — LIFESTYLE VARIABLES
SUBSTANCE_ABUSE: 0
HAVE YOU EVER FELT YOU SHOULD CUT DOWN ON YOUR DRINKING: NO
TOTAL SCORE: 0
HAVE PEOPLE ANNOYED YOU BY CRITICIZING YOUR DRINKING: NO
ON A TYPICAL DAY WHEN YOU DRINK ALCOHOL HOW MANY DRINKS DO YOU HAVE: 1
TOTAL SCORE: 0
EVER FELT BAD OR GUILTY ABOUT YOUR DRINKING: NO
SUBSTANCE_ABUSE: 0
EVER FELT BAD OR GUILTY ABOUT YOUR DRINKING: NO
TOTAL SCORE: 0
TOTAL SCORE: 0
ALCOHOL_USE: YES
CONSUMPTION TOTAL: NEGATIVE
AVERAGE NUMBER OF DAYS PER WEEK YOU HAVE A DRINK CONTAINING ALCOHOL: 7
HOW MANY TIMES IN THE PAST YEAR HAVE YOU HAD 5 OR MORE DRINKS IN A DAY: 0
CONSUMPTION TOTAL: NEGATIVE
EVER HAD A DRINK FIRST THING IN THE MORNING TO STEADY YOUR NERVES TO GET RID OF A HANGOVER: NO
TOTAL SCORE: 0
ALCOHOL_USE: YES
HOW MANY TIMES IN THE PAST YEAR HAVE YOU HAD 5 OR MORE DRINKS IN A DAY: 0
EVER HAD A DRINK FIRST THING IN THE MORNING TO STEADY YOUR NERVES TO GET RID OF A HANGOVER: NO
SUBSTANCE_ABUSE: 0
TOTAL SCORE: 0
AVERAGE NUMBER OF DAYS PER WEEK YOU HAVE A DRINK CONTAINING ALCOHOL: 1
HAVE YOU EVER FELT YOU SHOULD CUT DOWN ON YOUR DRINKING: NO
ON A TYPICAL DAY WHEN YOU DRINK ALCOHOL HOW MANY DRINKS DO YOU HAVE: 1
HAVE PEOPLE ANNOYED YOU BY CRITICIZING YOUR DRINKING: NO

## 2023-01-01 ASSESSMENT — GAIT ASSESSMENTS
GAIT LEVEL OF ASSIST: UNABLE TO PARTICIPATE
GAIT LEVEL OF ASSIST: MAXIMAL ASSIST
ASSISTIVE DEVICE: FRONT WHEEL WALKER
DISTANCE (FEET): 0
DEVIATION: SHUFFLED GAIT
DEVIATION: SHUFFLED GAIT
GAIT LEVEL OF ASSIST: UNABLE TO PARTICIPATE
DISTANCE (FEET): 2
DISTANCE (FEET): 2
GAIT LEVEL OF ASSIST: UNABLE TO PARTICIPATE
ASSISTIVE DEVICE: FRONT WHEEL WALKER
GAIT LEVEL OF ASSIST: MAXIMAL ASSIST
GAIT LEVEL OF ASSIST: UNABLE TO PARTICIPATE
ASSISTIVE DEVICE: FRONT WHEEL WALKER
DISTANCE (FEET): 2
ASSISTIVE DEVICE: FRONT WHEEL WALKER
GAIT LEVEL OF ASSIST: UNABLE TO PARTICIPATE
GAIT LEVEL OF ASSIST: MAXIMAL ASSIST
DISTANCE (FEET): 4
GAIT LEVEL OF ASSIST: UNABLE TO PARTICIPATE
GAIT LEVEL OF ASSIST: MAXIMAL ASSIST
ASSISTIVE DEVICE: HAND HELD ASSIST
DEVIATION: SHUFFLED GAIT;DECREASED HEEL STRIKE;DECREASED TOE OFF;DECREASED BASE OF SUPPORT
ASSISTIVE DEVICE: FRONT WHEEL WALKER
DEVIATION: SHUFFLED GAIT
DISTANCE (FEET): 0
GAIT LEVEL OF ASSIST: UNABLE TO PARTICIPATE
DEVIATION: SHUFFLED GAIT
DISTANCE (FEET): 2
GAIT LEVEL OF ASSIST: UNABLE TO PARTICIPATE
GAIT LEVEL OF ASSIST: UNABLE TO PARTICIPATE
GAIT LEVEL OF ASSIST: MAXIMAL ASSIST
ASSISTIVE DEVICE: FRONT WHEEL WALKER
GAIT LEVEL OF ASSIST: UNABLE TO PARTICIPATE
DISTANCE (FEET): 2
ASSISTIVE DEVICE: FRONT WHEEL WALKER

## 2023-01-01 ASSESSMENT — FIBROSIS 4 INDEX
FIB4 SCORE: 2.91
FIB4 SCORE: 2.17
FIB4 SCORE: 1.75
FIB4 SCORE: 1.53
FIB4 SCORE: 2.23
FIB4 SCORE: 2.91
FIB4 SCORE: 2.22
FIB4 SCORE: 2.16
FIB4 SCORE: 2.77

## 2023-01-01 ASSESSMENT — ACTIVITIES OF DAILY LIVING (ADL)
TOILETING: DEPENDENT
TOILETING: INDEPENDENT

## 2023-01-01 ASSESSMENT — PAIN SCALES - GENERAL: PAIN_LEVEL: 0

## 2023-01-01 ASSESSMENT — VISUAL ACUITY: OU: 1

## 2023-04-11 PROBLEM — M79.672 LEFT FOOT PAIN: Status: ACTIVE | Noted: 2023-01-01

## 2023-04-14 NOTE — PROGRESS NOTES
"  Subjective:   Yesi Garduno is a 89 y.o. female who presents today with   Chief Complaint   Patient presents with    Cough     X1 day, SOB, exposed to Covid on Easter     Cough  This is a new problem. The current episode started yesterday. The problem has been unchanged. Associated symptoms include shortness of breath. Pertinent negatives include no chest pain, chills, fever, hemoptysis, myalgias or wheezing. She has tried nothing for the symptoms. The treatment provided no relief.   Patient does have a history of pulmonary fibrosis and has had exposure to her daughter and family members who have recently tested positive for COVID.  Patient usually uses 3 L of oxygen at all times but recently has been using 4 L.    PMH:  has a past medical history of AAA (abdominal aortic aneurysm) (HCC) (8/11/2015), Breast cancer (HCC), Cancer (HCC), Near syncope (8/11/2015), and S/P right mastectomy.  MEDS:   Current Outpatient Medications:     Nirmatrelvir&Ritonavir 150/100 10 x 150 MG & 10 x 100MG Tablet Therapy Pack, Take 150 mg nirmatrelvir (one 150 mg tablet) with 100 mg ritonavir (one 100 mg tablet) by mouth, with both tablets taken together twice daily for 5 days., Disp: 20 Each, Rfl: 0    Dextromethorphan-guaiFENesin (MUCINEX DM PO), Take  by mouth., Disp: , Rfl:     Acetaminophen (TYLENOL PO), Take  by mouth., Disp: , Rfl:     alprazolam (XANAX) 0.25 MG Tab, Take 1 Tablet by mouth at bedtime as needed for Sleep., Disp: , Rfl:     Calcium Carbonate-Vit D-Min (CALCIUM 1200 PO), Take 1 Tab by mouth every day., Disp: , Rfl:     vitamin D (CHOLECALCIFEROL) 1000 UNIT Tab, Take 1 Tablet by mouth every day., Disp: , Rfl:     Cyanocobalamin (B-12 PO), Take 1 Tab by mouth every day., Disp: , Rfl:     fluticasone (FLONASE) 50 MCG/ACT nasal spray, Administer 1 Spray into affected nostril(S) 2 times a day as needed (allergy). Each Nostril, Disp: , Rfl:   ALLERGIES:   Allergies   Allergen Reactions    Hydrocodone Rash     \"all " "over\" and it makes me act loopy    Penicillins Vomiting     Other reaction(s): GI Intolerance    Tetracycline Rash and Itching     Pt states \"I get a bad body rash and itch all over\".    Oxycodone      Other reaction(s): Other (See Comments)  hallucinations     SURGHX:   Past Surgical History:   Procedure Laterality Date    BREAST BIOPSY  1990    MASTECTOMY  1979    right side    OTHER      mastectomy     SOCHX:  reports that she quit smoking about 38 years ago. Her smoking use included cigarettes. She has a 8.50 pack-year smoking history. She has been exposed to tobacco smoke. She has never used smokeless tobacco. She reports current alcohol use of about 4.2 oz per week. She reports that she does not use drugs.  FH: Reviewed with patient, not pertinent to this visit.     Review of Systems   Constitutional:  Negative for chills and fever.   Respiratory:  Positive for cough and shortness of breath. Negative for hemoptysis, sputum production and wheezing.    Cardiovascular:  Negative for chest pain and palpitations.   Musculoskeletal:  Negative for myalgias.      Objective:   /72   Pulse 77   Temp 36 °C (96.8 °F) (Temporal)   Resp 16   Wt 69.4 kg (153 lb)   SpO2 96% Comment: with 4L of O2 via nasal cannula  BMI 22.59 kg/m²   Physical Exam  Vitals and nursing note reviewed.   Constitutional:       General: She is not in acute distress.     Appearance: Normal appearance. She is well-developed. She is not ill-appearing or toxic-appearing.   HENT:      Head: Normocephalic and atraumatic.      Right Ear: Hearing normal.      Left Ear: Hearing normal.   Cardiovascular:      Rate and Rhythm: Normal rate and regular rhythm.      Heart sounds: Normal heart sounds.   Pulmonary:      Effort: Pulmonary effort is normal. No respiratory distress.      Breath sounds: Normal breath sounds. No stridor. No wheezing, rhonchi or rales.   Musculoskeletal:      Comments: Normal movement in all 4 extremities   Skin:     General: " Skin is warm and dry.   Neurological:      Mental Status: She is alert.      Coordination: Coordination normal.   Psychiatric:         Mood and Affect: Mood normal.   Patient ambulating with walker at baseline.    COVID -  FLU-  RSV -    GFR 40    Assessment/Plan:   Assessment    1. Acute cough  - POCT CoV-2, Flu A/B, RSV by PCR    2. Exposure to COVID-19 virus  - POCT CoV-2, Flu A/B, RSV by PCR  - Comp Metabolic Panel; Future  - Nirmatrelvir&Ritonavir 150/100 10 x 150 MG & 10 x 100MG Tablet Therapy Pack; Take 150 mg nirmatrelvir (one 150 mg tablet) with 100 mg ritonavir (one 100 mg tablet) by mouth, with both tablets taken together twice daily for 5 days.  Dispense: 20 Each; Refill: 0  Symptoms and presentation consistent with viral illness and we will rule out COVID at this time.  Vital signs are stable on exam today.  Discussed CDC guidelines including self isolation at home.   Patient encouraged to get plenty of rest, use OTC tylenol for pain/fever, and drink plenty of fluids.    Discussed emergency use authorized medication with patient and she is fully understanding and agreeable and would like to have the antiviral prescribed at this time if she is positive for COVID.  Her daughter is also present and is understanding and agreeable as well.  I did discuss with them that I would recommend updating GFR for dosing of the medication and she is agreeable to this.  Patient understands to hold off on use of Xanax while on paxlovid.    Discussed with patient and her daughter based on GFR I would suggest renal dosage of Paxlovid although I would not say that it is indicated at this time as she tested negative but I recommended them doing another home test in the next day or 2 and if it comes up positive given her close exposure I would recommend starting on antiviral therapy at that time and they are agreeable with this plan.  Patient is understanding only starting on Paxlovid if she tests positive.    Differential  diagnosis, natural history, supportive care, and indications for immediate follow-up discussed.   Patient given instructions and understanding of medications and treatment.    If not improving in 3-5 days, F/U with PCP or return to UC if symptoms worsen.    Patient agreeable to plan.      Please note that this dictation was created using voice recognition software. I have made every reasonable attempt to correct obvious errors, but I expect that there are errors of grammar and possibly content that I did not discover before finalizing the note.    Kalin Rodriguez PA-C

## 2023-05-16 PROBLEM — S72.002A DISPLACED FRACTURE OF LEFT FEMORAL NECK (HCC): Status: ACTIVE | Noted: 2023-01-01

## 2023-05-17 PROBLEM — Z01.810 PREOPERATIVE CARDIOVASCULAR EXAMINATION: Status: ACTIVE | Noted: 2023-01-01

## 2023-05-17 PROBLEM — I50.32 CHRONIC DIASTOLIC HEART FAILURE (HCC): Status: ACTIVE | Noted: 2023-01-01

## 2023-05-17 PROBLEM — Z71.89 ACP (ADVANCE CARE PLANNING): Status: ACTIVE | Noted: 2023-01-01

## 2023-05-17 NOTE — ASSESSMENT & PLAN NOTE
-Hip x-ray showed impacted left femoral neck fracture  Underwent surgical fixation Dr. Bourne 5/17  Narcotics were initially discontinued due to confusion.  Continue with with scheduled Tylenol, ice, lidocaine patch for localized pain on lower extremity.  We will also add IV Dilaudid, for breakthrough pain-sherlyn with patient and daughter and they are in agreement with this.    Continue Dilaudid 0.25 mg IV-high risk medication, monitor for somnolence, altered mental status and respiratory depression.   Encourage ambulation and up to chair  Pain Seems controlled with scheduled Tylenol    5/30: Continue lidocaine patch and Tylenol.  We will try to avoid opiate medications.  Continue to monitor closely.  Family considering hospice.  Overall prognosis remains guarded.    5/31: The patient and the patient daughter at bedside would like to pursue skilled nursing facility.  We appreciate further recommendations by case management.    6/2: Pending placement.

## 2023-05-17 NOTE — OR NURSING
1332: Patient arrived to PACU from OR via bed. Report received from anesthesia and RN. Respirations are spontaneous and unlabored. VSS on 6L. Dressing is CDI. Cold pack applied. LLE: pedal pulse 2+, cap refill less than 3 seconds, warm, pink. OPA in place.    1345: OPA removed. No c/o pain or nausea    1347: SBP greater than 170, see MAR    1357: /101, see MAR    1402: report to sanket LEE

## 2023-05-17 NOTE — ANESTHESIA TIME REPORT
Anesthesia Start and Stop Event Times     Date Time Event    5/17/2023 1116 Ready for Procedure     1152 Anesthesia Start     1339 Anesthesia Stop        Responsible Staff  05/17/23    Name Role Begin End    Jorge L Vance M.D. Anesth 1152 1339        Overtime Reason:  no overtime (within assigned shift)    Comments:

## 2023-05-17 NOTE — DISCHARGE PLANNING
Case Management Discharge Planning    Admission Date: 5/16/2023  GMLOS: 2.7  ALOS: 1    6-Clicks ADL Score: 15  6-Clicks Mobility Score: 7  PT and/or OT Eval ordered: Yes    Anticipated Discharge Dispo: Discharge Disposition: D/T to SNF with Medicare cert in anticipation of skilled care (03)    DME Needed: No    Action(s) Taken: Updated Provider/Nurse on Discharge Plan, DC Assessment Complete (See below), and Choice obtained  Patient discussed during morning IDT rounds with team. Patient is not medically cleared for discharge at this time. SW assessment completed with patient's daughter via phone. Patient lives alone, uses a walker and have been to SNFs before. Daughter states choice for rehab is [facility]. SW requested for back up SNF, list left in patient room per request along with list of private pay caregivers. No other needs reported at this time    Escalations Completed: None    Medically Clear: No    Next Steps: Pending PT/OT evaluation, IPR/SNF acceptance     Barriers to Discharge: Medical clearance, Pending Placement, and Pending PT Evaluation    Is the patient up for discharge tomorrow: No       Care Transition Team Assessment    Information Source  Orientation Level: Oriented to person, Disoriented to place, Disoriented to time, Disoriented to situation  Information Given By: Relative (Daughter)  Informant's Name: Lindsey         Elopement Risk  Legal Hold: No  Ambulatory or Self Mobile in Wheelchair: No-Not an Elopement Risk    Interdisciplinary Discharge Planning  Lives with - Patient's Self Care Capacity: Alone and Able to Care For Self  Patient or legal guardian wants to designate a caregiver: No  Support Systems: Children, Family Member(s)  Housing / Facility: Unable To Determine At This Time  Durable Medical Equipment: Walker, Home Oxygen    Discharge Preparedness  What is your plan after discharge?: Skilled nursing facility  What are your discharge supports?: Child  Prior  Functional Level: Uses Walker    Functional Assesment  Prior Functional Level: Uses Walker    Vision / Hearing Impairment  Vision Impairment : Yes (reading glasses)  Right Eye Vision: Wears Glasses  Left Eye Vision: Wears Glasses  Hearing Impairment : No    Advance Directive  Advance Directive?: Living Will    Domestic Abuse  Have you ever been the victim of abuse or violence?: No  Physical Abuse or Sexual Abuse: No  Verbal Abuse or Emotional Abuse: No  Possible Abuse/Neglect Reported to:: Not Applicable    Discharge Risks or Barriers  Patient risk factors: Lives alone and no community support, Vulnerable adult    Anticipated Discharge Information  Discharge Disposition: D/T to SNF with Medicare cert in anticipation of skilled care (03)

## 2023-05-17 NOTE — THERAPY
Occupational Therapy Contact Note    Patient Name: Yesi Garduno  Age:  89 y.o., Sex:  female  Medical Record #: [id number]  Today's Date: 5/17/2023 05/17/23 0816   Initial Contact Note    Initial Contact Note Order Received and Verified, Occupational Therapy Evaluation in Progress with Full Report to Follow.   Interdisciplinary Plan of Care Collaboration   IDT Collaboration with  Nursing   Collaboration Comments OT orders rec'd, Pt with L hip fx, awaiting ortho POC and likely surgery.  OT will hold until post-op orders rec'd.

## 2023-05-17 NOTE — ANESTHESIA POSTPROCEDURE EVALUATION
Patient: Yesi Garduno    Procedure Summary     Date: 05/17/23 Room / Location:  OR 02 / SURGERY [facility]    Anesthesia Start: 1152 Anesthesia Stop: 1339    Procedure: HEMIARTHROPLASTY, HIP (Left: Hip) Diagnosis: (left displaced femoral neck fracture)    Surgeons: David Siu M.D. Responsible Provider: Jorge L Vance M.D.    Anesthesia Type: general ASA Status: 2          Final Anesthesia Type: general  Last vitals  BP   Blood Pressure : (!) 158/55    Temp   36.2 °C (97.2 °F)    Pulse   84   Resp   16    SpO2   96 %      Anesthesia Post Evaluation    Patient location during evaluation: PACU  Patient participation: complete - patient participated  Level of consciousness: lethargic  Pain score: 0    Airway patency: patent  Anesthetic complications: no  Cardiovascular status: hemodynamically stable  Respiratory status: acceptable  Hydration status: euvolemic    PONV: none          No notable events documented.     Nurse Pain Score: 7 (NPRS)

## 2023-05-17 NOTE — HOSPITAL COURSE
History of a AAA, breast cancer status post mastectomy, CKD stage IIIa, hypertension, not on any medications admitted after ground-level fall complaining of blurred vision prior to the fall and left hip pain after the fall.  She was found to have an impacted left femoral neck fracture.  Orthopedic surgery Dr. Ponce was consulted and recommended operative fixation.  She had a mildly elevated troponin which trended up slightly to 42 however then trended down.  Cardiac clearance was obtained

## 2023-05-17 NOTE — H&P
"Hospital Medicine History & Physical Note    Date of Service  5/16/2023    Primary Care Physician  Savage Doyle M.D.    Consultants  Discussed with orthopedic surgery team, Dr. Bourne    Code Status  Full Code: I reviewed in detail CODE STATUS and advance directives with the patient on admission.  See assessment and plan for more details.    Chief Complaint  Chief Complaint   Patient presents with    Fall    Hip Pain     [facility], pt had a mechanical ground level fall yesterday, and today had a fall again and sustained hip pain bilaterally. Patient can't remember if she had LOC or hit head. Pt's granddaughter reports when she arrived to her grandmother's house pt already lying on the floor, awake, complaining of left hip pain. Pt received Fentanyl 150 mcg IV, Versed 2 mg IV. Pt's IV site as per EMS blew.        History of Presenting Illness  Yesi Garduno is a 89 y.o. female, who presented to the ER on 5/16/2023 after sustaining ground-level fall at home.  Patient, who is still living independently and has family that lives close by and frequently checks on her, reports that she was walking by her living room, she lost balance and fell having immediate severe pain of her left leg and unable to ambulate or stand up.  When her granddaughter came to visit her, about 3 hours later she was still in the ground.  Patient states that \"this is the worst pain of my life \".  Pain is significantly exacerbated with movement.  Patient is not really sure if she lost consciousness.  Denies having chest pain prior to this event or after.  No shortness of breath.  She is not sure if had a head trauma but no active injury.     Underlying history: Known AAA, HTN, CKD III, Chronic respiratory failure (2 L at baseline).    I discussed the plan of care with patient and ERP Dr. Schmidt .    Review of Systems  Review of Systems   Constitutional:  Negative for fever.   HENT:  Negative for congestion and sore throat.    Eyes: " " Negative for blurred vision and double vision.   Respiratory:  Negative for cough and shortness of breath.    Cardiovascular:  Negative for chest pain and palpitations.   Gastrointestinal:  Negative for nausea and vomiting.   Genitourinary:  Negative for dysuria and urgency.   Musculoskeletal:  Positive for falls and joint pain. Negative for myalgias and neck pain.   Skin:  Negative for itching and rash.   Neurological:  Negative for dizziness, weakness and headaches.   Endo/Heme/Allergies:  Does not bruise/bleed easily.   Psychiatric/Behavioral:  Negative for depression. The patient does not have insomnia.        Past Medical History   has a past medical history of AAA (abdominal aortic aneurysm) ([facility]) (8/11/2015), Breast cancer ([facility]), Cancer ([facility]), Near syncope (8/11/2015), and S/P right mastectomy.    Surgical History   has a past surgical history that includes other; mastectomy (1979); breast biopsy (1990); pr exc skin malig 2.1-3cm remaindr body (Left, 4/21/2023); pr amputation toe,i-p jt (4/21/2023); and pr adj tiss xfer head,fac,hand <10sqcm (4/21/2023).     Family History  family history includes Cancer in her mother and sister; Lung Disease in her paternal uncle.   Family history reviewed with patient. There is no family history that is pertinent to the chief complaint.     Social History   reports that she quit smoking about 38 years ago. Her smoking use included cigarettes. She has a 8.50 pack-year smoking history. She has been exposed to tobacco smoke. She has never used smokeless tobacco. She reports current alcohol use of about 4.2 oz of alcohol per week. She reports that she does not use drugs.    Allergies  Allergies   Allergen Reactions    Oxycodone      Other reaction(s): Other (See Comments)  hallucinations  Other reaction(s): Other (See Comments)  hallucinations    Hydrocodone Rash     \"all over\" and it makes me act loopy    Penicillins Vomiting     Other reaction(s): GI Intolerance  Other " "reaction(s): GI Intolerance  Other reaction(s): Vomiting  Other reaction(s): GI Intolerance    Tetracycline Rash and Itching     Pt states \"I get a bad body rash and itch all over\".  Pt states \"I get a bad body rash and itch all over\".       Medications  Prior to Admission Medications   Prescriptions Last Dose Informant Patient Reported? Taking?   ALPRAZolam (XANAX) 0.5 MG Tab   Yes No   Sig: Take 0.5 mg by mouth.   Acetaminophen (TYLENOL PO)   Yes No   Sig: Take  by mouth.   Calcium Carbonate-Vit D-Min (CALCIUM 1200 PO)  Patient Yes No   Sig: Take 1 Tab by mouth every day.   Cyanocobalamin (B-12 PO)  Patient Yes No   Sig: Take 1 Tab by mouth every day.   Dextromethorphan-guaiFENesin (MUCINEX DM PO)   Yes No   Sig: Take  by mouth.   Nirmatrelvir&Ritonavir 150/100 10 x 150 MG & 10 x 100MG Tablet Therapy Pack   No No   Sig: Take 150 mg nirmatrelvir (one 150 mg tablet) with 100 mg ritonavir (one 100 mg tablet) by mouth, with both tablets taken together twice daily for 5 days.   Respiratory Therapy Supplies Misc   Yes No   Sig: 3 L/min.   acetaminophen (TYLENOL) 500 MG Tab   No No   Sig: Take 2 Tablets by mouth 3 times a day.   alprazolam (XANAX) 0.25 MG Tab  Patient Yes No   Sig: Take 1 Tablet by mouth at bedtime as needed for Sleep.   fluticasone (FLONASE) 50 MCG/ACT nasal spray  Patient Yes No   Sig: Administer 1 Spray into affected nostril(S) 2 times a day as needed (allergy). Each Nostril   gabapentin (NEURONTIN) 100 MG Cap   No No   Sig: Take 1 Capsule by mouth 3 times a day.   hydrOXYzine pamoate (VISTARIL) 25 MG Cap   No No   Sig: Take 1 Capsule by mouth 3 times a day as needed for Itching or Other (nausea or insomnia).   ibuprofen (MOTRIN) 600 MG Tab   No No   Sig: Take 1 Tablet by mouth every 8 hours as needed for Moderate Pain.   vitamin D (CHOLECALCIFEROL) 1000 UNIT Tab  Patient Yes No   Sig: Take 1 Tablet by mouth every day.      Facility-Administered Medications: None       Physical Exam  Temp:  [36.4 °C " (97.6 °F)] 36.4 °C (97.6 °F)  Pulse:  [77-86] 86  Resp:  [18] 18  BP: (182-208)/(82-91) 208/85  SpO2:  [97 %-98 %] 97 %  Blood Pressure : (!) 208/85   Temperature: 36.4 °C (97.6 °F)   Pulse: 86   Respiration: 18   Pulse Oximetry: 97 %       Physical Exam  Constitutional:       Appearance: Normal appearance.      Comments: Looks in pain   HENT:      Head: Normocephalic and atraumatic.      Mouth/Throat:      Mouth: Mucous membranes are moist.      Pharynx: Oropharynx is clear.   Eyes:      Extraocular Movements: Extraocular movements intact.      Pupils: Pupils are equal, round, and reactive to light.   Cardiovascular:      Rate and Rhythm: Normal rate and regular rhythm.      Heart sounds: Normal heart sounds.   Pulmonary:      Effort: Pulmonary effort is normal.      Breath sounds: Normal breath sounds.   Abdominal:      General: Abdomen is flat. Bowel sounds are normal.      Palpations: Abdomen is soft.   Musculoskeletal:      Cervical back: Normal range of motion and neck supple.      Comments: Severe pain even with minimal range of motion on left leg.   Skin:     General: Skin is warm and dry.   Neurological:      General: No focal deficit present.      Mental Status: She is alert and oriented to person, place, and time.   Psychiatric:         Mood and Affect: Mood normal.         Behavior: Behavior normal.         Laboratory:  Recent Labs     05/16/23 2040   WBC 5.9   RBC 3.54*   HEMOGLOBIN 10.8*   HEMATOCRIT 35.1*   MCV 99.2*   MCH 30.5   MCHC 30.8*   RDW 46.6   PLATELETCT 155*   MPV 9.2     Recent Labs     05/16/23 2040   SODIUM 143   POTASSIUM 4.5   CHLORIDE 107   CO2 27   GLUCOSE 106*   BUN 32*   CREATININE 1.30   CALCIUM 9.5     Recent Labs     05/16/23 2040   ALTSGPT 23   ASTSGOT 23   ALKPHOSPHAT 87   TBILIRUBIN 0.3   GLUCOSE 106*         No results for input(s): NTPROBNP in the last 72 hours.      Recent Labs     05/16/23 2040   TROPONINT 30*       Imaging:  DX-SHOULDER 2+ RIGHT   Final Result       1.  Osteoporosis.      2.  No evidence of right shoulder fracture and/or dislocation.      DX-HAND 3+ RIGHT   Final Result         1.  No radiographic evidence of acute injury.      2. Osteoporosis.      DX-HIP-COMPLETE - UNILATERAL 2+ RIGHT   Final Result      1.  Impacted left femoral neck fracture.      2.  Previous pinning of right hip      3.  Osteoporosis.      DX-HUMERUS 2+ LEFT   Final Result         1.  No radiographic evidence of acute injury.      2. Osteopenia.      DX-HAND 3+ LEFT   Final Result         1.  No radiographic evidence of acute injury.      2. Osteoporosis.      DX-FEMUR-2+ LEFT   Final Result      1.  Impacted left femoral neck fracture.      2.  Bone infarcts noted in the metadiaphyseal region of left femur.      CT-CHEST,ABDOMEN,PELVIS WITH   Final Result      1.  Left femoral neck fracture.      2.  Previous pinning of right hip.      3.  Infrarenal aortic aneurysm which measures 5.5 cm in greatest diameter.      4.  Osteoporosis.      5.  Sigmoid scoliosis of the thoracic and lumbar spine.      6.  Centrilobular emphysema.      7.  These findings were discussed with JOSÉ LUIS GARCIA at 10:15 p.m. 5/16/2023      CT-CSPINE WITHOUT PLUS RECONS   Final Result      1.  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      2.  Mild to moderate degenerative anterolisthesis at C4-5 level.      3.  Osteopenia.      4.  No evidence of acute cervical spine fracture.      CT-HEAD W/O   Final Result      1.  Cerebral atrophy.      2.  White matter lucencies most consistent with small vessel ischemic change versus demyelination or gliosis.      3.  Otherwise, Head CT without contrast with no acute findings. No evidence of acute cerebral infarction, hemorrhage or mass lesion.                X-Ray:  I have personally reviewed the images and compared with prior images.  EKG:  My impression is: Sinus rhythm at 84 bpm Left atrial  enlargement, LVH and no acute ST elevation.    Assessment/Plan:  Justification for Admission Status  I anticipate this patient will require at least two midnights for appropriate medical management, necessitating inpatient admission because .  89-year-old female, coming with left impacted femoral neck fracture    * Displaced fracture of left femoral neck (HCC)- (present on admission)  Assessment & Plan  -Inpatient status on medical floor, with remote cardiac monitoring.  -Hip x-ray showed impacted left femoral neck fracture  -Strict NPO.  -Pain control as needed.  -Appreciate orthopedic surgery consult recommendations: ERP discussed with Dr. Bourne.  -Patient with underlying history of AAA, trending up troponin and significantly elevated blood pressure.  At the time of my evaluation, she still needs additional cardiovascular optimization prior to being ready for surgery.    Preoperative cardiovascular examination  Assessment & Plan  -High risk patient.  Patient with 89 years old, questionable syncopal episode prior event.  Underlying AAA, infrarenal 5.5 cm.  The last measurement was in 2017 and 4.2 cm.  Even though patient has knowledge about this, it is unclear to me if she was following this closely and how this might affect doing her surgery.  She has significantly elevated blood pressure and for so we will be better controlling this.  Added echocardiogram in the morning and will need to continue to trend her troponin level in the night.  Very likely need cardiology consult for additional preoperative cardiovascular clearance.  At the time of my evaluation, I am unable to clear her for early morning surgical procedure given above.    Elevated troponin- (present on admission)  Assessment & Plan  -Troponin 30 > 37.  Patient is not having any chest pain and her EKG is not showing any acute ischemia however, she does have underlying history of AAA.  We will work on optimizing blood pressure control, continue  "serial cardiac enzymes and remote telemetry overnight.    Hypertensive urgency- (present on admission)  Assessment & Plan  -Blood pressure poorly controlled, likely due to underlying acute pain.  Highest blood pressure was 208/105 in the ED.  She was treated for pain and blood pressure remains elevated reason why I decided hydralazine IV.   patient will need more aggressive blood pressure control given underlying history of AAA.     AAA (abdominal aortic aneurysm) (HCC)- (present on admission)  Assessment & Plan  - CT showed infrarenal aortic aneurysm which measures 5.5 cm in the greatest diameter.  -Patient is aware of having AAA in the past but is unable to specify size in detail information.  -I provided extensive chart review on this. In 7/2015 AAA was 3.9 cm (Renal CT), and on 1/2017 AAA 4.2 cm (CT L Spine).  Patient is asymptomatic but with elevated blood pressure now.  -There is no chest pain, EKG is negative for acute ischemia but she does have trending up troponin, 30 and 37.  -We will try to obtain with the daughter additional information about this as its not clear to me progression in follow-up of increasing size AAA.    Near syncope- (present on admission)  Assessment & Plan  -Patient not sure if she passed out.  Serial cardiac enzymes and echocardiogram added.    ACP (advance care planning)  Assessment & Plan  -Had long conversation with the patient regarding advance care planning and CODE STATUS.  Patient is is very involved with community projects, she is states that she enjoys spending time with her grandkids and great grandkids.  She also said that \"I do not want to be permanently as a vegetable leaving to a machine \", but she would like to be resuscitated in case of an emergency and give the best chance to survive therefore full code was added.  I offer to sign a POLST, she says this was done in the past\" I have a pink form in my refrigerator \".  She also does not remember the details of her POLST " form.  Total amount of time allocated to advance care planning discussion was 70 minutes.    Stage 3a chronic kidney disease (HCC)- (present on admission)  Assessment & Plan  -On admission her creatinine is 1.30.  -Avoid nephrotoxic medication and monitor chemistry panel in the morning.    S/P right mastectomy- (present on admission)  Assessment & Plan  -Due to underlying breast cancer in 1960s.        VTE prophylaxis: SCDs/TEDs

## 2023-05-17 NOTE — PROGRESS NOTES
12 hour chart check complete.    Monitor Summary:    Rhythm:  Sinus Rhythm    HR:  81-89    Ectopy:  Rare PAC    0.16/ 0.10/ 0.42

## 2023-05-17 NOTE — PROGRESS NOTES
Patient returned from PACU, VSS, on 2L NC, denies any pain. Still a little drowsy and confused, A/O x2. Call light within reach, reoriented to room.

## 2023-05-17 NOTE — ED TRIAGE NOTES
Chief Complaint   Patient presents with    Fall    Hip Pain     [facility], pt had a mechanical ground level fall yesterday, and today had a fall again and sustained hip pain bilaterally. Patient can't remember if she had LOC or hit head. Pt's granddaughter reports when she arrived to her grandmother's house pt already lying on the floor, awake, complaining of left hip pain. Pt received Fentanyl 150 mcg IV, Versed 2 mg IV. Pt's IV site as per EMS blew.      BP (!) 182/82   Pulse 77   Temp 36.4 °C (97.6 °F) (Temporal)   Resp 18   SpO2 98%

## 2023-05-17 NOTE — ED PROVIDER NOTES
"ED Provider Note    CHIEF COMPLAINT  Chief Complaint   Patient presents with    Fall    Hip Pain     [facility], pt had a mechanical ground level fall yesterday, and today had a fall again and sustained hip pain bilaterally. Patient can't remember if she had LOC or hit head. Pt's granddaughter reports when she arrived to her grandmother's house pt already lying on the floor, awake, complaining of left hip pain. Pt received Fentanyl 150 mcg IV, Versed 2 mg IV. Pt's IV site as per EMS blew.        EXTERNAL RECORDS REVIEWED  Outpatient Notes patient was seen by the [facility] on May 5 for follow-up for a left foot great toe partial amputation secondary to melanoma.  She was seen at urgent care on April 14, 2023 with complaint of a cough and shortness after exposure to COVID on Easter.    HPI/ROS  LIMITATION TO HISTORY   Select: : None  OUTSIDE HISTORIAN(S):  Family daughter and granddaughter state they see patient every day and she has been doing well overall.  No recent cough, cold or flulike symptoms.    Yesi Garduno is a 89 y.o. female who presents to the ER with complaint of left hip pain.  The patient reached over to get a glass of water off of a nightstand last night in the middle the night and ended up sliding off of her bed.  She was on the floor for a few hours until about 6 AM when the daughter came over.  Daughter was unable to get her up so they called EMS who assisted her back into the bed.  Daughter said she was able to stand and walk and seemed to be just fine.  The patient reports that about 3 PM this afternoon she was carrying some water into the living room when her legs just \"gave out on her\" and she fell.  She believes she struck her head.  She believes she might of lost consciousness.  She is not on any blood thinners.  No headache.  She complains of some mild neck pain.  No back pain.  No rib pain.  No trouble breathing.  No recent nausea or vomiting.  Patient has been feeling " well lately.  Daughter states she visits her every day and she has been doing just fine without any recent illness.  The patient was at lone when she fell.  She laid on the ground this afternoon until about 6 PM when granddaughter came over to check on her and found her on the ground.  It was at that point she complained of left hip pain.  She was unable to stand/weight-bear and was transported to ER.  She was given fentanyl in route with some improvement in her pain.  No complaints of chest pain, headache, abdominal pain or back pain prior to the fall.  She did not feel dizzy or lightheaded prior to the fall.    PAST MEDICAL HISTORY   has a past medical history of AAA (abdominal aortic aneurysm) ([facility]) (2015), Breast cancer ([facility]), Cancer ([facility]), Near syncope (2015), and S/P right mastectomy.    SURGICAL HISTORY   has a past surgical history that includes other; mastectomy (); breast biopsy (); exc skin malig 2.1-3cm remaindr body (Left, 2023); amputation toe,i-p jt (2023); and adj tiss xfer head,fac,hand <10sqcm (2023).    FAMILY HISTORY  Family History   Problem Relation Age of Onset    Cancer Sister     Cancer Mother     Lung Disease Paternal Uncle     Arthritis Neg Hx     Genetic Disorder Neg Hx     Psychiatric Illness Neg Hx     Diabetes Neg Hx     Heart Disease Neg Hx     Hypertension Neg Hx     Hyperlipidemia Neg Hx     Stroke Neg Hx     Alcohol/Drug Neg Hx        SOCIAL HISTORY  Social History     Tobacco Use    Smoking status: Former     Packs/day: 0.25     Years: 34.00     Pack years: 8.50     Types: Cigarettes     Quit date: 1985     Years since quittin.3     Passive exposure: Past    Smokeless tobacco: Never   Vaping Use    Vaping Use: Never used   Substance and Sexual Activity    Alcohol use: Yes     Alcohol/week: 4.2 oz     Types: 7 Shots of liquor per week     Comment: sip of wine    Drug use: No    Sexual activity: Not on file       CURRENT MEDICATIONS  Home  "Medications    **Home medications have not yet been reviewed for this encounter**         ALLERGIES  Allergies   Allergen Reactions    Oxycodone      Other reaction(s): Other (See Comments)  hallucinations  Other reaction(s): Other (See Comments)  hallucinations    Hydrocodone Rash     \"all over\" and it makes me act loopy    Penicillins Vomiting     Other reaction(s): GI Intolerance  Other reaction(s): GI Intolerance  Other reaction(s): Vomiting  Other reaction(s): GI Intolerance    Tetracycline Rash and Itching     Pt states \"I get a bad body rash and itch all over\".  Pt states \"I get a bad body rash and itch all over\".       PHYSICAL EXAM  VITAL SIGNS: BP (!) 199/91   Pulse 86   Temp 36.4 °C (97.6 °F) (Temporal)   Resp 18   SpO2 97%    Constitutional:  Well developed, well nourished; No acute distress   HENT: Normocephalic, Atraumatic, Bilateral external ears normal, slightly dry mucous membranes  Eyes: PERRL, EOMI, Conjunctiva normal, No discharge.   Neck: Normal range of motion, supple, nontender  Lymphatic: No lymphadenopathy noted.   Cardiovascular: Normal heart rate, Normal rhythm, No murmurs, rubs or gallops   Thorax & Lungs: CTA=bilaterally;  No respiratory distress,  No wheezing rales, or rhonchi; No chest tenderness. No crepitus or subQ air  Abdomen: Nontender, soft, good bowel sounds no guarding no rebound, no masses, no pulsatile mass, no tenderness, no distention  Skin: Warm, Dry, No erythema, No rash.   Back: No tenderness, No CVA tenderness.   Extremities: 2+ pitting edema to bilateral feet and ankles which is normal for patient.  2+ pedal pulses equal bilaterally.  The left leg is slightly shorter when compared to the right.  There is a very mild external rotation of the left leg.  There is tenderness over the proximal femur.  There is pain with flexion extension as well as internal and external rotation of the left leg.  There is also some mild discomfort with rotation of the right hip.  There " is tenderness to bilateral shoulders as well as the left proximal humerus.  Elbows are nontender.  Patient complains of some pain to bilateral hands which has been going on for the last 2 weeks after they got caught and her wheelchair.  She is requesting x-rays for those injuries 2 weeks ago.  Neurologic: Alert & oriented x 4, Clear speech.  Psychiatric: appropriate, normal affect     DIAGNOSTIC STUDIES / PROCEDURES  EKG  I have independently interpreted this EKG  Please see my EKG interpretation below:   EKG time 2305.  Rate of 84.  Normal axis.  Normal intervals.  No ST elevation or depression.    LABS  Results for orders placed or performed during the hospital encounter of 05/16/23   CBC WITH DIFFERENTIAL   Result Value Ref Range    WBC 5.9 4.8 - 10.8 K/uL    RBC 3.54 (L) 4.20 - 5.40 M/uL    Hemoglobin 10.8 (L) 12.0 - 16.0 g/dL    Hematocrit 35.1 (L) 37.0 - 47.0 %    MCV 99.2 (H) 81.4 - 97.8 fL    MCH 30.5 27.0 - 33.0 pg    MCHC 30.8 (L) 33.6 - 35.0 g/dL    RDW 46.6 35.9 - 50.0 fL    Platelet Count 155 (L) 164 - 446 K/uL    MPV 9.2 9.0 - 12.9 fL    Neutrophils-Polys 70.60 44.00 - 72.00 %    Lymphocytes 15.30 (L) 22.00 - 41.00 %    Monocytes 11.40 0.00 - 13.40 %    Eosinophils 1.90 0.00 - 6.90 %    Basophils 0.50 0.00 - 1.80 %    Immature Granulocytes 0.30 0.00 - 0.90 %    Nucleated RBC 0.00 /100 WBC    Neutrophils (Absolute) 4.14 2.00 - 7.15 K/uL    Lymphs (Absolute) 0.90 (L) 1.00 - 4.80 K/uL    Monos (Absolute) 0.67 0.00 - 0.85 K/uL    Eos (Absolute) 0.11 0.00 - 0.51 K/uL    Baso (Absolute) 0.03 0.00 - 0.12 K/uL    Immature Granulocytes (abs) 0.02 0.00 - 0.11 K/uL    NRBC (Absolute) 0.00 K/uL   COMP METABOLIC PANEL   Result Value Ref Range    Sodium 143 135 - 145 mmol/L    Potassium 4.5 3.6 - 5.5 mmol/L    Chloride 107 96 - 112 mmol/L    Co2 27 20 - 33 mmol/L    Anion Gap 9.0 7.0 - 16.0    Glucose 106 (H) 65 - 99 mg/dL    Bun 32 (H) 8 - 22 mg/dL    Creatinine 1.30 0.50 - 1.40 mg/dL    Calcium 9.5 8.4 - 10.2  mg/dL    AST(SGOT) 23 12 - 45 U/L    ALT(SGPT) 23 2 - 50 U/L    Alkaline Phosphatase 87 30 - 99 U/L    Total Bilirubin 0.3 0.1 - 1.5 mg/dL    Albumin 4.1 3.2 - 4.9 g/dL    Total Protein 6.4 6.0 - 8.2 g/dL    Globulin 2.3 1.9 - 3.5 g/dL    A-G Ratio 1.8 g/dL   MAGNESIUM   Result Value Ref Range    Magnesium 1.7 1.5 - 2.5 mg/dL   TROPONIN   Result Value Ref Range    Troponin T 30 (H) 6 - 19 ng/L   CREATINE KINASE   Result Value Ref Range    CPK Total 74 0 - 154 U/L   LACTIC ACID   Result Value Ref Range    Lactic Acid 0.6 0.5 - 2.0 mmol/L   URINALYSIS (UA)    Specimen: Urine   Result Value Ref Range    Micro Urine Req Microscopic    CORRECTED CALCIUM   Result Value Ref Range    Correct Calcium 9.4 8.5 - 10.5 mg/dL   ESTIMATED GFR   Result Value Ref Range    GFR (CKD-EPI) 38 (A) >60 mL/min/1.73 m 2   EKG (NOW)   Result Value Ref Range    Report       [facility]    Test Date:  2023  Pt Name:    ADAM RAHMAN                 Department: [facility]  MRN:        [id number]                      [facility]  Gender:     Female                       Technician: [id number]  :        [id number]                   Requested By:JOSÉ LUIS GARCIA  Order #:    [id number]                    Reading MD:    Measurements  Intervals                                Axis  Rate:       84                           P:          -9  MO:         194                          QRS:        -29  QRSD:       112                          T:          14  QT:         374  QTc:        443    Interpretive Statements  Sinus rhythm  Left atrial enlargement  Left ventricular hypertrophy  Baseline wander in lead(s) V1  Compared to ECG 2017 23:19:09  Left ventricular hypertrophy now present          RADIOLOGY  I have independently interpreted the diagnostic imaging associated with this visit and am waiting the final reading from the radiologist.     My preliminary interpretation is as follows: I reviewed the  patient's hip x-ray.  There appears to be a left femoral neck fracture.    Radiologist interpretation:   DX-SHOULDER 2+ RIGHT   Final Result      1.  Osteoporosis.      2.  No evidence of right shoulder fracture and/or dislocation.      DX-HAND 3+ RIGHT   Final Result         1.  No radiographic evidence of acute injury.      2. Osteoporosis.      DX-HIP-COMPLETE - UNILATERAL 2+ RIGHT   Final Result      1.  Impacted left femoral neck fracture.      2.  Previous pinning of right hip      3.  Osteoporosis.      DX-HUMERUS 2+ LEFT   Final Result         1.  No radiographic evidence of acute injury.      2. Osteopenia.      DX-HAND 3+ LEFT   Final Result         1.  No radiographic evidence of acute injury.      2. Osteoporosis.      DX-FEMUR-2+ LEFT   Final Result      1.  Impacted left femoral neck fracture.      2.  Bone infarcts noted in the metadiaphyseal region of left femur.      CT-CHEST,ABDOMEN,PELVIS WITH   Final Result      1.  Left femoral neck fracture.      2.  Previous pinning of right hip.      3.  Infrarenal aortic aneurysm which measures 5.5 cm in greatest diameter.      4.  Osteoporosis.      5.  Sigmoid scoliosis of the thoracic and lumbar spine.      6.  Centrilobular emphysema.      7.  These findings were discussed with JOSÉ LUIS GARCIA at 10:15 p.m. 5/16/2023      CT-CSPINE WITHOUT PLUS RECONS   Final Result      1.  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      2.  Mild to moderate degenerative anterolisthesis at C4-5 level.      3.  Osteopenia.      4.  No evidence of acute cervical spine fracture.      CT-HEAD W/O   Final Result      1.  Cerebral atrophy.      2.  White matter lucencies most consistent with small vessel ischemic change versus demyelination or gliosis.      3.  Otherwise, Head CT without contrast with no acute findings. No evidence of acute cerebral infarction, hemorrhage or mass lesion.               COURSE & MEDICAL DECISION MAKING    ED Observation Status? No; Patient does not meet criteria for ED Observation.     INITIAL ASSESSMENT, COURSE AND PLAN  Care Narrative: Patient presents to the ER with complaint of left hip pain after ground-level fall today.  She says she was carrying some water into the living room when her legs gave out and she fell.  She believes she hit her head and perhaps lost consciousness.  No headache.  No nausea or vomiting.  She laid on the ground for about 3 hours until family came to the house around 6:00 to check on her and found her on the ground.  She was unable to weight-bear on the left leg.  EMS was called and transported here in the hospital.  She received several rounds of fentanyl and Versed in route.  The patient reports that she has been feeling well over the last couple days.  No cough, cold or flulike symptoms.  She denies having any chest pains abdominal pain or back pain prior to the fall.  Patient underwent a comprehensive work-up here in the evaluation to determine possible cause of her fall as well as to evaluate for rhabdomyolysis as well as possible injuries.  Concern was high for left hip fracture.  She does indeed have a left femoral neck fracture.  I spoke with the on-call orthopedic surgeon who will kindly evaluate the patient in consultation for repair or replacement of her left femoral neck.  Patient is a little hypertensive here in the ER.  She was given some pain medication with some improvement in her pain, but no real improvement in blood pressure.  For this reason I gave her some labetalol.  She will need to be hospitalized as she will likely go to the operating tomorrow with orthopedics.  I will speak with hospitalist on-call for hospitalization.  Other than the left femoral neck fracture, patient has no other injuries.  No intracranial injury.  No cervical spine fracture.  No rib fractures.  No intra-abdominal organ injury.  At this time patient  has isolated hip injury for which she will need to be admitted.  Of note, patient has a known AAA.  It is larger than it was last time it was evaluated, which was many years ago.  Daughter is aware that patient will need outpatient follow-up with vascular surgery when she is discharged.    2235: I spoke with patient and her daughter.  Although the [facility] just operated on the patient's toe, they are very comfortable with the on-call physician who is available tonight to consult on the patient's left femoral neck fracture.  They would prefer I call the on-call physician at this time.  Daughter states that they are also aware the patient has an aneurysm.  They have not had it monitored in quite some time.  She says the last time they had it monitored it was under 4 cm.    2240: Discussed with Dr. Ponce, orthopedic surgeon on-call.  He will kindly see the patient in consultation.  He would like me to transmit some pictures of the x-rays to him.    2300: Paged hospitalist      ADDITIONAL PROBLEM LIST  Problem #1: Left hip pain  Problem #2: Pelvic pain  Problem #3: Closed head injury  Problem #4: Left shoulder pain  Problem #5: Generalized weakness    DISPOSITION AND DISCUSSIONS  I have discussed management of the patient with the following physicians and LORENE's: Orthopedics and hospitalist    Discussion of management with other Q or appropriate source(s): None     Escalation of care considered, and ultimately not performed: Patient does not have any back pain.  No need for dedicated T-spine and L-spine CT scans.    Barriers to care at this time, including but not limited to:  None known .     Decision tools and prescription drugs considered including, but not limited to: Antibiotics no signs of infection on work-up here today.  No need for antibiotics. .    I verified that the patient was wearing a mask and I was wearing appropriate PPE every time I entered the room. The patient's mask was on the patient at all times  during my encounter except for a brief view of the oropharynx.     FINAL DIAGNOSIS  1. Closed fracture of neck of left femur, initial encounter (HCC) Acute   2. Abdominal aortic aneurysm (AAA) without rupture, unspecified part (HCC) Acute        This dictation has been created using voice recognition software. The accuracy of the dictation is limited by the abilities of the software. I expect there may be some errors of grammar and possibly content. I made every attempt to manually correct the errors within my dictation. However, errors related to voice recognition software may still exist and should be interpreted within the appropriate context.     Electronically signed by: Zuly Schmidt M.D., 5/16/2023 7:26 PM

## 2023-05-17 NOTE — PROGRESS NOTES
[facility] Medicine Daily Progress Note    Date of Service  5/17/2023    Chief Complaint  Yesi Garduno is a 89 y.o. female admitted 5/16/2023 with left hip pain after a fall    Hospital Course  History of a AAA, breast cancer status post mastectomy, CKD stage IIIa, hypertension, not on any medications admitted after ground-level fall complaining of blurred vision prior to the fall and left hip pain after the fall.  She was found to have an impacted left femoral neck fracture.  Orthopedic surgery Dr. Ponce was consulted and recommended operative fixation.  She had a mildly elevated troponin which trended up slightly to 42 however then trended down.  Cardiac clearance was obtained    Interval Problem Update  5/17: Complaining of severe left hip pain, anxious for surgery.  Echocardiogram performed and reviewed and it shows some diastolic dysfunction however normal EF.  Cleared for surgery, discussed with cardiology.  Blood pressure improved after hydralazine and pain control.  N.p.o.    I have discussed this patient's plan of care and discharge plan at IDT rounds today with Case Management, Nursing, Nursing leadership, and other members of the IDT team.    Consultants/Specialty  orthopedics- Rosario  Cardiology - Jayro    Code Status  Full Code    Disposition  The patient is not medically cleared for discharge to home or a post-acute facility.  Anticipate discharge to: skilled nursing facility    I have placed the appropriate orders for post-discharge needs.    Review of Systems  Review of Systems   Constitutional:  Positive for malaise/fatigue. Negative for chills and fever.   HENT:  Negative for sore throat.    Eyes:  Positive for blurred vision and double vision.   Respiratory:  Negative for cough and shortness of breath.    Cardiovascular:  Negative for chest pain and palpitations.   Gastrointestinal:  Negative for abdominal pain, blood in stool, diarrhea, heartburn, nausea and vomiting.   Genitourinary:   Negative for dysuria and frequency.   Musculoskeletal:  Positive for falls and joint pain. Negative for back pain and myalgias.   Neurological:  Negative for dizziness, focal weakness, weakness and headaches.   Psychiatric/Behavioral:  Positive for memory loss. Negative for depression and hallucinations. The patient is nervous/anxious.    All other systems reviewed and are negative.       Physical Exam  Temp:  [36 °C (96.8 °F)-36.4 °C (97.6 °F)] 36.2 °C (97.2 °F)  Pulse:  [77-87] 84  Resp:  [9-25] 16  BP: (149-208)/() 158/55  SpO2:  [90 %-100 %] 96 %    Physical Exam  Constitutional:       General: She is not in acute distress.     Appearance: She is not ill-appearing.   HENT:      Nose: Nose normal.      Mouth/Throat:      Mouth: Mucous membranes are dry.   Cardiovascular:      Rate and Rhythm: Normal rate and regular rhythm.      Pulses: Normal pulses.      Heart sounds: No murmur heard.  Pulmonary:      Effort: Pulmonary effort is normal.      Breath sounds: Normal breath sounds.   Abdominal:      General: There is no distension.      Palpations: Abdomen is soft.   Musculoskeletal:         General: No swelling.      Cervical back: No muscular tenderness.      Comments: Bandage in place right great toe, amputation of distal and   Lymphadenopathy:      Cervical: No cervical adenopathy.   Skin:     General: Skin is warm and dry.      Coloration: Skin is pale.      Findings: No rash.   Neurological:      General: No focal deficit present.      Mental Status: She is alert and oriented to person, place, and time.      Motor: Weakness present.   Psychiatric:         Mood and Affect: Mood normal.         Thought Content: Thought content normal.         Fluids    Intake/Output Summary (Last 24 hours) at 5/17/2023 1302  Last data filed at 5/17/2023 0800  Gross per 24 hour   Intake 0 ml   Output 350 ml   Net -350 ml       Laboratory  Recent Labs     05/16/23  2040 05/17/23  0547   WBC 5.9 6.4   RBC 3.54* 3.53*    HEMOGLOBIN 10.8* 10.8*   HEMATOCRIT 35.1* 34.4*   MCV 99.2* 97.5   MCH 30.5 30.6   MCHC 30.8* 31.4*   RDW 46.6 45.9   PLATELETCT 155* 163*   MPV 9.2 9.1     Recent Labs     05/16/23 2040 05/17/23  0511   SODIUM 143 138   POTASSIUM 4.5 4.0   CHLORIDE 107 102   CO2 27 25   GLUCOSE 106* 141*   BUN 32* 28*   CREATININE 1.30 1.21   CALCIUM 9.5 9.5                   Imaging  EC-ECHOCARDIOGRAM COMPLETE W/ CONT   Final Result      DX-SHOULDER 2+ RIGHT   Final Result      1.  Osteoporosis.      2.  No evidence of right shoulder fracture and/or dislocation.      DX-HAND 3+ RIGHT   Final Result         1.  No radiographic evidence of acute injury.      2. Osteoporosis.      DX-HIP-COMPLETE - UNILATERAL 2+ RIGHT   Final Result      1.  Impacted left femoral neck fracture.      2.  Previous pinning of right hip      3.  Osteoporosis.      DX-HUMERUS 2+ LEFT   Final Result         1.  No radiographic evidence of acute injury.      2. Osteopenia.      DX-HAND 3+ LEFT   Final Result         1.  No radiographic evidence of acute injury.      2. Osteoporosis.      DX-FEMUR-2+ LEFT   Final Result      1.  Impacted left femoral neck fracture.      2.  Bone infarcts noted in the metadiaphyseal region of left femur.      CT-CHEST,ABDOMEN,PELVIS WITH   Final Result      1.  Left femoral neck fracture.      2.  Previous pinning of right hip.      3.  Infrarenal aortic aneurysm which measures 5.5 cm in greatest diameter.      4.  Osteoporosis.      5.  Sigmoid scoliosis of the thoracic and lumbar spine.      6.  Centrilobular emphysema.      7.  These findings were discussed with JOSÉ LUIS GARCIA at 10:15 p.m. 5/16/2023      CT-CSPINE WITHOUT PLUS RECONS   Final Result      1.  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      2.  Mild to moderate degenerative anterolisthesis at C4-5 level.      3.  Osteopenia.      4.  No evidence of acute cervical spine fracture.       CT-HEAD W/O   Final Result      1.  Cerebral atrophy.      2.  White matter lucencies most consistent with small vessel ischemic change versus demyelination or gliosis.      3.  Otherwise, Head CT without contrast with no acute findings. No evidence of acute cerebral infarction, hemorrhage or mass lesion.              Assessment/Plan  * Displaced fracture of left femoral neck (HCC)- (present on admission)  Assessment & Plan  -Hip x-ray showed impacted left femoral neck fracture  -Strict NPO-planning for surgical fixation Dr. Bourne.  -Pain control with oxycodone versus morphine as needed, resume Tylenol postoperatively  Cardiology agreed with Surgical clearance (Jayro)    Elevated troponin- (present on admission)  Assessment & Plan  -Troponin 30 > 37.  Patient is not having any chest pain and her EKG is not showing any acute ischemia however  I reviewed her echo which is essentially without significant abnormality, normal EF  Discussed with cardiology, they have cleared her for surgery given troponin trended back down, echo normal    Hypertensive urgency- (present on admission)  Assessment & Plan  Present on admission, likely due to pain as she does not take anything at home  Improved with IV hydralazine  Continue as needed BP medications and monitor    Stage 3a chronic kidney disease (HCC)- (present on admission)  Assessment & Plan  -On admission her creatinine is 1.30.  -Avoid nephrotoxic medication and monitor chemistry panel in the morning.    S/P right mastectomy- (present on admission)  Assessment & Plan  -Due to underlying breast cancer in 1960s.    AAA (abdominal aortic aneurysm) (formerly [facility])- (present on admission)  Assessment & Plan  - CT showed infrarenal aortic aneurysm which measures 5.5 cm in the greatest diameter  -Patient is aware of having AAA in the past but is unable to specify size in detail information.  -I provided extensive chart review on this. In 7/2015 AAA was 3.9 cm (Renal CT), and on  1/2017 AAA 4.2 cm (CT L Spine).  Blood pressure has improved  No chest pain, EKG is negative for acute ischemia.  Troponin increased slightly but now trending down  Discussed with cardiology who agreed stable for surgery given normal echo, trending down troponin and no complaint of chest pain    Near syncope- (present on admission)  Assessment & Plan  She complained of blurred vision prior to stumbling over a stool leading to a fall  She has been still complaining of intermittent double vision however states currently vision is normal  CT of the head on admission was unremarkable other than small vessel ischemic change, no focal neurodeficits to suggest CVA  No additional head imaging needed at this time  Echocardiogram performed and shows essentially a normal EF, no significant abnormalities  Possibly orthostatic versus volume depletion, monitor         VTE prophylaxis: SCDs/TEDs, xarelto post op    I have performed a physical exam and reviewed and updated ROS and Plan today (5/17/2023). In review of yesterday's note (5/16/2023), there are no changes except as documented above.      Total time:  53 minutes.  I spent greater than 50% of the time for patient care, counseling, and coordination on this date, including unit/floor time, and face-to-face time with the patient as per interval events and assessment and plan above

## 2023-05-17 NOTE — CARE PLAN
The patient is Stable - Low risk of patient condition declining or worsening    Shift Goals  Clinical Goals: Pain management  Patient Goals: drink water, manage pain  Family Goals: NANCY    Patient's pain is managed by medication. Patient appears comfortable and at ease. Skin is intact.    Progress made toward(s) clinical / shift goals:    Problem: Pain - Standard  Goal: Alleviation of pain or a reduction in pain to the patient’s comfort goal  Outcome: Progressing     Problem: Skin Integrity  Goal: Skin integrity is maintained or improved  Outcome: Progressing     Problem: Psychosocial  Goal: Patient's level of anxiety will decrease  Outcome: Progressing     Problem: Mobility  Goal: Patient's capacity to carry out activities will improve  Outcome: Progressing

## 2023-05-17 NOTE — OR NURSING
1051: Pt arrived to pre-op holding via bed, brought by transport, grand-daughter at bedside. Pain is high, but tolerable enough if pt is not moving around. Rcvd report from primary RN prior to pt's arrival.  1115: Daughter also at bedside now.  1140: Patient allergies and NPO status verified, home medication reconciliation completed and belongings secured. Patient verbalizes understanding of pain scale, expected course of stay and plan of care. Surgical site verified with patient. PIV patency verified. Sequentials placed on legs.

## 2023-05-17 NOTE — ASSESSMENT & PLAN NOTE
Elevated and initially trended up very slightly, likely due to reduced renal clearance, has trended back down.  Never complained of any chest pain  EKG negative for ischemia  Not consistent with cardiac ischemia/event

## 2023-05-17 NOTE — OP REPORT
DATE OF OPERATION: 5/17/2023     PREOPERATIVE DIAGNOSIS:   Left displaced femoral neck fracture  2. Osteoporosis    POSTOPERATIVE DIAGNOSIS: Same    PROCEDURE PERFORMED: Left cemented hip hemiarthroplasty    SURGEON: David Siu M.D.     ASSISTANT: JOAQUIN Burks    ANESTHESIA: General    SPECIMEN: None    ESTIMATED BLOOD LOSS: 20 mL    IMPLANTS: [place] cemented hip hemiarthroplasty, size 7 cemented stem, 50+0 bipolar head      INDICATIONS: The patient is a 89 y.o. female who presented with displaced left femoral neck fracture after ground-level fall.  I discussed the risks and benefits of the procedure which include but are not limited to risks of infection, wound healing complication, neurovascular injury, pain, malunion, non-union, malrotation, and the medical risks of anesthesia including MI, stroke, and death.  Alternatives to surgery were also discussed, including non-operative management, which I did not recommend.  The patient was in agreement with the plan to proceed, and the informed consent was signed and documented.  I met with the patient pre-operatively and marked the operative extremity with their agreement.  We proceeded to the operating room.     DESCRIPTION OF PROCEDURE:  Patient was seen in the preoperative holding area on the day of surgery. The operative site was marked with my initials.  she was taken to the operating room and placed lateral on the operative table.  Anesthesia was induced.  The operative extremity was prepped and draped in the normal sterile fashion.  Operative pause was conducted and the correct patient, site, side, procedure, and surgeon's initials on extremity were identified.  Standard posterior approach to the hip was performed.  The fascia was split in line with skin incision.  The bursa was not excised.  Retractors then placed underneath the abductor muscles and tendons.  The sure external rotators were then identified.  The piriformis tendon along with the other  external rotators was then taken down in a continuous sleeve with the capsule.  This was released down inferior neck.  Fracture was identified and noted be severely comminuted.  Our femoral echo was then marked out 1 cm proximal to the lesser trochanter.  This was performed lossing saw.  At this point time the head was then removed with a corkscrew device.  We then prepared the femur for sequential broaching.  We started with a box osteotome followed by canal finder.  We then sequentially broached up to a size 7 with good fit.  Care was taken to maintain appropriate version.  We then trialed with the 0 standard head.  This was noted to be extremely stable with excellent range of motion.  Her limb lengths appear to be symmetric at this point.  We then dislocated the hip and the trial implants were removed.  The femoral canal was prepped for cementation.  We placed a cement restrictor 1 cm below the measured stem depth.  A lap sponge was then placed in the acetabulum.  We then injected cement in the canal.  The final femoral implant was then put as a bent position and again taking care to maintain appropriate version and avoid varus position.  Once cement was hardened excess cement was removed.  The final head implant was then placed and the hip was reduced.  Again localized.  To be symmetric with excellent stability and range of motion.  We then rinsed the wound with Betadine solution.  The capsule and short external rotators were then repaired with several #5 Ethibond sutures.  The fascia was then repaired followed by subcutaneous sutures.  Staples were used for skin.  Sterile dressings were applied.  Abduction pillow was then placed.  Patient was awoken taken to PACU in stable condition.      POSTOPERATIVE PLAN:  Weightbearing as tolerated left lower extremity with posterior hip precautions weight bearing.  Mobilize with physical and occupational therapies.  DVT prophylaxis with SCDs and Lovenox until mobilizing  independently and then can be switched to aspirin for 4 weeks.  The patient will follow up in clinic in 2 weeks to check wounds and remove sutures/staples.      ____________________________________   David Siu M.D.   DD: 5/17/2023  1:33 PM

## 2023-05-17 NOTE — CARE PLAN
The patient is Stable - Low risk of patient condition declining or worsening       Progress made toward(s) clinical / shift goals:    Problem: Pain - Standard  Goal: Alleviation of pain or a reduction in pain to the patient’s comfort goal  Outcome: Progressing     Problem: Knowledge Deficit - Standard  Goal: Patient and family/care givers will demonstrate understanding of plan of care, disease process/condition, diagnostic tests and medications  Outcome: Progressing     Problem: Skin Integrity  Goal: Skin integrity is maintained or improved  Outcome: Progressing     Problem: Psychosocial  Goal: Patient's level of anxiety will decrease  Outcome: Progressing     Problem: Hemodynamics  Goal: Patient's hemodynamics, fluid balance and neurologic status will be stable or improve  Outcome: Progressing     Problem: Respiratory  Goal: Patient will achieve/maintain optimum respiratory ventilation and gas exchange  Outcome: Progressing     Problem: Fluid Volume  Goal: Fluid volume balance will be maintained  Outcome: Progressing     Problem: Risk for Aspiration  Goal: Patient's risk for aspiration will be absent or decrease  Outcome: Progressing     Problem: Mobility  Goal: Patient's capacity to carry out activities will improve  Outcome: Not Progressing     Problem: Self Care  Goal: Patient will have the ability to perform ADLs independently or with assistance (bathe, groom, dress, toilet and feed)  Outcome: Not Progressing     Problem: Infection - Standard  Goal: Patient will remain free from infection  Outcome: Progressing       Patient is not progressing towards the following goals:      Problem: Mobility  Goal: Patient's capacity to carry out activities will improve  Outcome: Not Progressing     Problem: Self Care  Goal: Patient will have the ability to perform ADLs independently or with assistance (bathe, groom, dress, toilet and feed)  Outcome: Not Progressing

## 2023-05-17 NOTE — PROGRESS NOTES
Received report from ICU RN and assumed care of patient at 1645. Patient is A&O to self and situation, disoriented to place and time. Patient is confused in conversation. Patient reports 1/10 pain in left hip. Call light in reach. Bed in lowest locked position. Bed alarm on. Hourly rounding to continue.

## 2023-05-17 NOTE — OR NURSING
1402 - Report received from Sadi LEE.  Pt resting comfortably.    1410 - Pt denies pain/nausea, b/p elevated 189/95, medication given.

## 2023-05-17 NOTE — ANESTHESIA PREPROCEDURE EVALUATION
Case: [id number] Date/Time: 05/17/23 1145    Procedure: HEMIARTHROPLASTY, HIP (Left)    Location:  OR 02 / SURGERY [facility]    Surgeons: David Siu M.D.          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    Pertinent diagnostic labs and testing reviewed    Informed Consent:    Anesthetic plan and risks discussed with patient.    Use of blood products discussed with: patient whom consented to blood products.

## 2023-05-17 NOTE — ASSESSMENT & PLAN NOTE
Now resolved  Present on admission, likely due to pain as she does not take anything at home  Improved with IV hydralazine  Continue as needed BP medications and monitor      5/30: Patient with possible orthostatic hypotension, however unable to assess at this time. Patient has been started on midodrine, monitor closely.    6/2: Continue midodrine for now.

## 2023-05-17 NOTE — ASSESSMENT & PLAN NOTE
-Creatinine has fluctuated throughout hospitalization, slightly worse today 1.45, will continue to monitor closely.  - Was on Lasix, which is likely contributing to MELANIE.  -Avoid nephrotoxic medication and monitor chemistry panel in the morning  Monitor with repeat labs   Improving    5/30: Improving, IVF stopped. Encourage PO intake. Cr today is 1.07.    5/31: Cr is 1.04 today.

## 2023-05-17 NOTE — ASSESSMENT & PLAN NOTE
She complained of blurred vision prior to stumbling over a stool leading to a fall  CT of the head on admission was unremarkable other than small vessel ischemic change, no focal neurodeficits to suggest CVA  No additional head imaging needed at this time  Echocardiogram performed and shows essentially a normal EF, diastolic dysfunction  Positive for orthostatic hypotension  RAYRAY hose  BP improved with RAYRAY hose, but still with orthostatic hypotension  Midodrine 5 mg added on 5/28-improved blood pressure, orthostasis, patient still unable to get out of bed    6/2: continue midodrine

## 2023-05-17 NOTE — PROGRESS NOTES
12-hour chart check complete.    Monitor Summary  Rhythm: SR w/ BBB  Rate: 85  Ectopy: rPAC/PVC  Measurements: .016/0.12/0.36

## 2023-05-17 NOTE — DISCHARGE PLANNING
Received Choice form at: [id number]  Agency/Facility name: [facility]  Referral sent per Choice form at: [phone]

## 2023-05-17 NOTE — CONSULTS
Cardiology Initial Consult Note    Date of note:    5/17/2023      Consulting Physician: Jesica Jimenes D.O.    Name:   Yesi Garduno   YOB: 1928  Age:   89 y.o.  female   MRN:   [id number]      Reason for Consultation: Preoperative cardiovascular evaluation    HPI:  Yesi Garduno is a 89 y.o. female with a history of AAA, chronic respiratory failure with hypoxia on 2 L supplemental oxygen and breast cancer who was admitted to the hospital after GLF which was complicated by left femoral neck fracture.  Cardiology is being consulted for preoperative cardiovascular evaluation with elevated troponin.  Patient denies any signs and symptoms concerning for angina.  No dizziness, lightheadedness, nausea or abdominal pain.  Prior to ground-level fall, she was ambulating independently with a front wheel walker and is not limited by chest pain or pressure.  Main complaint is severe left hip pain.      ROS  A complete ROS was performed and is negative except for pertinent positives mentioned in HPI.      Past Medical History:   Diagnosis Date    AAA (abdominal aortic aneurysm) (HCC) 8/11/2015    Breast cancer (HCC)     Cancer (HCC)     Near syncope 8/11/2015    S/P right mastectomy     per pt done in 1979       Past Surgical History:   Procedure Laterality Date    DC EXC SKIN MALIG 2.1-3CM REMAINDR BODY Left 4/21/2023    Procedure: LEFT FOOT MELANOMA RADICAL EXCISION, GREAT TOE PARTIAL AMPUTATION, REPAIRS AS INDICATED INCLUDING SOFT TISSUE REARRANGEMENT;  Surgeon: Saroj Doe M.D.;  Location: [facility];  Service: Orthopedics    PB AMPUTATION TOE,I-P JT  4/21/2023    Procedure: AMPUTATION, TOE;  Surgeon: Saroj Doe M.D.;  Location: [facility];  Service: Orthopedics    PB ADJ TISS XFER HEAD,FAC,HAND <10SQCM  4/21/2023    Procedure: EXCISION, MASS, LOWER EXTREMITY, WITH ROTATION FLAP RECONSTRUCTION;  Surgeon: Saroj SERRA  EFRA Doe;  Location: [facility];  Service: Orthopedics    BREAST BIOPSY  1990    MASTECTOMY  1979    right side    OTHER      mastectomy         Medications Prior to Admission   Medication Sig Dispense Refill Last Dose    acetaminophen (TYLENOL 8 HOUR) 650 MG CR tablet Take 650 mg by mouth 1 time a day as needed for Mild Pain. Indications: Pain   5/10/2023 at unk    gabapentin (NEURONTIN) 100 MG Cap Take 100 mg by mouth 2 times a day.   5/16/2023 at 1000    ibuprofen (MOTRIN) 200 MG Tab Take 600 mg by mouth every 8 hours as needed for Mild Pain. Indications: Pain   5/3/2023 at unk    Respiratory Therapy Supplies Misc 3 L/min.   oxygen    hydrOXYzine pamoate (VISTARIL) 25 MG Cap Take 1 Capsule by mouth 3 times a day as needed for Itching or Other (nausea or insomnia). 20 Capsule 0 5/10/2023 at 1000    ALPRAZolam (XANAX) 0.5 MG Tab Take 0.5 mg by mouth at bedtime as needed for Sleep.   5/15/2023 at 2300    Calcium Carbonate-Vit D-Min (CALCIUM 1200 PO) Take 1 Tab by mouth every day.   5/10/2023 at 1000    vitamin D (CHOLECALCIFEROL) 1000 UNIT Tab Take 1 Tablet by mouth every day.   5/10/2023 at 1000    Cyanocobalamin (B-12 PO) Take 1 Tab by mouth every day.   5/10/2023 at 1000    fluticasone (FLONASE) 50 MCG/ACT nasal spray Administer 1 Spray into affected nostril(S) 2 times a day as needed (allergy). Each Nostril   5/15/2023 at 1000     Current Facility-Administered Medications   Medication Dose Route Frequency Provider Last Rate Last Admin    lactated ringers infusion   Intravenous Continuous David Siu M.D.   New Bag at 05/17/23 1148    acetaminophen (Tylenol) tablet 650 mg  650 mg Oral Q6HRS PRN Katelyn Fonseca M.D.        ondansetron (ZOFRAN) syringe/vial injection 4 mg  4 mg Intravenous Q4HRS PRN Katelyn Fonseca M.D.   4 mg at 05/17/23 1229    ondansetron (ZOFRAN ODT) dispertab 4 mg  4 mg Oral Q4HRS PRN Katelyn Fonseca M.D.        senna-docusate  "(PERICOLACE or SENOKOT S) 8.6-50 MG per tablet 2 Tablet  2 Tablet Oral BID Katelyn Fonseca M.D.        And    polyethylene glycol/lytes (MIRALAX) PACKET 1 Packet  1 Packet Oral QDAY PRN Katelyn Fonseca M.D.        And    magnesium hydroxide (MILK OF MAGNESIA) suspension 30 mL  30 mL Oral QDAY PRN Katelyn Fonseca M.D.        And    bisacodyl (DULCOLAX) suppository 10 mg  10 mg Rectal QDAY PRN Katelyn Fonseca M.D.        morphine 4 MG/ML injection 2 mg  2 mg Intravenous Q3HRS PRN Katelyn Fonseca M.D.   2 mg at 05/17/23 0104    HYDROmorphone (Dilaudid) injection 0.5 mg  0.5 mg Intravenous Q3HRS PRN Katelyn Fonseca M.D.   0.5 mg at 05/17/23 0917         Allergies   Allergen Reactions    Oxycodone      Other reaction(s): Other (See Comments)  hallucinations  Other reaction(s): Other (See Comments)  hallucinations    Hydrocodone Rash     \"all over\" and it makes me act loopy    Penicillins Vomiting     Other reaction(s): GI Intolerance  Other reaction(s): GI Intolerance  Other reaction(s): Vomiting  Other reaction(s): GI Intolerance    Tetracycline Rash and Itching     Pt states \"I get a bad body rash and itch all over\".  Pt states \"I get a bad body rash and itch all over\".         Family History   Problem Relation Age of Onset    Cancer Sister     Cancer Mother     Lung Disease Paternal Uncle     Arthritis Neg Hx     Genetic Disorder Neg Hx     Psychiatric Illness Neg Hx     Diabetes Neg Hx     Heart Disease Neg Hx     Hypertension Neg Hx     Hyperlipidemia Neg Hx     Stroke Neg Hx     Alcohol/Drug Neg Hx          Social History     Socioeconomic History    Marital status:      Spouse name: Not on file    Number of children: Not on file    Years of education: Not on file    Highest education level: Not on file   Occupational History    Not on file   Tobacco Use    Smoking status: Former     Packs/day: 0.25     Years: 34.00     Pack years: 8.50     Types: Cigarettes    " " Quit date: 1985     Years since quittin.3     Passive exposure: Past    Smokeless tobacco: Never   Vaping Use    Vaping Use: Never used   Substance and Sexual Activity    Alcohol use: Yes     Alcohol/week: 4.2 oz     Types: 7 Shots of liquor per week     Comment: sip of wine    Drug use: No    Sexual activity: Not on file   Other Topics Concern    Not on file   Social History Narrative    Not on file     Social Determinants of Health     Financial Resource Strain: Not on file   Food Insecurity: Not on file   Transportation Needs: Not on file   Physical Activity: Not on file   Stress: Not on file   Social Connections: Not on file   Intimate Partner Violence: Not on file   Housing Stability: Not on file         Intake/Output Summary (Last 24 hours) at 2023 1554  Last data filed at 2023 1338  Gross per 24 hour   Intake 900 ml   Output 450 ml   Net 450 ml        Physical Exam  Body mass index is 19.31 kg/m².  BP (!) 167/77   Pulse 70   Temp 35.8 °C (96.5 °F) (Temporal)   Resp 16   Ht 1.753 m (5' 9\")   Wt 59.3 kg (130 lb 11.7 oz)   SpO2 93%   Vitals:    23 1420 23 1445 23 1500 23 1536   BP: (!) 179/85 (!) 185/86 (!) 171/80 (!) 167/77   Pulse: 66 65 66 70   Resp: 14 16 14 16   Temp:    35.8 °C (96.5 °F)   TempSrc:    Temporal   SpO2: 97% 98% 95% 93%   Weight:       Height:         Oxygen Therapy:  Pulse Oximetry: 93 %, O2 (LPM): 2, O2 Delivery Device: Nasal Cannula    General: No acute distress. Well nourished.  HEENT: EOM grossly intact, no scleral icterus, no pharyngeal erythema.   Neck:  Trachea midline, no visible masses  CVS: RRR. No JVD at 90  Resp: Normal respiratory effort. Normal appearing chest.  Abdomen: Soft, NT, ND  MSK/Ext: No clubbing or cyanosis.  Skin: Dry appearing, no rashes.  Neurological: CN III-XII grossly intact. No focal deficits.   Psych: A&O x 3, appropriate affect, good judgement      Labs (personally reviewed and notable for):   Lab Results "   Component Value Date/Time    SODIUM 138 05/17/2023 05:11 AM    POTASSIUM 4.0 05/17/2023 05:11 AM    CHLORIDE 102 05/17/2023 05:11 AM    CO2 25 05/17/2023 05:11 AM    GLUCOSE 141 (H) 05/17/2023 05:11 AM    BUN 28 (H) 05/17/2023 05:11 AM    CREATININE 1.21 05/17/2023 05:11 AM      Lab Results   Component Value Date/Time    WBC 6.4 05/17/2023 05:47 AM    RBC 3.53 (L) 05/17/2023 05:47 AM    HEMOGLOBIN 10.8 (L) 05/17/2023 05:47 AM    HEMATOCRIT 34.4 (L) 05/17/2023 05:47 AM    MCV 97.5 05/17/2023 05:47 AM    MCH 30.6 05/17/2023 05:47 AM    MCHC 31.4 (L) 05/17/2023 05:47 AM    MPV 9.1 05/17/2023 05:47 AM    NEUTSPOLYS 70.60 05/16/2023 08:40 PM    LYMPHOCYTES 15.30 (L) 05/16/2023 08:40 PM    MONOCYTES 11.40 05/16/2023 08:40 PM    EOSINOPHILS 1.90 05/16/2023 08:40 PM    BASOPHILS 0.50 05/16/2023 08:40 PM      Lab Results   Component Value Date/Time    CHOLSTRLTOT 213 (H) 12/19/2019 10:04 AM     (H) 12/19/2019 10:04 AM    HDL 76 12/19/2019 10:04 AM    TRIGLYCERIDE 83 12/19/2019 10:04 AM       Lab Results   Component Value Date/Time    TROPONINT 38 (H) 05/17/2023 0923     Lab Results   Component Value Date/Time    NTPROBNP 328 (H) 03/31/2021 1847         Cardiac Imaging and Procedures Review:    EKG dated 5/16/2023: My personal interpretation is sinus rhythm    Echo dated 5/17/2023: My personal interpretation is normal LV size and function, no WMA, no significant valve disease      Radiology test Review:  EC-ECHOCARDIOGRAM COMPLETE W/ CONT   Final Result      DX-SHOULDER 2+ RIGHT   Final Result      1.  Osteoporosis.      2.  No evidence of right shoulder fracture and/or dislocation.      DX-HAND 3+ RIGHT   Final Result         1.  No radiographic evidence of acute injury.      2. Osteoporosis.      DX-HIP-COMPLETE - UNILATERAL 2+ RIGHT   Final Result      1.  Impacted left femoral neck fracture.      2.  Previous pinning of right hip      3.  Osteoporosis.      DX-HUMERUS 2+ LEFT   Final Result         1.  No  radiographic evidence of acute injury.      2. Osteopenia.      DX-HAND 3+ LEFT   Final Result         1.  No radiographic evidence of acute injury.      2. Osteoporosis.      DX-FEMUR-2+ LEFT   Final Result      1.  Impacted left femoral neck fracture.      2.  Bone infarcts noted in the metadiaphyseal region of left femur.      CT-CHEST,ABDOMEN,PELVIS WITH   Final Result      1.  Left femoral neck fracture.      2.  Previous pinning of right hip.      3.  Infrarenal aortic aneurysm which measures 5.5 cm in greatest diameter.      4.  Osteoporosis.      5.  Sigmoid scoliosis of the thoracic and lumbar spine.      6.  Centrilobular emphysema.      7.  These findings were discussed with JOSÉ LUIS GARCIA at 10:15 p.m. 5/16/2023      CT-CSPINE WITHOUT PLUS RECONS   Final Result      1.  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      2.  Mild to moderate degenerative anterolisthesis at C4-5 level.      3.  Osteopenia.      4.  No evidence of acute cervical spine fracture.      CT-HEAD W/O   Final Result      1.  Cerebral atrophy.      2.  White matter lucencies most consistent with small vessel ischemic change versus demyelination or gliosis.      3.  Otherwise, Head CT without contrast with no acute findings. No evidence of acute cerebral infarction, hemorrhage or mass lesion.             Problem list:  Principal Problem:    Displaced fracture of left femoral neck (HCC) (POA: Yes)  Active Problems:    Hypertensive urgency (POA: Yes)    Stage 3a chronic kidney disease (HCC) (POA: Yes)    Elevated troponin (POA: Yes)    Near syncope (POA: Yes)    AAA (abdominal aortic aneurysm) (HCC) (POA: Yes)    S/P right mastectomy (POA: Yes)    Chronic diastolic heart failure (HCC) (POA: Yes)  Resolved Problems:    * No resolved hospital problems. *        Recommendations:  -- Patient presented with GLF resulting in left femoral neck fracture.  Unclear why  troponin levels were checked since she did not complain of any anginal equivalent symptoms.  EKG is reassuring along with echocardiogram which shows no WMA or valve disease.  -- Troponin level 37->42.  Reasonable to check another troponin.    -- At this time, patient is moderate risk for cardiovascular complications for moderate risk surgery.  Appears volume optimized with no evidence of congestive heart failure.  No further cardiac testing is indicated for urgent surgery.    Recommendations discussed with Dr. Jimenes.    Thank you for allowing me to participate in the care of this patient, cardiology will sign off.  Please contact me with any questions.      Juan Miguel Dial M.D.  Cardiologist, [facility] [place]   [phone]    This note was generated using voice recognition software which has a small chance of producing errors of grammar and possibly content. I have made every reasonable attempt to find and correct any obvious errors, but expect that some may not be found prior to finalization of this note.

## 2023-05-17 NOTE — CONSULTS
5/17/2023    Time Called: 0900  Time Arrived: 1100      HPI: Yesi Garduno is a 89 y.o. female who presents with left hip pain after ground-level fall.  Patient was seen by one of my colleagues.  Initial evaluation revealed a left displaced femoral neck fracture.  Of note she had a history of right femoral neck fracture treated with percutaneous screw fixation.  She ambulates at baseline with a walker.    Past Medical History:   Diagnosis Date    AAA (abdominal aortic aneurysm) (HCC) 8/11/2015    Breast cancer (HCC)     Cancer (HCC)     Near syncope 8/11/2015    S/P right mastectomy     per pt done in 1979       Past Surgical History:   Procedure Laterality Date    [place] EXC SKIN MALIG 2.1-3CM REMAINDR BODY Left 4/21/2023    Procedure: LEFT FOOT MELANOMA RADICAL EXCISION, GREAT TOE PARTIAL AMPUTATION, REPAIRS AS INDICATED INCLUDING SOFT TISSUE REARRANGEMENT;  Surgeon: Sraoj Doe M.D.;  Location: [facility];  Service: Orthopedics    PB AMPUTATION TOE,I-P JT  4/21/2023    Procedure: AMPUTATION, TOE;  Surgeon: Saroj Doe M.D.;  Location: [facility];  Service: Orthopedics    PB ADJ TISS XFER HEAD,FAC,HAND <10SQCM  4/21/2023    Procedure: EXCISION, MASS, LOWER EXTREMITY, WITH ROTATION FLAP RECONSTRUCTION;  Surgeon: Saroj Doe M.D.;  Location: [facility];  Service: Orthopedics    BREAST BIOPSY  1990    MASTECTOMY  1979    right side    OTHER      mastectomy       Medications  No current facility-administered medications on file prior to encounter.     Current Outpatient Medications on File Prior to Encounter   Medication Sig Dispense Refill    acetaminophen (TYLENOL 8 HOUR) 650 MG CR tablet Take 650 mg by mouth 1 time a day as needed for Mild Pain. Indications: Pain      gabapentin (NEURONTIN) 100 MG Cap Take 100 mg by mouth 2 times a day.      ibuprofen (MOTRIN) 200 MG Tab Take 600 mg by mouth every 8  "hours as needed for Mild Pain. Indications: Pain      Respiratory Therapy Supplies Misc 3 L/min.      hydrOXYzine pamoate (VISTARIL) 25 MG Cap Take 1 Capsule by mouth 3 times a day as needed for Itching or Other (nausea or insomnia). 20 Capsule 0    ALPRAZolam (XANAX) 0.5 MG Tab Take 0.5 mg by mouth at bedtime as needed for Sleep.      Calcium Carbonate-Vit D-Min (CALCIUM 1200 PO) Take 1 Tab by mouth every day.      vitamin D (CHOLECALCIFEROL) 1000 UNIT Tab Take 1 Tablet by mouth every day.      Cyanocobalamin (B-12 PO) Take 1 Tab by mouth every day.      fluticasone (FLONASE) 50 MCG/ACT nasal spray Administer 1 Spray into affected nostril(S) 2 times a day as needed (allergy). Each Nostril         Allergies  Oxycodone, Hydrocodone, Penicillins, and Tetracycline    ROS  . All other systems were reviewed and found to be negative    Family History   Problem Relation Age of Onset    Cancer Sister     Cancer Mother     Lung Disease Paternal Uncle     Arthritis Neg Hx     Genetic Disorder Neg Hx     Psychiatric Illness Neg Hx     Diabetes Neg Hx     Heart Disease Neg Hx     Hypertension Neg Hx     Hyperlipidemia Neg Hx     Stroke Neg Hx     Alcohol/Drug Neg Hx        Social History     Socioeconomic History    Marital status:    Tobacco Use    Smoking status: Former     Packs/day: 0.25     Years: 34.00     Pack years: 8.50     Types: Cigarettes     Quit date: 1985     Years since quittin.3     Passive exposure: Past    Smokeless tobacco: Never   Vaping Use    Vaping Use: Never used   Substance and Sexual Activity    Alcohol use: Yes     Alcohol/week: 4.2 oz     Types: 7 Shots of liquor per week     Comment: sip of wine    Drug use: No       Physical Exam  Vitals  BP (!) 162/58   Pulse 87   Temp 36 °C (96.8 °F) (Temporal)   Resp 12   Ht 1.75 m (5' 8.9\")   Wt 59.3 kg (130 lb 11.7 oz)   SpO2 98%   General: Well Developed, Well Nourished, Age appropriate appearance  HEENT: Normocephalic, " atraumatic  Psych: Normal mood and affect  Neck: Supple, nontender, no masses  Lungs: Breathing unlabored, No audible wheezing  Heart: Regular heart rate and rhythm  Abdomen: Soft, NT, ND  Neuro: Sensation grossly intact to BUE and BLE, moving all four extremities  Skin: Intact, no open wounds  Vascular: , Capillary refill <2 seconds  MSK: Left lower extremity: Pain with logroll.  Mild shortening noted with complete contralateral limb.  Sensation intact distally light touch.  Ankle dorsiflexion plantarflexion intact.      Radiographs:  EC-ECHOCARDIOGRAM COMPLETE W/ CONT   Final Result      DX-SHOULDER 2+ RIGHT   Final Result      1.  Osteoporosis.      2.  No evidence of right shoulder fracture and/or dislocation.      DX-HAND 3+ RIGHT   Final Result         1.  No radiographic evidence of acute injury.      2. Osteoporosis.      DX-HIP-COMPLETE - UNILATERAL 2+ RIGHT   Final Result      1.  Impacted left femoral neck fracture.      2.  Previous pinning of right hip      3.  Osteoporosis.      DX-HUMERUS 2+ LEFT   Final Result         1.  No radiographic evidence of acute injury.      2. Osteopenia.      DX-HAND 3+ LEFT   Final Result         1.  No radiographic evidence of acute injury.      2. Osteoporosis.      DX-FEMUR-2+ LEFT   Final Result      1.  Impacted left femoral neck fracture.      2.  Bone infarcts noted in the metadiaphyseal region of left femur.      CT-CHEST,ABDOMEN,PELVIS WITH   Final Result      1.  Left femoral neck fracture.      2.  Previous pinning of right hip.      3.  Infrarenal aortic aneurysm which measures 5.5 cm in greatest diameter.      4.  Osteoporosis.      5.  Sigmoid scoliosis of the thoracic and lumbar spine.      6.  Centrilobular emphysema.      7.  These findings were discussed with JOSÉ LUIS GARCIA at 10:15 p.m. 5/16/2023      CT-CSPINE WITHOUT PLUS RECONS   Final Result      1.  Moderate to severe osteoarthritic changes throughout the mid and lower cervical spine most pronounced  at the C5-6 level.      2.  Mild to moderate degenerative anterolisthesis at C4-5 level.      3.  Osteopenia.      4.  No evidence of acute cervical spine fracture.      CT-HEAD W/O   Final Result      1.  Cerebral atrophy.      2.  White matter lucencies most consistent with small vessel ischemic change versus demyelination or gliosis.      3.  Otherwise, Head CT without contrast with no acute findings. No evidence of acute cerebral infarction, hemorrhage or mass lesion.             Laboratory Values  Recent Labs     05/16/23 2040 05/17/23  0547   WBC 5.9 6.4   RBC 3.54* 3.53*   HEMOGLOBIN 10.8* 10.8*   HEMATOCRIT 35.1* 34.4*   MCV 99.2* 97.5   MCH 30.5 30.6   MCHC 30.8* 31.4*   RDW 46.6 45.9   PLATELETCT 155* 163*   MPV 9.2 9.1     Recent Labs     05/16/23 2040 05/17/23  0511   SODIUM 143 138   POTASSIUM 4.5 4.0   CHLORIDE 107 102   CO2 27 25   GLUCOSE 106* 141*   BUN 32* 28*   CPKTOTAL 74  --              Impression: 89-year-old female history of osteoporosis now status post ground-level fall with left displaced femoral neck fracture.  Plan for left hip hemiarthroplasty today.    Plan:We discussed the diagnosis and findings with the patient at length.  We reviewed possible non operative and operative interventions and the risks and benefits of each of these.  she had a chance to ask questions and all of these were answered to her satisfaction. The patient chose to proceed with surgical intervention. Risks and benefits of surgery were discussed which include but are not limited to bleeding, infection, neurovascular damage, malunion, nonunion, instability, limb length discrepancy, DVT, PE, MI, Stroke and death. They understand these risks and wish to proceed.      David Siu MD  Orthopedic Trauma Surgery

## 2023-05-17 NOTE — PROGRESS NOTES
4 Eyes Skin Assessment Completed by JESUS ARMANDO and JESUS VILLA.    Head WDL  Ears WDL  Nose WDL  Mouth WDL  Neck WDL  Breast/Chest WDL  Shoulder Blades WDL  Spine WDL  (R) Arm/Elbow/Hand WDL  (L) Arm/Elbow/Hand WDL  Abdomen WDL  Groin WDL  Scrotum/Coccyx/Buttocks WDL  (R) Leg WDL  (L) Leg WDL  (R) Heel/Foot/Toe Scab and Edema  (L) Heel/Foot/Toe Scab and Edema  LEFT GREAT TOE PARTIAL AMPUTATION, STITCHES AND STERI STRIPS IN PLACE, PRESENT PTA        Devices In Places ECG, Blood Pressure Cuff, Pulse Ox, and Nasal Cannula, PUREWICK      Interventions In Place Gray Ear Foams, TAP System, Pillows, and Q2 Turns    Possible Skin Injury No    Pictures Uploaded Into Epic No, needs to be completed  Wound Consult Placed Yes  RN Wound Prevention Protocol Ordered No

## 2023-05-17 NOTE — ANESTHESIA PROCEDURE NOTES
Airway    Date/Time: 5/17/2023 11:56 PM    Performed by: Jorge L Vance M.D.  Authorized by: Jorge L Vance M.D.    Location:  OR  Urgency:  Elective  Indications for Airway Management:  Anesthesia      Spontaneous Ventilation: absent    Sedation Level:  Deep  Preoxygenated: Yes    Patient Position:  Sniffing  Final Airway Type:  Endotracheal airway  Final Endotracheal Airway:  ETT  Cuffed: Yes    Technique Used for Successful ETT Placement:  Direct laryngoscopy    Insertion Site:  Oral  Blade Type:  Ailyn  Laryngoscope Blade/Videolaryngoscope Blade Size:  3  ETT Size (mm):  7.0  Measured from:  Teeth  ETT to Teeth (cm):  22  Placement Verified by: auscultation and capnometry    Cormack-Lehane Classification:  Grade IIa - partial view of glottis  Number of Attempts at Approach:  1

## 2023-05-17 NOTE — ASSESSMENT & PLAN NOTE
"-Had long conversation with the patient regarding advance care planning and CODE STATUS.  Patient is is very involved with community projects, she is states that she enjoys spending time with her grandkids and great grandkids.  She also said that \"I do not want to be permanently as a vegetable leaving to a machine \", but she would like to be resuscitated in case of an emergency and give the best chance to survive therefore full code was added.  I offer to sign a POLST, she says this was done in the past\" I have a pink form in my refrigerator \".  She also does not remember the details of her POLST form.  Total amount of time allocated to advance care planning discussion was 70 minutes.  "

## 2023-05-17 NOTE — ASSESSMENT & PLAN NOTE
-High risk patient.  Patient with 89 years old, questionable syncopal episode prior event.  Underlying AAA, infrarenal 5.5 cm.  The last measurement was in 2017 and 4.2 cm.  Even though patient has knowledge about this, it is unclear to me if she was following this closely and how this might affect doing her surgery.  She has significantly elevated blood pressure and for so we will be better controlling this.  Added echocardiogram in the morning and will need to continue to trend her troponin level in the night.  Very likely need cardiology consult for additional preoperative cardiovascular clearance.  At the time of my evaluation, I am unable to clear her for early morning surgical procedure given above.

## 2023-05-18 PROBLEM — G93.41 ACUTE METABOLIC ENCEPHALOPATHY: Status: ACTIVE | Noted: 2023-01-01

## 2023-05-18 NOTE — PROGRESS NOTES
POD 1 L hip hemiarthroplasty  Pleasantly confused  Dressing CDI  Grossly NVI    - WBAT with assist and posterior hip precautions  - Periop abx complete  - LMWH in house, ASA BID outpt  vs NOAC - 30 day post-op VTE ppx   - PT/OT  - CM  - KENDAL 2 weeks post-op

## 2023-05-18 NOTE — PROGRESS NOTES
Hospital Medicine Daily Progress Note    Date of Service  5/18/2023    Chief Complaint  Yesi Garduno is a 89 y.o. female admitted 5/16/2023 with left hip pain after a fall    Hospital Course  History of a AAA, breast cancer status post mastectomy, CKD stage IIIa, hypertension, not on any medications admitted after ground-level fall complaining of blurred vision prior to the fall and left hip pain after the fall.  She was found to have an impacted left femoral neck fracture.  Orthopedic surgery Dr. Ponce was consulted and recommended operative fixation.  She had a mildly elevated troponin which trended up slightly to 42 however then trended down.  Cardiac clearance was obtained    Interval Problem Update  5/17: Complaining of severe left hip pain, anxious for surgery.  Echocardiogram performed and reviewed and it shows some diastolic dysfunction however normal EF.  Cleared for surgery, discussed with cardiology.  Blood pressure improved after hydralazine and pain control.  N.p.o.  5/18: Uncomplicated surgery yesterday.  Today she is on 3 L, I reviewed her x-ray which shows bilateral small pleural effusion, atelectasis.  Fluids stopped, IV Lasix x1, I-S.  She is encephalopathic, IV narcotics discontinued.  Pending PT eval regarding SNF.  Labile blood pressure, as needed hydralazine added    I have discussed this patient's plan of care and discharge plan at IDT rounds today with Case Management, Nursing, Nursing leadership, and other members of the IDT team.    Consultants/Specialty  orthopedics- Rosario  Cardiology - Jayro    Code Status  Full Code    Disposition  The patient is not medically cleared for discharge to home or a post-acute facility.  Anticipate discharge to: skilled nursing facility    I have placed the appropriate orders for post-discharge needs.    Review of Systems  Review of Systems   Constitutional:  Positive for malaise/fatigue. Negative for chills and fever.   HENT:  Negative for sore  throat.    Eyes:  Positive for blurred vision. Negative for double vision.   Respiratory:  Negative for cough and shortness of breath.    Cardiovascular:  Negative for chest pain and palpitations.   Gastrointestinal:  Negative for abdominal pain, blood in stool, diarrhea, heartburn, nausea and vomiting.   Genitourinary:  Negative for dysuria and frequency.   Musculoskeletal:  Negative for back pain, falls, joint pain and myalgias.   Neurological:  Negative for dizziness, focal weakness, weakness and headaches.   Psychiatric/Behavioral:  Positive for hallucinations and memory loss. Negative for depression. The patient is nervous/anxious.    All other systems reviewed and are negative.       Physical Exam  Temp:  [35.8 °C (96.5 °F)-37.1 °C (98.8 °F)] 36.4 °C (97.5 °F)  Pulse:  [65-91] 91  Resp:  [14-20] 17  BP: (102-198)/() 141/58  SpO2:  [88 %-100 %] 91 %    Physical Exam  Constitutional:       General: She is in acute distress.   HENT:      Nose: Nose normal.      Mouth/Throat:      Mouth: Mucous membranes are dry.   Cardiovascular:      Rate and Rhythm: Normal rate and regular rhythm.      Pulses: Normal pulses.      Heart sounds: No murmur heard.  Pulmonary:      Effort: Pulmonary effort is normal.      Breath sounds: Normal breath sounds.   Abdominal:      General: There is no distension.      Palpations: Abdomen is soft.      Tenderness: There is no abdominal tenderness.   Musculoskeletal:         General: No swelling or tenderness.      Cervical back: No muscular tenderness.      Comments: Left hip with surgical dressing in place, clean dry and intact.  Minimal ecchymosis/swelling   Lymphadenopathy:      Cervical: No cervical adenopathy.   Skin:     General: Skin is warm and dry.      Coloration: Skin is pale.      Findings: No rash.   Neurological:      General: No focal deficit present.      Mental Status: She is disoriented and confused.      Motor: Weakness and tremor present.   Psychiatric:          Mood and Affect: Mood is anxious.         Speech: Speech is tangential.         Cognition and Memory: Cognition is impaired. Memory is impaired.         Judgment: Judgment is impulsive.         Fluids    Intake/Output Summary (Last 24 hours) at 5/18/2023 1136  Last data filed at 5/18/2023 0600  Gross per 24 hour   Intake 1260 ml   Output 250 ml   Net 1010 ml         Laboratory  Recent Labs     05/16/23 2040 05/17/23  0547 05/18/23  0055   WBC 5.9 6.4 9.7   RBC 3.54* 3.53* 3.24*   HEMOGLOBIN 10.8* 10.8* 10.0*   HEMATOCRIT 35.1* 34.4* 31.5*   MCV 99.2* 97.5 97.2   MCH 30.5 30.6 30.9   MCHC 30.8* 31.4* 31.7*   RDW 46.6 45.9 47.0   PLATELETCT 155* 163* 163*   MPV 9.2 9.1 9.3       Recent Labs     05/16/23 2040 05/17/23  0511 05/17/23  1705   SODIUM 143 138 142   POTASSIUM 4.5 4.0 4.4   CHLORIDE 107 102 106   CO2 27 25 25   GLUCOSE 106* 141* 155*   BUN 32* 28* 26*   CREATININE 1.30 1.21 1.16   CALCIUM 9.5 9.5 9.4                     Imaging  DX-CHEST-PORTABLE (1 VIEW)   Final Result      1.  Cardiomegaly with interstitial infiltrates.      2.  Bibasilar atelectasis.      EC-ECHOCARDIOGRAM COMPLETE W/ CONT   Final Result      DX-SHOULDER 2+ RIGHT   Final Result      1.  Osteoporosis.      2.  No evidence of right shoulder fracture and/or dislocation.      DX-HAND 3+ RIGHT   Final Result         1.  No radiographic evidence of acute injury.      2. Osteoporosis.      DX-HIP-COMPLETE - UNILATERAL 2+ RIGHT   Final Result      1.  Impacted left femoral neck fracture.      2.  Previous pinning of right hip      3.  Osteoporosis.      DX-HUMERUS 2+ LEFT   Final Result         1.  No radiographic evidence of acute injury.      2. Osteopenia.      DX-HAND 3+ LEFT   Final Result         1.  No radiographic evidence of acute injury.      2. Osteoporosis.      DX-FEMUR-2+ LEFT   Final Result      1.  Impacted left femoral neck fracture.      2.  Bone infarcts noted in the metadiaphyseal region of left femur.       CT-CHEST,ABDOMEN,PELVIS WITH   Final Result      1.  Left femoral neck fracture.      2.  Previous pinning of right hip.      3.  Infrarenal aortic aneurysm which measures 5.5 cm in greatest diameter.      4.  Osteoporosis.      5.  Sigmoid scoliosis of the thoracic and lumbar spine.      6.  Centrilobular emphysema.      7.  These findings were discussed with JOSÉ LUIS GARCIA at 10:15 p.m. 5/16/2023      CT-CSPINE WITHOUT PLUS RECONS   Final Result      1.  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      2.  Mild to moderate degenerative anterolisthesis at C4-5 level.      3.  Osteopenia.      4.  No evidence of acute cervical spine fracture.      CT-HEAD W/O   Final Result      1.  Cerebral atrophy.      2.  White matter lucencies most consistent with small vessel ischemic change versus demyelination or gliosis.      3.  Otherwise, Head CT without contrast with no acute findings. No evidence of acute cerebral infarction, hemorrhage or mass lesion.                Assessment/Plan  * Displaced fracture of left femoral neck (HCC)- (present on admission)  Assessment & Plan  -Hip x-ray showed impacted left femoral neck fracture  Underwent surgical fixation Dr. Bourne 5/17  Discontinue narcotics as she is confused today  Tylenol okay  Ice    Acute metabolic encephalopathy  Assessment & Plan  This is likely multifactorial secondary to anesthetic medications yesterday as well as narcotics, pain, sundowning  For now we will minimize narcotic medications  Continue supportive care  Normalize sleep-wake  Minimize lines/tubes    Chronic diastolic heart failure (HCC)- (present on admission)  Assessment & Plan  Seen on echo, she has mild bilateral pleural effusions on x-ray  Lasix IV x1  DC IV fluids  Monitor    Anemia due to stage 3b chronic kidney disease (HCC)- (present on admission)  Assessment & Plan  Denies any bleeding, minimal blood loss during  surgery  Monitor hemoglobin    Elevated troponin- (present on admission)  Assessment & Plan  Elevated and initially trended up very slightly, likely due to reduced renal clearance, has trended back down.  Never complained of any chest pain  EKG negative for ischemia  Not consistent with cardiac ischemia/event    Hypertensive urgency- (present on admission)  Assessment & Plan  Present on admission, likely due to pain as she does not take anything at home  Improved with IV hydralazine  Continue as needed BP medications and monitor    Stage 3a chronic kidney disease (HCC)- (present on admission)  Assessment & Plan  -On admission her creatinine is 1.30.  -Avoid nephrotoxic medication and monitor chemistry panel in the morning.    Acute respiratory failure with hypoxia ([facility])- (present on admission)  Assessment & Plan  She has been on 3 L of oxygen, reduced inspiratory effort on exam.  I ordered and reviewed her chest x-ray which shows bilateral basilar small pleural effusions as well as atelectasis  DC IV fluids  Lasix 20 IV x1  Encourage I-S  Mobilize as able    S/P right mastectomy- (present on admission)  Assessment & Plan  -Due to underlying breast cancer in 1960s.    AAA (abdominal aortic aneurysm) ([facility])- (present on admission)  Assessment & Plan  - CT showed infrarenal aortic aneurysm which measures 5.5 cm in the greatest diameter  -Patient is aware of having AAA in the past but is unable to specify size in detail information.  -I provided extensive chart review on this. In 7/2015 AAA was 3.9 cm (Renal CT), and on 1/2017 AAA 4.2 cm (CT L Spine).  Blood pressure has improved  No chest pain, EKG is negative for acute ischemia.  Troponin increased slightly but now trended down before surgery    Near syncope- (present on admission)  Assessment & Plan  She complained of blurred vision prior to stumbling over a stool leading to a fall  She has been still complaining of intermittent double vision however states currently  vision is normal  CT of the head on admission was unremarkable other than small vessel ischemic change, no focal neurodeficits to suggest CVA  No additional head imaging needed at this time  Echocardiogram performed and shows essentially a normal EF, no significant abnormalities  Possibly orthostatic versus volume depletion-she has been volume replaced  Hold fluids, today she has mild bilateral effusions on cxr         VTE prophylaxis: SCDs/TEDs, xarelto post op    I have performed a physical exam and reviewed and updated ROS and Plan today (5/18/2023). In review of yesterday's note (5/17/2023), there are no changes except as documented above.      Total time:  53 minutes.  I spent greater than 50% of the time for patient care, counseling, and coordination on this date, including unit/floor time, and face-to-face time with the patient as per interval events and assessment and plan above

## 2023-05-18 NOTE — THERAPY
Occupational Therapy   Initial Evaluation     Patient Name: Yesi Garduno  Age:  89 y.o., Sex:  female  Medical Record #: [id number]  Today's Date: 5/18/2023     Precautions  Precautions: (P) Fall Risk, Posterior Hip Precautions, Weight Bearing As Tolerated Left Lower Extremity    Assessment  Patient is 89 y.o. female with a diagnosis of Displaced fx L femoral neck - presented to ER 5/16/23 after GLF at home.  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  Additional factors influencing patient status / progress: Posterior hip precautions, WBAT LLE, confused/disoriented, poor activity tolerance, impaired balance, HIGH fall risk.  TC to Hetal (grand daughter) secondary pt currently AMS - Pt resides in a [facility] alone in [place], [place].  Family lives in the area dn are available to assist on a limited basis.  PLOF Mod I to indep for ADL's (except shower - Supervision) and Mod I 4WW in home.  Pt demonstrated Max assist x2 for bed mobility, Max assist x 2 sit/stand via FWW, Unable to perform ambulation, unable to perform transfers, posterior lean in EOB sitting and in standing, Total assist dressing/toileting.    Plan    Occupational Therapy Initial Treatment Plan   Treatment Interventions: (P) Self Care / Activities of Daily Living, Neuro Re-Education / Balance, Therapeutic Exercises, Therapeutic Activity, Adaptive Equipment  Treatment Frequency: (P) 4 Times per Week  Duration: (P) Until Therapy Goals Met    DC Equipment Recommendations: (P) Unable to determine at this time  Discharge Recommendations: (P) Recommend post-acute placement for additional occupational therapy services prior to discharge home     Subjective    Pt was confused, disoriented and poor participation w/ tx.     Objective       05/18/23 1242    Services   Is patient using  services for this encounter? No   Initial Contact Note    Initial Contact Note Order Received and Verified, Occupational Therapy Evaluation in Progress with Full  Report to Follow.   Prior Living Situation   Housing / Facility [address] [facility]   Steps Into Home 3  (Pt uses ramp w/ bilateral rails)   Steps In Home 0   Rail Both Rail (Steps into Home)  (ramp)   Bathroom Set up Walk In Shower;Shower Glass Doors;Grab Bars;Shower Chair   Equipment Owned 4-Wheel Walker;Tub / Shower Seat;Grab Bar(s) In Tub / Shower;Bed Side Commode   Lives with - Patient's Self Care Capacity Alone and Able to Care For Self   Comments TC to Hetal (grand daughter) secondary pt currently AMS - Pt resides in a [facility] alone in [place], [place].  Family lives in the area dn are available to assist on a limited basis.  PLOF Mod I to indep for ADL's (except shower - Supervision) and Mod I 4WW in home.   Prior Level of ADL Function   Self Feeding Independent   Grooming / Hygiene Independent   Bathing Requires Assist  (Supervision)   Dressing Independent   Toileting Independent   Prior Level of IADL Function   Laundry Independent   Kitchen Mobility Independent   Prior Level Of Mobility Independent With Device in Home   Driving / Transportation Relatives / Others Provide Transportation   Occupation (Pre-Hospital Vocational) Retired Due To Age   History of Falls   History of Falls Yes   Date of Last Fall 05/16/23   Precautions   Precautions Fall Risk;Posterior Hip Precautions;Weight Bearing As Tolerated Left Lower Extremity   Pain   Intervention Cold Pack;Rest;Repositioned   Pain 0 - 10 Group   Location Hip   Location Orientation Left   Description Aching   Comfort Goal Comfort with Movement;Perform Activity   Therapist Pain Assessment Post Activity Pain Same as Prior to Activity;Nurse Notified   Cognition    Cognition / Consciousness X   Speech/ Communication Delayed Responses   Orientation Level Not Oriented to Reason;Not Oriented to Place;Not Oriented to Time;Not Oriented to Day;Not Oriented to Month;Not Oriented to Year;Not Oriented to Address   Level of Consciousness Alert   Ability To Follow Commands Unable to Follow 1  Step Commands   Safety Awareness Impaired;Impulsive   New Learning Impaired   Attention Impaired   Sequencing Impaired   Initiation Impaired   Comments Family reported pt at baseline is A&Ox4   Active ROM Upper Body   Comments unable to assess at this time secondary unable to follow directions/disoriented/confusin   Strength Upper Body   Upper Body Strength  WDL   Coordination Upper Body   Coordination WDL   Balance Assessment   Sitting Balance (Static) Trace +   Sitting Balance (Dynamic) Trace   Standing Balance (Static) Dependent   Standing Balance (Dynamic) Dependent   Weight Shift Sitting Poor   Weight Shift Standing Absent   Bed Mobility    Supine to Sit Maximal Assist   Sit to Supine Maximal Assist   Scooting Maximal Assist   ADL Assessment   Upper Body Dressing Maximal Assist   Lower Body Dressing Total Assist   Toileting Total Assist   How much help from another person does the patient currently need...   Putting on and taking off regular lower body clothing? 1   Bathing (including washing, rinsing, and drying)? 1   Toileting, which includes using a toilet, bedpan, or urinal? 1   Putting on and taking off regular upper body clothing? 2   Taking care of personal grooming such as brushing teeth? 2   Eating meals? 2   6 Clicks Daily Activity Score 9   Functional Mobility   Sit to Stand Maximal Assist   Bed, Chair, Wheelchair Transfer Unable to Participate   Toilet Transfers Unable to Participate   Edema / Skin Assessment   Edema / Skin  Not Assessed   Activity Tolerance   Sitting Edge of Bed <10 mins   Standing 2x2   Short Term Goals   Short Term Goal # 1 Mod assist LB clothing management via AE/DME PRN   Short Term Goal # 2 Min assist UB clothing management   Short Term Goal # 3 Mod assist transfer to/from EOB/BSC via DME   Short Term Goal # 4 CGA EOB sitting (5+ mins) for G&H   Short Term Goal # 5 Min assist toileting on BSC   Education Group   Education Provided Role of Occupational Therapist;Activities of  Daily Living   Role of Occupational Therapist Patient Response Patient;Acceptance;Explanation;No Learning Evidence   ADL Patient Response Patient;Acceptance;Explanation;Demonstration;No Learning Evidence;Reinforcement Needed   Occupational Therapy Initial Treatment Plan    Treatment Interventions Self Care / Activities of Daily Living;Neuro Re-Education / Balance;Therapeutic Exercises;Therapeutic Activity;Adaptive Equipment   Treatment Frequency 4 Times per Week   Duration Until Therapy Goals Met   Problem List   Problem List Decreased Active Daily Living Skills;Decreased Functional Mobility;Decreased Activity Tolerance;Impaired Postural Control / Balance   Anticipated Discharge Equipment and Recommendations   DC Equipment Recommendations Unable to determine at this time   Discharge Recommendations Recommend post-acute placement for additional occupational therapy services prior to discharge home

## 2023-05-18 NOTE — WOUND TEAM
In to see pt for wound to her L hallux. She has steristrips in place. Stated Sx was 2 wks ago. Removed steri strips. Wound well approximated. Cleaned with alcohol and let dry. Reapplied steristrips for reinforcement. Pt has no advanced wound care needs at this time, so wound team not following. Please consult if condition changes.

## 2023-05-18 NOTE — ASSESSMENT & PLAN NOTE
Seen on echo, she has mild bilateral pleural effusions on x-ray  Lasix discontinued, due to volume overload  Monitor    5/31: Monitor volume status

## 2023-05-18 NOTE — ASSESSMENT & PLAN NOTE
Patient is on 2 L supplemental oxygen, baseline at home is 3L  Patient did not have acute respiratory failure, she has chronic respiratory failure.  Not overloaded.  I ordered and reviewed her chest x-ray which shows bilateral basilar small pleural effusions as well as atelectasis  Encourage IS  Mobilize as able TID to chair for meals-as tolerated    5/30: I suspect this is from atelectasis. Patient requiring 3 L of oxygen at the time of my evaluation.    6/2: Continue IS for now.

## 2023-05-18 NOTE — DISCHARGE PLANNING
Case Management Discharge Planning    Admission Date: 5/16/2023  GMLOS: 4  ALOS: 2    6-Clicks ADL Score: 15  6-Clicks Mobility Score: 6  PT and/or OT Eval ordered: Yes  Post-acute Referrals Ordered: Yes  Post-acute Choice Obtained: Yes  Has referral(s) been sent to post-acute provider:  Yes      Anticipated Discharge Dispo: Discharge Disposition: D/T to SNF with Medicare cert in anticipation of skilled care (03)    DME Needed: No    Action(s) Taken: Updated Provider/Nurse on Discharge Plan, Choice obtained, Referral(s) sent, Completed PASSR/LOC, and OTHER: RN CM received additional SNF choices from patient's granddaughter, Hetal. Choices are 1) [facility]; 2) [facility]; 3) [facility]; 4) [facility]. Choice form faxed to DPA to upload to chart and follow-up on referrals. Referrals sent and are pending acceptance.    PASSR: [id number]    Escalations Completed: Provider, Pending Discharge Destination, and Bedside RN    Medically Clear: No    Next Steps: Continue to monitor for changes and update the discharge plan accordingly. Follow-up with the Attending and Bedside RN for any issues/concerns and assist as indicated. Follow-up with the DPA for any accepting SNFs.    Barriers to Discharge: Medical clearance and Pending Placement    Is the patient up for discharge tomorrow: No

## 2023-05-18 NOTE — RESPIRATORY CARE
"   COPD EDUCATION by COPD CLINICAL EDUCATOR  5/18/2023 at 2:02 PM by Amanda Samuel, RRT     Patient reviewed by COPD education team. Patient does have a history or diagnosis of COPD and is a former smoker.  However, pt is unable to engage in conversation is not appropriate for COPD program.     COPD Screen  COPD Risk Screening  Do you have a history of COPD?: Yes  Do you have a Pulmonologist?: No    COPD Assessment  COPD Clinical Specialists ONLY  COPD Education Initiated: Yes--Short Intervention (Pt very confused, PFT 11/2/22 is not on any inhalers/medication for COPD, is here for a fall but was unable to engage in conversation was too worried about yelling all night and her throat hurts, pt saying things that did not make sence.)  [employer]: [employer]  Physician Name: DHIRAJ CAPELLAN M.D.  Pulmonologist Name: Per krunal caballero follow ups  Referrals Initiated: Yes  Pulmonary Rehab: N/A  Smoking Cessation: N/A  Hospice: N/A  Home Health Care: Yes  Mobile Urgent Care Services: N/A  Geriatric Specialty Group: N/A  Private In-Home Care Agency: N/A  (OP) Pulmonary Function Testing: Yes  Interdisciplinary Rounds: Attendance at Rounds (30 Min)    PFT Results    No results found for: PFT    Meds to Beds  Would the patient like to opt in for Bedside Medication Delivery at Discharge?: No     MY COPD ACTION PLAN     It is recommended that patients and physicians /healthcare providers complete this action plan together. This plan should be discussed at each physician visit and updated as needed.    The green, yellow and red zones show groups of symptoms of COPD. This list of symptoms is not comprehensive, and you may experience other symptoms. In the \"Actions\" column, your healthcare provider has recommended actions for you to take based on your symptoms.    Patient Name: Yesi Garduno   YOB: 1928   Last Updated on: 5/18/2023  2:02 PM   Green Zone:  I am doing well today Actions   •  Usual " "activitiy and exercise level •  Take daily medications   •  Usual amounts of cough and phlegm/mucus •  Use oxygen as prescribed   •  Sleep well at night •  Continue regular exercise/diet plan   •  Appetite is good •  At all times avoid cigarette smoke, inhaled irritants     Daily Medications (these medications are taken every day):                Yellow Zone:  I am having a bad day or a COPD flare Actions   •  More breathless than usual •  Continue daily medications   •  I have less energy for my daily activities •  Use quick relief inhaler as ordered   •  Increased or thicker phlegm/mucus •  Use oxygen as prescribed   •  Using quick relief inhaler/nebulizer more often •  Get plenty of rest   •  Swelling of ankles more than usual •  Use pursed lip breathing   •  More coughing than usual •  At all times avoid cigarette smoke, inhaled irritants   •  I feel like I have a \"chest cold\"   •  Poor sleep and my symptoms woke me up   •  My appetite is not good   •  My medicine is not helping    •  Call provider immediately if symptoms don’t improve     Continue daily medications, add rescue medications:               Medications to be used during a flare up, (as Discussed with Provider):              Red Zone:  I need urgent medical care Actions   •  Severe shortness of breath even at rest •  Call 911 or seek medical care immediately   •  Not able to do any activity because of breathing    •  Fever or shaking chills    •  Feeling confused or very drowsy     •  Chest pains    •  Coughing up blood                  "

## 2023-05-18 NOTE — PROGRESS NOTES
Received bedside report from day shift JESUS Katz at 1900. Assumed pt care. Pt seen AO4, O2 at 3L via NC SpO2 at 91%. Pt resting in bed, reported pain, medicated per mar. Cold pack provided.  Pts daughter at bedisde. POC discussed. Safety and fall precautions in place. Bed alarm on.  Instructed pt to use call light, verbalized understanding.   Call light kept within reach.  No other needs at this time.   Will continue to monitor.

## 2023-05-18 NOTE — CARE PLAN
"The patient is Watcher - Medium risk of patient condition declining or worsening    Shift Goals  Clinical Goals: Maintain SpO2 above 90% this shift, monitor pts LOC, manage anxiety  Patient Goals: \"Get up\"  Family Goals: Update POC    Progress made toward(s) clinical / shift goals:  Addressed  O2 supp according to needs in order to maintain SpO2 above 90%. Monitored LOC. Xanax given 1x this shift. Called grand daughter Esme 1x for update and to verify pt home meds. Pt seen asleep post interventions.    Patient is not progressing towards the following goals: Pt confused, talks to herself. AO1-AO2 towards the end of shift. Noted pts O2 needs have increased despite the pt not receiving narcotics this shift. Dyspnea with exertion noted. Was unable to mobilize pt due to dizziness and O2 desaturating with exertion. Pt quad strength weak on assessment.     Problem: Knowledge Deficit - Standard  Goal: Patient and family/care givers will demonstrate understanding of plan of care, disease process/condition, diagnostic tests and medications  5/18/2023 0653 by Rupinder Montelongo, R.N.  Outcome: Not Progressing  5/18/2023 0309 by Rupinder Montelongo, R.N.  Outcome: Not Progressing     Problem: Psychosocial  Goal: Patient's level of anxiety will decrease  Outcome: Not Progressing     Problem: Hemodynamics  Goal: neurologic status will be stable or improve  Outcome: Not Progressing     Problem: Respiratory  Goal: Patient will achieve/maintain optimum respiratory ventilation and gas exchange  Outcome: Not Progressing     " Detail Level: Zone

## 2023-05-18 NOTE — PROGRESS NOTES
"2000 Attempted to ambulate pt, pt refusing per pt \"No I can't walk\" . Pt on Q2 turns, pillows used for off loading. Taps system in place. Wound dressing assessed- intact.  Assessed for quad strength, pt able to dorsiflex feet. Follows commands. Quad strength left leg is weak. Unable to tolerate mobilization per pt \" I feel a little dizzy\".    2210 Pt confused, \"The door is upside down\", reassessed orientation level pt AO3-time. VS assessed, WNL. Denies nausea and chest pain. Pt reports anxiety.     2221 Called pts granddaughter Esme to verify Xanax dose that the pt is taking at home, per grand daughter \"yes\" dose verified at 0.25mg. PRN xanax given per mar.   0545 Pt very confused AO1, wants to call her Mother on the phone. Patient calling for \"[facility]\" Denies chest pain. Reports hip pain, Pt medicated per MAR.     "

## 2023-05-18 NOTE — THERAPY
Physical Therapy   Initial Evaluation     Patient Name: Yesi Garduno  Age:  89 y.o., Sex:  female  Medical Record #: [id number]  Today's Date: 5/18/2023     Precautions  Precautions: (P) Fall Risk;Posterior Hip Precautions;Weight Bearing As Tolerated Left Lower Extremity    Assessment  Patient is 89 y.o. female who was recently admitted for a GLF and presented with L femur fracture. Pt is now s/p L cemented hip hemiarthroplasty with posterior hip precautions and WBAT for L LE. Pt presented to PT with confusion and poor following throughout session. Pt also presented with functional impairments of poor balance, poor postural control, weakness, pain, and poor upright activity tolerance. These impairments are limiting the patients ability to perform safe upright mobility at her prior level of function which was noted to be independent with use of 4WW. Pt required Max A for all mobility and was only able to tolerate 2 sit<>stands with max faciliation and weight shifts to maintain standing posture with significant posterior lean and poor CHRIS. Recommend post-acute placement for continued physical therapy services prior to discharge home.       Plan    Physical Therapy Initial Treatment Plan   Treatment Plan : (P) Bed Mobility, Equipment, Gait Training, Manual Therapy, Neuro Re-Education / Balance, Self Care / Home Evaluation, Therapeutic Activities, Therapeutic Exercise, Stair Training  Treatment Frequency: (P) 5 Times per Week  Duration: (P) Until Therapy Goals Met    DC Equipment Recommendations: (P) Unable to determine at this time  Discharge Recommendations: (P) Recommend post-acute placement for additional physical therapy services prior to discharge home     Objective       05/18/23 1016    Services   Is patient using  services for this encounter? No   Initial Contact Note    Initial Contact Note Order Received and Verified, Physical Therapy Evaluation in Progress with Full Report to Follow.    Precautions   Precautions Fall Risk;Posterior Hip Precautions;Weight Bearing As Tolerated Left Lower Extremity   Pain 0 - 10 Group   Location Hip   Location Orientation Left   Description Aching   Therapist Pain Assessment Prior to Activity;During Activity;Post Activity;Nurse Notified  (reports of moderate pain, not rated)   Prior Living Situation   Prior Services None   Housing / Facility 1 Story House   Steps Into Home   (ramp with B rails)   Steps In Home 0   Rail Both Rail (Steps into Home)   Equipment Owned 4-Wheel Walker   Lives with - Patient's Self Care Capacity Alone and Able to Care For Self   Comments per family pt primarily lives alone, however, has family in the area that usually checks in on her   Prior Level of Functional Mobility   Bed Mobility Independent   Transfer Status Independent   Ambulation Independent   Ambulation Distance   (household distances)   Assistive Devices Used 4-Wheel Walker   Comments per family pt is primarily indep with mobility   History of Falls   History of Falls Yes   Date of Last Fall   (per medical chart pt has been having increase freq in falls)   Cognition    Cognition / Consciousness X   Speech/ Communication Delayed Responses   Orientation Level Not Oriented to Reason;Not Oriented to Place;Not Oriented to Time;Not Oriented to Year;Not Oriented to Month;Not Oriented to Address;Not Oriented to Day   Level of Consciousness Alert   Ability To Follow Commands Unable to Follow 1 Step Commands   Safety Awareness Impaired;Impulsive   New Learning Impaired   Attention Impaired   Sequencing Impaired   Initiation Impaired   Comments at baseline per family pt is not confused, at this time pt presents with AMS and unable to follow commands at times   Passive ROM Lower Body   Passive ROM Lower Body X   Comments limited in L LE due to pain, unable to formally assess due to poor following   Active ROM Lower Body    Active ROM Lower Body  X   Comments same as above   Strength Lower  Body   Lower Body Strength  X   Comments limited by pain, demonstrates with poor functional strength for standing   Sensation Lower Body   Lower Extremity Sensation   Not Tested   Lower Body Muscle Tone   Lower Body Muscle Tone  WDL   Neurological Concerns   Neurological Concerns No   Coordination Upper Body   Coordination WDL   Coordination Lower Body    Coordination Lower Body  Not Tested   Vision   Vision Comments per medical chart pt has been having blurry vision and tends to have a difficult time scanning   Balance Assessment   Sitting Balance (Static) Trace +   Sitting Balance (Dynamic) Trace   Standing Balance (Static) Dependent   Standing Balance (Dynamic) Dependent   Weight Shift Sitting Poor   Weight Shift Standing Absent   Comments w/fww use   Bed Mobility    Supine to Sit Maximal Assist   Sit to Supine Maximal Assist   Scooting Maximal Assist   Rolling Maximal Assist to Rt.;Maximum Assist to Lt.   Gait Analysis   Gait Level Of Assist Unable to Participate   Weight Bearing Status WBAT L LE   Functional Mobility   Sit to Stand Maximal Assist   Bed, Chair, Wheelchair Transfer Unable to Participate   Mobility w/fww   How much difficulty does the patient currently have...   Turning over in bed (including adjusting bedclothes, sheets and blankets)? 1   Sitting down on and standing up from a chair with arms (e.g., wheelchair, bedside commode, etc.) 1   Moving from lying on back to sitting on the side of the bed? 1   How much help from another person does the patient currently need...   Moving to and from a bed to a chair (including a wheelchair)? 1   Need to walk in a hospital room? 1   Climbing 3-5 steps with a railing? 1   6 clicks Mobility Score 6   Activity Tolerance   Sitting in Chair NT   Sitting Edge of Bed 8 mins   Standing 1-2 mins x 2   Comments limited by pain and poor effort   Edema / Skin Assessment   Edema / Skin  Not Assessed   Patient / Family Goals    Patient / Family Goal #1 none stated    Short Term Goals    Short Term Goal # 1 pt will go supine<>sit w/hob elevated and rails up w/Min A in 6tx   Short Term Goal # 2 pt will go sit<>stand w/fww w/mod A in 6tx   Short Term Goal # 3 pt will transfer bed<>chair w/fww w/Mod A in 6tx   Education Group   Education Provided Role of Physical Therapist;Hip Precautions Posterior   Hip Precautions Posterior Patient Response Patient;Nonacceptance;Explanation;Demonstration;Reinforcement Needed;No Learning Evidence   Role of Physical Therapist Patient Response Patient;Acceptance;Demonstration;Explanation;Reinforcement Needed;No Learning Evidence   Physical Therapy Initial Treatment Plan    Treatment Plan  Bed Mobility;Equipment;Gait Training;Manual Therapy;Neuro Re-Education / Balance;Self Care / Home Evaluation;Therapeutic Activities;Therapeutic Exercise;Stair Training   Treatment Frequency 5 Times per Week   Duration Until Therapy Goals Met   Problem List    Problems Pain;Impaired Bed Mobility;Impaired Transfers;Impaired Ambulation;Functional ROM Deficit;Functional Strength Deficit;Impaired Balance;Decreased Activity Tolerance;Safety Awareness Deficits / Cognition;Limited Knowledge of Post-Op Precautions;Motor Planning / Sequencing   Anticipated Discharge Equipment and Recommendations   DC Equipment Recommendations Unable to determine at this time   Discharge Recommendations Recommend post-acute placement for additional physical therapy services prior to discharge home   Interdisciplinary Plan of Care Collaboration   IDT Collaboration with  Nursing   Patient Position at End of Therapy In Bed;Bed Alarm On;Call Light within Reach;Tray Table within Reach;Phone within Reach   Collaboration Comments aware of visit and recs   Session Information   Date / Session Number  5/18-1 (1/5, 5/24)

## 2023-05-18 NOTE — PROGRESS NOTES
BSSR received from night RN. Pt is resting comfortably in bed, not in any distress, on 3L at 93% O2sat. AAOx3, disoriented to place. Pt reported pain, rest and repositioning for relief. Denies N/V. Discussed POC. Fall risk precaution in place, carline bed in lowest position, bed alarm on and call light within reach. All needs met at this time. Hourly rounding in place.

## 2023-05-18 NOTE — ASSESSMENT & PLAN NOTE
Denies any bleeding, minimal blood loss during surgery  Monitor hemoglobin    5/31: Hb is stable, no signs of bleeding at this time.

## 2023-05-18 NOTE — ASSESSMENT & PLAN NOTE
Waxing and waning.    Multifactorial secondary to anesthetic medications, as well as narcotics, pain, sundowning  Significant proved after narcotics were held  Continue supportive care  Normalize sleep-wake  Minimize lines/tubes  Cortisol and TSH within expected range   Mental status has been fluctuating, improved on 5/27.  No focal signs to suggest stroke.    5/30: Patient is sleeping today, very hard to wake up. No family members present at bedside at the time of my evaluation.    6/2: Patient seems to be alert and oriented today.  Her altered mental status could be waxing and waning.  Monitor closely.

## 2023-05-19 PROBLEM — N17.9 AKI (ACUTE KIDNEY INJURY) (HCC): Status: ACTIVE | Noted: 2023-01-01

## 2023-05-19 NOTE — PROGRESS NOTES
Received report from day shift RN. Pt resting in bed. A&Ox4, able to answer all questions appropriately. Reviewed plan of care for the night and fall precautions in place to keep pt safe. Pt voiced understanding.

## 2023-05-19 NOTE — PROGRESS NOTES
Telemetry Shift Summary     Rhythm: SR  Rate: 69-76  Measurements: .16/.12/.40  Ectopy (reported by Monitor Tech): r PVC, r PAC     Normal Values  Rhythm: Sinus  HR:   Measurements: 0.12-0.20/0.06-0.10/0.30-0.52

## 2023-05-19 NOTE — ASSESSMENT & PLAN NOTE
On ckd  May be due to Lasix versus swings in blood pressure with some perioperative hypotension  Hold off on fluids and Lasix to allow creatinine to normalize

## 2023-05-19 NOTE — PROGRESS NOTES
BSSR received from night RN. Pt is resting comfortably in bed, not in any distress, on 2 lpm. AAOx4. Pt reported pain, given Tylenol. Discussed POC. Fall risk precaution in place, locked bed in lowest position and call light within reach. All needs met at this time. Hourly rounding in place.

## 2023-05-19 NOTE — CARE PLAN
The patient is {Patient Stability:[id number]}    Shift Goals  Clinical Goals: Pt able to maintain >90% SpO2 with supplemental O2, improve mentation during this shift  Patient Goals: Pt able to rest comfortably  Family Goals: Update POC    Progress made toward(s) clinical / shift goals:  ***    Patient is not progressing towards the following goals:      Problem: Mobility  Goal: Patient's capacity to carry out activities will improve  Outcome: Not Progressing     Problem: Self Care  Goal: Patient will have the ability to perform ADLs independently or with assistance (bathe, groom, dress, toilet and feed)  Outcome: Not Progressing

## 2023-05-19 NOTE — CARE PLAN
The patient is Watcher - Medium risk of patient condition declining or worsening    Shift Goals  Clinical Goals: Pt able to maintain >90% SpO2 with supplemental O2, PT/OT today  Patient Goals: Pt able to rest comfortably, ambulate  Family Goals: n/a    Progress made toward(s) clinical / shift goals:  Pt remain >90% O2 sat at 4 lpm. PT/OT eval done today, pt able to get up at the edge of the bed but unable to mobilize. Pt c/o headache, given Tylenol. Fall risk precaution in place, pt did not sustain any fall/injury during this shift.    Patient is not progressing towards the following goals:      Problem: Hemodynamics  Goal: Patient's hemodynamics, fluid balance and neurologic status will be stable or improve  Outcome: Not Progressing     Problem: Nutrition  Goal: Patient's nutritional and fluid intake will be adequate or improve  Outcome: Not Progressing     Problem: Mobility  Goal: Patient's capacity to carry out activities will improve  Outcome: Not Progressing     Problem: Self Care  Goal: Patient will have the ability to perform ADLs independently or with assistance (bathe, groom, dress, toilet and feed)  Outcome: Not Progressing

## 2023-05-19 NOTE — PROGRESS NOTES
Telemetry Shift Summary     Rhythm: SR w/ BBB  HR: 64-79  Ectopy: rPAC    Measurements: 0.18/0.12/0.42    Normal Values  Rhythm: SR  HR:   Measurements: 0.12-0.20/0.08-0.10/0.30-0.52

## 2023-05-19 NOTE — THERAPY
Occupational Therapy  Daily Treatment     Patient Name: Yesi Garduno  Age:  89 y.o., Sex:  female  Medical Record #: [id number]  Today's Date: 5/19/2023     Precautions  Precautions: Fall Risk, Posterior Hip Precautions, No Weight Bearing Restrictions Left Lower Extremity    Assessment    Pt demonstrated Max assist bed mobility, Max assist sit/stand x 2, Total assist toileting, Total assist LB clothing management.  Therapist reviewedd/educated pt on environmental/safety awareness, fall precautions, posterior hip precautions, ADL's and transfers.    Plan    Treatment Plan Status: (P) Continue Current Treatment Plan  Type of Treatment: Self Care / Activities of Daily Living, Neuro Re-Education / Balance, Therapeutic Exercises, Therapeutic Activity, Adaptive Equipment  Treatment Frequency: 4 Times per Week  Treatment Duration: Until Therapy Goals Met    DC Equipment Recommendations: Unable to determine at this time  Discharge Recommendations: (P) Recommend post-acute placement for additional occupational therapy services prior to discharge home    Subjective    Pt was alert and cooperative w/ tx.     Objective       05/19/23 1443    Services   Is patient using  services for this encounter? No   Precautions   Precautions Fall Risk;Posterior Hip Precautions;No Weight Bearing Restrictions Left Lower Extremity   Pain   Intervention Rest;Repositioned   Pain 0 - 10 Group   Location Hip   Location Orientation Left   Description Aching   Comfort Goal Perform Activity;Comfort with Movement   Therapist Pain Assessment Post Activity Pain Same as Prior to Activity;Nurse Notified;4   Non Verbal Descriptors   Non Verbal Scale  Calm;Unlabored Breathing   Cognition    Level of Consciousness Alert   Balance   Sitting Balance (Static) Fair   Sitting Balance (Dynamic) Fair -   Standing Balance (Static) Poor -   Standing Balance (Dynamic) Poor -   Weight Shift Sitting Poor   Weight Shift Standing Absent   Bed  Mobility    Supine to Sit Maximal Assist   Sit to Supine Maximal Assist   Scooting Maximal Assist   Activities of Daily Living   Lower Body Dressing Total Assist   Toileting Total Assist   How much help from another person does the patient currently need...   Putting on and taking off regular lower body clothing? 1   Bathing (including washing, rinsing, and drying)? 2   Toileting, which includes using a toilet, bedpan, or urinal? 1   Putting on and taking off regular upper body clothing? 3   Taking care of personal grooming such as brushing teeth? 3   Eating meals? 4   6 Clicks Daily Activity Score 14   Functional Mobility   Sit to Stand Maximal Assist   Bed, Chair, Wheelchair Transfer Unable to Participate   Toilet Transfers Unable to Participate   Activity Tolerance   Sitting Edge of Bed 10   Standing <1 min   Comments Limited by pain and orthostatic   Short Term Goals   Goal Outcome # 1 Progressing slower than expected   Goal Outcome # 3 Progressing slower than expected   Goal Outcome # 5 Progressing slower than expected   Occupational Therapy Treatment Plan    O.T. Treatment Plan Continue Current Treatment Plan   Anticipated Discharge Equipment and Recommendations   Discharge Recommendations Recommend post-acute placement for additional occupational therapy services prior to discharge home

## 2023-05-19 NOTE — CARE PLAN
"The patient is Watcher - Medium risk of patient condition declining or worsening    Shift Goals  Clinical Goals: maintain O2>90%, pain control, rest, safety  Patient Goals: \"I want to try and walk tomorrow\"  Family Goals: n/a    Progress made toward(s) clinical / shift goals:  Pt drowsy but A&Ox4 during the night. Slept well. Tylenol given for pain. Weaned oxygen to 2.5L/NC.    Problem: Pain - Standard  Goal: Alleviation of pain or a reduction in pain to the patient’s comfort goal  Outcome: Progressing     Problem: Knowledge Deficit - Standard  Goal: Patient and family/care givers will demonstrate understanding of plan of care, disease process/condition, diagnostic tests and medications  Outcome: Progressing     Problem: Skin Integrity  Goal: Skin integrity is maintained or improved  Outcome: Progressing     Problem: Respiratory  Goal: Patient will achieve/maintain optimum respiratory ventilation and gas exchange  Outcome: Progressing     Problem: Bowel Elimination  Goal: Establish and maintain regular bowel function  Outcome: Progressing     Problem: Mobility  Goal: Patient's capacity to carry out activities will improve  Outcome: Progressing     Problem: Self Care  Goal: Patient will have the ability to perform ADLs independently or with assistance (bathe, groom, dress, toilet and feed)  Outcome: Progressing     Problem: Infection - Standard  Goal: Patient will remain free from infection  Outcome: Progressing     Problem: Wound/ / Incision Healing  Goal: Patient's wound/surgical incision will decrease in size and heals properly  Outcome: Progressing     "

## 2023-05-19 NOTE — THERAPY
Physical Therapy   Daily Treatment     Patient Name: Yesi Garduno  Age:  89 y.o., Sex:  female  Medical Record #: [id number]  Today's Date: 5/19/2023          Assessment    Pt stood x 20 seconds,mentation much improved BP 69/38 after standing    Plan    Treatment Plan Status:    Type of Treatment: Bed Mobility, Equipment, Gait Training, Manual Therapy, Neuro Re-Education / Balance, Self Care / Home Evaluation, Therapeutic Activities, Therapeutic Exercise, Stair Training  Treatment Frequency: (P) Daily  Treatment Duration: Until Therapy Goals Met    DC Equipment Recommendations: Unable to determine at this time  Discharge Recommendations: Recommend post-acute placement for additional physical therapy services prior to discharge home     Objective       05/19/23 1400   Charge Group   PT Therapeutic Activities (Units) 2   Cognition    Cognition / Consciousness WDL   Supine Lower Body Exercise   Supine Lower Body Exercises Yes   Balance   Sitting Balance (Static) Fair   Sitting Balance (Dynamic) Fair   Standing Balance (Static) Poor -   Standing Balance (Dynamic) Poor -   Weight Shift Sitting Poor   Weight Shift Standing Absent   Bed Mobility    Supine to Sit Maximal Assist   Sit to Supine Maximal Assist   Scooting Maximal Assist   Gait Analysis   Gait Level Of Assist Unable to Participate   Assistive Device Front Wheel Walker   Distance (Feet) 0   Functional Mobility   Sit to Stand Maximal Assist   Bed, Chair, Wheelchair Transfer Unable to Participate   Toilet Transfers Unable to Participate   Activity Tolerance   Sitting Edge of Bed 10   Standing 20 seconds   Short Term Goals    Short Term Goal # 1 pt will go supine<>sit w/hob elevated and rails up w/Min A in 6tx   Goal Outcome # 1 Progressing slower than expected   Short Term Goal # 2 pt will go sit<>stand w/fww w/mod A in 6tx   Goal Outcome # 2 Progressing slower than expected   Short Term Goal # 3 pt will transfer bed<>chair w/fww w/Mod A in 6tx   Goal Outcome #  3 Progressing slower than expected   Physical Therapy Treatment Plan   Treatment Frequency Daily   Interdisciplinary Plan of Care Collaboration   IDT Collaboration with  Nursing   Session Information   Date / Session Number  5/19-2 2/7 5/24   Priority 4

## 2023-05-19 NOTE — PROGRESS NOTES
Hospital Medicine Daily Progress Note    Date of Service  5/19/2023    Chief Complaint  Yesi Garduno is a 89 y.o. female admitted 5/16/2023 with left hip pain after a fall    Hospital Course  History of a AAA, breast cancer status post mastectomy, CKD stage IIIa, hypertension, not on any medications admitted after ground-level fall complaining of blurred vision prior to the fall and left hip pain after the fall.  She was found to have an impacted left femoral neck fracture.  Orthopedic surgery Dr. Ponce was consulted and recommended operative fixation.  She had a mildly elevated troponin which trended up slightly to 42 however then trended down.  Cardiac clearance was obtained    Interval Problem Update  5/17: Complaining of severe left hip pain, anxious for surgery.  Echocardiogram performed and reviewed and it shows some diastolic dysfunction however normal EF.  Cleared for surgery, discussed with cardiology.  Blood pressure improved after hydralazine and pain control.  N.p.o.  5/18: Uncomplicated surgery yesterday.  Today she is on 3 L, I reviewed her x-ray which shows bilateral small pleural effusion, atelectasis.  Fluids stopped, IV Lasix x1, I-S.  She is encephalopathic, IV narcotics discontinued.  Pending PT eval regarding SNF.  Labile blood pressure, as needed hydralazine added    I have discussed this patient's plan of care and discharge plan at IDT rounds today with Case Management, Nursing, Nursing leadership, and other members of the IDT team.    Consultants/Specialty  orthopedics- [facility]ario  Cardiology - [facility]    Code Status  Full Code    Disposition  The patient is not medically cleared for discharge to home or a post-acute facility.  Anticipate discharge to: skilled nursing facility    I have placed the appropriate orders for post-discharge needs.    Review of Systems  Review of Systems   Constitutional:  Positive for malaise/fatigue. Negative for chills and fever.   HENT:  Negative for sore  throat.    Eyes:  Negative for blurred vision and double vision.   Respiratory:  Negative for cough and shortness of breath.    Cardiovascular:  Negative for chest pain and palpitations.   Gastrointestinal:  Negative for abdominal pain, blood in stool, diarrhea, heartburn, nausea and vomiting.   Genitourinary:  Negative for dysuria and frequency.   Musculoskeletal:  Positive for joint pain (Mild and well controlled). Negative for back pain, falls and myalgias.   Neurological:  Negative for dizziness, focal weakness, weakness and headaches.   Psychiatric/Behavioral:  Positive for memory loss. Negative for depression and hallucinations. The patient is not nervous/anxious.    All other systems reviewed and are negative.       Physical Exam  Temp:  [36.6 °C (97.8 °F)-36.9 °C (98.5 °F)] 36.7 °C (98.1 °F)  Pulse:  [72-86] 79  Resp:  [16-17] 16  BP: ()/(45-65) 107/45  SpO2:  [90 %-98 %] 94 %    Physical Exam  Constitutional:       General: She is not in acute distress.     Comments: Thin   HENT:      Nose: Nose normal.      Mouth/Throat:      Mouth: Mucous membranes are dry.   Cardiovascular:      Rate and Rhythm: Normal rate and regular rhythm.      Pulses: Normal pulses.      Heart sounds: No murmur heard.  Pulmonary:      Effort: Pulmonary effort is normal.      Breath sounds: Normal breath sounds.   Abdominal:      General: There is no distension.      Palpations: Abdomen is soft.      Tenderness: There is no abdominal tenderness.   Musculoskeletal:         General: No swelling or tenderness.      Cervical back: No muscular tenderness.      Comments: Left hip with surgical dressing in place, clean dry and intact.  Minimal ecchymosis/swelling   Lymphadenopathy:      Cervical: No cervical adenopathy.   Skin:     General: Skin is warm and dry.      Coloration: Skin is pale.      Findings: No rash.   Neurological:      General: No focal deficit present.      Mental Status: She is alert and oriented to person, place,  and time. She is confused.      Motor: Weakness and tremor present.   Psychiatric:         Mood and Affect: Mood is not anxious.         Speech: Speech is not tangential.         Cognition and Memory: Cognition is not impaired. Memory is not impaired.         Fluids    Intake/Output Summary (Last 24 hours) at 5/19/2023 1119  Last data filed at 5/19/2023 0835  Gross per 24 hour   Intake 390 ml   Output 600 ml   Net -210 ml         Laboratory  Recent Labs     05/17/23  0547 05/18/23  0055 05/19/23  0033   WBC 6.4 9.7 8.5   RBC 3.53* 3.24* 3.23*   HEMOGLOBIN 10.8* 10.0* 10.0*   HEMATOCRIT 34.4* 31.5* 31.5*   MCV 97.5 97.2 97.5   MCH 30.6 30.9 31.0   MCHC 31.4* 31.7* 31.7*   RDW 45.9 47.0 47.0   PLATELETCT 163* 163* 152*   MPV 9.1 9.3 9.6       Recent Labs     05/17/23  0511 05/17/23  1705 05/19/23  0033   SODIUM 138 142 139   POTASSIUM 4.0 4.4 3.9   CHLORIDE 102 106 103   CO2 25 25 24   GLUCOSE 141* 155* 112*   BUN 28* 26* 36*   CREATININE 1.21 1.16 1.63*   CALCIUM 9.5 9.4 9.3                     Imaging  DX-PELVIS-1 OR 2 VIEWS   Final Result      Recent left hip arthroplasty.      DX-CHEST-PORTABLE (1 VIEW)   Final Result      1.  Cardiomegaly with interstitial infiltrates.      2.  Bibasilar atelectasis.      EC-ECHOCARDIOGRAM COMPLETE W/O CONT   Final Result      DX-SHOULDER 2+ RIGHT   Final Result      1.  Osteoporosis.      2.  No evidence of right shoulder fracture and/or dislocation.      DX-HAND 3+ RIGHT   Final Result         1.  No radiographic evidence of acute injury.      2. Osteoporosis.      DX-HIP-COMPLETE - UNILATERAL 2+ RIGHT   Final Result      1.  Impacted left femoral neck fracture.      2.  Previous pinning of right hip      3.  Osteoporosis.      DX-HUMERUS 2+ LEFT   Final Result         1.  No radiographic evidence of acute injury.      2. Osteopenia.      DX-HAND 3+ LEFT   Final Result         1.  No radiographic evidence of acute injury.      2. Osteoporosis.      DX-FEMUR-2+ LEFT   Final  Result      1.  Impacted left femoral neck fracture.      2.  Bone infarcts noted in the metadiaphyseal region of left femur.      CT-CHEST,ABDOMEN,PELVIS WITH   Final Result      1.  Left femoral neck fracture.      2.  Previous pinning of right hip.      3.  Infrarenal aortic aneurysm which measures 5.5 cm in greatest diameter.      4.  Osteoporosis.      5.  Sigmoid scoliosis of the thoracic and lumbar spine.      6.  Centrilobular emphysema.      7.  These findings were discussed with JOSÉ LUIS GARCIA at 10:15 p.m. 5/16/2023      CT-CSPINE WITHOUT PLUS RECONS   Final Result      1.  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      2.  Mild to moderate degenerative anterolisthesis at C4-5 level.      3.  Osteopenia.      4.  No evidence of acute cervical spine fracture.      CT-HEAD W/O   Final Result      1.  Cerebral atrophy.      2.  White matter lucencies most consistent with small vessel ischemic change versus demyelination or gliosis.      3.  Otherwise, Head CT without contrast with no acute findings. No evidence of acute cerebral infarction, hemorrhage or mass lesion.                Assessment/Plan  * Displaced fracture of left femoral neck (HCC)- (present on admission)  Assessment & Plan  -Hip x-ray showed impacted left femoral neck fracture  Underwent surgical fixation Dr. Bourne 5/17  Discontinued narcotics due to confusion and she is tolerating Tylenol with good pain control  Ice as needed    MELANIE (acute kidney injury) (HCC)  Assessment & Plan  May be due to Lasix versus swings in blood pressure with some perioperative hypotension  Hold off on fluids and Lasix to allow creatinine to normalize    Acute metabolic encephalopathy  Assessment & Plan  This was likely multifactorial secondary to anesthetic medications, as well as narcotics, pain, sundowning  Significant proved after narcotics were held, she is back to  baseline  Continue supportive care  Normalize sleep-wake  Minimize lines/tubes    Chronic diastolic heart failure (HCC)- (present on admission)  Assessment & Plan  Seen on echo, she has mild bilateral pleural effusions on x-ray  Lasix IV x1 on 5/18  DC IV fluids  Monitor    Anemia due to stage 3b chronic kidney disease (HCC)- (present on admission)  Assessment & Plan  Denies any bleeding, minimal blood loss during surgery  Monitor hemoglobin    Elevated troponin- (present on admission)  Assessment & Plan  Elevated and initially trended up very slightly, likely due to reduced renal clearance, has trended back down.  Never complained of any chest pain  EKG negative for ischemia  Not consistent with cardiac ischemia/event    Hypertensive urgency- (present on admission)  Assessment & Plan  Present on admission, likely due to pain as she does not take anything at home  Improved with IV hydralazine  Continue as needed BP medications and monitor    Stage 3a chronic kidney disease (HCC)- (present on admission)  Assessment & Plan  -On admission her creatinine is 1.30-she improved slightly however today bumped to 1.6 after Lasix was given  Hold Lasix  -Avoid nephrotoxic medication and monitor chemistry panel in the morning.    Acute respiratory failure with hypoxia (HCC)- (present on admission)  Assessment & Plan  She has been on 3 L of oxygen, reduced inspiratory effort on exam.  I ordered and reviewed her chest x-ray which shows bilateral basilar small pleural effusions as well as atelectasis  Lasix 20 IV x1 caused a bump in creatinine  Hold off on any further Lasix or fluids  Encourage I-S  Mobilize as able    S/P right mastectomy- (present on admission)  Assessment & Plan  -Due to underlying breast cancer in 1960s.    AAA (abdominal aortic aneurysm) ([facility])- (present on admission)  Assessment & Plan  CT showed infrarenal aortic aneurysm which measures 5.5 cm in the greatest diameter- has been present for years  -I provided  extensive chart review on this. In 7/2015 AAA was 3.9 cm (Renal CT), and on 1/2017 AAA 4.2 cm (CT L Spine).  Blood pressure has improved  No chest pain, EKG is negative for acute ischemia.  Troponin increased slightly but now trended down before surgery    Near syncope- (present on admission)  Assessment & Plan  She complained of blurred vision prior to stumbling over a stool leading to a fall  CT of the head on admission was unremarkable other than small vessel ischemic change, no focal neurodeficits to suggest CVA  No additional head imaging needed at this time  Echocardiogram performed and shows essentially a normal EF, diastolic dysfunction  Possibly orthostatic versus volume depletion-she has been volume replaced, BNP slightly up  Bump in Cr with lasix  Hold fluids and Lasix to let creatinine normalize         VTE prophylaxis: SCDs/TEDs, xarelto post op    I have performed a physical exam and reviewed and updated ROS and Plan today (5/19/2023). In review of yesterday's note (5/18/2023), there are no changes except as documented above.      Total time:  53 minutes.  I spent greater than 50% of the time for patient care, counseling, and coordination on this date, including unit/floor time, and face-to-face time with the patient as per interval events and assessment and plan above

## 2023-05-19 NOTE — PROGRESS NOTES
Telemetry Shift Summary    Rhythm SR, BBB  HR Range 70s-90s  Ectopy rare PAC  Measurements 0.20/0.12/0.38        Normal Values  Rhythm SR  HR Range    Measurements 0.12-0.20 / 0.06-0.10  / 0.30-0.52

## 2023-05-19 NOTE — DOCUMENTATION QUERY
"                                                                         [facility]                                                                       Query Response Note      PATIENT:               JOSIAH   ACCT #:                  [id number]  MRN:                     [id number]  :                      [id number]  ADMIT DATE:       2023 6:59 PM  DISCH DATE:          RESPONDING  PROVIDER #:        [id number]           QUERY TEXT:    Patient with osteoporosis  s/p GLF and was treated with Left hemiarthroplasty for a  displaced femoral neck fracture . Please clarify the most likely etiology of the fracture.     The patient's Clinical Indicators include:  NOTED    Reported Sliding off bed in AM... later carrying some water into living room when legs just\" gave out on her\" and she fell  XR  Left Hip - Impacted left femoral neck fracture - Bone infarcts noted in metadiaphyseal region of left femur  XR Right- Hip - Diffuse demineralization of the bony pelvis- osteoporosis  CT Chest & Spine - Diffuse demineralization - Osteoporosis/Osteopenia  XR Bilateral Hands- decreased bony mineralization- Osteoporosis   XR Humerus/Shoulder - Osteopenia/Osteoporosis   Left cemented hip hemiarthroplasty - ...fracture was identified and noted to be severely comminuted  Home meds: Calcium 1,200/Viatmin D 1000 U PO   History Breast cancer  Advanced age      Thank you,  Rosa Lee RN, [facility]  Clinical Documentation Integrity  Connect via Voalte  Options provided:   -- Fracture likely due to trauma alone   -- Fracture likely pathological (i.e. due to weakening of bone osteoporosis/bone infarcts   -- Fracture likely due to a combination of a pathological process and mild trauma that alone would likely not have caused the fracture   -- Other explanation, (Pls specify)      Query created by: Rosa Lee on 2023 7:24 AM    RESPONSE TEXT:    Fracture likely pathological (i.e. due to weakening of bone osteoporosis/bone " infarcts          Electronically signed by:  KYLE MOODY MD 5/19/2023 1:07 PM

## 2023-05-20 NOTE — PROGRESS NOTES
Telemetry Shift Summary     Rhythm SR  HR Range 71-82  Ectopy rPVC/PAC  Measurements 0.16/0.12/0.38           Normal Values  Rhythm SR  HR Range    Measurements 0.12-0.20 / 0.06-0.10  / 0.30-0.52

## 2023-05-20 NOTE — CARE PLAN
The patient is Stable - Low risk of patient condition declining or worsening    Shift Goals  Clinical Goals: maintain oxygen above 90% on 2L  Patient Goals: maintain pain below a 3 during shift  Family Goals: n/a    Progress made toward(s) clinical / shift goals:  pt's O2 remained above 90% on 2L, pt did not complain of pain.     Patient is not progressing towards the following goals:

## 2023-05-20 NOTE — CARE PLAN
The patient is Stable - Low risk of patient condition declining or worsening    Shift Goals  Clinical Goals: Able to maintain O2 sat > 90%  Patient Goals: Sleep comfortably tonight  Family Goals: Update POC    Progress made toward(s) clinical / shift goals:  O2 supplement at 2 LPM via NC. Hourly rounding done.    Patient is not progressing towards the following goals: n/a

## 2023-05-20 NOTE — PROGRESS NOTES
Pt up with PT and stated she's feeling dizzy. Blood pressures range from 104/62-77/50, pulse 55-72, 97% on 2L nasal canula. Dr. Jimenes notified. No new orders at this time.

## 2023-05-20 NOTE — PROGRESS NOTES
Hospital Medicine Daily Progress Note    Date of Service  5/20/2023    Chief Complaint  Yesi Garduno is a 89 y.o. female admitted 5/16/2023 with left hip pain after a fall    Hospital Course  History of a AAA, breast cancer status post mastectomy, CKD stage IIIa, hypertension, not on any medications admitted after ground-level fall complaining of blurred vision prior to the fall and left hip pain after the fall.  She was found to have an impacted left femoral neck fracture.  Orthopedic surgery Dr. Ponce was consulted and recommended operative fixation.  She had a mildly elevated troponin which trended up slightly to 42 however then trended down.  Cardiac clearance was obtained    Interval Problem Update  5/17: Complaining of severe left hip pain, anxious for surgery.  Echocardiogram performed and reviewed and it shows some diastolic dysfunction however normal EF.  Cleared for surgery, discussed with cardiology.  Blood pressure improved after hydralazine and pain control.  N.p.o.  5/18: Uncomplicated surgery yesterday.  Today she is on 3 L, I reviewed her x-ray which shows bilateral small pleural effusion, atelectasis.  Fluids stopped, IV Lasix x1, I-S.  She is encephalopathic, IV narcotics discontinued.  Pending PT eval regarding SNF.  Labile blood pressure, as needed hydralazine added    I have discussed this patient's plan of care and discharge plan at IDT rounds today with Case Management, Nursing, Nursing leadership, and other members of the IDT team.    Consultants/Specialty  orthopedics- [facility]  Cardiology - [facility]    Code Status  Full Code    Disposition  The patient is medically cleared for discharge to home or a post-acute facility.  Anticipate discharge to: skilled nursing facility    I have placed the appropriate orders for post-discharge needs.    Review of Systems  Review of Systems   Constitutional:  Positive for malaise/fatigue. Negative for chills and fever.   HENT:  Positive for  congestion. Negative for sore throat.    Eyes:  Negative for blurred vision and double vision.   Respiratory:  Negative for cough and shortness of breath.    Cardiovascular:  Negative for chest pain and palpitations.   Gastrointestinal:  Negative for abdominal pain, blood in stool, diarrhea, heartburn, nausea and vomiting.   Genitourinary:  Negative for dysuria and frequency.   Musculoskeletal:  Positive for joint pain (Mild and well controlled). Negative for back pain, falls and myalgias.   Skin:  Positive for itching (Hands).   Neurological:  Negative for dizziness, focal weakness, weakness and headaches.   Psychiatric/Behavioral:  Positive for memory loss. Negative for depression and hallucinations. The patient is not nervous/anxious.    All other systems reviewed and are negative.       Physical Exam  Temp:  [36.6 °C (97.8 °F)-37.2 °C (98.9 °F)] 36.7 °C (98 °F)  Pulse:  [66-83] 75  Resp:  [16-18] 18  BP: ()/(45-73) 152/73  SpO2:  [94 %-97 %] 95 %    Physical Exam  Constitutional:       General: She is not in acute distress.     Comments: Thin   HENT:      Nose: Nose normal.      Mouth/Throat:      Mouth: Mucous membranes are dry.   Cardiovascular:      Rate and Rhythm: Normal rate and regular rhythm.      Pulses: Normal pulses.      Heart sounds: No murmur heard.  Pulmonary:      Effort: Pulmonary effort is normal.      Breath sounds: Normal breath sounds.   Abdominal:      General: There is no distension.      Palpations: Abdomen is soft.      Tenderness: There is no abdominal tenderness.   Musculoskeletal:         General: No swelling or tenderness.      Cervical back: No muscular tenderness.      Comments: Left hip with surgical dressing in place, clean dry and intact.  Minimal ecchymosis/swelling   Lymphadenopathy:      Cervical: No cervical adenopathy.   Skin:     General: Skin is warm and dry.      Coloration: Skin is pale.      Findings: No rash.   Neurological:      General: No focal deficit  present.      Mental Status: She is alert and oriented to person, place, and time.      Motor: Weakness present.   Psychiatric:         Mood and Affect: Mood is not anxious.         Speech: Speech is not tangential.         Cognition and Memory: Cognition is not impaired. Memory is not impaired.         Fluids    Intake/Output Summary (Last 24 hours) at 5/20/2023 1136  Last data filed at 5/20/2023 0907  Gross per 24 hour   Intake 660 ml   Output 700 ml   Net -40 ml         Laboratory  Recent Labs     05/18/23  0055 05/19/23  0033 05/20/23  0116   WBC 9.7 8.5 8.3   RBC 3.24* 3.23* 2.97*   HEMOGLOBIN 10.0* 10.0* 9.0*   HEMATOCRIT 31.5* 31.5* 29.8*   MCV 97.2 97.5 100.3*   MCH 30.9 31.0 30.3   MCHC 31.7* 31.7* 30.2*   RDW 47.0 47.0 48.6   PLATELETCT 163* 152* 162*   MPV 9.3 9.6 9.8       Recent Labs     05/17/23  1705 05/19/23  0033 05/20/23  0116   SODIUM 142 139 138   POTASSIUM 4.4 3.9 4.6   CHLORIDE 106 103 104   CO2 25 24 22   GLUCOSE 155* 112* 121*   BUN 26* 36* 42*   CREATININE 1.16 1.63* 1.65*   CALCIUM 9.4 9.3 9.1                     Imaging  DX-PELVIS-1 OR 2 VIEWS   Final Result      Recent left hip arthroplasty.      DX-CHEST-PORTABLE (1 VIEW)   Final Result      1.  Cardiomegaly with interstitial infiltrates.      2.  Bibasilar atelectasis.      EC-ECHOCARDIOGRAM COMPLETE W/O CONT   Final Result      DX-SHOULDER 2+ RIGHT   Final Result      1.  Osteoporosis.      2.  No evidence of right shoulder fracture and/or dislocation.      DX-HAND 3+ RIGHT   Final Result         1.  No radiographic evidence of acute injury.      2. Osteoporosis.      DX-HIP-COMPLETE - UNILATERAL 2+ RIGHT   Final Result      1.  Impacted left femoral neck fracture.      2.  Previous pinning of right hip      3.  Osteoporosis.      DX-HUMERUS 2+ LEFT   Final Result         1.  No radiographic evidence of acute injury.      2. Osteopenia.      DX-HAND 3+ LEFT   Final Result         1.  No radiographic evidence of acute injury.      2.  Osteoporosis.      DX-FEMUR-2+ LEFT   Final Result      1.  Impacted left femoral neck fracture.      2.  Bone infarcts noted in the metadiaphyseal region of left femur.      CT-CHEST,ABDOMEN,PELVIS WITH   Final Result      1.  Left femoral neck fracture.      2.  Previous pinning of right hip.      3.  Infrarenal aortic aneurysm which measures 5.5 cm in greatest diameter.      4.  Osteoporosis.      5.  Sigmoid scoliosis of the thoracic and lumbar spine.      6.  Centrilobular emphysema.      7.  These findings were discussed with JOSÉ LUIS GARCIA at 10:15 p.m. 5/16/2023      CT-CSPINE WITHOUT PLUS RECONS   Final Result      1.  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      2.  Mild to moderate degenerative anterolisthesis at C4-5 level.      3.  Osteopenia.      4.  No evidence of acute cervical spine fracture.      CT-HEAD W/O   Final Result      1.  Cerebral atrophy.      2.  White matter lucencies most consistent with small vessel ischemic change versus demyelination or gliosis.      3.  Otherwise, Head CT without contrast with no acute findings. No evidence of acute cerebral infarction, hemorrhage or mass lesion.                Assessment/Plan  * Displaced fracture of left femoral neck (HCC)- (present on admission)  Assessment & Plan  -Hip x-ray showed impacted left femoral neck fracture  Underwent surgical fixation Dr. Bourne 5/17  Discontinued narcotics due to confusion and she is tolerating Tylenol with good pain control  Ice as needed    MELANIE (acute kidney injury) (HCC)  Assessment & Plan  May be due to Lasix versus swings in blood pressure with some perioperative hypotension  Hold off on fluids and Lasix to allow creatinine to normalize    Acute metabolic encephalopathy  Assessment & Plan  Now resolved.  This was likely multifactorial secondary to anesthetic medications, as well as narcotics, pain, sundowning  Significant proved  after narcotics were held, she is back to baseline  Continue supportive care  Normalize sleep-wake  Minimize lines/tubes    Chronic diastolic heart failure (HCC)- (present on admission)  Assessment & Plan  Seen on echo, she has mild bilateral pleural effusions on x-ray  Lasix IV x1 on 5/18  No IV fluids  Monitor    Anemia due to stage 3b chronic kidney disease (HCC)- (present on admission)  Assessment & Plan  Denies any bleeding, minimal blood loss during surgery  Monitor hemoglobin    Elevated troponin- (present on admission)  Assessment & Plan  Elevated and initially trended up very slightly, likely due to reduced renal clearance, has trended back down.  Never complained of any chest pain  EKG negative for ischemia  Not consistent with cardiac ischemia/event    Hypertensive urgency- (present on admission)  Assessment & Plan  Present on admission, likely due to pain as she does not take anything at home  Improved with IV hydralazine  Continue as needed BP medications and monitor    Stage 3a chronic kidney disease (HCC)- (present on admission)  Assessment & Plan  -On admission her creatinine is 1.30- increased to 1.6 after Lasix was given- now stable  Hold Lasix  -Avoid nephrotoxic medication and monitor chemistry panel in the morning.    Acute respiratory failure with hypoxia (HCC)- (present on admission)  Assessment & Plan  She has been on 2 L of oxygen, reduced inspiratory effort on exam.  I ordered and reviewed her chest x-ray which shows bilateral basilar small pleural effusions as well as atelectasis  Lasix 20 IV x1 caused a bump in creatinine  Hold off on any further Lasix or fluids for now  Encourage I-S  Mobilize as able    S/P right mastectomy- (present on admission)  Assessment & Plan  -Due to underlying breast cancer in 1960s.    AAA (abdominal aortic aneurysm) ([facility])- (present on admission)  Assessment & Plan  CT showed infrarenal aortic aneurysm which measures 5.5 cm in the greatest diameter- has been  present for years  -I provided extensive chart review on this. In 7/2015 AAA was 3.9 cm (Renal CT), and on 1/2017 AAA 4.2 cm (CT L Spine).  Blood pressure has improved  No chest pain, EKG is negative for acute ischemia.  Troponin increased slightly but now trended down before surgery    Near syncope- (present on admission)  Assessment & Plan  She complained of blurred vision prior to stumbling over a stool leading to a fall  CT of the head on admission was unremarkable other than small vessel ischemic change, no focal neurodeficits to suggest CVA  No additional head imaging needed at this time  Echocardiogram performed and shows essentially a normal EF, diastolic dysfunction  Possibly orthostatic versus volume depletion-she has been volume replaced, BNP elevated  Bump in Cr with lasix  Hold fluids and Lasix to let creatinine normalize - stable today         VTE prophylaxis: SCDs/TEDs, xarelto    I have performed a physical exam and reviewed and updated ROS and Plan today (5/20/2023). In review of yesterday's note (5/19/2023), there are no changes except as documented above.

## 2023-05-20 NOTE — PROGRESS NOTES
Received report from day shift nurse. Assumed pt care at 1915. Pt is A&Ox4, Sometimes forgetful. resting comfortably in bed. Pt on 2 LPM O2 via NC. No signs of SOB/respiratory distress. Labs noted, VSS. Fall precautions in place. Bed at lowest position. Call light and personal belongings within reach. Continue to monitor.

## 2023-05-20 NOTE — PROGRESS NOTES
Report from JESUS Tobin. Patient currently sleeping, equal rise and fall of chest noted. Hourly rounding in place, bed alarm on, call light within reach.

## 2023-05-20 NOTE — THERAPY
Physical Therapy   Daily Treatment     Patient Name: Yesi Garduno  Age:  89 y.o., Sex:  female  Medical Record #: [id number]  Today's Date: 5/20/2023     Precautions  Precautions: (P) Fall Risk;Posterior Hip Precautions;Weight Bearing As Tolerated Left Lower Extremity;Other (See Comments) (ABD pillow)    Assessment    Pt progressing slower than expected. Pt able to stand with FWW  3 attempts 1-2 mins with Max assist. Pivot transfer to chair with max assist x 2. Pt unable to fully extend knees. Pt confused, risk for falls. See below flow sheet for details.       Plan    Treatment Plan Status: (P) Continue Current Treatment Plan  Type of Treatment: Bed Mobility, Equipment, Gait Training, Manual Therapy, Neuro Re-Education / Balance, Self Care / Home Evaluation, Therapeutic Activities, Therapeutic Exercise, Stair Training  Treatment Frequency: Daily  Treatment Duration: Until Therapy Goals Met    DC Equipment Recommendations: (P) Unable to determine at this time  Discharge Recommendations: (P) Recommend post-acute placement for additional physical therapy services prior to discharge home         05/20/23 1348   Charge Group   Charges  Yes   PT Therapeutic Activities (Units) 2   Total Time Spent   PT Total Time Yes   PT Therapeutic Activities Time Spent (Mins) 30    Services   Is patient using  services for this encounter? No   Precautions   Precautions Fall Risk;Posterior Hip Precautions;Weight Bearing As Tolerated Left Lower Extremity;Other (See Comments)  (ABD pillow)   Vitals   Blood Pressure  108/50  (sitting)   O2 (LPM) 2   O2 Delivery Device Silicone Nasal Cannula   Pain 0 - 10 Group   Location Hip   Location Orientation Left   Therapist Pain Assessment Nurse Notified;Post Activity Pain Same as Prior to Activity   Cognition    Speech/ Communication Delayed Responses   Orientation Level Other (Comment)  (confused, knowns l hip fx'ed)   Ability To Follow Commands 1 Step   Safety Awareness  Impaired   New Learning Impaired   Attention Impaired   Sequencing Impaired   Initiation Impaired   Comments pt able to converse about her origins in [place].   Passive ROM Lower Body   Passive ROM Lower Body X   Comments within post hip prec's   Active ROM Lower Body    Active ROM Lower Body  X   Comments as above, tneds to IR and ADD hips   Strength Lower Body   Lower Body Strength  X   Comments unable to fully ext B knees in standing.   Sensation Lower Body   Lower Extremity Sensation   Not Tested   Lower Body Muscle Tone   Lower Body Muscle Tone  WDL   Supine Lower Body Exercise   Supine Lower Body Exercises Yes   Ankle Pumps Reciprocal;1 set of 10   Other Treatments   Other Treatments Provided nsg educationon ABD pillow   Neurological Concerns   Neurological Concerns No   Balance   Sitting Balance (Static) Fair   Sitting Balance (Dynamic) Fair -   Standing Balance (Static) Poor -   Standing Balance (Dynamic) Poor -   Weight Shift Sitting Poor   Weight Shift Standing Absent   Skilled Intervention Verbal Cuing;Postural Facilitation   Comments posterior LOB   Bed Mobility    Supine to Sit Moderate Assist  (max tactile nd VC's)   Sit to Supine   (pt left up in chair)   Scooting Maximal Assist   Rolling Maximal Assist to Rt.;Maximum Assist to Lt.   Skilled Intervention Verbal Cuing;Sequencing;Postural Facilitation;Compensatory Strategies;Facilitation   Comments extended time to complete mobility   Gait Analysis   Gait Level Of Assist Unable to Participate   Assistive Device Front Wheel Walker   Weight Bearing Status WBAT L LE   Functional Mobility   Sit to Stand Maximal Assist   Bed, Chair, Wheelchair Transfer Maximal Assist  (2 people for safety)   Comments stood x 3 times at 1-2 mins each for clean up and linen change   How much difficulty does the patient currently have...   Turning over in bed (including adjusting bedclothes, sheets and blankets)? 2   Sitting down on and standing up from a chair with arms (e.g.,  wheelchair, bedside commode, etc.) 2   Moving from lying on back to sitting on the side of the bed? 2   How much help from another person does the patient currently need...   Moving to and from a bed to a chair (including a wheelchair)? 2   Need to walk in a hospital room? 2   Climbing 3-5 steps with a railing? 1   6 clicks Mobility Score 11   Activity Tolerance   Sitting in Chair 1 hour recliner   Sitting Edge of Bed 5 mins   Standing 1-2 min x 3   Short Term Goals    Short Term Goal # 1 pt will go supine<>sit w/hob elevated and rails up w/Min A in 6tx   Goal Outcome # 1 Progressing slower than expected   Short Term Goal # 2 pt will go sit<>stand w/fww w/mod A in 6tx   Goal Outcome # 2 Progressing slower than expected   Short Term Goal # 3 pt will transfer bed<>chair w/fww w/Mod A in 6tx   Goal Outcome # 3 Progressing slower than expected   Education Group   Hip Precautions Posterior Patient Response Patient;Nonacceptance;Explanation;Demonstration;Reinforcement Needed;No Learning Evidence   Role of Physical Therapist Patient Response Patient;Acceptance;Demonstration;Explanation;Reinforcement Needed;No Learning Evidence   Physical Therapy Treatment Plan   Physical Therapy Treatment Plan Continue Current Treatment Plan   Anticipated Discharge Equipment and Recommendations   DC Equipment Recommendations Unable to determine at this time   Discharge Recommendations Recommend post-acute placement for additional physical therapy services prior to discharge home   Interdisciplinary Plan of Care Collaboration   IDT Collaboration with  Nursing;Occupational Therapist   Patient Position at End of Therapy Seated;Chair Alarm On;Phone within Reach;Tray Table within Reach;Call Light within Reach   Collaboration Comments re: tx   Session Information   Date / Session Number  5/20/23-3 3/7, 5/24

## 2023-05-21 NOTE — PROGRESS NOTES
Report from JESUS Tobin. Patient currently awake, A&O X3. Some complaints of pain but refusing any medication at this time. No additional needs, hourly rounding in place, call light within reach.

## 2023-05-21 NOTE — THERAPY
Physical Therapy   Daily Treatment     Patient Name: Yesi Garduno  Age:  89 y.o., Sex:  female  Medical Record #: [id number]  Today's Date: 5/21/2023     Precautions  Precautions: (P) Fall Risk;Posterior Hip Precautions;Weight Bearing As Tolerated Left Lower Extremity    Assessment    Very little participation from pt today. Pt very confused. Poor command following. Attempted bed mob and ther ex.  See flow sheet for details.     Plan    Treatment Plan Status: Continue Current Treatment Plan  Type of Treatment: Bed Mobility, Equipment, Gait Training, Manual Therapy, Neuro Re-Education / Balance, Self Care / Home Evaluation, Therapeutic Activities, Therapeutic Exercise, Stair Training  Treatment Frequency: Daily  Treatment Duration: Until Therapy Goals Met    DC Equipment Recommendations: (P) Unable to determine at this time  Discharge Recommendations: (P) Recommend post-acute placement for additional physical therapy services prior to discharge home           05/21/23 1430   Charge Group   Charges  Yes   PT Therapeutic Activities (Units) 1   Total Time Spent   PT Therapeutic Activities Time Spent (Mins) 15    Services   Is patient using  services for this encounter? No   Precautions   Precautions Fall Risk;Posterior Hip Precautions;Weight Bearing As Tolerated Left Lower Extremity   Vitals   O2 (LPM) 2   O2 Delivery Device Silicone Nasal Cannula   Pain 0 - 10 Group   Therapist Pain Assessment Nurse Notified;Post Activity Pain Same as Prior to Activity   Cognition    Speech/ Communication Delayed Responses;Word Finding Impairment;Slurred   Orientation Level Not Oriented to Year;Not Oriented to Place;Not Oriented to Reason   Level of Consciousness   (lethargic)   Ability To Follow Commands Unable to Follow 1 Step Commands   Comments pt very confused today. more than yesterday session   Supine Lower Body Exercise   Comments attempted supine ther ex. pt not following directions or participating    Other Treatments   Other Treatments Provided provide mental stimulation with TV, talking ther ex. pt too confused to follow.   Bed Mobility    Supine to Sit Unable to Participate   Gait Analysis   Gait Level Of Assist Unable to Participate   Functional Mobility   Sit to Stand Unable to Participate   Bed, Chair, Wheelchair Transfer Unable to Participate   How much difficulty does the patient currently have...   Turning over in bed (including adjusting bedclothes, sheets and blankets)? 2   Sitting down on and standing up from a chair with arms (e.g., wheelchair, bedside commode, etc.) 2   Moving from lying on back to sitting on the side of the bed? 2   How much help from another person does the patient currently need...   Moving to and from a bed to a chair (including a wheelchair)? 1   Need to walk in a hospital room? 1   Climbing 3-5 steps with a railing? 1   6 clicks Mobility Score 9   Short Term Goals    Short Term Goal # 1 pt will go supine<>sit w/hob elevated and rails up w/Min A in 6tx   Goal Outcome # 1 Other (see comments)  (not participating)   Education Group   Hip Precautions Posterior Patient Response Reinforcement Needed;No Learning Evidence   Role of Physical Therapist Patient Response Reinforcement Needed;No Learning Evidence   Anticipated Discharge Equipment and Recommendations   DC Equipment Recommendations Unable to determine at this time   Discharge Recommendations Recommend post-acute placement for additional physical therapy services prior to discharge home   Interdisciplinary Plan of Care Collaboration   IDT Collaboration with  Nursing   Patient Position at End of Therapy In Bed;Bed Alarm On   Collaboration Comments re; lack of participation due to confusion   Session Information   Date / Session Number  5/21/23-4 ( 4/7, 5/24)

## 2023-05-21 NOTE — CARE PLAN
The patient is Stable - Low risk of patient condition declining or worsening    Shift Goals  Clinical Goals: Maintain O2 sat at 90% and above  Patient Goals: Sleep comfortably tonight  Family Goals: n/a    Progress made toward(s) clinical / shift goals:  O2 supplementation at 2 LPM via NC. Hourly rounding done.    Patient is not progressing towards the following goals: n/a

## 2023-05-21 NOTE — PROGRESS NOTES
Hospital Medicine Daily Progress Note    Date of Service  5/21/2023    Chief Complaint  Yesi Garduno is a 89 y.o. female admitted 5/16/2023 with left hip pain after a fall    Hospital Course  History of a AAA, breast cancer status post mastectomy, CKD stage IIIa, hypertension, not on any medications admitted after ground-level fall complaining of blurred vision prior to the fall and left hip pain after the fall.  She was found to have an impacted left femoral neck fracture.  Orthopedic surgery Dr. Ponce was consulted and recommended operative fixation.  She had a mildly elevated troponin which trended up slightly to 42 however then trended down.  Cardiac clearance was obtained    Interval Problem Update  5/17: Complaining of severe left hip pain, anxious for surgery.  Echocardiogram performed and reviewed and it shows some diastolic dysfunction however normal EF.  Cleared for surgery, discussed with cardiology.  Blood pressure improved after hydralazine and pain control.  N.p.o.  5/18: Uncomplicated surgery yesterday.  Today she is on 3 L, I reviewed her x-ray which shows bilateral small pleural effusion, atelectasis.  Fluids stopped, IV Lasix x1, I-S.  She is encephalopathic, IV narcotics discontinued.  Pending PT eval regarding SNF.  Labile blood pressure, as needed hydralazine added    I have discussed this patient's plan of care and discharge plan at IDT rounds today with Case Management, Nursing, Nursing leadership, and other members of the IDT team.    Consultants/Specialty  orthopedics- [facility]  Cardiology - [facility]    Code Status  Full Code    Disposition  The patient is not medically cleared for discharge to home or a post-acute facility.  Anticipate discharge to: skilled nursing facility    I have placed the appropriate orders for post-discharge needs.    Review of Systems  Review of Systems   Constitutional:  Positive for malaise/fatigue. Negative for chills and fever.   HENT:  Positive for  congestion. Negative for sore throat.    Eyes:  Negative for blurred vision and double vision.   Respiratory:  Negative for cough and shortness of breath.    Cardiovascular:  Negative for chest pain and palpitations.   Gastrointestinal:  Negative for abdominal pain, blood in stool, diarrhea, heartburn, nausea and vomiting.   Genitourinary:  Negative for dysuria and frequency.   Musculoskeletal:  Positive for joint pain (Mild and well controlled). Negative for back pain, falls and myalgias.   Skin:  Positive for itching (Hands).   Neurological:  Negative for dizziness, focal weakness, weakness and headaches.   Psychiatric/Behavioral:  Positive for hallucinations and memory loss. Negative for depression. The patient is not nervous/anxious.    All other systems reviewed and are negative.       Physical Exam  Temp:  [36.7 °C (98 °F)-36.8 °C (98.3 °F)] 36.7 °C (98 °F)  Pulse:  [69-80] 80  Resp:  [16-18] 18  BP: ()/(41-62) 132/62  SpO2:  [93 %-95 %] 95 %    Physical Exam  Constitutional:       General: She is not in acute distress.     Comments: Thin   HENT:      Nose: Nose normal.      Mouth/Throat:      Mouth: Mucous membranes are dry.   Cardiovascular:      Rate and Rhythm: Normal rate and regular rhythm.      Pulses: Normal pulses.      Heart sounds: No murmur heard.  Pulmonary:      Effort: Pulmonary effort is normal.      Breath sounds: Normal breath sounds.   Abdominal:      General: There is no distension.      Palpations: Abdomen is soft.      Tenderness: There is no abdominal tenderness.   Musculoskeletal:         General: No swelling or tenderness.      Cervical back: No muscular tenderness.      Comments: Left hip with surgical dressing in place, clean dry and intact.  Minimal ecchymosis/swelling   Lymphadenopathy:      Cervical: No cervical adenopathy.   Skin:     General: Skin is warm and dry.      Coloration: Skin is pale.      Findings: No rash.   Neurological:      General: No focal deficit present.       Mental Status: She is alert. She is disoriented.      Motor: Weakness present.   Psychiatric:         Attention and Perception: She is inattentive. She perceives visual hallucinations.         Mood and Affect: Mood is not anxious. Affect is labile.         Speech: Speech is not tangential.         Cognition and Memory: Cognition is impaired. Memory is impaired.         Judgment: Judgment is impulsive and inappropriate.         Fluids    Intake/Output Summary (Last 24 hours) at 5/21/2023 1233  Last data filed at 5/21/2023 0853  Gross per 24 hour   Intake 360 ml   Output 100 ml   Net 260 ml         Laboratory  Recent Labs     05/19/23  0033 05/20/23  0116 05/21/23  0443   WBC 8.5 8.3 7.7   RBC 3.23* 2.97* 2.96*   HEMOGLOBIN 10.0* 9.0* 8.9*   HEMATOCRIT 31.5* 29.8* 29.5*   MCV 97.5 100.3* 99.7*   MCH 31.0 30.3 30.1   MCHC 31.7* 30.2* 30.2*   RDW 47.0 48.6 47.7   PLATELETCT 152* 162* 194   MPV 9.6 9.8 9.7       Recent Labs     05/19/23  0033 05/20/23  0116 05/21/23  0443   SODIUM 139 138 140   POTASSIUM 3.9 4.6 4.1   CHLORIDE 103 104 106   CO2 24 22 25   GLUCOSE 112* 121* 118*   BUN 36* 42* 42*   CREATININE 1.63* 1.65* 1.35   CALCIUM 9.3 9.1 8.9                     Imaging  DX-PELVIS-1 OR 2 VIEWS   Final Result      Recent left hip arthroplasty.      DX-CHEST-PORTABLE (1 VIEW)   Final Result      1.  Cardiomegaly with interstitial infiltrates.      2.  Bibasilar atelectasis.      EC-ECHOCARDIOGRAM COMPLETE W/O CONT   Final Result      DX-SHOULDER 2+ RIGHT   Final Result      1.  Osteoporosis.      2.  No evidence of right shoulder fracture and/or dislocation.      DX-HAND 3+ RIGHT   Final Result         1.  No radiographic evidence of acute injury.      2. Osteoporosis.      DX-HIP-COMPLETE - UNILATERAL 2+ RIGHT   Final Result      1.  Impacted left femoral neck fracture.      2.  Previous pinning of right hip      3.  Osteoporosis.      DX-HUMERUS 2+ LEFT   Final Result         1.  No radiographic evidence of  acute injury.      2. Osteopenia.      DX-HAND 3+ LEFT   Final Result         1.  No radiographic evidence of acute injury.      2. Osteoporosis.      DX-FEMUR-2+ LEFT   Final Result      1.  Impacted left femoral neck fracture.      2.  Bone infarcts noted in the metadiaphyseal region of left femur.      CT-CHEST,ABDOMEN,PELVIS WITH   Final Result      1.  Left femoral neck fracture.      2.  Previous pinning of right hip.      3.  Infrarenal aortic aneurysm which measures 5.5 cm in greatest diameter.      4.  Osteoporosis.      5.  Sigmoid scoliosis of the thoracic and lumbar spine.      6.  Centrilobular emphysema.      7.  These findings were discussed with JOSÉ LUIS GARCIA at 10:15 p.m. 5/16/2023      CT-CSPINE WITHOUT PLUS RECONS   Final Result      1.  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      2.  Mild to moderate degenerative anterolisthesis at C4-5 level.      3.  Osteopenia.      4.  No evidence of acute cervical spine fracture.      CT-HEAD W/O   Final Result      1.  Cerebral atrophy.      2.  White matter lucencies most consistent with small vessel ischemic change versus demyelination or gliosis.      3.  Otherwise, Head CT without contrast with no acute findings. No evidence of acute cerebral infarction, hemorrhage or mass lesion.                Assessment/Plan  * Displaced fracture of left femoral neck (HCC)- (present on admission)  Assessment & Plan  -Hip x-ray showed impacted left femoral neck fracture  Underwent surgical fixation Dr. Bourne 5/17  Discontinued narcotics due to confusion and she is tolerating Tylenol with good pain control  Ice as needed    Acute metabolic encephalopathy  Assessment & Plan  Waxing and waning.  ultifactorial secondary to anesthetic medications, as well as narcotics, pain, sundowning  Significant proved after narcotics were held  Continue supportive care  Normalize sleep-wake  Minimize  lines/tubes    Chronic diastolic heart failure (HCC)- (present on admission)  Assessment & Plan  Seen on echo, she has mild bilateral pleural effusions on x-ray  Lasix IV x1 on 5/18  No IV fluids  Monitor    Anemia due to stage 3b chronic kidney disease (HCC)- (present on admission)  Assessment & Plan  Denies any bleeding, minimal blood loss during surgery  Monitor hemoglobin    Elevated troponin- (present on admission)  Assessment & Plan  Elevated and initially trended up very slightly, likely due to reduced renal clearance, has trended back down.  Never complained of any chest pain  EKG negative for ischemia  Not consistent with cardiac ischemia/event    Hypertensive urgency- (present on admission)  Assessment & Plan  No resolved  Present on admission, likely due to pain as she does not take anything at home  Improved with IV hydralazine  Continue as needed BP medications and monitor    Stage 3a chronic kidney disease (HCC)- (present on admission)  Assessment & Plan  -On admission her creatinine is 1.30- increased to 1.6 after Lasix was given- now back to BL  Repeat lasix  -Avoid nephrotoxic medication and monitor chemistry panel in the morning.    Acute respiratory failure with hypoxia (HCC)- (present on admission)  Assessment & Plan  She has been on 2 L of oxygen, reduced inspiratory effort on exam.  I ordered and reviewed her chest x-ray which shows bilateral basilar small pleural effusions as well as atelectasis  Watching Cr closely, bumped previously with lasix  Encourage I-S  Mobilize as able  Trial lasix again for fluid removal 20 IV daily    S/P right mastectomy- (present on admission)  Assessment & Plan  -Due to underlying breast cancer in 1960s.    AAA (abdominal aortic aneurysm) ([facility])- (present on admission)  Assessment & Plan  CT showed infrarenal aortic aneurysm which measures 5.5 cm in the greatest diameter- has been present for years  -I provided extensive chart review on this. In 7/2015 AAA was 3.9  cm (Renal CT), and on 1/2017 AAA 4.2 cm (CT L Spine).  Blood pressure has improved  No chest pain, EKG is negative for acute ischemia.  Troponin increased slightly but now trended down before surgery    Near syncope- (present on admission)  Assessment & Plan  She complained of blurred vision prior to stumbling over a stool leading to a fall  CT of the head on admission was unremarkable other than small vessel ischemic change, no focal neurodeficits to suggest CVA  No additional head imaging needed at this time  Echocardiogram performed and shows essentially a normal EF, diastolic dysfunction  Bump in Cr with lasix but this is now normalized  Try gentle Lasix again today         VTE prophylaxis: SCDs/TEDs, xarelto    I have performed a physical exam and reviewed and updated ROS and Plan today (5/21/2023). In review of yesterday's note (5/20/2023), there are no changes except as documented above.    Total time:  52 minutes.  I spent greater than 50% of the time for patient care, counseling, and coordination on this date, including unit/floor time, and face-to-face time with the patient as per interval events and assessment and plan above

## 2023-05-21 NOTE — PROGRESS NOTES
Received report from day shift nurse. Assumed pt care at 1915. Pt is A&Ox4, but sometimes forgetful and confused. resting comfortably in bed. Pt on 2 LPM O2 via NC. No signs of SOB/respiratory distress. Labs noted, VSS. Fall precautions in place. Bed at lowest position. Call light and personal belongings within reach. Continue to monitor.

## 2023-05-21 NOTE — CARE PLAN
The patient is Stable - Low risk of patient condition declining or worsening    Shift Goals  Clinical Goals: maintain O2 sat above 90%  Patient Goals: get up to chair  Family Goals: n/a    Progress made toward(s) clinical / shift goals:  pt maintained O2 sats above 90%    Patient is not progressing towards the following goals:    Pt did not get to the chair today.

## 2023-05-21 NOTE — PROGRESS NOTES
Report from JESUS Tobin. Patient currently awake, A&O X4 and using paper and pen to communicate any needs. No complaints of pain. Updated on current plan of care. Hourly rounding in place, call light within reach.

## 2023-05-22 NOTE — THERAPY
"Occupational Therapy  Daily Treatment     Patient Name: Yesi Garduno  Age:  89 y.o., Sex:  female  Medical Record #: [id number]  Today's Date: 5/22/2023     Precautions  Precautions: Fall Risk, Posterior Hip Precautions, Weight Bearing As Tolerated Left Lower Extremity    Assessment    Pt with decreased command following and alertness limiting participation in ADL's and transfers.  Oriented to self only.  Not safe for home, will need further inpt post acute therapy prior to home.  OT will follow while in house.      Plan    Treatment Plan Status: Continue Current Treatment Plan  Type of Treatment: Self Care / Activities of Daily Living, Neuro Re-Education / Balance, Therapeutic Exercises, Therapeutic Activity, Adaptive Equipment  Treatment Frequency: 4 Times per Week  Treatment Duration: Until Therapy Goals Met    DC Equipment Recommendations: Unable to determine at this time  Discharge Recommendations: Recommend post-acute placement for additional occupational therapy services prior to discharge home    Subjective    \"Yes\" when asked if dizzy.     Objective       05/22/23 1128   Vitals   Blood Pressure  104/61  (seated after pt c/o increasing dizziness and became more lethargic at EOB)   O2 (LPM) 2   O2 Delivery Device Nasal Cannula   Pain 0 - 10 Group   Location Hip   Location Orientation Left   Description Sore   Therapist Pain Assessment Prior to Activity;During Activity;Nurse Notified  (not rated, c/o pain with mvmt)   Cognition    Cognition / Consciousness X   Speech/ Communication Delayed Responses;Slurred;Word Finding Impairment   Orientation Level Not Oriented to Year;Not Oriented to Place;Not Oriented to Reason   Level of Consciousness Alert  (initially alert then became lethargic midway through session)   Ability To Follow Commands Unable to Follow 1 Step Commands   Safety Awareness Impaired   New Learning Impaired   Attention Impaired   Sequencing Impaired   Initiation Impaired   Comments Pt demos " continued confusion, oriented only to self.  Initially able to follow 1 step commands (bend your knee, lift your leg) but shortly into session, became more lethargic, not able to follow commands, was able to confirm she was dizzy then stopped responding.  AMS is new compared to baseline per previous therapy notes   Active ROM Upper Body   Active ROM Upper Body  X   Comments Able to raise L shoulder approx , R shoulder limited to approx 40-50 degrees.  Pt confirmed she has a bad R shoulder   Strength Upper Body   Upper Body Strength  X   Comments unable to effectively use BUE to help with scooting to EOB   Sitting Upper Body Exercises   Comments AAROM BUE in sitting   Neuro-Muscular Treatments   Neuro-Muscular Treatments Anterior weight shift;Weight Shift Left;Weight Shift Right;Postural Facilitation;Postural Changes   Comments working on sitting balance with BUE support, then with 1 hand at a time.  As fatigue increased, pt leaning more heavily posterior and to L   Balance   Sitting Balance (Static) Fair -   Sitting Balance (Dynamic) Poor +   Weight Shift Sitting Absent   Weight Shift Standing Absent   Skilled Intervention Verbal Cuing;Tactile Cuing;Sequencing;Postural Facilitation   Comments EOB sitting only.  Pt able to hold own balance about 3 min with CGA, then started to lean and required up to maxA for balance.   Bed Mobility    Supine to Sit Maximal Assist   Sit to Supine Maximal Assist   Scooting Maximal Assist   Rolling Maximal Assist to Rt.;Maximum Assist to Lt.   Skilled Intervention Verbal Cuing;Tactile Cuing;Sequencing;Postural Facilitation;Facilitation   Comments HOB up and rails present   Activities of Daily Living   Comments unable to participate in self care tasks due to decreased mental status   Functional Mobility   Sit to Stand Unable to Participate   Bed, Chair, Wheelchair Transfer Unable to Participate   Toilet Transfers Unable to Participate   Mobility EOB only   Distance (Feet) 0    Comments NT 2/2 decreased alertness and inability to follow directions   Activity Tolerance   Sitting Edge of Bed 5 min total   Comments limited by lethargy, AMS   Short Term Goals   Short Term Goal # 1 Mod assist LB clothing management via AE/DME PRN   Short Term Goal # 2 Min assist UB clothing management   Short Term Goal # 3 Mod assist transfer to/from EOB/BSC via DME   Goal Outcome # 3 Progressing slower than expected   Short Term Goal # 4 CGA EOB sitting (5+ mins) for G&H   Goal Outcome # 4 Progressing as expected   Short Term Goal # 5 Min assist toileting on BSC   Goal Outcome # 5 Progressing slower than expected

## 2023-05-22 NOTE — PROGRESS NOTES
[facility] Medicine Daily Progress Note    Date of Service  5/22/2023    Chief Complaint  Yesi Garduno is a 89 y.o. female admitted 5/16/2023 with left hip pain after a fall    Hospital Course  History of a AAA, breast cancer status post mastectomy, CKD stage IIIa, hypertension, not on any medications admitted after ground-level fall complaining of blurred vision prior to the fall and left hip pain after the fall.  She was found to have an impacted left femoral neck fracture.  Orthopedic surgery Dr. Ponce was consulted and recommended operative fixation.  She had a mildly elevated troponin which trended up slightly to 42 however then trended down.  Cardiac clearance was obtained    Interval Problem Update  5/17: Complaining of severe left hip pain, anxious for surgery.  Echocardiogram performed and reviewed and it shows some diastolic dysfunction however normal EF.  Cleared for surgery, discussed with cardiology.  Blood pressure improved after hydralazine and pain control.  N.p.o.  5/18: Uncomplicated surgery yesterday.  Today she is on 3 L, I reviewed her x-ray which shows bilateral small pleural effusion, atelectasis.  Fluids stopped, IV Lasix x1, I-S.  She is encephalopathic, IV narcotics discontinued.  Pending PT eval regarding SNF.  Labile blood pressure, as needed hydralazine added    5/22: Confused but improved from yesterday.  Cr stable, repeat lasix.  She would greatly benefit from SNF for pt and ot to help recover from fracture and surgery    I have discussed this patient's plan of care and discharge plan at IDT rounds today with Case Management, Nursing, Nursing leadership, and other members of the IDT team.    Consultants/Specialty  orthopedics- Rosario  Cardiology - Jayro    Code Status  Full Code    Disposition  The patient is medically cleared for discharge to home or a post-acute facility.  Anticipate discharge to: skilled nursing facility    I have placed the appropriate orders for  post-discharge needs.    Review of Systems  Review of Systems   Constitutional:  Positive for malaise/fatigue. Negative for chills and fever.   HENT:  Positive for congestion. Negative for sore throat.    Eyes:  Negative for blurred vision and double vision.   Respiratory:  Negative for cough and shortness of breath.    Cardiovascular:  Negative for chest pain and palpitations.   Gastrointestinal:  Negative for abdominal pain, blood in stool, diarrhea, heartburn, nausea and vomiting.   Genitourinary:  Negative for dysuria and frequency.   Musculoskeletal:  Positive for joint pain (Mild and well controlled). Negative for back pain, falls and myalgias.   Skin:  Positive for itching (Hands).   Neurological:  Negative for dizziness, focal weakness, weakness and headaches.   Psychiatric/Behavioral:  Positive for hallucinations and memory loss. Negative for depression. The patient is not nervous/anxious.    All other systems reviewed and are negative.       Physical Exam  Temp:  [36.4 °C (97.6 °F)-37 °C (98.6 °F)] 37 °C (98.6 °F)  Pulse:  [80-92] 81  Resp:  [18-20] 18  BP: (111-133)/(56-68) 123/60  SpO2:  [93 %-98 %] 93 %    Physical Exam  Constitutional:       General: She is not in acute distress.     Comments: Thin   HENT:      Nose: Nose normal.      Mouth/Throat:      Mouth: Mucous membranes are dry.   Cardiovascular:      Rate and Rhythm: Normal rate and regular rhythm.      Pulses: Normal pulses.      Heart sounds: No murmur heard.  Pulmonary:      Effort: Pulmonary effort is normal.      Breath sounds: Normal breath sounds.   Abdominal:      General: There is no distension.      Palpations: Abdomen is soft.      Tenderness: There is no abdominal tenderness.   Musculoskeletal:         General: No swelling or tenderness.      Cervical back: No muscular tenderness.      Comments: Left hip with surgical dressing in place, clean dry and intact.  Minimal ecchymosis/swelling   Lymphadenopathy:      Cervical: No cervical  adenopathy.   Skin:     General: Skin is warm and dry.      Coloration: Skin is pale.      Findings: No rash.   Neurological:      General: No focal deficit present.      Mental Status: She is alert. She is disoriented.      Motor: Weakness present.   Psychiatric:         Attention and Perception: She is inattentive. She perceives visual hallucinations.         Mood and Affect: Mood is not anxious. Affect is labile.         Speech: Speech is not tangential.         Cognition and Memory: Cognition is impaired. Memory is impaired.         Judgment: Judgment is impulsive and inappropriate.         Fluids    Intake/Output Summary (Last 24 hours) at 5/22/2023 1217  Last data filed at 5/21/2023 1300  Gross per 24 hour   Intake 120 ml   Output --   Net 120 ml         Laboratory  Recent Labs     05/20/23  0116 05/21/23  0443 05/22/23  0209   WBC 8.3 7.7 7.7   RBC 2.97* 2.96* 3.00*   HEMOGLOBIN 9.0* 8.9* 9.2*   HEMATOCRIT 29.8* 29.5* 29.4*   .3* 99.7* 98.0*   MCH 30.3 30.1 30.7   MCHC 30.2* 30.2* 31.3*   RDW 48.6 47.7 46.5   PLATELETCT 162* 194 237   MPV 9.8 9.7 9.4       Recent Labs     05/20/23  0116 05/21/23 0443 05/22/23  0209   SODIUM 138 140 140   POTASSIUM 4.6 4.1 4.0   CHLORIDE 104 106 104   CO2 22 25 24   GLUCOSE 121* 118* 106*   BUN 42* 42* 46*   CREATININE 1.65* 1.35 1.55*   CALCIUM 9.1 8.9 9.3                     Imaging  DX-PELVIS-1 OR 2 VIEWS   Final Result      Recent left hip arthroplasty.      DX-CHEST-PORTABLE (1 VIEW)   Final Result      1.  Cardiomegaly with interstitial infiltrates.      2.  Bibasilar atelectasis.      EC-ECHOCARDIOGRAM COMPLETE W/O CONT   Final Result      DX-SHOULDER 2+ RIGHT   Final Result      1.  Osteoporosis.      2.  No evidence of right shoulder fracture and/or dislocation.      DX-HAND 3+ RIGHT   Final Result         1.  No radiographic evidence of acute injury.      2. Osteoporosis.      DX-HIP-COMPLETE - UNILATERAL 2+ RIGHT   Final Result      1.  Impacted left femoral  neck fracture.      2.  Previous pinning of right hip      3.  Osteoporosis.      DX-HUMERUS 2+ LEFT   Final Result         1.  No radiographic evidence of acute injury.      2. Osteopenia.      DX-HAND 3+ LEFT   Final Result         1.  No radiographic evidence of acute injury.      2. Osteoporosis.      DX-FEMUR-2+ LEFT   Final Result      1.  Impacted left femoral neck fracture.      2.  Bone infarcts noted in the metadiaphyseal region of left femur.      CT-CHEST,ABDOMEN,PELVIS WITH   Final Result      1.  Left femoral neck fracture.      2.  Previous pinning of right hip.      3.  Infrarenal aortic aneurysm which measures 5.5 cm in greatest diameter.      4.  Osteoporosis.      5.  Sigmoid scoliosis of the thoracic and lumbar spine.      6.  Centrilobular emphysema.      7.  These findings were discussed with JOSÉ LUIS GARCIA at 10:15 p.m. 5/16/2023      CT-CSPINE WITHOUT PLUS RECONS   Final Result      1.  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      2.  Mild to moderate degenerative anterolisthesis at C4-5 level.      3.  Osteopenia.      4.  No evidence of acute cervical spine fracture.      CT-HEAD W/O   Final Result      1.  Cerebral atrophy.      2.  White matter lucencies most consistent with small vessel ischemic change versus demyelination or gliosis.      3.  Otherwise, Head CT without contrast with no acute findings. No evidence of acute cerebral infarction, hemorrhage or mass lesion.                Assessment/Plan  * Displaced fracture of left femoral neck (HCC)- (present on admission)  Assessment & Plan  -Hip x-ray showed impacted left femoral neck fracture  Underwent surgical fixation Dr. Bourne 5/17  Discontinued narcotics due to confusion and she is tolerating Tylenol with good pain control  Ice as needed    Acute metabolic encephalopathy  Assessment & Plan  Waxing and waning.    Multifactorial secondary to  anesthetic medications, as well as narcotics, pain, sundowning  Significant proved after narcotics were held  Continue supportive care  Normalize sleep-wake  Minimize lines/tubes    Chronic diastolic heart failure (HCC)- (present on admission)  Assessment & Plan  Seen on echo, she has mild bilateral pleural effusions on x-ray  Lasix daily as tolerated w Cr  Monitor    Anemia due to stage 3b chronic kidney disease (HCC)- (present on admission)  Assessment & Plan  Denies any bleeding, minimal blood loss during surgery  Monitor hemoglobin    Elevated troponin- (present on admission)  Assessment & Plan  Elevated and initially trended up very slightly, likely due to reduced renal clearance, has trended back down.  Never complained of any chest pain  EKG negative for ischemia  Not consistent with cardiac ischemia/event    Hypertensive urgency- (present on admission)  Assessment & Plan  Now resolved  Present on admission, likely due to pain as she does not take anything at home  Improved with IV hydralazine  Continue as needed BP medications and monitor    Stage 3a chronic kidney disease (HCC)- (present on admission)  Assessment & Plan  -On admission her creatinine is 1.30- increased to 1.6 after Lasix was given- now back to   Repeat lasix today  -Avoid nephrotoxic medication and monitor chemistry panel in the morning.    Acute respiratory failure with hypoxia (HCC)- (present on admission)  Assessment & Plan  She has been on 2 L of oxygen, reduced inspiratory effort on exam, low mobility  I ordered and reviewed her chest x-ray which shows bilateral basilar small pleural effusions as well as atelectasis  Encourage I-S  Mobilize as able TID to chair for meals - I think she would greatly benefit from SNF for pt and ot  Trial lasix again for fluid removal 20 IV daily    S/P right mastectomy- (present on admission)  Assessment & Plan  -Due to underlying breast cancer in 1960s.    AAA (abdominal aortic aneurysm) ([facility])-  (present on admission)  Assessment & Plan  CT showed infrarenal aortic aneurysm which measures 5.5 cm in the greatest diameter- has been present for years  -I provided extensive chart review on this. In 7/2015 AAA was 3.9 cm (Renal CT), and on 1/2017 AAA 4.2 cm (CT L Spine).  Blood pressure has improved  No chest pain, EKG is negative for acute ischemia.  Troponin increased slightly but now trended down before surgery    Near syncope- (present on admission)  Assessment & Plan  She complained of blurred vision prior to stumbling over a stool leading to a fall  CT of the head on admission was unremarkable other than small vessel ischemic change, no focal neurodeficits to suggest CVA  No additional head imaging needed at this time  Echocardiogram performed and shows essentially a normal EF, diastolic dysfunction  Bump in Cr with lasix initially but this is stable today  Try gentle Lasix again today         VTE prophylaxis: SCDs/TEDs, xarelto    I have performed a physical exam and reviewed and updated ROS and Plan today (5/22/2023). In review of yesterday's note (5/21/2023), there are no changes except as documented above.

## 2023-05-22 NOTE — CARE PLAN
The patient is Stable - Low risk of patient condition declining or worsening    Shift Goals  Clinical Goals: pain control 3/10 or less  Patient Goals: Rest  Family Goals: n/a    Progress made toward(s) clinical / shift goals:  pain controlled 3/10 or less; able to transfer to commode with 1 max assist.    Patient is not progressing towards the following goals:

## 2023-05-22 NOTE — CARE PLAN
The patient is Stable - Low risk of patient condition declining or worsening    Shift Goals  Clinical Goals: Maintain O2 Sat 90% and above  Patient Goals: Sleep comfortably tonight  Family Goals: n/a    Progress made toward(s) clinical / shift goals:  O2 Supplent at 2 LPM via NC given. Hourly rounding done.    Patient is not progressing towards the following goals:       Problem: Mobility  Goal: Patient's capacity to carry out activities will improve  Outcome: Not Progressing     Problem: Self Care  Goal: Patient will have the ability to perform ADLs independently or with assistance (bathe, groom, dress, toilet and feed)  Outcome: Not Progressing

## 2023-05-22 NOTE — DISCHARGE PLANNING
@1012  DPA spoke to gabriel, patient would not be a good fit for Rehab based off of Physician notes regarding patient mobility

## 2023-05-22 NOTE — DISCHARGE PLANNING
Case Management Discharge Planning    Admission Date: 5/16/2023  GMLOS: 6.2  ALOS: 6    6-Clicks ADL Score: 13  6-Clicks Mobility Score: 6  PT and/or OT Eval ordered: Yes  Post-acute Referrals Ordered: Yes  Post-acute Choice Obtained: Yes  Has referral(s) been sent to post-acute provider:  Yes      Anticipated Discharge Dispo: Discharge Disposition: D/T to SNF with Medicare cert in anticipation of skilled care (03)    DME Needed: Unable to determine at this time.    Action(s) Taken: Updated Provider/Nurse on Discharge Plan, Referral(s) sent, and OTHER: Chart reviewed and PT/OT recommends post-acute placement for additional therapy services. Patient has no accepting facilities from originally sent referrals. Therefore, blanket referral sent to all facilities that are in-network with the patient's insurance. Pending accepting facility and then submission for insurance authorization for post-acute placement.    Escalations Completed: Provider, Pending Discharge Destination, and Bedside RN    Medically Clear: Yes    Next Steps: Continue to monitor for changes and update the discharge plan accordingly. Follow-up with the Attending and Bedside RN for any issues/concerns and assist as indicated. Follow-up with the DPA for any accepting facilities.    Barriers to Discharge: Pending Placement and Pending Insurance Authorization    Is the patient up for discharge tomorrow: No

## 2023-05-22 NOTE — PROGRESS NOTES
Received report from day shift nurse. Assumed pt care at 1915. Pt is A&O x 2, Patient is Confused on conversation. resting comfortably in bed. Pt on 2 LPM O2 via NC. No signs of SOB/respiratory distress. Labs noted, VSS. Fall precautions in place. Bed at lowest position. Call light and personal belongings within reach. Continue to monitor.

## 2023-05-22 NOTE — THERAPY
Physical Therapy   Daily Treatment     Patient Name: eYsi Garduno  Age:  89 y.o., Sex:  female  Medical Record #: [id number]  Today's Date: 5/22/2023     Precautions  Precautions: (P) Fall Risk;Posterior Hip Precautions;Weight Bearing As Tolerated Left Lower Extremity    Assessment    Pt continues to demonstrate slow progression and requires Max A for all upright mobility. Pt was able to demonstrate with improve sitting balance initially as the pt was able to sit for a few minutes without assist. However, as fatigue and lethargy increased pt demonstrated with posterior and L lateral lean. Pt was provided with faciliation, postural cues, and weight shifts in order to correct sitting posture to midline. Pt demonstrated with poor initiation and poor effort during skilled therapy. Pt also provided with PROM to B LE in order to improve knee and hip flexion. Pt also able to participate in SLR with active assist. Pt requires frequent VC and encouragement in order to engage musculature and participate in AROM. Recommend post-acute placement for continued physical therapy services prior to discharge home.       Plan    Treatment Plan Status: (P) Continue Current Treatment Plan  Type of Treatment: Bed Mobility, Equipment, Gait Training, Manual Therapy, Neuro Re-Education / Balance, Self Care / Home Evaluation, Therapeutic Activities, Therapeutic Exercise, Stair Training  Treatment Frequency: Daily  Treatment Duration: Until Therapy Goals Met    DC Equipment Recommendations: (P) Unable to determine at this time  Discharge Recommendations: (P) Recommend post-acute placement for additional physical therapy services prior to discharge home    Objective       05/22/23 1129   Precautions   Precautions Fall Risk;Posterior Hip Precautions;Weight Bearing As Tolerated Left Lower Extremity   Vitals   O2 (LPM) 2   O2 Delivery Device Nasal Cannula   Pain 0 - 10 Group   Location Hip   Location Orientation Left   Description Aching    Therapist Pain Assessment Prior to Activity;During Activity;Post Activity;Nurse Notified  (c/o pain, however, unable to rate)   Cognition    Cognition / Consciousness X   Speech/ Communication Delayed Responses;Word Finding Impairment;Slurred   Orientation Level Not Oriented to Year;Not Oriented to Place;Not Oriented to Reason   Level of Consciousness Alert   Ability To Follow Commands Unable to Follow 1 Step Commands   Safety Awareness Impaired   New Learning Impaired   Attention Impaired   Sequencing Impaired   Initiation Impaired   Comments pt continues to be confusion and requires frequent VC and initiation throughout session   Supine Lower Body Exercise   Supine Lower Body Exercises Yes   Straight Leg Raises 1 set of 10;Right;Left  (with active assist; only 5 reps L LE)   Heel Slide 1 set of 10;Bilateral   Neuro-Muscular Treatments   Neuro-Muscular Treatments Anterior weight shift;Weight Shift Left;Weight Shift Right;Postural Facilitation;Postural Changes   Comments pt provided with weight shifts and postural faciliation in order to maintain sitting balance, tends to lean towards L and posterior as fatigue increases   Neurological Concerns   Neurological Concerns No   Balance   Sitting Balance (Static) Fair -   Sitting Balance (Dynamic) Poor +   Weight Shift Sitting Absent   Weight Shift Standing Absent   Skilled Intervention Verbal Cuing;Postural Facilitation;Facilitation   Comments pt able to maintain sitting balance for about 3 mins, however, with fatigue pt tends to lean posterior and L   Bed Mobility    Supine to Sit Maximal Assist   Sit to Supine Maximal Assist   Scooting Maximal Assist   Rolling Maximal Assist to Rt.;Maximum Assist to Lt.   Skilled Intervention Verbal Cuing;Sequencing;Facilitation   Comments HOB elevated and rails up   Gait Analysis   Gait Level Of Assist Unable to Participate   Weight Bearing Status WBAT L LE   Functional Mobility   Sit to Stand Unable to Participate   Bed, Chair,  Wheelchair Transfer Unable to Participate   Toilet Transfers Unable to Participate   Mobility EOB only   Comments deferred standing as pt alertness continued to decline and pt effort not safe for standing   How much difficulty does the patient currently have...   Turning over in bed (including adjusting bedclothes, sheets and blankets)? 1   Sitting down on and standing up from a chair with arms (e.g., wheelchair, bedside commode, etc.) 1   Moving from lying on back to sitting on the side of the bed? 1   How much help from another person does the patient currently need...   Moving to and from a bed to a chair (including a wheelchair)? 1   Need to walk in a hospital room? 1   Climbing 3-5 steps with a railing? 1   6 clicks Mobility Score 6   Activity Tolerance   Sitting in Chair NT   Sitting Edge of Bed 5 mins   Standing NT   Comments limited due to lethargy and poor effort   Patient / Family Goals    Patient / Family Goal #1 none stated   Short Term Goals    Short Term Goal # 1 pt will go supine<>sit w/hob elevated and rails up w/Min A in 6tx   Goal Outcome # 1 Progressing slower than expected   Short Term Goal # 2 pt will go sit<>stand w/fww w/mod A in 6tx   Goal Outcome # 2 Progressing slower than expected   Short Term Goal # 3 pt will transfer bed<>chair w/fww w/Mod A in 6tx   Goal Outcome # 3 Progressing slower than expected   Education Group   Education Provided Role of Physical Therapist   Hip Precautions Posterior Patient Response Reinforcement Needed;No Learning Evidence   Role of Physical Therapist Patient Response Reinforcement Needed;No Learning Evidence   Physical Therapy Treatment Plan   Physical Therapy Treatment Plan Continue Current Treatment Plan   Anticipated Discharge Equipment and Recommendations   DC Equipment Recommendations Unable to determine at this time   Discharge Recommendations Recommend post-acute placement for additional physical therapy services prior to discharge home    Interdisciplinary Plan of Care Collaboration   IDT Collaboration with  Nursing;Occupational Therapist   Patient Position at End of Therapy In Bed;Call Light within Reach;Tray Table within Reach;Phone within Reach;Bed Alarm On   Collaboration Comments aware of visit and recs   Session Information   Date / Session Number  5/22-5 (5/7, 5/24)   Priority 4

## 2023-05-23 NOTE — PROGRESS NOTES
"Received report from day shift nurse, Cesar LEE. Assumed pt care at 1915.   Pt is sleeping comfortably in bed. Pt received with ongoing oxygen at 2 L/min via nasal cannula; no signs of SOB/respiratory distress. Pt sleeping and have no pain at this moment. Needs attended well. Fall precautions in place. Bed at lowest position. Call light and personal belongings within reach. Bed alarm on and maintained.   Hourly rounding in progress.      2100 Pt lethargic and drowsy. Pt able to answer questions then back to sleep; oriented to self and place. Pt denies pain; states she feels very sleepy at this time. Repositioned and placed on comfortable position. Hourly rounding in progress.    Capillary blood sugar checked; 105 mg/dl.       0032 Pt woke up and complains of pain on left hip at 8/10; prn pain medication given as per MAR. Pt able to tolerate. Pt states she want to watch television; television turned on.   Pt back to sleep.   Hourly rounding in progress.       0500 This RN was informed by CNA that when she was taking vital signs of pt, pt states that she want to kill herself and when asked why, pt states \"I just want to kill myself.\"  This RN went to see and assess pt; pt is seen sleeping on bed on comfortable position.  Hourly rounding in progress.  "

## 2023-05-23 NOTE — CARE PLAN
The patient is Watcher - Medium risk of patient condition declining or worsening    Shift Goals  Clinical Goals: Pt will remain free from falls or injury thorughout the shift.  Patient Goals: sleep  Family Goals: n/a    Progress made toward(s) clinical / shift goals:  Pt drowsy throughout the shift. Pt able to tolerate medication when awake. Pt is free from falls or injury throughout the shift. Pt repositined every 2-3 hours.  Hourly rounding in progress.      Problem: Skin Integrity  Goal: Skin integrity is maintained or improved  Outcome: Progressing     Problem: Hemodynamics  Goal: Patient's hemodynamics, fluid balance and neurologic status will be stable or improve  Outcome: Progressing     Problem: Respiratory  Goal: Patient will achieve/maintain optimum respiratory ventilation and gas exchange  Outcome: Progressing     Problem: Wound/ / Incision Healing  Goal: Patient's wound/surgical incision will decrease in size and heals properly  Outcome: Progressing     Problem: Fall Risk  Goal: Patient will remain free from falls  Outcome: Progressing       Patient is not progressing towards the following goals:

## 2023-05-23 NOTE — PROGRESS NOTES
[facility] Medicine Daily Progress Note    Date of Service  5/23/2023    Chief Complaint  Yesi Garduno is a 89 y.o. female admitted 5/16/2023 with left hip pain after a fall    Hospital Course  History of a AAA, breast cancer status post mastectomy, CKD stage IIIa, hypertension, not on any medications admitted after ground-level fall complaining of blurred vision prior to the fall and left hip pain after the fall.  She was found to have an impacted left femoral neck fracture.  Orthopedic surgery Dr. Ponce was consulted and recommended operative fixation.  She had a mildly elevated troponin which trended up slightly to 42 however then trended down.  Cardiac clearance was obtained    Interval Problem Update  5/17: Complaining of severe left hip pain, anxious for surgery.  Echocardiogram performed and reviewed and it shows some diastolic dysfunction however normal EF.  Cleared for surgery, discussed with cardiology.  Blood pressure improved after hydralazine and pain control.  N.p.o.  5/18: Uncomplicated surgery yesterday.  Today she is on 3 L, I reviewed her x-ray which shows bilateral small pleural effusion, atelectasis.  Fluids stopped, IV Lasix x1, I-S.  She is encephalopathic, IV narcotics discontinued.  Pending PT eval regarding SNF.  Labile blood pressure, as needed hydralazine added    5/22: Confused but improved from yesterday.  Cr stable, repeat lasix.  She would greatly benefit from SNF for pt and ot to help recover from fracture and surgery    5/23: Patient is alert, but somnolent.  Oriented and following directions at this time.  She has had poor appetite, likely due to delirium from hospitalization and pain medicines.  I discussed controlling pain, family and patient in agreement with small dose of Dilaudid and scheduled Tylenol.  We will continue to work with physical therapy for mobilization.  Patient would benefit most from skilled nursing facility, she has apparently had a bad experience at  [facility], and is declining this option.  She has been declined from other facilities due to cognition and level of assistance.  I do expect her level assistance and condition to continue to improve.  Will need to resend referrals once patient improves.    I have discussed this patient's plan of care and discharge plan at IDT rounds today with Case Management, Nursing, Nursing leadership, and other members of the IDT team.    Consultants/Specialty  orthopedics- [facility]  Cardiology - [facility]    Code Status  Full Code    Disposition  The patient is not medically cleared for discharge to home or a post-acute facility.  Anticipate discharge to: skilled nursing facility    I have placed the appropriate orders for post-discharge needs.    Review of Systems  Review of Systems   Constitutional:  Positive for malaise/fatigue. Negative for chills and fever.   HENT:  Positive for congestion. Negative for sore throat.    Eyes:  Negative for blurred vision and double vision.   Respiratory:  Negative for cough and shortness of breath.    Cardiovascular:  Negative for chest pain and palpitations.   Gastrointestinal:  Negative for abdominal pain, blood in stool, diarrhea, heartburn, nausea and vomiting.   Genitourinary:  Negative for dysuria and frequency.   Musculoskeletal:  Positive for joint pain (Mild and well controlled). Negative for back pain, falls and myalgias.   Skin:  Positive for itching (Hands).   Neurological:  Negative for dizziness, focal weakness, weakness and headaches.   Psychiatric/Behavioral:  Positive for hallucinations and memory loss. Negative for depression. The patient is not nervous/anxious.    All other systems reviewed and are negative.       Physical Exam  Temp:  [36.2 °C (97.1 °F)-37 °C (98.6 °F)] 37 °C (98.6 °F)  Pulse:  [65-73] 65  Resp:  [16-17] 16  BP: (117-147)/(53-63) 122/53  SpO2:  [96 %-100 %] 97 %    Physical Exam  Constitutional:       General: She is not in acute distress.     Comments: Thin    HENT:      Nose: Nose normal.      Mouth/Throat:      Mouth: Mucous membranes are dry.   Cardiovascular:      Rate and Rhythm: Normal rate and regular rhythm.      Pulses: Normal pulses.      Heart sounds: No murmur heard.  Pulmonary:      Effort: Pulmonary effort is normal.      Breath sounds: Normal breath sounds.   Abdominal:      General: There is no distension.      Palpations: Abdomen is soft.      Tenderness: There is no abdominal tenderness.   Musculoskeletal:         General: No swelling or tenderness.      Cervical back: No muscular tenderness.      Comments: Left hip with surgical dressing in place, clean dry and intact.  Minimal ecchymosis/swelling   Lymphadenopathy:      Cervical: No cervical adenopathy.   Skin:     General: Skin is warm and dry.      Coloration: Skin is pale.      Findings: No rash.   Neurological:      General: No focal deficit present.      Mental Status: She is alert. She is disoriented.      Motor: Weakness present.   Psychiatric:         Attention and Perception: She is inattentive. She perceives visual hallucinations.         Mood and Affect: Mood is not anxious. Affect is labile.         Speech: Speech is not tangential.         Cognition and Memory: Cognition is impaired. Memory is impaired.         Judgment: Judgment is impulsive and inappropriate.         Fluids    Intake/Output Summary (Last 24 hours) at 5/23/2023 1439  Last data filed at 5/23/2023 1300  Gross per 24 hour   Intake 220 ml   Output --   Net 220 ml       Laboratory  Recent Labs     05/21/23  0443 05/22/23  0209 05/23/23  0159   WBC 7.7 7.7 6.8   RBC 2.96* 3.00* 2.87*   HEMOGLOBIN 8.9* 9.2* 8.7*   HEMATOCRIT 29.5* 29.4* 27.8*   MCV 99.7* 98.0* 96.9   MCH 30.1 30.7 30.3   MCHC 30.2* 31.3* 31.3*   RDW 47.7 46.5 46.5   PLATELETCT 194 237 239   MPV 9.7 9.4 9.2     Recent Labs     05/21/23  0443 05/22/23  0209 05/23/23  0159   SODIUM 140 140 142   POTASSIUM 4.1 4.0 3.8   CHLORIDE 106 104 104   CO2 25 24 25    GLUCOSE 118* 106* 104*   BUN 42* 46* 47*   CREATININE 1.35 1.55* 1.40   CALCIUM 8.9 9.3 9.3                   Imaging  DX-PELVIS-1 OR 2 VIEWS   Final Result      Recent left hip arthroplasty.      DX-CHEST-PORTABLE (1 VIEW)   Final Result      1.  Cardiomegaly with interstitial infiltrates.      2.  Bibasilar atelectasis.      EC-ECHOCARDIOGRAM COMPLETE W/O CONT   Final Result      DX-SHOULDER 2+ RIGHT   Final Result      1.  Osteoporosis.      2.  No evidence of right shoulder fracture and/or dislocation.      DX-HAND 3+ RIGHT   Final Result         1.  No radiographic evidence of acute injury.      2. Osteoporosis.      DX-HIP-COMPLETE - UNILATERAL 2+ RIGHT   Final Result      1.  Impacted left femoral neck fracture.      2.  Previous pinning of right hip      3.  Osteoporosis.      DX-HUMERUS 2+ LEFT   Final Result         1.  No radiographic evidence of acute injury.      2. Osteopenia.      DX-HAND 3+ LEFT   Final Result         1.  No radiographic evidence of acute injury.      2. Osteoporosis.      DX-FEMUR-2+ LEFT   Final Result      1.  Impacted left femoral neck fracture.      2.  Bone infarcts noted in the metadiaphyseal region of left femur.      CT-CHEST,ABDOMEN,PELVIS WITH   Final Result      1.  Left femoral neck fracture.      2.  Previous pinning of right hip.      3.  Infrarenal aortic aneurysm which measures 5.5 cm in greatest diameter.      4.  Osteoporosis.      5.  Sigmoid scoliosis of the thoracic and lumbar spine.      6.  Centrilobular emphysema.      7.  These findings were discussed with JOSÉ LUIS GARCIA at 10:15 p.m. 5/16/2023      CT-CSPINE WITHOUT PLUS RECONS   Final Result      1.  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      2.  Mild to moderate degenerative anterolisthesis at C4-5 level.      3.  Osteopenia.      4.  No evidence of acute cervical spine fracture.      CT-HEAD W/O   Final Result      1.   Cerebral atrophy.      2.  White matter lucencies most consistent with small vessel ischemic change versus demyelination or gliosis.      3.  Otherwise, Head CT without contrast with no acute findings. No evidence of acute cerebral infarction, hemorrhage or mass lesion.                Assessment/Plan  * Displaced fracture of left femoral neck (HCC)- (present on admission)  Assessment & Plan  -Hip x-ray showed impacted left femoral neck fracture  Underwent surgical fixation Dr. Bourne 5/17  Narcotics were initially discontinued due to confusion.  Patient has uncontrolled pain, in agreement with scheduled Tylenol, ice, lidocaine patch for localized pain on lower extremity.  We will also add IV Dilaudid, for breakthrough pain-sherlyn with patient and daughter and they are in agreement with this.    Started Dilaudid 0.25 mg IV-high risk medication, monitor for somnolence, altered mental status and respiratory depression    Acute metabolic encephalopathy  Assessment & Plan  Waxing and waning.    Multifactorial secondary to anesthetic medications, as well as narcotics, pain, sundowning  Significant proved after narcotics were held  Continue supportive care  Normalize sleep-wake  Minimize lines/tubes    Chronic diastolic heart failure (HCC)- (present on admission)  Assessment & Plan  Seen on echo, she has mild bilateral pleural effusions on x-ray  Lasix discontinued, no evidence of volume overload on exam today  Monitor    Anemia due to stage 3b chronic kidney disease (HCC)- (present on admission)  Assessment & Plan  Denies any bleeding, minimal blood loss during surgery  Monitor hemoglobin    Elevated troponin- (present on admission)  Assessment & Plan  Elevated and initially trended up very slightly, likely due to reduced renal clearance, has trended back down.  Never complained of any chest pain  EKG negative for ischemia  Not consistent with cardiac ischemia/event    Hypertensive urgency- (present on  admission)  Assessment & Plan  Now resolved  Present on admission, likely due to pain as she does not take anything at home  Improved with IV hydralazine  Continue as needed BP medications and monitor    Stage 3a chronic kidney disease (HCC)- (present on admission)  Assessment & Plan  -On admission her creatinine is 1.30- increased to 1.6 after Lasix was given- now back to BL  -Avoid nephrotoxic medication and monitor chemistry panel in the morning  Monitor with repeat labs    Acute respiratory failure with hypoxia (HCC)- (present on admission)  Assessment & Plan  Patient is on 2 L supplemental oxygen, baseline at home is 3L  Patient did not have acute respiratory failure, she has chronic respiratory failure.  Not overloaded.  I ordered and reviewed her chest x-ray which shows bilateral basilar small pleural effusions as well as atelectasis  Encourage IS  Mobilize as able TID to chair for meals       S/P right mastectomy- (present on admission)  Assessment & Plan  -Due to underlying breast cancer in 1960s.    AAA (abdominal aortic aneurysm) ([facility])- (present on admission)  Assessment & Plan  CT showed infrarenal aortic aneurysm which measures 5.5 cm in the greatest diameter- has been present for years  -I provided extensive chart review on this. In 7/2015 AAA was 3.9 cm (Renal CT), and on 1/2017 AAA 4.2 cm (CT L Spine).  Blood pressure has improved  No chest pain, EKG is negative for acute ischemia.  Troponin increased slightly but now trended down before surgery    Near syncope- (present on admission)  Assessment & Plan  She complained of blurred vision prior to stumbling over a stool leading to a fall  CT of the head on admission was unremarkable other than small vessel ischemic change, no focal neurodeficits to suggest CVA  No additional head imaging needed at this time  Echocardiogram performed and shows essentially a normal EF, diastolic dysfunction  Discontinue Lasix  Patient is at risk of orthostatic  hypotension once ambulating         VTE prophylaxis: SCDs/TEDs, xarelto    I have performed a physical exam and reviewed and updated ROS and Plan today (5/23/2023). In review of yesterday's note (5/22/2023), there are no changes except as documented above.    Total time spent with patient over 51 minutes.  This included my review with nursing and ancillary staff, review of records, face to face interview, physical examination; my review of lab results (CBC, chemistry panel), imaging review (CXR) and ECG.   In addition, counseling patient and speaking with consultants.

## 2023-05-24 NOTE — THERAPY
Physical Therapy   Daily Treatment     Patient Name: Yesi Garduno  Age:  89 y.o., Sex:  female  Medical Record #: [id number]  Today's Date: 5/24/2023          Assessment    Pt able to sit side of bed but reports she does not feel good also seeing double.RN notified    Plan    Treatment Plan Status: Continue Current Treatment Plan  Type of Treatment: Bed Mobility, Equipment, Gait Training, Manual Therapy, Neuro Re-Education / Balance, Self Care / Home Evaluation, Therapeutic Activities, Therapeutic Exercise, Stair Training  Treatment Frequency: Daily  Treatment Duration: Until Therapy Goals Met    DC Equipment Recommendations: Unable to determine at this time  Discharge Recommendations: (P) Recommend post-acute placement for additional physical therapy services prior to discharge home     Objective       05/24/23 1300   Charge Group   PT Therapeutic Activities (Units) 2   Balance   Sitting Balance (Static) Fair -   Sitting Balance (Dynamic) Fair -   Standing Balance (Static) Dependent   Standing Balance (Dynamic) Dependent   Weight Shift Sitting Absent   Weight Shift Standing Absent   Bed Mobility    Supine to Sit Maximal Assist   Sit to Supine Maximal Assist   Scooting Maximal Assist   Gait Analysis   Gait Level Of Assist Unable to Participate   Activity Tolerance   Sitting Edge of Bed 5   Short Term Goals    Short Term Goal # 1 pt will go supine<>sit w/hob elevated and rails up w/Min A in 6tx   Goal Outcome # 1 Progressing slower than expected   Short Term Goal # 2 pt will go sit<>stand w/fww w/mod A in 6tx   Goal Outcome # 2 Progressing slower than expected   Short Term Goal # 3 pt will transfer bed<>chair w/fww w/Mod A in 6tx   Goal Outcome # 3 Progressing slower than expected   Anticipated Discharge Equipment and Recommendations   Discharge Recommendations Recommend post-acute placement for additional physical therapy services prior to discharge home   Interdisciplinary Plan of Care Collaboration   IDT  Collaboration with  Nursing   Session Information   Date / Session Number  5/23-6 6/7 5/24

## 2023-05-24 NOTE — PROGRESS NOTES
Received report from day shift nurse Mary LEE. Assumed pt care at 1915.   Pt is A&Ox2, resting comfortably in bed; wakes easily to voice and drift back to sleep. Pt with ongoing oxygen at 2 L/min via nasal cannula; no signs of SOB/respiratory distress. Pt denies pain at this moment. Pt repositioned on comfortable position.   Needs attended well. Fall precautions in place. Bed at lowest position. Call light and personal belongings within reach.   Hourly rounding in progress.

## 2023-05-24 NOTE — PROGRESS NOTES
[facility] Medicine Daily Progress Note    Date of Service  5/24/2023    Chief Complaint  Yesi Garduno is a 89 y.o. female admitted 5/16/2023 with left hip pain after a fall    Hospital Course  History of a AAA, breast cancer status post mastectomy, CKD stage IIIa, hypertension, not on any medications admitted after ground-level fall complaining of blurred vision prior to the fall and left hip pain after the fall.  She was found to have an impacted left femoral neck fracture.  Orthopedic surgery Dr. Ponce was consulted and recommended operative fixation.  She had a mildly elevated troponin which trended up slightly to 42 however then trended down.  Cardiac clearance was obtained    Interval Problem Update  5/17: Complaining of severe left hip pain, anxious for surgery.  Echocardiogram performed and reviewed and it shows some diastolic dysfunction however normal EF.  Cleared for surgery, discussed with cardiology.  Blood pressure improved after hydralazine and pain control.  N.p.o.  5/18: Uncomplicated surgery yesterday.  Today she is on 3 L, I reviewed her x-ray which shows bilateral small pleural effusion, atelectasis.  Fluids stopped, IV Lasix x1, I-S.  She is encephalopathic, IV narcotics discontinued.  Pending PT eval regarding SNF.  Labile blood pressure, as needed hydralazine added    5/22: Confused but improved from yesterday.  Cr stable, repeat lasix.  She would greatly benefit from SNF for pt and ot to help recover from fracture and surgery    5/23: Patient is alert, but somnolent.  Oriented and following directions at this time.  She has had poor appetite, likely due to delirium from hospitalization and pain medicines.  I discussed controlling pain, family and patient in agreement with small dose of Dilaudid and scheduled Tylenol.  We will continue to work with physical therapy for mobilization.  Patient would benefit most from skilled nursing facility, she has apparently had a bad experience at  [facility], and is declining this option.  She has been declined from other facilities due to cognition and level of assistance.  I do expect her level assistance and condition to continue to improve.  Will need to resend referrals once patient improves.    5/24: Patient's condition continues to improve, she is still somnolent, likely due to Dilaudid.  But tolerating this well so we will continue with scheduled Tylenol and as needed low-dose Dilaudid-for severe breakthrough pain.  I have also spoken with physical therapy and Occupational Therapy to reevaluate and work with patient, encourage out of bed to chair.  Now that patient has improved in both mobility and mental status, SNF referrals will be resent.  Discussed with case management and patient's family at bedside.    I have discussed this patient's plan of care and discharge plan at IDT rounds today with Case Management, Nursing, Nursing leadership, and other members of the IDT team.    Consultants/Specialty  orthopedics- [facility]  Cardiology - [facility]    Code Status  Full Code    Disposition  The patient is not medically cleared for discharge to home or a post-acute facility.  Anticipate discharge to: skilled nursing facility    I have placed the appropriate orders for post-discharge needs.    Review of Systems  Review of Systems   Constitutional:  Positive for malaise/fatigue. Negative for chills and fever.   HENT:  Positive for congestion. Negative for sore throat.    Eyes:  Negative for blurred vision and double vision.   Respiratory:  Negative for cough and shortness of breath.    Cardiovascular:  Negative for chest pain and palpitations.   Gastrointestinal:  Negative for abdominal pain, blood in stool, diarrhea, heartburn, nausea and vomiting.   Genitourinary:  Negative for dysuria and frequency.   Musculoskeletal:  Positive for joint pain (Mild and well controlled). Negative for back pain, falls and myalgias.   Skin:  Positive for itching (Hands).    Neurological:  Negative for dizziness, focal weakness, weakness and headaches.   Psychiatric/Behavioral:  Positive for hallucinations and memory loss. Negative for depression. The patient is not nervous/anxious.    All other systems reviewed and are negative.       Physical Exam  Temp:  [35.7 °C (96.2 °F)-36.9 °C (98.5 °F)] 35.7 °C (96.2 °F)  Pulse:  [63-73] 67  Resp:  [14-19] 16  BP: (110-111)/(45-47) 111/45  SpO2:  [93 %-98 %] 95 %    Physical Exam  Vitals and nursing note reviewed.   Constitutional:       General: She is not in acute distress.     Comments: Thin   HENT:      Nose: Nose normal.      Mouth/Throat:      Mouth: Mucous membranes are dry.   Cardiovascular:      Rate and Rhythm: Normal rate and regular rhythm.      Pulses: Normal pulses.      Heart sounds: No murmur heard.  Pulmonary:      Effort: Pulmonary effort is normal.      Breath sounds: Normal breath sounds.   Abdominal:      General: There is no distension.      Palpations: Abdomen is soft.      Tenderness: There is no abdominal tenderness.   Musculoskeletal:         General: No swelling or tenderness.      Cervical back: No muscular tenderness.      Comments: Left hip with surgical dressing in place, clean dry and intact.  Minimal ecchymosis/swelling   Lymphadenopathy:      Cervical: No cervical adenopathy.   Skin:     General: Skin is warm and dry.      Coloration: Skin is pale.      Findings: No rash.   Neurological:      General: No focal deficit present.      Mental Status: She is alert. She is disoriented.      Motor: Weakness present.   Psychiatric:         Attention and Perception: She is inattentive. She perceives visual hallucinations.         Mood and Affect: Mood is not anxious. Affect is labile.         Speech: Speech is not tangential.         Cognition and Memory: Cognition is impaired. Memory is impaired.         Judgment: Judgment is impulsive and inappropriate.         Fluids    Intake/Output Summary (Last 24 hours) at  5/24/2023 1333  Last data filed at 5/24/2023 0525  Gross per 24 hour   Intake 170 ml   Output 150 ml   Net 20 ml       Laboratory  Recent Labs     05/22/23 0209 05/23/23 0159 05/24/23  0200   WBC 7.7 6.8 6.6   RBC 3.00* 2.87* 2.79*   HEMOGLOBIN 9.2* 8.7* 8.6*   HEMATOCRIT 29.4* 27.8* 27.5*   MCV 98.0* 96.9 98.6*   MCH 30.7 30.3 30.8   MCHC 31.3* 31.3* 31.3*   RDW 46.5 46.5 46.9   PLATELETCT 237 239 251   MPV 9.4 9.2 9.1     Recent Labs     05/22/23 0209 05/23/23 0159 05/24/23  0200   SODIUM 140 142 139   POTASSIUM 4.0 3.8 4.2   CHLORIDE 104 104 103   CO2 24 25 26   GLUCOSE 106* 104* 98   BUN 46* 47* 53*   CREATININE 1.55* 1.40 1.45*   CALCIUM 9.3 9.3 8.8                   Imaging  DX-PELVIS-1 OR 2 VIEWS   Final Result      Recent left hip arthroplasty.      DX-CHEST-PORTABLE (1 VIEW)   Final Result      1.  Cardiomegaly with interstitial infiltrates.      2.  Bibasilar atelectasis.      EC-ECHOCARDIOGRAM COMPLETE W/O CONT   Final Result      DX-SHOULDER 2+ RIGHT   Final Result      1.  Osteoporosis.      2.  No evidence of right shoulder fracture and/or dislocation.      DX-HAND 3+ RIGHT   Final Result         1.  No radiographic evidence of acute injury.      2. Osteoporosis.      DX-HIP-COMPLETE - UNILATERAL 2+ RIGHT   Final Result      1.  Impacted left femoral neck fracture.      2.  Previous pinning of right hip      3.  Osteoporosis.      DX-HUMERUS 2+ LEFT   Final Result         1.  No radiographic evidence of acute injury.      2. Osteopenia.      DX-HAND 3+ LEFT   Final Result         1.  No radiographic evidence of acute injury.      2. Osteoporosis.      DX-FEMUR-2+ LEFT   Final Result      1.  Impacted left femoral neck fracture.      2.  Bone infarcts noted in the metadiaphyseal region of left femur.      CT-CHEST,ABDOMEN,PELVIS WITH   Final Result      1.  Left femoral neck fracture.      2.  Previous pinning of right hip.      3.  Infrarenal aortic aneurysm which measures 5.5 cm in greatest  diameter.      4.  Osteoporosis.      5.  Sigmoid scoliosis of the thoracic and lumbar spine.      6.  Centrilobular emphysema.      7.  These findings were discussed with JOSÉ LUIS GARCIA at 10:15 p.m. 5/16/2023      CT-CSPINE WITHOUT PLUS RECONS   Final Result      1.  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      2.  Mild to moderate degenerative anterolisthesis at C4-5 level.      3.  Osteopenia.      4.  No evidence of acute cervical spine fracture.      CT-HEAD W/O   Final Result      1.  Cerebral atrophy.      2.  White matter lucencies most consistent with small vessel ischemic change versus demyelination or gliosis.      3.  Otherwise, Head CT without contrast with no acute findings. No evidence of acute cerebral infarction, hemorrhage or mass lesion.                Assessment/Plan  * Displaced fracture of left femoral neck (HCC)- (present on admission)  Assessment & Plan  -Hip x-ray showed impacted left femoral neck fracture  Underwent surgical fixation Dr. Bourne 5/17  Narcotics were initially discontinued due to confusion.  Continue with with scheduled Tylenol, ice, lidocaine patch for localized pain on lower extremity.  We will also add IV Dilaudid, for breakthrough pain-sherlyn with patient and daughter and they are in agreement with this.    Continue Dilaudid 0.25 mg IV-high risk medication, monitor for somnolence, altered mental status and respiratory depression.   Encourage ambulation and up to chair      Acute metabolic encephalopathy  Assessment & Plan  Waxing and waning.    Multifactorial secondary to anesthetic medications, as well as narcotics, pain, sundowning  Significant proved after narcotics were held  Continue supportive care  Normalize sleep-wake  Minimize lines/tubes    Chronic diastolic heart failure (HCC)- (present on admission)  Assessment & Plan  Seen on echo, she has mild bilateral pleural effusions on  x-ray  Lasix discontinued, no evidence of volume overload on exam today  Monitor    Anemia due to stage 3b chronic kidney disease (HCC)- (present on admission)  Assessment & Plan  Denies any bleeding, minimal blood loss during surgery  Monitor hemoglobin    Elevated troponin- (present on admission)  Assessment & Plan  Elevated and initially trended up very slightly, likely due to reduced renal clearance, has trended back down.  Never complained of any chest pain  EKG negative for ischemia  Not consistent with cardiac ischemia/event    Hypertensive urgency- (present on admission)  Assessment & Plan  Now resolved  Present on admission, likely due to pain as she does not take anything at home  Improved with IV hydralazine  Continue as needed BP medications and monitor    Stage 3a chronic kidney disease (HCC)- (present on admission)  Assessment & Plan  -Creatinine has fluctuated throughout hospitalization, slightly worse today 1.45, will continue to monitor closely.  - Was on Lasix, which is likely contributing to MELANIE.  -Avoid nephrotoxic medication and monitor chemistry panel in the morning  Monitor with repeat labs    Acute respiratory failure with hypoxia (HCC)- (present on admission)  Assessment & Plan  Patient is on 2 L supplemental oxygen, baseline at home is 3L  Patient did not have acute respiratory failure, she has chronic respiratory failure.  Not overloaded.  I ordered and reviewed her chest x-ray which shows bilateral basilar small pleural effusions as well as atelectasis  Encourage IS  Mobilize as able TID to chair for meals       S/P right mastectomy- (present on admission)  Assessment & Plan  -Due to underlying breast cancer in 1960s.    AAA (abdominal aortic aneurysm) (HCC)- (present on admission)  Assessment & Plan  CT showed infrarenal aortic aneurysm which measures 5.5 cm in the greatest diameter- has been present for years  -I provided extensive chart review on this. In 7/2015 AAA was 3.9 cm (Renal  CT), and on 1/2017 AAA 4.2 cm (CT L Spine).  Blood pressure has improved  No chest pain, EKG is negative for acute ischemia.  Troponin increased slightly but now trended down before surgery    Near syncope- (present on admission)  Assessment & Plan  She complained of blurred vision prior to stumbling over a stool leading to a fall  CT of the head on admission was unremarkable other than small vessel ischemic change, no focal neurodeficits to suggest CVA  No additional head imaging needed at this time  Echocardiogram performed and shows essentially a normal EF, diastolic dysfunction  Discontinue Lasix  Patient is at risk of orthostatic hypotension once ambulating         VTE prophylaxis: SCDs/TEDs, xarelto    I have performed a physical exam and reviewed and updated ROS and Plan today (5/24/2023). In review of yesterday's note (5/23/2023), there are no changes except as documented above.

## 2023-05-24 NOTE — PROGRESS NOTES
Bedside report received from night RN. Assumed care of patient. Alert and oriented x2, wakes easily to voice. Daily plan of care discussed. Pt complains of pain, medicated per MAR. Left hip dressing intact with old drainage.  Hourly rounding in place.       1800 Pt. did not verbalize any suicidal ideation through the shift. Family at bedside.

## 2023-05-24 NOTE — CARE PLAN
The patient is Watcher - Medium risk of patient condition declining or worsening    Shift Goals  Clinical Goals: Pain management throughout the shift. Pt will remain free from falls or injury throughout the shift.  Patient Goals: sleep  Family Goals: n/a    Progress made toward(s) clinical / shift goals:  Pt did no require prn pain medication throughout the shift. Pt denies pain at this time. Pt seen sleeping comfortably in between rounds. Pt drowsy and refused to ambulate at this time; education provided. Pt is free from falls or injury throughout the shift. Hourly rounding in progress.      Problem: Skin Integrity  Goal: Skin integrity is maintained or improved  Outcome: Progressing     Problem: Hemodynamics  Goal: Patient's hemodynamics, fluid balance and neurologic status will be stable or improve  Outcome: Progressing     Problem: Psychosocial  Goal: Patient's level of anxiety will decrease  Outcome: Progressing     Problem: Fall Risk  Goal: Patient will remain free from falls  Outcome: Progressing     Problem: Self Care  Goal: Patient will have the ability to perform ADLs independently or with assistance (bathe, groom, dress, toilet and feed)  Outcome: Progressing     Problem: Infection - Standard  Goal: Patient will remain free from infection  Outcome: Progressing     Problem: Wound/ / Incision Healing  Goal: Patient's wound/surgical incision will decrease in size and heals properly  Outcome: Progressing       Patient is not progressing towards the following goals:

## 2023-05-24 NOTE — THERAPY
"Occupational Therapy  Daily Treatment     Patient Name: Yesi Garduno  Age:  89 y.o., Sex:  female  Medical Record #: [id number]  Today's Date: 5/24/2023     Precautions  Precautions: Fall Risk, Posterior Hip Precautions, Weight Bearing As Tolerated Left Lower Extremity    Assessment    Pt with poor activity tolerance for upright, unable to tolerate EOB or OOB ADL's.  Pt with double vision, dilated pupils, reporting dizziness and stating overall she feels terrible.  Wanted to try up to commode but pt not tolerating EOB sitting.  She is maxA x2 people for bed mobility at this time, unable to tolerate transfers.  NOT safe for home, will need further inpt post acute therapy.  Ot will follow.      Plan    Treatment Plan Status: Continue Current Treatment Plan  Type of Treatment: Self Care / Activities of Daily Living, Neuro Re-Education / Balance, Therapeutic Exercises, Therapeutic Activity, Adaptive Equipment  Treatment Frequency: 4 Times per Week  Treatment Duration: Until Therapy Goals Met    DC Equipment Recommendations: Unable to determine at this time  Discharge Recommendations: Recommend post-acute placement for additional occupational therapy services prior to discharge home    Subjective    \" There are 2 clocks on the wall\"     Objective       05/24/23 1353   Pain 0 - 10 Group   Location Hip   Location Orientation Left   Description Aching   Therapist Pain Assessment Prior to Activity;During Activity;Nurse Notified   Cognition    Cognition / Consciousness X   Speech/ Communication Delayed Responses   Ability To Follow Commands   (1-2 step commands)   Comments delayed initiation of movement, More alert today intiially but reports feeling terrible while seated EOB.  became more lethargic after sitting EOB   Strength Upper Body   Upper Body Strength  X   Comments  weak but equal   Other Treatments   Other Treatments Provided up to EOb with attempt to go to commode, pt reports too dizzy, with double vision.  " OOB deferred   Balance   Sitting Balance (Static) Fair -   Sitting Balance (Dynamic) Poor +   Comments pt seated EOB 2-3 min, returned to supine with c/o dizziness and feeling poorly.  Unsafe to attempt standing   Bed Mobility    Supine to Sit Maximal Assist  (x2)   Sit to Supine Maximal Assist  (x2)   Scooting Maximal Assist   Rolling Maximal Assist to Rt.;Maximum Assist to Lt.   Skilled Intervention Verbal Cuing;Tactile Cuing;Sequencing;Postural Facilitation   Activities of Daily Living   Lower Body Dressing Total Assist   Toileting Total Assist  (brief, purewik.  Wanted to try commode, unable to tolerate upright)   Functional Mobility   Sit to Stand Unable to Participate   Bed, Chair, Wheelchair Transfer Unable to Participate   Toilet Transfers Unable to Participate   Mobility EOB nly   Comments deferred standing 2/2 dizziness and weakness in upright with dilated pupils and double vision   Visual Perception   Visual Perception  X   Comments DOUBLE VISION   Activity Tolerance   Sitting Edge of Bed 2- 3 min   Standing NT   Comments limited by dizziness, double vision, weakness   Short Term Goals   Short Term Goal # 1 Mod assist LB clothing management via AE/DME PRN   Goal Outcome # 1 Progressing slower than expected   Short Term Goal # 2 Min assist UB clothing management   Goal Outcome # 2 Progressing slower than expected   Short Term Goal # 3 Mod assist transfer to/from EOB/BSC via DME   Goal Outcome # 3 Progressing slower than expected   Short Term Goal # 4 CGA EOB sitting (5+ mins) for G&H   Goal Outcome # 4 Progressing as expected   Short Term Goal # 5 Min assist toileting on BSC   Goal Outcome # 5 Progressing slower than expected

## 2023-05-24 NOTE — CARE PLAN
The patient is Watcher - Medium risk of patient condition declining or worsening    Shift Goals  Clinical Goals: Pain control through the shift  Patient Goals: rest comfortably  Family Goals: NA    Progress made toward(s) clinical / shift goals: Scheduled pain medications administered per MAR, pain control AEB pt sleeping post intervention.     Patient is not progressing towards the following goals:      Problem: Knowledge Deficit - Standard  Goal: Patient and family/care givers will demonstrate understanding of plan of care, disease process/condition, diagnostic tests and medications  Description: Target End Date:  1-3 days or as soon as patient condition allows    Document in Patient Education    1.  Patient and family/caregiver oriented to unit, equipment, visitation policy and means for communicating concern  2.  Complete/review Learning Assessment  3.  Assess knowledge level of disease process/condition, treatment plan, diagnostic tests and medications  4.  Explain disease process/condition, treatment plan, diagnostic tests and medications  Outcome: Not Progressing

## 2023-05-25 PROBLEM — I95.1 ORTHOSTATIC HYPOTENSION: Status: ACTIVE | Noted: 2023-01-01

## 2023-05-25 NOTE — CARE PLAN
The patient is Stable - Low risk of patient condition declining or worsening    Shift Goals  Clinical Goals: Pain control through the shift  Patient Goals: rest comfortably  Family Goals: NA    Progress made toward(s) clinical / shift goals: Pt report relief from pain medications administered as per schedule, AEB pt sleeping post medication administration    Patient is not progressing towards the following goals:    Problem: Mobility  Goal: Patient's capacity to carry out activities will improve  Description: Target End Date:  Prior to discharge or change in level of care    1.  Assess for barriers to mobility/activity  2.  Implement activity per interdisciplinary team recommendations  3.  Target activity level identified and patient/family/caregiver aware of goal  4.  Provide assistive devices  5.  Instruct patient/caregiver on proper use of assistive/adaptive devices  6.  Schedule activities and rest periods to decrease effects of fatigue  7.  Encourage mobilization to extent of ability  8.  Maintain proper body alignment  9.  Provide adequate pain management to allow progressive mobilization  10. Implement pace maker precautions as needed  Outcome: Not Progressing

## 2023-05-25 NOTE — PROGRESS NOTES
Bedside report received from night RN. Assumed care of patient. Alert and oriented, wakes easily to voice. Daily plan of care discussed. Pt states she is having terrible pain,medicated per MAR. Feeding assisted with family. Safety precautions in place. Hourly rounding ongoing.      1400 PT/OT tried to get up the pt in the chair but pt c/o of having double vision, feeling weak and dizzy. Placed back pt to bed. Provider made aware . Orthostatic vitals initiated except for standing due  to pt refused to stand.

## 2023-05-25 NOTE — PROGRESS NOTES
[facility] Medicine Daily Progress Note    Date of Service  5/25/2023    Chief Complaint  Yesi Garduno is a 89 y.o. female admitted 5/16/2023 with left hip pain after a fall    Hospital Course  History of a AAA, breast cancer status post mastectomy, CKD stage IIIa, hypertension, not on any medications admitted after ground-level fall complaining of blurred vision prior to the fall and left hip pain after the fall.  She was found to have an impacted left femoral neck fracture.  Orthopedic surgery Dr. Ponce was consulted and recommended operative fixation.  She had a mildly elevated troponin which trended up slightly to 42 however then trended down.  Cardiac clearance was obtained    Interval Problem Update  5/17: Complaining of severe left hip pain, anxious for surgery.  Echocardiogram performed and reviewed and it shows some diastolic dysfunction however normal EF.  Cleared for surgery, discussed with cardiology.  Blood pressure improved after hydralazine and pain control.  N.p.o.  5/18: Uncomplicated surgery yesterday.  Today she is on 3 L, I reviewed her x-ray which shows bilateral small pleural effusion, atelectasis.  Fluids stopped, IV Lasix x1, I-S.  She is encephalopathic, IV narcotics discontinued.  Pending PT eval regarding SNF.  Labile blood pressure, as needed hydralazine added    5/22: Confused but improved from yesterday.  Cr stable, repeat lasix.  She would greatly benefit from SNF for pt and ot to help recover from fracture and surgery    5/23: Patient is alert, but somnolent.  Oriented and following directions at this time.  She has had poor appetite, likely due to delirium from hospitalization and pain medicines.  I discussed controlling pain, family and patient in agreement with small dose of Dilaudid and scheduled Tylenol.  We will continue to work with physical therapy for mobilization.  Patient would benefit most from skilled nursing facility, she has apparently had a bad experience at  [facility], and is declining this option.  She has been declined from other facilities due to cognition and level of assistance.  I do expect her level assistance and condition to continue to improve.  Will need to resend referrals once patient improves.    5/24: Patient's condition continues to improve, she is still somnolent, likely due to Dilaudid.  But tolerating this well so we will continue with scheduled Tylenol and as needed low-dose Dilaudid-for severe breakthrough pain.  I have also spoken with physical therapy and Occupational Therapy to reevaluate and work with patient, encourage out of bed to chair.  Now that patient has improved in both mobility and mental status, SNF referrals will be resent.  Discussed with case management and patient's family at bedside.    5/25: Patient alert, follows commands but confused and disoriented this morning.  Had episode of orthostatic hypotension yesterday while working with physical therapy which unfortunately affected her ability to work and ambulate.  Patient was treated with IV fluids, blood pressure better today.  We will also use RAYRAY hose.  PT to work with patient again today.    I have discussed this patient's plan of care and discharge plan at IDT rounds today with Case Management, Nursing, Nursing leadership, and other members of the IDT team.    [facility]  Cardiology - [place]    Code Status  Full Code    Disposition  The patient is not medically cleared for discharge to home or a post-acute facility.  Anticipate discharge to: skilled nursing facility    I have placed the appropriate orders for post-discharge needs.    Review of Systems  Review of Systems   Constitutional:  Positive for malaise/fatigue. Negative for chills and fever.   HENT:  Positive for congestion. Negative for sore throat.    Eyes:  Negative for blurred vision and double vision.   Respiratory:  Negative for cough and shortness of breath.    Cardiovascular:   Negative for chest pain and palpitations.   Gastrointestinal:  Negative for abdominal pain, blood in stool, diarrhea, heartburn, nausea and vomiting.   Genitourinary:  Negative for dysuria and frequency.   Musculoskeletal:  Positive for joint pain (Mild and well controlled). Negative for back pain, falls and myalgias.   Skin:  Positive for itching (Hands).   Neurological:  Negative for dizziness, focal weakness, weakness and headaches.   Psychiatric/Behavioral:  Positive for hallucinations and memory loss. Negative for depression. The patient is not nervous/anxious.    All other systems reviewed and are negative.       Physical Exam  Temp:  [36.2 °C (97.1 °F)-36.8 °C (98.2 °F)] 36.3 °C (97.3 °F)  Pulse:  [68-82] 70  Resp:  [15-18] 18  BP: ()/(47-59) 120/49  SpO2:  [90 %-98 %] 92 %    Physical Exam  Vitals and nursing note reviewed.   Constitutional:       General: She is not in acute distress.     Comments: Thin   HENT:      Nose: Nose normal.      Mouth/Throat:      Mouth: Mucous membranes are dry.   Cardiovascular:      Rate and Rhythm: Normal rate and regular rhythm.      Pulses: Normal pulses.      Heart sounds: No murmur heard.  Pulmonary:      Effort: Pulmonary effort is normal.      Breath sounds: Normal breath sounds.   Abdominal:      General: There is no distension.      Palpations: Abdomen is soft.      Tenderness: There is no abdominal tenderness.   Musculoskeletal:         General: No swelling or tenderness.      Cervical back: No muscular tenderness.      Comments: Left hip with surgical dressing in place, clean dry and intact.  Minimal ecchymosis/swelling   Lymphadenopathy:      Cervical: No cervical adenopathy.   Skin:     General: Skin is warm and dry.      Coloration: Skin is pale.      Findings: No rash.   Neurological:      General: No focal deficit present.      Mental Status: She is alert. She is disoriented.      Motor: Weakness present.   Psychiatric:         Attention and Perception:  She is inattentive. She perceives visual hallucinations.         Mood and Affect: Mood is not anxious. Affect is labile.         Speech: Speech is not tangential.         Cognition and Memory: Cognition is impaired. Memory is impaired.         Judgment: Judgment is impulsive and inappropriate.         Fluids    Intake/Output Summary (Last 24 hours) at 5/25/2023 1404  Last data filed at 5/25/2023 1355  Gross per 24 hour   Intake 1760 ml   Output 1800 ml   Net -40 ml       Laboratory  Recent Labs     05/23/23  0159 05/24/23  0200   WBC 6.8 6.6   RBC 2.87* 2.79*   HEMOGLOBIN 8.7* 8.6*   HEMATOCRIT 27.8* 27.5*   MCV 96.9 98.6*   MCH 30.3 30.8   MCHC 31.3* 31.3*   RDW 46.5 46.9   PLATELETCT 239 251   MPV 9.2 9.1     Recent Labs     05/23/23  0159 05/24/23  0200 05/25/23  0346   SODIUM 142 139 136   POTASSIUM 3.8 4.2 4.7   CHLORIDE 104 103 101   CO2 25 26 25   GLUCOSE 104* 98 119*   BUN 47* 53* 47*   CREATININE 1.40 1.45* 1.27   CALCIUM 9.3 8.8 9.0                   Imaging  DX-PELVIS-1 OR 2 VIEWS   Final Result      Recent left hip arthroplasty.      DX-CHEST-PORTABLE (1 VIEW)   Final Result      1.  Cardiomegaly with interstitial infiltrates.      2.  Bibasilar atelectasis.      EC-ECHOCARDIOGRAM COMPLETE W/O CONT   Final Result      DX-SHOULDER 2+ RIGHT   Final Result      1.  Osteoporosis.      2.  No evidence of right shoulder fracture and/or dislocation.      DX-HAND 3+ RIGHT   Final Result         1.  No radiographic evidence of acute injury.      2. Osteoporosis.      DX-HIP-COMPLETE - UNILATERAL 2+ RIGHT   Final Result      1.  Impacted left femoral neck fracture.      2.  Previous pinning of right hip      3.  Osteoporosis.      DX-HUMERUS 2+ LEFT   Final Result         1.  No radiographic evidence of acute injury.      2. Osteopenia.      DX-HAND 3+ LEFT   Final Result         1.  No radiographic evidence of acute injury.      2. Osteoporosis.      DX-FEMUR-2+ LEFT   Final Result      1.  Impacted left femoral  neck fracture.      2.  Bone infarcts noted in the metadiaphyseal region of left femur.      CT-CHEST,ABDOMEN,PELVIS WITH   Final Result      1.  Left femoral neck fracture.      2.  Previous pinning of right hip.      3.  Infrarenal aortic aneurysm which measures 5.5 cm in greatest diameter.      4.  Osteoporosis.      5.  Sigmoid scoliosis of the thoracic and lumbar spine.      6.  Centrilobular emphysema.      7.  These findings were discussed with JOSÉ LUIS GARCIA at 10:15 p.m. 5/16/2023      CT-CSPINE WITHOUT PLUS RECONS   Final Result      1.  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      2.  Mild to moderate degenerative anterolisthesis at C4-5 level.      3.  Osteopenia.      4.  No evidence of acute cervical spine fracture.      CT-HEAD W/O   Final Result      1.  Cerebral atrophy.      2.  White matter lucencies most consistent with small vessel ischemic change versus demyelination or gliosis.      3.  Otherwise, Head CT without contrast with no acute findings. No evidence of acute cerebral infarction, hemorrhage or mass lesion.                Assessment/Plan  * Displaced fracture of left femoral neck (HCC)- (present on admission)  Assessment & Plan  -Hip x-ray showed impacted left femoral neck fracture  Underwent surgical fixation Dr. Bourne 5/17  Narcotics were initially discontinued due to confusion.  Continue with with scheduled Tylenol, ice, lidocaine patch for localized pain on lower extremity.  We will also add IV Dilaudid, for breakthrough pain-sherlyn with patient and daughter and they are in agreement with this.    Continue Dilaudid 0.25 mg IV-high risk medication, monitor for somnolence, altered mental status and respiratory depression.   Encourage ambulation and up to chair      Orthostatic hypotension  Assessment & Plan  Per patient's granddaughter, has had this in the past.  RAYRAY hose  Continue with IV fluids  Monitor  blood pressure when ambulating work with PT  If fails to resolve, may need midodrine  Follow-up on a.m. cortisol    Acute metabolic encephalopathy  Assessment & Plan  Waxing and waning.    Multifactorial secondary to anesthetic medications, as well as narcotics, pain, sundowning  Significant proved after narcotics were held  Continue supportive care  Normalize sleep-wake  Minimize lines/tubes    Chronic diastolic heart failure (HCC)- (present on admission)  Assessment & Plan  Seen on echo, she has mild bilateral pleural effusions on x-ray  Lasix discontinued, no evidence of volume overload on exam today  Monitor    Anemia due to stage 3b chronic kidney disease (HCC)- (present on admission)  Assessment & Plan  Denies any bleeding, minimal blood loss during surgery  Monitor hemoglobin    Elevated troponin- (present on admission)  Assessment & Plan  Elevated and initially trended up very slightly, likely due to reduced renal clearance, has trended back down.  Never complained of any chest pain  EKG negative for ischemia  Not consistent with cardiac ischemia/event    Hypertensive urgency- (present on admission)  Assessment & Plan  Now resolved  Present on admission, likely due to pain as she does not take anything at home  Improved with IV hydralazine  Continue as needed BP medications and monitor    Stage 3a chronic kidney disease (HCC)- (present on admission)  Assessment & Plan  -Creatinine has fluctuated throughout hospitalization, slightly worse today 1.45, will continue to monitor closely.  - Was on Lasix, which is likely contributing to MELANIE.  -Avoid nephrotoxic medication and monitor chemistry panel in the morning  Monitor with repeat labs    Acute respiratory failure with hypoxia (HCC)- (present on admission)  Assessment & Plan  Patient is on 2 L supplemental oxygen, baseline at home is 3L  Patient did not have acute respiratory failure, she has chronic respiratory failure.  Not overloaded.  I ordered and  reviewed her chest x-ray which shows bilateral basilar small pleural effusions as well as atelectasis  Encourage IS  Mobilize as able TID to chair for meals       S/P right mastectomy- (present on admission)  Assessment & Plan  -Due to underlying breast cancer in 1960s.    AAA (abdominal aortic aneurysm) (HCC)- (present on admission)  Assessment & Plan  CT showed infrarenal aortic aneurysm which measures 5.5 cm in the greatest diameter- has been present for years  -I provided extensive chart review on this. In 7/2015 AAA was 3.9 cm (Renal CT), and on 1/2017 AAA 4.2 cm (CT L Spine).  Blood pressure has improved  No chest pain, EKG is negative for acute ischemia.  Troponin increased slightly but now trended down before surgery    Near syncope- (present on admission)  Assessment & Plan  She complained of blurred vision prior to stumbling over a stool leading to a fall  CT of the head on admission was unremarkable other than small vessel ischemic change, no focal neurodeficits to suggest CVA  No additional head imaging needed at this time  Echocardiogram performed and shows essentially a normal EF, diastolic dysfunction  Positive for orthostatic hypotension  RAYRAY hose  Small bolus of 500 cc NS given today         VTE prophylaxis: SCDs/TEDs, xarelto    I have performed a physical exam and reviewed and updated ROS and Plan today (5/25/2023). In review of yesterday's note (5/24/2023), there are no changes except as documented above.      Total time spent with patient over 53 minutes.  This included my review with nursing and ancillary staff, review of records, face to face interview, physical examination; my review of lab results (CBC, chemistry panel), imaging review (CXR) and ECG.   In addition, counseling patient and speaking with consultants.

## 2023-05-25 NOTE — THERAPY
Physical Therapy   Daily Treatment     Patient Name: Yesi Garduno  Age:  89 y.o., Sex:  female  Medical Record #: [id number]  Today's Date: 5/25/2023          Assessment  Pt is a little more confused today and leans to the Left with sitting and standing.Able to stand for about 20 sec x 3    Plan    Treatment Plan Status: Continue Current Treatment Plan  Type of Treatment: Bed Mobility, Equipment, Gait Training, Manual Therapy, Neuro Re-Education / Balance, Self Care / Home Evaluation, Therapeutic Activities, Therapeutic Exercise, Stair Training  Treatment Frequency: Daily  Treatment Duration: Until Therapy Goals Met    DC Equipment Recommendations: Unable to determine at this time  Discharge Recommendations: Recommend post-acute placement for additional physical therapy services prior to discharge home     Objective       05/25/23 1400   Charge Group   PT Therapeutic Activities (Units) 2   Balance   Sitting Balance (Static) Poor +   Sitting Balance (Dynamic) Poor +   Standing Balance (Static) Poor -   Standing Balance (Dynamic) Poor -   Weight Shift Sitting Absent   Weight Shift Standing Absent   Bed Mobility    Supine to Sit Maximal Assist   Sit to Supine Maximal Assist   Scooting Maximal Assist   Gait Analysis   Gait Level Of Assist Unable to Participate   Functional Mobility   Sit to Stand Maximal Assist   Bed, Chair, Wheelchair Transfer Unable to Participate   Toilet Transfers Unable to Participate   Activity Tolerance   Sitting Edge of Bed 10   Standing 3 x 20 sec   Short Term Goals    Short Term Goal # 1 pt will go supine<>sit w/hob elevated and rails up w/Min A in 6tx   Goal Outcome # 1 Progressing slower than expected   Short Term Goal # 2 pt will go sit<>stand w/fww w/mod A in 6tx   Goal Outcome # 2 Progressing slower than expected   Short Term Goal # 3 pt will transfer bed<>chair w/fww w/Mod A in 6tx   Goal Outcome # 3 Progressing slower than expected   Interdisciplinary Plan of Care Collaboration   IDT  Collaboration with  Nursing   Session Information   Date / Session Number  5/25-7 7/7 6/30

## 2023-05-25 NOTE — PROGRESS NOTES
Received report from dayshift RN. Pt resting in bed,awake. Pt A&O x4, on RA. Pt denies pain at this time. Pt updated with POC which includes pain mgt and encourage water intake. Call light within reach, fall precautions in place, all needs met at this time.

## 2023-05-25 NOTE — CARE PLAN
The patient is Stable - Low risk of patient condition declining or worsening    Shift Goals  Clinical Goals: Pt's pain will be tolerable after mendoza interventions; monitor urine output.  Patient Goals: Sleep and go home.  Family Goals: NA    Progress made toward(s) clinical / shift goals:  Pt's pain tolerable after scheduled Tylenol and ice packs; with bear catheter inserted draining well to bear bag; pt still at 2 LPM via NC. Turned to sides Q2hrs. Pt still confused a little intermittently during shift, oriented to self and place only for me.    Problem: Skin Integrity  Goal: Skin integrity is maintained or improved  Outcome: Progressing    Patient is not progressing towards the following goals: NA

## 2023-05-25 NOTE — PROGRESS NOTES
2315, assessed pt and no urine output noted since start of evening shift; bladder scanner taken at 1534 ml, manipulated pt's bladder to check distention and was able to pee in diaper while changing her, BM also noted. Repeat bladder scan with 997 ml urine retained.  2325, informed Dr. Crespo about pt's urine retention with orders received to place urinary catheterization to pt.   Inserted a bear cath with a attempts from 4 different nurses and urine output was 1400 ml draining well, yellow in color.

## 2023-05-25 NOTE — PROGRESS NOTES
Received bedside report form George LEE.  Assumed pt care.   Assessment and chart check complete.  Pt is AA0X3, resting in bed, no signs of distress, denies nausea/vomiting.  Updated for the  POC of the day.  Dressing dry and intact, palpable pulses.  Bergeron catheter in place, draining to yellow color urine output.  Fall precautions in place, treaded socks on pt, bed in low position.   Call light within reach.   Educated pt to call if needing anything.   Hourly rounding.

## 2023-05-25 NOTE — ASSESSMENT & PLAN NOTE
Per patient's granddaughter, has had this in the past.  RAYRAY hose  Continue with IV fluids  Monitor blood pressure when ambulating work with PT  If fails to resolve, may need midodrine  Within target range    6/2: Continue midodrine, overall prognosis remains guarded.

## 2023-05-25 NOTE — CARE PLAN
The patient is Stable - Low risk of patient condition declining or worsening    Shift Goals  Clinical Goals: Pt able to sit up and monitor for BP  Patient Goals: rest comfortably  Family Goals: NA    Progress made toward(s) clinical / shift goals:  Pt able to sit on the edge of a bed and stand using FWW with PT. Pain has been controlled with tylenol. Fall precaution in place.    Patient is not progressing towards the following goals:      Problem: Pain - Standard  Goal: Alleviation of pain or a reduction in pain to the patient’s comfort goal  Outcome: Progressing     Problem: Mobility  Goal: Patient's capacity to carry out activities will improve  Outcome: Progressing

## 2023-05-25 NOTE — DISCHARGE PLANNING
Case Management Discharge Planning    Admission Date: 5/16/2023  GMLOS: 6.2  ALOS: 9    6-Clicks ADL Score: 13  6-Clicks Mobility Score: 6  PT and/or OT Eval ordered: Yes  Post-acute Referrals Ordered: Yes  Post-acute Choice Obtained: Yes  Has referral(s) been sent to post-acute provider:  Yes      Anticipated Discharge Dispo: Discharge Disposition: D/T to SNF with Medicare cert in anticipation of skilled care (03)    No accepting SNFs at this time. RN CM to resend referrals if patient's mentation improves.    DME Needed: Unable to determine at this time.    Action(s) Taken: Updated Provider/Nurse on Discharge Plan and OTHER: Patient has not accepting facilities from the originally sent SNF referrals. [facility] has accepted patient for services if discharge plan includes returning home with family on discharge.    Escalations Completed: Provider, Pending Discharge Destination, and Bedside RN    Medically Clear: No    Next Steps: Continue to monitor for changes and update the discharge plan accordingly. Follow-up with PT/OT for daily therapy notes and progression. Attending requesting to resend SNF referrals if patient's mentation improves and she is able to follow directions/participate in therapy. Follow-up with the Attending and Bedside RN for any issues/concerns and assist as indicated.    Barriers to Discharge: Pending Placement and Pending PT Evaluation    Is the patient up for discharge tomorrow: No

## 2023-05-26 NOTE — THERAPY
Physical Therapy   Daily Treatment     Patient Name: Yesi Garduno  Age:  89 y.o., Sex:  female  Medical Record #: [id number]  Today's Date: 5/26/2023          Assessment    Pt is much more lethargic today would not open her eyes.Pt unable to stand or follow commands    Plan    Treatment Plan Status: Continue Current Treatment Plan  Type of Treatment: Bed Mobility, Equipment, Gait Training, Manual Therapy, Neuro Re-Education / Balance, Self Care / Home Evaluation, Therapeutic Activities, Therapeutic Exercise, Stair Training  Treatment Frequency: Daily  Treatment Duration: Until Therapy Goals Met    DC Equipment Recommendations: Unable to determine at this time  Discharge Recommendations: Recommend post-acute placement for additional physical therapy services prior to discharge home     Objective       05/26/23 1500   Charge Group   PT Therapeutic Activities (Units) 2   Balance   Sitting Balance (Static) Poor +   Sitting Balance (Dynamic) Poor +   Standing Balance (Static) Dependent   Standing Balance (Dynamic) Dependent   Weight Shift Sitting Absent   Weight Shift Standing Absent   Bed Mobility    Supine to Sit Maximal Assist   Sit to Supine Maximal Assist   Scooting Maximal Assist   Gait Analysis   Gait Level Of Assist Unable to Participate   Functional Mobility   Sit to Stand Unable to Participate   Bed, Chair, Wheelchair Transfer Unable to Participate   Toilet Transfers Unable to Participate   Short Term Goals    Short Term Goal # 1 pt will go supine<>sit w/hob elevated and rails up w/Min A in 6tx   Goal Outcome # 1 Progressing slower than expected   Short Term Goal # 2 pt will go sit<>stand w/fww w/mod A in 6tx   Goal Outcome # 2 Progressing slower than expected   Short Term Goal # 3 pt will transfer bed<>chair w/fww w/Mod A in 6tx   Goal Outcome # 3 Progressing slower than expected   Session Information   Date / Session Number  5/26-8 2/7 5/31

## 2023-05-26 NOTE — PROGRESS NOTES
[facility] Medicine Daily Progress Note    Date of Service  5/26/2023    Chief Complaint  Yesi Garduno is a 89 y.o. female admitted 5/16/2023 with left hip pain after a fall    Hospital Course  History of a AAA, breast cancer status post mastectomy, CKD stage IIIa, hypertension, not on any medications admitted after ground-level fall complaining of blurred vision prior to the fall and left hip pain after the fall.  She was found to have an impacted left femoral neck fracture.  Orthopedic surgery Dr. Ponce was consulted and recommended operative fixation.  She had a mildly elevated troponin which trended up slightly to 42 however then trended down.  Cardiac clearance was obtained    Interval Problem Update  5/17: Complaining of severe left hip pain, anxious for surgery.  Echocardiogram performed and reviewed and it shows some diastolic dysfunction however normal EF.  Cleared for surgery, discussed with cardiology.  Blood pressure improved after hydralazine and pain control.  N.p.o.  5/18: Uncomplicated surgery yesterday.  Today she is on 3 L, I reviewed her x-ray which shows bilateral small pleural effusion, atelectasis.  Fluids stopped, IV Lasix x1, I-S.  She is encephalopathic, IV narcotics discontinued.  Pending PT eval regarding SNF.  Labile blood pressure, as needed hydralazine added    5/22: Confused but improved from yesterday.  Cr stable, repeat lasix.  She would greatly benefit from SNF for pt and ot to help recover from fracture and surgery    5/23: Patient is alert, but somnolent.  Oriented and following directions at this time.  She has had poor appetite, likely due to delirium from hospitalization and pain medicines.  I discussed controlling pain, family and patient in agreement with small dose of Dilaudid and scheduled Tylenol.  We will continue to work with physical therapy for mobilization.  Patient would benefit most from skilled nursing facility, she has apparently had a bad experience at  [facility], and is declining this option.  She has been declined from other facilities due to cognition and level of assistance.  I do expect her level assistance and condition to continue to improve.  Will need to resend referrals once patient improves.    5/24: Patient's condition continues to improve, she is still somnolent, likely due to Dilaudid.  But tolerating this well so we will continue with scheduled Tylenol and as needed low-dose Dilaudid-for severe breakthrough pain.  I have also spoken with physical therapy and Occupational Therapy to reevaluate and work with patient, encourage out of bed to chair.  Now that patient has improved in both mobility and mental status, SNF referrals will be resent.  Discussed with case management and patient's family at bedside.    5/25: Patient alert, follows commands but confused and disoriented this morning.  Had episode of orthostatic hypotension yesterday while working with physical therapy which unfortunately affected her ability to work and ambulate.  Patient was treated with IV fluids, blood pressure better today.  We will also use RAYRAY hose.  PT to work with patient again today.    5/26: Patient not progressing as well as expected, will still continue to get out of bed to chair, work with PT/OT.  Discussed overall goals of care with patient's daughter today, if patient does not progress, may qualify for hospice.    I have discussed this patient's plan of care and discharge plan at IDT rounds today with Case Management, Nursing, Nursing leadership, and other members of the IDT team.    Consultants/Specialty  orthopedics- [facility]  Cardiology - [facility]    Code Status  Full Code    Disposition  The patient is not medically cleared for discharge to home or a post-acute facility.  Anticipate discharge to: skilled nursing facility    I have placed the appropriate orders for post-discharge needs.    Review of Systems  Review of Systems   Constitutional:  Positive for  malaise/fatigue. Negative for chills and fever.   HENT:  Positive for congestion. Negative for sore throat.    Eyes:  Negative for blurred vision and double vision.   Respiratory:  Negative for cough and shortness of breath.    Cardiovascular:  Negative for chest pain and palpitations.   Gastrointestinal:  Negative for abdominal pain, blood in stool, diarrhea, heartburn, nausea and vomiting.   Genitourinary:  Negative for dysuria and frequency.   Musculoskeletal:  Positive for joint pain (Mild and well controlled). Negative for back pain, falls and myalgias.   Skin:  Positive for itching (Hands).   Neurological:  Negative for dizziness, focal weakness, weakness and headaches.   Psychiatric/Behavioral:  Positive for hallucinations and memory loss. Negative for depression. The patient is not nervous/anxious.    All other systems reviewed and are negative.       Physical Exam  Temp:  [36.3 °C (97.3 °F)-36.6 °C (97.9 °F)] 36.3 °C (97.4 °F)  Pulse:  [70-82] 71  Resp:  [16-20] 17  BP: (102-136)/(45-86) 102/45  SpO2:  [92 %-95 %] 94 %    Physical Exam  Vitals and nursing note reviewed.   Constitutional:       General: She is not in acute distress.     Comments: Thin   HENT:      Nose: Nose normal.      Mouth/Throat:      Mouth: Mucous membranes are dry.   Cardiovascular:      Rate and Rhythm: Normal rate and regular rhythm.      Pulses: Normal pulses.      Heart sounds: No murmur heard.  Pulmonary:      Effort: Pulmonary effort is normal.      Breath sounds: Normal breath sounds.   Abdominal:      General: There is no distension.      Palpations: Abdomen is soft.      Tenderness: There is no abdominal tenderness.   Musculoskeletal:         General: No swelling or tenderness.      Cervical back: No muscular tenderness.      Comments: Left hip with surgical dressing in place, clean dry and intact.  Minimal ecchymosis/swelling   Lymphadenopathy:      Cervical: No cervical adenopathy.   Skin:     General: Skin is warm and dry.       Coloration: Skin is pale.      Findings: No rash.   Neurological:      General: No focal deficit present.      Mental Status: She is alert. She is disoriented.      Motor: Weakness present.   Psychiatric:         Attention and Perception: She is inattentive. She perceives visual hallucinations.         Mood and Affect: Mood is not anxious. Affect is labile.         Speech: Speech is not tangential.         Cognition and Memory: Cognition is impaired. Memory is impaired.         Judgment: Judgment is impulsive and inappropriate.         Fluids    Intake/Output Summary (Last 24 hours) at 5/26/2023 1449  Last data filed at 5/26/2023 1300  Gross per 24 hour   Intake 990 ml   Output 1100 ml   Net -110 ml       Laboratory  Recent Labs     05/24/23  0200 05/26/23  0202   WBC 6.6 6.2   RBC 2.79* 2.84*   HEMOGLOBIN 8.6* 8.6*   HEMATOCRIT 27.5* 28.6*   MCV 98.6* 100.7*   MCH 30.8 30.3   MCHC 31.3* 30.1*   RDW 46.9 47.8   PLATELETCT 251 294   MPV 9.1 9.0     Recent Labs     05/24/23  0200 05/25/23  0346 05/26/23  0202   SODIUM 139 136 136   POTASSIUM 4.2 4.7 4.6   CHLORIDE 103 101 103   CO2 26 25 22   GLUCOSE 98 119* 103*   BUN 53* 47* 40*   CREATININE 1.45* 1.27 1.11   CALCIUM 8.8 9.0 9.1                   Imaging  DX-PELVIS-1 OR 2 VIEWS   Final Result      Recent left hip arthroplasty.      DX-CHEST-PORTABLE (1 VIEW)   Final Result      1.  Cardiomegaly with interstitial infiltrates.      2.  Bibasilar atelectasis.      EC-ECHOCARDIOGRAM COMPLETE W/O CONT   Final Result      DX-SHOULDER 2+ RIGHT   Final Result      1.  Osteoporosis.      2.  No evidence of right shoulder fracture and/or dislocation.      DX-HAND 3+ RIGHT   Final Result         1.  No radiographic evidence of acute injury.      2. Osteoporosis.      DX-HIP-COMPLETE - UNILATERAL 2+ RIGHT   Final Result      1.  Impacted left femoral neck fracture.      2.  Previous pinning of right hip      3.  Osteoporosis.      DX-HUMERUS 2+ LEFT   Final Result          1.  No radiographic evidence of acute injury.      2. Osteopenia.      DX-HAND 3+ LEFT   Final Result         1.  No radiographic evidence of acute injury.      2. Osteoporosis.      DX-FEMUR-2+ LEFT   Final Result      1.  Impacted left femoral neck fracture.      2.  Bone infarcts noted in the metadiaphyseal region of left femur.      CT-CHEST,ABDOMEN,PELVIS WITH   Final Result      1.  Left femoral neck fracture.      2.  Previous pinning of right hip.      3.  Infrarenal aortic aneurysm which measures 5.5 cm in greatest diameter.      4.  Osteoporosis.      5.  Sigmoid scoliosis of the thoracic and lumbar spine.      6.  Centrilobular emphysema.      7.  These findings were discussed with JOSÉ LUIS GARCIA at 10:15 p.m. 5/16/2023      CT-CSPINE WITHOUT PLUS RECONS   Final Result      1.  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      2.  Mild to moderate degenerative anterolisthesis at C4-5 level.      3.  Osteopenia.      4.  No evidence of acute cervical spine fracture.      CT-HEAD W/O   Final Result      1.  Cerebral atrophy.      2.  White matter lucencies most consistent with small vessel ischemic change versus demyelination or gliosis.      3.  Otherwise, Head CT without contrast with no acute findings. No evidence of acute cerebral infarction, hemorrhage or mass lesion.                Assessment/Plan  * Displaced fracture of left femoral neck (HCC)- (present on admission)  Assessment & Plan  -Hip x-ray showed impacted left femoral neck fracture  Underwent surgical fixation Dr. Bourne 5/17  Narcotics were initially discontinued due to confusion.  Continue with with scheduled Tylenol, ice, lidocaine patch for localized pain on lower extremity.  We will also add IV Dilaudid, for breakthrough pain-sherlyn with patient and daughter and they are in agreement with this.    Continue Dilaudid 0.25 mg IV-high risk medication, monitor for  somnolence, altered mental status and respiratory depression.   Encourage ambulation and up to chair      Orthostatic hypotension  Assessment & Plan  Per patient's granddaughter, has had this in the past.  RAYRAY hose  Continue with IV fluids  Monitor blood pressure when ambulating work with PT  If fails to resolve, may need midodrine  Follow-up on a.m. cortisol    Acute metabolic encephalopathy  Assessment & Plan  Waxing and waning.    Multifactorial secondary to anesthetic medications, as well as narcotics, pain, sundowning  Significant proved after narcotics were held  Continue supportive care  Normalize sleep-wake  Minimize lines/tubes  Cortisone within expected range  We will get TSH    Chronic diastolic heart failure (HCC)- (present on admission)  Assessment & Plan  Seen on echo, she has mild bilateral pleural effusions on x-ray  Lasix discontinued, due to volume overload  Monitor    Anemia due to stage 3b chronic kidney disease (HCC)- (present on admission)  Assessment & Plan  Denies any bleeding, minimal blood loss during surgery  Monitor hemoglobin    Elevated troponin- (present on admission)  Assessment & Plan  Elevated and initially trended up very slightly, likely due to reduced renal clearance, has trended back down.  Never complained of any chest pain  EKG negative for ischemia  Not consistent with cardiac ischemia/event    Hypertensive urgency- (present on admission)  Assessment & Plan  Now resolved  Present on admission, likely due to pain as she does not take anything at home  Improved with IV hydralazine  Continue as needed BP medications and monitor    Stage 3a chronic kidney disease (HCC)- (present on admission)  Assessment & Plan  -Creatinine has fluctuated throughout hospitalization, slightly worse today 1.45, will continue to monitor closely.  - Was on Lasix, which is likely contributing to MELANIE.  -Avoid nephrotoxic medication and monitor chemistry panel in the morning  Monitor with repeat labs    Improving    Acute respiratory failure with hypoxia (HCC)- (present on admission)  Assessment & Plan  Patient is on 2 L supplemental oxygen, baseline at home is 3L  Patient did not have acute respiratory failure, she has chronic respiratory failure.  Not overloaded.  I ordered and reviewed her chest x-ray which shows bilateral basilar small pleural effusions as well as atelectasis  Encourage IS  Mobilize as able TID to chair for meals-as tolerated      S/P right mastectomy- (present on admission)  Assessment & Plan  -Due to underlying breast cancer in 1960s.    AAA (abdominal aortic aneurysm) (HCC)- (present on admission)  Assessment & Plan  CT showed infrarenal aortic aneurysm which measures 5.5 cm in the greatest diameter- has been present for years  -I provided extensive chart review on this. In 7/2015 AAA was 3.9 cm (Renal CT), and on 1/2017 AAA 4.2 cm (CT L Spine).  Blood pressure has improved  No chest pain, EKG is negative for acute ischemia.  Troponin increased slightly but now trended down before surgery    Near syncope- (present on admission)  Assessment & Plan  She complained of blurred vision prior to stumbling over a stool leading to a fall  CT of the head on admission was unremarkable other than small vessel ischemic change, no focal neurodeficits to suggest CVA  No additional head imaging needed at this time  Echocardiogram performed and shows essentially a normal EF, diastolic dysfunction  Positive for orthostatic hypotension  RAYRAY hose  BP improved with RAYRAY hose         VTE prophylaxis: SCDs/TEDs, xarelto    I have performed a physical exam and reviewed and updated ROS and Plan today (5/26/2023). In review of yesterday's note (5/25/2023), there are no changes except as documented above.

## 2023-05-26 NOTE — THERAPY
"Occupational Therapy  Daily Treatment     Patient Name: Yesi Garduno  Age:  89 y.o., Sex:  female  Medical Record #: [id number]  Today's Date: 5/26/2023     Precautions  Precautions: Fall Risk, Posterior Hip Precautions, Weight Bearing As Tolerated Left Lower Extremity    Assessment  Pt presents with lethargy, Ox1. No command following during session. Pt keeps eyes closed. Family visiting, supportive. Reviewed posterior hip prec's with family. Pt has not met established OT goals, MaxA with all functional mobility; unable to stand/transfer. Total A with ADL's, requires 24/7 assistance at this time. OT will continue to follow.          Plan  Treatment Plan Status: (P) Continue Current Treatment Plan  Type of Treatment: (P) Self Care / Activities of Daily Living, Neuro Re-Education / Balance, Therapeutic Activity  Treatment Frequency: (P) 4 Times per Week  Treatment Duration: (P) Until Therapy Goals Met    DC Equipment Recommendations: (P) Unable to determine at this time  Discharge Recommendations: (P) Recommend post-acute placement for additional occupational therapy services prior to discharge home    Subjective  States \"no\" when asked if having pain.      Objective     05/26/23 1459   Cognition    Cognition / Consciousness X   Speech/ Communication Delayed Responses   Ability To Follow Commands Unable to Follow 1 Step Commands   Safety Awareness Impaired   New Learning Impaired   Attention Impaired   Sequencing Impaired   Initiation Impaired   Balance   Sitting Balance (Static) Poor +   Sitting Balance (Dynamic) Poor   Standing Balance (Static) Dependent   Standing Balance (Dynamic) Dependent   Weight Shift Sitting Absent   Weight Shift Standing Absent   Skilled Intervention Verbal Cuing;Sequencing;Tactile Cuing;Postural Facilitation   Bed Mobility    Supine to Sit Maximal Assist   Sit to Supine Maximal Assist   Scooting Maximal Assist   Rolling Maximal Assist to Rt.;Maximum Assist to Lt.   Skilled Intervention " Verbal Cuing   Activities of Daily Living   Eating Moderate Assist  (dtr states she fed pt breakfast)   Grooming Maximal Assist   Toileting Total Assist   How much help from another person does the patient currently need...   Putting on and taking off regular lower body clothing? 1   Bathing (including washing, rinsing, and drying)? 1   Toileting, which includes using a toilet, bedpan, or urinal? 1   Putting on and taking off regular upper body clothing? 2   Taking care of personal grooming such as brushing teeth? 2   Eating meals? 2   6 Clicks Daily Activity Score 9   Functional Mobility   Sit to Stand Unable to Participate   Bed, Chair, Wheelchair Transfer Unable to Participate   Toilet Transfers Unable to Participate   Activity Tolerance   Sitting Edge of Bed 10   Comments unable to perform full stand; 3 partial STSs, 20 secs   Short Term Goals   Goal Outcome # 1 Progressing slower than expected   Goal Outcome # 2 Progressing slower than expected   Goal Outcome # 3 Progressing slower than expected   Goal Outcome # 4 Progressing slower than expected   Goal Outcome # 5 Progressing slower than expected   Education Group   Role of Occupational Therapist Patient Response Patient;Family;Acceptance;Explanation;Verbal Demonstration;No Learning Evidence   ADL Patient Response Patient;Family;Acceptance;Explanation;No Learning Evidence;Verbal Demonstration   Occupational Therapy Treatment Plan    O.T. Treatment Plan Continue Current Treatment Plan   Treatment Interventions Self Care / Activities of Daily Living;Neuro Re-Education / Balance;Therapeutic Activity   Treatment Frequency 4 Times per Week   Duration Until Therapy Goals Met   Anticipated Discharge Equipment and Recommendations   DC Equipment Recommendations Unable to determine at this time   Discharge Recommendations Recommend post-acute placement for additional occupational therapy services prior to discharge home   Interdisciplinary Plan of Care Collaboration    IDT Collaboration with  Nursing   Patient Position at End of Therapy In Bed;Bed Alarm On;Call Light within Reach;Tray Table within Reach;Family / Friend in Room   Collaboration Comments Pt slow to progress, does not open eyes during session. Dtr and granddtr visiting, supportive of therapies.

## 2023-05-26 NOTE — CARE PLAN
The patient is Stable - Low risk of patient condition declining or worsening    Shift Goals  Clinical Goals: Monitor for pain,  urine output  Patient Goals: rest and comfort  Family Goals: NA    Progress made toward(s) clinical / shift goals: Scheduled pain medications administered per MAR, pain controlled. Bergeron bag catheter draining yellow colored urine.     Patient is not progressing towards the following goals:      Problem: Knowledge Deficit - Standard  Goal: Patient and family/care givers will demonstrate understanding of plan of care, disease process/condition, diagnostic tests and medications  Description: Target End Date:  1-3 days or as soon as patient condition allows    Document in Patient Education    1.  Patient and family/caregiver oriented to unit, equipment, visitation policy and means for communicating concern  2.  Complete/review Learning Assessment  3.  Assess knowledge level of disease process/condition, treatment plan, diagnostic tests and medications  4.  Explain disease process/condition, treatment plan, diagnostic tests and medications  Outcome: Not Progressing     Problem: Respiratory  Goal: Patient will achieve/maintain optimum respiratory ventilation and gas exchange  Description: Target End Date:  Prior to discharge or change in level of care    Document on Assessment flowsheet    1.  Assess and monitor rate, rhythm, depth and effort of respiration  2.  Breath sounds assessed qshift and/or as needed  3.  Assess O2 saturation, administer/titrate oxygen as ordered  4.  Position patient for maximum ventilatory efficiency  5.  Turn, cough, and deep breath with splinting to improve effectiveness  6.  Collaborate with RT to administer medication/treatments per order  7.  Encourage use of incentive spirometer and encourage patient to cough after use and utilize splinting techniques if applicable  8.  Airway suctioning  9.  Monitor sputum production for changes in color, consistency and  frequency  10. Perform frequent oral hygiene  11. Alternate physical activity with rest periods  Outcome: Not Progressing     Problem: Mobility  Goal: Patient's capacity to carry out activities will improve  Description: Target End Date:  Prior to discharge or change in level of care    1.  Assess for barriers to mobility/activity  2.  Implement activity per interdisciplinary team recommendations  3.  Target activity level identified and patient/family/caregiver aware of goal  4.  Provide assistive devices  5.  Instruct patient/caregiver on proper use of assistive/adaptive devices  6.  Schedule activities and rest periods to decrease effects of fatigue  7.  Encourage mobilization to extent of ability  8.  Maintain proper body alignment  9.  Provide adequate pain management to allow progressive mobilization  10. Implement pace maker precautions as needed  Outcome: Not Progressing     Problem: Self Care  Goal: Patient will have the ability to perform ADLs independently or with assistance (bathe, groom, dress, toilet and feed)  Description: Target End Date:  Prior to discharge or change in level of care    Document on ADL flowsheet    1.  Assess the capability and level of deficiency to perform ADLs  2.  Encourage family/care giver involvement  3.  Provide assistive devices  4.  Consider PT/OT evaluations  5.  Maintain support, give positive feedback, encourage self-care allowing extra time and verbal cuing as needed  6.  Avoid doing something for patients they can do themselves, but provide assistance as needed  7.  Assist in anticipating/planning individual needs  8.  Collaborate with Case Management and  to meet discharge needs  Outcome: Not Progressing

## 2023-05-26 NOTE — CARE PLAN
The patient is Stable - Low risk of patient condition declining or worsening    Shift Goals  Clinical Goals: Pt's pain will be tolerable; maintain safety and free from falls during shift.  Patient Goals: Sleep and wants be discharge.  Family Goals: NA    Progress made toward(s) clinical / shift goals:    Pt's pain was tolerable overnight and refused her scheduled Tylenol at   12 mn even with many attempts; pt urinary catheter was draining well to urine bag; pt needs to be reinforced with water intake, has been refusing medications at night shift. Still on 2LPM via NC.   At 0515, pt's surgical dressing at left hip was changed since it has a moderate amount of old drainage, charge nurse Yolanda at bedside to help, pt drank 240 ml water and eat a cup of apple sauce after with her Tylenol but refused other meds after that.     Problem: Skin Integrity  Goal: Skin integrity is maintained or improved  Outcome: Progressing    Patient is not progressing towards the following goals: NA

## 2023-05-26 NOTE — PROGRESS NOTES
Received report from margareth RN. Pt resting in bed; awake Pt A&O x2, on RA. Pt denies pain at this time. Pt updated with POC which includes pain mgt, sleep, Q2 turns, and safety precautions. Call light within reach, fall precautions in place, bed alarms on, all needs met at this time.

## 2023-05-27 NOTE — PROGRESS NOTES
1914 Received pt is lethargic, responds to painful stimuli only. Pupils are sluggish. Pt is full code, vital signs WNR, BS 91. Pt family at bedside Lay Avila daughter states that pt was supposed to be DNR DNI. Dr Oneal made aware;  is changing code to DNR DNI from full code.    Chart check completed

## 2023-05-27 NOTE — PROGRESS NOTES
Bedside report received from JESUS Meng. Assumed care of patient. Daily plan of care discussed. Pt resting comfortably in bed with no signs of distress noted. Patient placed on a waffle overlay. Patient currently on 2L O2 via NC.  Per night RN, patient confused over night, hallucinating and complaining of numbness to her lover extremities. Call light within reach. Bed locked in lowest position. Hourly rounding in place. CNA aware of 1:1 feeding and Q2 turns.

## 2023-05-27 NOTE — PROGRESS NOTES
"0332 Pt is alert x 2, but is confused to place and time. Pt is also having hallucinations stating \"you are evil, why are you laughing at me? Where did you hide the body?\" Pt reassured that she is safe and in the hospital. Pt was able to drink juice and respond to voice.   "

## 2023-05-27 NOTE — PROGRESS NOTES
Has been paged by patient nurse, Dalila Boone, who has informed me that the family wants the patient to be DNAR/DNI and NOT a FULL code.   CODE STATUS changed to DNAR/DNI

## 2023-05-27 NOTE — PROGRESS NOTES
[facility] Medicine Daily Progress Note    Date of Service  5/27/2023    Chief Complaint  Yesi Garduno is a 89 y.o. female admitted 5/16/2023 with left hip pain after a fall    Hospital Course  History of a AAA, breast cancer status post mastectomy, CKD stage IIIa, hypertension, not on any medications admitted after ground-level fall complaining of blurred vision prior to the fall and left hip pain after the fall.  She was found to have an impacted left femoral neck fracture.  Orthopedic surgery Dr. Ponce was consulted and recommended operative fixation.  She had a mildly elevated troponin which trended up slightly to 42 however then trended down.  Cardiac clearance was obtained    Interval Problem Update  5/17: Complaining of severe left hip pain, anxious for surgery.  Echocardiogram performed and reviewed and it shows some diastolic dysfunction however normal EF.  Cleared for surgery, discussed with cardiology.  Blood pressure improved after hydralazine and pain control.  N.p.o.  5/18: Uncomplicated surgery yesterday.  Today she is on 3 L, I reviewed her x-ray which shows bilateral small pleural effusion, atelectasis.  Fluids stopped, IV Lasix x1, I-S.  She is encephalopathic, IV narcotics discontinued.  Pending PT eval regarding SNF.  Labile blood pressure, as needed hydralazine added    5/22: Confused but improved from yesterday.  Cr stable, repeat lasix.  She would greatly benefit from SNF for pt and ot to help recover from fracture and surgery    5/23: Patient is alert, but somnolent.  Oriented and following directions at this time.  She has had poor appetite, likely due to delirium from hospitalization and pain medicines.  I discussed controlling pain, family and patient in agreement with small dose of Dilaudid and scheduled Tylenol.  We will continue to work with physical therapy for mobilization.  Patient would benefit most from skilled nursing facility, she has apparently had a bad experience at  [facility], and is declining this option.  She has been declined from other facilities due to cognition and level of assistance.  I do expect her level assistance and condition to continue to improve.  Will need to resend referrals once patient improves.    5/24: Patient's condition continues to improve, she is still somnolent, likely due to Dilaudid.  But tolerating this well so we will continue with scheduled Tylenol and as needed low-dose Dilaudid-for severe breakthrough pain.  I have also spoken with physical therapy and Occupational Therapy to reevaluate and work with patient, encourage out of bed to chair.  Now that patient has improved in both mobility and mental status, SNF referrals will be resent.  Discussed with case management and patient's family at bedside.    5/25: Patient alert, follows commands but confused and disoriented this morning.  Had episode of orthostatic hypotension yesterday while working with physical therapy which unfortunately affected her ability to work and ambulate.  Patient was treated with IV fluids, blood pressure better today.  We will also use RAYRAY hose.  PT to work with patient again today.    5/26: Patient not progressing as well as expected, will still continue to get out of bed to chair, work with PT/OT.  Discussed overall goals of care with patient's daughter today, if patient does not progress, may qualify for hospice.    5/27: Patient earlier this morning, reporting severe pain, but appears to be resting in bed.  Continues to overall plan, but able to participate in discussion.  Still very weak, physical therapy has been amenable to work with her out of bed due to profound weakness and confusion.  Will attempt again today.    I have discussed this patient's plan of care and discharge plan at IDT rounds today with Case Management, Nursing, Nursing leadership, and other members of the IDT team.    Consultants/Specialty  orthopedics- [facility]  Cardiology - [facility]    Code  Status  DNAR/DNI    Disposition  The patient is not medically cleared for discharge to home or a post-acute facility.  Anticipate discharge to: skilled nursing facility    I have placed the appropriate orders for post-discharge needs.    Review of Systems  Review of Systems   Constitutional:  Positive for malaise/fatigue. Negative for chills and fever.   HENT:  Positive for congestion. Negative for sore throat.    Eyes:  Negative for blurred vision and double vision.   Respiratory:  Negative for cough and shortness of breath.    Cardiovascular:  Negative for chest pain and palpitations.   Gastrointestinal:  Negative for abdominal pain, blood in stool, diarrhea, heartburn, nausea and vomiting.   Genitourinary:  Negative for dysuria and frequency.   Musculoskeletal:  Positive for joint pain (Mild and well controlled). Negative for back pain, falls and myalgias.   Skin:  Positive for itching (Hands).   Neurological:  Negative for dizziness, focal weakness, weakness and headaches.   Psychiatric/Behavioral:  Positive for hallucinations and memory loss. Negative for depression. The patient is not nervous/anxious.    All other systems reviewed and are negative.       Physical Exam  Temp:  [36.8 °C (98.2 °F)-37.2 °C (98.9 °F)] 37.2 °C (98.9 °F)  Pulse:  [66-70] 68  Resp:  [10-20] 17  BP: (104-121)/(45-56) 104/45  SpO2:  [93 %-100 %] 99 %    Physical Exam  Vitals and nursing note reviewed.   Constitutional:       General: She is not in acute distress.     Comments: Thin   HENT:      Nose: Nose normal.      Mouth/Throat:      Mouth: Mucous membranes are dry.   Cardiovascular:      Rate and Rhythm: Normal rate and regular rhythm.      Pulses: Normal pulses.      Heart sounds: No murmur heard.  Pulmonary:      Effort: Pulmonary effort is normal.      Breath sounds: Normal breath sounds.   Abdominal:      General: There is no distension.      Palpations: Abdomen is soft.      Tenderness: There is no abdominal tenderness.    Musculoskeletal:         General: No swelling or tenderness.      Cervical back: No muscular tenderness.      Comments: Left hip with surgical dressing in place, clean dry and intact.  Minimal ecchymosis/swelling   Lymphadenopathy:      Cervical: No cervical adenopathy.   Skin:     General: Skin is warm and dry.      Coloration: Skin is pale.      Findings: No rash.   Neurological:      General: No focal deficit present.      Mental Status: She is alert. She is disoriented.      Motor: Weakness present.   Psychiatric:         Attention and Perception: She is inattentive. She perceives visual hallucinations.         Mood and Affect: Mood is not anxious. Affect is labile.         Speech: Speech is not tangential.         Cognition and Memory: Cognition is impaired. Memory is impaired.         Judgment: Judgment is impulsive and inappropriate.         Fluids    Intake/Output Summary (Last 24 hours) at 5/27/2023 1247  Last data filed at 5/27/2023 0834  Gross per 24 hour   Intake 240 ml   Output 1150 ml   Net -910 ml       Laboratory  Recent Labs     05/26/23  0202   WBC 6.2   RBC 2.84*   HEMOGLOBIN 8.6*   HEMATOCRIT 28.6*   .7*   MCH 30.3   MCHC 30.1*   RDW 47.8   PLATELETCT 294   MPV 9.0     Recent Labs     05/25/23  0346 05/26/23  0202   SODIUM 136 136   POTASSIUM 4.7 4.6   CHLORIDE 101 103   CO2 25 22   GLUCOSE 119* 103*   BUN 47* 40*   CREATININE 1.27 1.11   CALCIUM 9.0 9.1                   Imaging  DX-CHEST-PORTABLE (1 VIEW)   Final Result      1.  Again seen cardiomegaly with interstitial infiltrates.      2.  Improved left lung base atelectasis.      DX-PELVIS-1 OR 2 VIEWS   Final Result      Recent left hip arthroplasty.      DX-CHEST-PORTABLE (1 VIEW)   Final Result      1.  Cardiomegaly with interstitial infiltrates.      2.  Bibasilar atelectasis.      EC-ECHOCARDIOGRAM COMPLETE W/O CONT   Final Result      DX-SHOULDER 2+ RIGHT   Final Result      1.  Osteoporosis.      2.  No evidence of right  shoulder fracture and/or dislocation.      DX-HAND 3+ RIGHT   Final Result         1.  No radiographic evidence of acute injury.      2. Osteoporosis.      DX-HIP-COMPLETE - UNILATERAL 2+ RIGHT   Final Result      1.  Impacted left femoral neck fracture.      2.  Previous pinning of right hip      3.  Osteoporosis.      DX-HUMERUS 2+ LEFT   Final Result         1.  No radiographic evidence of acute injury.      2. Osteopenia.      DX-HAND 3+ LEFT   Final Result         1.  No radiographic evidence of acute injury.      2. Osteoporosis.      DX-FEMUR-2+ LEFT   Final Result      1.  Impacted left femoral neck fracture.      2.  Bone infarcts noted in the metadiaphyseal region of left femur.      CT-CHEST,ABDOMEN,PELVIS WITH   Final Result      1.  Left femoral neck fracture.      2.  Previous pinning of right hip.      3.  Infrarenal aortic aneurysm which measures 5.5 cm in greatest diameter.      4.  Osteoporosis.      5.  Sigmoid scoliosis of the thoracic and lumbar spine.      6.  Centrilobular emphysema.      7.  These findings were discussed with JOSÉ LUIS GARCIA at 10:15 p.m. 5/16/2023      CT-CSPINE WITHOUT PLUS RECONS   Final Result      1.  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      2.  Mild to moderate degenerative anterolisthesis at C4-5 level.      3.  Osteopenia.      4.  No evidence of acute cervical spine fracture.      CT-HEAD W/O   Final Result      1.  Cerebral atrophy.      2.  White matter lucencies most consistent with small vessel ischemic change versus demyelination or gliosis.      3.  Otherwise, Head CT without contrast with no acute findings. No evidence of acute cerebral infarction, hemorrhage or mass lesion.                Assessment/Plan  * Displaced fracture of left femoral neck (HCC)- (present on admission)  Assessment & Plan  -Hip x-ray showed impacted left femoral neck fracture  Underwent surgical fixation  Dr. Bourne 5/17  Narcotics were initially discontinued due to confusion.  Continue with with scheduled Tylenol, ice, lidocaine patch for localized pain on lower extremity.  We will also add IV Dilaudid, for breakthrough pain-sherlyn with patient and daughter and they are in agreement with this.    Continue Dilaudid 0.25 mg IV-high risk medication, monitor for somnolence, altered mental status and respiratory depression.   Encourage ambulation and up to chair      Orthostatic hypotension  Assessment & Plan  Per patient's granddaughter, has had this in the past.  RAYRAY hose  Continue with IV fluids  Monitor blood pressure when ambulating work with PT  If fails to resolve, may need midodrine  Within target range    Acute metabolic encephalopathy  Assessment & Plan  Waxing and waning.    Multifactorial secondary to anesthetic medications, as well as narcotics, pain, sundowning  Significant proved after narcotics were held  Continue supportive care  Normalize sleep-wake  Minimize lines/tubes  Cortisol and TSH within expected range   Mental status has been fluctuating, improved on 5/27.  No focal signs to suggest stroke.    Chronic diastolic heart failure (HCC)- (present on admission)  Assessment & Plan  Seen on echo, she has mild bilateral pleural effusions on x-ray  Lasix discontinued, due to volume overload  Monitor    Anemia due to stage 3b chronic kidney disease (HCC)- (present on admission)  Assessment & Plan  Denies any bleeding, minimal blood loss during surgery  Monitor hemoglobin    Elevated troponin- (present on admission)  Assessment & Plan  Elevated and initially trended up very slightly, likely due to reduced renal clearance, has trended back down.  Never complained of any chest pain  EKG negative for ischemia  Not consistent with cardiac ischemia/event    Hypertensive urgency- (present on admission)  Assessment & Plan  Now resolved  Present on admission, likely due to pain as she does not take anything at  home  Improved with IV hydralazine  Continue as needed BP medications and monitor  Recent /45    Stage 3a chronic kidney disease (HCC)- (present on admission)  Assessment & Plan  -Creatinine has fluctuated throughout hospitalization, slightly worse today 1.45, will continue to monitor closely.  - Was on Lasix, which is likely contributing to MELANIE.  -Avoid nephrotoxic medication and monitor chemistry panel in the morning  Monitor with repeat labs   Improving    Acute respiratory failure with hypoxia (HCC)- (present on admission)  Assessment & Plan  Patient is on 2 L supplemental oxygen, baseline at home is 3L  Patient did not have acute respiratory failure, she has chronic respiratory failure.  Not overloaded.  I ordered and reviewed her chest x-ray which shows bilateral basilar small pleural effusions as well as atelectasis  Encourage IS  Mobilize as able TID to chair for meals-as tolerated      S/P right mastectomy- (present on admission)  Assessment & Plan  -Due to underlying breast cancer in 1960s.    AAA (abdominal aortic aneurysm) ([facility])- (present on admission)  Assessment & Plan  CT showed infrarenal aortic aneurysm which measures 5.5 cm in the greatest diameter- has been present for years  -I provided extensive chart review on this. In 7/2015 AAA was 3.9 cm (Renal CT), and on 1/2017 AAA 4.2 cm (CT L Spine).  Blood pressure has improved  No chest pain, EKG is negative for acute ischemia.  Troponin increased slightly but now trended down before surgery    Near syncope- (present on admission)  Assessment & Plan  She complained of blurred vision prior to stumbling over a stool leading to a fall  CT of the head on admission was unremarkable other than small vessel ischemic change, no focal neurodeficits to suggest CVA  No additional head imaging needed at this time  Echocardiogram performed and shows essentially a normal EF, diastolic dysfunction  Positive for orthostatic hypotension  RAYRAY hose  BP improved  with RAYRAY hose         VTE prophylaxis: SCDs/TEDs, xarelto    I have performed a physical exam and reviewed and updated ROS and Plan today (5/27/2023). In review of yesterday's note (5/26/2023), there are no changes except as documented above.

## 2023-05-27 NOTE — CARE PLAN
The patient is Watcher - Medium risk of patient condition declining or worsening    Shift Goals  Clinical Goals: monitor ALOC for changes  Patient Goals: rest and comfort  Family Goals: NA    Progress made toward(s) clinical / shift goals:  Pt is drowsy and lethargic, responds to painful stimuli, code status changed to DNR  Problem: Knowledge Deficit - Standard  Goal: Patient and family/care givers will demonstrate understanding of plan of care, disease process/condition, diagnostic tests and medications  Outcome: Progressing       Patient is not progressing towards the following goals:

## 2023-05-28 NOTE — THERAPY
Occupational Therapy  Daily Treatment     Patient Name: Yesi Garduno  Age:  89 y.o., Sex:  female  Medical Record #: [id number]  Today's Date: 5/28/2023     Precautions  Precautions: Posterior Hip Precautions, Weight Bearing As Tolerated Left Lower Extremity, Fall Risk    Assessment  Pt shows improved alertness, mentation, participation; tolerates EOB activity and 1 STS with MaxAx2. V/T cues for posterior hip prec's. Mod/MaxA with most ADL's; see below for CLOF. Low BPs, unable to transfer safely to chair; BTB. OT will continue to follow with recommendation of post-acute inpt rehab prior to home.      Plan  Treatment Plan Status: (P) Continue Current Treatment Plan  Type of Treatment: (P) Self Care / Activities of Daily Living, Neuro Re-Education / Balance, Therapeutic Activity  Treatment Frequency: (P) 4 Times per Week  Treatment Duration: (P) Until Therapy Goals Met    DC Equipment Recommendations: Unable to determine at this time  Discharge Recommendations: Recommend post-acute placement for additional occupational therapy services prior to discharge home    Subjective  Agreeable     Objective   05/28/23 0949   Precautions   Precautions Posterior Hip Precautions;Weight Bearing As Tolerated Left Lower Extremity;Fall Risk   Vitals   O2 (LPM) 2   O2 Delivery Device Silicone Nasal Cannula   Pain 0 - 10 Group   Location Hip   Location Orientation Left   Therapist Pain Assessment Post Activity Pain Same as Prior to Activity;Nurse Notified   Cognition    Cognition / Consciousness   (Ox3)   Level of Consciousness Alert   Ability To Follow Commands 1 Step   Comments Much improved alertness, attn, orientation, participation today.   Balance   Sitting Balance (Static) Fair   Sitting Balance (Dynamic) Fair -   Standing Balance (Static) Poor -   Standing Balance (Dynamic) Poor -   Weight Shift Sitting Fair   Weight Shift Standing Poor   Skilled Intervention Verbal Cuing;Postural Facilitation   Bed Mobility    Sit to Supine  "Maximal Assist   Scooting Moderate Assist   Skilled Intervention Verbal Cuing   Activities of Daily Living   Eating Minimal Assist   Grooming Moderate Assist;Seated   Toileting Total Assist   Skilled Intervention Verbal Cuing;Compensatory Strategies;Postural Facilitation;Sequencing   How much help from another person does the patient currently need...   Putting on and taking off regular lower body clothing? 1   Bathing (including washing, rinsing, and drying)? 1   Toileting, which includes using a toilet, bedpan, or urinal? 1   Putting on and taking off regular upper body clothing? 3   Taking care of personal grooming such as brushing teeth? 2   Eating meals? 3   6 Clicks Daily Activity Score 11   Functional Mobility   Sit to Stand Maximal Assist   Bed, Chair, Wheelchair Transfer Unable to Participate   Comments Tolerates EOB, 1 STS, pt with low BP; BTB   Visual Perception   Comments no c/o double vision during session.   Activity Tolerance   Sitting Edge of Bed 10-15   Standing 1\"   Short Term Goals   Goal Outcome # 1 Progressing slower than expected   Goal Outcome # 2 Progressing as expected   Goal Outcome # 3 Progressing slower than expected   Goal Outcome # 4 Progressing as expected   Goal Outcome # 5 Progressing slower than expected   Education Group   Role of Occupational Therapist Patient Response Patient;Acceptance;Verbal Demonstration;Explanation   ADL Patient Response Patient;Acceptance;Explanation;Verbal Demonstration   Occupational Therapy Treatment Plan    O.T. Treatment Plan Continue Current Treatment Plan   Treatment Interventions Self Care / Activities of Daily Living;Neuro Re-Education / Balance;Therapeutic Activity   Treatment Frequency 4 Times per Week   Duration Until Therapy Goals Met   Anticipated Discharge Equipment and Recommendations   DC Equipment Recommendations Unable to determine at this time   Discharge Recommendations Recommend post-acute placement for additional occupational therapy " services prior to discharge home   Interdisciplinary Plan of Care Collaboration   IDT Collaboration with  Certified Nursing Assistant;Nursing;Physical Therapist;Physician   Patient Position at End of Therapy In Bed;Bed Alarm On;Call Light within Reach;Tray Table within Reach;Phone within Reach;Other (Comments)  (RN in room)   Collaboration Comments Improved participation, improved alertness. Pt keeps eyes open, Ox3 with mild confusion. Low BPs; unable to safely transfer to chair today.

## 2023-05-28 NOTE — PROGRESS NOTES
[facility] Medicine Daily Progress Note    Date of Service  5/28/2023    Chief Complaint  Yesi Garduno is a 89 y.o. female admitted 5/16/2023 with left hip pain after a fall    Hospital Course  History of a AAA, breast cancer status post mastectomy, CKD stage IIIa, hypertension, not on any medications admitted after ground-level fall complaining of blurred vision prior to the fall and left hip pain after the fall.  She was found to have an impacted left femoral neck fracture.  Orthopedic surgery Dr. Ponce was consulted and recommended operative fixation.  She had a mildly elevated troponin which trended up slightly to 42 however then trended down.  Cardiac clearance was obtained    Interval Problem Update  5/17: Complaining of severe left hip pain, anxious for surgery.  Echocardiogram performed and reviewed and it shows some diastolic dysfunction however normal EF.  Cleared for surgery, discussed with cardiology.  Blood pressure improved after hydralazine and pain control.  N.p.o.  5/18: Uncomplicated surgery yesterday.  Today she is on 3 L, I reviewed her x-ray which shows bilateral small pleural effusion, atelectasis.  Fluids stopped, IV Lasix x1, I-S.  She is encephalopathic, IV narcotics discontinued.  Pending PT eval regarding SNF.  Labile blood pressure, as needed hydralazine added    5/22: Confused but improved from yesterday.  Cr stable, repeat lasix.  She would greatly benefit from SNF for pt and ot to help recover from fracture and surgery    5/23: Patient is alert, but somnolent.  Oriented and following directions at this time.  She has had poor appetite, likely due to delirium from hospitalization and pain medicines.  I discussed controlling pain, family and patient in agreement with small dose of Dilaudid and scheduled Tylenol.  We will continue to work with physical therapy for mobilization.  Patient would benefit most from skilled nursing facility, she has apparently had a bad experience at  [facility], and is declining this option.  She has been declined from other facilities due to cognition and level of assistance.  I do expect her level assistance and condition to continue to improve.  Will need to resend referrals once patient improves.    5/24: Patient's condition continues to improve, she is still somnolent, likely due to Dilaudid.  But tolerating this well so we will continue with scheduled Tylenol and as needed low-dose Dilaudid-for severe breakthrough pain.  I have also spoken with physical therapy and Occupational Therapy to reevaluate and work with patient, encourage out of bed to chair.  Now that patient has improved in both mobility and mental status, SNF referrals will be resent.  Discussed with case management and patient's family at bedside.    5/25: Patient alert, follows commands but confused and disoriented this morning.  Had episode of orthostatic hypotension yesterday while working with physical therapy which unfortunately affected her ability to work and ambulate.  Patient was treated with IV fluids, blood pressure better today.  We will also use RAYRAY hose.  PT to work with patient again today.    5/26: Patient not progressing as well as expected, will still continue to get out of bed to chair, work with PT/OT.  Discussed overall goals of care with patient's daughter today, if patient does not progress, may qualify for hospice.    5/27: Patient earlier this morning, reporting severe pain, but appears to be resting in bed.  Continues to overall plan, but able to participate in discussion.  Still very weak, physical therapy has been amenable to work with her out of bed due to profound weakness and confusion.  Will attempt again today.    5/28: Patient actually alert and oriented today, was able to get to the side of bed, but has profound orthostatic hypotension, SBP dropped to 70s, will add midodrine and says her orthostatic hypotension is not responsive to RAYRAY hose or fluids, likely  impeding ability to get out of bed to chair and ambulate.    I have discussed this patient's plan of care and discharge plan at IDT rounds today with Case Management, Nursing, Nursing leadership, and other members of the IDT team.    Consultants/Specialty  orthopedics- [facility]  Cardiology - [facility]    Code Status  DNAR/DNI    Disposition  The patient is not medically cleared for discharge to home or a post-acute facility.  Anticipate discharge to: skilled nursing facility    I have placed the appropriate orders for post-discharge needs.    Review of Systems  Review of Systems   Constitutional:  Positive for malaise/fatigue. Negative for chills and fever.   HENT:  Positive for congestion. Negative for sore throat.    Eyes:  Negative for blurred vision and double vision.   Respiratory:  Negative for cough and shortness of breath.    Cardiovascular:  Negative for chest pain and palpitations.   Gastrointestinal:  Negative for abdominal pain, blood in stool, diarrhea, heartburn, nausea and vomiting.   Genitourinary:  Negative for dysuria and frequency.   Musculoskeletal:  Positive for joint pain (Mild and well controlled). Negative for back pain, falls and myalgias.   Skin:  Positive for itching (Hands).   Neurological:  Negative for dizziness, focal weakness, weakness and headaches.   Psychiatric/Behavioral:  Positive for hallucinations and memory loss. Negative for depression. The patient is not nervous/anxious.    All other systems reviewed and are negative.       Physical Exam  Temp:  [36.7 °C (98.1 °F)-37 °C (98.6 °F)] 37 °C (98.6 °F)  Pulse:  [71-81] 72  Resp:  [17-18] 17  BP: ()/(34-62) 113/52  SpO2:  [91 %-98 %] 94 %    Physical Exam  Vitals and nursing note reviewed.   Constitutional:       General: She is not in acute distress.     Comments: Thin   HENT:      Nose: Nose normal.      Mouth/Throat:      Mouth: Mucous membranes are dry.   Cardiovascular:      Rate and Rhythm: Normal rate and regular rhythm.       Pulses: Normal pulses.      Heart sounds: No murmur heard.  Pulmonary:      Effort: Pulmonary effort is normal.      Breath sounds: Normal breath sounds.   Abdominal:      General: There is no distension.      Palpations: Abdomen is soft.      Tenderness: There is no abdominal tenderness.   Musculoskeletal:         General: No swelling or tenderness.      Cervical back: No muscular tenderness.      Comments: Left hip with surgical dressing in place, clean dry and intact.  Minimal ecchymosis/swelling   Lymphadenopathy:      Cervical: No cervical adenopathy.   Skin:     General: Skin is warm and dry.      Coloration: Skin is pale.      Findings: No rash.   Neurological:      General: No focal deficit present.      Mental Status: She is alert. She is disoriented.      Motor: Weakness present.   Psychiatric:         Attention and Perception: She is inattentive. She perceives visual hallucinations.         Mood and Affect: Mood is not anxious. Affect is labile.         Speech: Speech is not tangential.         Cognition and Memory: Cognition is impaired. Memory is impaired.         Judgment: Judgment is impulsive and inappropriate.         Fluids    Intake/Output Summary (Last 24 hours) at 5/28/2023 1349  Last data filed at 5/28/2023 1048  Gross per 24 hour   Intake 480 ml   Output 1300 ml   Net -820 ml       Laboratory  Recent Labs     05/26/23  0202   WBC 6.2   RBC 2.84*   HEMOGLOBIN 8.6*   HEMATOCRIT 28.6*   .7*   MCH 30.3   MCHC 30.1*   RDW 47.8   PLATELETCT 294   MPV 9.0     Recent Labs     05/26/23  0202   SODIUM 136   POTASSIUM 4.6   CHLORIDE 103   CO2 22   GLUCOSE 103*   BUN 40*   CREATININE 1.11   CALCIUM 9.1                   Imaging  DX-CHEST-PORTABLE (1 VIEW)   Final Result      1.  Again seen cardiomegaly with interstitial infiltrates.      2.  Improved left lung base atelectasis.      DX-PELVIS-1 OR 2 VIEWS   Final Result      Recent left hip arthroplasty.      DX-CHEST-PORTABLE (1 VIEW)   Final  Result      1.  Cardiomegaly with interstitial infiltrates.      2.  Bibasilar atelectasis.      EC-ECHOCARDIOGRAM COMPLETE W/O CONT   Final Result      DX-SHOULDER 2+ RIGHT   Final Result      1.  Osteoporosis.      2.  No evidence of right shoulder fracture and/or dislocation.      DX-HAND 3+ RIGHT   Final Result         1.  No radiographic evidence of acute injury.      2. Osteoporosis.      DX-HIP-COMPLETE - UNILATERAL 2+ RIGHT   Final Result      1.  Impacted left femoral neck fracture.      2.  Previous pinning of right hip      3.  Osteoporosis.      DX-HUMERUS 2+ LEFT   Final Result         1.  No radiographic evidence of acute injury.      2. Osteopenia.      DX-HAND 3+ LEFT   Final Result         1.  No radiographic evidence of acute injury.      2. Osteoporosis.      DX-FEMUR-2+ LEFT   Final Result      1.  Impacted left femoral neck fracture.      2.  Bone infarcts noted in the metadiaphyseal region of left femur.      CT-CHEST,ABDOMEN,PELVIS WITH   Final Result      1.  Left femoral neck fracture.      2.  Previous pinning of right hip.      3.  Infrarenal aortic aneurysm which measures 5.5 cm in greatest diameter.      4.  Osteoporosis.      5.  Sigmoid scoliosis of the thoracic and lumbar spine.      6.  Centrilobular emphysema.      7.  These findings were discussed with JOSÉ LUIS GARCIA at 10:15 p.m. 5/16/2023      CT-CSPINE WITHOUT PLUS RECONS   Final Result      1.  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      2.  Mild to moderate degenerative anterolisthesis at C4-5 level.      3.  Osteopenia.      4.  No evidence of acute cervical spine fracture.      CT-HEAD W/O   Final Result      1.  Cerebral atrophy.      2.  White matter lucencies most consistent with small vessel ischemic change versus demyelination or gliosis.      3.  Otherwise, Head CT without contrast with no acute findings. No evidence of acute cerebral  infarction, hemorrhage or mass lesion.                Assessment/Plan  * Displaced fracture of left femoral neck (HCC)- (present on admission)  Assessment & Plan  -Hip x-ray showed impacted left femoral neck fracture  Underwent surgical fixation Dr. Bourne 5/17  Narcotics were initially discontinued due to confusion.  Continue with with scheduled Tylenol, ice, lidocaine patch for localized pain on lower extremity.  We will also add IV Dilaudid, for breakthrough pain-sherlyn with patient and daughter and they are in agreement with this.    Continue Dilaudid 0.25 mg IV-high risk medication, monitor for somnolence, altered mental status and respiratory depression.   Encourage ambulation and up to chair  Pain Seems controlled with scheduled Tylenol    Orthostatic hypotension  Assessment & Plan  Per patient's granddaughter, has had this in the past.  RAYRAY hose  Continue with IV fluids  Monitor blood pressure when ambulating work with PT  If fails to resolve, may need midodrine  Within target range    Acute metabolic encephalopathy  Assessment & Plan  Waxing and waning.    Multifactorial secondary to anesthetic medications, as well as narcotics, pain, sundowning  Significant proved after narcotics were held  Continue supportive care  Normalize sleep-wake  Minimize lines/tubes  Cortisol and TSH within expected range   Mental status has been fluctuating, improved on 5/27.  No focal signs to suggest stroke.    Chronic diastolic heart failure (HCC)- (present on admission)  Assessment & Plan  Seen on echo, she has mild bilateral pleural effusions on x-ray  Lasix discontinued, due to volume overload  Monitor    Anemia due to stage 3b chronic kidney disease (HCC)- (present on admission)  Assessment & Plan  Denies any bleeding, minimal blood loss during surgery  Monitor hemoglobin    Elevated troponin- (present on admission)  Assessment & Plan  Elevated and initially trended up very slightly, likely due to reduced renal clearance,  has trended back down.  Never complained of any chest pain  EKG negative for ischemia  Not consistent with cardiac ischemia/event    Hypertensive urgency- (present on admission)  Assessment & Plan  Now resolved  Present on admission, likely due to pain as she does not take anything at home  Improved with IV hydralazine  Continue as needed BP medications and monitor  Recent BP 76/34    Stage 3a chronic kidney disease (HCC)- (present on admission)  Assessment & Plan  -Creatinine has fluctuated throughout hospitalization, slightly worse today 1.45, will continue to monitor closely.  - Was on Lasix, which is likely contributing to MELANIE.  -Avoid nephrotoxic medication and monitor chemistry panel in the morning  Monitor with repeat labs   Improving    Acute respiratory failure with hypoxia (HCC)- (present on admission)  Assessment & Plan  Patient is on 2 L supplemental oxygen, baseline at home is 3L  Patient did not have acute respiratory failure, she has chronic respiratory failure.  Not overloaded.  I ordered and reviewed her chest x-ray which shows bilateral basilar small pleural effusions as well as atelectasis  Encourage IS  Mobilize as able TID to chair for meals-as tolerated      S/P right mastectomy- (present on admission)  Assessment & Plan  -Due to underlying breast cancer in 1960s.    AAA (abdominal aortic aneurysm) ([facility])- (present on admission)  Assessment & Plan  CT showed infrarenal aortic aneurysm which measures 5.5 cm in the greatest diameter- has been present for years  -I provided extensive chart review on this. In 7/2015 AAA was 3.9 cm (Renal CT), and on 1/2017 AAA 4.2 cm (CT L Spine).  Blood pressure has improved  No chest pain, EKG is negative for acute ischemia.  Troponin increased slightly but now trended down before surgery    Near syncope- (present on admission)  Assessment & Plan  She complained of blurred vision prior to stumbling over a stool leading to a fall  CT of the head on admission was  unremarkable other than small vessel ischemic change, no focal neurodeficits to suggest CVA  No additional head imaging needed at this time  Echocardiogram performed and shows essentially a normal EF, diastolic dysfunction  Positive for orthostatic hypotension  RAYRAY hose  BP improved with RAYRAY hose, but still with orthostatic hypotension  Midodrine 5 mg added on 5/28         VTE prophylaxis: SCDs/TEDs, xarelto    I have performed a physical exam and reviewed and updated ROS and Plan today (5/28/2023). In review of yesterday's note (5/27/2023), there are no changes except as documented above.

## 2023-05-28 NOTE — PROGRESS NOTES
Pt is resting with eyes closed. Alert x4 with confusion at this time of assessment. Pt is near nurse station with Q2 hour turning. Call bell in reach    Chart check completed

## 2023-05-28 NOTE — CARE PLAN
The patient is Stable - Low risk of patient condition declining or worsening    Shift Goals  Clinical Goals: monitor ALOC for changes, Wean O2 to baseline RA.  Patient Goals: rest and pain mngt  Family Goals: NA    Progress made toward(s) clinical / shift goals:  Patient was A&OX4 throughout shift. Per family she had episodes where she was agitated with family. She was able to be weaned down to 1L O2 via NC. Physician  goal was to have patient move to edge of bed, but pt too weak to ambulate.     Patient is not progressing towards the following goals:

## 2023-05-28 NOTE — CARE PLAN
The patient is Watcher - Medium risk of patient condition declining or worsening    Shift Goals  Clinical Goals: Monitor ALOC; pain<4\10 throughout shift  Patient Goals: rest and comfort  Family Goals: NA    Progress made toward(s) clinical / shift goals:  Answers orientation questions correctly; mildly hallucinating   Problem: Pain - Standard  Goal: Alleviation of pain or a reduction in pain to the patient’s comfort goal  Outcome: Progressing       Patient is not progressing towards the following goals:

## 2023-05-28 NOTE — CARE PLAN
The patient is Stable - Low risk of patient condition declining or worsening    Shift Goals  Clinical Goals: Monitor patients LOC and SPO2  Patient Goals: rest comfortably  Family Goals: NA    Progress made toward(s) clinical / shift goals:  Patient is alert and oriented x 4 as of the moment.Patient remained on O2 via NC at 2 liters    Patient is not progressing towards the following goals:    Problem: Pain - Standard  Goal: Alleviation of pain or a reduction in pain to the patient’s comfort goal  Outcome: Progressing     Problem: Knowledge Deficit - Standard  Goal: Patient and family/care givers will demonstrate understanding of plan of care, disease process/condition, diagnostic tests and medications  Outcome: Progressing     Problem: Skin Integrity  Goal: Skin integrity is maintained or improved  Outcome: Progressing     Problem: Psychosocial  Goal: Patient's level of anxiety will decrease  Outcome: Progressing     Problem: Hemodynamics  Goal: Patient's hemodynamics, fluid balance and neurologic status will be stable or improve  Outcome: Progressing     Problem: Respiratory  Goal: Patient will achieve/maintain optimum respiratory ventilation and gas exchange  Outcome: Progressing     Problem: Fluid Volume  Goal: Fluid volume balance will be maintained  Outcome: Progressing     Problem: Risk for Aspiration  Goal: Patient's risk for aspiration will be absent or decrease  Outcome: Progressing     Problem: Nutrition  Goal: Patient's nutritional and fluid intake will be adequate or improve  Outcome: Progressing  Goal: Enteral nutrition will be maintained or improve  Outcome: Progressing     Problem: Urinary Elimination  Goal: Establish and maintain regular urinary output  Outcome: Progressing     Problem: Bowel Elimination  Goal: Establish and maintain regular bowel function  Outcome: Progressing     Problem: Mobility  Goal: Patient's capacity to carry out activities will improve  Outcome: Progressing     Problem:  Self Care  Goal: Patient will have the ability to perform ADLs independently or with assistance (bathe, groom, dress, toilet and feed)  Outcome: Progressing     Problem: Wound/ / Incision Healing  Goal: Patient's wound/surgical incision will decrease in size and heals properly  Outcome: Progressing     Problem: Fall Risk  Goal: Patient will remain free from falls  Outcome: Progressing     Problem: Provide Safe Environment  Goal: Suicide environmental safety, protocols, policies, and practices will be implemented  Outcome: Progressing     Problem: Psychosocial  Goal: Patient's ability to identify and develop effective coping behaviors will improve  Outcome: Progressing

## 2023-05-28 NOTE — PROGRESS NOTES
Received patient from Night shift RN . Patient is awake and alert x 4.On 2 liters via NC. Patient denies any pain as of the moment. Dressing to left hip in place, cdi. Refused ice polar pack to left hip.Bruising to the left arm noted. Fall precaution in place, kept bed in lowest position,bed alarm on  and call light within reach.     0950- assisted patient to get up from Bed. Initial v/s taken and recorded.PT and Ot at bedside  Patient was able to sit at the edge of the bed. Assisted in standing with the use of FWW, noted patient is bucking up.BP drops and patient reports she is dizzy,BP 86/43 mmHG   Offered water to drink and patient rested after some time.Recheck BP, remained low, 76/34 mmHG     1007- Place  patient back to bed (lying position), /58 mmHG.Patient remained alert and oriented x 4.

## 2023-05-29 PROBLEM — R62.51 FAILURE TO THRIVE (CHILD): Status: ACTIVE | Noted: 2023-01-01

## 2023-05-29 NOTE — CARE PLAN
The patient is Stable - Low risk of patient condition declining or worsening    Shift Goals  Clinical Goals: Manage pain with interventions  Patient Goals: rest comfortably  Family Goals: NA    Progress made toward(s) clinical / shift goals: Pt denies pain through the shift but refused being repositioned and ambulation with OT.     Patient is not progressing towards the following goals: NA

## 2023-05-29 NOTE — ASSESSMENT & PLAN NOTE
Patient has had poor appetite, increased debility from prolonged hospitalization.  Appetite improving, patient still weak and unable to ambulate and work with physical therapy    5/31: Overall prognosis remains guarded, family considering hospice. Referral has been placed.

## 2023-05-29 NOTE — THERAPY
Occupational Therapy Contact Note    Patient Name: Yesi Garduno  Age:  89 y.o., Sex:  female  Medical Record #: [id number]  Today's Date: 5/29/2023    Discussed missed therapy with RN       05/29/23 1209   Treatment Variance   Reason For Missed Therapy Non-Medical - Patient Refused   Interdisciplinary Plan of Care Collaboration   Collaboration Comments Attempted OT tx.  Pt refused secondary to fatigue.  Therapist encouraged participation - pt declined.

## 2023-05-29 NOTE — PROGRESS NOTES
"Received report from day shift nurse, Gely RN. Assumed pt care at 1915. Pt is alert and oriented x3, disoriented to place, resting comfortably in bed. Reoriented. Pt received with ongoing oxygen at 2 L/min via nasal cannula ;no signs of SOB/respiratory distress. Pt states pain at 3/10 at this moment; prn pain medication given as per MAR. Needs attended well. Fall precautions in place. Bed at lowest position. Call light and personal belongings within reach.   Hourly rounding in progress.     2300 Pt seen talking to self when asked, pt stated she is talking to her  \"Watson\". Pt reoriented on place; pt states understanding.  Hourly rounding in progress.    0400 Pt trying to remove abductor pillow. This RN attempted to placed it back but pt refused and states she is okay without it; education provided regarding importance of the abductor pillow but pt still refused. Pt repositioned supine on comfortable position; refused to be turned at this time.  Hourly rounding in progress.    0613 Pt seen talking to self; pt states it his . Reoriented pt.   Hourly rounding in progress.  "

## 2023-05-29 NOTE — CARE PLAN
The patient is Watcher - Medium risk of patient condition declining or worsening    Shift Goals  Clinical Goals: Monitor pt's level of consciousness. Monitor Bergeron catheter and urine output. Pt will report pain less than 3/10 post intervention.  Patient Goals: rest and pain control  Family Goals: n/a    Progress made toward(s) clinical / shift goals:  Pt is awake and oriented x 2 at this time; disoriented to situation and place. Pt required frequent reorientation throughout the shift. Pt reports left hip pain at 3/10; given with prn pain medication as per MAR. Bergeron catheter in place draining to bag with yellow, slightly hazy urine.Urine output recorded. Reposition every 2 hours throughout the shift. Pt refused to be repositioned at this time.   Hourly rounding in progress.       Problem: Hemodynamics  Goal: Patient's hemodynamics, fluid balance and neurologic status will be stable or improve  Outcome: Progressing     Problem: Wound/ / Incision Healing  Goal: Patient's wound/surgical incision will decrease in size and heals properly  Outcome: Progressing     Problem: Fall Risk  Goal: Patient will remain free from falls  Outcome: Progressing     Problem: Provide Safe Environment  Goal: Suicide environmental safety, protocols, policies, and practices will be implemented  Outcome: Progressing     Problem: Risk for Aspiration  Goal: Patient's risk for aspiration will be absent or decrease  Outcome: Progressing     Problem: Respiratory  Goal: Patient will achieve/maintain optimum respiratory ventilation and gas exchange  Outcome: Progressing     Problem: Urinary Elimination  Goal: Establish and maintain regular urinary output  Outcome: Progressing     Problem: Nutrition  Goal: Patient's nutritional and fluid intake will be adequate or improve  Outcome: Progressing     Patient is not progressing towards the following goals:

## 2023-05-29 NOTE — PROGRESS NOTES
Bedside report received from night RN. Assumed care of patient. Alert and oriented x3, wakes easily to voice. On 2L oxygen via NC, no SOB/respiratory distress noted. Pt seen taking to self, pt states she was taking to [employer]. Pt reoriented, still keeps talking to self. Denies pain at this time. Fall precautions in place. Hourly rounding ongoing.      1209 Attempted OT treatment, pt refused.

## 2023-05-29 NOTE — THERAPY
Physical Therapy   Daily Treatment     Patient Name: Yesi Garduno  Age:  89 y.o., Sex:  female  Medical Record #: [id number]  Today's Date: 5/28/2023     Precautions  Precautions: Posterior Hip Precautions;Weight Bearing As Tolerated Left Lower Extremity;Fall Risk    Assessment  Pt is more alert today and is able to keep her eyes open and answer questions appropriately. Pt is A&O x 3 today but continues to struggle with standing at bedside. Pt requires max A  to std x 1 minute and then she is able to sit at EOB and focus for 10-15 seconds on trunk control and maintaining it . Pt then falls lateral or posterior and needs min a to return to neutral. Pt has low BP and had to return to supine .       Plan    Treatment Plan Status: Continue Current Treatment Plan  Type of Treatment: Bed Mobility, Gait Training, Therapeutic Activities, Therapeutic Exercise, Neuro Re-Education / Balance, Self Care / Home Evaluation  Treatment Frequency: Daily  Treatment Duration: Until Therapy Goals Met    DC Equipment Recommendations: Unable to determine at this time  Discharge Recommendations: Recommend post-acute placement for additional physical therapy services prior to discharge home      Subjective  Pt more alert and answered the holiday question ( memorial  day) as well as where she was( [facility]). Pt was also able to follow 1 step commands today .        Objective     05/28/23 0930   Gait Analysis   Gait Level Of Assist Unable to Participate   Assistive Device Front Wheel Walker   Weight Bearing Status WBAT L LE   Functional Mobility   Sit to Stand Maximal Assist   Bed, Chair, Wheelchair Transfer Unable to Participate   Toilet Transfers Unable to Participate   Mobility EOB only , with BP dropping and pt BTB   Comments pt able to tolerate EOB sitting with mod A to prevent posterior / lateral leaning. pt returned BTB with low BP   Short Term Goals    Goal Outcome # 1 Progressing slower than expected   Goal Outcome # 2 Progressing  slower than expected   Goal Outcome # 3 Progressing slower than expected   Anticipated Discharge Equipment and Recommendations   DC Equipment Recommendations Unable to determine at this time   Discharge Recommendations Recommend post-acute placement for additional physical therapy services prior to discharge home

## 2023-05-29 NOTE — PROGRESS NOTES
[facility] Medicine Daily Progress Note    Date of Service  5/29/2023    Chief Complaint  Yesi Garduno is a 89 y.o. female admitted 5/16/2023 with left hip pain after a fall    Hospital Course  History of a AAA, breast cancer status post mastectomy, CKD stage IIIa, hypertension, not on any medications admitted after ground-level fall complaining of blurred vision prior to the fall and left hip pain after the fall.  She was found to have an impacted left femoral neck fracture.  Orthopedic surgery Dr. Ponce was consulted and recommended operative fixation.  She had a mildly elevated troponin which trended up slightly to 42 however then trended down.  Cardiac clearance was obtained    Interval Problem Update  5/17: Complaining of severe left hip pain, anxious for surgery.  Echocardiogram performed and reviewed and it shows some diastolic dysfunction however normal EF.  Cleared for surgery, discussed with cardiology.  Blood pressure improved after hydralazine and pain control.  N.p.o.  5/18: Uncomplicated surgery yesterday.  Today she is on 3 L, I reviewed her x-ray which shows bilateral small pleural effusion, atelectasis.  Fluids stopped, IV Lasix x1, I-S.  She is encephalopathic, IV narcotics discontinued.  Pending PT eval regarding SNF.  Labile blood pressure, as needed hydralazine added    5/22: Confused but improved from yesterday.  Cr stable, repeat lasix.  She would greatly benefit from SNF for pt and ot to help recover from fracture and surgery    5/23: Patient is alert, but somnolent.  Oriented and following directions at this time.  She has had poor appetite, likely due to delirium from hospitalization and pain medicines.  I discussed controlling pain, family and patient in agreement with small dose of Dilaudid and scheduled Tylenol.  We will continue to work with physical therapy for mobilization.  Patient would benefit most from skilled nursing facility, she has apparently had a bad experience at  [facility], and is declining this option.  She has been declined from other facilities due to cognition and level of assistance.  I do expect her level assistance and condition to continue to improve.  Will need to resend referrals once patient improves.    5/24: Patient's condition continues to improve, she is still somnolent, likely due to Dilaudid.  But tolerating this well so we will continue with scheduled Tylenol and as needed low-dose Dilaudid-for severe breakthrough pain.  I have also spoken with physical therapy and Occupational Therapy to reevaluate and work with patient, encourage out of bed to chair.  Now that patient has improved in both mobility and mental status, SNF referrals will be resent.  Discussed with case management and patient's family at bedside.    5/25: Patient alert, follows commands but confused and disoriented this morning.  Had episode of orthostatic hypotension yesterday while working with physical therapy which unfortunately affected her ability to work and ambulate.  Patient was treated with IV fluids, blood pressure better today.  We will also use RAYRAY hose.  PT to work with patient again today.    5/26: Patient not progressing as well as expected, will still continue to get out of bed to chair, work with PT/OT.  Discussed overall goals of care with patient's daughter today, if patient does not progress, may qualify for hospice.    5/27: Patient earlier this morning, reporting severe pain, but appears to be resting in bed.  Continues to overall plan, but able to participate in discussion.  Still very weak, physical therapy has been amenable to work with her out of bed due to profound weakness and confusion.  Will attempt again today.    5/28: Patient actually alert and oriented today, was able to get to the side of bed, but has profound orthostatic hypotension, SBP dropped to 70s, will add midodrine and says her orthostatic hypotension is not responsive to RAYRAY hose or fluids, likely  impeding ability to get out of bed to chair and ambulate.    5/29: Midodrine was added for orthostatic hypotension, blood pressures improved, however pt still very fatigued and unable to get out of bed.  If no improvement tomorrow, goals of care should be readdressed by family.  Per last closely discussion with daughter, doing this to try for a few more days, if this is not working hospice may be an option.    I have discussed this patient's plan of care and discharge plan at IDT rounds today with Case Management, Nursing, Nursing leadership, and other members of the IDT team.    Consultants/Specialty  orthopedics- [facility]  Cardiology - [facility]    Code Status  DNAR/DNI    Disposition  The patient is not medically cleared for discharge to home or a post-acute facility.  Anticipate discharge to: home with organized home healthcare and close outpatient follow-up    I have placed the appropriate orders for post-discharge needs.    Review of Systems  Review of Systems   Constitutional:  Positive for malaise/fatigue. Negative for chills and fever.   HENT:  Positive for congestion. Negative for sore throat.    Eyes:  Negative for blurred vision and double vision.   Respiratory:  Negative for cough and shortness of breath.    Cardiovascular:  Negative for chest pain and palpitations.   Gastrointestinal:  Negative for abdominal pain, blood in stool, diarrhea, heartburn, nausea and vomiting.   Genitourinary:  Negative for dysuria and frequency.   Musculoskeletal:  Positive for joint pain (Mild and well controlled). Negative for back pain, falls and myalgias.   Skin:  Positive for itching (Hands).   Neurological:  Negative for dizziness, focal weakness, weakness and headaches.   Psychiatric/Behavioral:  Positive for hallucinations and memory loss. Negative for depression. The patient is not nervous/anxious.    All other systems reviewed and are negative.       Physical Exam  Temp:  [36.6 °C (97.9 °F)-37.1 °C (98.7 °F)] 36.9 °C  (98.5 °F)  Pulse:  [65-85] 73  Resp:  [16-18] 18  BP: ()/(43-67) 99/43  SpO2:  [93 %-100 %] 97 %    Physical Exam  Vitals and nursing note reviewed.   Constitutional:       General: She is not in acute distress.     Comments: Thin   HENT:      Nose: Nose normal.      Mouth/Throat:      Mouth: Mucous membranes are dry.   Cardiovascular:      Rate and Rhythm: Normal rate and regular rhythm.      Pulses: Normal pulses.      Heart sounds: No murmur heard.  Pulmonary:      Effort: Pulmonary effort is normal.      Breath sounds: Normal breath sounds.   Abdominal:      General: There is no distension.      Palpations: Abdomen is soft.      Tenderness: There is no abdominal tenderness.   Musculoskeletal:         General: No swelling or tenderness.      Cervical back: No muscular tenderness.      Comments: Left hip with surgical dressing in place, clean dry and intact.  Minimal ecchymosis/swelling   Lymphadenopathy:      Cervical: No cervical adenopathy.   Skin:     General: Skin is warm and dry.      Coloration: Skin is pale.      Findings: No rash.   Neurological:      General: No focal deficit present.      Mental Status: She is alert. She is disoriented.      Motor: Weakness present.   Psychiatric:         Attention and Perception: She is inattentive. She perceives visual hallucinations.         Mood and Affect: Mood is not anxious. Affect is labile.         Speech: Speech is not tangential.         Cognition and Memory: Cognition is impaired. Memory is impaired.         Judgment: Judgment is impulsive and inappropriate.         Fluids    Intake/Output Summary (Last 24 hours) at 5/29/2023 1300  Last data filed at 5/29/2023 0900  Gross per 24 hour   Intake 600 ml   Output 950 ml   Net -350 ml       Laboratory                            Imaging  DX-CHEST-PORTABLE (1 VIEW)   Final Result      1.  Again seen cardiomegaly with interstitial infiltrates.      2.  Improved left lung base atelectasis.      DX-PELVIS-1 OR 2  VIEWS   Final Result      Recent left hip arthroplasty.      DX-CHEST-PORTABLE (1 VIEW)   Final Result      1.  Cardiomegaly with interstitial infiltrates.      2.  Bibasilar atelectasis.      EC-ECHOCARDIOGRAM COMPLETE W/O CONT   Final Result      DX-SHOULDER 2+ RIGHT   Final Result      1.  Osteoporosis.      2.  No evidence of right shoulder fracture and/or dislocation.      DX-HAND 3+ RIGHT   Final Result         1.  No radiographic evidence of acute injury.      2. Osteoporosis.      DX-HIP-COMPLETE - UNILATERAL 2+ RIGHT   Final Result      1.  Impacted left femoral neck fracture.      2.  Previous pinning of right hip      3.  Osteoporosis.      DX-HUMERUS 2+ LEFT   Final Result         1.  No radiographic evidence of acute injury.      2. Osteopenia.      DX-HAND 3+ LEFT   Final Result         1.  No radiographic evidence of acute injury.      2. Osteoporosis.      DX-FEMUR-2+ LEFT   Final Result      1.  Impacted left femoral neck fracture.      2.  Bone infarcts noted in the metadiaphyseal region of left femur.      CT-CHEST,ABDOMEN,PELVIS WITH   Final Result      1.  Left femoral neck fracture.      2.  Previous pinning of right hip.      3.  Infrarenal aortic aneurysm which measures 5.5 cm in greatest diameter.      4.  Osteoporosis.      5.  Sigmoid scoliosis of the thoracic and lumbar spine.      6.  Centrilobular emphysema.      7.  These findings were discussed with JOSÉ LUIS GARCIA at 10:15 p.m. 5/16/2023      CT-CSPINE WITHOUT PLUS RECONS   Final Result      1.  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      2.  Mild to moderate degenerative anterolisthesis at C4-5 level.      3.  Osteopenia.      4.  No evidence of acute cervical spine fracture.      CT-HEAD W/O   Final Result      1.  Cerebral atrophy.      2.  White matter lucencies most consistent with small vessel ischemic change versus demyelination or gliosis.      3.   Otherwise, Head CT without contrast with no acute findings. No evidence of acute cerebral infarction, hemorrhage or mass lesion.                Assessment/Plan  * Displaced fracture of left femoral neck (HCC)- (present on admission)  Assessment & Plan  -Hip x-ray showed impacted left femoral neck fracture  Underwent surgical fixation Dr. Bourne 5/17  Narcotics were initially discontinued due to confusion.  Continue with with scheduled Tylenol, ice, lidocaine patch for localized pain on lower extremity.  We will also add IV Dilaudid, for breakthrough pain-sherlyn with patient and daughter and they are in agreement with this.    Continue Dilaudid 0.25 mg IV-high risk medication, monitor for somnolence, altered mental status and respiratory depression.   Encourage ambulation and up to chair  Pain Seems controlled with scheduled Tylenol    Failure to thrive (child)  Assessment & Plan  Patient has had poor appetite, increased debility from prolonged hospitalization.  Appetite improving, patient still weak and unable to ambulate and work with physical therapy    Orthostatic hypotension  Assessment & Plan  Per patient's granddaughter, has had this in the past.  RAYRAY hose  Continue with IV fluids  Monitor blood pressure when ambulating work with PT  If fails to resolve, may need midodrine  Within target range    Acute metabolic encephalopathy  Assessment & Plan  Waxing and waning.    Multifactorial secondary to anesthetic medications, as well as narcotics, pain, sundowning  Significant proved after narcotics were held  Continue supportive care  Normalize sleep-wake  Minimize lines/tubes  Cortisol and TSH within expected range   Mental status has been fluctuating, improved on 5/27.  No focal signs to suggest stroke.    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  Never complained of any chest pain  EKG negative for ischemia  Not consistent with cardiac ischemia/event    Hypertensive urgency- (present on admission)  Assessment & Plan  Now resolved  Present on admission, likely due to pain as she does not take anything at home  Improved with IV hydralazine  Continue as needed BP medications and monitor  Recent BP 76/34    Stage 3a chronic kidney disease (HCC)- (present on admission)  Assessment & Plan  -Creatinine has fluctuated throughout hospitalization, slightly worse today 1.45, will continue to monitor closely.  - Was on Lasix, which is likely contributing to MELANIE.  -Avoid nephrotoxic medication and monitor chemistry panel in the morning  Monitor with repeat labs   Improving    Acute respiratory failure with hypoxia (HCC)- (present on admission)  Assessment & Plan  Patient is on 2 L supplemental oxygen, baseline at home is 3L  Patient did not have acute respiratory failure, she has chronic respiratory failure.  Not overloaded.  I ordered and reviewed her chest x-ray which shows bilateral basilar small pleural effusions as well as atelectasis  Encourage IS  Mobilize as able TID to chair for meals-as tolerated      S/P right mastectomy- (present on admission)  Assessment & Plan  -Due to underlying breast cancer in 1960s.    AAA (abdominal aortic aneurysm) (HCC)- (present on admission)  Assessment & Plan  CT showed infrarenal aortic aneurysm which measures 5.5 cm in the greatest diameter- has been present for years  -I provided extensive chart review on this. In 7/2015 AAA was 3.9 cm (Renal CT), and on 1/2017 AAA 4.2 cm (CT L Spine).  Blood  pressure has improved  No chest pain, EKG is negative for acute ischemia.  Troponin increased slightly but now trended down before surgery    Near syncope- (present on admission)  Assessment & Plan  She complained of blurred vision prior to stumbling over a stool leading to a fall  CT of the head on admission was unremarkable other than small vessel ischemic change, no focal neurodeficits to suggest CVA  No additional head imaging needed at this time  Echocardiogram performed and shows essentially a normal EF, diastolic dysfunction  Positive for orthostatic hypotension  RAYRAY hose  BP improved with RAYRAY hose, but still with orthostatic hypotension  Midodrine 5 mg added on 5/28-improved blood pressure, orthostasis, patient still unable to get out of bed         VTE prophylaxis: SCDs/TEDs, xarelto    I have performed a physical exam and reviewed and updated ROS and Plan today (5/29/2023). In review of yesterday's note (5/28/2023), there are no changes except as documented above.

## 2023-05-30 NOTE — THERAPY
Occupational Therapy  Daily Treatment     Patient Name: Yesi Garduno  Age:  89 y.o., Sex:  female  Medical Record #: [id number]  Today's Date: 5/30/2023     Precautions  Precautions: (P) Fall Risk, Posterior Hip Precautions, Weight Bearing As Tolerated Left Lower Extremity    Assessment    Pt demonstrated Max assist bed mobility, Max assist sit/stand, Total assist transfer x2 via Yennifer Steady, Total assist toileting, Total assist LB clothing management, Mod assist UB clothing management.    Plan    Treatment Plan Status: (P) Continue Current Treatment Plan  Type of Treatment: Self Care / Activities of Daily Living, Neuro Re-Education / Balance, Therapeutic Activity  Treatment Frequency: 4 Times per Week  Treatment Duration: Until Therapy Goals Met    DC Equipment Recommendations: (P) Unable to determine at this time  Discharge Recommendations: (P) Recommend post-acute placement for additional occupational therapy services prior to discharge home    Subjective    Pt had difficulty staying awake, VQ's to encourage participation.     Objective       05/30/23 1213    Services   Is patient using  services for this encounter? No   Precautions   Precautions Fall Risk;Posterior Hip Precautions;Weight Bearing As Tolerated Left Lower Extremity   Pain   Intervention Rest;Repositioned   Pain 0 - 10 Group   Location Hip;Back   Location Orientation Left   Description Aching   Comfort Goal Comfort with Movement;Perform Activity   Therapist Pain Assessment Post Activity Pain Same as Prior to Activity   Non Verbal Descriptors   Non Verbal Scale  Calm;Unlabored Breathing   Cognition    Ability To Follow Commands 1 Step   Safety Awareness Impaired   New Learning Impaired   Attention Impaired   Sequencing Impaired   Initiation Impaired   Balance   Sitting Balance (Static) Fair   Sitting Balance (Dynamic) Fair -   Standing Balance (Static) Trace +   Standing Balance (Dynamic) Trace   Weight Shift Sitting Poor    Weight Shift Standing Absent   Skilled Intervention Verbal Cuing;Tactile Cuing;Sequencing;Postural Facilitation;Compensatory Strategies   Bed Mobility    Supine to Sit Maximal Assist   Sit to Supine Maximal Assist   Scooting Maximal Assist   Rolling Maximal Assist to Rt.;Maximum Assist to Lt.   Skilled Intervention Verbal Cuing;Tactile Cuing;Sequencing;Postural Facilitation   Activities of Daily Living   Grooming Maximal Assist   Upper Body Dressing Moderate Assist   Lower Body Dressing Total Assist   Toileting Total Assist   How much help from another person does the patient currently need...   Putting on and taking off regular lower body clothing? 1   Bathing (including washing, rinsing, and drying)? 1   Toileting, which includes using a toilet, bedpan, or urinal? 1   Putting on and taking off regular upper body clothing? 2   Taking care of personal grooming such as brushing teeth? 2   Eating meals? 3   6 Clicks Daily Activity Score 10   Functional Mobility   Sit to Stand Maximal Assist   Bed, Chair, Wheelchair Transfer Total Assist   Toilet Transfers Unable to Participate   Transfer Method Other (Comments)  (Yennifer steady to/from EOB and bed side recliner)   Activity Tolerance   Sitting in Chair 20   Sitting Edge of Bed 10   Standing 1   Short Term Goals   Goal Outcome # 1 Progressing slower than expected  (Total assist)   Goal Outcome # 2 Progressing as expected  (Mod assist)   Goal Outcome # 3 Progressing slower than expected  (Total assist via Yennifer steady)   Goal Outcome # 5 Progressing slower than expected  (Total assist)   Occupational Therapy Treatment Plan    O.T. Treatment Plan Continue Current Treatment Plan   Anticipated Discharge Equipment and Recommendations   DC Equipment Recommendations Unable to determine at this time   Discharge Recommendations Recommend post-acute placement for additional occupational therapy services prior to discharge home

## 2023-05-30 NOTE — DISCHARGE PLANNING
Case Management Discharge Planning    Admission Date: 5/16/2023  GMLOS: 6.2  ALOS: 14    6-Clicks ADL Score: 11  6-Clicks Mobility Score: 6  PT and/or OT Eval ordered: Yes  Post-acute Referrals Ordered: Yes  Post-acute Choice Obtained: Yes  Has referral(s) been sent to post-acute provider:  Yes      Anticipated Discharge Dispo: Discharge Disposition: D/T to SNF with Medicare cert in anticipation of skilled care (03)    DME Needed: Unable to determine at this time.     Action(s) Taken: Updated Provider/Nurse on Discharge Plan and OTHER: RN CM received updates from Attending and Bedside RN in IDT rounds. Post-acute placement is recommended from PT for continued therapy services prior to discharge home; however, patient is requiring 2 person assists for safety and was unable to demonstrate any functional means for assisting in transfers. She remains a high risk for falling. PT indicates that if patient does not demonstrate meaningful progression within the next few visits therapy may be discontinued due to her poor engagement, continued lethargy, and likely not benefiting from further therapy.    Escalations Completed: Provider, Pending Discharge Destination, and Bedside RN    Medically Clear: No    Next Steps: Continue to monitor for changes and update the discharge plan accordingly. Follow-up with the Attending and Bedside RN for any issues/concerns and assist as indicated.     Barriers to Discharge: Medical clearance and Pending Placement    Is the patient up for discharge tomorrow: No

## 2023-05-30 NOTE — PROGRESS NOTES
"Hospital Medicine Daily Progress Note    Date of Service  5/30/2023    Chief Complaint  Yesi Garduno is a 89 y.o. female admitted 5/16/2023 with left hip pain after a fall    Hospital Course  As per chart review:  \"History of a AAA, breast cancer status post mastectomy, CKD stage IIIa, hypertension, not on any medications admitted after ground-level fall complaining of blurred vision prior to the fall and left hip pain after the fall.  She was found to have an impacted left femoral neck fracture.  Orthopedic surgery Dr. Ponce was consulted and recommended operative fixation.  She had a mildly elevated troponin which trended up slightly to 42 however then trended down.  Cardiac clearance was obtained\"    Interval Problem Update  5/17: Complaining of severe left hip pain, anxious for surgery.  Echocardiogram performed and reviewed and it shows some diastolic dysfunction however normal EF.  Cleared for surgery, discussed with cardiology.  Blood pressure improved after hydralazine and pain control.  N.p.o.  5/18: Uncomplicated surgery yesterday.  Today she is on 3 L, I reviewed her x-ray which shows bilateral small pleural effusion, atelectasis.  Fluids stopped, IV Lasix x1, I-S.  She is encephalopathic, IV narcotics discontinued.  Pending PT eval regarding SNF.  Labile blood pressure, as needed hydralazine added    5/22: Confused but improved from yesterday.  Cr stable, repeat lasix.  She would greatly benefit from SNF for pt and ot to help recover from fracture and surgery    5/23: Patient is alert, but somnolent.  Oriented and following directions at this time.  She has had poor appetite, likely due to delirium from hospitalization and pain medicines.  I discussed controlling pain, family and patient in agreement with small dose of Dilaudid and scheduled Tylenol.  We will continue to work with physical therapy for mobilization.  Patient would benefit most from skilled nursing facility, she has apparently " had a bad experience at [facility], and is declining this option.  She has been declined from other facilities due to cognition and level of assistance.  I do expect her level assistance and condition to continue to improve.  Will need to resend referrals once patient improves.    5/24: Patient's condition continues to improve, she is still somnolent, likely due to Dilaudid.  But tolerating this well so we will continue with scheduled Tylenol and as needed low-dose Dilaudid-for severe breakthrough pain.  I have also spoken with physical therapy and Occupational Therapy to reevaluate and work with patient, encourage out of bed to chair.  Now that patient has improved in both mobility and mental status, SNF referrals will be resent.  Discussed with case management and patient's family at bedside.    5/25: Patient alert, follows commands but confused and disoriented this morning.  Had episode of orthostatic hypotension yesterday while working with physical therapy which unfortunately affected her ability to work and ambulate.  Patient was treated with IV fluids, blood pressure better today.  We will also use RAYRAY hose.  PT to work with patient again today.    5/26: Patient not progressing as well as expected, will still continue to get out of bed to chair, work with PT/OT.  Discussed overall goals of care with patient's daughter today, if patient does not progress, may qualify for hospice.    5/27: Patient earlier this morning, reporting severe pain, but appears to be resting in bed.  Continues to overall plan, but able to participate in discussion.  Still very weak, physical therapy has been amenable to work with her out of bed due to profound weakness and confusion.  Will attempt again today.    5/28: Patient actually alert and oriented today, was able to get to the side of bed, but has profound orthostatic hypotension, SBP dropped to 70s, will add midodrine and says her orthostatic hypotension is not responsive to RAYRAY  hose or fluids, likely impeding ability to get out of bed to chair and ambulate.    5/29: Midodrine was added for orthostatic hypotension, blood pressures improved, however pt still very fatigued and unable to get out of bed.  If no improvement tomorrow, goals of care should be readdressed by family.  Per last closely discussion with daughter, doing this to try for a few more days, if this is not working hospice may be an option.    PATIENT SEEN BY PREVIOUS HOSPITALIST UNTIL 5/29 5/30: Patient seen at bedside this morning.  Patient was seen at bedside by me twice, however both times the patient was sleeping and very hard to wake up.  Unable to have a clear conversation with the patient.  I discussed with nursing and they were able to set her up with OT, however she went to go back to bed immediately.  It seems the patient is not able to eat appropriately, not able to move appropriately.  At 89 years old this are very high risk factors for declining.  Overall prognosis remains guarded.  No family members present at bedside, however I was able to discuss the case with the patient's granddaughters over the phone who might be interested in hospice but they need to talk to her mom.  I will meet with the patient's daughter tomorrow.  I have placed referral to hospice in the event that they would like hospice so we can move this faster.  Overall prognosis remains guarded.    I have discussed this patient's plan of care and discharge plan at IDT rounds today with Case Management, Nursing, Nursing leadership, and other members of the IDT team.    Consultants/Specialty  orthopedics- [facility]  Cardiology - [facility]    Code Status  DNAR/DNI    Disposition  The patient is not medically cleared for discharge to home or a post-acute facility.      I have placed the appropriate orders for post-discharge needs.    Review of Systems  Review of Systems   Unable to perform ROS: Critical illness   Patient sleeping twice that I went in  to see her, unable to wake her up.     Physical Exam  Temp:  [36.3 °C (97.4 °F)-37.1 °C (98.8 °F)] 36.3 °C (97.4 °F)  Pulse:  [61-78] 61  Resp:  [15-18] 16  BP: ()/(44-56) 93/44  SpO2:  [95 %-100 %] 100 %    Physical Exam  Vitals and nursing note reviewed.   Constitutional:       Appearance: She is ill-appearing.   HENT:      Head: Normocephalic and atraumatic.      Right Ear: External ear normal.      Left Ear: External ear normal.      Mouth/Throat:      Pharynx: No oropharyngeal exudate or posterior oropharyngeal erythema.   Eyes:      General:         Right eye: No discharge.         Left eye: No discharge.   Cardiovascular:      Rate and Rhythm: Normal rate and regular rhythm.      Pulses: Normal pulses.      Heart sounds: No murmur heard.     No gallop.   Pulmonary:      Effort: Respiratory distress present.      Breath sounds: Rhonchi present.      Comments: Requiring oxygen supplementation  Abdominal:      General: Bowel sounds are normal. There is no distension.      Palpations: Abdomen is soft.      Tenderness: There is no abdominal tenderness. There is no guarding.   Musculoskeletal:         General: No swelling or tenderness.      Cervical back: No tenderness.   Skin:     General: Skin is warm and dry.   Neurological:      Comments: Unable to assess due to clinical status   Psychiatric:      Comments: Unable to assess due to clinical status         Fluids    Intake/Output Summary (Last 24 hours) at 5/30/2023 1544  Last data filed at 5/30/2023 1100  Gross per 24 hour   Intake 230 ml   Output 550 ml   Net -320 ml         Laboratory  Recent Labs     05/30/23  0259   WBC 6.6   RBC 2.96*   HEMOGLOBIN 8.9*   HEMATOCRIT 30.0*   .4*   MCH 30.1   MCHC 29.7*   RDW 50.4*   PLATELETCT 315   MPV 9.0       Recent Labs     05/30/23  0259   SODIUM 141   POTASSIUM 4.4   CHLORIDE 106   CO2 23   GLUCOSE 89   BUN 32*   CREATININE 1.07   CALCIUM 9.5                     Imaging  DX-CHEST-PORTABLE (1 VIEW)   Final  Result      1.  Again seen cardiomegaly with interstitial infiltrates.      2.  Improved left lung base atelectasis.      DX-PELVIS-1 OR 2 VIEWS   Final Result      Recent left hip arthroplasty.      DX-CHEST-PORTABLE (1 VIEW)   Final Result      1.  Cardiomegaly with interstitial infiltrates.      2.  Bibasilar atelectasis.      EC-ECHOCARDIOGRAM COMPLETE W/O CONT   Final Result      DX-SHOULDER 2+ RIGHT   Final Result      1.  Osteoporosis.      2.  No evidence of right shoulder fracture and/or dislocation.      DX-HAND 3+ RIGHT   Final Result         1.  No radiographic evidence of acute injury.      2. Osteoporosis.      DX-HIP-COMPLETE - UNILATERAL 2+ RIGHT   Final Result      1.  Impacted left femoral neck fracture.      2.  Previous pinning of right hip      3.  Osteoporosis.      DX-HUMERUS 2+ LEFT   Final Result         1.  No radiographic evidence of acute injury.      2. Osteopenia.      DX-HAND 3+ LEFT   Final Result         1.  No radiographic evidence of acute injury.      2. Osteoporosis.      DX-FEMUR-2+ LEFT   Final Result      1.  Impacted left femoral neck fracture.      2.  Bone infarcts noted in the metadiaphyseal region of left femur.      CT-CHEST,ABDOMEN,PELVIS WITH   Final Result      1.  Left femoral neck fracture.      2.  Previous pinning of right hip.      3.  Infrarenal aortic aneurysm which measures 5.5 cm in greatest diameter.      4.  Osteoporosis.      5.  Sigmoid scoliosis of the thoracic and lumbar spine.      6.  Centrilobular emphysema.      7.  These findings were discussed with JOSÉ LUIS GARCIA at 10:15 p.m. 5/16/2023      CT-CSPINE WITHOUT PLUS RECONS   Final Result      1.  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      2.  Mild to moderate degenerative anterolisthesis at C4-5 level.      3.  Osteopenia.      4.  No evidence of acute cervical spine fracture.      CT-HEAD W/O   Final Result      1.   Cerebral atrophy.      2.  White matter lucencies most consistent with small vessel ischemic change versus demyelination or gliosis.      3.  Otherwise, Head CT without contrast with no acute findings. No evidence of acute cerebral infarction, hemorrhage or mass lesion.                Assessment/Plan  * Displaced fracture of left femoral neck (HCC)- (present on admission)  Assessment & Plan  -Hip x-ray showed impacted left femoral neck fracture  Underwent surgical fixation Dr. Bourne 5/17  Narcotics were initially discontinued due to confusion.  Continue with with scheduled Tylenol, ice, lidocaine patch for localized pain on lower extremity.  We will also add IV Dilaudid, for breakthrough pain-sherlyn with patient and daughter and they are in agreement with this.    Continue Dilaudid 0.25 mg IV-high risk medication, monitor for somnolence, altered mental status and respiratory depression.   Encourage ambulation and up to chair  Pain Seems controlled with scheduled Tylenol    5/30: Continue lidocaine patch and Tylenol.  We will try to avoid opiate medications.  Continue to monitor closely.  Family considering hospice.  Overall prognosis remains guarded.    Failure to thrive (child)  Assessment & Plan  Patient has had poor appetite, increased debility from prolonged hospitalization.  Appetite improving, patient still weak and unable to ambulate and work with physical therapy    5/30: Overall prognosis remains guarded, family considering hospice. Referral has been placed.    Orthostatic hypotension  Assessment & Plan  Per patient's granddaughter, has had this in the past.  RAYRAY hose  Continue with IV fluids  Monitor blood pressure when ambulating work with PT  If fails to resolve, may need midodrine  Within target range    5/30: Continue midodrine, overall prognosis remains guarded.    Acute metabolic encephalopathy  Assessment & Plan  Waxing and waning.    Multifactorial secondary to anesthetic medications, as well as  narcotics, pain, sundowning  Significant proved after narcotics were held  Continue supportive care  Normalize sleep-wake  Minimize lines/tubes  Cortisol and TSH within expected range   Mental status has been fluctuating, improved on 5/27.  No focal signs to suggest stroke.    5/30: Patient is sleeping today, very hard to wake up. No family members present at bedside at the time of my evaluation.    Chronic diastolic heart failure (HCC)- (present on admission)  Assessment & Plan  Seen on echo, she has mild bilateral pleural effusions on x-ray  Lasix discontinued, due to volume overload  Monitor    5/30: Monitor volume status    Anemia due to stage 3b chronic kidney disease (HCC)- (present on admission)  Assessment & Plan  Denies any bleeding, minimal blood loss during surgery  Monitor hemoglobin    5/30: Hb is stable, no signs of bleeding at this time.    Elevated troponin- (present on admission)  Assessment & Plan  Elevated and initially trended up very slightly, likely due to reduced renal clearance, has trended back down.  Never complained of any chest pain  EKG negative for ischemia  Not consistent with cardiac ischemia/event    Hypertensive urgency- (present on admission)  Assessment & Plan  Now resolved  Present on admission, likely due to pain as she does not take anything at home  Improved with IV hydralazine  Continue as needed BP medications and monitor      5/30: Patient with possible orthostatic hypotension, however unable to assess at this time. Patient has been started on midodrine, monitor closely.    Stage 3a chronic kidney disease (HCC)- (present on admission)  Assessment & Plan  -Creatinine has fluctuated throughout hospitalization, slightly worse today 1.45, will continue to monitor closely.  - Was on Lasix, which is likely contributing to MELANIE.  -Avoid nephrotoxic medication and monitor chemistry panel in the morning  Monitor with repeat labs   Improving    5/30: Improving, IVF stopped. Encourage  PO intake. Cr today is 1.07.    Acute respiratory failure with hypoxia (HCC)- (present on admission)  Assessment & Plan  Patient is on 2 L supplemental oxygen, baseline at home is 3L  Patient did not have acute respiratory failure, she has chronic respiratory failure.  Not overloaded.  I ordered and reviewed her chest x-ray which shows bilateral basilar small pleural effusions as well as atelectasis  Encourage IS  Mobilize as able TID to chair for meals-as tolerated    5/30: I suspect this is from atelectasis. Patient requiring 3 L of oxygen at the time of my evaluation.      S/P right mastectomy- (present on admission)  Assessment & Plan  -Due to underlying breast cancer in 1960s.    AAA (abdominal aortic aneurysm) (HCC)- (present on admission)  Assessment & Plan  CT showed infrarenal aortic aneurysm which measures 5.5 cm in the greatest diameter- has been present for years  -I provided extensive chart review on this. In 7/2015 AAA was 3.9 cm (Renal CT), and on 1/2017 AAA 4.2 cm (CT L Spine).  Blood pressure has improved  No chest pain, EKG is negative for acute ischemia.  Troponin increased slightly but now trended down before surgery    5/30: The patient will require close follow-up as an outpatient, at this point due to the patient's clinical status it would not be a good idea to pursue surgical options at this time.  I did speak to the patient's granddaughter and they are considering hospice but they would like to talk to the patient's daughter first.    Near syncope- (present on admission)  Assessment & Plan  She complained of blurred vision prior to stumbling over a stool leading to a fall  CT of the head on admission was unremarkable other than small vessel ischemic change, no focal neurodeficits to suggest CVA  No additional head imaging needed at this time  Echocardiogram performed and shows essentially a normal EF, diastolic dysfunction  Positive for orthostatic hypotension  RAYRAY hose  BP improved with RAYRAY  hose, but still with orthostatic hypotension  Midodrine 5 mg added on 5/28-improved blood pressure, orthostasis, patient still unable to get out of bed    5/30: continue midodrine         VTE prophylaxis: Heparin    I have performed a physical exam and reviewed and updated ROS and Plan today (5/30/2023). In review of yesterday's note (5/29/2023), there are no changes except as documented above.      Spend at least 51 minutes in care for this patient.  This included face-to-face interview with no family members present at bedside, physical examination.  Review of lab work including CBC and BMP and magnesium.  Review of previous notes.  Discussing at length with the patient's granddaughters over the phone discussing with multidisciplinary team including charge nurse, case management and pharmacy.  Creating plan of care, reviewing orders.

## 2023-05-30 NOTE — THERAPY
Physical Therapy   Daily Treatment     Patient Name: Yesi Garduno  Age:  89 y.o., Sex:  female  Medical Record #: [id number]  Today's Date: 5/30/2023     Precautions  Precautions: (P) Fall Risk;Posterior Hip Precautions;Weight Bearing As Tolerated Left Lower Extremity    Assessment    Pt continues to demonstrate poor progression with functional mobility and poor engagement with therapy services. Pt was initially able to participate in anterior weight shifts and sitting balance exercises, however, within 5 mins of sitting pt demonstrated with poor following and increased lethargy. Pt required 2 person assist for safety in order to use maria g steady for standing. Pt was able to demonstrate initiation with use of B UE in order to assist with standing mechanics. Maria G steady was used for dependent transfer to chair, as pt is unable to demonstrate any functional means for assisting in transfers and is a high risk for falling. Recommend post-acute placement for continued physical therapy services prior to discharge home. If pt does not demonstrate with meaningful progression in next few visits therapy may d/c due to poor engagement, lethargy, and like not benefiting from further therapy. Will continue to follow and update plan of care.     Plan    Treatment Plan Status: (P) Continue Current Treatment Plan  Type of Treatment: Bed Mobility, Gait Training, Therapeutic Activities, Therapeutic Exercise, Neuro Re-Education / Balance, Self Care / Home Evaluation  Treatment Frequency: Daily  Treatment Duration: Until Therapy Goals Met    DC Equipment Recommendations: (P) Unable to determine at this time  Discharge Recommendations: (P) Recommend post-acute placement for additional physical therapy services prior to discharge home    Objective       05/30/23 1011   Precautions   Precautions Fall Risk;Posterior Hip Precautions;Weight Bearing As Tolerated Left Lower Extremity   Vitals   O2 (LPM) 3   O2 Delivery Device Silicone Nasal  Cannula   Pain 0 - 10 Group   Location Hip;Back   Location Orientation Left   Description Aching   Therapist Pain Assessment Prior to Activity;During Activity;Post Activity;Nurse Notified  (c/o minimal pain of hip, not rated, also c/o back pain sitting position)   Cognition    Cognition / Consciousness X   Speech/ Communication Delayed Responses   Level of Consciousness Responds to voice   Ability To Follow Commands 1 Step   Safety Awareness Impaired   New Learning Impaired   Attention Impaired   Sequencing Impaired   Initiation Impaired   Comments pt appears to have slightly improved in confusion, however, continues to be very lethargic during therapy session, requires frequent stimulation and encouragement   Balance   Sitting Balance (Static) Fair   Sitting Balance (Dynamic) Fair -   Standing Balance (Static) Trace +   Standing Balance (Dynamic) Trace   Weight Shift Sitting Poor   Weight Shift Standing Absent   Skilled Intervention Verbal Cuing;Facilitation   Comments pt able to participate in anterior leans reaching with B UE for thearpist hands, requires use of 2 person assist and maria g steady for standing in order to use B UE.   Bed Mobility    Supine to Sit Maximal Assist   Sit to Supine Maximal Assist   Scooting Maximal Assist   Rolling Maximal Assist to Rt.;Maximum Assist to Lt.   Skilled Intervention Verbal Cuing;Facilitation   Comments HOB elevated and rails up   Gait Analysis   Gait Level Of Assist Unable to Participate   Weight Bearing Status WBAT L LE   Functional Mobility   Sit to Stand Maximal Assist   Bed, Chair, Wheelchair Transfer Total Assist   Transfer Method Other (Comments)  (maria g steady)   Mobility EOB, sit<>stand on maria g steady with use of BUE, transfer to chair with maria g steady   Skilled Intervention Verbal Cuing;Facilitation   Comments provided with manual facilaition for hip extension upon standing and VC to use B UE in order to pull to standing   ICU Target Mobility Level   ICU Mobility -  Targeted Level Level 1   How much difficulty does the patient currently have...   Turning over in bed (including adjusting bedclothes, sheets and blankets)? 1   Sitting down on and standing up from a chair with arms (e.g., wheelchair, bedside commode, etc.) 1   Moving from lying on back to sitting on the side of the bed? 1   How much help from another person does the patient currently need...   Moving to and from a bed to a chair (including a wheelchair)? 1   Need to walk in a hospital room? 1   Climbing 3-5 steps with a railing? 1   6 clicks Mobility Score 6   Activity Tolerance   Sitting in Chair left up in chair   Sitting Edge of Bed 10 mins   Standing 1 mins   Comments limited by poor following and lethargy during session   Patient / Family Goals    Patient / Family Goal #1 none stated   Short Term Goals    Short Term Goal # 1 pt will go supine<>sit w/hob elevated and rails up w/Min A in 6tx   Goal Outcome # 1 Progressing slower than expected   Short Term Goal # 2 pt will go sit<>stand w/fww w/mod A in 6tx   Goal Outcome # 2 Progressing slower than expected   Short Term Goal # 3 pt will transfer bed<>chair w/fww w/Mod A in 6tx   Goal Outcome # 3 Progressing slower than expected   Education Group   Hip Precautions Posterior Patient Response Reinforcement Needed;No Learning Evidence   Role of Physical Therapist Patient Response Reinforcement Needed   Physical Therapy Treatment Plan   Physical Therapy Treatment Plan Continue Current Treatment Plan   Anticipated Discharge Equipment and Recommendations   DC Equipment Recommendations Unable to determine at this time   Discharge Recommendations Recommend post-acute placement for additional physical therapy services prior to discharge home   Interdisciplinary Plan of Care Collaboration   IDT Collaboration with  Nursing   Patient Position at End of Therapy Seated;Call Light within Reach;Tray Table within Reach;Phone within Reach;Bed Alarm On   Collaboration Comments  aware of visit and recs   Session Information   Date / Session Number  5/30-10 (4/7, 5/31)   Priority   (.)

## 2023-05-30 NOTE — CARE PLAN
The patient is Stable - Low risk of patient condition declining or worsening    Shift Goals  Clinical Goals: Patient will report pain improvement after interventions this shift  Patient Goals: Rest comfortably      Progress made toward(s) clinical / shift goals:  Patient reports pain to be at a tolerable level in order to rest. Skin integrity maintained through positioning with pillows, turns, sacral mepilex, waffle mattress in place.     Patient is not progressing towards the following goals: N/A

## 2023-05-30 NOTE — PROGRESS NOTES
Bedside report received from night RN. Assumed care of patient. Pt resting in bed with eyes closed. Oriented x3-4 with confusion. Bergeron catheter in place draining clear, yellow urine. Denies pain at this time. Safety precautions in place. Hourly rounding ongoing.

## 2023-05-30 NOTE — CARE PLAN
The patient is Watcher - Medium risk of patient condition declining or worsening    Shift Goals  Clinical Goals: pain management through the shift  Patient Goals: rest and sleep comfortably  Family Goals: NA    Progress made toward(s) clinical / shift goals: Pt c/o pain when transferred to the chair, PRN pain medication administered per MAR with relief AEB pt sleeping in bed.     Patient is not progressing towards the following goals:      Problem: Knowledge Deficit - Standard  Goal: Patient and family/care givers will demonstrate understanding of plan of care, disease process/condition, diagnostic tests and medications  Description: Target End Date:  1-3 days or as soon as patient condition allows    Document in Patient Education    1.  Patient and family/caregiver oriented to unit, equipment, visitation policy and means for communicating concern  2.  Complete/review Learning Assessment  3.  Assess knowledge level of disease process/condition, treatment plan, diagnostic tests and medications  4.  Explain disease process/condition, treatment plan, diagnostic tests and medications  Outcome: Not Progressing

## 2023-05-31 PROBLEM — R62.7 FAILURE TO THRIVE IN ADULT: Status: ACTIVE | Noted: 2023-01-01

## 2023-05-31 NOTE — PROGRESS NOTES
"Hospital Medicine Daily Progress Note    Date of Service  5/31/2023    Chief Complaint  Yesi Garduno is a 89 y.o. female admitted 5/16/2023 with left hip pain after a fall    Hospital Course  As per chart review:  \"History of a AAA, breast cancer status post mastectomy, CKD stage IIIa, hypertension, not on any medications admitted after ground-level fall complaining of blurred vision prior to the fall and left hip pain after the fall.  She was found to have an impacted left femoral neck fracture.  Orthopedic surgery Dr. Ponce was consulted and recommended operative fixation.  She had a mildly elevated troponin which trended up slightly to 42 however then trended down.  Cardiac clearance was obtained\"    Interval Problem Update  5/17: Complaining of severe left hip pain, anxious for surgery.  Echocardiogram performed and reviewed and it shows some diastolic dysfunction however normal EF.  Cleared for surgery, discussed with cardiology.  Blood pressure improved after hydralazine and pain control.  N.p.o.  5/18: Uncomplicated surgery yesterday.  Today she is on 3 L, I reviewed her x-ray which shows bilateral small pleural effusion, atelectasis.  Fluids stopped, IV Lasix x1, I-S.  She is encephalopathic, IV narcotics discontinued.  Pending PT eval regarding SNF.  Labile blood pressure, as needed hydralazine added    5/22: Confused but improved from yesterday.  Cr stable, repeat lasix.  She would greatly benefit from SNF for pt and ot to help recover from fracture and surgery    5/23: Patient is alert, but somnolent.  Oriented and following directions at this time.  She has had poor appetite, likely due to delirium from hospitalization and pain medicines.  I discussed controlling pain, family and patient in agreement with small dose of Dilaudid and scheduled Tylenol.  We will continue to work with physical therapy for mobilization.  Patient would benefit most from skilled nursing facility, she has apparently " had a bad experience at [facility], and is declining this option.  She has been declined from other facilities due to cognition and level of assistance.  I do expect her level assistance and condition to continue to improve.  Will need to resend referrals once patient improves.    5/24: Patient's condition continues to improve, she is still somnolent, likely due to Dilaudid.  But tolerating this well so we will continue with scheduled Tylenol and as needed low-dose Dilaudid-for severe breakthrough pain.  I have also spoken with physical therapy and Occupational Therapy to reevaluate and work with patient, encourage out of bed to chair.  Now that patient has improved in both mobility and mental status, SNF referrals will be resent.  Discussed with case management and patient's family at bedside.    5/25: Patient alert, follows commands but confused and disoriented this morning.  Had episode of orthostatic hypotension yesterday while working with physical therapy which unfortunately affected her ability to work and ambulate.  Patient was treated with IV fluids, blood pressure better today.  We will also use RAYRAY hose.  PT to work with patient again today.    5/26: Patient not progressing as well as expected, will still continue to get out of bed to chair, work with PT/OT.  Discussed overall goals of care with patient's daughter today, if patient does not progress, may qualify for hospice.    5/27: Patient earlier this morning, reporting severe pain, but appears to be resting in bed.  Continues to overall plan, but able to participate in discussion.  Still very weak, physical therapy has been amenable to work with her out of bed due to profound weakness and confusion.  Will attempt again today.    5/28: Patient actually alert and oriented today, was able to get to the side of bed, but has profound orthostatic hypotension, SBP dropped to 70s, will add midodrine and says her orthostatic hypotension is not responsive to RAYRAY  hose or fluids, likely impeding ability to get out of bed to chair and ambulate.    5/29: Midodrine was added for orthostatic hypotension, blood pressures improved, however pt still very fatigued and unable to get out of bed.  If no improvement tomorrow, goals of care should be readdressed by family.  Per last closely discussion with daughter, doing this to try for a few more days, if this is not working hospice may be an option.    PATIENT SEEN BY PREVIOUS HOSPITALIST UNTIL 5/29 5/30: Patient seen at bedside this morning.  Patient was seen at bedside by me twice, however both times the patient was sleeping and very hard to wake up.  Unable to have a clear conversation with the patient.  I discussed with nursing and they were able to set her up with OT, however she went to go back to bed immediately.  It seems the patient is not able to eat appropriately, not able to move appropriately.  At 89 years old this are very high risk factors for declining.  Overall prognosis remains guarded.  No family members present at bedside, however I was able to discuss the case with the patient's granddaughters over the phone who might be interested in hospice but they need to talk to her mom.  I will meet with the patient's daughter tomorrow.  I have placed referral to hospice in the event that they would like hospice so we can move this faster.  Overall prognosis remains guarded.    5/31: Patient seen at bedside twice at least today.  Once without family members and the second time with family members present at bedside.  The patient today seems to be alert and oriented.  She could have waxing and waning altered mental status.  However today she is willing to work with physical therapy and is having an appetite.  Family members patient would like to pursue skilled nursing facility.  We appreciate further recommendations by case management.  Overall prognosis still remains guarded.    I have discussed this patient's plan of care  and discharge plan at IDT rounds today with Case Management, Nursing, Nursing leadership, and other members of the IDT team.    Consultants/Specialty  orthopedics- [facility]  Cardiology - [facility]    Code Status  DNAR/DNI    Disposition  The patient is not medically cleared for discharge to home or a post-acute facility.      I have placed the appropriate orders for post-discharge needs.    Review of Systems  Review of Systems   Constitutional:  Positive for malaise/fatigue. Negative for chills and fever.   HENT:  Negative for hearing loss and nosebleeds.    Eyes:  Negative for blurred vision and double vision.   Respiratory:  Negative for cough and shortness of breath.    Cardiovascular:  Negative for chest pain and palpitations.   Gastrointestinal:  Negative for abdominal pain, heartburn and vomiting.   Genitourinary:  Negative for dysuria and urgency.   Musculoskeletal:  Positive for joint pain.   Skin:  Negative for itching and rash.   Neurological:  Positive for weakness (generalized weakness). Negative for dizziness and headaches.   Psychiatric/Behavioral:  Negative for substance abuse. The patient is not nervous/anxious.    All other systems reviewed and are negative.  Patient sleeping twice that I went in to see her, unable to wake her up.     Physical Exam  Temp:  [36.6 °C (97.8 °F)-36.9 °C (98.5 °F)] 36.6 °C (97.8 °F)  Pulse:  [54-74] 66  Resp:  [15-18] 18  BP: (111-131)/(48-56) 114/49  SpO2:  [94 %-100 %] 100 %    Physical Exam  Vitals and nursing note reviewed.   Constitutional:       Appearance: She is ill-appearing.   HENT:      Head: Normocephalic and atraumatic.      Right Ear: External ear normal.      Left Ear: External ear normal.      Mouth/Throat:      Pharynx: No oropharyngeal exudate or posterior oropharyngeal erythema.   Eyes:      General:         Right eye: No discharge.         Left eye: No discharge.   Cardiovascular:      Rate and Rhythm: Normal rate and regular rhythm.      Pulses: Normal  pulses.      Heart sounds: No murmur heard.     No gallop.   Pulmonary:      Effort: Respiratory distress present.      Breath sounds: Rhonchi present.      Comments: Requiring oxygen supplementation  Abdominal:      General: Bowel sounds are normal. There is no distension.      Palpations: Abdomen is soft.      Tenderness: There is no abdominal tenderness. There is no guarding.   Musculoskeletal:         General: No swelling or tenderness.      Cervical back: No tenderness.   Skin:     General: Skin is warm and dry.   Neurological:      Mental Status: She is alert and oriented to person, place, and time.   Psychiatric:         Mood and Affect: Mood normal.         Behavior: Behavior normal.         Fluids    Intake/Output Summary (Last 24 hours) at 5/31/2023 1304  Last data filed at 5/31/2023 0813  Gross per 24 hour   Intake 240 ml   Output 700 ml   Net -460 ml         Laboratory  Recent Labs     05/30/23  0259 05/31/23  0548   WBC 6.6 7.2   RBC 2.96* 2.85*   HEMOGLOBIN 8.9* 8.8*   HEMATOCRIT 30.0* 28.9*   .4* 101.4*   MCH 30.1 30.9   MCHC 29.7* 30.4*   RDW 50.4* 49.5   PLATELETCT 315 297   MPV 9.0 8.7*         Recent Labs     05/30/23  0259 05/31/23  0548   SODIUM 141 141   POTASSIUM 4.4 5.0   CHLORIDE 106 107   CO2 23 29   GLUCOSE 89 109*   BUN 32* 35*   CREATININE 1.07 1.04   CALCIUM 9.5 9.3                       Imaging  DX-CHEST-PORTABLE (1 VIEW)   Final Result      1.  Again seen cardiomegaly with interstitial infiltrates.      2.  Improved left lung base atelectasis.      DX-PELVIS-1 OR 2 VIEWS   Final Result      Recent left hip arthroplasty.      DX-CHEST-PORTABLE (1 VIEW)   Final Result      1.  Cardiomegaly with interstitial infiltrates.      2.  Bibasilar atelectasis.      EC-ECHOCARDIOGRAM COMPLETE W/O CONT   Final Result      DX-SHOULDER 2+ RIGHT   Final Result      1.  Osteoporosis.      2.  No evidence of right shoulder fracture and/or dislocation.      DX-HAND 3+ RIGHT   Final Result          1.  No radiographic evidence of acute injury.      2. Osteoporosis.      DX-HIP-COMPLETE - UNILATERAL 2+ RIGHT   Final Result      1.  Impacted left femoral neck fracture.      2.  Previous pinning of right hip      3.  Osteoporosis.      DX-HUMERUS 2+ LEFT   Final Result         1.  No radiographic evidence of acute injury.      2. Osteopenia.      DX-HAND 3+ LEFT   Final Result         1.  No radiographic evidence of acute injury.      2. Osteoporosis.      DX-FEMUR-2+ LEFT   Final Result      1.  Impacted left femoral neck fracture.      2.  Bone infarcts noted in the metadiaphyseal region of left femur.      CT-CHEST,ABDOMEN,PELVIS WITH   Final Result      1.  Left femoral neck fracture.      2.  Previous pinning of right hip.      3.  Infrarenal aortic aneurysm which measures 5.5 cm in greatest diameter.      4.  Osteoporosis.      5.  Sigmoid scoliosis of the thoracic and lumbar spine.      6.  Centrilobular emphysema.      7.  These findings were discussed with JOSÉ LUIS GARCIA at 10:15 p.m. 5/16/2023      CT-CSPINE WITHOUT PLUS RECONS   Final Result      1.  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      2.  Mild to moderate degenerative anterolisthesis at C4-5 level.      3.  Osteopenia.      4.  No evidence of acute cervical spine fracture.      CT-HEAD W/O   Final Result      1.  Cerebral atrophy.      2.  White matter lucencies most consistent with small vessel ischemic change versus demyelination or gliosis.      3.  Otherwise, Head CT without contrast with no acute findings. No evidence of acute cerebral infarction, hemorrhage or mass lesion.                Assessment/Plan  * Displaced fracture of left femoral neck (HCC)- (present on admission)  Assessment & Plan  -Hip x-ray showed impacted left femoral neck fracture  Underwent surgical fixation Dr. Bourne 5/17  Narcotics were initially discontinued due to  confusion.  Continue with with scheduled Tylenol, ice, lidocaine patch for localized pain on lower extremity.  We will also add IV Dilaudid, for breakthrough pain-sherlyn with patient and daughter and they are in agreement with this.    Continue Dilaudid 0.25 mg IV-high risk medication, monitor for somnolence, altered mental status and respiratory depression.   Encourage ambulation and up to chair  Pain Seems controlled with scheduled Tylenol    5/30: Continue lidocaine patch and Tylenol.  We will try to avoid opiate medications.  Continue to monitor closely.  Family considering hospice.  Overall prognosis remains guarded.    5/31: The patient and the patient daughter at bedside would like to pursue skilled nursing facility.  We appreciate further recommendations by case management.    Failure to thrive in adult  Assessment & Plan  Patient has had poor appetite, increased debility from prolonged hospitalization.  Appetite improving, patient still weak and unable to ambulate and work with physical therapy    5/31: Overall prognosis remains guarded, family were considering hospice yesterday, however the patient is alert and oriented today.  They would like to pursue skilled nursing facility.    Orthostatic hypotension  Assessment & Plan  Per patient's granddaughter, has had this in the past.  RAYRAY hose  Continue with IV fluids  Monitor blood pressure when ambulating work with PT  If fails to resolve, may need midodrine  Within target range    5/31: Continue midodrine, overall prognosis remains guarded.    Acute metabolic encephalopathy  Assessment & Plan  Waxing and waning.    Multifactorial secondary to anesthetic medications, as well as narcotics, pain, sundowning  Significant proved after narcotics were held  Continue supportive care  Normalize sleep-wake  Minimize lines/tubes  Cortisol and TSH within expected range   Mental status has been fluctuating, improved on 5/27.  No focal signs to suggest stroke.    5/30:  Patient is sleeping today, very hard to wake up. No family members present at bedside at the time of my evaluation.    5/31: Patient seems to be alert and oriented today.  Her altered mental status could be waxing and waning.  Monitor closely.    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    Elevated troponin- (present on admission)  Assessment & Plan  Elevated and initially trended up very slightly, likely due to reduced renal clearance, has trended back down.  Never complained of any chest pain  EKG negative for ischemia  Not consistent with cardiac ischemia/event    Hypertensive urgency- (present on admission)  Assessment & Plan  Now resolved  Present on admission, likely due to pain as she does not take anything at home  Improved with IV hydralazine  Continue as needed BP medications and monitor      5/30: Patient with possible orthostatic hypotension, however unable to assess at this time. Patient has been started on midodrine, monitor closely.    5/31: Continue midodrine for now.    Stage 3a chronic kidney disease (HCC)- (present on admission)  Assessment & Plan  -Creatinine has fluctuated throughout hospitalization, slightly worse today 1.45, will continue to monitor closely.  - Was on Lasix, which is likely contributing to MELANIE.  -Avoid nephrotoxic medication and monitor chemistry panel in the morning  Monitor with repeat labs   Improving    5/30: Improving, IVF stopped. Encourage PO intake. Cr today is 1.07.    5/31: Cr is 1.04 today.    Acute respiratory failure with hypoxia (HCC)- (present on  admission)  Assessment & Plan  Patient is on 2 L supplemental oxygen, baseline at home is 3L  Patient did not have acute respiratory failure, she has chronic respiratory failure.  Not overloaded.  I ordered and reviewed her chest x-ray which shows bilateral basilar small pleural effusions as well as atelectasis  Encourage IS  Mobilize as able TID to chair for meals-as tolerated    5/30: I suspect this is from atelectasis. Patient requiring 3 L of oxygen at the time of my evaluation.    5/31: Continue IS for now.      S/P right mastectomy- (present on admission)  Assessment & Plan  -Due to underlying breast cancer in 1960s.    AAA (abdominal aortic aneurysm) (HCC)- (present on admission)  Assessment & Plan  CT showed infrarenal aortic aneurysm which measures 5.5 cm in the greatest diameter- has been present for years  -I provided extensive chart review on this. In 7/2015 AAA was 3.9 cm (Renal CT), and on 1/2017 AAA 4.2 cm (CT L Spine).  Blood pressure has improved  No chest pain, EKG is negative for acute ischemia.  Troponin increased slightly but now trended down before surgery    5/30: The patient will require close follow-up as an outpatient, at this point due to the patient's clinical status it would not be a good idea to pursue surgical options at this time.  I did speak to the patient's granddaughter and they are considering hospice but they would like to talk to the patient's daughter first.    5/31: I spoke to the patient's daughter and the patient at bedside today, for now they would not like to pursue surgical intervention for this, she will require close follow-up as an outpatient.    Near syncope- (present on admission)  Assessment & Plan  She complained of blurred vision prior to stumbling over a stool leading to a fall  CT of the head on admission was unremarkable other than small vessel ischemic change, no focal neurodeficits to suggest CVA  No additional head imaging needed at this  time  Echocardiogram performed and shows essentially a normal EF, diastolic dysfunction  Positive for orthostatic hypotension  RAYRAY hose  BP improved with RAYRAY hose, but still with orthostatic hypotension  Midodrine 5 mg added on 5/28-improved blood pressure, orthostasis, patient still unable to get out of bed    5/31: continue midodrine         VTE prophylaxis: Heparin    I have performed a physical exam and reviewed and updated ROS and Plan today (5/31/2023). In review of yesterday's note (5/30/2023), there are no changes except as documented above.      Spend at least 52 minutes in care for this patient.  This included face-to-face interview, physical examination.  Review of lab work including CBC and BMP. I evaluated the patient twice today, once without family members present and the second time with daughter and granddaughter present. We had a long family meeting today regarding further plan of care.  Discussion with multidisciplinary team including charge nurse, case management and pharmacy.  Creating plan of care, reviewing orders.

## 2023-05-31 NOTE — PROGRESS NOTES
Bedside report received from JESUS Guerrero. Assumed care of patient. Daily plan of care discussed. Pt resting comfortably in bed with no signs of distress noted. Breathing even and unlabored. Patient currently on 3L O2. Patient reports no needs at this time. Call light within reach. Bed locked in lowest position. Hourly rounding in place. Family to have meeting with Dr. Wiggins at 11am this morning.     0816: Patient currently A&OX4

## 2023-05-31 NOTE — CARE PLAN
The patient is Stable - Low risk of patient condition declining or worsening    Shift Goals  Clinical Goals: Free from falls or injuries, pain controlled at 3/10, rest and sleep at least 4 hours  Patient Goals: Free from falls or injuries, pain controlled at 3/10, rest and sleep at least 4 hours  Family Goals: Free from falls or injuries, pain controlled at 3/10, rest and sleep at least 4 hours    Progress made toward(s) clinical / shift goals:  Free from falls or injuries, pain controlled at 3/10 with current pain regimen, rested and slept at least 4 hours    Patient is not progressing towards the following goals:

## 2023-05-31 NOTE — PROGRESS NOTES
Received bedside patient report from JESUS Hernandez. Patient resting comfortably in bed, no complaints at this time. Safety precautions in place. Will continue to monitor.

## 2023-05-31 NOTE — THERAPY
Physical Therapy   Daily Treatment     Patient Name: Yesi Garduno  Age:  89 y.o., Sex:  female  Medical Record #: [id number]  Today's Date: 5/31/2023          Assessment    Much more awake today able to move both legs over side of the bed   B/P 86/44 when sitting and standing    Plan    Treatment Plan Status: Continue Current Treatment Plan  Type of Treatment: Bed Mobility, Gait Training, Therapeutic Activities, Therapeutic Exercise, Neuro Re-Education / Balance, Self Care / Home Evaluation  Treatment Frequency: Daily  Treatment Duration: Until Therapy Goals Met    DC Equipment Recommendations: Unable to determine at this time  Discharge Recommendations: Recommend post-acute placement for additional physical therapy services prior to discharge home     Objective       05/31/23 1300   Session Information   Date / Session Number  5/31-11 5/7 5/31

## 2023-05-31 NOTE — DISCHARGE PLANNING
Case Management Discharge Planning    Admission Date: 5/16/2023  GMLOS: 6.2  ALOS: 15    6-Clicks ADL Score: 10  6-Clicks Mobility Score: 6  PT and/or OT Eval ordered: Yes  Post-acute Referrals Ordered: Yes  Post-acute Choice Obtained: Yes  Has referral(s) been sent to post-acute provider:  Yes      Anticipated Discharge Dispo: Discharge Disposition: D/T to SNF with Medicare cert in anticipation of skilled care (03)    DME Needed: Unable to determine at this time.    Action(s) Taken: Updated Provider/Nurse on Discharge Plan, Choice obtained, Referral(s) sent, and OTHER: 1130: RN CHARLI received phone call from patient's daughter, Lay Avila [phone], requesting an update on the status of SNF referrals that were sent. RN CHARLI reviewed each referral sent and reasons for denial. She asks that a referral be resent to all facilities again and that a new referral be sent to [facility]. Referrals sent per request.    Escalations Completed: Provider, Pending Discharge Destination, Bedside RN, and Leadership    Medically Clear: No    Next Steps: Continue to monitor for changes and update the discharge plan accordingly. Follow-up with the Attending and Bedside RN for any issues/concerns and assist as indicated. Follow-up with [facility] regarding any SNF acceptances received.    Barriers to Discharge: Medical clearance and Pending Placement    Is the patient up for discharge tomorrow: No

## 2023-05-31 NOTE — ASSESSMENT & PLAN NOTE
Patient has had poor appetite, increased debility from prolonged hospitalization.  Appetite improving, patient still weak and unable to ambulate and work with physical therapy    5/31: Overall prognosis remains guarded, family were considering hospice yesterday, however the patient is alert and oriented today.  They would like to pursue skilled nursing facility.    6/2: Pending placement.

## 2023-06-01 NOTE — CARE PLAN
Problem: Pain - Standard  Goal: Alleviation of pain or a reduction in pain to the patient’s comfort goal  Description: Target End Date:  Prior to discharge or change in level of care    Document on Vitals flowsheet    1.  Document pain using the appropriate pain scale per order or unit policy  2.  Educate and implement non-pharmacologic comfort measures (i.e. relaxation, distraction, massage, cold/heat therapy, etc.)  3.  Pain management medications as ordered  4.  Reassess pain after pain med administration per policy  5.  If opiods administered assess patient's response to pain medication is appropriate per POSS sedation scale  6.  Follow pain management plan developed in collaboration with patient and interdisciplinary team (including palliative care or pain specialists if applicable)  Outcome: Progressing   The patient is Stable - Low risk of patient condition declining or worsening    Shift Goals  Clinical Goals: ambulate safely within the shift, remain free from falls throughout the shift, maintain spo2 not less than 90%  Patient Goals: move and rest comfortably  Family Goals: n/a    Progress made toward(s) clinical / shift goals:  Patient was able to ambulate safely within the shift and sat on the chair for meal. Patient was able to remain free from falls throughout the shift. Patient was able to maintain spo2 not less than 90% with 1lpm va NC.     Patient is not progressing towards the following goals: n/a

## 2023-06-01 NOTE — WOUND TEAM
[facility]  Inpatient Services  Initial Wound and Skin Care Evaluation    Admission Date: 2023     Last order of IP CONSULT TO WOUND CARE was found on 2023 from Hospital Encounter on 2023     HPI, PMH, SH: Reviewed    Past Surgical History:   Procedure Laterality Date    PB PARTIAL HIP REPLACEMENT Left 2023    Procedure: HEMIARTHROPLASTY, HIP;  Surgeon: David Siu M.D.;  Location: SURGERY [facility];  Service: Orthopedics    NC EXC SKIN MALIG 2.1-3CM REMAINDR BODY Left 2023    Procedure: LEFT FOOT MELANOMA RADICAL EXCISION, GREAT TOE PARTIAL AMPUTATION, REPAIRS AS INDICATED INCLUDING SOFT TISSUE REARRANGEMENT;  Surgeon: Saroj Doe M.D.;  Location: [facility];  Service: Orthopedics    PB AMPUTATION TOE,I-P JT  2023    Procedure: AMPUTATION, TOE;  Surgeon: Saroj Doe M.D.;  Location: [facility];  Service: Orthopedics    PB ADJ TISS XFER HEAD,FAC,HAND <10SQCM  2023    Procedure: EXCISION, MASS, LOWER EXTREMITY, WITH ROTATION FLAP RECONSTRUCTION;  Surgeon: Saroj Doe M.D.;  Location: [facility];  Service: Orthopedics    BREAST BIOPSY      MASTECTOMY      right side    OTHER      mastectomy     Social History     Tobacco Use    Smoking status: Former     Packs/day: 0.25     Years: 34.00     Pack years: 8.50     Types: Cigarettes     Quit date: 1985     Years since quittin.4     Passive exposure: Past    Smokeless tobacco: Never   Vaping Use    Vaping Use: Never used   Substance Use Topics    Alcohol use: Yes     Alcohol/week: 4.2 oz     Types: 7 Shots of liquor per week     Comment: sip of wine     Chief Complaint   Patient presents with    Fall    Hip Pain     [facility], pt had a mechanical ground level fall yesterday, and today had a fall again and sustained hip pain bilaterally. Patient can't remember if she had LOC or  hit head. Pt's granddaughter reports when she arrived to her grandmother's house pt already lying on the floor, awake, complaining of left hip pain. Pt received Fentanyl 150 mcg IV, Versed 2 mg IV. Pt's IV site as per EMS blew.      Diagnosis: Displaced fracture of left femoral neck (HCC) [S72.002A]    Unit where seen by Wound Team: 1110/01     WOUND CONSULT/FOLLOW UP RELATED TO:  BL ankles, L lateral foot, & L heel     WOUND HISTORY:  89 y.o .female admitted for GLF.  5/17/23 L hip hemiarthroplasty with Dr. Siu.  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    WOUND ASSESSMENT/LDA  Skin Risk/Perry   Sensory Perception: Slightly Limited  Moisture: Occasionally Moist  Activity: Chairfast  Mobility: Very Limited  Nutrition: Adequate  Friction and Shear: Potential Problem  Total Score: 15  Skin Breakdown Risk: 13-17 Moderate Risk for Skin Breakdown    Sensory Interventions  Bed Types: Standard Mattress with Overlay  Skin Preventative Measures: Pillows in Use for Support / Positioning, Waffle Overlay, Heel Protectors in Use   Skin Preventative Dressing: Foam Heel Protector  Moisturizers/Barriers: Barrier Paste  PT / OT Involved in Care: Physical Therapy Involved, Occupational Therapy Involved  Activity : Bed, Cardiac chair  Patient Turns / Repositioning: Sitting Up in Chair  Patient is Receiving Nutrition: Oral Intake Adequate  Nutrition Consult Ordered: No, Consult has Already been Placed  Vitamin Therapy in Use: No  Friction Interventions: Draw Sheet / Pad Used for Repositioning, Sacral Foam Dressing in Use                         Wound 05/17/23  Distal Left SUTURES AND STERI STRIPS IN PLACE (Active)   Wound Image    05/17/23 1700   Site Assessment Clean;Dry;Intact 05/31/23 1931   Periwound Assessment Dry;Clean;Intact 06/01/23 1000   Margins NANCY 06/01/23 1000   Closure Closure strips 06/01/23 1000   Drainage Amount None 06/01/23 1000   Dressing  Options Open to Air;Steri-Strip 06/01/23 1000   Dressing Changed Observed 06/01/23 1000   Dressing Status Clean;Dry;Intact 06/01/23 1000       Wound 05/17/23 Incision Hip Left aquacel (Active)   Site Assessment NANCY 06/01/23 1000   Periwound Assessment NANCY 06/01/23 1000   Margins NANCY 06/01/23 1000   Closure None 06/01/23 1000   Drainage Amount None 06/01/23 1000   Drainage Description Sanguineous 06/01/23 1000   Treatments Ice applied 05/28/23 1928   Dressing Options Other (Comments) 05/31/23 1931   Dressing Changed Observed 06/01/23 1000   Dressing Status Clean;Dry;Intact 06/01/23 1000   Dressing Change/Treatment Frequency As Needed 06/01/23 1000       Wound 05/31/23 Pressure Injury BL Ankle & L lateral foot sDTI (Truong hose) (Active)   Wound Image     06/01/23 1600   Site Assessment Purple;Dry    Periwound Assessment Clean;Dry;Intact    Margins Defined edges;Attached edges    Closure Secondary intention    Drainage Amount None    Treatments Site care;Offloading    Wound Cleansing Normal Saline Irrigation    Periwound Protectant Not Applicable    Dressing Cleansing/Solutions Not Applicable    Dressing Options Mepilex    Dressing Changed Changed    Dressing Status Clean;Dry;Intact    Dressing Change/Treatment Frequency Every 72 hrs, and As Needed    NEXT Dressing Change/Treatment Date 06/04/23    NEXT Weekly Photo (Inpatient Only) 06/08/23    WOUND NURSE ONLY - Pressure Injury Stage DTPI    Wound Length (cm) 0.2 cm 06/01/23 1600   Wound Width (cm) 7.5 cm 06/01/23 1600   Wound Surface Area (cm^2) 1.5 cm^2 06/01/23 1600   Shape Linear    Wound Odor None    Exposed Structures NANCY    WOUND NURSE ONLY - Time Spent with Patient (mins) 30        Wound 06/01/23 Pressure Injury Heel Left sDTI (Active)   Wound Image   06/01/23 1600   Site Assessment Purple;Brown;Red    Periwound Assessment Pink;Blistered    Closure Adhesive bandage    Drainage Amount None    Treatments Offloading    Wound Cleansing Not Applicable    Periwound  Protectant Not Applicable    Dressing Cleansing/Solutions Not Applicable    Dressing Options Mepilex Heel    Dressing Changed Changed    Dressing Status Clean;Dry;Intact    Dressing Change/Treatment Frequency Every 72 hrs, and As Needed    NEXT Dressing Change/Treatment Date 06/04/23    NEXT Weekly Photo (Inpatient Only) 06/08/23    WOUND NURSE ONLY - Pressure Injury Stage DTPI    Wound Length (cm) 4 cm 06/01/23 1600   Wound Width (cm) 6.5 cm 06/01/23 1600   Wound Surface Area (cm^2) 26 cm^2    Shape Irregular    Wound Odor None    Pulses N/A    Exposed Structures NANCY      Vascular:    JOJO:   No results found.    Lab Values:    Lab Results   Component Value Date/Time    WBC 7.2 05/31/2023 05:48 AM    RBC 2.85 (L) 05/31/2023 05:48 AM    HEMOGLOBIN 8.8 (L) 05/31/2023 05:48 AM    HEMATOCRIT 28.9 (L) 05/31/2023 05:48 AM    SEDRATEWES 47 (H) 03/03/2017 12:14 PM    HBA1C 5.6 01/20/2017 11:09 AM        Culture Results show:  No results found for this or any previous visit (from the past 720 hour(s)).    Pain Level/Medicated:  Denies pain       INTERVENTIONS BY WOUND TEAM:  Chart and images reviewed. Discussed with bedside RN. All areas of concern (based on picture review, LDA review and discussion with bedside RN) have been thoroughly assessed. Documentation of areas based on significant findings. This RN in to assess patient. Performed standard wound care which includes appropriate positioning, dressing removal and non-selective debridement. Pictures and measurements obtained weekly if/when required.  Preparation for Dressing removal: Dressings removed without difficulty  Non-selectively Debrided with:  NS and gauze.  Sharp debridement: NA  Mónica wound: Cleansed with NS  Primary Dressing: Offloading adhesive foams  Secondary (Outer) Dressing: NA    Advanced Wound Care Discharge Planning  Number of Clinicians necessary to complete wound care: 1  Is patient requiring IV pain medications for dressing changes: No  Length of  time for dressing change 10 min. (This does not include chart review, pre-medication time, set up, clean up or time spent charting.)    Interdisciplinary consultation: Patient, Bedside RN, Janiya (Wound Tech)    EVALUATION / RATIONALE FOR TREATMENT:  Most Recent Date:  6/1/23: Patient with sDTI to BL ankles, L lateral foot, and L heel. Expect that the wounds will likely worsen and become open prior to improving. Continue to offload and protect areas from further injury.  Sacrum intact with areas of friction-related excoriation to buttocks, offloading adhesive foams already in place.     Goals: Steady decrease in wound area and depth weekly.    WOUND TEAM PLAN OF CARE ([X] for frequency of wound follow up,):   Nursing to follow dressing orders written for wound care. Contact wound team if area fails to progress, deteriorates or with any questions/concerns if something comes up before next scheduled follow up (See below as to whether wound is following and frequency of wound follow up)  Dressing changes by wound team:                   Follow up 3 times weekly:                NPWT change 3 times weekly:     Follow up 1-2 times weekly:    X BL ankles, L lateral foot, L heel  Follow up Bi-Monthly:           Follow up Monthly (High Risk):                        Follow up as needed:   X Sacrum  Other (explain):     NURSING PLAN OF CARE ORDERS (X):  Dressing changes: See Dressing Care orders: X  Skin care: See Skin Care orders:   RN Prevention Protocol: X  Rectal tube care: See Rectal Tube Care orders:   Other orders:    RSKIN:   CURRENTLY IN PLACE (X), APPLIED THIS VISIT (A), ORDERED (O):   Q shift Perry:  X  Q shift pressure point assessments:  X    Surface/Positioning   Standard Mattress/Trauma Bed   X       Low Airloss        O  ICU Low Airloss   Bariatric LEONA     Waffle cushion        Waffle Overlay        X Waffle was half-inflated  Reposition q 2 hours    X  TAPs Turning system   O  Z Yordy Pillow      Offloading/Redistribution   Sacral Offloading Dressing (Silicone dressing)   X  Heel Offloading Dressing (Silicone dressing)       X  Heel float boots (Prevalon boot)           O  Float Heels off Bed with Pillows           Respiratory   Silicone O2 tubing       X  Gray Foam Ear protectors   X  Cannula fixation Device (Tender )          High flow offloading Clip    Elastic head band offloading device      Anchorfast                                                         Trach with Optifoam split foam             Containment/Moisture Prevention NA    Rectal tube or BMS    Purwick/Condom Cath        Bergeron Catheter    Barrier wipes           Barrier paste       Antifungal tx      Interdry        Mobilization       Up to chair      X  Ambulate      PT/OT  X    Nutrition       Dietician        Diabetes Education      PO  X   TF     TPN     NPO   # days     Anticipated discharge plans: TBD  LTACH:        SNF/Rehab:                  Home Health Care:           Outpatient Wound Center:            Self/Family Care:        Other:                  Vac Discharge Needs:   Vac Discharge plan is purely a recommendation from wound team and not a requirement for discharge unless otherwise stated by physician.  Not Applicable Pt not on a wound vac:     X  Regular Vac while inpatient, alternative dressing at DC:        Regular Vac in use and continued at DC:            Reg. Vac w/ Skin Sub/Biologic in use. Will need to be changed 2x wkly:      Veraflo Vac while inpatient, ok to transition to Regular Vac on Discharge (Bedside RN to Clamp small instillation tubing at time of DC):           Veraflo Vac while inpatient, would benefit from remaining on Veraflo Vac upon discharge:

## 2023-06-01 NOTE — PROGRESS NOTES
Received report from day shift nurse. Assumed pt care at 1915. Pt is A&Ox4, resting comfortably in bed. Pt on 3 LPM O2 via NC. Bergeron attached to urobag draining well. No signs of SOB/respiratory distress. Labs noted, VSS. Fall precautions in place. Bed at lowest position. Call light and personal belongings within reach. Continue to monitor.

## 2023-06-01 NOTE — CARE PLAN
The patient is Stable - Low risk of patient condition declining or worsening    Shift Goals  Clinical Goals: Free from fall throughout the shift, q2 turns to prevent pressure sore  Patient Goals: Sleep comfortably tonight  Family Goals: n/a    Progress made toward(s) clinical / shift goals:  Fall precautions in place, Q2 turns done, hourly rounding done.    Patient is not progressing towards the following goals: n/a

## 2023-06-01 NOTE — THERAPY
Physical Therapy   Daily Treatment     Patient Name: Yesi Garduno  Age:  89 y.o., Sex:  female  Medical Record #: [id number]  Today's Date: 6/1/2023     Precautions  Precautions: (P) Fall Risk;Posterior Hip Precautions;Weight Bearing As Tolerated Left Lower Extremity    Assessment    Pt has demonstrated with significant improvement in mental status and participation with therapy. Pt is able to demonstrate with improved following along with motivation. Pt was able to improve bed mobility, sitting balance, and overall activity tolerance. Pt was able to respond well to supine and seated therapeutic exercises and was able to demonstrate with improved strength in B LE along with AROM. Pt was able to participate in multiple sit<>stands and engage in pre-gait activities. Pt was able to take a few side steps, however, unable to demo functional steps for transfers or ambulation at this time. Pt was able to improve ability to transfer to chair with only use of gait belt and take shuffling steps over to the chair. Recommend post-acute placement for continued physical therapy services prior to discharge home.       Plan    Treatment Plan Status: (P) Continue Current Treatment Plan  Type of Treatment: Bed Mobility, Gait Training, Therapeutic Activities, Therapeutic Exercise, Neuro Re-Education / Balance, Self Care / Home Evaluation  Treatment Frequency: Daily  Treatment Duration: Until Therapy Goals Met    DC Equipment Recommendations: (P) Unable to determine at this time  Discharge Recommendations: (P) Recommend post-acute placement for additional physical therapy services prior to discharge home    Objective       06/01/23 1128   Precautions   Precautions Fall Risk;Posterior Hip Precautions;Weight Bearing As Tolerated Left Lower Extremity   Vitals   O2 (LPM) 1   O2 Delivery Device Silicone Nasal Cannula   Pain 0 - 10 Group   Location Hip;Back   Location Orientation Left   Description Aching   Therapist Pain Assessment Prior  to Activity;During Activity;Post Activity;Nurse Notified;3   Cognition    Cognition / Consciousness WDL   Level of Consciousness Alert   Comments pt demonstrates with signifcant improvement in mental status, appears to be back to her baseline   Supine Lower Body Exercise   Supine Lower Body Exercises Yes   Straight Leg Raises 1 set of 10;Left  (with active assist)   Heel Slide 1 set of 10;Bilateral   Ankle Pumps Reciprocal;1 set of 10   Gluteal Isometrics 1 set of 10;Bilateral   Quadriceps Isometrics 1 set of 10;Left   Sitting Lower Body Exercises   Sitting Lower Body Exercises Yes   Long Arc Quad 1 set of 10;Bilateral   Standing Lower Body Exercises   Standing Lower Body Exercises Yes   Marching   (5 reps x2 with assist for weight shifting)   Neuro-Muscular Treatments   Neuro-Muscular Treatments Anterior weight shift;Weight Shift Left;Weight Shift Right;Postural Facilitation;Postural Changes   Comments provided with frequent anterior weight shift upon standing and lateral weight shifts during side steps and marching in place   Neurological Concerns   Neurological Concerns No   Balance   Sitting Balance (Static) Fair +   Sitting Balance (Dynamic) Fair   Standing Balance (Static) Poor +   Standing Balance (Dynamic) Poor   Weight Shift Sitting Fair   Weight Shift Standing Poor   Skilled Intervention Verbal Cuing;Postural Facilitation;Facilitation;Compensatory Strategies   Comments w/fww, pt had demonstrated with improved upright sitting balance, requiring no physical assist from therapist   Bed Mobility    Supine to Sit Moderate Assist   Scooting Maximal Assist   Rolling Maximal Assist to Rt.;Maximum Assist to Lt.   Skilled Intervention Verbal Cuing;Sequencing;Facilitation   Comments HOB elevated and rails up, continues to require assist to bring trunk to sitting, however, is able to mobilize B LE to EOB   Gait Analysis   Gait Level Of Assist Maximal Assist   Assistive Device Front Wheel Walker   Distance (Feet) 2   #  of Times Distance was Traveled 1   Deviation Shuffled Gait;Decreased Heel Strike;Decreased Toe Off;Decreased Base Of Support   Weight Bearing Status WBAT L LE   Skilled Intervention Verbal Cuing;Sequencing;Facilitation   Comments pt provided with VC and manual weight shifts in order to complete 1-2 side steps at EOB, unable to demo functionals steps for transfer at this time   Functional Mobility   Sit to Stand Maximal Assist   Bed, Chair, Wheelchair Transfer Maximal Assist   Transfer Method Stand Step   Mobility EOB, sit<>stand x3, marching, side steps, transfer to chair   Skilled Intervention Verbal Cuing;Sequencing;Compensatory Strategies   Comments provided with VC and sequencing for appropriate standing mechancis and cues to reach for arm rest prior to sitting   How much difficulty does the patient currently have...   Turning over in bed (including adjusting bedclothes, sheets and blankets)? 2   Sitting down on and standing up from a chair with arms (e.g., wheelchair, bedside commode, etc.) 1   Moving from lying on back to sitting on the side of the bed? 1   How much help from another person does the patient currently need...   Moving to and from a bed to a chair (including a wheelchair)? 1   Need to walk in a hospital room? 1   Climbing 3-5 steps with a railing? 1   6 clicks Mobility Score 7   Activity Tolerance   Sitting in Chair left up in chair, RN aware   Sitting Edge of Bed 10 mins   Standing 1 mins x 3   Comments limited by fatigue and weakness   Patient / Family Goals    Patient / Family Goal #1 none stated   Short Term Goals    Short Term Goal # 1 pt will go supine<>sit w/hob elevated and rails up w/Min A in 6tx   Goal Outcome # 1 Progressing as expected   Short Term Goal # 2 pt will go sit<>stand w/fww w/mod A in 6tx   Goal Outcome # 2 Progressing as expected   Short Term Goal # 3 pt will transfer bed<>chair w/fww w/Mod A in 6tx   Goal Outcome # 3 Progressing slower than expected   Education Group    Hip Precautions Posterior Patient Response Reinforcement Needed;No Learning Evidence   Role of Physical Therapist Patient Response Reinforcement Needed   Physical Therapy Treatment Plan   Physical Therapy Treatment Plan Continue Current Treatment Plan   Anticipated Discharge Equipment and Recommendations   DC Equipment Recommendations Unable to determine at this time   Discharge Recommendations Recommend post-acute placement for additional physical therapy services prior to discharge home   Interdisciplinary Plan of Care Collaboration   IDT Collaboration with  Nursing   Patient Position at End of Therapy Seated;Call Light within Reach;Tray Table within Reach;Phone within Reach   Collaboration Comments aware of visit and recs   Session Information   Date / Session Number  6/1-12 (1/7, 6/8)

## 2023-06-01 NOTE — DISCHARGE PLANNING
0810: RN CM reviewed patient's chart and noted there are no accepting SNFs at this time from the referrals that were resent on 05/31/2023. Two pending SNFs remain without an answer. RN CM requested [facility] follow-up to obtain an answer (acceptance or declination) to assist with further discharge planning.     1010: RN CM received update from [facility] that all SNFs had been contacted and they have declined based on not progressing in therapy, non-contracted provider, unable to follow one step commands, and bed not available (this facility reported being contacted directly by the family for a bed with a possible plan for hospice). RN CM notified Attending, Bedside RN, and Charge RN. Attending requests to send out additional SNF referrals to outside area. RN CM reached out to [facility] to assist with expanding referrals to [facility] and all [facility] SNFs.    1110: RN CM received call from Anabel at [facility] [place], indicating they are able to accept the patient for SNF and can submit for insurance authorization if family is agreeable.    1115: RN CM made phone call to patient's daughter, Nina Avila, to discuss discharge plan, status of referrals resent to SNFs, and the acceptance received fro [facility]. Nina indicates the drive to [facility] is not ideal but is agreeable with pursuing insurance authorization for the accepting SNF.     1220: RN CM notified Attending, Bedside RN, and Charge RN of the acceptance and of [facility] planning to submit for authorization.    1230: JESUS AUGUSTIN notified Shelby SHAFFER, CM Leadership, of the acceptance and pending insurance authorization.    1420: RN CM received a phone call from [facility] stating that the insurance authorization had been declined and that the insurance was offering a [id number] between the Attending and their Medical Director.     1430: RN CM notified the Attending of the denial and obtained a  contact number to provide the insurance company to perform the [id number] review.     1515: RN CM made phone call to CM at [facility] to provide Attending's contact number for the [id number]. She indicates a plan to provide to the Medical Director to call the Attending to discuss.    1615: RN CM received call from Attending stating he received a call from the insurance Medical Director for a [id number] and that they are reporting not getting the PT/OT notes to review. The Attending asked if they can be sent and indicates this may be a reason for the denial.     1620: JESUS AUGUSTIN made call to CHARLI Crawford with the insurance to ask for a direct fax number to send the PT/OT notes for review. No answer; therefore, a message was left for a return call. RN CM then made a call to Reny at [facility] to inform of the attempted [id number] between the Attending and Medical Director for the insurance. Reny reports the entire referral packet they received was sent to the insurance for review and authorization. She requests an updated referral packet be sent so they can forward to the insurance for review. RN CM sent updated referral packet, to include today's therapy notes, for authorization. Attending notified.    1625: RN CM received notification from the [facility] that [facility] has accepted the patient for SNF. However, she reports they will return a call tomorrow to verify if an insurance authorization is needed for post-acute placement in their facility.

## 2023-06-01 NOTE — PROGRESS NOTES
Received bedside report from JESUS Tobin. Pt is A&O 4. Pt has no complaints of pain. Educated on pain medication use. Denies N/V as of assessment. Dressing to left hip CDI and CMS noted. Safety precaution in place. All needs met at this time. Hourly rounding in place.

## 2023-06-01 NOTE — THERAPY
Occupational Therapy  Daily Treatment     Patient Name: Yesi Garduno  Age:  89 y.o., Sex:  female  Medical Record #: [id number]  Today's Date: 6/1/2023     Precautions  Precautions: (P) Fall Risk, Posterior Hip Precautions, Weight Bearing As Tolerated Left Lower Extremity    Assessment    Pt required Mod assist supine to EOB sitting, Max assist x 2 sit/stand, Max assist x 2 bed/chair transfer, Mod I seated G&H, Total assist LB clothing management, Mod I UB clothing management, Sup BUE TherEx seated via theraband.  Therapist reviewed/educated pt on environmental/safety awareness, fall precautions, posterior hip precautions, AE/DME, ADL's and transfers.    Plan    Treatment Plan Status: (P) Continue Current Treatment Plan  Type of Treatment: Self Care / Activities of Daily Living, Neuro Re-Education / Balance, Therapeutic Activity  Treatment Frequency: 4 Times per Week  Treatment Duration: Until Therapy Goals Met    DC Equipment Recommendations: (P) Unable to determine at this time  Discharge Recommendations: (P) Recommend post-acute placement for additional occupational therapy services prior to discharge home    Subjective    Pt was alert and cooperative w/ tx.     Objective       06/01/23 1243    Services   Is patient using  services for this encounter? No   Precautions   Precautions Fall Risk;Posterior Hip Precautions;Weight Bearing As Tolerated Left Lower Extremity   Pain   Intervention Rest;Repositioned   Pain 0 - 10 Group   Location Hip;Back   Location Orientation Left   Comfort Goal Comfort with Movement;Perform Activity   Therapist Pain Assessment Post Activity Pain Same as Prior to Activity;3   Non Verbal Descriptors   Non Verbal Scale  Calm;Unlabored Breathing   Neuro-Muscular Treatments   Neuro-Muscular Treatments Anterior weight shift;Facilitation;Postural Facilitation;Tactile Cuing;Verbal Cuing;Weight Shift Right;Weight Shift Left   Balance   Sitting Balance (Static) Fair +    Sitting Balance (Dynamic) Fair   Standing Balance (Static) Poor +   Standing Balance (Dynamic) Poor   Weight Shift Sitting Fair   Weight Shift Standing Poor   Skilled Intervention Verbal Cuing;Tactile Cuing;Postural Facilitation   Bed Mobility    Supine to Sit Moderate Assist   Activities of Daily Living   Grooming Supervision;Seated   Upper Body Dressing Minimal Assist   Lower Body Dressing Total Assist   Skilled Intervention Verbal Cuing;Tactile Cuing   How much help from another person does the patient currently need...   Putting on and taking off regular lower body clothing? 1   Bathing (including washing, rinsing, and drying)? 2   Toileting, which includes using a toilet, bedpan, or urinal? 1   Putting on and taking off regular upper body clothing? 3   Taking care of personal grooming such as brushing teeth? 4   Eating meals? 3   6 Clicks Daily Activity Score 14   Functional Mobility   Sit to Stand Maximal Assist   Bed, Chair, Wheelchair Transfer Maximal Assist   Transfer Method Stand Step   Activity Tolerance   Sitting in Chair 20   Sitting Edge of Bed 10   Short Term Goals   Goal Outcome # 1 Progressing slower than expected  (Total assist)   Goal Outcome # 2 Goal met   Goal Outcome # 3 Progressing slower than expected  (Max assist x 2)   Occupational Therapy Treatment Plan    O.T. Treatment Plan Continue Current Treatment Plan   Anticipated Discharge Equipment and Recommendations   DC Equipment Recommendations Unable to determine at this time   Discharge Recommendations Recommend post-acute placement for additional occupational therapy services prior to discharge home

## 2023-06-01 NOTE — PROGRESS NOTES
"Hospital Medicine Daily Progress Note    Date of Service  6/1/2023    Chief Complaint  Yesi Garduno is a 89 y.o. female admitted 5/16/2023 with left hip pain after a fall    Hospital Course  As per chart review:  \"History of a AAA, breast cancer status post mastectomy, CKD stage IIIa, hypertension, not on any medications admitted after ground-level fall complaining of blurred vision prior to the fall and left hip pain after the fall.  She was found to have an impacted left femoral neck fracture.  Orthopedic surgery Dr. Ponce was consulted and recommended operative fixation.  She had a mildly elevated troponin which trended up slightly to 42 however then trended down.  Cardiac clearance was obtained\"    Interval Problem Update  5/17: Complaining of severe left hip pain, anxious for surgery.  Echocardiogram performed and reviewed and it shows some diastolic dysfunction however normal EF.  Cleared for surgery, discussed with cardiology.  Blood pressure improved after hydralazine and pain control.  N.p.o.  5/18: Uncomplicated surgery yesterday.  Today she is on 3 L, I reviewed her x-ray which shows bilateral small pleural effusion, atelectasis.  Fluids stopped, IV Lasix x1, I-S.  She is encephalopathic, IV narcotics discontinued.  Pending PT eval regarding SNF.  Labile blood pressure, as needed hydralazine added    5/22: Confused but improved from yesterday.  Cr stable, repeat lasix.  She would greatly benefit from SNF for pt and ot to help recover from fracture and surgery    5/23: Patient is alert, but somnolent.  Oriented and following directions at this time.  She has had poor appetite, likely due to delirium from hospitalization and pain medicines.  I discussed controlling pain, family and patient in agreement with small dose of Dilaudid and scheduled Tylenol.  We will continue to work with physical therapy for mobilization.  Patient would benefit most from skilled nursing facility, she has apparently had " a bad experience at [facility], and is declining this option.  She has been declined from other facilities due to cognition and level of assistance.  I do expect her level assistance and condition to continue to improve.  Will need to resend referrals once patient improves.    5/24: Patient's condition continues to improve, she is still somnolent, likely due to Dilaudid.  But tolerating this well so we will continue with scheduled Tylenol and as needed low-dose Dilaudid-for severe breakthrough pain.  I have also spoken with physical therapy and Occupational Therapy to reevaluate and work with patient, encourage out of bed to chair.  Now that patient has improved in both mobility and mental status, SNF referrals will be resent.  Discussed with case management and patient's family at bedside.    5/25: Patient alert, follows commands but confused and disoriented this morning.  Had episode of orthostatic hypotension yesterday while working with physical therapy which unfortunately affected her ability to work and ambulate.  Patient was treated with IV fluids, blood pressure better today.  We will also use RAYRAY hose.  PT to work with patient again today.    5/26: Patient not progressing as well as expected, will still continue to get out of bed to chair, work with PT/OT.  Discussed overall goals of care with patient's daughter today, if patient does not progress, may qualify for hospice.    5/27: Patient earlier this morning, reporting severe pain, but appears to be resting in bed.  Continues to overall plan, but able to participate in discussion.  Still very weak, physical therapy has been amenable to work with her out of bed due to profound weakness and confusion.  Will attempt again today.    5/28: Patient actually alert and oriented today, was able to get to the side of bed, but has profound orthostatic hypotension, SBP dropped to 70s, will add midodrine and says her orthostatic hypotension is not responsive to RAYRAY hose  or fluids, likely impeding ability to get out of bed to chair and ambulate.    5/29: Midodrine was added for orthostatic hypotension, blood pressures improved, however pt still very fatigued and unable to get out of bed.  If no improvement tomorrow, goals of care should be readdressed by family.  Per last closely discussion with daughter, doing this to try for a few more days, if this is not working hospice may be an option.    PATIENT SEEN BY PREVIOUS HOSPITALIST UNTIL 5/29 5/30: Patient seen at bedside this morning.  Patient was seen at bedside by me twice, however both times the patient was sleeping and very hard to wake up.  Unable to have a clear conversation with the patient.  I discussed with nursing and they were able to set her up with OT, however she went to go back to bed immediately.  It seems the patient is not able to eat appropriately, not able to move appropriately.  At 89 years old this are very high risk factors for declining.  Overall prognosis remains guarded.  No family members present at bedside, however I was able to discuss the case with the patient's granddaughters over the phone who might be interested in hospice but they need to talk to her mom.  I will meet with the patient's daughter tomorrow.  I have placed referral to hospice in the event that they would like hospice so we can move this faster.  Overall prognosis remains guarded.    5/31: Patient seen at bedside twice at least today.  Once without family members and the second time with family members present at bedside.  The patient today seems to be alert and oriented.  She could have waxing and waning altered mental status.  However today she is willing to work with physical therapy and is having an appetite.  Family members patient would like to pursue skilled nursing facility.  We appreciate further recommendations by case management.  Overall prognosis still remains guarded.    6/1: Patient seen at bedside today.  The patient  remains alert and oriented.  She states that she has appetite.  We are still pending placement.  Case management working on placement possible on [place], we appreciate further recommendations.  Overall prognosis remains guarded.  Unable to get skilled nursing facility placement I have talked to the patient's daughter over the phone and they would like to take the patient home with home health.    I have discussed this patient's plan of care and discharge plan at IDT rounds today with Case Management, Nursing, Nursing leadership, and other members of the IDT team.    Consultants/Specialty  orthopedics- [facility]  Cardiology - [facility]    Code Status  DNAR/DNI    Disposition  The patient is medically cleared for discharge to home or a post-acute facility.  Anticipate discharge to: skilled nursing facility    I have placed the appropriate orders for post-discharge needs.    Review of Systems  Review of Systems   Constitutional:  Positive for malaise/fatigue. Negative for chills and fever.   HENT:  Negative for hearing loss and nosebleeds.    Eyes:  Negative for blurred vision and double vision.   Respiratory:  Negative for cough and shortness of breath.    Cardiovascular:  Negative for chest pain and palpitations.   Gastrointestinal:  Negative for abdominal pain, heartburn and vomiting.   Genitourinary:  Negative for dysuria and urgency.   Musculoskeletal:  Positive for joint pain.   Skin:  Negative for itching and rash.   Neurological:  Positive for weakness (generalized weakness). Negative for dizziness and headaches.   Psychiatric/Behavioral:  Negative for substance abuse. The patient is not nervous/anxious.    All other systems reviewed and are negative.  Patient sleeping twice that I went in to see her, unable to wake her up.     Physical Exam  Temp:  [36.6 °C (97.9 °F)-37.1 °C (98.7 °F)] 36.6 °C (97.9 °F)  Pulse:  [60] 60  Resp:  [16] 16  BP: (101-124)/(46-54) 101/46  SpO2:  [94 %-99 %] 94 %    Physical  Exam  Vitals and nursing note reviewed.   Constitutional:       Appearance: She is ill-appearing.   HENT:      Head: Normocephalic and atraumatic.      Right Ear: External ear normal.      Left Ear: External ear normal.      Mouth/Throat:      Pharynx: No oropharyngeal exudate or posterior oropharyngeal erythema.   Eyes:      General:         Right eye: No discharge.         Left eye: No discharge.   Cardiovascular:      Rate and Rhythm: Normal rate and regular rhythm.      Pulses: Normal pulses.      Heart sounds: No murmur heard.     No gallop.   Pulmonary:      Effort: Respiratory distress present.      Breath sounds: Rhonchi present.      Comments: Requiring oxygen supplementation  Abdominal:      General: Bowel sounds are normal. There is no distension.      Palpations: Abdomen is soft.      Tenderness: There is no abdominal tenderness. There is no guarding.   Musculoskeletal:         General: No swelling or tenderness.      Cervical back: No tenderness.   Skin:     General: Skin is warm and dry.   Neurological:      Mental Status: She is alert and oriented to person, place, and time.   Psychiatric:         Mood and Affect: Mood normal.         Behavior: Behavior normal.         Fluids    Intake/Output Summary (Last 24 hours) at 6/1/2023 1433  Last data filed at 6/1/2023 1243  Gross per 24 hour   Intake 640 ml   Output 1250 ml   Net -610 ml         Laboratory  Recent Labs     05/30/23  0259 05/31/23  0548   WBC 6.6 7.2   RBC 2.96* 2.85*   HEMOGLOBIN 8.9* 8.8*   HEMATOCRIT 30.0* 28.9*   .4* 101.4*   MCH 30.1 30.9   MCHC 29.7* 30.4*   RDW 50.4* 49.5   PLATELETCT 315 297   MPV 9.0 8.7*         Recent Labs     05/30/23  0259 05/31/23  0548   SODIUM 141 141   POTASSIUM 4.4 5.0   CHLORIDE 106 107   CO2 23 29   GLUCOSE 89 109*   BUN 32* 35*   CREATININE 1.07 1.04   CALCIUM 9.5 9.3                       Imaging  DX-CHEST-PORTABLE (1 VIEW)   Final Result      1.  Again seen cardiomegaly with interstitial  infiltrates.      2.  Improved left lung base atelectasis.      DX-PELVIS-1 OR 2 VIEWS   Final Result      Recent left hip arthroplasty.      DX-CHEST-PORTABLE (1 VIEW)   Final Result      1.  Cardiomegaly with interstitial infiltrates.      2.  Bibasilar atelectasis.      EC-ECHOCARDIOGRAM COMPLETE W/O CONT   Final Result      DX-SHOULDER 2+ RIGHT   Final Result      1.  Osteoporosis.      2.  No evidence of right shoulder fracture and/or dislocation.      DX-HAND 3+ RIGHT   Final Result         1.  No radiographic evidence of acute injury.      2. Osteoporosis.      DX-HIP-COMPLETE - UNILATERAL 2+ RIGHT   Final Result      1.  Impacted left femoral neck fracture.      2.  Previous pinning of right hip      3.  Osteoporosis.      DX-HUMERUS 2+ LEFT   Final Result         1.  No radiographic evidence of acute injury.      2. Osteopenia.      DX-HAND 3+ LEFT   Final Result         1.  No radiographic evidence of acute injury.      2. Osteoporosis.      DX-FEMUR-2+ LEFT   Final Result      1.  Impacted left femoral neck fracture.      2.  Bone infarcts noted in the metadiaphyseal region of left femur.      CT-CHEST,ABDOMEN,PELVIS WITH   Final Result      1.  Left femoral neck fracture.      2.  Previous pinning of right hip.      3.  Infrarenal aortic aneurysm which measures 5.5 cm in greatest diameter.      4.  Osteoporosis.      5.  Sigmoid scoliosis of the thoracic and lumbar spine.      6.  Centrilobular emphysema.      7.  These findings were discussed with JOSÉ LUIS GARCIA at 10:15 p.m. 5/16/2023      CT-CSPINE WITHOUT PLUS RECONS   Final Result      1.  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      2.  Mild to moderate degenerative anterolisthesis at C4-5 level.      3.  Osteopenia.      4.  No evidence of acute cervical spine fracture.      CT-HEAD W/O   Final Result      1.  Cerebral atrophy.      2.  White matter lucencies most  consistent with small vessel ischemic change versus demyelination or gliosis.      3.  Otherwise, Head CT without contrast with no acute findings. No evidence of acute cerebral infarction, hemorrhage or mass lesion.                Assessment/Plan  * Displaced fracture of left femoral neck (HCC)- (present on admission)  Assessment & Plan  -Hip x-ray showed impacted left femoral neck fracture  Underwent surgical fixation Dr. Bourne 5/17  Narcotics were initially discontinued due to confusion.  Continue with with scheduled Tylenol, ice, lidocaine patch for localized pain on lower extremity.  We will also add IV Dilaudid, for breakthrough pain-sherlyn with patient and daughter and they are in agreement with this.    Continue Dilaudid 0.25 mg IV-high risk medication, monitor for somnolence, altered mental status and respiratory depression.   Encourage ambulation and up to chair  Pain Seems controlled with scheduled Tylenol    5/30: Continue lidocaine patch and Tylenol.  We will try to avoid opiate medications.  Continue to monitor closely.  Family considering hospice.  Overall prognosis remains guarded.    5/31: The patient and the patient daughter at bedside would like to pursue skilled nursing facility.  We appreciate further recommendations by case management.    6/1: Pending placement.    Failure to thrive in adult  Assessment & Plan  Patient has had poor appetite, increased debility from prolonged hospitalization.  Appetite improving, patient still weak and unable to ambulate and work with physical therapy    5/31: Overall prognosis remains guarded, family were considering hospice yesterday, however the patient is alert and oriented today.  They would like to pursue skilled nursing facility.    6/1: Pending placement.    Orthostatic hypotension  Assessment & Plan  Per patient's granddaughter, has had this in the past.  RAYRAY hose  Continue with IV fluids  Monitor blood pressure when ambulating work with PT  If fails  to resolve, may need midodrine  Within target range    6/1: Continue midodrine, overall prognosis remains guarded.    Acute metabolic encephalopathy  Assessment & Plan  Waxing and waning.    Multifactorial secondary to anesthetic medications, as well as narcotics, pain, sundowning  Significant proved after narcotics were held  Continue supportive care  Normalize sleep-wake  Minimize lines/tubes  Cortisol and TSH within expected range   Mental status has been fluctuating, improved on 5/27.  No focal signs to suggest stroke.    5/30: Patient is sleeping today, very hard to wake up. No family members present at bedside at the time of my evaluation.    6/1: Patient seems to be alert and oriented today.  Her altered mental status could be waxing and waning.  Monitor closely.    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    Elevated troponin- (present on admission)  Assessment & Plan  Elevated and initially trended up very slightly, likely due to reduced renal clearance, has trended back down.  Never complained of any chest pain  EKG negative for ischemia  Not consistent with cardiac ischemia/event    Hypertensive urgency- (present on admission)  Assessment & Plan  Now resolved  Present on admission, likely due to pain as she does not take anything at home  Improved with IV hydralazine  Continue as needed BP medications and monitor      5/30: Patient with possible orthostatic hypotension, however unable to assess at this time. Patient has been started on midodrine, monitor closely.    6/1: Continue midodrine  for now.    Stage 3a chronic kidney disease (HCC)- (present on admission)  Assessment & Plan  -Creatinine has fluctuated throughout hospitalization, slightly worse today 1.45, will continue to monitor closely.  - Was on Lasix, which is likely contributing to MELANIE.  -Avoid nephrotoxic medication and monitor chemistry panel in the morning  Monitor with repeat labs   Improving    5/30: Improving, IVF stopped. Encourage PO intake. Cr today is 1.07.    5/31: Cr is 1.04 today.    Acute respiratory failure with hypoxia (HCC)- (present on admission)  Assessment & Plan  Patient is on 2 L supplemental oxygen, baseline at home is 3L  Patient did not have acute respiratory failure, she has chronic respiratory failure.  Not overloaded.  I ordered and reviewed her chest x-ray which shows bilateral basilar small pleural effusions as well as atelectasis  Encourage IS  Mobilize as able TID to chair for meals-as tolerated    5/30: I suspect this is from atelectasis. Patient requiring 3 L of oxygen at the time of my evaluation.    6/1: Continue IS for now.      S/P right mastectomy- (present on admission)  Assessment & Plan  -Due to underlying breast cancer in 1960s.    AAA (abdominal aortic aneurysm) ([facility])- (present on admission)  Assessment & Plan  CT showed infrarenal aortic aneurysm which measures 5.5 cm in the greatest diameter- has been present for years  -I provided extensive chart review on this. In 7/2015 AAA was 3.9 cm (Renal CT), and on 1/2017 AAA 4.2 cm (CT L Spine).  Blood pressure has improved  No chest pain, EKG is negative for acute ischemia.  Troponin increased slightly but now trended down before surgery    5/30: The patient will require close follow-up as an outpatient, at this point due to the patient's clinical status it would not be a good idea to pursue surgical options at this time.  I did speak to the patient's granddaughter and they are considering hospice but they would like to talk to the patient's daughter  first.    5/31: I spoke to the patient's daughter and the patient at bedside today, for now they would not like to pursue surgical intervention for this, she will require close follow-up as an outpatient.    6/1: Patient will require close follow up as outpatient.    Near syncope- (present on admission)  Assessment & Plan  She complained of blurred vision prior to stumbling over a stool leading to a fall  CT of the head on admission was unremarkable other than small vessel ischemic change, no focal neurodeficits to suggest CVA  No additional head imaging needed at this time  Echocardiogram performed and shows essentially a normal EF, diastolic dysfunction  Positive for orthostatic hypotension  RAYRAY hose  BP improved with RAYRAY hose, but still with orthostatic hypotension  Midodrine 5 mg added on 5/28-improved blood pressure, orthostasis, patient still unable to get out of bed    6/1: continue midodrine         VTE prophylaxis: Heparin    I have performed a physical exam and reviewed and updated ROS and Plan today (6/1/2023). In review of yesterday's note (5/31/2023), there are no changes except as documented above.      Spend at least 51 minutes in care for this patient.  This includes face-to-face interview, physical examination. No family members present at bedside, however I had a discussion with daughter over the phone. I also had a long discussion with case management regarding placement.  Discussion with multidisciplinary team including charge nurse, case management and pharmacy.  Creating plan of care, reviewing orders.

## 2023-06-01 NOTE — DISCHARGE PLANNING
@[phone]   DPA was advised patient was accepted at [facility], will also sent out insurance auth. Will follow up tomorrow regarding acceptance from insurance.

## 2023-06-01 NOTE — DISCHARGE PLANNING
Received Choice form at: 1025  Agency/Facility name: [facility]  Referral sent per Choice form at: 1030      Agency/Facility name: [facility]  Spoke to: Hannah  Outcome: DPA advised patient has been accepted, waiting on Insurance auth. Will take a few days to hear back

## 2023-06-02 NOTE — DISCHARGE SUMMARY
"Discharge Summary    CHIEF COMPLAINT ON ADMISSION  Chief Complaint   Patient presents with    Fall    Hip Pain     [facility], pt had a mechanical ground level fall yesterday, and today had a fall again and sustained hip pain bilaterally. Patient can't remember if she had LOC or hit head. Pt's granddaughter reports when she arrived to her grandmother's house pt already lying on the floor, awake, complaining of left hip pain. Pt received Fentanyl 150 mcg IV, Versed 2 mg IV. Pt's IV site as per EMS blew.        Reason for Admission  EMS    Admission Date  5/16/2023     CODE STATUS  DNAR/DNI    HPI & HOSPITAL COURSE  As per chart review:  \"History of a AAA, breast cancer status post mastectomy, CKD stage IIIa, hypertension, not on any medications admitted after ground-level fall complaining of blurred vision prior to the fall and left hip pain after the fall.  She was found to have an impacted left femoral neck fracture.  Orthopedic surgery Dr. Ponce was consulted and recommended operative fixation.  She had a mildly elevated troponin which trended up slightly to 42 however then trended down.  Cardiac clearance was obtained\"    Patient was admitted for further management and care.  On admission the patient had an echocardiogram which showed diastolic dysfunction ejection fraction as per chart review.  She was cleared for surgery for orthopedic surgery.  The patient underwent cemented hip hemiarthroplasty by orthopedic surgery.    After surgery the patient requiring oxygen likely due to atelectasis.  The patient will require to continue incentive spirometer.  Patient does not look volume overloaded so she has not been started on Lasix, however volume status will need to be monitored daily make changes accordingly.    Also during hospitalization the patient became encephalopathic, this could have been multifactorial possibly due to anesthesia as well as narcotics.  We discontinued opiate medications and eventually " the patient's mental status improved.  The patient has remained now alert and oriented.  Note we had a discussion about hospice during hospitalization while the patient was encephalopathic, family had initially agreed to hospice if the patient did not improve, however the patient eventually improved and has now been alert and oriented and the patient herself does not want hospice and would like to continue with physical therapy and she is agreeable to skilled nursing facility.    Of note the patient has a history of abdominal aortic aneurysm.  The patient and the patient's family know about this for many years.  On the last scan showed 5.5 cm.  Neither the patient nor the patient's family would like to pursue surgical options at this time and they will follow-up as an outpatient.    Also of note the patient developed orthostatic hypotension while hospitalized and the patient has been started on midodrine and she has been responding appropriately.  The patient will require close follow-up and daily assessment.    The patient has now been accepted to a skilled nursing facility.  The patient will be discharged to a skilled facility today.    Therefore, she is discharged in fair and stable condition to skilled nursing facility.    The patient met 2-midnight criteria for an inpatient stay at the time of discharge.      FOLLOW UP ITEMS POST DISCHARGE  Patient will be discharged to SNF, will require close follow up as outpatient with PCP and Orthopedic Surgery.    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 *      FOLLOW UP  Future Appointments   Date Time Provider Department Center   6/30/2023  1:00 PM Saroj Doe M.D. [facility]     Savage Doyle M.D.  [address]  [facility] [facility] [address]  [phone]    Follow up  Please call your primary care provider to schedule a hospital follow up. Thank you.    David Siu M.D.  [address] [facility] [address] [phone]    Schedule an appointment as soon as possible for a visit        MEDICATIONS ON DISCHARGE     Medication List        START taking these medications        Instructions   acetaminophen 500 MG Tabs  Commonly known as: TYLENOL  Replaces: Tylenol 8 Hour 650 MG CR tablet   Take 2 Tablets by mouth in the morning, at noon, and at bedtime.  Dose: 1,000 mg     artificial tears Oint ophth ointment  Commonly known as: EYE LUBRICANT   Apply 1 Application to both eyes every 8 hours.  Dose: 1 Application     heparin 5000 UNIT/ML Soln   Inject 1 mL under the skin every 8 hours.  Dose: 5,000 Units     lidocaine 5 % Ptch  Commonly known as: LIDODERM   Place 1 Patch on the skin every 24 hours.  Dose: 1 Patch     midodrine 5 MG Tabs  Commonly known as: PROAMATINE   Take 1 Tablet by mouth 3 times a day with meals.  Dose: 5 mg     senna-docusate 8.6-50 MG Tabs  Commonly known as: PERICOLACE or SENOKOT S   Take 2 Tablets by mouth 2 times a day.  Dose: 2 Tablet            CONTINUE taking these medications        Instructions   B-12 PO   Take 1 Tab by mouth every day.  Dose: 1 Tablet     CALCIUM 1200 PO   Take 1 Tab by mouth every day.  Dose: 1 Tablet     fluticasone 50 MCG/ACT nasal spray  Commonly known as: FLONASE   Administer 1 Spray into affected nostril(S) 2 times a day as  "needed (allergy). Each Nostril  Dose: 1 Spray     gabapentin 100 MG Caps  Commonly known as: NEURONTIN   Take 100 mg by mouth 2 times a day.  Dose: 100 mg     Respiratory Therapy Supplies Misc   3 L/min.  Dose: 3 L/min     vitamin D 1000 Unit (25 mcg) Tabs  Commonly known as: cholecalciferol   Take 1 Tablet by mouth every day.  Dose: 1,000 Units            STOP taking these medications      ALPRAZolam 0.5 MG Tabs  Commonly known as: XANAX     hydrOXYzine pamoate 25 MG Caps  Commonly known as: VISTARIL     ibuprofen 200 MG Tabs  Commonly known as: MOTRIN     Tylenol 8 Hour 650 MG CR tablet  Generic drug: acetaminophen  Replaced by: acetaminophen 500 MG Tabs              Allergies  Allergies   Allergen Reactions    Oxycodone      Other reaction(s): Other (See Comments)  hallucinations  Other reaction(s): Other (See Comments)  hallucinations    Hydrocodone Rash     \"all over\" and it makes me act loopy    Penicillins Vomiting     Other reaction(s): GI Intolerance  Other reaction(s): GI Intolerance  Other reaction(s): Vomiting  Other reaction(s): GI Intolerance    Tetracycline Rash and Itching     Pt states \"I get a bad body rash and itch all over\".  Pt states \"I get a bad body rash and itch all over\".       DIET  Orders Placed This Encounter   Procedures    Diet Order Diet: Regular; Miscellaneous modifications: (optional): Finger Foods     Standing Status:   Standing     Number of Occurrences:   1     Order Specific Question:   Diet:     Answer:   Regular [1]     Order Specific Question:   Miscellaneous modifications: (optional)     Answer:   Finger Foods  [2]       ACTIVITY  As tolerated and directed by skilled nursing.  Weight bearing as tolerated and directed by skilled nursing.    LINES, DRAINS, AND WOUNDS  This is an automated list. Peripheral IVs will be removed prior to discharge.  Peripheral IV 05/28/23 22 G Anterior;Right Upper arm (Active)   Site Assessment Clean;Dry;Intact 06/02/23 0900   Dressing Type " Transparent 06/02/23 0900   Line Status Scrubbed the hub prior to access;Flushed;Saline locked 06/02/23 0900   Dressing Status Clean;Dry;Intact 06/02/23 0900   Dressing Intervention N/A 06/02/23 0900   Dressing Change Due 06/03/23 06/02/23 0900   Date IV Connector(s) Changed 05/28/23 05/30/23 2057   Infiltration Grading ([facility], [facility]) 0 06/02/23 0900   Phlebitis Scale ([facility] Only) 0 06/02/23 0900     Urethral Catheter 16 Fr. (Active)   Site Assessment Clean;Skin intact 06/02/23 0900   Collection Container Standard drainage bag 06/02/23 0900   Urinary Catheter Care Tamper Evident Seal in Place;Drainage Bag Not Overfilled;Drainage Tube Extended;Drainage Tubing Properly Secured;Drainage Bag Below Bladder Level and Not on Floor;Cath Care Done with Soap & Water 06/02/23 0900   Securement Method Securing device (Describe) 06/02/23 0900   Output (mL) 900 mL 06/02/23 0853      Wound 05/17/23  Distal Left SUTURES AND STERI STRIPS IN PLACE (Active)   Wound Image    05/17/23 1700   Site Assessment Clean;Dry;Intact 06/02/23 0900   Periwound Assessment Dry;Clean;Intact 06/02/23 0900   Margins NANCY 06/02/23 0900   Closure Closure strips 06/02/23 0900   Drainage Amount None 06/02/23 0900   Dressing Options Open to Air;Steri-Strip 06/02/23 0900   Dressing Changed Observed 06/02/23 0900   Dressing Status Clean;Dry;Intact 06/02/23 0900       Wound 05/17/23 Incision Hip Left aquacel (Active)   Site Assessment NANCY 06/02/23 0900   Periwound Assessment NANCY 06/02/23 0900   Margins NANCY 06/02/23 0900   Closure None 06/02/23 0900   Drainage Amount None 06/02/23 0900   Drainage Description NANCY 06/01/23 1915   Treatments Ice applied 05/28/23 1928   Dressing Options Other (Comments) 05/31/23 1931   Dressing Changed Observed 06/02/23 0900   Dressing Status Clean;Dry;Intact 06/02/23 0900   Dressing Change/Treatment Frequency As Needed 06/01/23 1915   Pulses 2+ 06/02/23 0900       Wound 05/31/23 Pressure Injury BL Ankle & L lateral foot sDTI (Truong  hose) (Active)   Wound Image    06/01/23 1600   Site Assessment NANCY 06/02/23 0900   Periwound Assessment NANCY 06/02/23 0900   Margins NANCY 06/02/23 0900   Closure NANCY 06/02/23 0900   Drainage Amount None 06/02/23 0900   Treatments Site care;Offloading 06/02/23 0900   Wound Cleansing Normal Saline Irrigation 06/02/23 0900   Periwound Protectant Not Applicable 06/02/23 0900   Dressing Cleansing/Solutions Not Applicable 06/02/23 0900   Dressing Options Mepilex 06/02/23 0900   Dressing Changed Observed 06/02/23 0900   Dressing Status Clean;Intact 06/02/23 0900   Dressing Change/Treatment Frequency Every 72 hrs, and As Needed 06/02/23 0900   NEXT Dressing Change/Treatment Date 06/04/23 06/02/23 0900   NEXT Weekly Photo (Inpatient Only) 06/08/23 06/02/23 0900   WOUND NURSE ONLY - Pressure Injury Stage DTPI 06/01/23 1600   Wound Length (cm) 0.2 cm 06/01/23 1600   Wound Width (cm) 7.5 cm 06/01/23 1600   Wound Surface Area (cm^2) 1.5 cm^2 06/01/23 1600   Shape Linear 06/01/23 1600   Wound Odor None 06/01/23 1600   Exposed Structures NANCY 06/01/23 1600   WOUND NURSE ONLY - Time Spent with Patient (mins) 30 06/01/23 1600       Wound 06/01/23 Pressure Injury Heel Left sDTI (Active)   Wound Image   06/01/23 1600   Site Assessment NANCY 06/02/23 0900   Periwound Assessment NANCY 06/02/23 0900   Closure Adhesive bandage 06/02/23 0900   Drainage Amount None 06/02/23 0900   Treatments Offloading 06/02/23 0900   Wound Cleansing Not Applicable 06/01/23 1600   Periwound Protectant Not Applicable 06/01/23 1600   Dressing Cleansing/Solutions Not Applicable 06/01/23 1600   Dressing Options Mepilex Heel 06/02/23 0900   Dressing Changed Observed 06/02/23 0900   Dressing Status Clean;Intact;Dry 06/02/23 0900   Dressing Change/Treatment Frequency Every 72 hrs, and As Needed 06/02/23 0900   NEXT Dressing Change/Treatment Date 06/04/23 06/02/23 0900   NEXT Weekly Photo (Inpatient Only) 06/08/23 06/02/23 0900   WOUND NURSE ONLY - Pressure Injury Stage  DTPI 06/01/23 1600   Wound Length (cm) 4 cm 06/01/23 1600   Wound Width (cm) 6.5 cm 06/01/23 1600   Wound Surface Area (cm^2) 26 cm^2 06/01/23 1600   Shape Irregular 06/01/23 1600   Wound Odor None 06/01/23 1600   Pulses N/A 06/01/23 1600   Exposed Structures NANCY 06/01/23 1600       Peripheral IV 05/28/23 22 G Anterior;Right Upper arm (Active)   Site Assessment Clean;Dry;Intact 06/02/23 0900   Dressing Type Transparent 06/02/23 0900   Line Status Scrubbed the hub prior to access;Flushed;Saline locked 06/02/23 0900   Dressing Status Clean;Dry;Intact 06/02/23 0900   Dressing Intervention N/A 06/02/23 0900   Dressing Change Due 06/03/23 06/02/23 0900   Date IV Connector(s) Changed 05/28/23 05/30/23 2057   Infiltration Grading ([facility], [facility]) 0 06/02/23 0900   Phlebitis Scale ([facility] Only) 0 06/02/23 0900           Urethral Catheter 16 Fr. (Active)   Site Assessment Clean;Skin intact 06/02/23 0900   Collection Container Standard drainage bag 06/02/23 0900   Urinary Catheter Care Tamper Evident Seal in Place;Drainage Bag Not Overfilled;Drainage Tube Extended;Drainage Tubing Properly Secured;Drainage Bag Below Bladder Level and Not on Floor;Cath Care Done with Soap & Water 06/02/23 0900   Securement Method Securing device (Describe) 06/02/23 0900   Output (mL) 900 mL 06/02/23 0853        MENTAL STATUS ON TRANSFER      Alert and oriented.       CONSULTATIONS  Cardiology  Orthopedic Surgery    PROCEDURES  Left cemented hip hemiarthroplasty on 5/17    DX-CHEST-PORTABLE (1 VIEW)   Final Result      1.  Again seen cardiomegaly with interstitial infiltrates.      2.  Improved left lung base atelectasis.      DX-PELVIS-1 OR 2 VIEWS   Final Result      Recent left hip arthroplasty.      DX-CHEST-PORTABLE (1 VIEW)   Final Result      1.  Cardiomegaly with interstitial infiltrates.      2.  Bibasilar atelectasis.      EC-ECHOCARDIOGRAM COMPLETE W/O CONT   Final Result      DX-SHOULDER 2+ RIGHT   Final Result      1.  Osteoporosis.       2.  No evidence of right shoulder fracture and/or dislocation.      DX-HAND 3+ RIGHT   Final Result         1.  No radiographic evidence of acute injury.      2. Osteoporosis.      DX-HIP-COMPLETE - UNILATERAL 2+ RIGHT   Final Result      1.  Impacted left femoral neck fracture.      2.  Previous pinning of right hip      3.  Osteoporosis.      DX-HUMERUS 2+ LEFT   Final Result         1.  No radiographic evidence of acute injury.      2. Osteopenia.      DX-HAND 3+ LEFT   Final Result         1.  No radiographic evidence of acute injury.      2. Osteoporosis.      DX-FEMUR-2+ LEFT   Final Result      1.  Impacted left femoral neck fracture.      2.  Bone infarcts noted in the metadiaphyseal region of left femur.      CT-CHEST,ABDOMEN,PELVIS WITH   Final Result      1.  Left femoral neck fracture.      2.  Previous pinning of right hip.      3.  Infrarenal aortic aneurysm which measures 5.5 cm in greatest diameter.      4.  Osteoporosis.      5.  Sigmoid scoliosis of the thoracic and lumbar spine.      6.  Centrilobular emphysema.      7.  These findings were discussed with JOSÉ LUIS GARCIA at 10:15 p.m. 5/16/2023      CT-CSPINE WITHOUT PLUS RECONS   Final Result      1.  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      2.  Mild to moderate degenerative anterolisthesis at C4-5 level.      3.  Osteopenia.      4.  No evidence of acute cervical spine fracture.      CT-HEAD W/O   Final Result      1.  Cerebral atrophy.      2.  White matter lucencies most consistent with small vessel ischemic change versus demyelination or gliosis.      3.  Otherwise, Head CT without contrast with no acute findings. No evidence of acute cerebral infarction, hemorrhage or mass lesion.              LABORATORY  Lab Results   Component Value Date    SODIUM 141 05/31/2023    POTASSIUM 5.0 05/31/2023    CHLORIDE 107 05/31/2023    CO2 29 05/31/2023    GLUCOSE 109  (H) 05/31/2023    BUN 35 (H) 05/31/2023    CREATININE 1.04 05/31/2023        Lab Results   Component Value Date    WBC 7.2 05/31/2023    HEMOGLOBIN 8.8 (L) 05/31/2023    HEMATOCRIT 28.9 (L) 05/31/2023    PLATELETCT 297 05/31/2023        Total time of the discharge process exceeds 36 minutes.

## 2023-06-02 NOTE — THERAPY
Physical Therapy   Daily Treatment     Patient Name: Yesi Garduno  Age:  89 y.o., Sex:  female  Medical Record #: [id number]  Today's Date: 6/2/2023     Precautions  Precautions: Fall Risk;Posterior Hip Precautions;Weight Bearing As Tolerated Left Lower Extremity    Assessment    Pt a little more confused this am,standing and transffering is improving.Pt likes to internally rotate L hip    Plan    Treatment Plan Status: Continue Current Treatment Plan  Type of Treatment: Bed Mobility, Gait Training, Therapeutic Activities, Therapeutic Exercise, Neuro Re-Education / Balance, Self Care / Home Evaluation  Treatment Frequency: Daily  Treatment Duration: Until Therapy Goals Met    DC Equipment Recommendations: Unable to determine at this time  Discharge Recommendations: Recommend post-acute placement for additional physical therapy services prior to discharge home       Objective       06/02/23 1000   Charge Group   PT Gait Training (Units) 1   PT Therapeutic Activities (Units) 1   Cognition    Cognition / Consciousness X   Balance   Sitting Balance (Static) Fair   Sitting Balance (Dynamic) Fair   Standing Balance (Static) Poor   Standing Balance (Dynamic) Poor   Weight Shift Sitting Fair   Weight Shift Standing Poor   Bed Mobility    Supine to Sit Moderate Assist   Gait Analysis   Gait Level Of Assist Maximal Assist   Assistive Device Front Wheel Walker   Distance (Feet) 2   # of Times Distance was Traveled 1   Deviation Shuffled Gait   Functional Mobility   Sit to Stand Maximal Assist   Bed, Chair, Wheelchair Transfer Maximal Assist   Transfer Method Stand Step   How much difficulty does the patient currently have...   Turning over in bed (including adjusting bedclothes, sheets and blankets)? 2   Sitting down on and standing up from a chair with arms (e.g., wheelchair, bedside commode, etc.) 2   Moving from lying on back to sitting on the side of the bed? 2   How much help from another person does the patient  currently need...   Moving to and from a bed to a chair (including a wheelchair)? 2   Need to walk in a hospital room? 1   Climbing 3-5 steps with a railing? 1   6 clicks Mobility Score 10   Activity Tolerance   Sitting Edge of Bed 10   Standing 2   Short Term Goals    Short Term Goal # 1 pt will go supine<>sit w/hob elevated and rails up w/Min A in 6tx   Goal Outcome # 1 Progressing slower than expected   Short Term Goal # 2 pt will go sit<>stand w/fww w/mod A in 6tx   Goal Outcome # 2 Progressing slower than expected   Short Term Goal # 3 pt will transfer bed<>chair w/fww w/Mod A in 6tx   Goal Outcome # 3 Progressing slower than expected   Interdisciplinary Plan of Care Collaboration   IDT Collaboration with  Nursing   Session Information   Date / Session Number  6/2-13 2/7 6/8

## 2023-06-02 NOTE — PROGRESS NOTES
Received report from day shift nurse. Assumed pt care at 1915. Pt is A&Ox4, resting comfortably in bed. Pt on 1 LPM O2 via NC. Bergeron catheter attached and draining well. No signs of SOB/respiratory distress. Labs noted, VSS. Fall precautions in place. Bed at lowest position. Call light and personal belongings within reach. Continue to monitor.

## 2023-06-02 NOTE — PROGRESS NOTES
Received bedside report from night shift RN..  Assumed pt care.   Assessment and chart check complete.  Pt is AA0X3, no signs of distress, denies nausea/vomiting.  Updated for the  POC of the day.  Bergeron catheter in place draining well to yellow color urine output.  Dressing to left hip CDI, CMS intact.  Moon boots applied.  Fall precautions in place, treaded socks on pt, bed in low position, bed alarm on.   Call light within reach.   Educated pt to call if needing anything.   Hourly rounding.

## 2023-06-02 NOTE — DISCHARGE PLANNING
Case Management Discharge Planning    Admission Date: 5/16/2023  GMLOS: 6.2  ALOS: 17    6-Clicks ADL Score: 14  6-Clicks Mobility Score: 10  PT and/or OT Eval ordered: Yes  Post-acute Referrals Ordered: Yes  Post-acute Choice Obtained: Yes  Has referral(s) been sent to post-acute provider:  Yes      Anticipated Discharge Dispo: Discharge Disposition: D/T to SNF with Medicare cert in anticipation of skilled care (03)    DME Needed: No    Action(s) Taken: Updated Provider/Nurse on Discharge Plan, Choice obtained, Referral(s) sent, Acceptance Received, Transport Arranged , Authorization Received , Completed PASSR/LOC, and OTHER: 1000 - RN CHARLI received a phone call from admissions at [facility] indicating they received a declination from the insurance authorization and that their records show that an authorization was provided to another SNF. 1015 - RN CHARLI spoke with Anderson Tapia, who states that the declination for insurance authorization for [facility] was upheld after the [id number]; however, authorization was granted to [facility] for post-hospitalization placement for SNF. She reports that the decision can be appealed if so desired.    1035 - RN CHARLI notified the Attending of the acceptance by [facility] and the approved insurance authorization. He signed the COBRA form and requested transportation be arranged if a bed was found to be available at [facility].    1045 - RN CHARLI spoke with Woody in admissions at [facility], who states they have received insurance authorization, have an available bed for the patient, and are able to accept her today. Transportation request form completed and faxed to [facility] to coordinate transportation to [facility] at the requested time of 1400. Pending confirmation of requested time.     1330: JESUS AUGUSTIN spoke with Woody in admissions at [facility] to report that [facility] is not available to transport until 1730. Woody  indicates they are able to accommodate this. RN CM notified Ashleigh at [facility] and received confirmation of transport today to [facility] at 1730.    Escalations Completed: Provider, Pending Discharge Destination, [facility], Insurance , and Bedside RN    Medically Clear: Yes    Next Steps: Continue to monitor for changes and update the discharge plan accordingly. Follow-up with the Attending and Bedside RN for any issues/concerns and assist as indicated. Follow-up with [facility] for confirmed transportation time. Complete COBRA packet and provide to bedside RN for discharge.    Barriers to Discharge: Transportation    Is the patient up for discharge tomorrow: No, patient is set to discharge today (06/02/2023) to [facility] by [facility] at 1730.

## 2023-06-02 NOTE — PROGRESS NOTES
1615-Report called to [facility], report to Cris LEE.   1630-Discharging patient SNF per MD order. Daughter demonstrated understanding of discharge instruction. Pain well controlled, tolerating oral medications, O2 greater than 90%. Given discharge instructions and transferred packet to [facility]. PIV removed. Pt discharged off unit with [facility] escort.

## 2023-06-02 NOTE — DISCHARGE PLANNING
DC Transport Scheduled    Received request at: 6/2/2023 at 1309    Transport Company Scheduled:  [facility]  Spoke with Macie at [facility] to schedule transport.    Scheduled Date: 6/2/2023  Scheduled Time: 1730    Destination: [facility] [address]     Notified care team of scheduled transport via Voalte.     If there are any changes needed to the DC transportation scheduled, please contact [facility] at ext. [phone] between the hours of 2051-9288 Mon-Fri. If outside those hours, contact the ED Case Manager at ext. [phone].

## 2023-06-02 NOTE — DISCHARGE INSTRUCTIONS
Discharge Instructions    Discharged to other by medical transportation with escort. Discharged via wheelchair, hospital escort: Yes.  Special equipment needed: Oxygen    Be sure to schedule a follow-up appointment with your primary care doctor or any specialists as instructed.     Discharge Plan:        I understand that a diet low in cholesterol, fat, and sodium is recommended for good health. Unless I have been given specific instructions below for another diet, I accept this instruction as my diet prescription.   Other diet: regular    Special Instructions: Discharge instructions for the Orthopedic Patient    Follow up with Primary Care Physician within 2 weeks of discharge to home, regarding:  Review of medications and diagnostic testing.  Surveillance for medical complications.  Workup and treatment of osteoporosis, if appropriate.     -Is this a Hip/Knee/Shoulder Joint Replacement patient? Yes   TOTAL HIP REPLACEMENT, AFTER-CARE GUIDELINES     These instructions provide you with information on caring for yourself and your hip after surgery. Your health care provider may also give you instructions that are more specific. Your treatment was planned and performed according to current medical practices but problems sometimes occur. Call your health care provider if you have any problems or questions.     WHAT TO EXPECT AFTER THE PROCEDURE   After your procedure, your hip will typically be stiff, sore, and bruised. This will improve over time.     Pain   Follow your home pain management plan as discussed with your nurse and as directed by your provider.   It is important to follow any scheduled pain medications for maximal pain relief.   If prescribed opioid medication, the goal is to use opioids only as needed and to wean off prescription pain medicine as soon as possible.   Ice use for pain control.  Put ice in a plastic bag.   Place a towel between your skin and the bag.   Leave the ice on for 20 minutes, 2-3  times a day at a minimum.   Most patients are off the pain pills by 3 weeks. If your pain continues to be severe, follow up with your provider.     Infection   Deep hip joint infections that require removal of the prostheses occur in less than 1.0% of patients. Lesser infections in the skin (cellulites) are more common and much more easily treated.   Keep the incision as clean and dry as possible.   Always wash your hands before touching your incision.   Avoid dental care for 3 months after surgery. Your provider may recommend taking a dose of antibiotics an hour prior to any dental procedure. After 2 years, most providers recommend antibiotics only before an extensive procedure. Ask your provider what they recommend.   Signs and symptoms of infection include low-grade fever, redness, pain, swelling and drainage from your incision. Notify your provider IMMEDIATELY if you develop ANY of these symptoms.     Post op Disturbances   Bowel Habits - constipation is extremely common and caused by a combination of anesthesia, lack of mobility, dehydration and pain medicine. Use stool softeners or laxatives if necessary. It is important not to ignore this problem as bowel obstructions can be a serious complication after joint replacement surgery.   Mood/Energy Level - Many patients experience a lack of energy and endurance for up to 2-3 months after surgery. Some people feel down and can even become depressed. This is likely due to postoperative anemia, change in activity level, lack of sleep, pain medicine and just the emotional reaction to the surgery itself that is a big disruption in a person’s life. This usually passes. If symptoms persist, follow up with your primary care provider.   Returning to Work - Your provider will give you specific instructions based on your profession. Generally, if you work a sedentary job requiring little standing or walking, most patients may return within 2-6 weeks. Manual labor jobs  involving walking, lifting and standing may take 3-4 months. Your provider’s office can provide a release to part-time or light duty work early on in your recovery and progress you to full duty as able.   Driving - You can begin driving once cleared by your provider, provided you are no longer taking narcotic pain medication or any other medications that impair driving. Discuss the length of time with your doctor as returning to driving will depend on things such as your vehicle, which hip was replaced (right or left) and if you do or do not have post-operative movement precautions.   Avoiding falls - A fall during the first few weeks after surgery can damage your new hip and may result in a need for further surgery.  throw rugs and tack down loose carpeting. Be aware of floor hazards such as pets, small objects or uneven surfaces. Notify your provider of any falls.   Airport Metal Detectors - The sensitivity of metal detectors varies and it is likely that your prosthesis will cause an alarm. Inform the  of your artificial joint.     Diet   Resume your normal diet as tolerated.   It is important to achieve a healthy nutritional status by eating a well-balanced diet on a regular basis.   Your provider may recommend that you take iron and vitamin supplements.   Continue to drink plenty of fluids.     Shower/Bathing   You may shower as soon as you get home from the hospital unless otherwise instructed.   Keep your incision out of water to prevent infection. To keep the incision dry when showering, cover it with a plastic bag or plastic wrap. If your bandage is waterproof, this may not be necessary.   Pat incision dry if it gets wet. Do not rub. Notify your provider.   Do not submerge in a bath until cleared by your provider. Your staples must be out and the incision completely healed.     Dressing Changes: Only change your dressing if directed by your provider.   Wash hands.   Open all dressing  change materials.   Remove old dressing and discard.   Inspect incision for signs of irritation or infection including redness, increase in clear drainage, yellow/green drainage, odor and surrounding skin hot to touch. Notify your provider if present.    the new dressing by one corner and lay over the incision. Be careful not to touch the inside of the dressing that will lay over the incision.   Secure in place as instructed.     Swelling/Bruising   Swelling is normal after hip replacement and can involve the thigh, knee, calf and foot.   Swelling can last from 3-6 months.   To reduce swelling, elevate your leg higher than your heart while reclining. The first week you are home you should elevate your leg an equal amount of time as you are active.   The swelling is usually worse after you go home since you are upright for longer periods of time.   Bruising often does not appear until after you arrive home and can be quite dramatic- appearing purple, black, or green. Bruising is typically not concerning and will subside without any treatment.     Blood Clot Prevention   Your treatment plan includes multiple preventative measures to decrease the risk of blood clots in the legs (DVTs) and the less common, but serious, clots that travel to the lungs (pulmonary emboli). Most patients are at standard risk for them, but people who are at higher risk include those who have had previous clots, a family history of clotting, smoking, diabetes, obesity, advanced age, use estrogen and/or live a sedentary lifestyle.     Signs of blood clots in legs include - Swelling in thigh, calf or ankle that does not go down with elevation. Pain, heat and tenderness in calf, back of calf or groin area. NOTE: blood clots can occur in either leg.   Signs of blood clots in lungs include - Sudden increased shortness of breath, sudden onset of chest pain, and localized chest pain with coughing.   If you experience any of the above symptoms,  notify your provider and seek medical attention immediately.   You received anticoagulant therapy (blood thinners) in the hospital. Continue the prescribed blood-thinning medication at home, as directed by your provider.   Your risk for developing a clot continues for up to 2-3 months after surgery. Avoid prolonged sitting and dehydration (long air trips and car trips). If you do take a trip during this time, please get up, move around every 1-1.5 hours, and discuss all travel plans with your provider.     Activity   Once you get home, stay active. The key is not to overdo it. While you can expect some good days and some bad days, you should notice a gradual improvement over time and a gradual increase in endurance over the next 6 to 12 months.     Weight Bearing - You can begin to bear weight as tolerated unless otherwise directed by your provider. Use a walker, crutches or cane to assist with walking until you can walk smoothly (minimal or no limp) without assistance.   Physical Precautions - To assure proper recovery and tissue healing, you may be asked to take special precautions when moving (sitting, bending or sleeping) - usually for the first 6 weeks after surgery. Precautions vary from patient to patient depending on surgical approach used by your provider. Follow any specific precautions provided by your provider and physical therapist until cleared by your provider.   Sitting - Early on it is easier to get up from a tall chair or a chair with arms. The physical therapist will show you how to sit and stand from a chair keeping your affected leg out in front of you. Get up and move around on a regular basis--at least once every hour.   Walking - Walk as much as you like once your doctor gives permission to proceed, but remember that walking is no substitute for your prescribed exercises.  Follow the home exercise program prescribed during your hospital stay as directed by your physical therapist or provider.    Continued physical therapy may be prescribed after hospital discharge to strengthen your muscles and improve your gait/walking pattern. The decision to have therapy or not after the hospital stay is made by you and your provider prior to surgery or at your follow up appointment.   Swimming is an excellent low impact activity; you can begin as soon as the wound is healed and your provider clears you. Using a pair of training fins may make swimming a more enjoyable and effective exercise.   Sexual activity - Your provider can tell you when it is safe to resume sexual activity.   Other activities - Low impact activities are preferred. If you have specific questions, consult your provider.     When to Call the Doctor   Call the provider if you experience:   Fever over 100.5° F   Increased pain, drainage, redness, odor or heat around the incision area   Shaking chills   Increased knee pain with activity and rest   Increased pain in calf, tenderness or redness above or below the knee   Increased swelling of calf, ankle, foot   Sudden increased shortness of breath, sudden onset of chest pain, localized chest pain with coughing   Incision opening   Or, if there are any questions or concerns about medications or care.     Infection statistic resource:   https://www.vzaar[employer]/contents/prosthetic-joint-infection-epidemiology-microbiology-clinical-manifestations-and-diagnosis     -Is this patient being discharged with medication to prevent blood clots?  Yes, Heparin   Heparin injection  What is this medicine?  HEPARIN (HEP a rin) is an anticoagulant. It is used to treat or prevent clots in the veins, arteries, lungs, or heart. It stops clots from forming or getting bigger. This medicine prevents clotting during open-heart surgery, dialysis, or in patients who are confined to bed.  This medicine may be used for other purposes; ask your health care provider or pharmacist if you have questions.  COMMON BRAND NAME(S):  Hep-Lock, Hep-Lock U/P, Hepflush-10, Monoject Prefill Advanced Heparin Lock Flush, SASH Normal Saline and Heparin  What should I tell my health care provider before I take this medicine?  They need to know if you have any of these conditions:  bleeding disorders, such as hemophilia or low blood platelets  bowel disease or diverticulitis  endocarditis  high blood pressure  liver disease  recent surgery or delivery of a baby  stomach ulcers  an unusual or allergic reaction to heparin, benzyl alcohol, sulfites, other medicines, foods, dyes, or preservatives  pregnant or trying to get pregnant  breast-feeding  How should I use this medicine?  This medicine is given by injection or infusion into a vein. It can also be given by injection of small amounts under the skin. It is usually given by a health care professional in a hospital or clinic setting.  If you get this medicine at home, you will be taught how to prepare and give this medicine. Use exactly as directed. Take your medicine at regular intervals. Do not take it more often than directed. Do not stop taking except on your doctor's advice. Stopping this medicine may increase your risk of a blot clot. Be sure to refill your prescription before you run out of medicine.  It is important that you put your used needles and syringes in a special sharps container. Do not put them in a trash can. If you do not have a sharps container, call your pharmacist or healthcare provider to get one.  Talk to your pediatrician regarding the use of this medicine in children. While this medicine may be prescribed for children for selected conditions, precautions do apply.  Overdosage: If you think you have taken too much of this medicine contact a poison control center or emergency room at once.  NOTE: This medicine is only for you. Do not share this medicine with others.  What if I miss a dose?  If you miss a dose, take it as soon as you can. If it is almost time for your next dose,  take only that dose. Do not take double or extra doses.  What may interact with this medicine?  Do not take this medicine with any of the following medications:  aspirin and aspirin-like drugs  mifepristone  medicines that treat or prevent blood clots like warfarin, enoxaparin, and dalteparin  palifermin  protamine  This medicine may also interact with the following medications:  dextran  digoxin  hydroxychloroquine  medicines for treating colds or allergies  nicotine  NSAIDs, medicines for pain and inflammation, like ibuprofen or naproxen  phenylbutazone  tetracycline antibiotics  This list may not describe all possible interactions. Give your health care provider a list of all the medicines, herbs, non-prescription drugs, or dietary supplements you use. Also tell them if you smoke, drink alcohol, or use illegal drugs. Some items may interact with your medicine.  What should I watch for while using this medicine?  Visit your healthcare professional for regular checks on your progress. You may need blood work done while you are taking this medicine. Your condition will be monitored carefully while you are receiving this medicine. It is important not to miss any appointments.  Wear a medical ID bracelet or chain, and carry a card that describes your disease and details of your medicine and dosage times.  Notify your doctor or healthcare professional at once if you have cold, blue hands or feet.  If you are going to need surgery or other procedure, tell your healthcare professional that you are using this medicine.  Avoid sports and activities that might cause injury while you are using this medicine. Severe falls or injuries can cause unseen bleeding. Be careful when using sharp tools or knives. Consider using an electric razor. Take special care brushing or flossing your teeth. Report any injuries, bruising, or red spots on the skin to your healthcare professional.  Using this medicine for a long time may weaken  your bones and increase the risk of bone fractures.  You should make sure that you get enough calcium and vitamin D while you are taking this medicine. Discuss the foods you eat and the vitamins you take with your healthcare professional.  Wear a medical ID bracelet or chain. Carry a card that describes your disease and details of your medicine and dosage times.  What side effects may I notice from receiving this medicine?  Side effects that you should report to your doctor or health care professional as soon as possible:  allergic reactions like skin rash, itching or hives, swelling of the face, lips, or tongue  bone pain  fever, chills  nausea, vomiting  signs and symptoms of bleeding such as bloody or black, tarry stools; red or dark-brown urine; spitting up blood or brown material that looks like coffee grounds; red spots on the skin; unusual bruising or bleeding from the eye, gums, or nose  signs and symptoms of a blood clot such as chest pain; shortness of breath; pain, swelling, or warmth in the leg  signs and symptoms of a stroke such as changes in vision; confusion; trouble speaking or understanding; severe headaches; sudden numbness or weakness of the face, arm or leg; trouble walking; dizziness; loss of coordination  Side effects that usually do not require medical attention (report to your doctor or health care professional if they continue or are bothersome):  hair loss  pain, redness, or irritation at site where injected  This list may not describe all possible side effects. Call your doctor for medical advice about side effects. You may report side effects to FDA at 6-890-FDA-5317.  Where should I keep my medicine?  Keep out of the reach of children.  Store unopened vials at room temperature between 15 and 30 degrees C (59 and 86 degrees F). Do not freeze. Do not use if solution is discolored or particulate matter is present. Throw away any unused medicine after the expiration date.  NOTE: This sheet  is a summary. It may not cover all possible information. If you have questions about this medicine, talk to your doctor, pharmacist, or health care provider.  © 2020 [employer] (2018-12-13 11:26:57)    -Is this patient being discharged with medication to prevent blood clots?  Yes, Heparin   Heparin injection  What is this medicine?  HEPARIN (HEP a rin) is an anticoagulant. It is used to treat or prevent clots in the veins, arteries, lungs, or heart. It stops clots from forming or getting bigger. This medicine prevents clotting during open-heart surgery, dialysis, or in patients who are confined to bed.  This medicine may be used for other purposes; ask your health care provider or pharmacist if you have questions.  COMMON BRAND NAME(S): Hep-Lock, Hep-Lock U/P, Hepflush-10, Monoject Prefill Advanced Heparin Lock Flush, SASH Normal Saline and Heparin  What should I tell my health care provider before I take this medicine?  They need to know if you have any of these conditions:  bleeding disorders, such as hemophilia or low blood platelets  bowel disease or diverticulitis  endocarditis  high blood pressure  liver disease  recent surgery or delivery of a baby  stomach ulcers  an unusual or allergic reaction to heparin, benzyl alcohol, sulfites, other medicines, foods, dyes, or preservatives  pregnant or trying to get pregnant  breast-feeding  How should I use this medicine?  This medicine is given by injection or infusion into a vein. It can also be given by injection of small amounts under the skin. It is usually given by a health care professional in a hospital or clinic setting.  If you get this medicine at home, you will be taught how to prepare and give this medicine. Use exactly as directed. Take your medicine at regular intervals. Do not take it more often than directed. Do not stop taking except on your doctor's advice. Stopping this medicine may increase your risk of a blot clot. Be sure to refill  your prescription before you run out of medicine.  It is important that you put your used needles and syringes in a special sharps container. Do not put them in a trash can. If you do not have a sharps container, call your pharmacist or healthcare provider to get one.  Talk to your pediatrician regarding the use of this medicine in children. While this medicine may be prescribed for children for selected conditions, precautions do apply.  Overdosage: If you think you have taken too much of this medicine contact a poison control center or emergency room at once.  NOTE: This medicine is only for you. Do not share this medicine with others.  What if I miss a dose?  If you miss a dose, take it as soon as you can. If it is almost time for your next dose, take only that dose. Do not take double or extra doses.  What may interact with this medicine?  Do not take this medicine with any of the following medications:  aspirin and aspirin-like drugs  mifepristone  medicines that treat or prevent blood clots like warfarin, enoxaparin, and dalteparin  palifermin  protamine  This medicine may also interact with the following medications:  dextran  digoxin  hydroxychloroquine  medicines for treating colds or allergies  nicotine  NSAIDs, medicines for pain and inflammation, like ibuprofen or naproxen  phenylbutazone  tetracycline antibiotics  This list may not describe all possible interactions. Give your health care provider a list of all the medicines, herbs, non-prescription drugs, or dietary supplements you use. Also tell them if you smoke, drink alcohol, or use illegal drugs. Some items may interact with your medicine.  What should I watch for while using this medicine?  Visit your healthcare professional for regular checks on your progress. You may need blood work done while you are taking this medicine. Your condition will be monitored carefully while you are receiving this medicine. It is important not to miss any  appointments.  Wear a medical ID bracelet or chain, and carry a card that describes your disease and details of your medicine and dosage times.  Notify your doctor or healthcare professional at once if you have cold, blue hands or feet.  If you are going to need surgery or other procedure, tell your healthcare professional that you are using this medicine.  Avoid sports and activities that might cause injury while you are using this medicine. Severe falls or injuries can cause unseen bleeding. Be careful when using sharp tools or knives. Consider using an electric razor. Take special care brushing or flossing your teeth. Report any injuries, bruising, or red spots on the skin to your healthcare professional.  Using this medicine for a long time may weaken your bones and increase the risk of bone fractures.  You should make sure that you get enough calcium and vitamin D while you are taking this medicine. Discuss the foods you eat and the vitamins you take with your healthcare professional.  Wear a medical ID bracelet or chain. Carry a card that describes your disease and details of your medicine and dosage times.  What side effects may I notice from receiving this medicine?  Side effects that you should report to your doctor or health care professional as soon as possible:  allergic reactions like skin rash, itching or hives, swelling of the face, lips, or tongue  bone pain  fever, chills  nausea, vomiting  signs and symptoms of bleeding such as bloody or black, tarry stools; red or dark-brown urine; spitting up blood or brown material that looks like coffee grounds; red spots on the skin; unusual bruising or bleeding from the eye, gums, or nose  signs and symptoms of a blood clot such as chest pain; shortness of breath; pain, swelling, or warmth in the leg  signs and symptoms of a stroke such as changes in vision; confusion; trouble speaking or understanding; severe headaches; sudden numbness or weakness of the  face, arm or leg; trouble walking; dizziness; loss of coordination  Side effects that usually do not require medical attention (report to your doctor or health care professional if they continue or are bothersome):  hair loss  pain, redness, or irritation at site where injected  This list may not describe all possible side effects. Call your doctor for medical advice about side effects. You may report side effects to FDA at 4-004-ZII-2046.  Where should I keep my medicine?  Keep out of the reach of children.  Store unopened vials at room temperature between 15 and 30 degrees C (59 and 86 degrees F). Do not freeze. Do not use if solution is discolored or particulate matter is present. Throw away any unused medicine after the expiration date.  NOTE: This sheet is a summary. It may not cover all possible information. If you have questions about this medicine, talk to your doctor, pharmacist, or health care provider.  © 2020 [employer]/Gold Standard (2018-12-13 11:26:57)    Is patient discharged on Warfarin / Coumadin?   No

## 2023-06-02 NOTE — CARE PLAN
The patient is Stable - Low risk of patient condition declining or worsening    Shift Goals  Clinical Goals: Free from fall throughout the shift, q2 turns  Patient Goals: Sleep comfortably tonight  Family Goals: n/a    Progress made toward(s) clinical / shift goals:  Fall precautions in place, q2 turns patients position, hourly rounding done.    Patient is not progressing towards the following goals: n/a

## 2023-06-02 NOTE — PROGRESS NOTES
"Hospital Medicine Daily Progress Note    Date of Service  6/2/2023    Chief Complaint  Yesi Garduno is a 89 y.o. female admitted 5/16/2023 with left hip pain after a fall    Hospital Course  As per chart review:  \"History of a AAA, breast cancer status post mastectomy, CKD stage IIIa, hypertension, not on any medications admitted after ground-level fall complaining of blurred vision prior to the fall and left hip pain after the fall.  She was found to have an impacted left femoral neck fracture.  Orthopedic surgery Dr. Ponce was consulted and recommended operative fixation.  She had a mildly elevated troponin which trended up slightly to 42 however then trended down.  Cardiac clearance was obtained\"    Interval Problem Update  5/17: Complaining of severe left hip pain, anxious for surgery.  Echocardiogram performed and reviewed and it shows some diastolic dysfunction however normal EF.  Cleared for surgery, discussed with cardiology.  Blood pressure improved after hydralazine and pain control.  N.p.o.  5/18: Uncomplicated surgery yesterday.  Today she is on 3 L, I reviewed her x-ray which shows bilateral small pleural effusion, atelectasis.  Fluids stopped, IV Lasix x1, I-S.  She is encephalopathic, IV narcotics discontinued.  Pending PT eval regarding SNF.  Labile blood pressure, as needed hydralazine added    5/22: Confused but improved from yesterday.  Cr stable, repeat lasix.  She would greatly benefit from SNF for pt and ot to help recover from fracture and surgery    5/23: Patient is alert, but somnolent.  Oriented and following directions at this time.  She has had poor appetite, likely due to delirium from hospitalization and pain medicines.  I discussed controlling pain, family and patient in agreement with small dose of Dilaudid and scheduled Tylenol.  We will continue to work with physical therapy for mobilization.  Patient would benefit most from skilled nursing facility, she has apparently had " a bad experience at [facility], and is declining this option.  She has been declined from other facilities due to cognition and level of assistance.  I do expect her level assistance and condition to continue to improve.  Will need to resend referrals once patient improves.    5/24: Patient's condition continues to improve, she is still somnolent, likely due to Dilaudid.  But tolerating this well so we will continue with scheduled Tylenol and as needed low-dose Dilaudid-for severe breakthrough pain.  I have also spoken with physical therapy and Occupational Therapy to reevaluate and work with patient, encourage out of bed to chair.  Now that patient has improved in both mobility and mental status, SNF referrals will be resent.  Discussed with case management and patient's family at bedside.    5/25: Patient alert, follows commands but confused and disoriented this morning.  Had episode of orthostatic hypotension yesterday while working with physical therapy which unfortunately affected her ability to work and ambulate.  Patient was treated with IV fluids, blood pressure better today.  We will also use RAYRAY hose.  PT to work with patient again today.    5/26: Patient not progressing as well as expected, will still continue to get out of bed to chair, work with PT/OT.  Discussed overall goals of care with patient's daughter today, if patient does not progress, may qualify for hospice.    5/27: Patient earlier this morning, reporting severe pain, but appears to be resting in bed.  Continues to overall plan, but able to participate in discussion.  Still very weak, physical therapy has been amenable to work with her out of bed due to profound weakness and confusion.  Will attempt again today.    5/28: Patient actually alert and oriented today, was able to get to the side of bed, but has profound orthostatic hypotension, SBP dropped to 70s, will add midodrine and says her orthostatic hypotension is not responsive to RAYRAY hose  or fluids, likely impeding ability to get out of bed to chair and ambulate.    5/29: Midodrine was added for orthostatic hypotension, blood pressures improved, however pt still very fatigued and unable to get out of bed.  If no improvement tomorrow, goals of care should be readdressed by family.  Per last closely discussion with daughter, doing this to try for a few more days, if this is not working hospice may be an option.    PATIENT SEEN BY PREVIOUS HOSPITALIST UNTIL 5/29 5/30: Patient seen at bedside this morning.  Patient was seen at bedside by me twice, however both times the patient was sleeping and very hard to wake up.  Unable to have a clear conversation with the patient.  I discussed with nursing and they were able to set her up with OT, however she went to go back to bed immediately.  It seems the patient is not able to eat appropriately, not able to move appropriately.  At 89 years old this are very high risk factors for declining.  Overall prognosis remains guarded.  No family members present at bedside, however I was able to discuss the case with the patient's granddaughters over the phone who might be interested in hospice but they need to talk to her mom.  I will meet with the patient's daughter tomorrow.  I have placed referral to hospice in the event that they would like hospice so we can move this faster.  Overall prognosis remains guarded.    5/31: Patient seen at bedside twice at least today.  Once without family members and the second time with family members present at bedside.  The patient today seems to be alert and oriented.  She could have waxing and waning altered mental status.  However today she is willing to work with physical therapy and is having an appetite.  Family members patient would like to pursue skilled nursing facility.  We appreciate further recommendations by case management.  Overall prognosis still remains guarded.    6/1: Patient seen at bedside today.  The patient  remains alert and oriented.  She states that she has appetite.  We are still pending placement.  Case management working on placement possible on [place], we appreciate further recommendations.  Overall prognosis remains guarded.  Unable to get skilled nursing facility placement I have talked to the patient's daughter over the phone and they would like to take the patient home with home health.    6/2: Patient seen at bedside this morning.  No family members present at bedside.  The patient seems to be in good spirits today.  We are still waiting on placement, she is medically cleared for discharge.  I had a long discussion with case management regarding further plan of care upon discharge.  We are still awaiting skilled nursing facility acceptance.  I had previously a peer to peer discussion with the patient's insurance for skilled facility placement.  I discussed over the phone with the patient's daughter about clinical status and discharge plan.  We appreciate further recommendations by case management.    I have discussed this patient's plan of care and discharge plan at IDT rounds today with Case Management, Nursing, Nursing leadership, and other members of the IDT team.    Consultants/Specialty  orthopedics- [facility]  Cardiology - [facility]    Code Status  DNAR/DNI    Disposition  The patient is medically cleared for discharge to home or a post-acute facility.  Anticipate discharge to: skilled nursing facility      Review of Systems  Review of Systems   Constitutional:  Positive for malaise/fatigue. Negative for chills and fever.   HENT:  Negative for hearing loss and nosebleeds.    Eyes:  Negative for blurred vision and double vision.   Respiratory:  Negative for cough and shortness of breath.    Cardiovascular:  Negative for chest pain and palpitations.   Gastrointestinal:  Negative for abdominal pain, heartburn and vomiting.   Genitourinary:  Negative for dysuria and urgency.   Musculoskeletal:  Positive  for joint pain.   Skin:  Negative for itching and rash.   Neurological:  Positive for weakness (generalized weakness). Negative for dizziness and headaches.   Psychiatric/Behavioral:  Negative for substance abuse. The patient is not nervous/anxious.    All other systems reviewed and are negative.  Patient sleeping twice that I went in to see her, unable to wake her up.     Physical Exam  Temp:  [36.2 °C (97.2 °F)-36.7 °C (98 °F)] 36.7 °C (98 °F)  Pulse:  [60-75] 68  Resp:  [16-20] 19  BP: (101-125)/(46-51) 115/50  SpO2:  [91 %-94 %] 91 %    Physical Exam  Vitals and nursing note reviewed.   Constitutional:       Appearance: She is ill-appearing.   HENT:      Head: Normocephalic and atraumatic.      Right Ear: External ear normal.      Left Ear: External ear normal.      Mouth/Throat:      Pharynx: No oropharyngeal exudate or posterior oropharyngeal erythema.   Eyes:      General:         Right eye: No discharge.         Left eye: No discharge.   Cardiovascular:      Rate and Rhythm: Normal rate and regular rhythm.      Pulses: Normal pulses.      Heart sounds: No murmur heard.     No gallop.   Pulmonary:      Effort: Respiratory distress present.      Breath sounds: Rhonchi present.      Comments: Requiring oxygen supplementation  Abdominal:      General: Bowel sounds are normal. There is no distension.      Palpations: Abdomen is soft.      Tenderness: There is no abdominal tenderness. There is no guarding.   Musculoskeletal:         General: No swelling or tenderness.      Cervical back: No tenderness.   Skin:     General: Skin is warm and dry.   Neurological:      Mental Status: She is alert and oriented to person, place, and time.   Psychiatric:         Mood and Affect: Mood normal.         Behavior: Behavior normal.         Fluids    Intake/Output Summary (Last 24 hours) at 6/2/2023 1023  Last data filed at 6/2/2023 0900  Gross per 24 hour   Intake 400 ml   Output 1900 ml   Net -1500 ml          Laboratory  Recent Labs     05/31/23  0548   WBC 7.2   RBC 2.85*   HEMOGLOBIN 8.8*   HEMATOCRIT 28.9*   .4*   MCH 30.9   MCHC 30.4*   RDW 49.5   PLATELETCT 297   MPV 8.7*         Recent Labs     05/31/23  0548   SODIUM 141   POTASSIUM 5.0   CHLORIDE 107   CO2 29   GLUCOSE 109*   BUN 35*   CREATININE 1.04   CALCIUM 9.3                       Imaging  DX-CHEST-PORTABLE (1 VIEW)   Final Result      1.  Again seen cardiomegaly with interstitial infiltrates.      2.  Improved left lung base atelectasis.      DX-PELVIS-1 OR 2 VIEWS   Final Result      Recent left hip arthroplasty.      DX-CHEST-PORTABLE (1 VIEW)   Final Result      1.  Cardiomegaly with interstitial infiltrates.      2.  Bibasilar atelectasis.      EC-ECHOCARDIOGRAM COMPLETE W/O CONT   Final Result      DX-SHOULDER 2+ RIGHT   Final Result      1.  Osteoporosis.      2.  No evidence of right shoulder fracture and/or dislocation.      DX-HAND 3+ RIGHT   Final Result         1.  No radiographic evidence of acute injury.      2. Osteoporosis.      DX-HIP-COMPLETE - UNILATERAL 2+ RIGHT   Final Result      1.  Impacted left femoral neck fracture.      2.  Previous pinning of right hip      3.  Osteoporosis.      DX-HUMERUS 2+ LEFT   Final Result         1.  No radiographic evidence of acute injury.      2. Osteopenia.      DX-HAND 3+ LEFT   Final Result         1.  No radiographic evidence of acute injury.      2. Osteoporosis.      DX-FEMUR-2+ LEFT   Final Result      1.  Impacted left femoral neck fracture.      2.  Bone infarcts noted in the metadiaphyseal region of left femur.      CT-CHEST,ABDOMEN,PELVIS WITH   Final Result      1.  Left femoral neck fracture.      2.  Previous pinning of right hip.      3.  Infrarenal aortic aneurysm which measures 5.5 cm in greatest diameter.      4.  Osteoporosis.      5.  Sigmoid scoliosis of the thoracic and lumbar spine.      6.  Centrilobular emphysema.      7.  These findings were discussed with JOSÉ LUIS  DARRELL GARCIA at 10:15 p.m. 5/16/2023      CT-CSPINE WITHOUT PLUS RECONS   Final Result      1.  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      2.  Mild to moderate degenerative anterolisthesis at C4-5 level.      3.  Osteopenia.      4.  No evidence of acute cervical spine fracture.      CT-HEAD W/O   Final Result      1.  Cerebral atrophy.      2.  White matter lucencies most consistent with small vessel ischemic change versus demyelination or gliosis.      3.  Otherwise, Head CT without contrast with no acute findings. No evidence of acute cerebral infarction, hemorrhage or mass lesion.                Assessment/Plan  * Displaced fracture of left femoral neck (HCC)- (present on admission)  Assessment & Plan  -Hip x-ray showed impacted left femoral neck fracture  Underwent surgical fixation Dr. Bourne 5/17  Narcotics were initially discontinued due to confusion.  Continue with with scheduled Tylenol, ice, lidocaine patch for localized pain on lower extremity.  We will also add IV Dilaudid, for breakthrough pain-sherlyn with patient and daughter and they are in agreement with this.    Continue Dilaudid 0.25 mg IV-high risk medication, monitor for somnolence, altered mental status and respiratory depression.   Encourage ambulation and up to chair  Pain Seems controlled with scheduled Tylenol    5/30: Continue lidocaine patch and Tylenol.  We will try to avoid opiate medications.  Continue to monitor closely.  Family considering hospice.  Overall prognosis remains guarded.    5/31: The patient and the patient daughter at bedside would like to pursue skilled nursing facility.  We appreciate further recommendations by case management.    6/2: Pending placement.    Failure to thrive in adult  Assessment & Plan  Patient has had poor appetite, increased debility from prolonged hospitalization.  Appetite improving, patient still weak and unable to  ambulate and work with physical therapy    5/31: Overall prognosis remains guarded, family were considering hospice yesterday, however the patient is alert and oriented today.  They would like to pursue skilled nursing facility.    6/2: Pending placement.    Orthostatic hypotension  Assessment & Plan  Per patient's granddaughter, has had this in the past.  RAYRAY hose  Continue with IV fluids  Monitor blood pressure when ambulating work with PT  If fails to resolve, may need midodrine  Within target range    6/2: Continue midodrine, overall prognosis remains guarded.    Acute metabolic encephalopathy  Assessment & Plan  Waxing and waning.    Multifactorial secondary to anesthetic medications, as well as narcotics, pain, sundowning  Significant proved after narcotics were held  Continue supportive care  Normalize sleep-wake  Minimize lines/tubes  Cortisol and TSH within expected range   Mental status has been fluctuating, improved on 5/27.  No focal signs to suggest stroke.    5/30: Patient is sleeping today, very hard to wake up. No family members present at bedside at the time of my evaluation.    6/2: Patient seems to be alert and oriented today.  Her altered mental status could be waxing and waning.  Monitor closely.    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    Elevated troponin- (present on admission)  Assessment & Plan  Elevated and initially trended up very slightly, likely due to reduced renal clearance, has trended back down.  Never complained of any chest pain  EKG negative for ischemia  Not  consistent with cardiac ischemia/event    Hypertensive urgency- (present on admission)  Assessment & Plan  Now resolved  Present on admission, likely due to pain as she does not take anything at home  Improved with IV hydralazine  Continue as needed BP medications and monitor      5/30: Patient with possible orthostatic hypotension, however unable to assess at this time. Patient has been started on midodrine, monitor closely.    6/2: Continue midodrine for now.    Stage 3a chronic kidney disease (HCC)- (present on admission)  Assessment & Plan  -Creatinine has fluctuated throughout hospitalization, slightly worse today 1.45, will continue to monitor closely.  - Was on Lasix, which is likely contributing to MELANIE.  -Avoid nephrotoxic medication and monitor chemistry panel in the morning  Monitor with repeat labs   Improving    5/30: Improving, IVF stopped. Encourage PO intake. Cr today is 1.07.    5/31: Cr is 1.04 today.    Acute respiratory failure with hypoxia (HCC)- (present on admission)  Assessment & Plan  Patient is on 2 L supplemental oxygen, baseline at home is 3L  Patient did not have acute respiratory failure, she has chronic respiratory failure.  Not overloaded.  I ordered and reviewed her chest x-ray which shows bilateral basilar small pleural effusions as well as atelectasis  Encourage IS  Mobilize as able TID to chair for meals-as tolerated    5/30: I suspect this is from atelectasis. Patient requiring 3 L of oxygen at the time of my evaluation.    6/2: Continue IS for now.      S/P right mastectomy- (present on admission)  Assessment & Plan  -Due to underlying breast cancer in 1960s.    AAA (abdominal aortic aneurysm) ([facility])- (present on admission)  Assessment & Plan  CT showed infrarenal aortic aneurysm which measures 5.5 cm in the greatest diameter- has been present for years  -I provided extensive chart review on this. In 7/2015 AAA was 3.9 cm (Renal CT), and on 1/2017 AAA 4.2 cm (CT L Spine).  Blood  pressure has improved  No chest pain, EKG is negative for acute ischemia.  Troponin increased slightly but now trended down before surgery    5/30: The patient will require close follow-up as an outpatient, at this point due to the patient's clinical status it would not be a good idea to pursue surgical options at this time.  I did speak to the patient's granddaughter and they are considering hospice but they would like to talk to the patient's daughter first.    5/31: I spoke to the patient's daughter and the patient at bedside today, for now they would not like to pursue surgical intervention for this, she will require close follow-up as an outpatient.    6/2: Patient will require close follow up as outpatient.    Near syncope- (present on admission)  Assessment & Plan  She complained of blurred vision prior to stumbling over a stool leading to a fall  CT of the head on admission was unremarkable other than small vessel ischemic change, no focal neurodeficits to suggest CVA  No additional head imaging needed at this time  Echocardiogram performed and shows essentially a normal EF, diastolic dysfunction  Positive for orthostatic hypotension  RAYRAY hose  BP improved with RAYRAY hose, but still with orthostatic hypotension  Midodrine 5 mg added on 5/28-improved blood pressure, orthostasis, patient still unable to get out of bed    6/2: continue midodrine         VTE prophylaxis: Heparin    I have performed a physical exam and reviewed and updated ROS and Plan today (6/2/2023). In review of yesterday's note (6/1/2023), there are no changes except as documented above.

## 2023-06-02 NOTE — CARE PLAN
The patient is Stable - Low risk of patient condition declining or worsening    Shift Goals  Clinical Goals: Pt able to sit and pain control throughout the shift  Patient Goals: for discharge, rest  Family Goals: n/a    Progress made toward(s) clinical / shift goals:  Pt able to sit to cardiac chair and walk back to bed using FWW with assist. Tylenol given for pain controlled. Pt had BM after dulcolax suppository. Fall precaution in place.    Patient is not progressing towards the following goals:      Problem: Pain - Standard  Goal: Alleviation of pain or a reduction in pain to the patient’s comfort goal  Outcome: Progressing     Problem: Respiratory  Goal: Patient will achieve/maintain optimum respiratory ventilation and gas exchange  Outcome: Progressing     Problem: Bowel Elimination  Goal: Establish and maintain regular bowel function  Outcome: Progressing     Problem: Mobility  Goal: Patient's capacity to carry out activities will improve  Outcome: Progressing     Problem: Fall Risk  Goal: Patient will remain free from falls  Outcome: Progressing     Problem: Discharge Barriers/Planning  Goal: Patient's continuum of care needs are met  Outcome: Progressing  Flowsheets (Taken 6/2/2023 0653)  Continuum of Care Needs:   Assessed for discharge barriers   Involved patient/family/support system in discharge process   Provided and explained discharge instructions   Communicated discharge barriers to interdisciplinary tream   Collaborated with Case Management/

## 2023-08-09 PROBLEM — Z66 DNR (DO NOT RESUSCITATE): Status: ACTIVE | Noted: 2023-01-01

## 2023-08-09 PROBLEM — E87.5 HYPERKALEMIA: Status: ACTIVE | Noted: 2023-01-01

## 2023-08-09 PROBLEM — J96.21 ACUTE ON CHRONIC RESPIRATORY FAILURE WITH HYPOXIA (HCC): Status: RESOLVED | Noted: 2021-04-01 | Resolved: 2023-01-01

## 2023-08-09 PROBLEM — N17.9 ACUTE RENAL FAILURE SUPERIMPOSED ON CHRONIC KIDNEY DISEASE (HCC): Status: ACTIVE | Noted: 2023-01-01

## 2023-08-09 PROBLEM — N18.9 ACUTE RENAL FAILURE SUPERIMPOSED ON CHRONIC KIDNEY DISEASE (HCC): Status: ACTIVE | Noted: 2023-01-01

## 2023-08-09 NOTE — H&P
Hospital Medicine History & Physical Note    Date of Service  8/9/2023    Primary Care Physician  Savage Doyle M.D.    Consultants  None    Specialist Names: None    Code Status  DNAR/DNI    Chief Complaint  Chief Complaint   Patient presents with    ALOC     Gh states intermitt abnormal behavior, as well as 2 days of hallucinations       History of Presenting Illness  Yesi Garduno is a 89 y.o. female who presented 8/9/2023 with altered mental status and auditory and visual hallucinations.  Per the daughter the patient last Saturday had her birthday party and was doing well.  Since then however the patient has considerably declined and has developed auditory and visual hallucinations and thus she brought her into the hospital because of this.  The patient is found to have acute renal failure with acute hyperkalemia.  The patient lives at a group home where apparently she is taking really good care of.  The patient at the group home eats really well.  She apparently also has a water jug next to her on the night table.  Nevertheless the patient has developed acute renal failure and at this point hypokalemia.  Patient will need correction of the hyperkalemia as well as aggressive hydration to correct the renal failure.  We will avoid nephrotoxic medications.  Continue at this point with monitoring intake and output and daily weights.  Patient is DO NOT RESUSCITATE DO NOT INTUBATE is discussed with the daughter.  Daughter would like her to go back to the assisted living facility and she will continue with outpatient rehab on discharge    I discussed the plan of care with patient, family, bedside RN, , pharmacy, and emergency room physician Dr. Garcia .    Review of Systems  Review of Systems   Unable to perform ROS: Acuity of condition       Past Medical History   has a past medical history of AAA (abdominal aortic aneurysm) ([facility]) (8/11/2015), Breast cancer (HCC), Cancer (HCC), Near syncope  "(8/11/2015), and S/P right mastectomy.    Surgical History   has a past surgical history that includes other; mastectomy (1979); breast biopsy (1990); pr exc skin malig 2.1-3cm remaindr body (Left, 4/21/2023); pr amputation toe,i-p jt (4/21/2023); pr adj tiss xfer head,fac,hand <10sqcm (4/21/2023); and pr partial hip replacement (Left, 5/17/2023).     Family History  family history includes Cancer in her mother and sister; Lung Disease in her paternal uncle.   Family history reviewed with patient. There is no family history that is pertinent to the chief complaint.     Social History   reports that she quit smoking about 38 years ago. Her smoking use included cigarettes. She has a 8.50 pack-year smoking history. She has been exposed to tobacco smoke. She has never used smokeless tobacco. She reports current alcohol use of about 4.2 oz of alcohol per week. She reports that she does not use drugs.    Allergies  Allergies   Allergen Reactions    Oxycodone      hallucinations        Hydrocodone Rash     \"all over\" and it makes me act loopy    Penicillins Vomiting     Other reaction(s): Vomiting  Other reaction(s): GI Intolerance    Tetracycline Rash and Itching     Pt states \"I get a bad body rash and itch all over\".      Penicillin G Vomiting     Other reaction(s): Vomiting  Other reaction(s): GI Intolerance       Medications  Prior to Admission Medications   Prescriptions Last Dose Informant Patient Reported? Taking?   ALPRAZolam (XANAX) 0.25 MG Tab 8/1/2023 at Unknown MAR from Other Facility Yes Yes   Sig: Take 0.125-0.25 mg by mouth at bedtime as needed for Sleep.   fluticasone (FLONASE) 50 MCG/ACT nasal spray 8/9/2023 at 0800 MAR from Other Facility Yes No   Sig: Administer 1 Spray into affected nostril(S) 2 times a day.   sulfamethoxazole-trimethoprim (BACTRIM DS) 800-160 MG tablet 8/7/2023 at FINISHED MAR from Other Facility Yes Yes   Sig: Take 1 Tablet by mouth 2 times a day. Per MAR pt started on 7/31/2023 for " 7 day course      Facility-Administered Medications: None       Physical Exam  Temp:  [34.9 °C (94.9 °F)-35.4 °C (95.7 °F)] 35.4 °C (95.7 °F)  Pulse:  [60-71] 71  Resp:  [7-19] 13  BP: (155-188)/(60-79) 164/79  SpO2:  [90 %-100 %] 95 %  Blood Pressure : (!) 164/79   Temperature: (!) 35.4 °C (95.7 °F)   Pulse: 71   Respiration: 13   Pulse Oximetry: 95 %       Physical Exam  Vitals and nursing note reviewed. Exam conducted with a chaperone present.   Constitutional:       General: She is awake.      Appearance: She is well-developed, well-groomed and normal weight. She is ill-appearing.      Interventions: Nasal cannula in place.   HENT:      Head: Normocephalic and atraumatic.      Jaw: There is normal jaw occlusion. No trismus.      Salivary Glands: Right salivary gland is not tender. Left salivary gland is not tender.      Right Ear: External ear normal.      Left Ear: External ear normal.      Mouth/Throat:      Mouth: Mucous membranes are moist.      Pharynx: Oropharynx is clear.   Eyes:      General: Lids are normal. Vision grossly intact.      Extraocular Movements: Extraocular movements intact.      Conjunctiva/sclera: Conjunctivae normal.      Right eye: Right conjunctiva is not injected. No exudate.     Left eye: Left conjunctiva is not injected. No exudate.     Pupils: Pupils are equal, round, and reactive to light.   Neck:      Thyroid: No thyroid mass.      Vascular: No hepatojugular reflux or JVD.      Trachea: No abnormal tracheal secretions or tracheal deviation.   Cardiovascular:      Rate and Rhythm: Regular rhythm. Tachycardia present. Occasional Extrasystoles are present.     Pulses: Normal pulses.      Heart sounds: Normal heart sounds. No murmur heard.     No friction rub.   Pulmonary:      Effort: Pulmonary effort is normal.      Breath sounds: Normal breath sounds. No wheezing or rhonchi.   Abdominal:      General: Abdomen is flat. Bowel sounds are normal.      Palpations: Abdomen is soft.       Tenderness: There is no abdominal tenderness. There is no right CVA tenderness or left CVA tenderness.      Hernia: No hernia is present.   Musculoskeletal:      Cervical back: Full passive range of motion without pain, normal range of motion and neck supple. No rigidity. No muscular tenderness.      Right lower leg: No edema.      Left lower leg: No edema.   Lymphadenopathy:      Head:      Right side of head: No submental adenopathy.      Left side of head: No submental adenopathy.      Cervical:      Right cervical: No superficial cervical adenopathy.     Left cervical: No superficial cervical adenopathy.      Upper Body:      Right upper body: No supraclavicular adenopathy.      Left upper body: No supraclavicular adenopathy.   Skin:     General: Skin is warm and dry.      Capillary Refill: Capillary refill takes less than 2 seconds.      Coloration: Skin is not cyanotic or pale.      Findings: Erythema and wound present. No abrasion or bruising.             Comments: Left heel tissue injury most likely secondary to pressure present on arrival   Neurological:      General: No focal deficit present.      Mental Status: She is oriented to person, place, and time. Mental status is at baseline. She is lethargic.      GCS: GCS eye subscore is 4. GCS verbal subscore is 5. GCS motor subscore is 6.      Cranial Nerves: No cranial nerve deficit.      Sensory: No sensory deficit.      Motor: Motor function is intact.      Deep Tendon Reflexes:      Reflex Scores:       Tricep reflexes are 2+ on the right side and 2+ on the left side.       Bicep reflexes are 2+ on the right side and 2+ on the left side.       Brachioradialis reflexes are 2+ on the right side and 2+ on the left side.       Patellar reflexes are 2+ on the right side and 2+ on the left side.       Achilles reflexes are 2+ on the right side and 2+ on the left side.  Psychiatric:         Attention and Perception: She is inattentive. She perceives auditory and  visual hallucinations.         Mood and Affect: Affect is flat.         Speech: Speech is delayed.         Behavior: Behavior is slowed and withdrawn.         Cognition and Memory: Cognition is impaired. Memory is impaired.         Laboratory:  Recent Labs     08/09/23  1200   WBC 6.6   RBC 4.05*   HEMOGLOBIN 11.4*   HEMATOCRIT 38.5   MCV 95.1   MCH 28.1   MCHC 29.6*   RDW 51.8*   PLATELETCT 271   MPV 9.3     Recent Labs     08/09/23  1200   SODIUM 138   POTASSIUM 6.7*   CHLORIDE 106   CO2 21   GLUCOSE 88   BUN 34*   CREATININE 1.59*   CALCIUM 10.5*     Recent Labs     08/09/23  1200   ALTSGPT 13   ASTSGOT 18   ALKPHOSPHAT 130*   TBILIRUBIN 0.3   GLUCOSE 88         No results for input(s): NTPROBNP in the last 72 hours.      Recent Labs     08/09/23  1200   TROPONINT 35*       Imaging:  CT-HEAD W/O   Final Result      1.  No evidence of acute intracranial process.      2.  Cerebral atrophy as well as periventricular chronic small vessel ischemic change.         DX-CHEST-PORTABLE (1 VIEW)   Final Result      Cardiomegaly with bilateral interstitial opacities.      US-RENAL    (Results Pending)       X-Ray:  I have personally reviewed the images and compared with prior images.  EKG:  I have personally reviewed the images and compared with prior images.    Assessment/Plan:  Justification for Admission Status  I anticipate this patient will require at least two midnights for appropriate medical management, necessitating inpatient admission because patient has acute renal failure with auditory and visual hallucinations.  She will require at this point 48 hours or more of inpatient management to correct her renal functions and electrolytes    Patient will need a Telemetry bed on MEDICAL service .  The need is secondary to acute renal failure with acute hyperkalemia.    * Hyperkalemia- (present on admission)  Assessment & Plan  Potassium is up secondary to acute renal failure.  Patient will need at this point avoidance of  potassium containing substances and no potassium supplementation  Patient will need fluid resuscitation monitoring of renal functions and we will give her at this point bicarb for the acidosis  With this modality we should see her potassium levels stabilize  Telemetric monitoring while she is with hyperkalemia    Acute renal failure superimposed on chronic kidney disease (HCC)  Assessment & Plan  Patient has definitely gotten prerenal azotemia with dehydration that she will need at this point aggressive fluid resuscitation and monitoring of renal functions if her renal functions do not improve within the next 24 hours we will need nephrology consultation.    Acute metabolic encephalopathy- (present on admission)  Assessment & Plan  According to the daughter patient has become more confused over the past few days.  Last Saturday she took her for a birthday party.  Since then however patient's mentation is considerably decline and she has become more more confused to the point where she is not answering simple questions anymore.  This may be secondary to the acute dehydration renal failure, rehydrate monitor renal functions and hopefully mentation will return back to her baseline.    Chronic diastolic heart failure (HCC)- (present on admission)  Assessment & Plan  Monitor fluid status especially now that she is dehydrated  Continue telemetry  Monitor intake and output and daily weights    Hypertension- (present on admission)  Assessment & Plan  Optimize blood pressure to keep systolic blood pressure less than 140 diastolic under 90  Continue at this point with medication management using as needed labetalol    Interstitial lung disease (HCC)- (present on admission)  Assessment & Plan  Patient has history of interstitial lung disease  Currently no acute exacerbation  Not oxygen dependent    Breast cancer (HCC)- (present on admission)  Assessment & Plan  History of breast cancer status postmastectomy in 2015    DNR (do  not resuscitate)  Assessment & Plan  Discussion held with daughter at bedside regarding advanced directives of the patient.  The patient at this point does not want any resuscitation efforts so she is DNR/DNI.  Advance care discussion held for 30 minutes regarding patient's condition    Chronic bronchitis (HCC)- (present on admission)  Assessment & Plan  Currently without any exacerbation  RT protocol  Nebulizer treatments as needed        VTE prophylaxis: SCDs/TEDs

## 2023-08-09 NOTE — ED PROVIDER NOTES
ED Provider Note    Primary care provider: Savage Doyle M.D.    CHIEF COMPLAINT  Chief Complaint   Patient presents with    ALOC     Gh states intermitt abnormal behavior, as well as 2 days of hallucinations       Additional history obtained from: Daughter.  All history below was obtained by the daughter as the patient is quite lethargic.  Limitation to History:  Select: Altered mental status / Confusion    HPI  Yesi Garduno is a 89 y.o. female who presents to the Emergency Department for lethargy.  The daughter states that she has been doing well in the group home for the past few months since her prior hip surgery.  Over the past few weeks she has had steadily declining sensorium and lethargy.  She has been treated twice for urinary tract infections, over the antibiotic course her sensorium appears to improve, last completed 1 week course of antibiotics 3 days ago.    No recent trauma, no falls.  The patient occasionally complains of left shoulder pain which is chronic.  The daughter is unaware of any fevers, nausea, vomiting, diarrhea, cough.  No sick contacts in the group home.  She does have chronic lymphedema of the bilateral lower extremities which have been wrapped with compression stockings.  Appetite has been good when the patient is awake.    The group home apparently commented the patient has been restless at night, staying awake for several days on and since her birthday 4 days ago.      External Record Review: Patient was hospitalized for left femoral neck fracture, underwent arthroplasty on 5/17.  She had a follow-up in the orthopedic clinic 48 hours ago, per that note she has had some minimal ambulation as she has DJD of the knee and a contracture that has caused her to not recover fully from the prior injury.  Orthopedics recommends formal physical therapy.    REVIEW OF SYSTEMS  See HPI.     PAST MEDICAL HISTORY   has a past medical history of AAA (abdominal aortic aneurysm) (HCC)  "(2015), Breast cancer (HCC), Cancer (HCC), Near syncope (2015), and S/P right mastectomy.    SURGICAL HISTORY   has a past surgical history that includes other; mastectomy (); breast biopsy (); exc skin malig 2.1-3cm remaindr body (Left, 2023); amputation toe,i-p jt (2023); adj tiss xfer head,fac,hand <10sqcm (2023); and partial hip replacement (Left, 2023).    SOCIAL HISTORY  Social History     Tobacco Use    Smoking status: Former     Packs/day: 0.25     Years: 34.00     Pack years: 8.50     Types: Cigarettes     Quit date: 1985     Years since quittin.6     Passive exposure: Past    Smokeless tobacco: Never   Vaping Use    Vaping Use: Never used   Substance Use Topics    Alcohol use: Yes     Alcohol/week: 4.2 oz     Types: 7 Shots of liquor per week     Comment: sip of wine    Drug use: No      Social History     Substance and Sexual Activity   Drug Use No       FAMILY HISTORY  Family History   Problem Relation Age of Onset    Cancer Sister     Cancer Mother     Lung Disease Paternal Uncle     Arthritis Neg Hx     Genetic Disorder Neg Hx     Psychiatric Illness Neg Hx     Diabetes Neg Hx     Heart Disease Neg Hx     Hypertension Neg Hx     Hyperlipidemia Neg Hx     Stroke Neg Hx     Alcohol/Drug Neg Hx        CURRENT MEDICATIONS  Reviewed.  See Encounter Summary.     ALLERGIES  Allergies   Allergen Reactions    Oxycodone      hallucinations        Hydrocodone Rash     \"all over\" and it makes me act loopy    Penicillins Vomiting     Other reaction(s): Vomiting  Other reaction(s): GI Intolerance    Tetracycline Rash and Itching     Pt states \"I get a bad body rash and itch all over\".      Penicillin G Vomiting     Other reaction(s): Vomiting  Other reaction(s): GI Intolerance       PHYSICAL EXAM  VITAL SIGNS: BP (!) 155/64   Pulse 63   Temp (!) 34.9 °C (94.9 °F) (Rectal)   Resp 13   Ht 1.753 m (5' 9\")   Wt 69.4 kg (153 lb) Comment: bib remsa  SpO2 99%   BMI " 22.59 kg/m²   Constitutional: Lethargic, ill-appearing, awakens and does answer questions appropriately as well as follows commands but falls back asleep fairly rapidly.  HENT: Normocephalic, Bilateral external ears normal. Nose normal.  Dry mucosal membranes.  Eyes: Conjunctiva normal, non-icteric, EOMI.    Thorax & Lungs: Easy unlabored respirations, Clear to ascultation bilaterally.  Cardiovascular: Regular rate, Regular rhythm, No murmurs, rubs or gallops. Bilateral pulses symmetrical.   Abdomen:  Soft, nontender, nondistended, normal active bowel sounds.   :    Skin: Visualized skin is  Dry, No erythema, No rash.   Musculoskeletal:   No cyanosis, clubbing or edema. No leg asymmetry.   Neurologic:  Lethargic, moves all 4 extremities equally on command.  Psychiatric: Normal affect, Normal mood  Lymphatic:      EKG   12 lead Interpreted by me  Rhythm:  Normal sinus rhythm   Rate: 69  Axis: normal  Ectopy: none  Conduction: normal  ST Segments: no acute change  T Waves: no acute change  Clinical Impression: Normal EKG without acute changes       RADIOLOGY  I have independently interpreted the diagnostic imaging associated with this visit and am waiting the final reading from the radiologist.   My preliminary interpretation is as follows: No intracranial hemorrhage    Radiologist interpretation:   CT-HEAD W/O   Final Result      1.  No evidence of acute intracranial process.      2.  Cerebral atrophy as well as periventricular chronic small vessel ischemic change.         DX-CHEST-PORTABLE (1 VIEW)   Final Result      Cardiomegaly with bilateral interstitial opacities.          COURSE & MEDICAL DECISION MAKING  Pertinent Labs & Imaging studies reviewed. (See chart for details)    COURSE & MEDICAL DECISION MAKING  Pertinent Labs & Imaging studies reviewed. (See chart for details)    Differential diagnoses include but are not limited to: MELANIE, electrolyte imbalance, sepsis, COVID,    11:15 AM - Nursing notes  reviewed, patient seen and examined at bedside.    ED Observation Status? Yes; I am placing the patient in to an observation status due to a diagnostic uncertainty as well as therapeutic intensity. Patient placed in observation status at 11:25 AM    Observation plan is as follows: IV fluids, sepsis evaluation etc.    Upon Reevaluation, the patient's condition has: Unchanged though the patient is alert and asking for something to eat    Patient discharged from ED Observation status at 8/9/2023 1:51 PM     Discussion of management with other medical personnel: Dr. William who will evaluate the patient for admission.    Decision tools and prescription drugs considered including, but not limited to: Heart score 1    Decision Making:  This is a pleasant 89 y.o. year old female who presents with lethargy.  The patient is alert and oriented, though falls asleep quite often.  The group home apparently states that she has been awake at night and then sleeping somewhat during the day, therefore it is possible she is just having some altered sensorium due to sleep deprivation.  May be a good candidate for nightly Seroquel.  There is no sign of an acute infection, head CT normal as well.  Nothing else immediately explains the patient's somnolence.    She did appear quite dry, laboratory evaluation revealed acute kidney injury with a potassium of 6.7.  No acute EKG changes, the patient does make urine.  Urinalysis today is normal.    The patient will need to be gently fluid resuscitated, I did not give a large-volume fluid bolus upfront as she does have some suggestion of heart failure on chest x-ray.    I discussed the CODE STATUS with the daughter, she would like to have a POLST to return back home to the group home states that she is a DNR.  SHe does not wish to advance to comfort measures only status yet.    Patient be hospitalized in guarded condition.        FINAL IMPRESSION  1. MELANIE (acute kidney injury) (HCC)    2.  Hyperkalemia    3. Hypersomnolent

## 2023-08-09 NOTE — ED NOTES
Oxygen applied at 2L n/c for rm air sat =90%. Pt able to answer some triage questions accurately, then falls off to sleep-which dt states is not normal. Pt appears oriented, though is sleepy, denies pain, is wheel chair bound since reecnt left hip surgery

## 2023-08-09 NOTE — ED TRIAGE NOTES
"Pt to er via group home with reported \"abnormal behavior\" described by staff as auditory hallucinations , and screaming last night for no appArent   recent   "

## 2023-08-09 NOTE — DISCHARGE PLANNING
ER CM met with daughter  Kev Mack at bedside. Pt has been at [facility] for past few months Address for [facility] is [address] [place] [address] Hafsa Dow is  owner  Fax ..  Pt orientation is confused. She started having hallucinations auditory and visual. She is wheelchair bound. She had recent hip Left hemiarthroplasty and great left toe melanoma removed.. She has had a heel wound in the past from being bedbound in the past. She was to start going to [facility] wound clinic for this but has not started that rx yet.  She is seen at [facility] by [facility]. Will ask ER RM to do wound pictures today. She has had utis in the past. She did have a antibx Bactrim 7.31.23 and oint. Daughter notes that she has had several rounds of antibx.  She was seen in offices of [facility] recently this week  with Dr Doe and Dr David Siu. She went post hip surgery to [facility] post op in June. She has had Home health in the past from [facility] , PCP Dr Doyle and RX  [facility].  Daughter notes that she would like a POLST done as well . ER CM does see a ACP scanned in. Will review with Dr Garcia. She does note that pt has o2 at 2lpm via [employer] at night. She used to be on O2 full time but after last OR she did not need it 24/7 any more only at noc .She has hx of Pulm fibrosis and she notes that she has vision changes /loss after last OR. She has appt for Dr Castillo 9/19 for this.  Care Transition Team Assessment    Information Source  Orientation Level: Oriented to place, Oriented to situation, Oriented to person  Information Given By: Relative         Elopement Risk  Legal Hold: No  Ambulatory or Self Mobile in Wheelchair: No-Not an Elopement Risk    Interdisciplinary Discharge Planning  Lives with - Patient's Self Care Capacity: Attendant / Paid Care Giver  Support Systems: Children, Other (Comments), Home Care Staff  Housing / Facility: [facility]  Do You Take your Prescribed Medications Regularly:  Yes  Mobility Issues: Yes  Assistance Needed: Yes  Durable Medical Equipment: Other - Specify, Home Oxygen (wheelchair)  DME Provider / Phone: riana    Discharge Preparedness  What is your plan after discharge?: penitentiary  What are your discharge supports?: Child, Other (comment)  Prior Functional Level: Uses Wheelchair, Wheelchair Dependent, Needs Assist with Medication Management, Needs Assist with Activities of Daily Living    Functional Assesment  Prior Functional Level: Uses Wheelchair, Wheelchair Dependent, Needs Assist with Medication Management, Needs Assist with Activities of Daily Living    Finances  Prescription Coverage: Yes    Vision / Hearing Impairment  Vision Impairment : Yes              Domestic Abuse  Have you ever been the victim of abuse or violence?: No              Anticipated Discharge Information  Discharge Disposition: Disch to a long term care facility (63)

## 2023-08-09 NOTE — ED NOTES
Aminta from Lab called with critical result of K=6.7 at 1234. Critical lab result read back to Aminta.   Dr. donato notified of critical lab result at 1235.  Critical lab result read back by Dr. donato.

## 2023-08-09 NOTE — ED NOTES
Erp to bedside with orders recvd. Straight cath for  clear yellow urine obtained with pure wick placed upon completion of same. [facility] iv utilized for blood draw, though no longer used  during same. Dc'd with new line placed left fa. Pt to rad after same. Er cm to bedside to talk to dtr roma ledesma

## 2023-08-09 NOTE — ED NOTES
Report received from Kitty.   Pt rounded on and states that she is fine but that she wishes she would stop seeing things that aren't there. PT is reaching up into the air trying to grab at something that is not visible to this RN.   Pt has visitor at bedside and both pt and visitor declines questions at this time.

## 2023-08-09 NOTE — ED NOTES
Med rec updated and complete, per MAR from [facility] [phone]  Allergies reviewed, per historical history

## 2023-08-10 NOTE — PROGRESS NOTES
1510-Received report from Tommy LEE.  Assumed pt care.   Pt is alert and awake, resting in bed, no signs of distress, denies nausea/vomiting.  Family member at bedside.  Per Tommy pt has not urinated yet, bladder scan result 834 per CNA.  Bergeron catheter tried insert 2X with 1X with the ICU RN but unable to insert it. On call provider aware.  Per MD informed urologist on call.  Blair Mckinley urologist notified and aware.  Fall precautions in place, treaded socks on pt, bed in low position, bed alarm.   Call light within reach.   Educated pt to call if needing anything.   Hourly rounding in progress

## 2023-08-10 NOTE — PROGRESS NOTES
Informed on call provider that pt has not urinated. Informed of renal ultrasound results as well as bladder scan result. No orders at this time

## 2023-08-10 NOTE — THERAPY
"Occupational Therapy   Initial Evaluation     Patient Name: Yesi Garduno  Age:  89 y.o., Sex:  female  Medical Record #: [id number]  Today's Date: 8/10/2023     Precautions  Precautions: Fall Risk  Comments: Pt resists WB on LLE, sometimes even holdig it off the floor completely during transfers    Assessment  Patient is 89 y.o. female admit 8/9/2023 with altered mental status and auditory and visual hallucinations. acute renal failure with acute hyperkalemia.  Previous L hip fx with hemiarthroplasty in 5/2023.  Pt currently lives at a group home. (SEE NOTES BELOW FOR INFO ON PLOF)  Pt is currently limited by decreased functional mobility, activity tolerance, cognition,  strength, AROM, coordination, balance, and pain which are affecting her ability to complete ADLs/IADLs at baseline. See grid for details. Pt will benefit from further therapy via HH or outpt- see below.  Anticipate pt should be able to return to  when cleared medically with support services in place.  Will continue to follow.       Plan    Occupational Therapy Initial Treatment Plan   Treatment Interventions: Self Care / Activities of Daily Living, Adaptive Equipment, Neuro Re-Education / Balance, Therapeutic Exercises, Therapeutic Activity  Treatment Frequency: 3 Times per Week  Duration: Until Therapy Goals Met    DC Equipment Recommendations: Unable to determine at this time  Discharge Recommendations: Recommend home health for continued occupational therapy services (vs Outpatient therapy per family wishes.  Outpt only if family able to provide transfer assist and transportation to all appointments)     Subjective    Per Granddtr- \"The orthopedist says theres no reason why she shouldn't be able to walk.\"     Objective       08/10/23 1516   Prior Living Situation   Prior Services Continuous (24 Hour) Care Giving Per Service   Housing / Facility Group Home   Steps Into Home 0   Steps In Home 0   Bathroom Set up Grab Bars  (roll in shower with " "rolling chair)   Equipment Owned Front-Wheel Walker;Wheelchair;Grab Bar(s) In Tub / Shower;Grab Bar(s) By Toilet   Lives with - Patient's Self Care Capacity Attendant / Paid Care Giver   Comments T/C to group home to obtain PLOF information on her ADl's and transfers due to RN report that daughter told her \"I don't know how she gets out of bed or transfers.  She lives at a group home and they do it.\"  OT spoke with group home Owner Hafsa Dow.  She reports that pt has been in their home for about 5 weeks.  She requires 2 person maxA to stand pivot to a wheelchair or rolling shower chair from bed, and maxA of 1-2 people to transfer the toilet because \"sometimes she will use her arms to pull on the grab bar and help.\"  Hafsa reports that Yesi has required total assist for all ADl's except was able to feed herself up until a couple days ago when mental status became altered.  Hafsa reports pt was getting home PT and OT, PT was minimal and OT was \"wrapping her legs for edema.\" as well as wound care with nursing.  Hafsa reports that family was entertaining the idea of talking to Dr. Doyle about initiation of hospice care but then the  RN advised them to have her taken to the hospital.  Hafsa is concerned about the amount of effort her transfers require and the fact that family has recently been also thinking about cancelling home care services and trying to get her into outpatient Physical Therapy and wound care which could be hard on the patient and challenging for the family. During OT evalFreda arrived and confirmed that they thought outpatient therapy could get Yesi walking again based on their meeting with her orthopedic surgeon.  She reports that either she or her brother have been doing the transfers with Yesi in/out of the car for all the recent appointments she's had and they are aware of how difficult it is.   Prior Level of ADL Function   Self Feeding Independent  (until the last couple days) "   Grooming / Hygiene Requires Assist   Bathing Dependent   Dressing Dependent   Toileting Dependent   Prior Level of IADL Function   Medication Management Dependent   Laundry Dependent   Kitchen Mobility Dependent   Finances Dependent   Home Management Dependent   Shopping Dependent   Prior Level Of Mobility Uses Wheel Chair for in Home Mobility  (does not self propel)   Driving / Transportation Relatives / Others Provide Transportation   Occupation (Pre-Hospital Vocational) Retired Due To Age   History of Falls   History of Falls Yes   Date of Last Fall 05/16/23   Precautions   Precautions Fall Risk   Comments Pt resists WB on LLE, sometimes even holdig it off the floor completely during transfers   Vitals   O2 Delivery Device None - Room Air   Pain 0 - 10 Group   Location Foot   Location Orientation Left   Description Aching   Therapist Pain Assessment During Activity;Nurse Notified  (minimal)   Cognition    Comments grossly alert and approx 50% appropriate.  Able to follow about 50% directions, and report with some accuracy her PLOF.  No hallucinations appreciated during OT eval   Active ROM Upper Body   Comments TBA further- able to use arms to help support self in standing and to reach for chair   Strength Upper Body   Upper Body Strength  X   Gross Strength Generalized Weakness, Equal Bilaterally.    Upper Body Muscle Tone   Upper Body Muscle Tone  WDL   Coordination Upper Body   Coordination WDL   Balance Assessment   Sitting Balance (Static) Fair   Sitting Balance (Dynamic) Fair   Standing Balance (Static) Trace   Standing Balance (Dynamic) Trace   Weight Shift Sitting Poor   Weight Shift Standing Absent   Comments tends to lift LLE up during transfers   Bed Mobility    Supine to Sit Maximal Assist   Sit to Supine Moderate Assist   ADL Assessment   Upper Body Dressing Maximal Assist   Lower Body Dressing Total Assist   Toileting Total Assist   How much help from another person does the patient currently  need...   Putting on and taking off regular lower body clothing? 1   Bathing (including washing, rinsing, and drying)? 1   Toileting, which includes using a toilet, bedpan, or urinal? 1   Putting on and taking off regular upper body clothing? 2   Taking care of personal grooming such as brushing teeth? 2   Eating meals? 2   6 Clicks Daily Activity Score 9   Functional Mobility   Sit to Stand Maximal Assist  (x2 people)   Bed, Chair, Wheelchair Transfer Maximal Assist  (x2 people)   Transfer Method Stand Pivot   Mobility transfer to chair and back   Distance (Feet) 2   # of Times Distance was Traveled 2   Comments Hips tight and knees touching eachother- requires max effort to pry them apart and block them into a safe position for standing and attempts to transfer   Activity Tolerance   Sitting in Chair 20 min   Sitting Edge of Bed 10 min   Standing 1 min x2   Patient / Family Goals   Patient / Family Goal #1 granddtr voices they want outpt PT   Short Term Goals   Short Term Goal # 1 Pt will transfer to commode with maxAx1 in 4 visits   Short Term Goal # 2 pt will sponge bathe UB with setup in 3 visits   Short Term Goal # 3 pt will groom with setup in 5 visits   Education Group   Education Provided Role of Occupational Therapist   Role of Occupational Therapist Patient Response Patient;Family;Acceptance;Explanation;Verbal Demonstration

## 2023-08-10 NOTE — THERAPY
Physical Therapy   Initial Evaluation     Patient Name: Yesi Garduno  Age:  89 y.o., Sex:  female  Medical Record #: [id number]  Today's Date: 8/10/2023     Precautions  Precautions: (P) Fall Risk  Comments: (P) high    Assessment  Patient is 89 y.o. female with a diagnosis of Hyperkalemia,S/P L hip Fx with THR.Pt has been living at a group home and has not ambulated since Fx.L hip and knee are very stiff.Acute PT needed to improve bed mob,transfers,ambulation,L LE ROM.Pts family are willing to take her to out Pt PT     Plan    Physical Therapy Initial Treatment Plan   Treatment Plan : (P) Bed Mobility, Gait Training, Manual Therapy, Neuro Re-Education / Balance, Therapeutic Activities, Therapeutic Exercise  Treatment Frequency: (P) 5 Times per Week    DC Equipment Recommendations: (P) None  Discharge Recommendations: (P) Recommend outpatient physical therapy services to address higher level deficits        Objective       08/10/23 1400   Total Time Spent   PT Evaluation Time Spent (Mins) 40   Initial Contact Note    Initial Contact Note Order Received and Verified, Physical Therapy Evaluation in Progress with Full Report to Follow.   Precautions   Precautions Fall Risk   Comments high   Prior Living Situation   Prior Services Continuous (24 Hour) Care Giving Per Service   Housing / Facility Group Home   Steps Into Home 0   Steps In Home 0   Equipment Owned Front-Wheel Walker;Wheelchair   Lives with - Patient's Self Care Capacity Attendant / Paid Care Giver   Prior Level of Functional Mobility   Bed Mobility Required Assist   Transfer Status Required Assist   Ambulation Dependent   Cognition    Cognition / Consciousness X   Passive ROM Lower Body   Passive ROM Lower Body X   Comments very stiff L Knee and hip   Active ROM Lower Body    Active ROM Lower Body  X   Strength Lower Body   Comments general weakness   Lower Body Muscle Tone   Comments increased adductor tone   Coordination Upper Body   Coordination WDL    Coordination Lower Body    Coordination Lower Body  WDL   Balance Assessment   Sitting Balance (Static) Fair   Sitting Balance (Dynamic) Fair   Standing Balance (Static) Poor   Standing Balance (Dynamic) Poor   Weight Shift Sitting Poor   Weight Shift Standing Poor   Bed Mobility    Supine to Sit Maximal Assist   Gait Analysis   Gait Level Of Assist Maximal Assist   Assistive Device Hand Held Assist   Distance (Feet) 2   # of Times Distance was Traveled 1   Deviation Shuffled Gait   Weight Bearing Status wbat   Functional Mobility   Sit to Stand Maximal Assist   Bed, Chair, Wheelchair Transfer Maximal Assist   Transfer Method Stand Pivot   Activity Tolerance   Sitting Edge of Bed 10   Standing 2 x 1min   Patient / Family Goals    Patient / Family Goal #1 not stated   Short Term Goals    Short Term Goal # 1 min A bed mob in 6 V   Short Term Goal # 2 Mod A transfers in 6 V   Short Term Goal # 3 Mod A amb x 10 feet in 6 V   Short Term Goal # 4 Improve L hip and knee ROM   Physical Therapy Initial Treatment Plan    Treatment Plan  Bed Mobility;Gait Training;Manual Therapy;Neuro Re-Education / Balance;Therapeutic Activities;Therapeutic Exercise   Treatment Frequency 5 Times per Week   Problem List    Problems Impaired Bed Mobility;Impaired Transfers;Impaired Ambulation;Functional ROM Deficit;Functional Strength Deficit   Anticipated Discharge Equipment and Recommendations   DC Equipment Recommendations None   Discharge Recommendations Recommend outpatient physical therapy services to address higher level deficits   Interdisciplinary Plan of Care Collaboration   IDT Collaboration with  Nursing   Session Information   Date / Session Number  8/10-1 1/5 8/16

## 2023-08-10 NOTE — PROGRESS NOTES
Hospital Medicine Daily Progress Note    Date of Service  8/10/2023    Chief Complaint  Yesi Garduno is a 89 y.o. female admitted 8/9/2023 with weakness, visual and auditory hallucinations    Hospital Course  Patient is a 89-year-old female who presented with altered mental status with auditory and visual hallucinations.  Per the daughter the patient was at her recent baseline and was doing well able to participate in her 89th birthday.  Over the last 48 hours she has had auditory and visual hallucinations and was brought into the hospital.  Patient found to have elevated creatinine as well as elevated potassium.  Patient's presentation consistent with dehydration however she also recently had a urinary tract infection and was on Cipro as well as Bactrim and I suspect the Bactrim is also contributing to her acute kidney injury.  Patient to be seen by physical and Occupational Therapy as well as wound care.  Given her recent urinary tract infection despite her normal UA I will get a urine culture for further evaluation.  Renal ultrasound came back within normal limits.  Potassium improved with IV hydration.    Interval Problem Update  8/10 patient has had a steady decline since her hip fracture back earlier this year.  She has had minimal improvement with her mobility and has had intermittent issues with urinary tract infection as well as confusion.  She presents again with significant confusion after recent urinary tract infection.  Urine culture collected and pending.    I have discussed this patient's plan of care and discharge plan at IDT rounds today with Case Management, Nursing, Nursing leadership, and other members of the IDT team.    Consultants/Specialty  none    Code Status  DNAR/DNI    Disposition  The patient is not medically cleared for discharge to home or a post-acute facility.  Anticipate discharge to: home with organized home healthcare and close outpatient follow-up    I have placed the  appropriate orders for post-discharge needs.    Review of Systems  Review of Systems   Constitutional:  Negative for chills and fever.   HENT:  Negative for congestion.    Eyes:  Negative for blurred vision and photophobia.   Respiratory:  Negative for cough and shortness of breath.    Cardiovascular:  Negative for chest pain, claudication and leg swelling.   Gastrointestinal:  Negative for abdominal pain, constipation, diarrhea, heartburn and vomiting.   Genitourinary:  Negative for dysuria and hematuria.   Musculoskeletal:  Negative for joint pain and myalgias.   Skin:  Negative for itching and rash.   Neurological:  Positive for weakness (Generalized). Negative for dizziness, sensory change, speech change and headaches.   Psychiatric/Behavioral:  Negative for depression. The patient is not nervous/anxious and does not have insomnia.         Physical Exam  Temp:  [35 °C (95 °F)-37 °C (98.6 °F)] 36.2 °C (97.2 °F)  Pulse:  [59-72] 59  Resp:  [14-18] 18  BP: (105-164)/(54-79) 150/62  SpO2:  [90 %-98 %] 96 %    Physical Exam  Vitals and nursing note reviewed.   Constitutional:       General: She is not in acute distress.     Appearance: Normal appearance. She is not ill-appearing.   HENT:      Head: Normocephalic and atraumatic.      Nose: Nose normal.   Cardiovascular:      Rate and Rhythm: Normal rate and regular rhythm.      Heart sounds: Normal heart sounds. No murmur heard.  Pulmonary:      Effort: Pulmonary effort is normal.      Breath sounds: Normal breath sounds.   Abdominal:      General: Bowel sounds are normal. There is no distension.      Palpations: Abdomen is soft.   Musculoskeletal:         General: No swelling or tenderness.      Cervical back: Neck supple.   Skin:     General: Skin is warm and dry.   Neurological:      General: No focal deficit present.      Mental Status: She is alert.      Comments: Oriented to self only, confused as to time and location.   Psychiatric:         Mood and Affect:  Mood normal.         Fluids    Intake/Output Summary (Last 24 hours) at 8/10/2023 1327  Last data filed at 8/10/2023 0246  Gross per 24 hour   Intake --   Output 900 ml   Net -900 ml       Laboratory  Recent Labs     08/09/23  1200 08/10/23  0056   WBC 6.6 6.0   RBC 4.05* 3.26*   HEMOGLOBIN 11.4* 9.5*   HEMATOCRIT 38.5 31.9*   MCV 95.1 97.9*   MCH 28.1 29.1   MCHC 29.6* 29.8*   RDW 51.8* 54.1*   PLATELETCT 271 230   MPV 9.3 9.1     Recent Labs     08/09/23  1200 08/10/23  0056   SODIUM 138 139   POTASSIUM 6.7* 5.6*   CHLORIDE 106 111   CO2 21 18*   GLUCOSE 88 82   BUN 34* 36*   CREATININE 1.59* 1.63*   CALCIUM 10.5* 9.2                   Imaging  US-RENAL   Final Result      1.  Distended urinary bladder.   2.  No hydronephrosis.      CT-HEAD W/O   Final Result      1.  No evidence of acute intracranial process.      2.  Cerebral atrophy as well as periventricular chronic small vessel ischemic change.         DX-CHEST-PORTABLE (1 VIEW)   Final Result      Cardiomegaly with bilateral interstitial opacities.           Assessment/Plan  * Hyperkalemia- (present on admission)  Assessment & Plan  Potassium level has improved, she is down to 5.6 from 6.7.   Continue with gentle IV hydration    DNR (do not resuscitate)  Assessment & Plan  Discussion held with daughter at bedside regarding advanced directives of the patient.  The patient at this point does not want any resuscitation efforts so she is DNR/DNI.      Acute renal failure superimposed on chronic kidney disease (HCC)  Assessment & Plan  Continue with gentle IV hydration, creatinine did increase slightly with hydration.  She still has good urinary output.    Acute metabolic encephalopathy- (present on admission)  Assessment & Plan  Patient has had multiple episodes of confusion and this is similar to her previous presentation.  She did have a urinary tract infection and had recently been on Cipro and then most recently Bactrim.  This is likely what caused her acute  kidney injury.  I will get urine culture to see if her urine is now in fact sterile.  UA was not consistent with infection at this time.  Mentation is about the same today compared to yesterday per daughter at bedside.  She states her mother is speaking with people who are not in the room and are no longer with us.      Chronic diastolic heart failure (HCC)- (present on admission)  Assessment & Plan  Monitor fluid status especially now that she is dehydrated  Continue telemetry  Monitor intake and output and daily weights    Hypertension- (present on admission)  Assessment & Plan  Optimize blood pressure to keep systolic blood pressure less than 140 diastolic under 90  Continue at this point with medication management using as needed labetalol    Interstitial lung disease (HCC)- (present on admission)  Assessment & Plan  Patient has history of interstitial lung disease  Currently no acute exacerbation  Not oxygen dependent    Chronic bronchitis (HCC)- (present on admission)  Assessment & Plan  Currently without any exacerbation  RT protocol  Nebulizer treatments as needed    Breast cancer (HCC)- (present on admission)  Assessment & Plan  History of breast cancer status postmastectomy in 2015         VTE prophylaxis:   SCDs/TEDs   heparin ppx      I have performed a physical exam and reviewed and updated ROS and Plan today (8/10/2023). In review of yesterday's note (8/9/2023), there are no changes except as documented above.

## 2023-08-10 NOTE — PROGRESS NOTES
4 Eyes Skin Assessment Completed by JESUS Issa and JESUS Gaming.    Head WDL  Ears WDL  Nose WDL  Mouth WDL  Neck WDL  Breast/Chest Scar (right mastectomy)   Shoulder Blades WDL  Spine WDL  (R) Arm/Elbow/Hand Bruising and Scab  (L) Arm/Elbow/Hand Bruising  Abdomen WDL  Groin WDL  Scrotum/Coccyx/Buttocks Redness and Blanching  (R) Leg Swelling  (L) Leg Swelling  (R) Heel/Foot/Toe Redness and Blanching  (L) Heel/Foot/Toe Redness and Ulcer(s)          Devices In Places Tele Box and Blood Pressure Cuff      Interventions In Place Gray Ear Foams, Heel Mepilex, and Barrier Cream    Possible Skin Injury Yes    Pictures Uploaded Into Epic Yes  Wound Consult Placed Yes  RN Wound Prevention Protocol Ordered Yes

## 2023-08-10 NOTE — CARE PLAN
Problem: Knowledge Deficit - Standard  Goal: Patient and family/care givers will demonstrate understanding of plan of care, disease process/condition, diagnostic tests and medications  Outcome: Progressing  Note: Urine culture ordered and restarted IVF     Problem: Skin Integrity  Goal: Skin integrity is maintained or improved  Outcome: Progressing  Note: Q2 turns with use of waffle overlay   The patient is Unstable - High likelihood or risk of patient condition declining or worsening    Shift Goals  Clinical Goals: f/u with family on baseline mentation, PT/OT eval  Patient Goals: Unable to answer  Family Goals: rest    Progress made toward(s) clinical / shift goals:  completed    Patient is not progressing towards the following goals:

## 2023-08-10 NOTE — RESPIRATORY CARE
"   COPD EDUCATION by COPD CLINICAL EDUCATOR  8/10/2023 at 9:50 AM by Amanda Samuel, RRT     Patient reviewed by COPD education team. Patient does not have a history or diagnosis of COPD and is a former smoker.  Therefore, patient does not qualify for the COPD program.     COPD Screen  COPD Risk Screening  Do you have a history of COPD?: No    COPD Assessment  COPD Clinical Specialists ONLY  COPD Education Initiated: Yes--Short Intervention (talked to family in room pt does not have COPD but ILD, pt is not planning on seeing Pulmonary or going to Pulmonary Rehab, pt is not on any inhalers, last PFT 11/2022)  DME Company: [employer]  Physician Name: Savage Doyle M.D.  Pulmonologist Name: [facility] - last seen 11/2022  Referrals Initiated: Yes  Pulmonary Rehab: Declined  Smoking Cessation: N/A  Hospice: N/A  Home Health Care: N/A  Mobile Urgent Care Services: N/A  Geriatric Specialty Group: N/A  Private In-Home Care Agency: N/A    PFT Results    No results found for: PFT    Meds to Beds  Would the patient like to opt in for Bedside Medication Delivery at Discharge?: Yes, interested     MY COPD ACTION PLAN     It is recommended that patients and physicians /healthcare providers complete this action plan together. This plan should be discussed at each physician visit and updated as needed.    The green, yellow and red zones show groups of symptoms of COPD. This list of symptoms is not comprehensive, and you may experience other symptoms. In the \"Actions\" column, your healthcare provider has recommended actions for you to take based on your symptoms.    Patient Name: Yesi Garduno   YOB: 1928   Last Updated on: 5/18/2023  2:02 PM   Green Zone:  I am doing well today Actions     Usual activitiy and exercise level   Take daily medications     Usual amounts of cough and phlegm/mucus   Use oxygen as prescribed     Sleep well at night   Continue regular exercise/diet plan     Appetite is " "good   At all times avoid cigarette smoke, inhaled irritants     Daily Medications (these medications are taken every day):                Yellow Zone:  I am having a bad day or a COPD flare Actions     More breathless than usual   Continue daily medications     I have less energy for my daily activities   Use quick relief inhaler as ordered     Increased or thicker phlegm/mucus   Use oxygen as prescribed     Using quick relief inhaler/nebulizer more often   Get plenty of rest     Swelling of ankles more than usual   Use pursed lip breathing     More coughing than usual   At all times avoid cigarette smoke, inhaled irritants     I feel like I have a \"chest cold\"     Poor sleep and my symptoms woke me up     My appetite is not good     My medicine is not helping      Call provider immediately if symptoms don’t improve     Continue daily medications, add rescue medications:               Medications to be used during a flare up, (as Discussed with Provider):              Red Zone:  I need urgent medical care Actions     Severe shortness of breath even at rest   Call 911 or seek medical care immediately     Not able to do any activity because of breathing      Fever or shaking chills      Feeling confused or very drowsy       Chest pains      Coughing up blood                  "

## 2023-08-10 NOTE — HOSPITAL COURSE
Patient is a 89-year-old female who presented with altered mental status with auditory and visual hallucinations.  Per the daughter the patient was at her recent baseline and was doing well able to participate in her 89th birthday.  Over the last 48 hours she has had auditory and visual hallucinations and was brought into the hospital.  Patient found to have elevated creatinine as well as elevated potassium.  Patient's presentation consistent with dehydration however she also recently had a urinary tract infection and was on Cipro as well as Bactrim and I suspect the Bactrim is also contributing to her acute kidney injury.  Patient to be seen by physical and Occupational Therapy as well as wound care.  Given her recent urinary tract infection despite her normal UA I will get a urine culture for further evaluation.  Renal ultrasound came back within normal limits.  Potassium improved with IV hydration.

## 2023-08-10 NOTE — PROGRESS NOTES
Telemetry Shift Summary     Rhythm: SB-SR with BBB  HR: 57-78  Ectopy: rPAC  Measurements: 0.18/0.12/0.40       Normal Values  Rhythm: SR  HR:   Measurements: 0.12-0.20 / 0.04-0.10 / 0.30-0.52

## 2023-08-10 NOTE — CARE PLAN
The patient is Watcher - Medium risk of patient condition declining or worsening    Shift Goals  Clinical Goals: neuro q2, safety, labs  Patient Goals: rest  Family Goals: rest    Progress made toward(s) clinical / shift goals:    Problem: Pain - Standard  Goal: Alleviation of pain or a reduction in pain to the patient’s comfort goal  Description: Target End Date:  Prior to discharge or change in level of care    Document on Vitals flowsheet    1.  Document pain using the appropriate pain scale per order or unit policy  2.  Educate and implement non-pharmacologic comfort measures (i.e. relaxation, distraction, massage, cold/heat therapy, etc.)  3.  Pain management medications as ordered  4.  Reassess pain after pain med administration per policy  5.  If opiods administered assess patient's response to pain medication is appropriate per POSS sedation scale  6.  Follow pain management plan developed in collaboration with patient and interdisciplinary team (including palliative care or pain specialists if applicable)  Outcome: Progressing  Note: Pt able to rest with current pain medications     Problem: Skin Integrity  Goal: Skin integrity is maintained or improved  Description: Target End Date:  Prior to discharge or change in level of care    Document interventions on Skin Risk/Perry flowsheet groups and corresponding LDA    1.  Assess and monitor skin integrity, appearance and/or temperature  2.  Assess risk factors for impaired skin integrity and/or pressures ulcers  3.  Implement precautions to protect skin integrity in collaboration with interdisciplinary team  4.  Implement pressure ulcer prevention protocol if at risk for skin breakdown  5.  Confirm wound care consult if at risk for skin breakdown  6.  Ensure patient use of pressure relieving devices  (Low air loss bed, waffle overlay, heel protectors, ROHO cushion, etc)  Outcome: Progressing  Note: Pt placed on waffle mattress and TAPs     Problem: Fall  Risk  Goal: Patient will remain free from falls  Description: Target End Date:  Prior to discharge or change in level of care    Document interventions on the Alaina Kumar Fall Risk Assessment    1.  Assess for fall risk factors  2.  Implement fall precautions  Outcome: Progressing  Note: Bed low and locked, bed alarm on, call light within reach       Patient is not progressing towards the following goals:

## 2023-08-11 NOTE — DIETARY
"Nutrition services: Day 2 of admit.  Yesi Garduno is a 89 y.o. female with admitting DX of Hyperkalemia [E87.5]    Consult received for MST 2: 2-13 lb wt loss x 1 month, poor PO intake PTA. Per chart notes, pt not appropriate to interview for wt/PO hx d/t confusion/forgetfulness. RD visited pt at bedside. Pt able to report likes/dislikes and participate in nutrition-focused physical exam (NFPE). She would like snacks between meals; reviewed preferred items to send.    Assessment:  Height: 175.3 cm (5' 9\")  Weight: 64.7 kg (142 lb 10.2 oz)  Body mass index is 21.06 kg/m²., BMI classification: Normal, though below ideal BMI range for age  Diet/Intake: Regular; refused x2 meals yesterday, breakfast % today    Evaluation:   PMHx includes breast cancer s/p R mastectomy, toe amputation, partial hip replacement (5/2023), chronic diastolic HF  Labs: BUN 29, GFR 44  MAR: NS, pericolace (refused), NaBicarb  Skin: unstageable pressure injury to L heel, thought to be related to immobility after L hip repair per wound team note 8/10.   Trace edema to BLEs  GI: last BM 8/8  Nutrition-focused physical exam: no overt signs of fat or muscle wasting; pt overall appears well-nourished for age  Chart wt hx w/ abrupt wt changes of questionable accuracy 5/16 to current admit wt. Current admit wt is more consistent w/ wt hx prior to 5/16 (150-153 lb). Wt hx otherwise shows 25-lb loss in 2 months with subsequent 17-lb gain in 1 month. Wt changes may potentially be indicative of scale error vs fluid-related changes in setting of hx CKD and chronic diastolic HF.    Malnutrition Risk: Unable to meet criteria at this time    Recommendations/Plan:  Provide BID snacks w/ meals. Pt's PO intake improved this a.m; monitor for continued adequate nutritional intake.   Encourage intake of meals and snacks >50%.  Document intake of all PO as % taken in ADL's to provide interdisciplinary communication across all shifts.   Monitor " weight.  Nutrition rep will continue to see patient for ongoing meal and snack preferences.     RD following.

## 2023-08-11 NOTE — CARE PLAN
The patient is Stable - Low risk of patient condition declining or worsening    Shift Goals  Clinical Goals: place bera  Patient Goals: rest  Family Goals: monica    Progress made toward(s) clinical / shift goals:  yes    Patient is not progressing towards the following goals:      Problem: Knowledge Deficit - Standard  Goal: Patient and family/care givers will demonstrate understanding of plan of care, disease process/condition, diagnostic tests and medications  Outcome: Progressing     Problem: Pain - Standard  Goal: Alleviation of pain or a reduction in pain to the patient’s comfort goal  Outcome: Progressing     Problem: Skin Integrity  Goal: Skin integrity is maintained or improved  Outcome: Progressing     Problem: Fall Risk  Goal: Patient will remain free from falls  Outcome: Progressing

## 2023-08-11 NOTE — CARE PLAN
The patient is Stable - Low risk of patient condition declining or worsening    Shift Goals  Clinical Goals: place bear  Patient Goals: rest  Family Goals: monica    Progress made toward(s) clinical / shift goals:    Problem: Pain - Standard  Goal: Alleviation of pain or a reduction in pain to the patient’s comfort goal  Description: Target End Date:  Prior to discharge or change in level of care    Document on Vitals flowsheet    1.  Document pain using the appropriate pain scale per order or unit policy  2.  Educate and implement non-pharmacologic comfort measures (i.e. relaxation, distraction, massage, cold/heat therapy, etc.)  3.  Pain management medications as ordered  4.  Reassess pain after pain med administration per policy  5.  If opiods administered assess patient's response to pain medication is appropriate per POSS sedation scale  6.  Follow pain management plan developed in collaboration with patient and interdisciplinary team (including palliative care or pain specialists if applicable)  Outcome: Progressing  Note: Pt currently declining pain, reports that current pain medication works well for her     Problem: Skin Integrity  Goal: Skin integrity is maintained or improved  Description: Target End Date:  Prior to discharge or change in level of care    Document interventions on Skin Risk/Perry flowsheet groups and corresponding LDA    1.  Assess and monitor skin integrity, appearance and/or temperature  2.  Assess risk factors for impaired skin integrity and/or pressures ulcers  3.  Implement precautions to protect skin integrity in collaboration with interdisciplinary team  4.  Implement pressure ulcer prevention protocol if at risk for skin breakdown  5.  Confirm wound care consult if at risk for skin breakdown  6.  Ensure patient use of pressure relieving devices  (Low air loss bed, waffle overlay, heel protectors, ROHO cushion, etc)  Outcome: Progressing  Note: Pt on waffle mattress and TAPS, pt is  able to turn self, staff still turning pt.      Problem: Fall Risk  Goal: Patient will remain free from falls  Description: Target End Date:  Prior to discharge or change in level of care    Document interventions on the Alaina Kumar Fall Risk Assessment    1.  Assess for fall risk factors  2.  Implement fall precautions  Outcome: Progressing  Note: Bed low and locked, bed alarm on, call light within reach       Patient is not progressing towards the following goals:

## 2023-08-11 NOTE — PROGRESS NOTES
Telemetry Shift Summary     Rhythm: SB-SR with BBB  HR: 56-62  Ectopy: rPAC, rPVC, rTrig  Measurements: 0.18/0.12/0.42       Normal Values  Rhythm: SR  HR:   Measurements: 0.12-0.20 / 0.04-0.10 / 0.30-0.52

## 2023-08-11 NOTE — WOUND TEAM
[facility]  Inpatient Services  Initial Wound and Skin Care Evaluation    Admission Date: 2023     Last order of IP CONSULT TO WOUND CARE was found on 2023 from Hospital Encounter on 2023     HPI, PMH, SH: Reviewed    Past Surgical History:   Procedure Laterality Date    PB PARTIAL HIP REPLACEMENT Left 2023    Procedure: HEMIARTHROPLASTY, HIP;  Surgeon: David Siu M.D.;  Location: SURGERY [facility];  Service: Orthopedics    CA EXC SKIN MALIG 2.1-3CM REMAINDR BODY Left 2023    Procedure: LEFT FOOT MELANOMA RADICAL EXCISION, GREAT TOE PARTIAL AMPUTATION, REPAIRS AS INDICATED INCLUDING SOFT TISSUE REARRANGEMENT;  Surgeon: Saroj Doe M.D.;  Location: [facility];  Service: Orthopedics    PB AMPUTATION TOE,I-P JT  2023    Procedure: AMPUTATION, TOE;  Surgeon: Saroj Doe M.D.;  Location: [facility];  Service: Orthopedics    PB ADJ TISS XFER HEAD,FAC,HAND <10SQCM  2023    Procedure: EXCISION, MASS, LOWER EXTREMITY, WITH ROTATION FLAP RECONSTRUCTION;  Surgeon: Saroj Doe M.D.;  Location: [facility];  Service: Orthopedics    BREAST BIOPSY      MASTECTOMY      right side    OTHER      mastectomy     Social History     Tobacco Use    Smoking status: Former     Packs/day: 0.25     Years: 34.00     Pack years: 8.50     Types: Cigarettes     Quit date: 1985     Years since quittin.6     Passive exposure: Past    Smokeless tobacco: Never   Substance Use Topics    Alcohol use: Yes     Alcohol/week: 4.2 oz     Types: 7 Shots of liquor per week     Comment: sip of wine     Chief Complaint   Patient presents with    ALOC     Gh states intermitt abnormal behavior, as well as 2 days of hallucinations     Diagnosis: Hyperkalemia [E87.5]    Unit where seen by Wound Team:      WOUND CONSULT RELATED TO:  L heel    WOUND HISTORY:   Pt presented  to ED for change in mental status.  Admitted for hyperkalemia, acute renal failure, metabolic encephalopathy.  Also found to have urinary retention.  Recently had a UTI and was on abx.       6/1/23 Pt found to have a DTI on L heel by [facility] wound team.  Pt had a fall with hip fx on the L that was repaired on the OR         WOUND ASSESSMENT/LDA  Wound 06/01/23 Pressure Injury Heel Left Unstageable (Active)   Date First Assessed/Time First Assessed: 06/01/23 1617   Present on Original Admission: No  Primary Wound Type: Pressure Injury  Location: Heel  Laterality: Left  Wound Description (Comments): Unstageable      Assessments 8/10/2023  5:00 PM   Wound Image     Periwound Assessment Pink   Drainage Amount Small   Drainage Description Serosanguineous   Dressing Changed Changed   Dressing Cleansing/Solutions 1/4 Strength Dakin's Solution   Dressing Options Moist Roll Gauze;Mepilex Heel   Dressing Change/Treatment Frequency Every Shift, and As Needed   NEXT Dressing Change/Treatment Date 08/11/23   NEXT Weekly Photo (Inpatient Only) 08/17/23   Wound Length (cm) 3.2 cm   Wound Width (cm) 3.2 cm   Wound Depth (cm) 0.6 cm   Wound Surface Area (cm^2) 10.24 cm^2   Wound Volume (cm^3) 6.144 cm^3   Wound Odor None   Exposed Structures None        Vascular:    JOJO:   No results found.    Lab Values:    Lab Results   Component Value Date/Time    WBC 5.1 08/11/2023 01:29 AM    RBC 3.22 (L) 08/11/2023 01:29 AM    HEMOGLOBIN 9.1 (L) 08/11/2023 01:29 AM    HEMATOCRIT 30.2 (L) 08/11/2023 01:29 AM    CREACTPROT 0.48 08/09/2023 12:00 PM    SEDRATEWES 28 (H) 08/09/2023 12:00 PM    HBA1C 5.6 01/20/2017 11:09 AM         Culture Results show:  No results found for this or any previous visit (from the past 720 hour(s)).    Pain Level/Medicated:  None, Tolerated without pain medication       INTERVENTIONS BY WOUND TEAM:  C Performed standard wound care which includes appropriate positioning, dressing removal and non-selective debridement.  Pictures and measurements obtained weekly if/when required.    Wound:  L HEEL  Preparation for Dressing removal: Removed without difficulty  Non-Excisional Conservative Sharp debridement: Slough debrided away using scissors and forceps < 20cm2 debrided down to  .  Scant bleeding noted, controlled with manual pressure.  Mónica wound: Cleansed with Normal Saline, Prepped with No Sting  Primary Dressin/4 strength dakins on cut down 4x4 gauze  Secondary (Outer) Dressing: heel mepilex    Advanced Wound Care Discharge Planning  Number of Clinicians necessary to complete wound care: 1  Is patient requiring IV pain medications for dressing changes:  No   Length of time for dressing change 30 min. (This does not include chart review, pre-medication time, set up, clean up or time spent charting.)    Interdisciplinary consultation: Patient, Bedside RN (), .  .    EVALUATION / RATIONALE FOR TREATMENT:     Date:  23  Wound Status:  Initial evaluation    Pt with evolving pressure injury to the L heel that developed due to immobility  after a L hip repair.  Wound bed has some depth, most necrotic tissue was removed and no structures are visible at this point.  Pt will require off loading and wound care to allow tissue to fill in.  Will order x-ray to r/o osteo and help guide treatment         Goals: 100% red granular tissue in 1 week.    WOUND TEAM PLAN OF CARE - Frequency of Follow-up:   Nursing to follow dressing orders written for wound care. Contact wound team if area fails to progress, deteriorates or with any questions/concerns if something comes up before next scheduled follow up (See below as to whether wound is following and frequency of wound follow up)   Weekly -      NURSING PLAN OF CARE ORDERS:  Dressing changes: See Dressing Care orders    NUTRITION RECOMMENDATIONS   Wound Team Recommendations:  Dietary consult    DIET ORDERS (From admission to next 24h)       Start     Ordered    23 8240  Diet Order  Diet: Regular  ALL MEALS        Question:  Diet:  Answer:  Regular    08/09/23 2817                    PREVENTATIVE INTERVENTIONS:    Q shift Perry - performed per nursing policy  Q shift pressure point assessments - performed per nursing policy    Surface/Positioning  Reposition q 2 hours - Currently in Place  Waffle cushion - Currently in Place    Offloading/Redistribution  Heel float boots (Prevalon boot) - Ordered    Anticipated discharge plans:  Home Health Care        Vac Discharge Needs:  Vac Discharge plan is purely a recommendation from wound team and not a requirement for discharge unless otherwise stated by physician.  Not Applicable Pt not on a wound vac

## 2023-08-11 NOTE — CONSULTS
[facility] Consult/H&P Note    Primary Service: Medicine  Attending: Gianna Kenyon D.O.  Patient's Name/MRN: Yesi Garduno, [id number]    Admit Date:8/9/2023  Today's Date: 8/10/2023   Length of stay:  LOS: 1 day   Room #: 3303/02      Reason for consult/chief complaint: Urinary retention, difficult Bergeron  ID/HPI: Yesi Garduno is a 89 y.o. female patient currently admitted to the hospital. Found to be incontinent and in urinary retention. + cc. Multiple unsuccessful prior attempts.       Past Medical History:   Past Medical History:   Diagnosis Date    AAA (abdominal aortic aneurysm) (HCC) 8/11/2015    Breast cancer (HCC)     Cancer (HCC)     Near syncope 8/11/2015    S/P right mastectomy     per pt done in 1979        Past Surgical History:   Past Surgical History:   Procedure Laterality Date    PB PARTIAL HIP REPLACEMENT Left 5/17/2023    Procedure: HEMIARTHROPLASTY, HIP;  Surgeon: David Siu M.D.;  Location: SURGERY [facility];  Service: Orthopedics    LA EXC SKIN MALIG 2.1-3CM REMAINDR BODY Left 4/21/2023    Procedure: LEFT FOOT MELANOMA RADICAL EXCISION, GREAT TOE PARTIAL AMPUTATION, REPAIRS AS INDICATED INCLUDING SOFT TISSUE REARRANGEMENT;  Surgeon: Saroj Doe M.D.;  Location: [facility];  Service: Orthopedics    PB AMPUTATION TOE,I-P JT  4/21/2023    Procedure: AMPUTATION, TOE;  Surgeon: Saroj Doe M.D.;  Location: [facility];  Service: Orthopedics    PB ADJ TISS XFER HEAD,FAC,HAND <10SQCM  4/21/2023    Procedure: EXCISION, MASS, LOWER EXTREMITY, WITH ROTATION FLAP RECONSTRUCTION;  Surgeon: Saroj Doe M.D.;  Location: [facility];  Service: Orthopedics    BREAST BIOPSY  1990    MASTECTOMY  1979    right side    OTHER      mastectomy        Family History:   Family History   Problem Relation Age of Onset    Cancer Sister     Cancer Mother     Lung Disease Paternal  "Uncle     Arthritis Neg Hx     Genetic Disorder Neg Hx     Psychiatric Illness Neg Hx     Diabetes Neg Hx     Heart Disease Neg Hx     Hypertension Neg Hx     Hyperlipidemia Neg Hx     Stroke Neg Hx     Alcohol/Drug Neg Hx          Social History:   Social History     Tobacco Use    Smoking status: Former     Packs/day: 0.25     Years: 34.00     Pack years: 8.50     Types: Cigarettes     Quit date: 1985     Years since quittin.6     Passive exposure: Past    Smokeless tobacco: Never   Vaping Use    Vaping Use: Never used   Substance Use Topics    Alcohol use: Yes     Alcohol/week: 4.2 oz     Types: 7 Shots of liquor per week     Comment: sip of wine    Drug use: No      Social History     Social History Narrative    Not on file        Allergies: she Oxycodone, Hydrocodone, Penicillins, Tetracycline, and Penicillin g    Medications:   Medications Prior to Admission   Medication Sig Dispense Refill Last Dose    sulfamethoxazole-trimethoprim (BACTRIM DS) 800-160 MG tablet Take 1 Tablet by mouth 2 times a day. Per MAR pt started on 2023 for 7 day course   2023 at FINISHED    ALPRAZolam (XANAX) 0.25 MG Tab Take 0.125-0.25 mg by mouth at bedtime as needed for Sleep.   2023 at Unknown    fluticasone (FLONASE) 50 MCG/ACT nasal spray Administer 1 Spray into affected nostril(S) 2 times a day.   2023 at 0800         Review of Systems  ROS    As per HPI     Physical Exam  VITAL SIGNS: BP (!) 156/67 Comment: RN NOTIFIED  Pulse 65   Temp 36 °C (96.8 °F) (Temporal)   Resp 17   Ht 1.753 m (5' 9\")   Wt 65.9 kg (145 lb 4.5 oz)   SpO2 92%   BMI 21.45 kg/m²   Physical Exam    Gen: no acute distress  Card: regular rate  Pulm: breathing comfortably  : Contracted legs making exposure to vagina difficult.     Labs:  Recent Labs     23  1200 08/10/23  0056   WBC 6.6 6.0   RBC 4.05* 3.26*   HEMOGLOBIN 11.4* 9.5*   HEMATOCRIT 38.5 31.9*   MCV 95.1 97.9*   MCH 28.1 29.1   MCHC 29.6* 29.8*   RDW 51.8* " 54.1*   PLATELETCT 271 230   MPV 9.3 9.1     Recent Labs     08/09/23  1200 08/10/23  0056   SODIUM 138 139   POTASSIUM 6.7* 5.6*   CHLORIDE 106 111   CO2 21 18*   GLUCOSE 88 82   BUN 34* 36*   CREATININE 1.59* 1.63*   CALCIUM 10.5* 9.2         Glucose:  Recent Labs     08/09/23  1200 08/10/23  0056   GLUCOSE 88 82     Coags:  No results for input(s): INR in the last 72 hours.      Urinalysis:   Recent Labs     08/09/23  1146   COLORURINE Yellow   CLARITY Clear   SPECGRAVITY 1.010   PHURINE 6.0   GLUCOSEUR Negative   KETONES Negative   NITRITE Negative   OCCULTBLOOD Negative       Imaging:  US-RENAL   Final Result      1.  Distended urinary bladder.   2.  No hydronephrosis.      CT-HEAD W/O   Final Result      1.  No evidence of acute intracranial process.      2.  Cerebral atrophy as well as periventricular chronic small vessel ischemic change.         DX-CHEST-PORTABLE (1 VIEW)   Final Result      Cardiomegaly with bilateral interstitial opacities.             Assessment/Recommendation   89 y.o. female currently admitted found to be in acute urinary retention w/ + cc. Multiple prior unsuccessful catheter attempts due to contracted legs making exposure to urethra difficult. Using sterile technique and several attempts, a 16 Marshallese silicone catheter was inserted into the bladder. Immediate return of large volume of clear yellow urine. 10 cc instilled into balloon. Patient tolerated procedure.    Catheter to remain in place on d/c.  Will arrange voiding trial in clinic.

## 2023-08-11 NOTE — DOCUMENTATION QUERY
"                                                                         [facility]                                                                       Query Response Note      PATIENT:               JOSIAH  ACCT #:                  [id number]  MRN:                     [id number]  :                      [id number]  ADMIT DATE:       2023 10:32 AM  DISCH DATE:          RESPONDING  PROVIDER #:        [id number]           QUERY TEXT:    An unstageable pressure ulcer of the left heel is documented in the Wound Team Note on 8/10 but has not been substantiated by a hospitalist. Can a clarification be provided?      The patient's Clinical Indicators include:  \"Pressure Injury Heel Left Unstageable\" is described as pink with small serosanguineous drainage and measuring 3.2cm x 3.2cm x 0.6cm in the Wound Team Consult.    Treatments include: Dakin's Solution, Mepilex, and Wound Team Consult.    Risk factors include: dx MELANIE, dx Metabolic Encephalopathy, dx Dehydration, and Advanced Age.    Thank you,  Jovani Garcia RN, BSN  Clinical Documentation   Connect via Voalte  Options provided:   -- Agree with Wound Care RN assessment- Unstageable pressure ulcer of the left heel is ruled in and was POA   -- Disagree with Wound Care RN assessment, Please specify the location, staging, and POA status of the pressure ulcer(s)   -- Other explanation, Please specify      Query created by: Jovani Garcia on 2023 8:05 AM    RESPONSE TEXT:    Agree with Wound Care RN assessment- Unstageable pressure ulcer of the left heel is ruled in and was POA          Electronically signed by:  AMBER SANCHEZ DO 2023 10:27 AM              "

## 2023-08-11 NOTE — PROGRESS NOTES
[facility] Medicine Daily Progress Note    Date of Service  8/11/2023    Chief Complaint  Yesi Garduno is a 89 y.o. female admitted 8/9/2023 with weakness, visual and auditory hallucinations    Hospital Course  Patient is a 89-year-old female who presented with altered mental status with auditory and visual hallucinations.  Per the daughter the patient was at her recent baseline and was doing well able to participate in her 89th birthday.  Over the last 48 hours she has had auditory and visual hallucinations and was brought into the hospital.  Patient found to have elevated creatinine as well as elevated potassium.  Patient's presentation consistent with dehydration however she also recently had a urinary tract infection and was on Cipro as well as Bactrim and I suspect the Bactrim is also contributing to her acute kidney injury.  Patient to be seen by physical and Occupational Therapy as well as wound care.  Given her recent urinary tract infection despite her normal UA I will get a urine culture for further evaluation.  Renal ultrasound came back within normal limits.  Potassium improved with IV hydration.    Interval Problem Update  8/10 patient has had a steady decline since her hip fracture back earlier this year.  She has had minimal improvement with her mobility and has had intermittent issues with urinary tract infection as well as confusion.  She presents again with significant confusion after recent urinary tract infection.  Urine culture collected and pending.  8/11 patient without complaints today.  They were unable to place a Bergeron catheter yesterday after straight cath was done x2 prior to this and urology had to be called and successfully placed a Bergeron catheter.  Patient seems much brighter today compared to yesterday and is able to have a conversation with her eyes open.  Urine culture at this time is showing no growth.  Creatinine has improved and is down to 1.14.  We will continue with gentle  IV hydration until her oral intake improves.    I have discussed this patient's plan of care and discharge plan at IDT rounds today with Case Management, Nursing, Nursing leadership, and other members of the IDT team.    Consultants/Specialty  none    Code Status  DNAR/DNI    Disposition  The patient is not medically cleared for discharge to home or a post-acute facility.  Anticipate discharge to: home with organized home healthcare and close outpatient follow-up    I have placed the appropriate orders for post-discharge needs.    Review of Systems  Review of Systems   Constitutional:  Negative for chills and fever.   HENT:  Negative for congestion.    Eyes:  Negative for blurred vision and photophobia.   Respiratory:  Negative for cough and shortness of breath.    Cardiovascular:  Negative for chest pain, claudication and leg swelling.   Gastrointestinal:  Negative for abdominal pain, constipation, diarrhea, heartburn and vomiting.   Genitourinary:  Negative for dysuria and hematuria.   Musculoskeletal:  Negative for joint pain and myalgias.   Skin:  Negative for itching and rash.   Neurological:  Negative for dizziness, sensory change, speech change, weakness and headaches.   Psychiatric/Behavioral:  Negative for depression. The patient is not nervous/anxious and does not have insomnia.         Physical Exam  Temp:  [36 °C (96.8 °F)-36.7 °C (98 °F)] 36.7 °C (98 °F)  Pulse:  [52-65] 60  Resp:  [16-18] 16  BP: (131-171)/(43-67) 153/60  SpO2:  [91 %-96 %] 91 %    Physical Exam  Vitals and nursing note reviewed.   Constitutional:       General: She is not in acute distress.     Appearance: Normal appearance. She is not ill-appearing.   HENT:      Head: Normocephalic and atraumatic.      Nose: Nose normal.   Cardiovascular:      Rate and Rhythm: Normal rate and regular rhythm.      Heart sounds: Normal heart sounds. No murmur heard.  Pulmonary:      Effort: Pulmonary effort is normal.      Breath sounds: Normal breath  sounds.   Abdominal:      General: Bowel sounds are normal. There is no distension.      Palpations: Abdomen is soft.   Musculoskeletal:         General: No swelling or tenderness.      Cervical back: Neck supple.   Skin:     General: Skin is warm and dry.   Neurological:      General: No focal deficit present.      Mental Status: She is alert.      Comments: Oriented to self only, confused as to time and location.   Psychiatric:         Mood and Affect: Mood normal.         Fluids    Intake/Output Summary (Last 24 hours) at 8/11/2023 1254  Last data filed at 8/11/2023 0619  Gross per 24 hour   Intake --   Output 1390 ml   Net -1390 ml         Laboratory  Recent Labs     08/09/23  1200 08/10/23  0056 08/11/23  0129   WBC 6.6 6.0 5.1   RBC 4.05* 3.26* 3.22*   HEMOGLOBIN 11.4* 9.5* 9.1*   HEMATOCRIT 38.5 31.9* 30.2*   MCV 95.1 97.9* 93.8   MCH 28.1 29.1 28.3   MCHC 29.6* 29.8* 30.1*   RDW 51.8* 54.1* 50.6*   PLATELETCT 271 230 220   MPV 9.3 9.1 8.8*       Recent Labs     08/09/23  1200 08/10/23  0056 08/11/23  0129   SODIUM 138 139 139   POTASSIUM 6.7* 5.6* 5.0   CHLORIDE 106 111 111   CO2 21 18* 18*   GLUCOSE 88 82 82   BUN 34* 36* 29*   CREATININE 1.59* 1.63* 1.14   CALCIUM 10.5* 9.2 9.3                     Imaging  US-RENAL   Final Result      1.  Distended urinary bladder.   2.  No hydronephrosis.      CT-HEAD W/O   Final Result      1.  No evidence of acute intracranial process.      2.  Cerebral atrophy as well as periventricular chronic small vessel ischemic change.         DX-CHEST-PORTABLE (1 VIEW)   Final Result      Cardiomegaly with bilateral interstitial opacities.             Assessment/Plan  * Hyperkalemia- (present on admission)  Assessment & Plan  Potassium level has improved, she is down to 5.6 from 6.7.   Continue with gentle IV hydration    DNR (do not resuscitate)  Assessment & Plan  Discussion held with daughter at bedside regarding advanced directives of the patient.  The patient at this point  does not want any resuscitation efforts so she is DNR/DNI.      Acute renal failure superimposed on chronic kidney disease (HCC)  Assessment & Plan  Continue with gentle IV hydration, creatinine did increase slightly with hydration.  She still has good urinary output.    Acute metabolic encephalopathy- (present on admission)  Assessment & Plan  Seemingly improved today.    Patient has had multiple episodes of confusion and this is similar to her previous presentation.  She did have a urinary tract infection and had recently been on Cipro and then most recently Bactrim.  This is likely what caused her acute kidney injury.  I will get urine culture to see if her urine is now in fact sterile.  UA was not consistent with infection at this time.     Chronic diastolic heart failure (HCC)- (present on admission)  Assessment & Plan  Monitor fluid status especially now that she is dehydrated  Continue telemetry  Monitor intake and output and daily weights    Hypertension- (present on admission)  Assessment & Plan  Optimize blood pressure to keep systolic blood pressure less than 140 diastolic under 90  Persistently elevated, initiate amlodipine  Continue at this point with medication management using as needed labetalol    Interstitial lung disease (HCC)- (present on admission)  Assessment & Plan  Patient has history of interstitial lung disease  Currently no acute exacerbation  Not oxygen dependent    Chronic bronchitis (HCC)- (present on admission)  Assessment & Plan  Currently without any exacerbation  RT protocol  Nebulizer treatments as needed    Breast cancer (HCC)- (present on admission)  Assessment & Plan  History of breast cancer status postmastectomy in 2015         VTE prophylaxis:    heparin ppx      I have performed a physical exam and reviewed and updated ROS and Plan today (8/11/2023). In review of yesterday's note (8/10/2023), there are no changes except as documented above.

## 2023-08-12 PROBLEM — R33.9 URINARY RETENTION: Status: ACTIVE | Noted: 2023-01-01

## 2023-08-12 NOTE — THERAPY
Physical Therapy   Daily Treatment     Patient Name: Yesi Garduno  Age:  89 y.o., Sex:  female  Medical Record #: [id number]  Today's Date: 8/12/2023          Assessment    L knee and L hip still very stiff.Pt able to transfer to chair with max A    Plan    Treatment Plan Status:    Type of Treatment: Bed Mobility, Gait Training, Manual Therapy, Neuro Re-Education / Balance, Therapeutic Activities, Therapeutic Exercise  Treatment Frequency: 5 Times per Week  Treatment Duration:      DC Equipment Recommendations: None  Discharge Recommendations: Recommend outpatient physical therapy services to address higher level deficits       Objective       08/12/23 1500   Charge Group   PT Gait Training (Units) 1   PT Therapeutic Activities (Units) 1   Supine Lower Body Exercise   Supine Lower Body Exercises Yes   Sitting Lower Body Exercises   Sitting Lower Body Exercises Yes   Balance   Sitting Balance (Static) Fair   Sitting Balance (Dynamic) Fair   Standing Balance (Static) Poor   Standing Balance (Dynamic) Poor   Weight Shift Sitting Poor   Weight Shift Standing Poor   Bed Mobility    Supine to Sit Maximal Assist   Sit to Supine Moderate Assist   Gait Analysis   Gait Level Of Assist Maximal Assist   Assistive Device Front Wheel Walker   Distance (Feet) 4   # of Times Distance was Traveled 1   Deviation Shuffled Gait   Functional Mobility   Sit to Stand Maximal Assist   Bed, Chair, Wheelchair Transfer Maximal Assist   Transfer Method Stand Step   How much difficulty does the patient currently have...   Turning over in bed (including adjusting bedclothes, sheets and blankets)? 2   Sitting down on and standing up from a chair with arms (e.g., wheelchair, bedside commode, etc.) 2   Moving from lying on back to sitting on the side of the bed? 2   How much help from another person does the patient currently need...   Moving to and from a bed to a chair (including a wheelchair)? 2   Need to walk in a hospital room? 1    Climbing 3-5 steps with a railing? 1   6 clicks Mobility Score 10   Activity Tolerance   Sitting Edge of Bed 10   Standing 2 mins   Short Term Goals    Short Term Goal # 1 min A bed mob in 6 V   Goal Outcome # 1 Progressing slower than expected   Short Term Goal # 2 Mod A transfers in 6 V   Goal Outcome # 2 Progressing slower than expected   Short Term Goal # 3 Mod A amb x 10 feet in 6 V   Goal Outcome # 3 Progressing slower than expected   Interdisciplinary Plan of Care Collaboration   IDT Collaboration with  Nursing   Session Information   Date / Session Number  8/12-2 2/5 8/16

## 2023-08-12 NOTE — CARE PLAN
The patient is Stable - Low risk of patient condition declining or worsening    Shift Goals  Clinical Goals: Q2 turns, discontinue fluids  Patient Goals: rest  Family Goals: monica    Progress made toward(s) clinical / shift goals: IV fluids discontinued. Q2 turns completed.      Problem: Knowledge Deficit - Standard  Goal: Patient and family/care givers will demonstrate understanding of plan of care, disease process/condition, diagnostic tests and medications  Outcome: Progressing     Problem: Pain - Standard  Goal: Alleviation of pain or a reduction in pain to the patient’s comfort goal  Outcome: Progressing     Problem: Skin Integrity  Goal: Skin integrity is maintained or improved  Outcome: Progressing     Problem: Fall Risk  Goal: Patient will remain free from falls  Outcome: Progressing

## 2023-08-12 NOTE — CARE PLAN
The patient is Stable - Low risk of patient condition declining or worsening    Shift Goals  Clinical Goals: Q2 turns, IV fluids, monitor K+  Patient Goals: rest  Family Goals: monica    Progress made toward(s) clinical / shift goals:    Problem: Pain - Standard  Goal: Alleviation of pain or a reduction in pain to the patient’s comfort goal  Description: Target End Date:  Prior to discharge or change in level of care    Document on Vitals flowsheet    1.  Document pain using the appropriate pain scale per order or unit policy  2.  Educate and implement non-pharmacologic comfort measures (i.e. relaxation, distraction, massage, cold/heat therapy, etc.)  3.  Pain management medications as ordered  4.  Reassess pain after pain med administration per policy  5.  If opiods administered assess patient's response to pain medication is appropriate per POSS sedation scale  6.  Follow pain management plan developed in collaboration with patient and interdisciplinary team (including palliative care or pain specialists if applicable)  Outcome: Progressing  Note: Pt currently denying pain     Problem: Skin Integrity  Goal: Skin integrity is maintained or improved  Description: Target End Date:  Prior to discharge or change in level of care    Document interventions on Skin Risk/Perry flowsheet groups and corresponding LDA    1.  Assess and monitor skin integrity, appearance and/or temperature  2.  Assess risk factors for impaired skin integrity and/or pressures ulcers  3.  Implement precautions to protect skin integrity in collaboration with interdisciplinary team  4.  Implement pressure ulcer prevention protocol if at risk for skin breakdown  5.  Confirm wound care consult if at risk for skin breakdown  6.  Ensure patient use of pressure relieving devices  (Low air loss bed, waffle overlay, heel protectors, ROHO cushion, etc)  Outcome: Progressing  Note: Pt on waffle, TAPS, turned Q2, sacral mepilex, heel mepilexes     Problem:  Fall Risk  Goal: Patient will remain free from falls  Description: Target End Date:  Prior to discharge or change in level of care    Document interventions on the Alaina Kumar Fall Risk Assessment    1.  Assess for fall risk factors  2.  Implement fall precautions  Outcome: Progressing  Note: Bed low and locked, bed alarm on, call light within reach       Patient is not progressing towards the following goals:

## 2023-08-12 NOTE — PROGRESS NOTES
Telemetry Shift Summary     Rhythm: SB/SR with BBB  HR: 58-65  Ectopy: rPVC, oPAC  Measurements: 0.20/0.12/0.44       Normal Values  Rhythm: SR  HR:   Measurements: 0.12-0.20 / 0.04-0.10 / 0.30-0.52

## 2023-08-13 NOTE — PROGRESS NOTES
Telemetry Shift Summary     Rhythm SB, SR, ST BBB  HR Range   Ectopy r-oPVC, r-oPAC  Measurements  0.20/0.12/0.44           Normal Values  Rhythm SR  HR Range    Measurements 0.12-0.20 / 0.06-0.10  / 0.30-0.52

## 2023-08-13 NOTE — CARE PLAN
The patient is Stable - Low risk of patient condition declining or worsening    Shift Goals  Clinical Goals: q2 turns, wound dressing change  Patient Goals: rest  Family Goals: monica    Progress made toward(s) clinical / shift goals:    Problem: Pain - Standard  Goal: Alleviation of pain or a reduction in pain to the patient’s comfort goal  Outcome: Progressing     Problem: Skin Integrity  Goal: Skin integrity is maintained or improved  Outcome: Progressing     Problem: Fall Risk  Goal: Patient will remain free from falls  Outcome: Progressing       Patient is not progressing towards the following goals:

## 2023-08-13 NOTE — CARE PLAN
The patient is Stable - Low risk of patient condition declining or worsening    Shift Goals  Clinical Goals: discharge  Patient Goals: go home, rest  Family Goals: monica    Progress made toward(s) clinical / shift goals:  Patient will discharge this afternoon.      Problem: Knowledge Deficit - Standard  Goal: Patient and family/care givers will demonstrate understanding of plan of care, disease process/condition, diagnostic tests and medications  Outcome: Progressing     Problem: Pain - Standard  Goal: Alleviation of pain or a reduction in pain to the patient’s comfort goal  Outcome: Progressing     Problem: Skin Integrity  Goal: Skin integrity is maintained or improved  Outcome: Progressing     Problem: Fall Risk  Goal: Patient will remain free from falls  Outcome: Progressing

## 2023-08-13 NOTE — PROGRESS NOTES
Telemetry Shift Summary    Rhythm SB/SR/ST  HR Range 55-71  Ectopy r-o PAC, r-o PVC  Measurement .18/.12/.40    Normal Values  Rhythm SR  HR Range   Measurement 0.12-.020 / 0.06-0.10 / 0.30-0.52

## 2023-08-13 NOTE — DISCHARGE PLANNING
Case Management Discharge Planning    Admission Date: 8/9/2023  GMLOS: 4.3  ALOS: 4    6-Clicks ADL Score: 9  6-Clicks Mobility Score: 10    Anticipated Discharge Dispo: Discharge Disposition: Discharged to home/self care (01)    DME Needed: No    Action(s) Taken: Updated Provider/Nurse on Discharge Plan  Patient discussed with team during IDT rounds. Patient is medically cleared for discharge at this time. PAM called [facility], confirmed patient is on service and next appointment is tomorrow 8/14. PAM called  owner Hafsa to inform of discharge, patient is able to return to . PCP Dr Doyle and [facility].    [place] [id number] [place]  Owner: Hafsa Dow  Fax [phone] - PAM received notice from physician stating patient is too weak to d/c home to  and ordered SNF. PAM called patient's daughter Lay to discuss, Lay agreeable to SNF and gave choices of 1. [facility] 2. [facility] 3. [facility]. Choice form sent to [facility]    Escalations Completed: None    Medically Clear: Yes    Next Steps: Daughter to provide transportation home    Barriers to Discharge: None

## 2023-08-13 NOTE — FACE TO FACE
Face to Face Supporting Documentation - Home Health    The encounter with this patient was in whole or in part the primary reason for home health admission.    Date of encounter:   Patient:                    MRN:                       YOB: 2023  Yesi Garduno  [id number]  8/5/1928     Home health to see patient for:  Skilled Nursing care for assessment, interventions & education, Wound Care, Physical Therapy evaluation and treatment, and Occupational therapy evaluation and treatment    Skilled need for:  New Onset Medical Diagnosis acute kidney injury    Skilled nursing interventions to include:  Wound Care    Homebound status evidenced by:  Needs the assistance of another person in order to leave the home. Leaving home requires a considerable and taxing effort. There is a normal inability to leave the home.    Community Physician to provide follow up care: Savage Doyle M.D.     Optional Interventions? No      I certify the face to face encounter for this home health care referral meets the CMS requirements and the encounter/clinical assessment with the patient was, in whole, or in part, for the medical condition(s) listed above, which is the primary reason for home health care. Based on my clinical findings: the service(s) are medically necessary, support the need for home health care, and the homebound criteria are met.  I certify that this patient has had a face to face encounter by myself.  Gianna Kenyon D.O. - NPI: [id number]

## 2023-08-13 NOTE — PROGRESS NOTES
Telemetry Shift Summary    Rhythm SR/SB  HR Range 55-65  Ectopy rPVC, rPAC  Measurement . 20/.10.44      Normal Values  Rhythm SR  HR Range   Measurement 0.12-.020 / 0.06-0.10 / 0.30-0.52

## 2023-08-13 NOTE — DISCHARGE PLANNING
Received choice form @: [id number]  Agency/Facility name: [facility]  Sent referral per choice form @: [id number]

## 2023-08-13 NOTE — PROGRESS NOTES
Report received from Ly LEE. Plan of care discussed. Patient resting comfortably in bed. Safety precautions in place.

## 2023-08-14 NOTE — PROGRESS NOTES
Received report from night shift RN. Assumed pt care 0700  Pt is awake and resting in bed, sitting upright eating breakfast. Plan of care reviewed for activities and goals this shift. Medication eduction provided.  Pt does not verbalize understanding of plan of care for this shift but is calm and cooperative with care.   Pt currently having hallucinations of other people being in the room and having conversations with them.   Gentle re-orientation given to pt..   Pt on 2L O2 NC -  no signs of SOB/respiratory distress. Pt denies pain and nausea at this moment.  Patients needs attended well. Fall precautions in place. Bed at lowest position. Call light and personal belongings within reach.   Hourly rounding in progress.      *Q2 turning, gown and linens changed. Pt tolerates well, heels floated on pillow as the prevalon/moon boots are not available in central pt supply at this time.     Repositioned pt to sit up in bed for meal. Medications administered. Pt level of orientation and hallucinations has not changed since the beginning of shift.   Visual and auditory hallucinations continue     1430-Daughter at bedside. Plan of care reviewed with her along with the hallucinations which she states is worsening from yesterday. Plan for further UA testing reviewed and collected.   Pt ate lunch meal. Oral fluids encouraged with each visit, pt calm and cooperative for this shift.   1530- Pt sleeping eyes closed. No distress noted. Daughter at bedside plan of care reviewed.   1630- Pt sleeping, opens eyes briefly to voice. Unable to stay awake for a duration of time. Unable to have pt tolerate oral fluids at this time.   1730- family at bedside for dinner meal. Pt waking up. Sat pt upright  and  Assistance provided with feeding.   Pt able to swallow oral fluids and some bites of dinner tray.

## 2023-08-14 NOTE — PROGRESS NOTES
Telemetry Shift Summary     Rhythm: SB-SR w/ BBB  HR: 55-71  Ectopy: o-rPAC, rPVC    Measurements: .16/.12/.40    Normal Values  Rhythm: SR  HR:   Measurements: 0.12-0.20/0.08-0.10/0.30-0.52

## 2023-08-14 NOTE — PROGRESS NOTES
[facility] Medicine Daily Progress Note    Date of Service  8/14/2023    Chief Complaint  Yesi Garduno is a 89 y.o. female admitted 8/9/2023 with weakness, visual and auditory hallucinations    Hospital Course  Patient is a 89-year-old female who presented with altered mental status with auditory and visual hallucinations.  Per the daughter the patient was at her recent baseline and was doing well able to participate in her 89th birthday.  Over the last 48 hours she has had auditory and visual hallucinations and was brought into the hospital.  Patient found to have elevated creatinine as well as elevated potassium.  Patient's presentation consistent with dehydration however she also recently had a urinary tract infection and was on Cipro as well as Bactrim and I suspect the Bactrim is also contributing to her acute kidney injury.  Patient to be seen by physical and Occupational Therapy as well as wound care.  Given her recent urinary tract infection despite her normal UA I will get a urine culture for further evaluation.  Renal ultrasound came back within normal limits.  Potassium improved with IV hydration.    Interval Problem Update  8/10 patient has had a steady decline since her hip fracture back earlier this year.  She has had minimal improvement with her mobility and has had intermittent issues with urinary tract infection as well as confusion.  She presents again with significant confusion after recent urinary tract infection.  Urine culture collected and pending.  8/11 patient without complaints today.  They were unable to place a Bergeron catheter yesterday after straight cath was done x2 prior to this and urology had to be called and successfully placed a Bergeron catheter.  Patient seems much brighter today compared to yesterday and is able to have a conversation with her eyes open.  Urine culture at this time is showing no growth.  Creatinine has improved and is down to 1.14.  We will continue with gentle  "IV hydration until her oral intake improves.  8/12 patient continues to be very aware and alert and she states that she feels back to her normal \"sane\" self.  She remembers her birthday, she remembers being in the hospital and ever since she had the Bergeron placed yesterday she has had a dramatic improvement.  Discussed with the daughter that she is getting ready to be able to discharge from the hospital earlier than anticipated and requested them for more information as to when she could return back to her group home.  Per the group home she can come back on Sunday.  Patient will discharge home tomorrow.  8/13 in anticipation of discharge today patient was being moved with staff and now is apparently needing 3 staff in order to safely transfer her due to her increasing weakness.  When she was seen by physical therapy yesterday was max assist with the physical therapist alone.  The group home is only 2 people for their patients and would not be able to accommodate patient in her current state with her weakness and she has been here in the hospital.  We will look into skilled nursing placement for discharge rather than back to group home.  8/14 patient doing slightly worse today with recurrence of hallucinations according to bedside RN.  She still is more conversant than she was on the very first day and her kidney function as well as her electrolytes are within normal limits.  Urine culture on 8/9 was normal.  Given her confusion again we will get another UA and culture if abnormal.    I have discussed this patient's plan of care and discharge plan at IDT rounds today with Case Management, Nursing, Nursing leadership, and other members of the IDT team.    Consultants/Specialty  none    Code Status  DNAR/DNI    Disposition  The patient is medically cleared for discharge to home or a post-acute facility.  Anticipate discharge to: skilled nursing facility    I have placed the appropriate orders for post-discharge " needs.    Review of Systems  Review of Systems   Constitutional:  Negative for chills and fever.   HENT:  Negative for congestion.    Eyes:  Negative for blurred vision and photophobia.   Respiratory:  Negative for cough and shortness of breath.    Cardiovascular:  Negative for chest pain, claudication and leg swelling.   Gastrointestinal:  Negative for abdominal pain, constipation, diarrhea, heartburn and vomiting.   Genitourinary:  Negative for dysuria and hematuria.   Musculoskeletal:  Negative for joint pain and myalgias.   Skin:  Negative for itching and rash.   Neurological:  Negative for dizziness, sensory change, speech change, weakness and headaches.   Psychiatric/Behavioral:  Negative for depression. The patient is not nervous/anxious and does not have insomnia.         Physical Exam  Temp:  [36.3 °C (97.3 °F)-36.8 °C (98.3 °F)] 36.8 °C (98.3 °F)  Pulse:  [66-82] 72  Resp:  [16-18] 16  BP: (131-173)/(51-77) 134/76  SpO2:  [92 %-97 %] 92 %    Physical Exam  Vitals and nursing note reviewed.   Constitutional:       General: She is not in acute distress.     Appearance: Normal appearance. She is not ill-appearing.   HENT:      Head: Normocephalic and atraumatic.      Nose: Nose normal.   Cardiovascular:      Rate and Rhythm: Normal rate and regular rhythm.      Heart sounds: Normal heart sounds. No murmur heard.  Pulmonary:      Effort: Pulmonary effort is normal.      Breath sounds: Normal breath sounds.   Abdominal:      General: Bowel sounds are normal. There is no distension.      Palpations: Abdomen is soft.   Musculoskeletal:         General: No swelling or tenderness.      Cervical back: Neck supple.   Skin:     General: Skin is warm and dry.   Neurological:      General: No focal deficit present.      Mental Status: She is alert.      Comments: Oriented to self only, confused as to time and location.   Psychiatric:         Mood and Affect: Mood normal.         Fluids    Intake/Output Summary (Last 24  hours) at 8/14/2023 1354  Last data filed at 8/14/2023 0430  Gross per 24 hour   Intake 120 ml   Output 1050 ml   Net -930 ml         Laboratory  Recent Labs     08/12/23  0035 08/13/23  0015   WBC 5.8 6.7   RBC 3.18* 3.25*   HEMOGLOBIN 9.1* 9.2*   HEMATOCRIT 30.4* 30.9*   MCV 95.6 95.1   MCH 28.6 28.3   MCHC 29.9* 29.8*   RDW 51.3* 50.6*   PLATELETCT 219 214   MPV 9.2 9.2       Recent Labs     08/12/23  0035 08/13/23  0015   SODIUM 142 141   POTASSIUM 4.9 4.4   CHLORIDE 114* 110   CO2 19* 19*   GLUCOSE 87 87   BUN 27* 23*   CREATININE 1.26 0.96   CALCIUM 8.8 8.8                     Imaging  US-RENAL   Final Result      1.  Distended urinary bladder.   2.  No hydronephrosis.      CT-HEAD W/O   Final Result      1.  No evidence of acute intracranial process.      2.  Cerebral atrophy as well as periventricular chronic small vessel ischemic change.         DX-CHEST-PORTABLE (1 VIEW)   Final Result      Cardiomegaly with bilateral interstitial opacities.             Assessment/Plan  * Hyperkalemia- (present on admission)  Assessment & Plan  Potassium level has improved, she is down to 4.4  Stop hydration    Urinary retention  Assessment & Plan  I suspect this is a chronic issue that has been leading to her multiple urinary tract infections.  Bergeron catheter needed to be inserted by the urologist as the bedside nurses were unable to do Bergeron catheter even though they have been able to straight catheterize her 2 times previous on this hospital stay.    DNR (do not resuscitate)  Assessment & Plan  DNR/DNI.      Acute renal failure superimposed on chronic kidney disease (HCC)  Assessment & Plan  Continue with gentle IV hydration, creatinine did increase slightly with hydration.  She still has good urinary output.    Acute metabolic encephalopathy- (present on admission)  Assessment & Plan  Patient having active hallucinations again per bedside RN, recheck UA  WBC count remains normal      Chronic diastolic heart failure  (HCC)- (present on admission)  Assessment & Plan  Doing well, no signs of volume overload  Continue telemetry  Monitor intake and output and daily weights    Weakness- (present on admission)  Assessment & Plan  Patient has become more weak since has been in the hospital and decreased mobility and is requiring 3 people for transfers and only has 2 people available at the group home.  PT OT to reevaluate patient and likely will need placement in skilled nursing for a brief period of time until she gets stronger.    Hypertension- (present on admission)  Assessment & Plan  Optimize blood pressure to keep systolic blood pressure less than 140 diastolic under 90  Persistently elevated, initiate amlodipine  Continue at this point with medication management using as needed labetalol    Interstitial lung disease (HCC)- (present on admission)  Assessment & Plan  Patient has history of interstitial lung disease  Currently no acute exacerbation  Not oxygen dependent    Chronic bronchitis (HCC)- (present on admission)  Assessment & Plan  Currently without any exacerbation  RT protocol  Nebulizer treatments as needed    Breast cancer (HCC)- (present on admission)  Assessment & Plan  History of breast cancer status postmastectomy in 2015         VTE prophylaxis:    heparin ppx      I have performed a physical exam and reviewed and updated ROS and Plan today (8/14/2023). In review of yesterday's note (8/13/2023), there are no changes except as documented above.

## 2023-08-14 NOTE — CARE PLAN
The patient is Stable - Low risk of patient condition declining or worsening    Shift Goals  Clinical Goals: keep pt comfortable  Patient Goals: NANCY  Family Goals: nancy    Progress made toward(s) clinical / shift goals:       Problem: Skin Integrity  Goal: Skin integrity is maintained or improved  Outcome: Progressing     Problem: Fall Risk  Goal: Patient will remain free from falls  Outcome: Progressing     Problem: Pain - Standard  Goal: Alleviation of pain or a reduction in pain to the patient’s comfort goal  Outcome: Progressing       Patient is not progressing towards the following goals:      Problem: Knowledge Deficit - Standard  Goal: Patient and family/care givers will demonstrate understanding of plan of care, disease process/condition, diagnostic tests and medications  Outcome: Not Progressing

## 2023-08-15 PROBLEM — R41.0 DELIRIUM: Status: ACTIVE | Noted: 2023-01-01

## 2023-08-15 PROBLEM — D63.8 ANEMIA, CHRONIC DISEASE: Status: ACTIVE | Noted: 2021-04-01

## 2023-08-15 PROBLEM — R54 FRAILTY SYNDROME IN GERIATRIC PATIENT: Status: ACTIVE | Noted: 2021-04-01

## 2023-08-15 NOTE — CARE PLAN
The patient is Stable - Low risk of patient condition declining or worsening    Shift Goals  Clinical Goals: q2 turns, reorient from hallucinations,  clean environment for safety  Patient Goals: reorient  Family Goals: monica    Progress made toward(s) clinical / shift goals:        Problem: Pain - Standard  Goal: Alleviation of pain or a reduction in pain to the patient’s comfort goal  Outcome: Progressing     Problem: Skin Integrity  Goal: Skin integrity is maintained or improved  Outcome: Progressing     Problem: Fall Risk  Goal: Patient will remain free from falls  Outcome: Progressing          Patient is not progressing towards the following goals:      Problem: Knowledge Deficit - Standard  Goal: Patient and family/care givers will demonstrate understanding of plan of care, disease process/condition, diagnostic tests and medications  Outcome: Not Progressing

## 2023-08-15 NOTE — PROGRESS NOTES
Hospital Medicine Daily Progress Note    Date of Service  8/15/2023    Chief Complaint  eYsi Garduno is a 89 y.o. female admitted 8/9/2023 with encephalopathy    Hospital Course  Admitted with encephalopathy, MELANIE on CKD stage III, appeared to have urinary retention.  A Bergeron catheter was placed by Urology, she was carefully hydrated with IV fluids.    Interval Problem Update  MELANIE - crea 1.0  Encephalopathy -more alert and oriented  Hypertension - sbp 136-147    I have discussed this patient's plan of care and discharge plan at IDT rounds today with Case Management, Nursing, Nursing leadership, and other members of the IDT team.    Consultants/Specialty  urology    Code Status  DNAR/DNI    Disposition  The patient is medically cleared for discharge to home or a post-acute facility.  Anticipate discharge to: skilled nursing facility    I have placed the appropriate orders for post-discharge needs.    Review of Systems  Review of Systems   Constitutional:  Negative for chills, diaphoresis, fever and malaise/fatigue.   HENT:  Negative for congestion, hearing loss and sore throat.    Eyes:  Negative for blurred vision.   Respiratory:  Negative for cough, shortness of breath and wheezing.    Cardiovascular:  Negative for chest pain, palpitations and leg swelling.   Gastrointestinal:  Negative for abdominal pain, diarrhea, heartburn, nausea and vomiting.   Genitourinary:  Negative for dysuria, flank pain and hematuria.   Musculoskeletal:  Negative for back pain, joint pain, myalgias and neck pain.   Skin:  Negative for rash.   Neurological:  Positive for weakness. Negative for dizziness, sensory change, speech change, focal weakness and headaches.   Psychiatric/Behavioral:  The patient is not nervous/anxious.         Physical Exam  Temp:  [36.4 °C (97.6 °F)-36.9 °C (98.4 °F)] 36.7 °C (98.1 °F)  Pulse:  [60-79] 66  Resp:  [16-18] 18  BP: (136-147)/(47-69) 136/55  SpO2:  [91 %-100 %] 100 %    Physical Exam  Vitals and  nursing note reviewed.   HENT:      Head: Normocephalic and atraumatic.      Nose: No congestion.   Eyes:      Extraocular Movements: Extraocular movements intact.      Conjunctiva/sclera: Conjunctivae normal.   Cardiovascular:      Rate and Rhythm: Normal rate and regular rhythm.   Pulmonary:      Effort: Pulmonary effort is normal.      Breath sounds: Normal breath sounds.   Abdominal:      General: There is no distension.      Tenderness: There is no abdominal tenderness. There is no guarding or rebound.   Musculoskeletal:      Cervical back: No tenderness.      Right lower leg: No edema.      Left lower leg: No edema.      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   Neurological:      General: No focal deficit present.      Mental Status: She is alert and oriented to person, place, and time.      Cranial Nerves: No cranial nerve deficit.         Fluids    Intake/Output Summary (Last 24 hours) at 8/15/2023 1409  Last data filed at 8/15/2023 1000  Gross per 24 hour   Intake 240 ml   Output 1100 ml   Net -860 ml       Laboratory  Recent Labs     08/13/23  0015 08/15/23  0642   WBC 6.7 6.8   RBC 3.25* 3.73*   HEMOGLOBIN 9.2* 10.7*   HEMATOCRIT 30.9* 36.0*   MCV 95.1 96.5   MCH 28.3 28.7   MCHC 29.8* 29.7*   RDW 50.6* 51.9*   PLATELETCT 214 213   MPV 9.2 9.2     Recent Labs     08/13/23  0015 08/15/23  0642   SODIUM 141 142   POTASSIUM 4.4 4.3   CHLORIDE 110 108   CO2 19* 23   GLUCOSE 87 88   BUN 23* 30*   CREATININE 0.96 1.06   CALCIUM 8.8 9.5                   Imaging  US-RENAL   Final Result      1.  Distended urinary bladder.   2.  No hydronephrosis.      CT-HEAD W/O   Final Result      1.  No evidence of acute intracranial process.      2.  Cerebral atrophy as well as periventricular chronic small vessel ischemic change.         DX-CHEST-PORTABLE (1 VIEW)   Final Result      Cardiomegaly with bilateral interstitial opacities.           Assessment/Plan  * Hyperkalemia-  (present on admission)  Assessment & Plan  Follow bmp    Delirium- (present on admission)  Assessment & Plan  Avoid Benzodiazepines and Anticholinergics  Frequent orientation  Open window blinds during the day  Avoid naps during the day  Family at bedside whenever possible  Ensure adequate sleep at night - use Melatonin preferably  Avoid early morning labs  Avoid vital signs during sleep  Ambulate if possible  Provide adequate pain control  Monitor for urinary retention  Prevent and manage constipation  Discontinue IVs, Catheters, Tubes, Lines, Cardiac monitors, SCDs if possible    Urinary retention- (present on admission)  Assessment & Plan  Bergeron catheter in place  8/10/2023    Acute kidney injury superimposed on chronic kidney disease (HCC)- (present on admission)  Assessment & Plan  Follow bmp    Acute metabolic encephalopathy- (present on admission)  Assessment & Plan  multifactorial      Frailty syndrome in geriatric patient- (present on admission)  Assessment & Plan  PT and OT    Chronic heart failure with preserved ejection fraction (HCC)- (present on admission)  Assessment & Plan  Monitor volume status    Anemia, chronic disease- (present on admission)  Assessment & Plan  Follow cbc    Stage 3a chronic kidney disease (HCC)- (present on admission)  Assessment & Plan  follow BMP    Hypertension- (present on admission)  Assessment & Plan  Norvasc    Interstitial lung disease (HCC)- (present on admission)  Assessment & Plan  RT protocol    Chronic bronchitis (HCC)- (present on admission)  Assessment & Plan  RT protocol    AAA (abdominal aortic aneurysm) (HCC)- (present on admission)  Assessment & Plan  Follow up with vascular surgery as outpatient         VTE prophylaxis:    heparin ppx      I have performed a physical exam and reviewed and updated ROS and Plan today (8/15/2023). In review of yesterday's note (8/14/2023), there are no changes except as documented above.

## 2023-08-15 NOTE — PROGRESS NOTES
Telemetry Shift Summary     Rhythm: SR w/ BBB  HR: 67-76  Ectopy: r-oPAC, r-o PVC    Measurements: .16/.12/.38    Normal Values  Rhythm: SR  HR:   Measurements: 0.12-0.20/0.08-0.10/0.30-0.52

## 2023-08-15 NOTE — CARE PLAN
The patient is Stable - Low risk of patient condition declining or worsening    Shift Goals  Clinical Goals: dressing change, skin integrity, assess mentation, safety, placement  Patient Goals: rest, comfort  Family Goals: monica    Progress made toward(s) clinical / shift goals:  Pt very lethargic at start of shift but quickly improved to alert and oriented x4, no confusion or hallucinations. Pt has bed alarm on, bed locked and lowered, dressing change completed per order set, Q2 turns in progress when pt allows it, education provided on importance. Pt weaned to 0.5-1L NC, dropping to only 87% on room air.     Patient is not progressing towards the following goals: Pt not able to ambulate, max assist with therapy, wheelchair previously used per pt. Pt uncomfortable with turn interventions, education provided.     Problem: Skin Integrity  Goal: Skin integrity is maintained or improved  Outcome: Not Progressing     Problem: Fall Risk  Goal: Patient will remain free from falls  Outcome: Not Progressing     Problem: Knowledge Deficit - Standard  Goal: Patient and family/care givers will demonstrate understanding of plan of care, disease process/condition, diagnostic tests and medications  Outcome: Progressing

## 2023-08-15 NOTE — PROGRESS NOTES
Telemetry Shift Summary    Rhythm Sinus Bradycardia/Sinus Rhythm  HR Range 58-66  Ectopy rare to occasional PVC rare PAC  Measurements .20/.12/.44        Normal Values  Rhythm SR  HR Range    Measurements 0.12-0.20 / 0.06-0.10  / 0.30-0.52

## 2023-08-16 NOTE — DISCHARGE PLANNING
Case Management Discharge Planning    Admission Date: 8/9/2023  GMLOS: 4.3  ALOS: 7    6-Clicks ADL Score: 9  6-Clicks Mobility Score: 10  PT and/or OT Eval ordered: Yes  Post-acute Referrals Ordered: Yes  Post-acute Choice Obtained: Yes  Has referral(s) been sent to post-acute provider:  Yes    Anticipated Discharge Dispo: Discharge Disposition: D/T to SNF with Medicare cert in anticipation of skilled care (03)  Discharge Address: [address] [place] [address]    DME Needed: No    Action(s) Taken: Updated Provider/Nurse on Discharge Plan  Patient discussed with team during IDT rounds. Patient is not medically cleared for discharge at this time. PAM informed [facility] received auth and is able to accept patient today. PAM informed physician. Transportation requested, transport arranged for 1630. PAM met with patient and daughter in room. Daughter signed COBRA, no questions or concerns at this time.    PASRR  [id number]    Escalations Completed: None    Medically Clear: Yes    Next Steps: Transport at 1630 to [facility]     Barriers to Discharge: None

## 2023-08-16 NOTE — THERAPY
Physical Therapy   Daily Treatment     Patient Name: Yesi Garduno  Age:  89 y.o., Sex:  female  Medical Record #: [id number]  Today's Date: 8/16/2023          Assessment    L hip and knee still very stiff.    Plan    Treatment Plan Status:    Type of Treatment: Bed Mobility, Gait Training, Manual Therapy, Neuro Re-Education / Balance, Therapeutic Activities, Therapeutic Exercise  Treatment Frequency: 5 Times per Week  Treatment Duration:      DC Equipment Recommendations: None  Discharge Recommendations: (P) Recommend post-acute placement for additional physical therapy services prior to discharge home       Objective       08/16/23 1522   Charge Group   PT Therapeutic Activities (Units) 2   Vitals   Pulse 69   Patient BP Position Supine   Blood Pressure  131/72   Respiration 18   Pulse Oximetry 96 %   O2 Delivery Device None - Room Air   Balance   Sitting Balance (Static) Fair   Sitting Balance (Dynamic) Fair   Bed Mobility    Supine to Sit Maximal Assist   Sit to Supine Maximal Assist   Activity Tolerance   Sitting Edge of Bed 10   Short Term Goals    Short Term Goal # 1 min A bed mob in 6 V   Goal Outcome # 1 Progressing slower than expected   Short Term Goal # 2 Mod A transfers in 6 V   Goal Outcome # 2 Progressing slower than expected   Short Term Goal # 3 Mod A amb x 10 feet in 6 V   Goal Outcome # 3 Progressing slower than expected   Anticipated Discharge Equipment and Recommendations   Discharge Recommendations Recommend post-acute placement for additional physical therapy services prior to discharge home   Interdisciplinary Plan of Care Collaboration   IDT Collaboration with  Nursing   Session Information   Date / Session Number  8/16-3 3/5 8/16

## 2023-08-16 NOTE — DISCHARGE PLANNING
DC Transport Scheduled    Received request at: 1423    Transport Company Scheduled:  [facility]   Spoke with ALLA at [facility] to schedule transport.      Scheduled Date: 08/16/2023  Scheduled Time: 1630    Destination: [address] [place]      Notified care team of scheduled transport via Voalte.     If there are any changes needed to the DC transportation scheduled, please contact [facility] at ext. [phone] between the hours of 7851-8505 Mon-Fri. If outside those hours, contact the ED Case Manager at ext. [phone].

## 2023-08-16 NOTE — DISCHARGE PLANNING
[id number]  Agency/Facility Name: [facility]   Spoke To: Woody   Outcome: Benjie called DPA to inform that he has obtained insurance auth., and bed available today. DPA to verify medical clearnce and call Benjie back with updates.     [id number]  Agency/Facility Name: [facility]   Spoke To: Woody   Outcome: DPA called to inform Pt medically cleared, per [facility] facility has no time preference for transport time.

## 2023-08-16 NOTE — DISCHARGE PLANNING
Case Management Discharge Planning    Admission Date: 8/9/2023  GMLOS: 4.3  ALOS: 7    6-Clicks ADL Score: 9  6-Clicks Mobility Score: 10  PT and/or OT Eval ordered: Yes  Post-acute Referrals Ordered: Yes  Post-acute Choice Obtained: Yes  Has referral(s) been sent to post-acute provider:  Yes    Anticipated Discharge Dispo: Discharge Disposition: D/T to SNF with Medicare cert in anticipation of skilled care (03)    DME Needed: No    Action(s) Taken: Updated Provider/Nurse on Discharge Plan  Patient discussed with team during IDT rounds. Patient is not medically cleared for discharge at this time. SW called [facility], spoke with admissions. Auth was started yesterday, still pending approval.     Escalations Completed: None    Medically Clear: No    Next Steps: Pending insurance auth for [facility]    Barriers to Discharge: Medical clearance and Pending Placement

## 2023-08-16 NOTE — DISCHARGE SUMMARY
Discharge Summary    CHIEF COMPLAINT ON ADMISSION  Chief Complaint   Patient presents with    ALOC     Gh states intermitt abnormal behavior, as well as 2 days of hallucinations       Reason for Admission  EMS     Admission Date  8/9/2023    CODE STATUS  DNAR/DNI    HPI & HOSPITAL COURSE    Patient is a 89-year-old female who presented with altered mental status with auditory and visual hallucinations.  Per the daughter the patient was at her recent baseline and was doing well able to participate in her 89th birthday.  Over the last 48 hours she has had auditory and visual hallucinations and was brought into the hospital.  Patient found to have elevated creatinine as well as elevated potassium.  Patient's presentation consistent with dehydration however she also recently had a urinary tract infection and was on Cipro as well as Bactrim and I suspect the Bactrim is also contributing to her acute kidney injury    She was found to have acute urinary retention causing her encephalopathy and a bear catheter was placed by Urology, with plans for them to do a voiding trial in their clinic. UA was benign so pt was kept off of antibiotics, and her renal function improved with catheter placement.  She is now fully oriented and stable for discharge to SNF today.     Therefore, she is discharged in good and stable condition to skilled nursing facility.    The patient met 2-midnight criteria for an inpatient stay at the time of discharge.    Discharge Date  8/16/2023    FOLLOW UP ITEMS POST DISCHARGE  [facility] for voiding trial    DISCHARGE DIAGNOSES  Principal Problem:    Hyperkalemia (POA: Yes)  Active Problems:    AAA (abdominal aortic aneurysm) (HCC) (POA: Yes)    Chronic bronchitis (HCC) (POA: Yes)    Interstitial lung disease (HCC) (POA: Yes)    Hypertension (POA: Yes)    Stage 3a chronic kidney disease (HCC) (POA: Yes)    Frailty syndrome in geriatric patient (POA: Yes)    Anemia, chronic disease (POA: Yes)     "Chronic heart failure with preserved ejection fraction (HCC) (POA: Yes)    Acute metabolic encephalopathy (POA: Yes)    Acute kidney injury superimposed on chronic kidney disease (HCC) (POA: Yes)    Urinary retention (POA: Yes)    Delirium (POA: Yes)  Resolved Problems:    Acute on chronic respiratory failure with hypoxia (HCC) (POA: Yes)      FOLLOW UP  Future Appointments   Date Time Provider Department Center   9/14/2023 10:30 AM [facility]     Savage Doyle M.D.  [address] [facility]  [address]  [facility] [address]  [phone]    Follow up  Please call your primary care provider to schedule a hospital follow up. Thank you.    [facility] [place]  [address]  [facility] [id number] [phone]    Follow up  follow up for catheter removal.      MEDICATIONS ON DISCHARGE     Medication List        START taking these medications        Instructions   amLODIPine 5 MG Tabs  Commonly known as: Norvasc   Take 1 Tablet by mouth every day.  Dose: 5 mg            CONTINUE taking these medications        Instructions   ALPRAZolam 0.25 MG Tabs  Commonly known as: Xanax   Take 0.5-1 Tablets by mouth at bedtime as needed for Sleep for up to 2 days.  Dose: 0.125-0.25 mg     fluticasone 50 MCG/ACT nasal spray  Commonly known as: Flonase   Administer 1 Spray into affected nostril(S) 2 times a day.  Dose: 1 Spray            STOP taking these medications      sulfamethoxazole-trimethoprim 800-160 MG tablet  Commonly known as: Bactrim DS              Allergies  Allergies   Allergen Reactions    Oxycodone      hallucinations        Hydrocodone Rash     \"all over\" and it makes me act loopy    Penicillins Vomiting     Other reaction(s): Vomiting  Other reaction(s): GI Intolerance    Tetracycline Rash and Itching     Pt states \"I get a bad body rash and itch all over\".      Penicillin G Vomiting     Other reaction(s): Vomiting  Other reaction(s): GI Intolerance       DIET  Orders Placed This Encounter "   Procedures    Diet Order Diet: Regular     Standing Status:   Standing     Number of Occurrences:   1     Order Specific Question:   Diet:     Answer:   Regular [1]       ACTIVITY  As tolerated and directed by skilled nursing.  Weight bearing as tolerated    CONSULTATIONS  None    PROCEDURES  None    LABORATORY  Lab Results   Component Value Date    SODIUM 142 08/15/2023    POTASSIUM 4.3 08/15/2023    CHLORIDE 108 08/15/2023    CO2 23 08/15/2023    GLUCOSE 88 08/15/2023    BUN 30 (H) 08/15/2023    CREATININE 1.06 08/15/2023        Lab Results   Component Value Date    WBC 6.8 08/15/2023    HEMOGLOBIN 10.7 (L) 08/15/2023    HEMATOCRIT 36.0 (L) 08/15/2023    PLATELETCT 213 08/15/2023        Total time of the discharge process exceeds 33 minutes.

## 2023-08-16 NOTE — CARE PLAN
The patient is Stable - Low risk of patient condition declining or worsening    Shift Goals  Clinical Goals: dressing change, skin integrity,  Patient Goals: rest and comfort  Family Goals: monica    Progress made toward(s) clinical / shift goals:  Pt AxO 4, pt responds to  commands, pt educated on POC, pt verbalizes understanding. Safety precautions in place.     Problem: Knowledge Deficit - Standard  Goal: Patient and family/care givers will demonstrate understanding of plan of care, disease process/condition, diagnostic tests and medications  Outcome: Progressing     Problem: Skin Integrity  Goal: Skin integrity is maintained or improved  Outcome: Progressing       Patient is not progressing towards the following goals:

## 2023-08-16 NOTE — PROGRESS NOTES
Telemetry Shift Summary    Rhythm SR  HR Range 62-68  Ectopy fPVC, rPAC  Measurements 0.20/0.12/0.40        Normal Values  Rhythm SR  HR Range    Measurements 0.12-0.20 / 0.06-0.10  / 0.30-0.52

## 2023-08-16 NOTE — DIETARY
Nutrition Update:    Day 6 of admit.  Yesi Garduno is a 89 y.o. female with admitting DX of Hyperkalemia [E87.5].  Patient being followed to optimize nutrition.    Current Diet: regular    Recorded PO intake % of most meals.     Problem: Nutritional:  Goal: Achieve adequate nutritional intake  Description: Patient will consume >50% of meals  Outcome: met    RD will follow weekly or PRN.

## 2023-08-16 NOTE — PROGRESS NOTES
Patient being picked up by [facility] for transportation to SNF. DNR order on file. Patient A+Ox3 upon transfer. VSS no complaints of pain. Attempted to call report to [facility] post acute SNF three times from the hours of 1430 to 1630. Voicemail left but call was never returned.

## 2023-08-16 NOTE — CARE PLAN
The patient is Stable - Low risk of patient condition declining or worsening    Shift Goals  Clinical Goals: dressing change, skin integrity,  Patient Goals: rest and comfort  Family Goals: monica    Progress made toward(s) clinical / shift goals:  yes    Patient is not progressing towards the following goals:      Problem: Knowledge Deficit - Standard  Goal: Patient and family/care givers will demonstrate understanding of plan of care, disease process/condition, diagnostic tests and medications  8/16/2023 0942 by Leonard Reyes R.N.  Outcome: Progressing  8/16/2023 0941 by Leonard Reyes R.N.  Outcome: Progressing     Problem: Pain - Standard  Goal: Alleviation of pain or a reduction in pain to the patient’s comfort goal  8/16/2023 0942 by Leonard Reyes R.N.  Outcome: Progressing  8/16/2023 0941 by Leonard Reyes R.N.  Outcome: Progressing     Problem: Skin Integrity  Goal: Skin integrity is maintained or improved  8/16/2023 0942 by Leonard Reyes R.N.  Outcome: Progressing  8/16/2023 0941 by Leonard Reyes R.N.  Outcome: Progressing     Problem: Fall Risk  Goal: Patient will remain free from falls  8/16/2023 0942 by Leonard Reyes R.N.  Outcome: Progressing  8/16/2023 0941 by Leonard Reyes R.N.  Outcome: Progressing

## 2023-08-16 NOTE — CARE PLAN
The patient is Stable - Low risk of patient condition declining or worsening    Shift Goals  Clinical Goals: dressing change, skin integrity,  Patient Goals: rest and comfort  Family Goals: monica    Progress made toward(s) clinical / shift goals:  yes    Patient is not progressing towards the following goals:

## 2024-10-16 NOTE — PROGRESS NOTES
[facility] Medicine Daily Progress Note    Date of Service  8/13/2023    Chief Complaint  Yesi Garduno is a 89 y.o. female admitted 8/9/2023 with weakness, visual and auditory hallucinations    Hospital Course  Patient is a 89-year-old female who presented with altered mental status with auditory and visual hallucinations.  Per the daughter the patient was at her recent baseline and was doing well able to participate in her 89th birthday.  Over the last 48 hours she has had auditory and visual hallucinations and was brought into the hospital.  Patient found to have elevated creatinine as well as elevated potassium.  Patient's presentation consistent with dehydration however she also recently had a urinary tract infection and was on Cipro as well as Bactrim and I suspect the Bactrim is also contributing to her acute kidney injury.  Patient to be seen by physical and Occupational Therapy as well as wound care.  Given her recent urinary tract infection despite her normal UA I will get a urine culture for further evaluation.  Renal ultrasound came back within normal limits.  Potassium improved with IV hydration.    Interval Problem Update  8/10 patient has had a steady decline since her hip fracture back earlier this year.  She has had minimal improvement with her mobility and has had intermittent issues with urinary tract infection as well as confusion.  She presents again with significant confusion after recent urinary tract infection.  Urine culture collected and pending.  8/11 patient without complaints today.  They were unable to place a Bergeron catheter yesterday after straight cath was done x2 prior to this and urology had to be called and successfully placed a Bergeron catheter.  Patient seems much brighter today compared to yesterday and is able to have a conversation with her eyes open.  Urine culture at this time is showing no growth.  Creatinine has improved and is down to 1.14.  We will continue with gentle  "IV hydration until her oral intake improves.  8/12 patient continues to be very aware and alert and she states that she feels back to her normal \"sane\" self.  She remembers her birthday, she remembers being in the hospital and ever since she had the Bergeron placed yesterday she has had a dramatic improvement.  Discussed with the daughter that she is getting ready to be able to discharge from the hospital earlier than anticipated and requested them for more information as to when she could return back to her group home.  Per the group home she can come back on Sunday.  Patient will discharge home tomorrow.  8/13 in anticipation of discharge today patient was being moved with staff and now is apparently needing 3 staff in order to safely transfer her due to her increasing weakness.  When she was seen by physical therapy yesterday was max assist with the physical therapist alone.  The group home is only 2 people for their patients and would not be able to accommodate patient in her current state with her weakness and she has been here in the hospital.  We will look into skilled nursing placement for discharge rather than back to group home.    I have discussed this patient's plan of care and discharge plan at IDT rounds today with Case Management, Nursing, Nursing leadership, and other members of the IDT team.    Consultants/Specialty  none    Code Status  DNAR/DNI    Disposition  The patient is medically cleared for discharge to home or a post-acute facility.  Anticipate discharge to: skilled nursing facility    I have placed the appropriate orders for post-discharge needs.    Review of Systems  Review of Systems   Constitutional:  Negative for chills and fever.   HENT:  Negative for congestion.    Eyes:  Negative for blurred vision and photophobia.   Respiratory:  Negative for cough and shortness of breath.    Cardiovascular:  Negative for chest pain, claudication and leg swelling.   Gastrointestinal:  Negative for " abdominal pain, constipation, diarrhea, heartburn and vomiting.   Genitourinary:  Negative for dysuria and hematuria.   Musculoskeletal:  Negative for joint pain and myalgias.   Skin:  Negative for itching and rash.   Neurological:  Negative for dizziness, sensory change, speech change, weakness and headaches.   Psychiatric/Behavioral:  Negative for depression. The patient is not nervous/anxious and does not have insomnia.         Physical Exam  Temp:  [36.1 °C (97 °F)-36.3 °C (97.3 °F)] 36.3 °C (97.3 °F)  Pulse:  [60-65] 60  Resp:  [16-18] 18  BP: (136-154)/(52-66) 136/54  SpO2:  [94 %-99 %] 96 %    Physical Exam  Vitals and nursing note reviewed.   Constitutional:       General: She is not in acute distress.     Appearance: Normal appearance. She is not ill-appearing.   HENT:      Head: Normocephalic and atraumatic.      Nose: Nose normal.   Cardiovascular:      Rate and Rhythm: Normal rate and regular rhythm.      Heart sounds: Normal heart sounds. No murmur heard.  Pulmonary:      Effort: Pulmonary effort is normal.      Breath sounds: Normal breath sounds.   Abdominal:      General: Bowel sounds are normal. There is no distension.      Palpations: Abdomen is soft.   Musculoskeletal:         General: No swelling or tenderness.      Cervical back: Neck supple.   Skin:     General: Skin is warm and dry.   Neurological:      General: No focal deficit present.      Mental Status: She is alert.      Comments: Oriented to self only, confused as to time and location.   Psychiatric:         Mood and Affect: Mood normal.         Fluids    Intake/Output Summary (Last 24 hours) at 8/13/2023 1314  Last data filed at 8/13/2023 0900  Gross per 24 hour   Intake 120 ml   Output 2400 ml   Net -2280 ml         Laboratory  Recent Labs     08/11/23  0129 08/12/23  0035 08/13/23  0015   WBC 5.1 5.8 6.7   RBC 3.22* 3.18* 3.25*   HEMOGLOBIN 9.1* 9.1* 9.2*   HEMATOCRIT 30.2* 30.4* 30.9*   MCV 93.8 95.6 95.1   MCH 28.3 28.6 28.3   MCHC  30.1* 29.9* 29.8*   RDW 50.6* 51.3* 50.6*   PLATELETCT 220 219 214   MPV 8.8* 9.2 9.2       Recent Labs     08/11/23  0129 08/12/23  0035 08/13/23  0015   SODIUM 139 142 141   POTASSIUM 5.0 4.9 4.4   CHLORIDE 111 114* 110   CO2 18* 19* 19*   GLUCOSE 82 87 87   BUN 29* 27* 23*   CREATININE 1.14 1.26 0.96   CALCIUM 9.3 8.8 8.8                     Imaging  US-RENAL   Final Result      1.  Distended urinary bladder.   2.  No hydronephrosis.      CT-HEAD W/O   Final Result      1.  No evidence of acute intracranial process.      2.  Cerebral atrophy as well as periventricular chronic small vessel ischemic change.         DX-CHEST-PORTABLE (1 VIEW)   Final Result      Cardiomegaly with bilateral interstitial opacities.             Assessment/Plan  * Hyperkalemia- (present on admission)  Assessment & Plan  Potassium level has improved, she is down to 4.9  Continue with gentle IV hydration    Urinary retention  Assessment & Plan  I suspect this is a chronic issue that has been leading to her multiple urinary tract infections.  Bergeron catheter needed to be inserted by the urologist as the bedside nurses were unable to do Bergeron catheter even though they have been able to straight catheterize her 2 times previous on this hospital stay.    DNR (do not resuscitate)  Assessment & Plan  DNR/DNI.      Acute renal failure superimposed on chronic kidney disease (HCC)  Assessment & Plan  Continue with gentle IV hydration, creatinine did increase slightly with hydration.  She still has good urinary output.    Acute metabolic encephalopathy- (present on admission)  Assessment & Plan  Seemingly improved today.    Patient has had multiple episodes of confusion and this is similar to her previous presentation.  She did have a urinary tract infection and had recently been on Cipro and then most recently Bactrim.  This is likely what caused her acute kidney injury.  I will get urine culture to see if her urine is now in fact sterile.  UA was  not consistent with infection at this time.     Chronic diastolic heart failure (HCC)- (present on admission)  Assessment & Plan  Doing well, no signs of volume overload  Continue telemetry  Monitor intake and output and daily weights    Weakness- (present on admission)  Assessment & Plan  Patient has become more weak since has been in the hospital and decreased mobility and is requiring 3 people for transfers and only has 2 people available at the group home.  PT OT to reevaluate patient and likely will need placement in skilled nursing for a brief period of time until she gets stronger.    Hypertension- (present on admission)  Assessment & Plan  Optimize blood pressure to keep systolic blood pressure less than 140 diastolic under 90  Persistently elevated, initiate amlodipine  Continue at this point with medication management using as needed labetalol    Interstitial lung disease (HCC)- (present on admission)  Assessment & Plan  Patient has history of interstitial lung disease  Currently no acute exacerbation  Not oxygen dependent    Chronic bronchitis (HCC)- (present on admission)  Assessment & Plan  Currently without any exacerbation  RT protocol  Nebulizer treatments as needed    Breast cancer (HCC)- (present on admission)  Assessment & Plan  History of breast cancer status postmastectomy in 2015         VTE prophylaxis:    heparin ppx      I have performed a physical exam and reviewed and updated ROS and Plan today (8/13/2023). In review of yesterday's note (8/12/2023), there are no changes except as documented above.         [] : no respiratory distress [Respiration, Rhythm And Depth] : normal respiratory rhythm and effort [Exaggerated Use Of Accessory Muscles For Inspiration] : no accessory muscle use [Auscultation Breath Sounds / Voice Sounds] : lungs were clear to auscultation bilaterally [Apical Impulse] : the apical impulse was normal [Heart Rate And Rhythm] : heart rate was normal and rhythm regular [Heart Sounds] : normal S1 and S2 [Clean] : clean [Dry] : dry [Healing Well] : healing well [No Edema] : no edema [FreeTextEntry1] : small opening noted to lower pole of incision  [Bleeding] : no active bleeding [Foul Odor] : no foul smell [Purulent Drainage] : no purulent drainage [Serosanguinous Drainage] : no serosanguinous drainage [Erythema] : not erythematous [Warm] : not warm [Tender] : not tender [FreeTextEntry5] : Left LE SVG

## 2024-11-11 NOTE — H&P
CHIEF COMPLAINT:  Increasing shortness of breath and hypoxia at home.    HISTORY OF PRESENT ILLNESS:  The patient is an 88-year-old female with the   history of COPD, does not use oxygen at home.  She says over the last week,   she has had increasing shortness of breath especially with exertion.  It got   to the point today where she walked just to open a door at her home when a   neighbor came and saw her and she was out of breath.  This has never happened   to her before.  She denies any cough, except until have a cough in the ER.    She says that she has a pulse oximeter at home and checked and it was 66%.    She thought it was broken and therefore she called her daughter and daughter   came over and use her pulse ox, which showed to be 66%.  She has got her flu   shot this year.  No obvious sick contacts except for her daughter who was sick   a week and half earlier.  She also endorses some mild muscle aches, but no   fevers, no nausea or vomiting.  She denies any decrease in appetite.  She   denies any new skin changes.  No diarrhea, no dysuria, frequency or hematuria.    REVIEW OF SYSTEMS:  All other systems reviewed and are negative.    In the ED, no intervention was done.    PAST MEDICAL HISTORY:  1.  COPD.  2.  History of breast cancer.  3.  History of abdominal aortic aneurysm.  4.  Chronic pain.    PAST SURGICAL HISTORY:  1.  Right-sided mastectomy for localized breast cancer.  2.  Appendectomy.  3.  Tonsillectomy.    SOCIAL HISTORY:  Drinks 2 hard drinks a day and 1 shot of alcohol daily.  Quit   tobacco 40 years ago.  No drugs.    FAMILY HISTORY:  None.    ALLERGIES:  PENICILLIN, WHICH CAUSES VOMITING, TETRACYCLINE.    MEDICATIONS PRIOR TO ADMISSION:  1.  Alprazolam 0.25 mg at bedtime as needed for insomnia.  2.  Calcium vitamin D 1 tablet daily.  3.  Coenzyme Q10 one capsule every day at 6 p.m.  4.  Cyanocobalamin 1 tablet every day.  5.  Docusate 100 mg b.i.d.  6.  Flonase 1 spray in nose at  Patient having root canal 11/12/24  Sent letter over to her  Aware that laughing gas not recommended  Patient would like to take her Xanax?    Dr Roy,  Can patient take her Xanax for her root canal tomorrow?   bedtime.  7.  Tylenol 650 mg capsule daily.  8.  Norco 5/325 mg tablets 1-2 tabs every 4 hours as needed for   moderate-to-severe pain.  9.  Vitamin D 1000 units daily.    PHYSICAL EXAMINATION:  VITAL SIGNS:  Temperature is 37.0, heart rate is 83, respiratory rate 22,   oxygen saturation 93% on 4 L nasal cannula, blood pressure is 157/77.  GENERAL:  No acute distress, speaking in full sentences, A and O x4, pleasant   and cooperative.  HEENT:  Normocephalic, atraumatic.  Pupils are equal and reactive to light.  NECK:  Flat JVD.  CARDIOVASCULAR:  Regular rate and rhythm.  No murmurs, rubs, or gallops.    Nondisplaced PMI.  She has crackles diffusely, more prominent in the lower   bases and mid lung field.  She has possible rhonchi in the right lower base.    No obvious wheezing, no use of accessory muscles.  ABDOMEN:  Soft, nontender, nondistended.  Normoactive bowel sounds.  No   hepatosplenomegaly.  EXTREMITIES:  No clubbing, cyanosis, or edema, is warm, peripherally 2+ radial   pulses equal and symmetric.  NEUROLOGICAL:  Intact sensation to soft touch, nonfocal.  MUSCULOSKELETAL:  Full range of motion of all extremities, 5/5 muscle strength   throughout.  SKIN:  No rashes, lesions, or excoriations.  PSYCHOLOGICAL:  Appropriate affect, A and O x4.    LABORATORY DATA:    1.  CBC remarkable for white blood cell count of 7.5, H and H 12.6 and 37.7,   white count is 250.    2.  CMP:  Sodium 137, potassium 4.4, BUN and creatinine are 26 and 1.22,   glucose 142.  AST and ALT are 27 and 19, albumin 3.0.  Lactic acid 1.30,   troponin less than 0.02.  BNP 75.  3.  D-dimer 783.    IMAGIN.  CT of L-spine without contrast.  No acute osseous abnormality identified,   osteopenia.  Lumbar spondylosis.  Slight interval enlargement of infrarenal   abdominal aortic aneurysm, which now measures up to 4.2 cm.  2.  Portable chest x-ray.  Interstitial and alveolar edema less likely   atypical infection.  Atherosclerosis  emphysema.  3.  CTA of the chest with contrast.  No central segmental pulmonary embolus.    Bilateral interstitial lung disease, suspect atypical infection or other   inflammatory process with a small component of consolidation in the right   lower lobe.  A 10 x 4 mm right middle lobe pulmonary nodule and enlarged left   lymph nodes and prominent mediastinal right hilar lymph nodes.  Bilateral   adrenal gland adenomas.  4.  EKG personally reviewed by me, normal sinus rhythm, heart rate is   approximately 83.  No evidence of ST segment changes.    ASSESSMENT:  1.  Community-acquired pneumonia.  2.  Acute respiratory failure with hypoxia.  3.  Newly diagnosed interstitial lung disease.  4.  Underlying chronic obstructive pulmonary disease.  5.  Incidentally discovered bilateral adrenal adenomas.  6.  A 10 x 4 mm right middle lobe pulmonary nodule incidentally discovered.  7.  Mild acute kidney injury.  8.  Dehydration.  9.  Elevated glucose.  10.  History of breast cancer.  11.  History of abdominal aortic aneurysm, stable.    PLAN:  Patient will be admitted to the medical unit.  She appears to have an   early community-acquired pneumonia in the setting of undiagnosed interstitial   lung disease.  Interstitial lung disease could certainly be related to the   smoking or to environmental exposure.  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  She might benefit from an outpatient   pulmonary consultation.  At this time, we will do Prednisone 40 mg p.o. daily   for 5 days as well as IV ceftriaxone and azithromycin for both atypical as   well as the community-acquired pneumonia pathogens.  Also, do DuoNeb inhalers,   RT q. 6 hours.  Do gentle fluids NS at 75 mL an hour.    CODE STATUS:  Full code.    DIET:  Regular.    DEEP VENOUS THROMBOSIS PROPHYLAXIS:  Heparin 5000 units every 8 hours.       ____________________________________     REJI CUNNINGHAM MD    [facility] / [facility]    DD:  01/18/2017  22:15:00  DT:  01/18/2017 23:17:38    D#:  [id number]  Job#:  [id number]

## 2024-11-26 NOTE — PATIENT INSTRUCTIONS
Yesi comes in today with her daughter, has interstitial lung disease and previously was on pirfenidone.  She had side effects, malaise, and fortunately after discontinuing that medication she is actually rebounded, today her total lung capacity is up from 72% 6 months ago to 84%.  She has not lost any ground on oxygen transfer, and her spirometry looks good as well.  I suspect this is a stable chronic fibrotic process not advancing.  No interval new cough hemoptysis or purulence.    She is 89 years of age, very bright alert coherent insightful had her hair done yesterday and looks wonderful today.    Lung function testing today looks good as described above.  Flu vaccine is current.  She does require oxygen 24/7.  When she returns in 6 months we will look again at her oxygen needs, presently she is clearly benefiting from oxygen 24/7 and hopefully lung function testing will remain as good.   [Time Spent: ___ minutes] : I have spent [unfilled] minutes of time on the encounter which excludes teaching and separately reported services.

## 2025-05-16 NOTE — DISCHARGE PLANNING
Per the request of the [facility] on floor transportation has been arranged to transfer the patient to [facility]. Aylin at [facility] has arrange for the [facility] Van to transport the patient at 1600. SW on floor has been notified via phone.     Her/She

## (undated) DEVICE — LACTATED RINGERS INJ 1000 ML - (14EA/CA 60CA/PF)

## (undated) DEVICE — SUTURE 5 TI-CRON HOS-14 - (36/BX)

## (undated) DEVICE — DRAPE LARGE 3 QUARTER - (20/CA)

## (undated) DEVICE — SUTURE GENERAL

## (undated) DEVICE — SODIUM CHL IRRIGATION 0.9% 1000ML (12EA/CA)

## (undated) DEVICE — HANDPIECE 10FT INTPLS SCT PLS IRRIGATION HAND CONTROL SET (6/PK)

## (undated) DEVICE — SENSOR OXIMETER ADULT SPO2 RD SET (20EA/BX)

## (undated) DEVICE — HUMID-VENT HEAT AND MOISTURE EXCHANGE- (50/BX)

## (undated) DEVICE — GLOVE BIOGEL PI ULTRATOUCH SZ 7.5 SURGICAL PF LF -(50/BX 4BX/CA)

## (undated) DEVICE — MIXER BONE CEMENT REVOLUTION - W/FEMORAL PRESSURIZER (6/CA)

## (undated) DEVICE — TUBING CLEARLINK DUO-VENT - C-FLO (48EA/CA)

## (undated) DEVICE — PACK TOTAL HIP - (1/CA)

## (undated) DEVICE — GLOVE BIOGEL SZ 7.5 SURGICAL PF LTX - (50PR/BX 4BX/CA)

## (undated) DEVICE — SUCTION INSTRUMENT YANKAUER BULBOUS TIP W/O VENT (50EA/CA)

## (undated) DEVICE — BRUSH FMCNL TIP IRR STRL - (12/PKG) FOR SURGILAV

## (undated) DEVICE — TIP INTPLS HFLO ML ORFC BTRY - (12/CS)  FOR SURGILAV

## (undated) DEVICE — PACK MAJOR ORTHO - (2EA/CA)

## (undated) DEVICE — PAD PREP 24 X 48 CUFFED - (100/CA)

## (undated) DEVICE — BLADE SAGITTAL SAW DUAL CUT 75.0 X 25.0MM (1/EA)

## (undated) DEVICE — TOWEL STOP TIMEOUT SAFETY FLAG (40EA/CA)

## (undated) DEVICE — CANISTER SUCTION RIGID RED 1500CC (40EA/CA)

## (undated) DEVICE — KIT HIP PREP IM ENCHANCE TOTAL (5EA/BX)

## (undated) DEVICE — GLOVE BIOGEL INDICATOR SZ 8 SURGICAL PF LTX - (50/BX 4BX/CA)

## (undated) DEVICE — SUTURE 1 VICRYL PLUS CTX - 8 X 18 INCH (12/BX)

## (undated) DEVICE — SUTURE 2-0 MONOCRYL PLUS UNDYED CT-1 1 X 36 (36EA/BX)"

## (undated) DEVICE — SET EXTENSION WITH 2 PORTS (48EA/CA) ***PART #2C8610 IS A SUBSTITUTE*****

## (undated) DEVICE — SUTURE 2-0 VICRYL PLUS CT-1 - 8 X 18 INCH(12/BX)

## (undated) DEVICE — COVER LIGHT HANDLE FLEXIBLE - SOFT (2EA/PK 80PK/CA)